# Patient Record
Sex: FEMALE | Race: WHITE | NOT HISPANIC OR LATINO | Employment: OTHER | URBAN - METROPOLITAN AREA
[De-identification: names, ages, dates, MRNs, and addresses within clinical notes are randomized per-mention and may not be internally consistent; named-entity substitution may affect disease eponyms.]

---

## 2023-12-28 ENCOUNTER — HOSPITAL ENCOUNTER (OUTPATIENT)
Dept: GENERAL RADIOLOGY | Facility: HOSPITAL | Age: 73
Discharge: HOME | End: 2023-12-28
Attending: NURSE PRACTITIONER | Admitting: NURSE PRACTITIONER
Payer: MEDICARE

## 2023-12-28 ENCOUNTER — OFFICE VISIT (OUTPATIENT)
Dept: FAMILY MEDICINE | Facility: CLINIC | Age: 73
End: 2023-12-28
Payer: MEDICARE

## 2023-12-28 VITALS
OXYGEN SATURATION: 98 % | SYSTOLIC BLOOD PRESSURE: 233 MMHG | DIASTOLIC BLOOD PRESSURE: 112 MMHG | HEART RATE: 99 BPM | RESPIRATION RATE: 17 BRPM | WEIGHT: 122 LBS | TEMPERATURE: 98.4 F

## 2023-12-28 DIAGNOSIS — R09.89 ABNORMAL LUNG SOUNDS: ICD-10-CM

## 2023-12-28 DIAGNOSIS — R05.2 SUBACUTE COUGH: Primary | ICD-10-CM

## 2023-12-28 DIAGNOSIS — J20.9 ACUTE BRONCHITIS, UNSPECIFIED ORGANISM: ICD-10-CM

## 2023-12-28 DIAGNOSIS — R05.2 SUBACUTE COUGH: ICD-10-CM

## 2023-12-28 PROCEDURE — 99203 OFFICE O/P NEW LOW 30 MIN: CPT | Performed by: NURSE PRACTITIONER

## 2023-12-28 PROCEDURE — 71046 X-RAY EXAM CHEST 2 VIEWS: CPT

## 2023-12-28 RX ORDER — PREDNISONE 20 MG/1
20 TABLET ORAL DAILY
Qty: 5 TABLET | Refills: 0 | Status: SHIPPED | OUTPATIENT
Start: 2023-12-28 | End: 2024-01-07

## 2023-12-28 RX ORDER — ALBUTEROL SULFATE 90 UG/1
1-2 AEROSOL, METERED RESPIRATORY (INHALATION) EVERY 4 HOURS PRN
Qty: 18 G | Refills: 0 | Status: ON HOLD | OUTPATIENT
Start: 2023-12-28 | End: 2024-01-10

## 2023-12-28 RX ORDER — AZITHROMYCIN 250 MG/1
TABLET, FILM COATED ORAL
Qty: 6 TABLET | Refills: 0 | Status: SHIPPED | OUTPATIENT
Start: 2023-12-28 | End: 2024-01-02

## 2023-12-28 NOTE — PROGRESS NOTES
SUBJECTIVE:   Inez Rodriugez is a 73 year old female presenting with a chief complaint of   Chief Complaint   Patient presents with    Respiratory Problems     X 2 months, sinus giving her an issue, difficulty breathing, wheezing, trouble coughing, no OTC medication is helping. No chest pains, wheezing, crackling. Has not been sleeping well.        She is a new patient of [facility].    She is here today for illness x 2 months, she recently moved her about 4 months ago from [place] and she notes that she always has high blood pressure when she comes in because she is scared of the doctor office and she has been to the hospital and had her heart checked out many times for the hypertensions, she has had ECHO and stress done and there is nothing wrong with her heart. She has been on blood pressure medication in the past but she is not on any blood pressure medication now, when she was on BP medication she was drinking alcohol and now she has made a lot of changes in her life and she is taking care of her self better. She is a non smoker, denies history of heart attack. Denies diabetes, stroke history. She did test her self for covid x 2 and she was negative x 2 times. She has been very careful over the last three years and has not left her house for anything for the last 3 years. Denies significant medical history. She does monitor her BP at home and it is always normal at home.     She states for about two months she has had cough, sinus pressure pain, she is having problems with laying down and wheezing, she does have phlegm but she does not get much up. She has tried OTC medication and herbs and steam and nothing is helping her get rid of the sinus congestion. She did try sudafed one time ast night. She has pressure when she touches her head and she states that her head feels like it weighs 100 pounds. She has no appetite. She denies shortness of breath.     Review of Systems    No past medical history on file.  No  family history on file.  Current Outpatient Medications   Medication Sig Dispense Refill    albuterol (PROAIR HFA/PROVENTIL HFA/VENTOLIN HFA) 108 (90 Base) MCG/ACT inhaler Inhale 1-2 puffs into the lungs every 4 hours as needed for shortness of breath, wheezing or cough 18 g 0    azithromycin (ZITHROMAX) 250 MG tablet Take 2 tablets (500 mg) by mouth daily for 1 day, THEN 1 tablet (250 mg) daily for 4 days. 6 tablet 0    predniSONE (DELTASONE) 20 MG tablet Take 1 tablet (20 mg) by mouth daily 5 tablet 0     Social History     Tobacco Use    Smoking status: Not on file    Smokeless tobacco: Not on file   Substance Use Topics    Alcohol use: Not on file       OBJECTIVE  BP (!) 233/112 (BP Location: Right arm, Patient Position: Sitting, Cuff Size: Adult Small)   Pulse 99   Temp 98.4  F (36.9  C) (Oral)   Resp 17   Wt 55.3 kg (122 lb)   SpO2 98%     Physical Exam  Constitutional:       Appearance: Normal appearance.   HENT:      Head: Normocephalic.      Right Ear: Tympanic membrane and ear canal normal.      Left Ear: Ear canal normal.      Nose: Nose normal.      Mouth/Throat:      Mouth: Mucous membranes are moist.      Pharynx: Posterior oropharyngeal erythema present.   Cardiovascular:      Rate and Rhythm: Normal rate and regular rhythm.   Pulmonary:      Breath sounds: Stridor present. Wheezing and rales present.   Abdominal:      General: Abdomen is flat.      Palpations: Abdomen is soft.   Musculoskeletal:      Cervical back: Normal range of motion.   Skin:     General: Skin is dry.   Neurological:      General: No focal deficit present.      Mental Status: She is alert and oriented to person, place, and time.   Psychiatric:         Mood and Affect: Mood normal.         Behavior: Behavior normal.         Labs:  No results found for this or any previous visit (from the past 24 hour(s)).    X-Ray was done, my findings are: clear lung xray     ASSESSMENT:      ICD-10-CM    1. Subacute cough  R05.2 XR Chest 2  Views     albuterol (PROAIR HFA/PROVENTIL HFA/VENTOLIN HFA) 108 (90 Base) MCG/ACT inhaler      2. Abnormal lung sounds  R09.89 XR Chest 2 Views     predniSONE (DELTASONE) 20 MG tablet      3. Acute bronchitis, unspecified organism  J20.9 predniSONE (DELTASONE) 20 MG tablet     azithromycin (ZITHROMAX) 250 MG tablet        PLAN:  - discussed in depth patients blood pressure and the need for follow up care and to establish care, she states she has been working with medicare to fill out all the paper work and so she can get a primary. She states she is healthy and takes care of herself, BP second time on manual cuff still elevated, will have her continue to monitor at home.   - will treat due to duration of illness  - discussed medications and side effects  - take prednisone with food      Followup:    If not improving or if condition worsens, follow up with your Primary Care Provider    There are no Patient Instructions on file for this visit.

## 2024-01-07 ENCOUNTER — HOSPITAL ENCOUNTER (INPATIENT)
Facility: HOSPITAL | Age: 74
LOS: 2 days | Discharge: HOME | DRG: 166 | End: 2024-01-10
Attending: EMERGENCY MEDICINE | Admitting: STUDENT IN AN ORGANIZED HEALTH CARE EDUCATION/TRAINING PROGRAM
Payer: MEDICARE

## 2024-01-07 ENCOUNTER — NURSE TRIAGE (OUTPATIENT)
Dept: NURSING | Facility: CLINIC | Age: 74
End: 2024-01-07
Payer: MEDICARE

## 2024-01-07 ENCOUNTER — APPOINTMENT (OUTPATIENT)
Dept: CT IMAGING | Facility: HOSPITAL | Age: 74
DRG: 166 | End: 2024-01-07
Attending: EMERGENCY MEDICINE
Payer: MEDICARE

## 2024-01-07 DIAGNOSIS — E04.1 THYROID NODULE: ICD-10-CM

## 2024-01-07 DIAGNOSIS — R05.2 SUBACUTE COUGH: ICD-10-CM

## 2024-01-07 DIAGNOSIS — R05.3 CHRONIC COUGH: ICD-10-CM

## 2024-01-07 DIAGNOSIS — Z03.89 OBSERVATION FOR SUSPECTED MALIGNANT NEOPLASM: ICD-10-CM

## 2024-01-07 DIAGNOSIS — J98.8 WHEEZING-ASSOCIATED RESPIRATORY INFECTION (WARI): ICD-10-CM

## 2024-01-07 DIAGNOSIS — I10 BENIGN ESSENTIAL HYPERTENSION: ICD-10-CM

## 2024-01-07 DIAGNOSIS — R91.8 PULMONARY NODULES: ICD-10-CM

## 2024-01-07 DIAGNOSIS — C34.31 MALIGNANT NEOPLASM OF LOWER LOBE OF RIGHT LUNG (H): ICD-10-CM

## 2024-01-07 DIAGNOSIS — J18.9 POSTOBSTRUCTIVE PNEUMONIA: Primary | ICD-10-CM

## 2024-01-07 LAB
ANION GAP SERPL CALCULATED.3IONS-SCNC: 14 MMOL/L (ref 7–15)
BASOPHILS # BLD AUTO: 0 10E3/UL (ref 0–0.2)
BASOPHILS NFR BLD AUTO: 0 %
BUN SERPL-MCNC: 12.6 MG/DL (ref 8–23)
CALCIUM SERPL-MCNC: 9.4 MG/DL (ref 8.8–10.2)
CHLORIDE SERPL-SCNC: 96 MMOL/L (ref 98–107)
CREAT SERPL-MCNC: 0.8 MG/DL (ref 0.51–0.95)
DEPRECATED HCO3 PLAS-SCNC: 25 MMOL/L (ref 22–29)
EGFRCR SERPLBLD CKD-EPI 2021: 77 ML/MIN/1.73M2
EOSINOPHIL # BLD AUTO: 0 10E3/UL (ref 0–0.7)
EOSINOPHIL NFR BLD AUTO: 0 %
ERYTHROCYTE [DISTWIDTH] IN BLOOD BY AUTOMATED COUNT: 14.3 % (ref 10–15)
FLUAV RNA SPEC QL NAA+PROBE: NEGATIVE
FLUBV RNA RESP QL NAA+PROBE: NEGATIVE
GLUCOSE SERPL-MCNC: 98 MG/DL (ref 70–99)
HCT VFR BLD AUTO: 42.9 % (ref 35–47)
HGB BLD-MCNC: 13.9 G/DL (ref 11.7–15.7)
IMM GRANULOCYTES # BLD: 0.1 10E3/UL
IMM GRANULOCYTES NFR BLD: 1 %
LYMPHOCYTES # BLD AUTO: 1.5 10E3/UL (ref 0.8–5.3)
LYMPHOCYTES NFR BLD AUTO: 10 %
MCH RBC QN AUTO: 29 PG (ref 26.5–33)
MCHC RBC AUTO-ENTMCNC: 32.4 G/DL (ref 31.5–36.5)
MCV RBC AUTO: 90 FL (ref 78–100)
MONOCYTES # BLD AUTO: 1.4 10E3/UL (ref 0–1.3)
MONOCYTES NFR BLD AUTO: 9 %
NEUTROPHILS # BLD AUTO: 11.9 10E3/UL (ref 1.6–8.3)
NEUTROPHILS NFR BLD AUTO: 80 %
NRBC # BLD AUTO: 0 10E3/UL
NRBC BLD AUTO-RTO: 0 /100
NT-PROBNP SERPL-MCNC: 95 PG/ML (ref 0–900)
PLATELET # BLD AUTO: 265 10E3/UL (ref 150–450)
POTASSIUM SERPL-SCNC: 4.8 MMOL/L (ref 3.4–5.3)
RBC # BLD AUTO: 4.79 10E6/UL (ref 3.8–5.2)
RSV RNA SPEC NAA+PROBE: NEGATIVE
SARS-COV-2 RNA RESP QL NAA+PROBE: NEGATIVE
SODIUM SERPL-SCNC: 135 MMOL/L (ref 135–145)
T4 FREE SERPL-MCNC: 1.22 NG/DL (ref 0.9–1.7)
TSH SERPL DL<=0.005 MIU/L-ACNC: 0.02 UIU/ML (ref 0.3–4.2)
WBC # BLD AUTO: 14.9 10E3/UL (ref 4–11)

## 2024-01-07 PROCEDURE — 250N000011 HC RX IP 250 OP 636: Performed by: EMERGENCY MEDICINE

## 2024-01-07 PROCEDURE — 96361 HYDRATE IV INFUSION ADD-ON: CPT

## 2024-01-07 PROCEDURE — 80048 BASIC METABOLIC PNL TOTAL CA: CPT | Performed by: EMERGENCY MEDICINE

## 2024-01-07 PROCEDURE — 258N000003 HC RX IP 258 OP 636: Performed by: EMERGENCY MEDICINE

## 2024-01-07 PROCEDURE — 93005 ELECTROCARDIOGRAM TRACING: CPT | Performed by: EMERGENCY MEDICINE

## 2024-01-07 PROCEDURE — 85025 COMPLETE CBC W/AUTO DIFF WBC: CPT | Performed by: EMERGENCY MEDICINE

## 2024-01-07 PROCEDURE — 99222 1ST HOSP IP/OBS MODERATE 55: CPT | Mod: AI | Performed by: STUDENT IN AN ORGANIZED HEALTH CARE EDUCATION/TRAINING PROGRAM

## 2024-01-07 PROCEDURE — G0378 HOSPITAL OBSERVATION PER HR: HCPCS

## 2024-01-07 PROCEDURE — 94640 AIRWAY INHALATION TREATMENT: CPT

## 2024-01-07 PROCEDURE — 250N000013 HC RX MED GY IP 250 OP 250 PS 637

## 2024-01-07 PROCEDURE — 36415 COLL VENOUS BLD VENIPUNCTURE: CPT | Performed by: EMERGENCY MEDICINE

## 2024-01-07 PROCEDURE — 84439 ASSAY OF FREE THYROXINE: CPT

## 2024-01-07 PROCEDURE — 999N000157 HC STATISTIC RCP TIME EA 10 MIN

## 2024-01-07 PROCEDURE — 87637 SARSCOV2&INF A&B&RSV AMP PRB: CPT | Performed by: EMERGENCY MEDICINE

## 2024-01-07 PROCEDURE — 96374 THER/PROPH/DIAG INJ IV PUSH: CPT | Mod: 59

## 2024-01-07 PROCEDURE — 99285 EMERGENCY DEPT VISIT HI MDM: CPT | Mod: 25

## 2024-01-07 PROCEDURE — G1010 CDSM STANSON: HCPCS

## 2024-01-07 PROCEDURE — 99207 PR APP CREDIT; MD BILLING SHARED VISIT: CPT

## 2024-01-07 PROCEDURE — 250N000009 HC RX 250: Performed by: EMERGENCY MEDICINE

## 2024-01-07 PROCEDURE — 83880 ASSAY OF NATRIURETIC PEPTIDE: CPT | Performed by: EMERGENCY MEDICINE

## 2024-01-07 PROCEDURE — 250N000013 HC RX MED GY IP 250 OP 250 PS 637: Performed by: EMERGENCY MEDICINE

## 2024-01-07 PROCEDURE — 94664 DEMO&/EVAL PT USE INHALER: CPT

## 2024-01-07 PROCEDURE — 84443 ASSAY THYROID STIM HORMONE: CPT

## 2024-01-07 RX ORDER — MULTIPLE VITAMINS W/ MINERALS TAB 9MG-400MCG
1 TAB ORAL DAILY
COMMUNITY

## 2024-01-07 RX ORDER — GUAIFENESIN 200 MG/10ML
200 LIQUID ORAL EVERY 4 HOURS PRN
Status: DISCONTINUED | OUTPATIENT
Start: 2024-01-07 | End: 2024-01-08

## 2024-01-07 RX ORDER — IPRATROPIUM BROMIDE AND ALBUTEROL SULFATE 2.5; .5 MG/3ML; MG/3ML
3 SOLUTION RESPIRATORY (INHALATION) ONCE
Status: COMPLETED | OUTPATIENT
Start: 2024-01-07 | End: 2024-01-07

## 2024-01-07 RX ORDER — IPRATROPIUM BROMIDE AND ALBUTEROL SULFATE 2.5; .5 MG/3ML; MG/3ML
3 SOLUTION RESPIRATORY (INHALATION)
Status: DISCONTINUED | OUTPATIENT
Start: 2024-01-07 | End: 2024-01-07

## 2024-01-07 RX ORDER — PREDNISONE 20 MG/1
40 TABLET ORAL DAILY
Status: DISCONTINUED | OUTPATIENT
Start: 2024-01-08 | End: 2024-01-08

## 2024-01-07 RX ORDER — HYDRALAZINE HYDROCHLORIDE 20 MG/ML
10 INJECTION INTRAMUSCULAR; INTRAVENOUS EVERY 6 HOURS PRN
Status: DISCONTINUED | OUTPATIENT
Start: 2024-01-07 | End: 2024-01-08

## 2024-01-07 RX ORDER — METHYLPREDNISOLONE SODIUM SUCCINATE 125 MG/2ML
125 INJECTION, POWDER, LYOPHILIZED, FOR SOLUTION INTRAMUSCULAR; INTRAVENOUS ONCE
Status: COMPLETED | OUTPATIENT
Start: 2024-01-07 | End: 2024-01-07

## 2024-01-07 RX ORDER — PREDNISONE 20 MG/1
40 TABLET ORAL DAILY
Status: DISCONTINUED | OUTPATIENT
Start: 2024-01-07 | End: 2024-01-07

## 2024-01-07 RX ORDER — ACETAMINOPHEN 650 MG/1
650 SUPPOSITORY RECTAL EVERY 4 HOURS PRN
Status: DISCONTINUED | OUTPATIENT
Start: 2024-01-07 | End: 2024-01-10 | Stop reason: HOSPADM

## 2024-01-07 RX ORDER — IPRATROPIUM BROMIDE AND ALBUTEROL SULFATE 2.5; .5 MG/3ML; MG/3ML
3 SOLUTION RESPIRATORY (INHALATION) EVERY 4 HOURS PRN
Status: DISCONTINUED | OUTPATIENT
Start: 2024-01-07 | End: 2024-01-10 | Stop reason: HOSPADM

## 2024-01-07 RX ORDER — MULTIPLE VITAMINS W/ MINERALS TAB 9MG-400MCG
1 TAB ORAL DAILY
Status: DISCONTINUED | OUTPATIENT
Start: 2024-01-07 | End: 2024-01-10 | Stop reason: HOSPADM

## 2024-01-07 RX ORDER — PREDNISONE 20 MG/1
40 TABLET ORAL DAILY
Qty: 14 TABLET | Refills: 0 | Status: SHIPPED | OUTPATIENT
Start: 2024-01-08 | End: 2024-01-09

## 2024-01-07 RX ORDER — LIDOCAINE 40 MG/G
CREAM TOPICAL
Status: DISCONTINUED | OUTPATIENT
Start: 2024-01-07 | End: 2024-01-10 | Stop reason: HOSPADM

## 2024-01-07 RX ORDER — ALBUTEROL SULFATE 0.83 MG/ML
3 SOLUTION RESPIRATORY (INHALATION)
Status: DISCONTINUED | OUTPATIENT
Start: 2024-01-07 | End: 2024-01-10 | Stop reason: HOSPADM

## 2024-01-07 RX ORDER — IOPAMIDOL 755 MG/ML
75 INJECTION, SOLUTION INTRAVASCULAR ONCE
Status: COMPLETED | OUTPATIENT
Start: 2024-01-07 | End: 2024-01-07

## 2024-01-07 RX ORDER — ALBUTEROL SULFATE 90 UG/1
2 AEROSOL, METERED RESPIRATORY (INHALATION) EVERY 6 HOURS PRN
Qty: 18 G | Refills: 0 | Status: SHIPPED | OUTPATIENT
Start: 2024-01-07 | End: 2024-01-10

## 2024-01-07 RX ORDER — ACETAMINOPHEN 325 MG/1
650 TABLET ORAL EVERY 4 HOURS PRN
Status: DISCONTINUED | OUTPATIENT
Start: 2024-01-07 | End: 2024-01-10 | Stop reason: HOSPADM

## 2024-01-07 RX ORDER — BENZONATATE 100 MG/1
100 CAPSULE ORAL 3 TIMES DAILY PRN
Status: DISCONTINUED | OUTPATIENT
Start: 2024-01-07 | End: 2024-01-09

## 2024-01-07 RX ADMIN — Medication 125 MCG: at 18:40

## 2024-01-07 RX ADMIN — GUAIFENESIN 200 MG: 100 SOLUTION ORAL at 18:40

## 2024-01-07 RX ADMIN — METHYLPREDNISOLONE SODIUM SUCCINATE 125 MG: 125 INJECTION, POWDER, FOR SOLUTION INTRAMUSCULAR; INTRAVENOUS at 12:26

## 2024-01-07 RX ADMIN — SODIUM CHLORIDE 500 ML: 9 INJECTION, SOLUTION INTRAVENOUS at 12:23

## 2024-01-07 RX ADMIN — BENZONATATE 100 MG: 100 CAPSULE ORAL at 12:26

## 2024-01-07 RX ADMIN — MULTIPLE VITAMINS W/ MINERALS TAB 1 TABLET: TAB at 18:40

## 2024-01-07 RX ADMIN — IOPAMIDOL 75 ML: 755 INJECTION, SOLUTION INTRAVENOUS at 13:08

## 2024-01-07 RX ADMIN — BENZONATATE 100 MG: 100 CAPSULE ORAL at 18:40

## 2024-01-07 RX ADMIN — IPRATROPIUM BROMIDE AND ALBUTEROL SULFATE 3 ML: .5; 3 SOLUTION RESPIRATORY (INHALATION) at 12:28

## 2024-01-07 ASSESSMENT — ACTIVITIES OF DAILY LIVING (ADL)
DEPENDENT_IADLS:: TRANSPORTATION;SHOPPING
ADLS_ACUITY_SCORE: 35
ADLS_ACUITY_SCORE: 18

## 2024-01-07 ASSESSMENT — ENCOUNTER SYMPTOMS
COUGH: 1
ABDOMINAL PAIN: 0

## 2024-01-07 NOTE — H&P
[facility]    History and Physical - Hospitalist Service       Date of Admission:  1/7/2024    Assessment & Plan    Inez Rodriguez is a 73 year old female admitted on 1/7/2024 for cough, shortness of breath, abnormal chest CT suspicious for malignancy.     Cough   Shortness of Breath  Chest discomfort  Patient reports cough, SOB x 2-3 months, worsening   - EKG sinus tach, no ischemic changes per my read  - COVID, Flu and RSV negative  - Recently given azithro and prednisone at previous office visit 12/28  - CT Chest: Mediastinal and right hilar adenopathy most suggestive of metastatic adenopathy from a primary lung malignancy. A nodular opacity in the right upper lobe could represent a primary lung malignancy versus an infectious/inflammatory focus . (Negative for PE)  - Pulreji diaz in ED, plan for bronchoscopy tomorrow. NPO at midnight   - Pulm consult placed  - Solu-Medrol/Duoneb given in ED  - PRN albuterol neb and DuoNeb. PRN robitussin and tessalon    - Prednisone 40 mg daily starting tomorrow   - Not currently requiring supp O2, continuous pulseox and O2 PRN     Leukocytosis   - Suspect secondary to prednisone use PTA   - CBC trend     Thyroid Nodule   - CT Chest: Left thyroid nodule which could be further evaluated with nonemergent thyroid ultrasound   - TSH with reflex pending    Elevated blood pressures without the diagnosis of hypertension   - SBPs 190 at admit   - Denies hx of HTN, no home meds   - Hydralazine PRN           Diet:  Regular Adult  DVT Prophylaxis: Pneumatic Compression Devices and Ambulate every shift  Ansari Catheter: Not present  Lines: None     Cardiac Monitoring: None  Code Status:  Full     Clinically Significant Risk Factors Present on Admission                                  Disposition Plan      Expected Discharge Date: 01/08/2024                The patient's care was discussed with the Attending Physician, Dr. Armin Florentino who independently met with  "and assessed the patient and is agreement with the assessment and plan.     Rosanne Goins PA-C  Hospitalist Service  [facility]  Securely message with [employer] (more info)  Text page via [employer] Paging/Directory     ______________________________________________________________________    Chief Complaint   Cough  Shortness of breath   Difficulty breathing     History is obtained from the patient, patient's son     History of Present Illness   Inez Rodriguez is a 73 year old female who presented to the ED with complaints of worsening cough and shortness of breath that has been present for 2-3 months. Patient states she was seen at a clinic 12/28/23, had CXR which was clear and dx'd with acute bronchitis. Given azithromycin and prednisone which patient states she has taken without improvement in symptoms, and states she feels worsening symptoms since. Was given albuterol inhaler which she has also used without improvement in sxs. Reports \"chest discomfort\" which she states is secondary to coughing. Denies leg swelling, leg pain. No history of COPD, asthma. Reports \"occasional\" cigarette in remote past, but has not smoked since 1987. Does report 2nd hand smoke exposure, lived with smoker.     Patient recently moved to [place] with her son.     Past Medical History    No past medical history on file.    Past Surgical History   No past surgical history on file.    Prior to Admission Medications   Prior to Admission Medications   Prescriptions Last Dose Informant Patient Reported? Taking?   albuterol (PROAIR HFA/PROVENTIL HFA/VENTOLIN HFA) 108 (90 Base) MCG/ACT inhaler   No No   Sig: Inhale 1-2 puffs into the lungs every 4 hours as needed for shortness of breath, wheezing or cough   predniSONE (DELTASONE) 20 MG tablet   No No   Sig: Take 1 tablet (20 mg) by mouth daily      Facility-Administered Medications: None           Physical Exam   Vital Signs: Temp: 98.4  F (36.9  C) Temp src: Tympanic BP: (!) 162/79 " Pulse: 108   Resp: 16 SpO2: 95 % O2 Device: None (Room air)    Weight: 122 lbs 0 oz    Constitutional: awake, alert, no apparent distress, and appears stated age  Respiratory: No increased work of breathing, no accessory muscle use. Inspiratory wheezes + rhonci bilaterally, faint expiratory wheezes.  Cardiovascular: Regular rate and rhythm, normal S1 and S2, no S3 or S4, and no murmur noted  Skin: Normal skin color, texture, turgor. No rashes and no jaundice  Musculoskeletal: no lower extremity pitting edema present  Neuropsychiatric: Appropriate mood, affect and eye contact. Cooperative.      Medical Decision Making             Data     I have personally reviewed the following data over the past 24 hrs:    14.9 (H)  \   13.9   / 265     135 96 (L) 12.6 /  98   4.8 25 0.80 \     Trop: N/A BNP: 95       Imaging results reviewed over the past 24 hrs:   Recent Results (from the past 24 hour(s))   CT Chest Pulmonary Embolism w Contrast    Narrative    EXAM: CT CHEST PULMONARY EMBOLISM W CONTRAST  LOCATION: [facility]  DATE: 1/7/2024    INDICATION: Cough. Shortness of breath.  COMPARISON: Chest x-ray 12/28/2023.  TECHNIQUE: CT chest pulmonary angiogram during arterial phase injection of IV contrast. Multiplanar reformats and MIP reconstructions were performed. Dose reduction techniques were used.   CONTRAST: 75 mL Isovue-370.    FINDINGS:  ANGIOGRAM CHEST: Pulmonary arteries are normal caliber and negative for pulmonary emboli. Thoracic aorta is negative for dissection within the limitations of cardiac motion artifact. Moderate burden of atherosclerotic disease of the thoracic aorta.     LUNGS AND PLEURA: Right upper lobe nodular opacity measuring 2.1 x 1.9 x 1.0 cm (series 6, image 109). Ill-defined cluster of nodular opacities in the right upper lobe (series 6, image 75), favored infectious or inflammatory. Mild centrilobular   emphysema. No pleural effusion.    MEDIASTINUM/AXILLAE:  Heterogeneous left thyroid lobe with a 1.5 cm hypoattenuating left thyroid nodule. There is mediastinal and right hilar adenopathy including right upper paratracheal, subcarinal, and right hilar nodes. Index right upper paratracheal   lymph node measures 2.2 cm in short axis and the subcarinal nichole conglomerate measures 3.0 cm in short axis. Right hilar nichole conglomerate demonstrates mass effect on the right upper lobe bronchus and bronchus intermedius with associated narrowing   without occlusion. Normal heart size with trace pericardial effusion.    CORONARY ARTERY CALCIFICATION: Moderate.    UPPER ABDOMEN: Lobulated contour of both kidneys. Atherosclerotic disease of the partially imaged abdominal aorta.    MUSCULOSKELETAL: No aggressive appearing osseous lesion.      Impression    IMPRESSION:  1.  Mediastinal and right hilar adenopathy most suggestive of metastatic adenopathy from a primary lung malignancy. A nodular opacity in the right upper lobe could represent a primary lung malignancy versus an infectious/inflammatory focus. Recommend   referral to pulmonology for tissue sampling.  2.  Nichole conglomerate demonstrate mass effect on the right upper lobe bronchus and bronchus intermedius with associated narrowing without occlusion.  3.  Left thyroid nodule which could be further evaluated with nonemergent thyroid ultrasound.  4.  No evidence of acute pulmonary thromboembolic disease.

## 2024-01-07 NOTE — TELEPHONE ENCOUNTER
Patient's son, ann Cooper.     Reports that pt. recently completed course of antibiotics and prednisone for URI. Concerned that symptoms are getting worse.  No consent on file; Obtained verbal consent from patient and patient took over call for triage.    Patient with persistent cough and some wheezing, that is worse when laying down. No fever. C/o having no energy. Also reports sore throat, and an achy pain on the right side of her chest under the collar bone. Patient states that the pain comes and goes, and thinks that it is related to laying on her right side as she reports sore back muscles on the side. Pain not present now.    Disposition: Go to ED Now. Patient verbalized understanding and agrees with plan of care.    Sherie Taylor RN on 1/7/2024 at 11:00 AM     Reason for Disposition   Breathing difficulty   [1] MODERATE difficulty breathing (e.g., speaks in phrases, SOB even at rest, pulse 100-120) AND [2] NEW-onset or WORSE than normal    Additional Information   Negative: SEVERE difficulty breathing (e.g., struggling for each breath, speaks in single words)   Negative: [1] Breathing stopped AND [2] hasn't returned   Negative: Bluish (or gray) lips or face now   Negative: Choking on something   Negative: Difficult to awaken or acting confused (e.g., disoriented, slurred speech)   Negative: Passed out (i.e., lost consciousness, collapsed and was not responding)   Negative: Wheezing started suddenly after medicine, an allergic food or bee sting   Negative: Stridor (harsh sound while breathing in)   Negative: Slow, shallow and weak breathing   Negative: Sounds like a life-threatening emergency to the triager    Protocols used: Breathing Difficulty-A-AH, Respiratory Multiple Symptoms - Guideline [id number]

## 2024-01-07 NOTE — ED TRIAGE NOTES
Pt was diagnosed with pneumonia 10 days ago-put on prednisone, alb inhaler and antibiotics. Was feeling better after this at first but then the cough started up again. Has been fatigued and not eating much. Son is with pt.

## 2024-01-07 NOTE — ED PROVIDER NOTES
EMERGENCY DEPARTMENT ENCOUNTER      NAME: Inez Rodriguez  AGE: 73 year old female  YOB: 1950  MRN: [id number]  EVALUATION DATE & TIME: 1/7/2024 11:33 AM    PCP: No Ref-Primary, Physician    ED PROVIDER: Christine Christy M.D.      CHIEF COMPLAINT     Chief Complaint   Patient presents with    Cough         FINAL IMPRESSION:     1. Chronic cough    2. Wheezing-associated respiratory infection (WARI)    3. Pulmonary nodules    4. Thyroid nodule    5. Observation for suspected malignant neoplasm          MEDICAL DECISION MAKING:       Pertinent Labs & Imaging studies reviewed. (See chart for details)    73 year old female presents to the Emergency Department for evaluation of cough    History  Supplemental history from son  External Record(s) review as documented below    Exam  Nontoxic cooperative and pleasant when she starts talking she does have a harsh cough.  Not staccato.  Bilateral expiratory wheezes.    Differential Diagnosis include but not limited to pneumonia, COVID, reactive airway disease, congestive heart failure, aspiration among others      Vital Signs: Hypertensive  EKG: Sinus tachycardia normal anterior progression normal axis  Imaging: I independently interpreted CT chest.Formal read by radiologist.  Home meds: Reviewed  ED meds: Solu-Medrol albuterol Atrovent normal saline Tessalon Perles  Fluids: Normal saline  Labs:  K 4.8  Cr 0.80  Wbc 14.9  Hgb 13.9  platelets 265    Clinical Impression and Decision Making    73-year-old female presents with her son.  She has been having a cough for at least 2 months.  Was seen at the clinic was given prednisone azithromycin and albuterol has not improved.  Cough is nonproductive.    On examination she is nontoxic has a harsh cough but is able to speak in full sentences.  Bilateral expiratory wheezes.    Given the expiratory wheezes will do albuterol Atrovent will do Solu-Medrol will scan chest.    EKG reveals sinus tachycardia with no acute or  normalities.    CT reveals no pulmonary embolism but unfortunately multiple nodules concerning for lung primary with mets and a thyroid nodule.  Spoke again with pulmonology states she could be scheduled for outpatient bronchoscopy if patient does not feel well she could be admitted.  Patient notified she says she feels terrible.  Spoke with hospitalist.  Patient admitted stable condition with guarded diagnosis.    In addition to the work out documented, I considered the following work up antibiotics.  clinically no evidence of bacterial infection.           Medical Decision Making  Obtained supplemental history:Supplemental history obtained?: Documented in chart  Reviewed external records: External records reviewed?: Documented in chart  Care impacted by chronic illness:N/A  Care significantly affected by social determinants of health:N/A  Did you consider but not order tests?: Work up considered but not performed and documented in chart, if applicable  Did you interpret images independently?: Independent interpretation of ECG and images noted in documentation, when applicable.  Consultation discussion with other provider:Did you involve another provider (consultant, , pharmacy, etc.)?: I discussed the care with another health care provider, see documentation for details.  Discharge. I prescribed additional prescription strength medication(s) as charted. See documentation for any additional details.      Review of External Records  Hospital/Clinic:  Date: 12/28/23  2 months of cough, sinus drainage, and wheezing  /112.   Prescribed prednisone, azithromycin, and albuterol.      External Consultation  Pulmonologist - Dr. Mayfield      ED COURSE     11:53 AM I met with the patient, obtained history, performed an initial exam, and discussed options and plan for diagnostics and treatment here in the ED.  1:15 PM Checked in on and updated patient. She is feeling much better. We discussed the plan for discharge  and the patient is agreeable. Reviewed supportive cares, symptomatic treatment, outpatient follow up, and reasons to return to the Emergency Department. Patient to be discharged by ED RN.   1:24 PM Paged pulmonology.   1:28 PM Spoke to pulmonology, Dr. Mayfield.  2:53 PM spoke with hospitalist.  Assess patient MedSurg observation.  2:53 PM reevaluated and updated.    At the conclusion of the encounter I discussed the results of all of the tests and the disposition. The questions were answered. The patient and son acknowledged understanding and was agreeable with the care plan.         MEDICATIONS GIVEN IN THE EMERGENCY:     Medications   benzonatate (TESSALON) capsule 100 mg (100 mg Oral $Given 1/7/24 1226)   methylPREDNISolone sodium succinate (solu-MEDROL) injection 125 mg (125 mg Intravenous $Given 1/7/24 1226)   ipratropium - albuterol 0.5 mg/2.5 mg/3 mL (DUONEB) neb solution 3 mL (3 mLs Nebulization $Given 1/7/24 1228)   sodium chloride 0.9% BOLUS 500 mL (0 mLs Intravenous Stopped 1/7/24 1330)   iopamidol (ISOVUE-370) solution 75 mL (75 mLs Intravenous $Given 1/7/24 1308)       NEW PRESCRIPTIONS STARTED AT TODAY'S ER VISIT     New Prescriptions    ALBUTEROL (PROAIR HFA/PROVENTIL HFA/VENTOLIN HFA) 108 (90 BASE) MCG/ACT INHALER    Inhale 2 puffs into the lungs every 6 hours as needed for shortness of breath, wheezing or cough    PREDNISONE (DELTASONE) 20 MG TABLET    Take 2 tablets (40 mg) by mouth daily for 7 days Take two tablets (= 40mg) each day for 5 (five) days          =================================================================    HPI     Patient information was obtained from: patient and patient's son    Use of : N/A        Inez Rodriguez is a 73 year old female who presents by walk in for evaluation of cough.    Since November 2023 (2 months ago), the patient has been having a cough. She was seen for evaluation and prescribed steroids, antibiotics, and albuterol. Son says patient's cough  "has worsened since taking the antibiotics. The patient is vaccinated against COVID-19. Son says the patient has \"white coat syndrome\", and the patient's blood pressure is frequently elevated in medical settings. Patient denies a diagnosis of hypertension. Reports her blood pressure at home is 115-120/70-80. Denies smoking. In August 2023, patient and her son moved to [place] from [place]. No abdominal pain or any other complaints at this time.      REVIEW OF SYSTEMS   Review of Systems   Respiratory:  Positive for cough.    Gastrointestinal:  Negative for abdominal pain.       PAST MEDICAL HISTORY:   No past medical history on file.    PAST SURGICAL HISTORY:   No past surgical history on file.      CURRENT MEDICATIONS:   albuterol (PROAIR HFA/PROVENTIL HFA/VENTOLIN HFA) 108 (90 Base) MCG/ACT inhaler  [START ON 1/8/2024] predniSONE (DELTASONE) 20 MG tablet  albuterol (PROAIR HFA/PROVENTIL HFA/VENTOLIN HFA) 108 (90 Base) MCG/ACT inhaler         ALLERGIES:   No Known Allergies    FAMILY HISTORY:   No family history on file.    SOCIAL HISTORY:     Social History     Socioeconomic History    Marital status:        VITALS:   BP (!) 162/79   Pulse 108   Temp 98.4  F (36.9  C) (Tympanic)   Resp 16   Ht 1.651 m (5' 5\")   Wt 55.3 kg (122 lb)   SpO2 95%   BMI 20.30 kg/m      PHYSICAL EXAM     Physical Exam  Vitals and nursing note reviewed. Exam conducted with a chaperone present.   Constitutional:       General: She is not in acute distress.     Appearance: Normal appearance. She is normal weight. She is not ill-appearing, toxic-appearing or diaphoretic.   Pulmonary:      Breath sounds: Wheezing and rhonchi present.   Neurological:      Mental Status: She is alert.         Physical Exam   Constitutional: Well appearing. In no distress.    Head: Atraumatic.     Nose: Nose normal.     Mouth/Throat: Oropharynx is clear and moist.     Eyes: EOM are normal. Pupils are equal, round, and reactive to light.     Ears: " Bilateral pearly white tympanic membranes.    Neck: Normal range of motion. Neck supple.     Cardiovascular: Normal rate, regular rhythm and normal heart sounds.      Pulmonary/Chest: Normal effort  and breath sounds normal.     Abdominal: soft nontender.    Musculoskeletal: Normal range of motion.     Neurological: Moves upper and lower extremities equally.    Lymphatics: no edema    : NA    Skin: Skin is warm and dry.     Psychiatric: Normal mood and affect. Behavior is normal.       LAB:     All pertinent labs reviewed and interpreted.  Labs Ordered and Resulted from Time of ED Arrival to Time of ED Departure   BASIC METABOLIC PANEL - Abnormal       Result Value    Sodium 135      Potassium 4.8      Chloride 96 (*)     Carbon Dioxide (CO2) 25      Anion Gap 14      Urea Nitrogen 12.6      Creatinine 0.80      GFR Estimate 77      Calcium 9.4      Glucose 98     CBC WITH PLATELETS AND DIFFERENTIAL - Abnormal    WBC Count 14.9 (*)     RBC Count 4.79      Hemoglobin 13.9      Hematocrit 42.9      MCV 90      MCH 29.0      MCHC 32.4      RDW 14.3      Platelet Count 265      % Neutrophils 80      % Lymphocytes 10      % Monocytes 9      % Eosinophils 0      % Basophils 0      % Immature Granulocytes 1      NRBCs per 100 WBC 0      Absolute Neutrophils 11.9 (*)     Absolute Lymphocytes 1.5      Absolute Monocytes 1.4 (*)     Absolute Eosinophils 0.0      Absolute Basophils 0.0      Absolute Immature Granulocytes 0.1      Absolute NRBCs 0.0     NT PROBNP INPATIENT - Normal    N terminal Pro BNP Inpatient 95     INFLUENZA A/B, RSV, & SARS-COV2 PCR - Normal    Influenza A PCR Negative      Influenza B PCR Negative      RSV PCR Negative      SARS CoV2 PCR Negative          RADIOLOGY:     Reviewed all pertinent imaging. Please see official radiology report.  CT Chest Pulmonary Embolism w Contrast   Final Result   IMPRESSION:   1.  Mediastinal and right hilar adenopathy most suggestive of metastatic adenopathy from a  primary lung malignancy. A nodular opacity in the right upper lobe could represent a primary lung malignancy versus an infectious/inflammatory focus. Recommend    referral to pulmonology for tissue sampling.   2.  [employer] demonstrate mass effect on the right upper lobe bronchus and bronchus intermedius with associated narrowing without occlusion.   3.  Left thyroid nodule which could be further evaluated with nonemergent thyroid ultrasound.   4.  No evidence of acute pulmonary thromboembolic disease.           EKG:     EKG #1  Sinus tachycardia normal anterior progression normal axis    Time:104847    Ventricular rate 107 bmp  Axis normal  HI interval 162 ms  QRS duration 88 ms  QT//467 ms    Compared to previous EKG on no previous available for comparison    I have independently reviewed and interpreted the EKG(s) documented above.      PROCEDURES:     Procedures      I, Karen Martin, am serving as a scribe to document services personally performed by Dr. Christy based on my observation and the provider's statements to me. I, Christine Christy MD attest that Karen Martin is acting in a scribe capacity, has observed my performance of the services and has documented them in accordance with my direction.    Christine Christy M.D.  Emergency Medicine  [facility] EMERGENCY DEPARTMENT  [facility] [facility] [address] [phone]  Dept: [phone]       Christine Christy MD  01/07/24 5118

## 2024-01-07 NOTE — PHARMACY-ADMISSION MEDICATION HISTORY
Pharmacist Admission Medication History    Admission medication history is complete. The information provided in this note is only as accurate as the sources available at the time of the update.    Information Source(s): Patient and Family member via in-person    Pertinent Information: patient reports taking numerous vitamins/supplements including potassium, magnesium, calcium, and zinc (writer was unable to get dosage details on all of them). Family member was able to look up a few products to determine dosage and instructions, added to list as able.  Patient also reports consuming higher amounts of garlic, kale, ginger, and apple cider vinegar in a drink she made daily up until ~2-3 weeks ago.     Changes made to PTA medication list:  Added: OTCs/supplements - Nyquil, multivitamin, vitamin D, beta glucan  Deleted: prednisone (therapy completed)  Changed: None    Allergies reviewed with patient and updates made in EHR: yes    Medication History Completed By: Nicollette McMann, RP 1/7/2024 3:32 PM    Prior to Admission medications    Medication Sig Last Dose Taking? Auth Provider Long Term End Date   albuterol (PROAIR HFA/PROVENTIL HFA/VENTOLIN HFA) 108 (90 Base) MCG/ACT inhaler Inhale 1-2 puffs into the lungs every 4 hours as needed for shortness of breath, wheezing or cough  at PRN Yes Shelley Pederson, NP Yes    BETA GLUCAN PO Take 1,000 mg by mouth daily 1/6/2024 Yes Unknown, Entered By History     DM-Doxylamine-Acetaminophen (NYQUIL COLD & FLU PO) Take 2 capsules by mouth every 6 hours as needed (cold and flu symptoms) 1/6/2024 Yes Unknown, Entered By History     multivitamin w/minerals (THERA-VIT-M) tablet Take 1 tablet by mouth daily 1/6/2024 Yes Unknown, Entered By History     Vitamin D3 (CHOLECALCIFEROL) 125 MCG (5000 UT) tablet Take 125 mcg by mouth daily 1/6/2024 Yes Unknown, Entered By History

## 2024-01-08 ENCOUNTER — ANESTHESIA (OUTPATIENT)
Dept: SURGERY | Facility: HOSPITAL | Age: 74
DRG: 166 | End: 2024-01-08
Payer: MEDICARE

## 2024-01-08 ENCOUNTER — TRANSFERRED RECORDS (OUTPATIENT)
Dept: HEALTH INFORMATION MANAGEMENT | Facility: CLINIC | Age: 74
End: 2024-01-08

## 2024-01-08 ENCOUNTER — ANESTHESIA EVENT (OUTPATIENT)
Dept: SURGERY | Facility: HOSPITAL | Age: 74
DRG: 166 | End: 2024-01-08
Payer: MEDICARE

## 2024-01-08 LAB
% LINING CELLS, BODY FLUID: 3 %
ANION GAP SERPL CALCULATED.3IONS-SCNC: 13 MMOL/L (ref 7–15)
APPEARANCE FLD: ABNORMAL
BUN SERPL-MCNC: 15.1 MG/DL (ref 8–23)
CALCIUM SERPL-MCNC: 9.9 MG/DL (ref 8.8–10.2)
CELL COUNT BODY FLUID SOURCE: ABNORMAL
CHLORIDE SERPL-SCNC: 102 MMOL/L (ref 98–107)
COLOR FLD: ABNORMAL
CREAT SERPL-MCNC: 0.61 MG/DL (ref 0.51–0.95)
DEPRECATED HCO3 PLAS-SCNC: 26 MMOL/L (ref 22–29)
EGFRCR SERPLBLD CKD-EPI 2021: >90 ML/MIN/1.73M2
EOSINOPHIL NFR FLD MANUAL: 1 %
ERYTHROCYTE [DISTWIDTH] IN BLOOD BY AUTOMATED COUNT: 14.1 % (ref 10–15)
GLUCOSE SERPL-MCNC: 146 MG/DL (ref 70–99)
HCT VFR BLD AUTO: 42.7 % (ref 35–47)
HGB BLD-MCNC: 13.9 G/DL (ref 11.7–15.7)
HOLD SPECIMEN: NORMAL
INR PPP: 1.1 (ref 0.85–1.15)
KOH PREPARATION: NORMAL
KOH PREPARATION: NORMAL
LYMPHOCYTES NFR FLD MANUAL: 4 %
MCH RBC QN AUTO: 28.9 PG (ref 26.5–33)
MCHC RBC AUTO-ENTMCNC: 32.6 G/DL (ref 31.5–36.5)
MCV RBC AUTO: 89 FL (ref 78–100)
MONOS+MACROS NFR FLD MANUAL: 9 %
NEUTS BAND NFR FLD MANUAL: 83 %
PLATELET # BLD AUTO: 305 10E3/UL (ref 150–450)
POTASSIUM SERPL-SCNC: 4.5 MMOL/L (ref 3.4–5.3)
PROCALCITONIN SERPL IA-MCNC: 0.41 NG/ML
RBC # BLD AUTO: 4.81 10E6/UL (ref 3.8–5.2)
SODIUM SERPL-SCNC: 141 MMOL/L (ref 135–145)
WBC # BLD AUTO: 12.1 10E3/UL (ref 4–11)
WBC # FLD AUTO: 90 /UL

## 2024-01-08 PROCEDURE — 999N000157 HC STATISTIC RCP TIME EA 10 MIN

## 2024-01-08 PROCEDURE — 31624 DX BRONCHOSCOPE/LAVAGE: CPT | Performed by: INTERNAL MEDICINE

## 2024-01-08 PROCEDURE — 87210 SMEAR WET MOUNT SALINE/INK: CPT | Performed by: INTERNAL MEDICINE

## 2024-01-08 PROCEDURE — 99222 1ST HOSP IP/OBS MODERATE 55: CPT | Mod: 25 | Performed by: INTERNAL MEDICINE

## 2024-01-08 PROCEDURE — 85027 COMPLETE CBC AUTOMATED: CPT

## 2024-01-08 PROCEDURE — 250N000011 HC RX IP 250 OP 636: Performed by: INTERNAL MEDICINE

## 2024-01-08 PROCEDURE — 250N000011 HC RX IP 250 OP 636

## 2024-01-08 PROCEDURE — 87206 SMEAR FLUORESCENT/ACID STAI: CPT | Performed by: INTERNAL MEDICINE

## 2024-01-08 PROCEDURE — 370N000017 HC ANESTHESIA TECHNICAL FEE, PER MIN: Performed by: INTERNAL MEDICINE

## 2024-01-08 PROCEDURE — 0BBC8ZX EXCISION OF RIGHT UPPER LUNG LOBE, VIA NATURAL OR ARTIFICIAL OPENING ENDOSCOPIC, DIAGNOSTIC: ICD-10-PCS | Performed by: INTERNAL MEDICINE

## 2024-01-08 PROCEDURE — 710N000009 HC RECOVERY PHASE 1, LEVEL 1, PER MIN: Performed by: INTERNAL MEDICINE

## 2024-01-08 PROCEDURE — 80048 BASIC METABOLIC PNL TOTAL CA: CPT

## 2024-01-08 PROCEDURE — 31653 BRONCH EBUS SAMPLNG 3/> NODE: CPT | Performed by: INTERNAL MEDICINE

## 2024-01-08 PROCEDURE — 0BB38ZX EXCISION OF RIGHT MAIN BRONCHUS, VIA NATURAL OR ARTIFICIAL OPENING ENDOSCOPIC, DIAGNOSTIC: ICD-10-PCS | Performed by: INTERNAL MEDICINE

## 2024-01-08 PROCEDURE — 250N000012 HC RX MED GY IP 250 OP 636 PS 637

## 2024-01-08 PROCEDURE — 272N000001 HC OR GENERAL SUPPLY STERILE: Performed by: INTERNAL MEDICINE

## 2024-01-08 PROCEDURE — 999N000248 HC STATISTIC IV INSERT WITH US BY RN

## 2024-01-08 PROCEDURE — 0B948ZX DRAINAGE OF RIGHT UPPER LOBE BRONCHUS, VIA NATURAL OR ARTIFICIAL OPENING ENDOSCOPIC, DIAGNOSTIC: ICD-10-PCS | Performed by: INTERNAL MEDICINE

## 2024-01-08 PROCEDURE — 87102 FUNGUS ISOLATION CULTURE: CPT | Performed by: INTERNAL MEDICINE

## 2024-01-08 PROCEDURE — 120N000001 HC R&B MED SURG/OB

## 2024-01-08 PROCEDURE — 36415 COLL VENOUS BLD VENIPUNCTURE: CPT

## 2024-01-08 PROCEDURE — 87205 SMEAR GRAM STAIN: CPT | Performed by: INTERNAL MEDICINE

## 2024-01-08 PROCEDURE — 88342 IMHCHEM/IMCYTCHM 1ST ANTB: CPT | Mod: TC | Performed by: INTERNAL MEDICINE

## 2024-01-08 PROCEDURE — 88173 CYTOPATH EVAL FNA REPORT: CPT | Mod: TC | Performed by: INTERNAL MEDICINE

## 2024-01-08 PROCEDURE — 250N000013 HC RX MED GY IP 250 OP 250 PS 637: Performed by: INTERNAL MEDICINE

## 2024-01-08 PROCEDURE — G0378 HOSPITAL OBSERVATION PER HR: HCPCS

## 2024-01-08 PROCEDURE — 89050 BODY FLUID CELL COUNT: CPT | Performed by: INTERNAL MEDICINE

## 2024-01-08 PROCEDURE — 88305 TISSUE EXAM BY PATHOLOGIST: CPT | Mod: TC | Performed by: INTERNAL MEDICINE

## 2024-01-08 PROCEDURE — 84145 PROCALCITONIN (PCT): CPT | Performed by: INTERNAL MEDICINE

## 2024-01-08 PROCEDURE — 99233 SBSQ HOSP IP/OBS HIGH 50: CPT | Performed by: INTERNAL MEDICINE

## 2024-01-08 PROCEDURE — 250N000013 HC RX MED GY IP 250 OP 250 PS 637

## 2024-01-08 PROCEDURE — 07D78ZX EXTRACTION OF THORAX LYMPHATIC, VIA NATURAL OR ARTIFICIAL OPENING ENDOSCOPIC, DIAGNOSTIC: ICD-10-PCS | Performed by: INTERNAL MEDICINE

## 2024-01-08 PROCEDURE — 0B9C8ZX DRAINAGE OF RIGHT UPPER LUNG LOBE, VIA NATURAL OR ARTIFICIAL OPENING ENDOSCOPIC, DIAGNOSTIC: ICD-10-PCS | Performed by: INTERNAL MEDICINE

## 2024-01-08 PROCEDURE — 360N000077 HC SURGERY LEVEL 4, PER MIN: Performed by: INTERNAL MEDICINE

## 2024-01-08 PROCEDURE — 999N000141 HC STATISTIC PRE-PROCEDURE NURSING ASSESSMENT: Performed by: INTERNAL MEDICINE

## 2024-01-08 PROCEDURE — 85610 PROTHROMBIN TIME: CPT | Performed by: STUDENT IN AN ORGANIZED HEALTH CARE EDUCATION/TRAINING PROGRAM

## 2024-01-08 PROCEDURE — 250N000009 HC RX 250

## 2024-01-08 PROCEDURE — 31625 BRONCHOSCOPY W/BIOPSY(S): CPT | Performed by: INTERNAL MEDICINE

## 2024-01-08 PROCEDURE — 250N000013 HC RX MED GY IP 250 OP 250 PS 637: Performed by: EMERGENCY MEDICINE

## 2024-01-08 PROCEDURE — 258N000003 HC RX IP 258 OP 636

## 2024-01-08 RX ORDER — NALOXONE HYDROCHLORIDE 1 MG/ML
0.4 INJECTION INTRAMUSCULAR; INTRAVENOUS; SUBCUTANEOUS
Status: DISCONTINUED | OUTPATIENT
Start: 2024-01-08 | End: 2024-01-10 | Stop reason: HOSPADM

## 2024-01-08 RX ORDER — PROPOFOL 10 MG/ML
INJECTION, EMULSION INTRAVENOUS PRN
Status: DISCONTINUED | OUTPATIENT
Start: 2024-01-08 | End: 2024-01-08

## 2024-01-08 RX ORDER — FENTANYL CITRATE 50 UG/ML
INJECTION, SOLUTION INTRAMUSCULAR; INTRAVENOUS PRN
Status: DISCONTINUED | OUTPATIENT
Start: 2024-01-08 | End: 2024-01-08

## 2024-01-08 RX ORDER — FENTANYL CITRATE 50 UG/ML
25 INJECTION, SOLUTION INTRAMUSCULAR; INTRAVENOUS EVERY 5 MIN PRN
Status: DISCONTINUED | OUTPATIENT
Start: 2024-01-08 | End: 2024-01-08

## 2024-01-08 RX ORDER — ONDANSETRON 4 MG/1
4 TABLET, ORALLY DISINTEGRATING ORAL EVERY 6 HOURS PRN
Status: DISCONTINUED | OUTPATIENT
Start: 2024-01-08 | End: 2024-01-10 | Stop reason: HOSPADM

## 2024-01-08 RX ORDER — ONDANSETRON 2 MG/ML
INJECTION INTRAMUSCULAR; INTRAVENOUS PRN
Status: DISCONTINUED | OUTPATIENT
Start: 2024-01-08 | End: 2024-01-08

## 2024-01-08 RX ORDER — LIDOCAINE 40 MG/G
CREAM TOPICAL
Status: DISCONTINUED | OUTPATIENT
Start: 2024-01-08 | End: 2024-01-08 | Stop reason: HOSPADM

## 2024-01-08 RX ORDER — HYDROMORPHONE HYDROCHLORIDE 1 MG/ML
0.2 INJECTION, SOLUTION INTRAMUSCULAR; INTRAVENOUS; SUBCUTANEOUS EVERY 5 MIN PRN
Status: DISCONTINUED | OUTPATIENT
Start: 2024-01-08 | End: 2024-01-08 | Stop reason: HOSPADM

## 2024-01-08 RX ORDER — DIPHENHYDRAMINE HCL 25 MG
25 CAPSULE ORAL EVERY 6 HOURS PRN
Status: DISCONTINUED | OUTPATIENT
Start: 2024-01-08 | End: 2024-01-10 | Stop reason: HOSPADM

## 2024-01-08 RX ORDER — NALOXONE HYDROCHLORIDE 1 MG/ML
0.2 INJECTION INTRAMUSCULAR; INTRAVENOUS; SUBCUTANEOUS
Status: DISCONTINUED | OUTPATIENT
Start: 2024-01-08 | End: 2024-01-10 | Stop reason: HOSPADM

## 2024-01-08 RX ORDER — ONDANSETRON 2 MG/ML
4 INJECTION INTRAMUSCULAR; INTRAVENOUS EVERY 6 HOURS PRN
Status: DISCONTINUED | OUTPATIENT
Start: 2024-01-08 | End: 2024-01-10 | Stop reason: HOSPADM

## 2024-01-08 RX ORDER — LANOLIN ALCOHOL/MO/W.PET/CERES
3 CREAM (GRAM) TOPICAL
Status: DISCONTINUED | OUTPATIENT
Start: 2024-01-08 | End: 2024-01-10 | Stop reason: HOSPADM

## 2024-01-08 RX ORDER — ONDANSETRON 2 MG/ML
4 INJECTION INTRAMUSCULAR; INTRAVENOUS EVERY 30 MIN PRN
Status: DISCONTINUED | OUTPATIENT
Start: 2024-01-08 | End: 2024-01-08 | Stop reason: HOSPADM

## 2024-01-08 RX ORDER — FLUMAZENIL 0.1 MG/ML
0.2 INJECTION, SOLUTION INTRAVENOUS
Status: ACTIVE | OUTPATIENT
Start: 2024-01-08 | End: 2024-01-09

## 2024-01-08 RX ORDER — SODIUM CHLORIDE, SODIUM LACTATE, POTASSIUM CHLORIDE, CALCIUM CHLORIDE 600; 310; 30; 20 MG/100ML; MG/100ML; MG/100ML; MG/100ML
INJECTION, SOLUTION INTRAVENOUS CONTINUOUS
Status: DISCONTINUED | OUTPATIENT
Start: 2024-01-08 | End: 2024-01-08

## 2024-01-08 RX ORDER — HYDRALAZINE HYDROCHLORIDE 20 MG/ML
10 INJECTION INTRAMUSCULAR; INTRAVENOUS EVERY 4 HOURS PRN
Status: DISCONTINUED | OUTPATIENT
Start: 2024-01-08 | End: 2024-01-10 | Stop reason: HOSPADM

## 2024-01-08 RX ORDER — FENTANYL CITRATE 50 UG/ML
50 INJECTION, SOLUTION INTRAMUSCULAR; INTRAVENOUS EVERY 5 MIN PRN
Status: DISCONTINUED | OUTPATIENT
Start: 2024-01-08 | End: 2024-01-08

## 2024-01-08 RX ORDER — PIPERACILLIN SODIUM, TAZOBACTAM SODIUM 3; .375 G/15ML; G/15ML
3.38 INJECTION, POWDER, LYOPHILIZED, FOR SOLUTION INTRAVENOUS EVERY 8 HOURS
Status: DISCONTINUED | OUTPATIENT
Start: 2024-01-08 | End: 2024-01-10 | Stop reason: HOSPADM

## 2024-01-08 RX ORDER — ONDANSETRON 4 MG/1
4 TABLET, ORALLY DISINTEGRATING ORAL EVERY 30 MIN PRN
Status: DISCONTINUED | OUTPATIENT
Start: 2024-01-08 | End: 2024-01-08 | Stop reason: HOSPADM

## 2024-01-08 RX ORDER — SODIUM CHLORIDE, SODIUM LACTATE, POTASSIUM CHLORIDE, CALCIUM CHLORIDE 600; 310; 30; 20 MG/100ML; MG/100ML; MG/100ML; MG/100ML
INJECTION, SOLUTION INTRAVENOUS CONTINUOUS
Status: DISCONTINUED | OUTPATIENT
Start: 2024-01-08 | End: 2024-01-08 | Stop reason: HOSPADM

## 2024-01-08 RX ORDER — SODIUM CHLORIDE, SODIUM LACTATE, POTASSIUM CHLORIDE, CALCIUM CHLORIDE 600; 310; 30; 20 MG/100ML; MG/100ML; MG/100ML; MG/100ML
INJECTION, SOLUTION INTRAVENOUS CONTINUOUS PRN
Status: DISCONTINUED | OUTPATIENT
Start: 2024-01-08 | End: 2024-01-08

## 2024-01-08 RX ORDER — OXYCODONE HYDROCHLORIDE 5 MG/1
10 TABLET ORAL
Status: DISCONTINUED | OUTPATIENT
Start: 2024-01-08 | End: 2024-01-08 | Stop reason: HOSPADM

## 2024-01-08 RX ORDER — PIPERACILLIN SODIUM, TAZOBACTAM SODIUM 3; .375 G/15ML; G/15ML
3.38 INJECTION, POWDER, LYOPHILIZED, FOR SOLUTION INTRAVENOUS ONCE
Status: COMPLETED | OUTPATIENT
Start: 2024-01-08 | End: 2024-01-08

## 2024-01-08 RX ORDER — HYDROMORPHONE HYDROCHLORIDE 1 MG/ML
0.4 INJECTION, SOLUTION INTRAMUSCULAR; INTRAVENOUS; SUBCUTANEOUS EVERY 5 MIN PRN
Status: DISCONTINUED | OUTPATIENT
Start: 2024-01-08 | End: 2024-01-08 | Stop reason: HOSPADM

## 2024-01-08 RX ORDER — OXYCODONE HYDROCHLORIDE 5 MG/1
5 TABLET ORAL
Status: DISCONTINUED | OUTPATIENT
Start: 2024-01-08 | End: 2024-01-08 | Stop reason: HOSPADM

## 2024-01-08 RX ORDER — PROPOFOL 10 MG/ML
INJECTION, EMULSION INTRAVENOUS CONTINUOUS PRN
Status: DISCONTINUED | OUTPATIENT
Start: 2024-01-08 | End: 2024-01-08

## 2024-01-08 RX ORDER — CODEINE PHOSPHATE AND GUAIFENESIN 10; 100 MG/5ML; MG/5ML
5 SOLUTION ORAL EVERY 4 HOURS PRN
Status: DISCONTINUED | OUTPATIENT
Start: 2024-01-08 | End: 2024-01-10 | Stop reason: HOSPADM

## 2024-01-08 RX ORDER — DEXAMETHASONE SODIUM PHOSPHATE 4 MG/ML
INJECTION, SOLUTION INTRA-ARTICULAR; INTRALESIONAL; INTRAMUSCULAR; INTRAVENOUS; SOFT TISSUE PRN
Status: DISCONTINUED | OUTPATIENT
Start: 2024-01-08 | End: 2024-01-08

## 2024-01-08 RX ORDER — LIDOCAINE HYDROCHLORIDE 10 MG/ML
INJECTION, SOLUTION INFILTRATION; PERINEURAL PRN
Status: DISCONTINUED | OUTPATIENT
Start: 2024-01-08 | End: 2024-01-08

## 2024-01-08 RX ADMIN — HYDRALAZINE HYDROCHLORIDE 10 MG: 20 INJECTION INTRAMUSCULAR; INTRAVENOUS at 14:46

## 2024-01-08 RX ADMIN — FENTANYL CITRATE 50 MCG: 50 INJECTION INTRAMUSCULAR; INTRAVENOUS at 11:20

## 2024-01-08 RX ADMIN — DEXAMETHASONE SODIUM PHOSPHATE 4 MG: 4 INJECTION, SOLUTION INTRA-ARTICULAR; INTRALESIONAL; INTRAMUSCULAR; INTRAVENOUS; SOFT TISSUE at 11:37

## 2024-01-08 RX ADMIN — ACETAMINOPHEN 650 MG: 325 TABLET ORAL at 19:03

## 2024-01-08 RX ADMIN — GUAIFENESIN 200 MG: 100 SOLUTION ORAL at 17:34

## 2024-01-08 RX ADMIN — SODIUM CHLORIDE, POTASSIUM CHLORIDE, SODIUM LACTATE AND CALCIUM CHLORIDE: 600; 310; 30; 20 INJECTION, SOLUTION INTRAVENOUS at 11:13

## 2024-01-08 RX ADMIN — BENZONATATE 100 MG: 100 CAPSULE ORAL at 15:06

## 2024-01-08 RX ADMIN — MULTIPLE VITAMINS W/ MINERALS TAB 1 TABLET: TAB at 08:46

## 2024-01-08 RX ADMIN — FENTANYL CITRATE 50 MCG: 50 INJECTION INTRAMUSCULAR; INTRAVENOUS at 11:26

## 2024-01-08 RX ADMIN — PROPOFOL 150 MG: 10 INJECTION, EMULSION INTRAVENOUS at 11:20

## 2024-01-08 RX ADMIN — Medication 1 LOZENGE: at 20:47

## 2024-01-08 RX ADMIN — ONDANSETRON 4 MG: 2 INJECTION INTRAMUSCULAR; INTRAVENOUS at 11:54

## 2024-01-08 RX ADMIN — Medication 125 MCG: at 08:46

## 2024-01-08 RX ADMIN — PIPERACILLIN AND TAZOBACTAM 3.38 G: 3; .375 INJECTION, POWDER, FOR SOLUTION INTRAVENOUS at 20:34

## 2024-01-08 RX ADMIN — GUAIFENESIN 200 MG: 100 SOLUTION ORAL at 14:14

## 2024-01-08 RX ADMIN — MIDAZOLAM 2 MG: 1 INJECTION INTRAMUSCULAR; INTRAVENOUS at 11:10

## 2024-01-08 RX ADMIN — GUAIFENESIN AND CODEINE PHOSPHATE 5 ML: 100; 10 SOLUTION ORAL at 19:24

## 2024-01-08 RX ADMIN — PROPOFOL 150 MCG/KG/MIN: 10 INJECTION, EMULSION INTRAVENOUS at 11:20

## 2024-01-08 RX ADMIN — PROPOFOL 50 MG: 10 INJECTION, EMULSION INTRAVENOUS at 11:54

## 2024-01-08 RX ADMIN — Medication 1 LOZENGE: at 19:04

## 2024-01-08 RX ADMIN — LIDOCAINE HYDROCHLORIDE 5 ML: 10 INJECTION, SOLUTION INFILTRATION; PERINEURAL at 11:20

## 2024-01-08 RX ADMIN — HYDRALAZINE HYDROCHLORIDE 10 MG: 20 INJECTION INTRAMUSCULAR; INTRAVENOUS at 21:33

## 2024-01-08 RX ADMIN — PREDNISONE 40 MG: 20 TABLET ORAL at 08:46

## 2024-01-08 RX ADMIN — Medication 80 MG: at 11:20

## 2024-01-08 RX ADMIN — PIPERACILLIN AND TAZOBACTAM 3.38 G: 3; .375 INJECTION, POWDER, FOR SOLUTION INTRAVENOUS at 14:25

## 2024-01-08 RX ADMIN — PROPOFOL 50 MG: 10 INJECTION, EMULSION INTRAVENOUS at 11:28

## 2024-01-08 ASSESSMENT — ACTIVITIES OF DAILY LIVING (ADL)
ADLS_ACUITY_SCORE: 21
ADLS_ACUITY_SCORE: 20
ADLS_ACUITY_SCORE: 18
ADLS_ACUITY_SCORE: 20
ADLS_ACUITY_SCORE: 21
ADLS_ACUITY_SCORE: 20
ADLS_ACUITY_SCORE: 18
ADLS_ACUITY_SCORE: 20
ADLS_ACUITY_SCORE: 21

## 2024-01-08 NOTE — PROGRESS NOTES
Attempted to start 18 gauge IV x2 on right forearm. Unsuccessful. Pt had rolling veins. Swat nurse called to place IV. SCD's placed. Warm blankets wrapped around bilateral forearms .

## 2024-01-08 NOTE — PROGRESS NOTES
Assisted Dr Mustafa with Ebus bronch in the OR. Anesthesia controlled the airway.  Pathology present for slides. BAL done on Rt lung.  Bites done RML.  Samples sent to lab.  Deshawn Arce, RT

## 2024-01-08 NOTE — ANESTHESIA PREPROCEDURE EVALUATION
Anesthesia Pre-Procedure Evaluation    Patient: Inez Rodriguez   MRN: [id number] : 1950        Procedure : Procedure(s):  BRONCHOSCOPY, WITH ENDOBRONCHIAL ULTRASOUND with possible endobronchial biopsies, brush, and lavage          No past medical history on file.   No past surgical history on file.   No Known Allergies   Social History     Tobacco Use    Smoking status: Not on file    Smokeless tobacco: Not on file   Substance Use Topics    Alcohol use: Not on file      Wt Readings from Last 1 Encounters:   24 55.3 kg (122 lb)        Anesthesia Evaluation            ROS/MED HX  ENT/Pulmonary:       Neurologic:       Cardiovascular:     (+)  hypertension- -   -  - -                                      METS/Exercise Tolerance:     Hematologic:       Musculoskeletal:       GI/Hepatic:     (+) GERD,                   Renal/Genitourinary:       Endo:     (+)          thyroid problem,            Psychiatric/Substance Use:       Infectious Disease:       Malignancy:       Other:            Physical Exam    Airway        Mallampati: II   TM distance: > 3 FB   Neck ROM: full     Respiratory Devices and Support         Dental       (+) Minor Abnormalities - some fillings, tiny chips      Cardiovascular   cardiovascular exam normal          Pulmonary   pulmonary exam normal            OUTSIDE LABS:  CBC:   Lab Results   Component Value Date    WBC 12.1 (H) 2024    WBC 14.9 (H) 2024    HGB 13.9 2024    HGB 13.9 2024    HCT 42.7 2024    HCT 42.9 2024     2024     2024     BMP:   Lab Results   Component Value Date     2024     2024    POTASSIUM 4.5 2024    POTASSIUM 4.8 2024    CHLORIDE 102 2024    CHLORIDE 96 (L) 2024    CO2 26 2024    CO2 25 2024    BUN 15.1 2024    BUN 12.6 2024    CR 0.61 2024    CR 0.80 2024     (H) 2024    GLC 98 2024     COAGS:  "  Lab Results   Component Value Date    INR 1.10 01/08/2024     POC: No results found for: \"BGM\", \"HCG\", \"HCGS\"  HEPATIC: No results found for: \"ALBUMIN\", \"PROTTOTAL\", \"ALT\", \"AST\", \"GGT\", \"ALKPHOS\", \"BILITOTAL\", \"BILIDIRECT\", \"JANETTE\"  OTHER:   Lab Results   Component Value Date    CHELITA 9.9 01/08/2024    TSH 0.02 (L) 01/07/2024    T4 1.22 01/07/2024       Anesthesia Plan    ASA Status:  3       Anesthesia Type: General.     - Airway: ETT   Induction: RSI.   Maintenance: TIVA.   Techniques and Equipment:     - Airway: Video-Laryngoscope       Consents    Anesthesia Plan(s) and associated risks, benefits, and realistic alternatives discussed. Questions answered and patient/representative(s) expressed understanding.     - Discussed: Risks, Benefits and Alternatives for BOTH SEDATION and the PROCEDURE were discussed     - Discussed with:  Patient      - Extended Intubation/Ventilatory Support Discussed: No.      - Patient is DNR/DNI Status: No     Use of blood products discussed: No .     Postoperative Care    Pain management: IV analgesics, Oral pain medications.   PONV prophylaxis: Ondansetron (or other 5HT-3), Dexamethasone or Solumedrol     Comments:               Jimenez Martinez,     I have reviewed the pertinent notes and labs in the chart from the past 30 days and (re)examined the patient.  Any updates or changes from those notes are reflected in this note.     # Hyponatremia: Lowest Na = 135 mmol/L in last 30 days, will monitor as appropriate              "

## 2024-01-08 NOTE — PLAN OF CARE
"PRIMARY DIAGNOSIS: \"GENERIC\" NURSING  OUTPATIENT/OBSERVATION GOALS TO BE MET BEFORE DISCHARGE:  ADLs back to baseline: No    Activity and level of assistance: Up with standby assistance.    Pain status: Pain free.    Return to near baseline physical activity: No     Discharge Planner Nurse   Safe discharge environment identified: Yes  Barriers to discharge: No       Entered by: Sarah Sahu RN 01/08/2024 9:53 AM     Please review provider order for any additional goals.   Nurse to notify provider when observation goals have been met and patient is ready for discharge.Goal Outcome Evaluation:  Patient will be going for bronchoscopy soon. PICC team had to come place IV due to her being a difficult IV start. Pulmonologist came and spoke with the patient and her son and explained procedure. Continues to have high blood pressure. States she takes OTC potassium at home for this. Asked her to talk with her PMD about this and if she needs blood pressure medications. I will also let hospitalist know this. Denies pain. Gets up with SBA to bathroom and gets short of breath.     Sarah Sahu RN                        "

## 2024-01-08 NOTE — PLAN OF CARE
"PRIMARY DIAGNOSIS: \"GENERIC\" NURSING  OUTPATIENT/OBSERVATION GOALS TO BE MET BEFORE DISCHARGE:  ADLs back to baseline: No    Activity and level of assistance: Up with standby assistance.    Pain status: Pain free.    Return to near baseline physical activity: No     Discharge Planner Nurse   Safe discharge environment identified: No  Barriers to discharge: Yes       Entered by: Ev Talbot RN 01/08/2024 3:37 AM     Pain: Denies  Neuro: A&Ox4   Cardiac: WDL  Respiratory: SaO2 >92% on RA. Has frequent, non-productive cough. On continuous pulse oximetry  Diet/Appetite:  NPO at midnight  /GI: WDL  LDA's: PIV saline locked  Skin: WDL  Activity: Up w/ SBA, can walk to the curtain in room before becoming fatigued.  Procedures: Bronchoscopy expected in AM  Pertinent Labs: Respiratory panel negative     BP (!) 156/85 (BP Location: Right arm, Patient Position: Supine, Cuff Size: Adult Small)   Pulse 82   Temp 97.8  F (36.6  C) (Oral)   Resp 16   Ht 1.651 m (5' 5\")   Wt 55.3 kg (122 lb)   SpO2 94%   BMI 20.30 kg/m      "

## 2024-01-08 NOTE — ANESTHESIA POSTPROCEDURE EVALUATION
Patient: Inez Rodriguez    Procedure: Procedure(s):  BRONCHOSCOPY, WITH ENDOBRONCHIAL ULTRASOUND WITH FINE NEEDLE APIRATION OF LYMPH NODE       Anesthesia Type:  General    Note:  Disposition: Outpatient   Postop Pain Control: Uneventful            Sign Out: Well controlled pain   PONV: No   Neuro/Psych: Uneventful            Sign Out: Acceptable/Baseline neuro status   Airway/Respiratory: Uneventful            Sign Out: Acceptable/Baseline resp. status   CV/Hemodynamics: Uneventful            Sign Out: Acceptable CV status; No obvious hypovolemia; No obvious fluid overload   Other NRE: NONE   DID A NON-ROUTINE EVENT OCCUR? No    Event details/Postop Comments:  Patient recovering comfortably.        Last vitals:  Vitals Value Taken Time   /77 01/08/24 1330   Temp 36.8  C (98.3  F) 01/08/24 1315   Pulse 102 01/08/24 1333   Resp 42 01/08/24 1332   SpO2 89 % 01/08/24 1334   Vitals shown include unfiled device data.    Electronically Signed By: Jimenez Martinez DO  January 8, 2024  1:52 PM

## 2024-01-08 NOTE — UTILIZATION REVIEW
Admission Status; Secondary Review Determination   Under the authority of the Utilization Management Committee, the utilization review process indicated a secondary review on Inez Rodriguez. The review outcome is based on review of the medical records, discussions with staff, and applying clinical experience noted on the date of the review.   (x) Inpatient Status Appropriate - This patient's medical care is consistent with medical management for inpatient care and reasonable inpatient medical practice.     RATIONALE FOR DETERMINATION   Inez Rodriguez is a 73 yr old female, former smoker who presented with shortness of breath and cough.  CT with no PE however RUL opacity noted with right hilar and mediastinal and RU paratracheal lymphadenopathy.  Pulmonary has seen and planned bronchoscopy which was performed today with significant findings for post obstructive pneumonia.  Has been started on IV Zosyn every 8 hours.  WBC is elevated, unclear if due to steroids.      At the time of admission with the information available to the attending physician more than 2 nights Hospital complex care was anticipated, based on patient risk of adverse outcome if treated as outpatient and complex care required. Inpatient admission is appropriate based on the Medicare guidelines.   The information on this document is developed by the utilization review team in order for the business office to ensure compliance. This only denotes the appropriateness of proper admission status and does not reflect the quality of care rendered.   The definitions of Inpatient Status and Observation Status used in making the determination above are those provided in the CMS Coverage Manual, Chapter 1 and Chapter 6, section 70.4.   Sincerely,   Althea Andrade MD  Utilization Review  Physician Advisor  [facility]

## 2024-01-08 NOTE — ANESTHESIA PROCEDURE NOTES
Airway       Patient location during procedure: OR       Procedure Start/Stop Times: 1/8/2024 11:20 AM  Staff -        CRNA: Mo Gregory APRN CRNA       Performed By: CRNAIndications and Patient Condition       Indications for airway management: karely-procedural         Mask difficulty assessment: 0 - not attempted    Final Airway Details       Final airway type: endotracheal airway       Successful airway: ETT - single  Endotracheal Airway Details        ETT size (mm): 8.0       Cuffed: yes       Successful intubation technique: video laryngoscopy       VL Blade Size: Glidescope 3       Grade View of Cords: 1       Adjucts: stylet    Post intubation assessment        Placement verified by: capnometry and equal breath sounds        Number of attempts at approach: 1       Ease of procedure: easy    Medication(s) Administered   Medication Administration Time: 1/8/2024 11:20 AM

## 2024-01-08 NOTE — PROCEDURES
FIBEROPTIC BRONCHOSCOPY PROCEDURE NOTE (NON-OR)  Date of Procedure: 1/8/2024  Performing Physician: Angelia Mustafa MD  Pre-Procedure Diagnosis:   Mediastinal and hilar adenopathy  Post-Procedure Diagnosis:  Same as Pre-Procedure Diagnosis  Procedure:  Diagnostic Flexible Fiberoptic Bronchoscopy   Indications:  Inez Rodriguez is a 73 year old female with cough and shortness of breath x few months.  Preop evaluation: Per anesthesia  Anesthesia: General anesthesia  Specimen:   Bronchoscopy  EBUS TBNA from 4R x 7 passes, subcarinal x 6 passes, and 11 R x 5 passes  BAL/wash from right bronchus at the junction of the takeoff of the right upper lobe and the bronchus intermedius  Endobronchial biopsies x 3 passes from the right bronchus, at the takeoff of the right upper lobe and bronchus intermedius    Estimated Blood Loss:  Minimal ml  Complications:  none    Findings:    Trachea    Jena  Abnormal, splayed     Right Bronchial Tree  Abnormal, .  Extrinsic compression noted at the takeoff of the right upper lobe and the bronchus intermedius.  There is also extrinsic compression of the right lower lobe.  The mucosa appeared somewhat abnormal at the takeoff of the right upper lobe and bronchus intermedius.   I was able to pass the scope to the right middle lobe and right lower lobe, however I was unable to pass the scope through the right upper lobe.  Left Bronchial Tree  Normal    Photodocumentation was obtained.    Procedure Details:   The patient was seen and the risks, benefits, complications, treatment options, and expected outcomes were discussed with PATIENT   . The risks and potential complications of their problem and proposed treatment include but are not limited to infection, bleeding, pain, adverse drug reaction, pulmonary aspiration, the need for additional procedures, failure to diagnose a condition, creating a complication requiring transfusion or operation, and complication secondary to the anesthetic.  The  patient/alternate (see above) concurred with the proposed plan, giving informed consent.  The patient was identified as Inez Rodriguez with Date of Birth 1950 and the procedure verified as Diagnostic Flexible Fiberoptic Bronchoscopy .  A Time Out was held and the above information confirmed.    After application of topical nasopharyngeal anesthesia, the patient was placed in the supine position and the bronchoscope was passed through the mouth.  The vocal cords were visualized . The cord findings are noted above.  The scope was then passed into the trachea.  Careful inspection of the tracheal lumen was accomplished. The scope was sequentially passed into the left main and then left upper and lower bronchi and segmental bronchi.   Findings and specimen details recorded above.  The scope was then withdrawn and advanced into the right main bronchus and then into the RUL, RML, and RLL bronchi and segmental bronchi.   Findings and specimen details recorded above.     Additional Procedures:   EBUS TBNA 4R x 7 passes, subcarinal x 6 passes, and 11R x 5 passes.  Marked enlarged subcarinal LN on EBUS about 4x6 cm, 4R LN about 3 x 3 cm, 11 R about 3x4 cm.  Endobronchial biopsy from take off of RUL and bronchus intermedius - 3 biopsies  BAL/bronchial wash from take off RUL and bronchus intermedius    The patient tolerated the procedure well.      Angelia Mustafa MD, 1/8/2024, 12:47 PM    Referring Physician: * No referring provider recorded for this case *  Attending Physician: Smitha Wyatt MD  Primary Care Physician: No Ref-Primary, Physician

## 2024-01-08 NOTE — CONSULTS
"Care Management Initial Consult    General Information  Assessment completed with: PatientInez  Type of CM/SW Visit: Initial Assessment    Primary Care Provider verified and updated as needed: Yes   Readmission within the last 30 days: no previous admission in last 30 days      Reason for Consult: discharge planning  Advance Care Planning: Advance Care Planning Reviewed: no concerns identified          Communication Assessment  Patient's communication style: spoken language (English or Bilingual)             Cognitive  Cognitive/Neuro/Behavioral:                        Living Environment:   People in home: child(bernice), adult  Inez and son Kenneth  Current living Arrangements: house (\"2 level house. There are 2 separate living environments. I live up a full flight of steps on the 2nd level. My son lives downstairs.\")      Able to return to prior arrangements: yes       Family/Social Support:  Care provided by: self  Provides care for: no one  Marital Status:   Children          Description of Support System: Supportive, Involved    Support Assessment: Adequate family and caregiver support, Adequate social supports, Patient communicates needs well met    Current Resources:   Patient receiving home care services: No     Community Resources: None  Equipment currently used at home: none  Supplies currently used at home: Other (\"glasses\")    Employment/Financial:  Employment Status: retired     Employment/ Comments: \"no  history\"  Financial Concerns:     Referral to Financial Worker: No       Does the patient's insurance plan have a 3 day qualifying hospital stay waiver?  No      Functional Status:  Prior to admission patient needed assistance:   Dependent ADLs:: Independent, Ambulation-no assistive device  Dependent IADLs:: Transportation, Shopping (\"I haven't driven since COVID. My son helps with transportation\".)  Assesssment of Functional Status: At functional baseline    Mental Health Status:   " "       Chemical Dependency Status:                Values/Beliefs:  Spiritual, Cultural Beliefs, Buddhism Practices, Values that affect care:                 Additional Information:  Inez lives in a house with her adult son Kenneth. It is a \"2 level house. There are 2 separate living environments. I live up a full flight of steps on the 2nd level. My son lives downstairs. We just recently moved here to [place] from [place] of state\".    She is independent with ADLs and son helps with some IADLs. \"I haven't driven since COVID. My son helps with transportation\".    Unknown discharge needs at this time, but should be able to return home.     Son to transport at discharge.    CM to follow for medical progression of care, discharge recommendations, and final discharge plan.    Natalee Landa RN    "

## 2024-01-08 NOTE — ANESTHESIA CARE TRANSFER NOTE
Patient: Inez Rodriguez    Procedure: Procedure(s):  BRONCHOSCOPY, WITH ENDOBRONCHIAL ULTRASOUND WITH FINE NEEDLE APIRATION OF LYMPH NODE       Diagnosis: Pulmonary nodules [R91.8]  Diagnosis Additional Information: No value filed.    Anesthesia Type:   General     Note:    Oropharynx: oropharynx clear of all foreign objects  Level of Consciousness: drowsy  Oxygen Supplementation: face mask    Independent Airway: airway patency satisfactory and stable  Dentition: dentition unchanged  Vital Signs Stable: post-procedure vital signs reviewed and stable  Report to RN Given: handoff report given  Patient transferred to: PACU    Handoff Report: Identifed the Patient, Identified the Reponsible Provider, Reviewed the pertinent medical history, Discussed the surgical course, Reviewed Intra-OP anesthesia mangement and issues during anesthesia, Set expectations for post-procedure period and Allowed opportunity for questions and acknowledgement of understanding      Vitals:  Vitals Value Taken Time   /85 01/08/24 1245   Temp     Pulse 85 01/08/24 1246   Resp 19 01/08/24 1246   SpO2 89 % 01/08/24 1246   Vitals shown include unfiled device data.    Electronically Signed By: TANESHA Gutierrez CRNA  January 8, 2024  12:48 PM

## 2024-01-08 NOTE — PROGRESS NOTES
[facility]    PROGRESS NOTE - Hospitalist Service    Assessment and Plan    Principal Problem:    Observation for suspected malignant neoplasm  Active Problems:    Thyroid nodule    Chronic cough    Pulmonary nodules    Wheezing-associated respiratory infection (WARI)    Inez Rodriguez is a 73 year old female with h/o  progressive shortness of breath. Was treated with a course of antibiotics recently, but her cough and breathlessness worsened despite treatment. On admission her CT PE study showed evidence of a RUL opacity that seems most consistent with a primary lung malignancy. There is associated hilar adenopathy and a marialuisa mass effect on the RUL bronchus with narrowing of the lumen.     RUL nodular opacity  Right-sided hilar adenopathy with mass effect leading to RUL bronchus narrowing  - Received steroids in the ED, stopped prednisone for now   - personally reviewed CT Mediastinal and right hilar adenopathy most suggestive of metastatic adenopathy from a primary lung malignancy. A nodular opacity in the right upper lobe could represent a primary lung malignancy versus an infectious/inflammatory focus.   - Discussed with Pulmonology and oncology consulted   - S/p Bronchoscopy  Extrinsic compression noted at the takeoff of the right upper lobe and the bronchus intermedius.  There is also extrinsic compression of the right lower lobe. The mucosa appeared somewhat abnormal at the takeoff of the right upper lobe and bronchus intermedius.  Marked enlarged subcarinal LN on EBUS about 4x6 cm, 4R LN about 3 x 3 cm, 11 R about 3x4 cm. Endobronchial biopsy from take off of RUL and bronchus intermedius - 3 biopsies BAL/bronchial wash from take off RUL and bronchus intermedius  - Oncology consulted  - ok to eat and ADAT   - pulm placed on zosyn continue for now for post-obstructive pneumonitis    - trend labs      Leukocytosis   - may be due to Prednisone  - pulm placed on IV zosyn for now  - trend  "labs   - prednisone stopped      Thyroid Nodule   - CT Chest: Left thyroid nodule which could be further evaluated with nonemergent thyroid ultrasound   - TSH low but free T4 normal  - recheck at outpatient PCP follow up      Elevated blood pressures without the diagnosis of hypertension   - prn IV hydralazine 10mg Q4hrs for sbp >160     Clinically Significant Risk Factors      # Overweight: Estimated body mass index is 28.31 kg/m  as calculated from the following:  Height as of this encounter: 1.626 m (5' 4\").  Weight as of this encounter: 74.8 kg (164 lb 14.4 oz)., PRESENT ON ADMISSION    COVID-19 PCR Results          1/7/2024    12:23   COVID-19 PCR Results   SARS CoV2 PCR Negative      COVID-19 Antibody Results, Testing for Immunity           No data to display               Code Status: Full Code  VTE prophylaxis:  Pneumatic Compression Devices  DIET: Orders Placed This Encounter      Advance Diet as Tolerated: Full Liquid Diet    Drains/Lines: none  Weight bearing status: WBAT     Expected Discharge Date: 01/10/2024      Destination: home with family        Subjective:  Seen in PACU     PHYSICAL EXAM  Temp:  [97  F (36.1  C)-98.3  F (36.8  C)] 98.3  F (36.8  C)  Pulse:  [] 97  Resp:  [12-24] 20  BP: (144-187)/() 181/97  SpO2:  [87 %-96 %] 96 %  Wt Readings from Last 1 Encounters:   01/07/24 55.3 kg (122 lb)       Intake/Output Summary (Last 24 hours) at 1/8/2024 0753  Last data filed at 1/8/2024 0600  Gross per 24 hour   Intake 650 ml   Output --   Net 650 ml      Body mass index is 20.3 kg/m .    Physical Exam  Constitutional:       Comments: Groggy but sitting up coughing   HENT:      Head: Normocephalic and atraumatic.   Cardiovascular:      Rate and Rhythm: Regular rhythm. Tachycardia present.      Pulses: Normal pulses.   Pulmonary:      Comments: Tachypnea and persistent coughing  Course BS   Abdominal:      General: Bowel sounds are normal.      Palpations: Abdomen is soft. " "  Musculoskeletal:         General: Normal range of motion.      Right lower leg: No edema.      Left lower leg: No edema.   Skin:     General: Skin is warm and dry.      Capillary Refill: Capillary refill takes less than 2 seconds.   Neurological:      Mental Status: She is oriented to person, place, and time. Mental status is at baseline.       PERTINENT LABS/IMAGING:  Results for orders placed or performed during the hospital encounter of 01/07/24   CT Chest Pulmonary Embolism w Contrast    Impression    IMPRESSION:  1.  Mediastinal and right hilar adenopathy most suggestive of metastatic adenopathy from a primary lung malignancy. A nodular opacity in the right upper lobe could represent a primary lung malignancy versus an infectious/inflammatory focus. Recommend   referral to pulmonology for tissue sampling.  2.  Nichole conglomerate demonstrate mass effect on the right upper lobe bronchus and bronchus intermedius with associated narrowing without occlusion.  3.  Left thyroid nodule which could be further evaluated with nonemergent thyroid ultrasound.  4.  No evidence of acute pulmonary thromboembolic disease.       Imaging results reviewed over the past 24 hrs:   No results found for this or any previous visit (from the past 24 hour(s)).    Recent Labs   Lab 01/08/24  0714 01/07/24  1223   WBC 12.1* 14.9*   HGB 13.9 13.9   MCV 89 90    265   INR 1.10  --     135   POTASSIUM 4.5 4.8   CHLORIDE 102 96*   CO2 26 25   BUN 15.1 12.6   CR 0.61 0.80   ANIONGAP 13 14   CHELITA 9.9 9.4   * 98     No results for input(s): \"CHOL\", \"HDL\", \"LDL\", \"TRIG\", \"CHOLHDLRATIO\" in the last 60433 hours.  Recent Labs   Lab Test 01/07/24  1223      POTASSIUM 4.8   CHLORIDE 96*   CO2 25   GLC 98   BUN 12.6   CR 0.80   GFRESTIMATED 77   CHELITA 9.4     No results for input(s): \"A1C\" in the last 46077 hours.  Recent Labs   Lab Test 01/07/24  1223   HGB 13.9     No results for input(s): \"TROPONINI\" in the last 30262 " "hours.  Recent Labs   Lab Test 01/07/24  1223   NTBNPI 95     Recent Labs   Lab Test 01/07/24  1223   TSH 0.02*     No results for input(s): \"INR\" in the last 44077 hours.    25 MINUTES SPENT BY ME on the date of service doing chart review, history, exam, documentation, discussion with nursing staff and specialist, & further activities per the note.  Smitha Wyatt MD  [facility] Medicine Service  [phone]   "

## 2024-01-08 NOTE — CONSULTS
Pulmonary Service Consult Note  Date of Service: 01/08/2024    Reason for Consultation: abnormal ct chest    Assessment:     Inez Rodriguez is a 73 year old female, with distant history of tobacco use, who came in with progressive shortness of breath and cough.  CT scan of the chest was done and revealed no evidence of pulmonary embolus however there was a right upper lobe nodule opacity along with mediastinal and right hilar as well as right upper paratracheal lymphadenopathy.  Pulmonary was consulted for further recommendations.    Plan:   Follow up procalcitonin and sputum for Gram stain and culture  Bronch with EBUS this morning   Discussed with OR to find timing    Addendum: pt taken to OR for bronchoscopy.  On EBUS, patient was noted to have a markedly enlarged subcarinal node, right hilar, and 4R lymph node.   In addition, there was abnormal mucosa likely due to extrinsic compression at the takeoff of the right upper lobe and bronchus intermedius.  Endobronchial biopsies x 4 passes were done. In addition, the takeoff of the right lower lobe appeared to be narrowed.  I was unable to pass the scope to the right upper lobe as well as the right lower lobe, but they were patent though.  Patient will be started on Zosyn for postobstructive pneumonia.     TTS greater than 55 min, more than 50% on counseling and coordination of care    I appreciate the opportunity to participate in the care of Inez Rodriguez.  Please feel free to contact me at any time.    Angelia Mustafa MD  Pulmonary and Critical Care Medicine    History:     HPI: Patient is a pleasant 73-year-old female, former smoker, with no significant past medical history who came in with complaints of shortness of breath and cough for few months.  Patient noted that she moved from [place] back in September 2023.  She endorses exposure to smoke during the summer.  She then developed shortness of breath that progressively got worse.  She also had associated cough  "and endorsed blood flecks in her sputum at times, but denied any misty hemoptysis.  She denied any significant weight loss or night sweats.  She endorsed fatigue.  Patient notes that she sought medical attention and received a course of antibiotics and prednisone without much improvement.  Patient has distant history of tobacco use and quit approximately 40 years ago.  Prior to that, she used to smoke occasionally.  She denied any significant exposures.    PMHx/PSHx:  Past Medical History:   Diagnosis Date    Hypertension     SOB (shortness of breath)      Past Surgical History:   Procedure Laterality Date    TONSILLECTOMY      at 8 years of age     Social History     Socioeconomic History    Marital status:      Spouse name: Not on file    Number of children: Not on file    Years of education: Not on file    Highest education level: Not on file   Occupational History    Not on file   Tobacco Use    Smoking status: Not on file    Smokeless tobacco: Not on file   Substance and Sexual Activity    Alcohol use: Not on file    Drug use: Not on file    Sexual activity: Not on file   Other Topics Concern    Not on file   Social History Narrative    Not on file     Social Determinants of Health     Financial Resource Strain: Not on file   Food Insecurity: Not on file   Transportation Needs: Not on file   Physical Activity: Not on file   Stress: Not on file   Social Connections: Not on file   Interpersonal Safety: Not on file   Housing Stability: Not on file       Review of Systems - 10 point review of system negative except for what is mentioned in the HPI.    Exam/Data:   BP (!) 180/87   Pulse 91   Temp 98.2  F (36.8  C) (Temporal)   Resp 16   Ht 1.651 m (5' 5\")   Wt 55.3 kg (122 lb)   SpO2 95%   BMI 20.30 kg/m      GEN: comfortable, NAD  HEENT: NCAT, EMOI, mmm  CVS: S1S2, RRR  Lung: good air entry bilaterally, exp wheezing on right  Abd: Soft, NT, ND,  + BS.   Ext: no c/c/e  Neuro: nonfocal  Skin: no visible " rash  Musculoskeletal: FROM all extremities  Psych: appropriate    DATA    Labs: Reviewed in EMR and outside records where pertinent.         IMAGING: Personally reviewed images. Formal radiology interpretation noted below.    CT Chest Pulmonary Embolism w Contrast    Result Date: 1/7/2024  EXAM: CT CHEST PULMONARY EMBOLISM W CONTRAST LOCATION: [facility] DATE: 1/7/2024 INDICATION: Cough. Shortness of breath. COMPARISON: Chest x-ray 12/28/2023. TECHNIQUE: CT chest pulmonary angiogram during arterial phase injection of IV contrast. Multiplanar reformats and MIP reconstructions were performed. Dose reduction techniques were used. CONTRAST: 75 mL [id number]. FINDINGS: ANGIOGRAM CHEST: Pulmonary arteries are normal caliber and negative for pulmonary emboli. Thoracic aorta is negative for dissection within the limitations of cardiac motion artifact. Moderate burden of atherosclerotic disease of the thoracic aorta. LUNGS AND PLEURA: Right upper lobe nodular opacity measuring 2.1 x 1.9 x 1.0 cm (series 6, image 109). Ill-defined cluster of nodular opacities in the right upper lobe (series 6, image 75), favored infectious or inflammatory. Mild centrilobular emphysema. No pleural effusion. MEDIASTINUM/AXILLAE: Heterogeneous left thyroid lobe with a 1.5 cm hypoattenuating left thyroid nodule. There is mediastinal and right hilar adenopathy including right upper paratracheal, subcarinal, and right hilar nodes. Index right upper paratracheal lymph node measures 2.2 cm in short axis and the subcarinal marialuisa conglomerate measures 3.0 cm in short axis. Right hilar marialuisa conglomerate demonstrates mass effect on the right upper lobe bronchus and bronchus intermedius with associated narrowing without occlusion. Normal heart size with trace pericardial effusion. CORONARY ARTERY CALCIFICATION: Moderate. UPPER ABDOMEN: Lobulated contour of both kidneys. Atherosclerotic disease of the partially imaged abdominal  aorta. MUSCULOSKELETAL: No aggressive appearing osseous lesion.     IMPRESSION: 1.  Mediastinal and right hilar adenopathy most suggestive of metastatic adenopathy from a primary lung malignancy. A nodular opacity in the right upper lobe could represent a primary lung malignancy versus an infectious/inflammatory focus. Recommend referral to pulmonology for tissue sampling. 2.  Nichole conglomerate demonstrate mass effect on the right upper lobe bronchus and bronchus intermedius with associated narrowing without occlusion. 3.  Left thyroid nodule which could be further evaluated with nonemergent thyroid ultrasound. 4.  No evidence of acute pulmonary thromboembolic disease.    XR Chest 2 Views    Result Date: 12/28/2023  EXAM: XR CHEST 2 VIEWS LOCATION: [facility] DATE: 12/28/2023 INDICATION:  Subacute cough, Abnormal lung sounds COMPARISON: None.     IMPRESSION: Heart size is normal. No CHF, lobar consolidation, or effusion. Mediastinum and bony structures are unremarkable.          No family history on file.  No Known Allergies    Medications:     Current Outpatient Medications   Medication Sig Dispense Refill    albuterol (PROAIR HFA/PROVENTIL HFA/VENTOLIN HFA) 108 (90 Base) MCG/ACT inhaler Inhale 2 puffs into the lungs every 6 hours as needed for shortness of breath, wheezing or cough 18 g 0    predniSONE (DELTASONE) 20 MG tablet Take 2 tablets (40 mg) by mouth daily for 7 days Take two tablets (= 40mg) each day for 5 (five) days 14 tablet 0       Much or all of the text in this note was generated through the use of the Dragon Dictate voice-to-text software. Errors in spelling or words which seem out of context are unintentional. Sound alike errors, in particular, may have escaped editing.

## 2024-01-09 ENCOUNTER — APPOINTMENT (OUTPATIENT)
Dept: PHYSICAL THERAPY | Facility: HOSPITAL | Age: 74
DRG: 166 | End: 2024-01-09
Attending: INTERNAL MEDICINE
Payer: MEDICARE

## 2024-01-09 LAB
ANION GAP SERPL CALCULATED.3IONS-SCNC: 10 MMOL/L (ref 7–15)
BUN SERPL-MCNC: 14 MG/DL (ref 8–23)
CALCIUM SERPL-MCNC: 9.8 MG/DL (ref 8.8–10.2)
CHLORIDE SERPL-SCNC: 107 MMOL/L (ref 98–107)
CREAT SERPL-MCNC: 0.77 MG/DL (ref 0.51–0.95)
DEPRECATED HCO3 PLAS-SCNC: 27 MMOL/L (ref 22–29)
EGFRCR SERPLBLD CKD-EPI 2021: 81 ML/MIN/1.73M2
ERYTHROCYTE [DISTWIDTH] IN BLOOD BY AUTOMATED COUNT: 14 % (ref 10–15)
GLUCOSE SERPL-MCNC: 120 MG/DL (ref 70–99)
HCT VFR BLD AUTO: 39 % (ref 35–47)
HGB BLD-MCNC: 12.6 G/DL (ref 11.7–15.7)
MCH RBC QN AUTO: 28.8 PG (ref 26.5–33)
MCHC RBC AUTO-ENTMCNC: 32.3 G/DL (ref 31.5–36.5)
MCV RBC AUTO: 89 FL (ref 78–100)
PLATELET # BLD AUTO: 317 10E3/UL (ref 150–450)
POTASSIUM SERPL-SCNC: 4.4 MMOL/L (ref 3.4–5.3)
RBC # BLD AUTO: 4.37 10E6/UL (ref 3.8–5.2)
SODIUM SERPL-SCNC: 144 MMOL/L (ref 135–145)
WBC # BLD AUTO: 16 10E3/UL (ref 4–11)

## 2024-01-09 PROCEDURE — 250N000009 HC RX 250

## 2024-01-09 PROCEDURE — 99232 SBSQ HOSP IP/OBS MODERATE 35: CPT | Performed by: INTERNAL MEDICINE

## 2024-01-09 PROCEDURE — 80048 BASIC METABOLIC PNL TOTAL CA: CPT | Performed by: INTERNAL MEDICINE

## 2024-01-09 PROCEDURE — 250N000013 HC RX MED GY IP 250 OP 250 PS 637: Performed by: INTERNAL MEDICINE

## 2024-01-09 PROCEDURE — 250N000011 HC RX IP 250 OP 636: Performed by: INTERNAL MEDICINE

## 2024-01-09 PROCEDURE — 85027 COMPLETE CBC AUTOMATED: CPT | Performed by: INTERNAL MEDICINE

## 2024-01-09 PROCEDURE — 99207 PR NO BILLABLE SERVICE THIS VISIT: CPT | Performed by: INTERNAL MEDICINE

## 2024-01-09 PROCEDURE — 250N000013 HC RX MED GY IP 250 OP 250 PS 637

## 2024-01-09 PROCEDURE — 999N000104 HC STATISTIC NO CHARGE

## 2024-01-09 PROCEDURE — 120N000001 HC R&B MED SURG/OB

## 2024-01-09 PROCEDURE — 36415 COLL VENOUS BLD VENIPUNCTURE: CPT | Performed by: INTERNAL MEDICINE

## 2024-01-09 RX ORDER — BENZONATATE 100 MG/1
100 CAPSULE ORAL 3 TIMES DAILY PRN
Status: DISCONTINUED | OUTPATIENT
Start: 2024-01-09 | End: 2024-01-10 | Stop reason: HOSPADM

## 2024-01-09 RX ORDER — AMLODIPINE BESYLATE 5 MG/1
5 TABLET ORAL DAILY
Qty: 30 TABLET | Refills: 0 | Status: SHIPPED | OUTPATIENT
Start: 2024-01-09 | End: 2024-02-02

## 2024-01-09 RX ORDER — BENZONATATE 100 MG/1
100 CAPSULE ORAL 3 TIMES DAILY PRN
Qty: 15 CAPSULE | Refills: 0 | Status: SHIPPED | OUTPATIENT
Start: 2024-01-09 | End: 2024-02-14

## 2024-01-09 RX ORDER — CODEINE PHOSPHATE AND GUAIFENESIN 10; 100 MG/5ML; MG/5ML
5 SOLUTION ORAL EVERY 4 HOURS PRN
Qty: 180 ML | Refills: 0 | Status: SHIPPED | OUTPATIENT
Start: 2024-01-09 | End: 2024-02-14

## 2024-01-09 RX ORDER — AMLODIPINE BESYLATE 5 MG/1
5 TABLET ORAL DAILY
Status: DISCONTINUED | OUTPATIENT
Start: 2024-01-09 | End: 2024-01-10 | Stop reason: HOSPADM

## 2024-01-09 RX ADMIN — ACETAMINOPHEN 650 MG: 325 TABLET ORAL at 17:46

## 2024-01-09 RX ADMIN — Medication 125 MCG: at 10:17

## 2024-01-09 RX ADMIN — ACETAMINOPHEN 650 MG: 325 TABLET ORAL at 10:23

## 2024-01-09 RX ADMIN — PIPERACILLIN AND TAZOBACTAM 3.38 G: 3; .375 INJECTION, POWDER, FOR SOLUTION INTRAVENOUS at 04:56

## 2024-01-09 RX ADMIN — PIPERACILLIN AND TAZOBACTAM 3.38 G: 3; .375 INJECTION, POWDER, FOR SOLUTION INTRAVENOUS at 21:26

## 2024-01-09 RX ADMIN — ALBUTEROL SULFATE 2.5 MG: 2.5 SOLUTION RESPIRATORY (INHALATION) at 19:25

## 2024-01-09 RX ADMIN — AMLODIPINE BESYLATE 5 MG: 5 TABLET ORAL at 19:25

## 2024-01-09 RX ADMIN — GUAIFENESIN AND CODEINE PHOSPHATE 5 ML: 100; 10 SOLUTION ORAL at 17:54

## 2024-01-09 RX ADMIN — BENZONATATE 100 MG: 100 CAPSULE ORAL at 10:00

## 2024-01-09 RX ADMIN — MULTIPLE VITAMINS W/ MINERALS TAB 1 TABLET: TAB at 10:00

## 2024-01-09 RX ADMIN — PIPERACILLIN AND TAZOBACTAM 3.38 G: 3; .375 INJECTION, POWDER, FOR SOLUTION INTRAVENOUS at 13:17

## 2024-01-09 RX ADMIN — GUAIFENESIN AND CODEINE PHOSPHATE 5 ML: 100; 10 SOLUTION ORAL at 13:24

## 2024-01-09 ASSESSMENT — ACTIVITIES OF DAILY LIVING (ADL)
ADLS_ACUITY_SCORE: 21
ADLS_ACUITY_SCORE: 21
ADLS_ACUITY_SCORE: 20
ADLS_ACUITY_SCORE: 21
ADLS_ACUITY_SCORE: 21
ADLS_ACUITY_SCORE: 20
ADLS_ACUITY_SCORE: 21

## 2024-01-09 NOTE — PROGRESS NOTES
Occupational Therapy: OT order received however pt is at baseline with mobility and ADL's and has no OT needs at this time.  Pt should continue to get OOB and ambulate with supervision of nursing while inpatient.  Will discontinue OT orders at this time.    Nell Sharma OTR/L, CLT  1/9/2024

## 2024-01-09 NOTE — PLAN OF CARE
Problem: Hypertension Acute  Goal: Blood Pressure Within Desired Range  1/9/2024 0646 by Lupe Rios RN  Outcome: Progressing  1/9/2024 0302 by Lupe Rios RN  Outcome: Progressing   Goal Outcome Evaluation:       Her BP continues to be within normal range, 142/62 at 05.  Problem: Infection  Goal: Absence of Infection Signs and Symptoms  1/9/2024 0646 by Lupe Rios RN  Outcome: Progressing  She has been afebrile.98.6 & 98.7.  She is getting zosyn q 8 hours.

## 2024-01-09 NOTE — PLAN OF CARE
"Goal Outcome Evaluation:    Pt c/o persistent non stop cough, sore throat and HA from coughing so much 8/10. Robitussin w/ codeine syrup, chloraseptic lozenge, and Tylenol given. Writer also gave the pt chamomile tea twice. Pt reports \"the main thing that helped her was the tea.\" Writer observed the Pts cough has decreased. Hypertensive PRN Hydralazine given stabilizing BP. SBA to the BR. Pt has been coughing up thin blood tinged sputum MD aware. Other VSS on RA.       Problem: Adult Inpatient Plan of Care  Goal: Plan of Care Review  Description: The Plan of Care Review/Shift note should be completed every shift.  The Outcome Evaluation is a brief statement about your assessment that the patient is improving, declining, or no change.  This information will be displayed automatically on your shift  note.  Outcome: Progressing  Flowsheets (Taken 1/8/2024 2316)  Plan of Care Reviewed With:   patient   child  Overall Patient Progress: improving     Problem: Adult Inpatient Plan of Care  Goal: Optimal Comfort and Wellbeing  Outcome: Progressing     Problem: Adult Inpatient Plan of Care  Goal: Optimal Comfort and Wellbeing  Intervention: Monitor Pain and Promote Comfort  Recent Flowsheet Documentation  Taken 1/8/2024 1903 by Althea Dorsey, RN  Pain Management Interventions: (camomile tea given)   medication (see MAR)   rest   other (see comments)  Taken 1/8/2024 1830 by Althea Dorsey, RN  Pain Management Interventions: (pt constantly coughing MD and CC MD notified.)   medication (see MAR)   emotional support   MD notified (comment)   other (see comments)     Problem: Adult Inpatient Plan of Care  Goal: Readiness for Transition of Care  Outcome: Progressing     Problem: Risk for Delirium  Goal: Improved Sleep  Outcome: Progressing           Plan of Care Reviewed With: patient, child    Overall Patient Progress: improvingOverall Patient Progress: improving           "

## 2024-01-09 NOTE — PLAN OF CARE
Problem: Hypertension Acute  Goal: Blood Pressure Within Desired Range  Outcome: Progressing   Goal Outcome Evaluation:    Problem: Hypertension Acute  Goal: Blood Pressure Within Desired Range  Outcome: Progressing   Her Bps have been 151/75 at 2300 & 129/66 at 02.  Problem: Risk for Delirium  Goal: Improved Sleep  1/9/2024 0302 by Lupe Rios RN  Outcome: Progressing  She states she has finally been able to sleep; states its been 10 days since she slept well.

## 2024-01-09 NOTE — PROGRESS NOTES
Pt is not appropriate for skilled PT services at this time due to pt up independently in room, pt has no concerns about home mobility at this time.  If mobility status changes please reorder PT, pt and pt's son in agreement with plan.  Will D/C current PT orders.  Please reorder if status changes. Thank you.    1/9/2024 by Ananya Watson,PT

## 2024-01-09 NOTE — DISCHARGE SUMMARY
[facility]  Hospitalist Discharge Summary      Date of Admission:  1/7/2024  Date of Discharge:  1/9/2024  Discharging Provider: Lisa Jarquin MD  Discharge Service: Hospitalist Service    Discharge Diagnoses   Postobstructive pneumonia  Tobacco use disorder  Mediastinal and hilar adenopathy     Clinically Significant Risk Factors          Follow-ups Needed After Discharge   Follow-up Appointments     Follow-up and recommended labs and tests       Follow up with primary care provider, Physician No Ref-Primary, within 7   days for hospital follow- up.  Follow-up with pulmonologist on 1/19/2024   as arranged.  Follow-up results of EBUS biopsy.  Follow-up at the oncology   clinic as arranged.            Unresulted Labs Ordered in the Past 30 Days of this Admission       Date and Time Order Name Status Description    1/8/2024  2:02 PM Surgical pathology exam - Biopsy Site 1 In process     1/8/2024 12:55 PM Acid-Fast Bacilli Culture and Stain with AFB Stain In process     1/8/2024 12:46 PM Nocardia culture - BAL Site 1 In process     1/8/2024 12:46 PM Fungus Culture, non-blood - BAL Site 1 In process     1/8/2024 12:46 PM Acid-Fast Bacilli Culture and Stain with AFB Stain In process     1/8/2024 12:46 PM Respiratory Aerobic Bacterial Culture with Gram Stain Preliminary     1/8/2024 12:46 PM Cytology non gyn - BAL Sites In process     1/8/2024 12:27 PM Cytology, non-gynecologic In process         These results will be followed up by Pulmonologist, Dr. Arzola    Discharge Disposition   Discharged to home  Condition at discharge: Stable    Hospital Course   Inez Rodriguez is a 73-year-old woman, former smoker with history of hypertension admitted on 1/7/2023 with cough associated with shortness of breath and chest pain secondary to postobstructive pneumonia.     Pulmonary CT angiogram revealed large mediastinal and hilar adenopathy and no evidence of pulmonary embolism. Endobronchial ultrasound  (EBUS) revealed significant enlargement of the patient's subcarinal, right hilar, and 4R lymph nodes, along with mucosal abnormalities likely due to extrinsic compression at the junctions of the right upper lobe bronchus and bronchus intermedius, and a noted constriction at the junction of the right lower lobe bronchus.  Results of lymph node biopsy are pending.  Results of AFB, sputum culture, and BAL for nocardia and cytology are also pending.    She received IV Zosyn briefly during hospital stay and was subsequently transitioned to oral Augmentin for 7 days as recommended by pulmonologist, Dr. Cammy Candelario.  She will follow-up at the pulmonology clinic on 1/19/2023. Pulmonologist service will discuss biopsy results at that time.  Symptoms have improved and patient is breathing comfortably on room air.  She is clinically stable for discharge at this time.    Consultations This Hospital Stay   PULMONARY IP CONSULT  CARE MANAGEMENT / SOCIAL WORK IP CONSULT  VASCULAR ACCESS ADULT IP CONSULT  HEMATOLOGY & ONCOLOGY IP CONSULT  PHYSICAL THERAPY ADULT IP CONSULT  OCCUPATIONAL THERAPY ADULT IP CONSULT    Code Status   Full Code    Time Spent on this Encounter   I, Lisa Jarquin MD, personally saw the patient today and spent greater than 30 minutes discharging this patient.       Lisa Jarquin MD  [place] [id number]  Phone: [phone]  Fax: [phone]  ______________________________________________________________________    Physical Exam   Vital Signs: Temp: 98.5  F (36.9  C) Temp src: Oral BP: (!) 151/76 Pulse: 81   Resp: 20 SpO2: 95 % O2 Device: None (Room air)    Weight: 122 lbs 0 oz    Physical Exam  General appearance: Awake, Alert, Cooperative, not in any obvious distress and appears stated age   HEENT: Normocephalic, atraumatic, conjunctiva clear without icterus and ears without discharge  Lungs: Diminished air exchange  bilaterally.  Cardiovascular: Regular Rate and Rythm, normal apical impulse, normal S1 and S2, no lower extremity edema bilaterally  Abdomen: Soft, non-tender and Non-distended, active bowel sounds  Skin: Skin color, texture normal and bruising or bleeding. No rashes or lesions over face, neck, arms and legs, turgor normal.  Musculoskeletal: No bony deformities or joint tenderness. Normal ROM upon flexion & extension.   Neurologic: Alert & Oriented X 3, Facial symmetry preserved and upper & lower extremities moving well with symmetry  Psychiatric: Calm, normal eye contact and normal affect         Primary Care Physician   Physician No Ref-Primary    Discharge Orders      Adult Pulmonary Medicine  Referral      Adult Oncology/Hematology  Referral      Reason for your hospital stay    Tobacco use disorder  Postobstructive pneumonia  Suspected metastatic adenopathy postobstructive pneumonia     Follow-up and recommended labs and tests     Follow up with primary care provider, Physician No Ref-Primary, within 7 days for hospital follow- up.  Follow-up with pulmonologist on 1/19/2024 as arranged.  Follow-up results of EBUS biopsy.  Follow-up at the oncology clinic as arranged.     Activity    Your activity upon discharge: activity as tolerated     Diet    Follow this diet upon discharge: Orders Placed This Encounter      Advance Diet as Tolerated: Full Liquid Diet       Significant Results and Procedures   Results for orders placed or performed during the hospital encounter of 01/07/24   CT Chest Pulmonary Embolism w Contrast    Narrative    EXAM: CT CHEST PULMONARY EMBOLISM W CONTRAST  LOCATION: [facility]  DATE: 1/7/2024    INDICATION: Cough. Shortness of breath.  COMPARISON: Chest x-ray 12/28/2023.  TECHNIQUE: CT chest pulmonary angiogram during arterial phase injection of IV contrast. Multiplanar reformats and MIP reconstructions were performed. Dose reduction techniques  were used.   CONTRAST: 75 mL Isovue-[id number].    FINDINGS:  ANGIOGRAM CHEST: Pulmonary arteries are normal caliber and negative for pulmonary emboli. Thoracic aorta is negative for dissection within the limitations of cardiac motion artifact. Moderate burden of atherosclerotic disease of the thoracic aorta.     LUNGS AND PLEURA: Right upper lobe nodular opacity measuring 2.1 x 1.9 x 1.0 cm (series 6, image 109). Ill-defined cluster of nodular opacities in the right upper lobe (series 6, image 75), favored infectious or inflammatory. Mild centrilobular   emphysema. No pleural effusion.    MEDIASTINUM/AXILLAE: Heterogeneous left thyroid lobe with a 1.5 cm hypoattenuating left thyroid nodule. There is mediastinal and right hilar adenopathy including right upper paratracheal, subcarinal, and right hilar nodes. Index right upper paratracheal   lymph node measures 2.2 cm in short axis and the subcarinal nichole conglomerate measures 3.0 cm in short axis. Right hilar nichole conglomerate demonstrates mass effect on the right upper lobe bronchus and bronchus intermedius with associated narrowing   without occlusion. Normal heart size with trace pericardial effusion.    CORONARY ARTERY CALCIFICATION: Moderate.    UPPER ABDOMEN: Lobulated contour of both kidneys. Atherosclerotic disease of the partially imaged abdominal aorta.    MUSCULOSKELETAL: No aggressive appearing osseous lesion.      Impression    IMPRESSION:  1.  Mediastinal and right hilar adenopathy most suggestive of metastatic adenopathy from a primary lung malignancy. A nodular opacity in the right upper lobe could represent a primary lung malignancy versus an infectious/inflammatory focus. Recommend   referral to pulmonology for tissue sampling.  2.  Nichole conglomerate demonstrate mass effect on the right upper lobe bronchus and bronchus intermedius with associated narrowing without occlusion.  3.  Left thyroid nodule which could be further evaluated with nonemergent  thyroid ultrasound.  4.  No evidence of acute pulmonary thromboembolic disease.       Discharge Medications   Current Discharge Medication List        START taking these medications    Details   !! albuterol (PROAIR HFA/PROVENTIL HFA/VENTOLIN HFA) 108 (90 Base) MCG/ACT inhaler Inhale 2 puffs into the lungs every 6 hours as needed for shortness of breath, wheezing or cough  Qty: 18 g, Refills: 0    Comments: Pharmacy may dispense brand covered by insurance (Proair, or proventil or ventolin or generic albuterol inhaler)      amoxicillin-clavulanate (AUGMENTIN) 875-125 MG tablet Take 1 tablet by mouth 2 times daily  Qty: 14 tablet, Refills: 0    Associated Diagnoses: Chronic cough; Wheezing-associated respiratory infection (WARI); Pulmonary nodules; Postobstructive pneumonia      benzonatate (TESSALON) 100 MG capsule Take 1 capsule (100 mg) by mouth 3 times daily as needed for cough  Qty: 15 capsule, Refills: 0    Associated Diagnoses: Chronic cough; Wheezing-associated respiratory infection (WARI); Pulmonary nodules; Postobstructive pneumonia      guaiFENesin-codeine (ROBITUSSIN AC) 100-10 MG/5ML solution Take 5 mLs by mouth every 4 hours as needed for cough  Qty: 180 mL, Refills: 0    Associated Diagnoses: Chronic cough; Wheezing-associated respiratory infection (WARI); Pulmonary nodules; Postobstructive pneumonia       !! - Potential duplicate medications found. Please discuss with provider.        CONTINUE these medications which have NOT CHANGED    Details   !! albuterol (PROAIR HFA/PROVENTIL HFA/VENTOLIN HFA) 108 (90 Base) MCG/ACT inhaler Inhale 1-2 puffs into the lungs every 4 hours as needed for shortness of breath, wheezing or cough  Qty: 18 g, Refills: 0    Comments: Pharmacy may dispense brand covered by insurance (Proair, or proventil or ventolin or generic albuterol inhaler)  Associated Diagnoses: Subacute cough      BETA GLUCAN PO Take 1,000 mg by mouth daily      DM-Doxylamine-Acetaminophen (NYQUIL COLD  & FLU PO) Take 2 capsules by mouth every 6 hours as needed (cold and flu symptoms)      multivitamin w/minerals (THERA-VIT-M) tablet Take 1 tablet by mouth daily      Vitamin D3 (CHOLECALCIFEROL) 125 MCG (5000 UT) tablet Take 125 mcg by mouth daily       !! - Potential duplicate medications found. Please discuss with provider.        STOP taking these medications       predniSONE (DELTASONE) 20 MG tablet Comments:   Reason for Stopping:             Allergies   No Known Allergies

## 2024-01-10 VITALS
RESPIRATION RATE: 18 BRPM | BODY MASS INDEX: 20.33 KG/M2 | DIASTOLIC BLOOD PRESSURE: 79 MMHG | HEART RATE: 88 BPM | TEMPERATURE: 97.8 F | SYSTOLIC BLOOD PRESSURE: 142 MMHG | WEIGHT: 122 LBS | HEIGHT: 65 IN | OXYGEN SATURATION: 95 %

## 2024-01-10 LAB
BACTERIA BRONCH: NO GROWTH
GRAM STAIN RESULT: NORMAL
GRAM STAIN RESULT: NORMAL

## 2024-01-10 PROCEDURE — 250N000013 HC RX MED GY IP 250 OP 250 PS 637: Performed by: INTERNAL MEDICINE

## 2024-01-10 PROCEDURE — 250N000011 HC RX IP 250 OP 636: Performed by: INTERNAL MEDICINE

## 2024-01-10 PROCEDURE — 250N000013 HC RX MED GY IP 250 OP 250 PS 637

## 2024-01-10 PROCEDURE — 99239 HOSP IP/OBS DSCHRG MGMT >30: CPT | Performed by: INTERNAL MEDICINE

## 2024-01-10 PROCEDURE — 250N000009 HC RX 250

## 2024-01-10 RX ORDER — ALBUTEROL SULFATE 90 UG/1
1-2 AEROSOL, METERED RESPIRATORY (INHALATION) EVERY 4 HOURS PRN
Qty: 18 G | Refills: 0 | Status: SHIPPED | OUTPATIENT
Start: 2024-01-10 | End: 2024-01-10

## 2024-01-10 RX ORDER — ALBUTEROL SULFATE 0.83 MG/ML
2.5 SOLUTION RESPIRATORY (INHALATION) EVERY 4 HOURS PRN
Qty: 90 ML | Refills: 0 | Status: SHIPPED | OUTPATIENT
Start: 2024-01-10 | End: 2024-02-14

## 2024-01-10 RX ADMIN — MULTIPLE VITAMINS W/ MINERALS TAB 1 TABLET: TAB at 08:13

## 2024-01-10 RX ADMIN — AMLODIPINE BESYLATE 5 MG: 5 TABLET ORAL at 08:13

## 2024-01-10 RX ADMIN — IPRATROPIUM BROMIDE AND ALBUTEROL SULFATE 3 ML: .5; 3 SOLUTION RESPIRATORY (INHALATION) at 05:55

## 2024-01-10 RX ADMIN — GUAIFENESIN AND CODEINE PHOSPHATE 5 ML: 100; 10 SOLUTION ORAL at 10:07

## 2024-01-10 RX ADMIN — HYDRALAZINE HYDROCHLORIDE 10 MG: 20 INJECTION INTRAMUSCULAR; INTRAVENOUS at 08:36

## 2024-01-10 RX ADMIN — BENZONATATE 100 MG: 100 CAPSULE ORAL at 00:56

## 2024-01-10 RX ADMIN — GUAIFENESIN AND CODEINE PHOSPHATE 5 ML: 100; 10 SOLUTION ORAL at 06:07

## 2024-01-10 RX ADMIN — GUAIFENESIN AND CODEINE PHOSPHATE 5 ML: 100; 10 SOLUTION ORAL at 00:56

## 2024-01-10 RX ADMIN — PIPERACILLIN AND TAZOBACTAM 3.38 G: 3; .375 INJECTION, POWDER, FOR SOLUTION INTRAVENOUS at 05:57

## 2024-01-10 RX ADMIN — Medication 125 MCG: at 08:13

## 2024-01-10 RX ADMIN — ACETAMINOPHEN 650 MG: 325 TABLET ORAL at 06:07

## 2024-01-10 ASSESSMENT — ACTIVITIES OF DAILY LIVING (ADL)
ADLS_ACUITY_SCORE: 21

## 2024-01-10 NOTE — PLAN OF CARE
"  Problem: Adult Inpatient Plan of Care  Goal: Plan of Care Review  Description: The Plan of Care Review/Shift note should be completed every shift.  The Outcome Evaluation is a brief statement about your assessment that the patient is improving, declining, or no change.  This information will be displayed automatically on your shift  note.  Outcome: Progressing  Goal: Patient-Specific Goal (Individualized)  Description: You can add care plan individualizations to a care plan. Examples of Individualization might be:  \"Parent requests to be called daily at 9am for status\", \"I have a hard time hearing out of my right ear\", or \"Do not touch me to wake me up as it startles  me\".  Outcome: Progressing  Goal: Absence of Hospital-Acquired Illness or Injury  Outcome: Progressing  Intervention: Identify and Manage Fall Risk  Recent Flowsheet Documentation  Taken 1/10/2024 0800 by Kristy Richards RN  Safety Promotion/Fall Prevention:   activity supervised   nonskid shoes/slippers when out of bed   room organization consistent   toileting scheduled   supervised activity  Intervention: Prevent Skin Injury  Recent Flowsheet Documentation  Taken 1/10/2024 0800 by Kristy Richards RN  Body Position: position changed independently  Goal: Optimal Comfort and Wellbeing  Outcome: Progressing  Goal: Readiness for Transition of Care  Outcome: Progressing     Problem: Risk for Delirium  Goal: Optimal Coping  Outcome: Progressing  Intervention: Optimize Psychosocial Adjustment to Delirium  Recent Flowsheet Documentation  Taken 1/10/2024 0800 by Kristy Richards RN  Supportive Measures:   active listening utilized   self-care encouraged   problem-solving facilitated  Goal: Improved Behavioral Control  Outcome: Progressing  Intervention: Minimize Safety Risk  Recent Flowsheet Documentation  Taken 1/10/2024 0800 by Kristy Richards RN  Communication Enhancement Strategies: call light answered in person  Goal: Improved Attention and Thought " Clarity  Outcome: Progressing  Intervention: Maximize Cognitive Function  Recent Flowsheet Documentation  Taken 1/10/2024 0800 by Kristy Richards RN  Reorientation Measures: clock in view  Goal: Improved Sleep  Outcome: Progressing     Problem: Infection  Goal: Absence of Infection Signs and Symptoms  Outcome: Progressing     Problem: Hypertension Acute  Goal: Blood Pressure Within Desired Range  Outcome: Progressing  Intervention: Normalize Blood Pressure  Recent Flowsheet Documentation  Taken 1/10/2024 0800 by Kristy Richards RN  Medication Review/Management: medications reviewed   Goal Outcome Evaluation:               Pt is alert and oriented x3. Pt is med complaint. Pt denies pain. Pt's v/s is stable. Pt received prn bp med for high bp.pt received all of her discharge paperwork and belongings. Pt will leave the unit with her son at around 1330.

## 2024-01-10 NOTE — DISCHARGE SUMMARY
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  Follow-up with pulmonologist on 1/19/2024   as arranged.  Follow-up results of EBUS biopsy.  Follow-up at the oncology   clinic as arranged.            Unresulted Labs Ordered in the Past 30 Days of this Admission       Date and Time Order Name Status Description    1/8/2024  2:02 PM Surgical pathology exam - Biopsy Site 1 In process     1/8/2024 12:46 PM Nocardia culture - BAL Site 1 Preliminary     1/8/2024 12:46 PM Fungus Culture, non-blood - BAL Site 1 Preliminary     1/8/2024 12:46 PM Acid-Fast Bacilli Culture and Stain with AFB Stain In process     1/8/2024 12:46 PM Respiratory Aerobic Bacterial Culture with Gram Stain Preliminary     1/8/2024 12:46 PM Cytology non gyn - BAL Sites In process     1/8/2024 12:27 PM Cytology, non-gynecologic In process         These results will be followed up by Pulmonologist, Dr. Arzola    Discharge Disposition   Discharged to home  Condition at discharge: Stable    Hospital Course   Inez Rodriguez is a 73-year-old woman, former smoker with history of hypertension admitted on 1/7/2023 with cough associated with shortness of breath and chest pain secondary to postobstructive pneumonia.     Pulmonary CT angiogram revealed large  mediastinal and hilar adenopathy and no evidence of pulmonary embolism. Endobronchial ultrasound (EBUS) revealed significant enlargement of the patient's subcarinal, right hilar, and 4R lymph nodes, along with mucosal abnormalities likely due to extrinsic compression at the junctions of the right upper lobe bronchus and bronchus intermedius, and a noted constriction at the junction of the right lower lobe bronchus.  Results of lymph node biopsy are pending.  Results of AFB, sputum culture, and BAL for nocardia and cytology are also pending.    She received IV Zosyn briefly during hospital stay and was subsequently transitioned to oral Augmentin for 7 days as recommended by pulmonologist, Dr. Cammy Candelario.  She will follow-up at the pulmonology clinic on 1/19/2023. Symptoms have improved and patient is breathing comfortably on room air.  She is clinically stable for discharge at this time.      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 Patient has an upcoming appointment with oncologist, Dr. Miller on 1/17/2024 at 1pm and pulmonologist Dr. Arzola on 1/19/2024.  Patient and sonKenneth are aware of pathology results.  They said they already saw the results and they are planning to follow-up with Dr. Miller on 1/17/2021 at 1 PM.    Consultations This Hospital Stay   PULMONARY IP CONSULT  CARE MANAGEMENT / SOCIAL WORK IP CONSULT  VASCULAR ACCESS ADULT IP CONSULT  HEMATOLOGY & ONCOLOGY IP CONSULT  PHYSICAL THERAPY ADULT IP CONSULT  OCCUPATIONAL THERAPY ADULT IP CONSULT    Code Status   Full Code    Time Spent on this Encounter   I, Lisa Jarquin MD, personally saw the patient today and spent greater than 30 minutes discharging this patient.       Lisa Jarquin MD  [address] [address]  Phone: [phone]  Fax:  [phone]  ______________________________________________________________________    Physical Exam   Vital Signs: Temp: 97.8  F (36.6  C) Temp src: Oral BP: (!) 175/76 Pulse: 88   Resp: 18 SpO2: 95 % O2 Device: None (Room air)    Weight: 122 lbs 0 oz    Physical Exam  General appearance: Awake, Alert, Cooperative, not in any obvious distress and appears stated age   HEENT: Normocephalic, atraumatic, conjunctiva clear without icterus and ears without discharge  Lungs: Diminished air exchange bilaterally.  Cardiovascular: Regular Rate and Rythm, normal apical impulse, normal S1 and S2, no lower extremity edema bilaterally  Abdomen: Soft, non-tender and Non-distended, active bowel sounds  Skin: Skin color, texture normal and bruising or bleeding. No rashes or lesions over face, neck, arms and legs, turgor normal.  Musculoskeletal: No bony deformities or joint tenderness. Normal ROM upon flexion & extension.   Neurologic: Alert & Oriented X 3, Facial symmetry preserved and upper & lower extremities moving well with symmetry  Psychiatric: Calm, normal eye contact and normal affect         Primary Care Physician   Physician No Ref-Primary    Discharge Orders      Adult Pulmonary Medicine  Referral      Adult Oncology/Hematology  Referral      Reason for your hospital stay    Tobacco use disorder  Postobstructive pneumonia  Suspected metastatic adenopathy postobstructive pneumonia     Follow-up and recommended labs and tests     Follow up with primary care provider, Physician No Ref-Primary, within 7 days for hospital follow- up.  Follow-up with pulmonologist on 1/19/2024 as arranged.  Follow-up results of EBUS biopsy.  Follow-up at the oncology clinic as arranged.     Activity    Your activity upon discharge: activity as tolerated     Pulse Oximeter Equipment     Diet    Follow this diet upon discharge: Orders Placed This Encounter      Advance Diet as Tolerated: Full Liquid Diet       Significant  Results and Procedures   Results for orders placed or performed during the hospital encounter of 01/07/24   CT Chest Pulmonary Embolism w Contrast    Narrative    EXAM: CT CHEST PULMONARY EMBOLISM W CONTRAST  LOCATION: [facility]  DATE: 1/7/2024    INDICATION: Cough. Shortness of breath.  COMPARISON: Chest x-ray 12/28/2023.  TECHNIQUE: CT chest pulmonary angiogram during arterial phase injection of IV contrast. Multiplanar reformats and MIP reconstructions were performed. Dose reduction techniques were used.   CONTRAST: 75 mL Isovue-[id number].    FINDINGS:  ANGIOGRAM CHEST: Pulmonary arteries are normal caliber and negative for pulmonary emboli. Thoracic aorta is negative for dissection within the limitations of cardiac motion artifact. Moderate burden of atherosclerotic disease of the thoracic aorta.     LUNGS AND PLEURA: Right upper lobe nodular opacity measuring 2.1 x 1.9 x 1.0 cm (series 6, image 109). Ill-defined cluster of nodular opacities in the right upper lobe (series 6, image 75), favored infectious or inflammatory. Mild centrilobular   emphysema. No pleural effusion.    MEDIASTINUM/AXILLAE: Heterogeneous left thyroid lobe with a 1.5 cm hypoattenuating left thyroid nodule. There is mediastinal and right hilar adenopathy including right upper paratracheal, subcarinal, and right hilar nodes. Index right upper paratracheal   lymph node measures 2.2 cm in short axis and the subcarinal marialuisa conglomerate measures 3.0 cm in short axis. Right hilar marialuisa conglomerate demonstrates mass effect on the right upper lobe bronchus and bronchus intermedius with associated narrowing   without occlusion. Normal heart size with trace pericardial effusion.    CORONARY ARTERY CALCIFICATION: Moderate.    UPPER ABDOMEN: Lobulated contour of both kidneys. Atherosclerotic disease of the partially imaged abdominal aorta.    MUSCULOSKELETAL: No aggressive appearing osseous lesion.      Impression     IMPRESSION:  1.  Mediastinal and right hilar adenopathy most suggestive of metastatic adenopathy from a primary lung malignancy. A nodular opacity in the right upper lobe could represent a primary lung malignancy versus an infectious/inflammatory focus. Recommend   referral to pulmonology for tissue sampling.  2.  Nichole conglomerate demonstrate mass effect on the right upper lobe bronchus and bronchus intermedius with associated narrowing without occlusion.  3.  Left thyroid nodule which could be further evaluated with nonemergent thyroid ultrasound.  4.  No evidence of acute pulmonary thromboembolic disease.       Discharge Medications   Current Discharge Medication List        START taking these medications    Details   !! albuterol (PROAIR HFA/PROVENTIL HFA/VENTOLIN HFA) 108 (90 Base) MCG/ACT inhaler Inhale 2 puffs into the lungs every 6 hours as needed for shortness of breath, wheezing or cough  Qty: 18 g, Refills: 0    Comments: Pharmacy may dispense brand covered by insurance (Proair, or proventil or ventolin or generic albuterol inhaler)      amLODIPine (NORVASC) 5 MG tablet Take 1 tablet (5 mg) by mouth daily  Qty: 30 tablet, Refills: 0    Associated Diagnoses: Benign essential hypertension      amoxicillin-clavulanate (AUGMENTIN) 875-125 MG tablet Take 1 tablet by mouth 2 times daily  Qty: 14 tablet, Refills: 0    Associated Diagnoses: Chronic cough; Wheezing-associated respiratory infection (WARI); Pulmonary nodules; Postobstructive pneumonia      benzonatate (TESSALON) 100 MG capsule Take 1 capsule (100 mg) by mouth 3 times daily as needed for cough  Qty: 15 capsule, Refills: 0    Associated Diagnoses: Chronic cough; Wheezing-associated respiratory infection (WARI); Pulmonary nodules; Postobstructive pneumonia      guaiFENesin-codeine (ROBITUSSIN AC) 100-10 MG/5ML solution Take 5 mLs by mouth every 4 hours as needed for cough  Qty: 180 mL, Refills: 0    Associated Diagnoses: Chronic cough;  Wheezing-associated respiratory infection (WARI); Pulmonary nodules; Postobstructive pneumonia       !! - Potential duplicate medications found. Please discuss with provider.        CONTINUE these medications which have NOT CHANGED    Details   !! albuterol (PROAIR HFA/PROVENTIL HFA/VENTOLIN HFA) 108 (90 Base) MCG/ACT inhaler Inhale 1-2 puffs into the lungs every 4 hours as needed for shortness of breath, wheezing or cough  Qty: 18 g, Refills: 0    Comments: Pharmacy may dispense brand covered by insurance (Proair, or proventil or ventolin or generic albuterol inhaler)  Associated Diagnoses: Subacute cough      BETA GLUCAN PO Take 1,000 mg by mouth daily      DM-Doxylamine-Acetaminophen (NYQUIL COLD & FLU PO) Take 2 capsules by mouth every 6 hours as needed (cold and flu symptoms)      multivitamin w/minerals (THERA-VIT-M) tablet Take 1 tablet by mouth daily      Vitamin D3 (CHOLECALCIFEROL) 125 MCG (5000 UT) tablet Take 125 mcg by mouth daily       !! - Potential duplicate medications found. Please discuss with provider.        STOP taking these medications       predniSONE (DELTASONE) 20 MG tablet Comments:   Reason for Stopping:             Allergies   No Known Allergies

## 2024-01-10 NOTE — PROGRESS NOTES
Care Management Discharge Note    Discharge Date: 01/10/2024       Discharge Disposition:  Home with son    Discharge Transportation: family or friend will provide    Private pay costs discussed: Not applicable    Does the patient's insurance plan have a 3 day qualifying hospital stay waiver?  No      Additional Information:  SW informed that pt requires a hospital follow-up visit and son has questions about insurance. SW spoke with pts son. He confirmed pt needs a PCP appointment and they are requesting the [facility]. He denies having any other questions about insurance. Pt has Medicare listed on her facesheet.    SW able to schedule pt at the [facility] on 1/11/2024 at 1:25 PM. Appointment listed on pts AVS. Pts RN updated. No further SW needs identified.    ROXANA Vences

## 2024-01-10 NOTE — CONSULTS
Oncology note  1/9/2024    Chart reviewed.  Patient is admitted with a postobstructive pneumonia.  EBUS biopsy pending for suspected lung cancer.  We will complete a formal consult once the pathology is finalized.    Please call me with any questions.    Jaren Jorge MD

## 2024-01-10 NOTE — PLAN OF CARE
Problem: Infection  Goal: Absence of Infection Signs and Symptoms  Outcome: Progressing   Goal Outcome Evaluation:       Pt alert and oriented x4. Lung sounds wheezing on inspiration and expiration. Prn albuterol neb given. Reports generalized weakness and  abdominal pain from coughing. Oxygen on room air.  Afebrile. On zosyn.

## 2024-01-10 NOTE — PROGRESS NOTES
Pulmonary Update:    Patient discharging today.  Needs 7 days of Augmentin for obstructive pneumonia  Pathology still pending.  Needs oncology appointment to discuss next steps. I have messaged the coordinator to start getting appt.  Has appt with Dr. Arzola 1/18 this may not be needed based on when oncology can get her in.    Andree Chawla MD

## 2024-01-11 ENCOUNTER — PATIENT OUTREACH (OUTPATIENT)
Dept: CARE COORDINATION | Facility: CLINIC | Age: 74
End: 2024-01-11

## 2024-01-11 ENCOUNTER — PATIENT OUTREACH (OUTPATIENT)
Dept: ONCOLOGY | Facility: CLINIC | Age: 74
End: 2024-01-11

## 2024-01-11 ENCOUNTER — OFFICE VISIT (OUTPATIENT)
Dept: INTERNAL MEDICINE | Facility: CLINIC | Age: 74
End: 2024-01-11
Payer: MEDICARE

## 2024-01-11 VITALS
BODY MASS INDEX: 20.33 KG/M2 | OXYGEN SATURATION: 94 % | HEART RATE: 107 BPM | SYSTOLIC BLOOD PRESSURE: 156 MMHG | WEIGHT: 122 LBS | DIASTOLIC BLOOD PRESSURE: 82 MMHG | RESPIRATION RATE: 18 BRPM | HEIGHT: 65 IN

## 2024-01-11 DIAGNOSIS — C34.31 MALIGNANT NEOPLASM OF LOWER LOBE OF RIGHT LUNG (H): ICD-10-CM

## 2024-01-11 DIAGNOSIS — J18.9 POSTOBSTRUCTIVE PNEUMONIA: Primary | ICD-10-CM

## 2024-01-11 DIAGNOSIS — R05.3 CHRONIC COUGH: ICD-10-CM

## 2024-01-11 PROCEDURE — 99213 OFFICE O/P EST LOW 20 MIN: CPT | Performed by: NURSE PRACTITIONER

## 2024-01-11 ASSESSMENT — PATIENT HEALTH QUESTIONNAIRE - PHQ9
SUM OF ALL RESPONSES TO PHQ QUESTIONS 1-9: 25
SUM OF ALL RESPONSES TO PHQ QUESTIONS 1-9: 25
10. IF YOU CHECKED OFF ANY PROBLEMS, HOW DIFFICULT HAVE THESE PROBLEMS MADE IT FOR YOU TO DO YOUR WORK, TAKE CARE OF THINGS AT HOME, OR GET ALONG WITH OTHER PEOPLE: VERY DIFFICULT

## 2024-01-11 NOTE — PROGRESS NOTES
Assessment & Plan   Problem List Items Addressed This Visit       Chronic cough    Malignant neoplasm of lower lobe of right lung (H)    Postobstructive pneumonia - Primary               MED REC REQUIRED  Post Medication Reconciliation Status:     Depression Screening Follow Up        1/11/2024    12:47 PM   PHQ   PHQ-9 Total Score 25   Q9: Thoughts of better off dead/self-harm past 2 weeks Nearly every day   F/U: Thoughts of suicide or self-harm No   F/U: Safety concerns No                 Follow Up    Follow Up Actions Taken  Crisis resource information provided in the After Visit Summary  Patient to follow up with PCP.  Clinic staff to schedule appointment if able.    Discussed the following ways the patient can remain in a safe environment:  be around others      Althea Jones NP  [facility] GABRIELA Wiley is a 73 year old, presenting for the following health issues:  Hospital F/U        1/11/2024     1:16 PM   Additional Questions   Roomed by J Luis VILLASENOR CMA   Accompanied by Bart       [facility]       Hospital Follow-up Visit:    Hospital/Nursing Home/IP Rehab Facility: [facility]  Date of Admission: 01/07/2024  Date of Discharge: 01/10/2024  Reason(s) for Admission: Observation for suspected malignant neoplasm     Was your hospitalization related to COVID-19? No   Problems taking medications regularly:  None  Medication changes since discharge: Amlodipine, augmentin, benzonatate, robitussin AC, albuterol   Problems adhering to non-medication therapy:  None    Summary of hospitalization:  [facility] discharge summary reviewed  Diagnostic Tests/Treatments reviewed.  Follow up needed: Pulmonology, oncology and establish care with a primary care provider  Other Healthcare Providers Involved in Patient s Care:         None  Update since discharge: stable.         Plan of care communicated with patient and family             Review of Systems  "  Constitutional, HEENT, cardiovascular, pulmonary, GI, , musculoskeletal, neuro, skin, endocrine and psych systems are negative, except as otherwise noted.      Objective    BP (!) 156/82 (BP Location: Right arm, Patient Position: Sitting, Cuff Size: Adult Small)   Pulse 107   Resp 18   Ht 1.651 m (5' 5\")   Wt 55.3 kg (122 lb)   SpO2 94%   BMI 20.30 kg/m    Body mass index is 20.3 kg/m .  Physical Exam   GENERAL: alert and no distress  NECK: no adenopathy  RESP: Respirations are regular nonlabored, coarsely clear to auscultation anterior posterior bilaterally  CV: regular rate and rhythm, normal S1 S2,  MS: no gross musculoskeletal defects noted, no edema  PSYCH: mentation appears normal, affect normal/bright    Admission on 01/07/2024, Discharged on 01/10/2024   Component Date Value Ref Range Status    Sodium 01/07/2024 135  135 - 145 mmol/L Final    Reference intervals for this test were updated on 09/26/2023 to more accurately reflect our healthy population. There may be differences in the flagging of prior results with similar values performed with this method. Interpretation of those prior results can be made in the context of the updated reference intervals.     Potassium 01/07/2024 4.8  3.4 - 5.3 mmol/L Final    Chloride 01/07/2024 96 (L)  98 - 107 mmol/L Final    Carbon Dioxide (CO2) 01/07/2024 25  22 - 29 mmol/L Final    Anion Gap 01/07/2024 14  7 - 15 mmol/L Final    Urea Nitrogen 01/07/2024 12.6  8.0 - 23.0 mg/dL Final    Creatinine 01/07/2024 0.80  0.51 - 0.95 mg/dL Final    GFR Estimate 01/07/2024 77  >60 mL/min/1.73m2 Final    Calcium 01/07/2024 9.4  8.8 - 10.2 mg/dL Final    Glucose 01/07/2024 98  70 - 99 mg/dL Final    N terminal Pro BNP Inpatient 01/07/2024 95  0 - 900 pg/mL Final    Reference range shown and results flagged as abnormal are suggested inpatient cut points for confirming diagnosis if CHF in an acute setting. Establishing a baseline value for each individual patient is useful " for follow-up. An inpatient or emergency department NT-proPBNP <300 pg/mL effectively rules out acute CHF, with 99% negative predictive value.    The outpatient non-acute reference range for ruling out CHF is:  0-125 pg/mL (age 18 to less than 75)  0-450 pg/mL (age 75 yrs and older)     Influenza A PCR 01/07/2024 Negative  Negative Final    Influenza B PCR 01/07/2024 Negative  Negative Final    RSV PCR 01/07/2024 Negative  Negative Final    SARS CoV2 PCR 01/07/2024 Negative  Negative Final    NEGATIVE: SARS-CoV-2 (COVID-19) RNA not detected, presumed negative.    WBC Count 01/07/2024 14.9 (H)  4.0 - 11.0 10e3/uL Final    RBC Count 01/07/2024 4.79  3.80 - 5.20 10e6/uL Final    Hemoglobin 01/07/2024 13.9  11.7 - 15.7 g/dL Final    Hematocrit 01/07/2024 42.9  35.0 - 47.0 % Final    MCV 01/07/2024 90  78 - 100 fL Final    MCH 01/07/2024 29.0  26.5 - 33.0 pg Final    MCHC 01/07/2024 32.4  31.5 - 36.5 g/dL Final    RDW 01/07/2024 14.3  10.0 - 15.0 % Final    Platelet Count 01/07/2024 265  150 - 450 10e3/uL Final    % Neutrophils 01/07/2024 80  % Final    % Lymphocytes 01/07/2024 10  % Final    % Monocytes 01/07/2024 9  % Final    % Eosinophils 01/07/2024 0  % Final    % Basophils 01/07/2024 0  % Final    % Immature Granulocytes 01/07/2024 1  % Final    NRBCs per 100 WBC 01/07/2024 0  <1 /100 Final    Absolute Neutrophils 01/07/2024 11.9 (H)  1.6 - 8.3 10e3/uL Final    Absolute Lymphocytes 01/07/2024 1.5  0.8 - 5.3 10e3/uL Final    Absolute Monocytes 01/07/2024 1.4 (H)  0.0 - 1.3 10e3/uL Final    Absolute Eosinophils 01/07/2024 0.0  0.0 - 0.7 10e3/uL Final    Absolute Basophils 01/07/2024 0.0  0.0 - 0.2 10e3/uL Final    Absolute Immature Granulocytes 01/07/2024 0.1  <=0.4 10e3/uL Final    Absolute NRBCs 01/07/2024 0.0  10e3/uL Final    TSH 01/07/2024 0.02 (L)  0.30 - 4.20 uIU/mL Final    Free T4 01/07/2024 1.22  0.90 - 1.70 ng/dL Final    WBC Count 01/08/2024 12.1 (H)  4.0 - 11.0 10e3/uL Final    RBC Count 01/08/2024 4.81   3.80 - 5.20 10e6/uL Final    Hemoglobin 01/08/2024 13.9  11.7 - 15.7 g/dL Final    Hematocrit 01/08/2024 42.7  35.0 - 47.0 % Final    MCV 01/08/2024 89  78 - 100 fL Final    MCH 01/08/2024 28.9  26.5 - 33.0 pg Final    MCHC 01/08/2024 32.6  31.5 - 36.5 g/dL Final    RDW 01/08/2024 14.1  10.0 - 15.0 % Final    Platelet Count 01/08/2024 305  150 - 450 10e3/uL Final    Sodium 01/08/2024 141  135 - 145 mmol/L Final    Reference intervals for this test were updated on 09/26/2023 to more accurately reflect our healthy population. There may be differences in the flagging of prior results with similar values performed with this method. Interpretation of those prior results can be made in the context of the updated reference intervals.     Potassium 01/08/2024 4.5  3.4 - 5.3 mmol/L Final    Chloride 01/08/2024 102  98 - 107 mmol/L Final    Carbon Dioxide (CO2) 01/08/2024 26  22 - 29 mmol/L Final    Anion Gap 01/08/2024 13  7 - 15 mmol/L Final    Urea Nitrogen 01/08/2024 15.1  8.0 - 23.0 mg/dL Final    Creatinine 01/08/2024 0.61  0.51 - 0.95 mg/dL Final    GFR Estimate 01/08/2024 >90  >60 mL/min/1.73m2 Final    Calcium 01/08/2024 9.9  8.8 - 10.2 mg/dL Final    Glucose 01/08/2024 146 (H)  70 - 99 mg/dL Final    INR 01/08/2024 1.10  0.85 - 1.15 Final    Hold Specimen 01/08/2024 JIC   Final    Procalcitonin 01/08/2024 0.41  <0.50 ng/mL Final    Comment: Interpretation and Recommendations     <0.5 ng/mL:   Systemic bacterial infection unlikely. Local bacterial infection is possible.     0.5-1.99 ng/mL:   Systemic bacterial infection possible, but various other conditions are known to induce PCT as well.     >=2.00 ng/mL:   Systemic bacterial infection likely, unless other causes are known.     Decision to start antibiotics should not be based on procalcitonin level alone. See Procalcitonin Guidance document for more details.  https://[employer]/files/[email or web address]/[email or web address]-[email or web address]-guidance-[email or web address]    Factors that may affect PCT levels (not all-inclusive):     - Increased PCT level           Severe trauma/burns           Invasive surgery           Cooling therapy after cardiac arrest/surgery           Treatment with agents which stimulate cytokines           Acute kidney injury           Chronic kidney disease and end stage renal disease           Acute graft vs host disease           Non-specific shock                            causing decreased organ perfusion and/or infarction       - Normal or unchanged PCT level           Early in infections (if low and infection is suspected, repeating in 6-12 hours is recommended)           Chronic infections (endocarditis, osteomyelitis, prosthetic device/graft infections)           Localized infections (cellulitis, wound infections, intra-abdominal abscess)     Note: PCT has not been extensively studied in pregnancy/breastfeeding, pediatrics, severe immunosuppression, and cystic fibrosis.    Culture 01/08/2024 No Growth   Final    Gram Stain Result 01/08/2024 <25 PMNs/low power field   Final    Gram Stain Result 01/08/2024 No organisms seen   Final    Culture 01/08/2024 No growth after 2 days   Preliminary    KATHLEEN Preparation 01/08/2024 No fungal elements seen   Final    KATHLEEN Preparation 01/08/2024 Reference Range: No fungal elements seen.   Final    Culture 01/08/2024 No growth after 2 days   Preliminary    Color 01/08/2024 Red (A)  Colorless, Yellow Final    Clarity 01/08/2024 Cloudy (A)  Clear Final    Cell Count Fluid Source 01/08/2024 Lung, Right   Final    Total Nucleated Cells 01/08/2024 90  /uL Final    Acid Fast Stain 01/08/2024 No acid fast bacilli seen   Preliminary    Less than 5 mL of specimen received. A minimum of 5 mL of sputum or fluid is recommended for recovery of acid fast bacilli (AFB). Volumes less than 5 mL are suboptimal and may compromise recovery  of AFB from culture.    Acid Fast Stain 01/08/2024 No acid fast bacilli seen   Preliminary    Comment: Less than 5 mL of specimen received. A minimum of 5 mL of sputum or fluid is recommended for recovery of acid fast bacilli (AFB). Volumes less than 5 mL are suboptimal and may compromise recovery of AFB from culture.                             This is an appended report. These results have been appended to a previously preliminary verified report.    % Neutrophils 01/08/2024 83  % Final    % Lymphocytes 01/08/2024 4  % Final    % Monocyte/Macrophages 01/08/2024 9  % Final    % Eosinophils 01/08/2024 1  % Final    % Lining Cells 01/08/2024 3  % Final    Sodium 01/09/2024 144  135 - 145 mmol/L Final    Reference intervals for this test were updated on 09/26/2023 to more accurately reflect our healthy population. There may be differences in the flagging of prior results with similar values performed with this method. Interpretation of those prior results can be made in the context of the updated reference intervals.     Potassium 01/09/2024 4.4  3.4 - 5.3 mmol/L Final    Chloride 01/09/2024 107  98 - 107 mmol/L Final    Carbon Dioxide (CO2) 01/09/2024 27  22 - 29 mmol/L Final    Anion Gap 01/09/2024 10  7 - 15 mmol/L Final    Urea Nitrogen 01/09/2024 14.0  8.0 - 23.0 mg/dL Final    Creatinine 01/09/2024 0.77  0.51 - 0.95 mg/dL Final    GFR Estimate 01/09/2024 81  >60 mL/min/1.73m2 Final    Calcium 01/09/2024 9.8  8.8 - 10.2 mg/dL Final    Glucose 01/09/2024 120 (H)  70 - 99 mg/dL Final    WBC Count 01/09/2024 16.0 (H)  4.0 - 11.0 10e3/uL Final    RBC Count 01/09/2024 4.37  3.80 - 5.20 10e6/uL Final    Hemoglobin 01/09/2024 12.6  11.7 - 15.7 g/dL Final    Hematocrit 01/09/2024 39.0  35.0 - 47.0 % Final    MCV 01/09/2024 89  78 - 100 fL Final    MCH 01/09/2024 28.8  26.5 - 33.0 pg Final    MCHC 01/09/2024 32.3  31.5 - 36.5 g/dL Final    RDW 01/09/2024 14.0  10.0 - 15.0 % Final    Platelet Count 01/09/2024 317  150 -  450 10e3/uL Final         Current Outpatient Medications:     albuterol (PROVENTIL) (2.5 MG/3ML) 0.083% neb solution, Take 1 vial (2.5 mg) by nebulization every 4 hours as needed for shortness of breath, wheezing or cough (Patient not taking: Reported on 1/19/2024), Disp: 90 mL, Rfl: 0    amLODIPine (NORVASC) 5 MG tablet, Take 1 tablet (5 mg) by mouth daily, Disp: 30 tablet, Rfl: 0    amoxicillin-clavulanate (AUGMENTIN) 875-125 MG tablet, Take 1 tablet by mouth 2 times daily (Patient not taking: Reported on 1/17/2024), Disp: 14 tablet, Rfl: 0    benzonatate (TESSALON) 100 MG capsule, Take 1 capsule (100 mg) by mouth 3 times daily as needed for cough (Patient not taking: Reported on 1/17/2024), Disp: 15 capsule, Rfl: 0    BETA GLUCAN PO, Take 1,000 mg by mouth daily, Disp: , Rfl:     CITICOLINE PO, Take 250 mg by mouth daily (Patient not taking: Reported on 1/19/2024), Disp: , Rfl:     DM-Doxylamine-Acetaminophen (NYQUIL COLD & FLU PO), Take 2 capsules by mouth every 6 hours as needed (cold and flu symptoms) (Patient not taking: Reported on 1/19/2024), Disp: , Rfl:     Folic Acid (FOLATE PO), Take 1,700 mcg by mouth daily (Patient not taking: Reported on 1/19/2024), Disp: , Rfl:     guaiFENesin-codeine (ROBITUSSIN AC) 100-10 MG/5ML solution, Take 5 mLs by mouth every 4 hours as needed for cough (Patient not taking: Reported on 1/19/2024), Disp: 180 mL, Rfl: 0    Inositol Niacinate (NIACIN FLUSH FREE PO), Take 1 capsule by mouth daily (Patient not taking: Reported on 1/19/2024), Disp: , Rfl:     Mag Aspart-Potassium Aspart (POTASSIUM & MAGNESIUM ASPARTAT PO), Take 2 capsules by mouth daily (Patient not taking: Reported on 1/19/2024), Disp: , Rfl:     METHYLCOBALAMIN PO, Place 1 mg under the tongue daily (Patient not taking: Reported on 1/19/2024), Disp: , Rfl:     multivitamin w/minerals (THERA-VIT-M) tablet, Take 1 tablet by mouth daily, Disp: , Rfl:     Vitamin D3 (CHOLECALCIFEROL) 125 MCG (5000 UT) tablet, Take 125  mcg by mouth daily, Disp: , Rfl:

## 2024-01-11 NOTE — PROGRESS NOTES
New Patient Oncology Nurse Navigator Note     Referring provider: [facility]    Referring Clinic/Organization: [facility]     Referred to: Medical Oncology    Requested provider (if applicable): First available - did not specify     Referral Received: 01/09/24       Evaluation for : CT concerning for lung malignancy     Clinical History (per Nurse review of records provided):      * 1/7/2024 - 1/10/2024 Pt presented to ED at [facility] for cough associated with shortness of breath and chest pain secondary to postobstructive pneumonia. Work up is below:      * 1/7/2024 CT chest ([facility])  IMPRESSION:  1.  Mediastinal and right hilar adenopathy most suggestive of metastatic adenopathy from a primary lung malignancy. A nodular opacity in the right upper lobe could represent a primary lung malignancy versus an infectious/inflammatory focus. Recommend   referral to pulmonology for tissue sampling.  2.  [employer] demonstrate mass effect on the right upper lobe bronchus and bronchus intermedius with associated narrowing without occlusion.  3.  Left thyroid nodule which could be further evaluated with nonemergent thyroid ultrasound.  4.  No evidence of acute pulmonary thromboembolic disease.      * 1/8/2024 Bronchoscopy, pathology is pending________ at the time of this note.      Clinical Assessment / Barriers to Care (Per Nurse):    Inpt Pulm team, Dr Chawla, is referring pt to Med Onc for presumed lung cancer. Biopsy/ path are still pending from EBUS 1/8. Pt lives in [facility]; Dr Miller had a cancellation for 1/17/24 Wed at 1pm, in person. Pt accepts this appt.       [facility]     Records Needed:     N/A, internal work up and path    Additional testing needed prior to consult:     TBD          Bandar Jaime, RN, BSN, OCN  Oncology New Patient Nurse Navigator   [facility]  [phone]

## 2024-01-11 NOTE — PROGRESS NOTES
[facility]    Background: Transitional Care Management program identified per system criteria and reviewed by [facility] team for possible outreach.    Assessment: Upon chart review, CCR Team member will not proceed with patient outreach related to this episode of Transitional Care Management program due to reason below:    Patient has a follow up appointment with an appropriate provider today for hospital discharge    Future Appointments   Date Time Provider Department Center   1/11/2024  1:40 PM Althea Jones NP [facility]   1/19/2024 10:30 AM Андрей Arzola MD [facility]         Plan: Transitional Care Management episode addressed appropriately per reason noted above.      Kathy Mendoza RN  [facility] Resource Center, [facility]    *Connected Care Resource Team does NOT follow patient ongoing. Referrals are identified based on internal discharge reports and the outreach is to ensure patient has an understanding of their discharge instructions.

## 2024-01-12 LAB
PATH REPORT.COMMENTS IMP SPEC: ABNORMAL
PATH REPORT.COMMENTS IMP SPEC: YES
PATH REPORT.FINAL DX SPEC: ABNORMAL
PATH REPORT.GROSS SPEC: ABNORMAL
PATH REPORT.MICROSCOPIC SPEC OTHER STN: ABNORMAL
PATH REPORT.RELEVANT HX SPEC: ABNORMAL

## 2024-01-12 PROCEDURE — 88305 TISSUE EXAM BY PATHOLOGIST: CPT | Mod: 26 | Performed by: PATHOLOGY

## 2024-01-12 PROCEDURE — 88360 TUMOR IMMUNOHISTOCHEM/MANUAL: CPT | Mod: 26 | Performed by: PATHOLOGY

## 2024-01-12 PROCEDURE — 88342 IMHCHEM/IMCYTCHM 1ST ANTB: CPT | Mod: 26 | Performed by: PATHOLOGY

## 2024-01-12 PROCEDURE — 88173 CYTOPATH EVAL FNA REPORT: CPT | Mod: 26 | Performed by: PATHOLOGY

## 2024-01-12 PROCEDURE — 88341 IMHCHEM/IMCYTCHM EA ADD ANTB: CPT | Mod: 26 | Performed by: PATHOLOGY

## 2024-01-12 PROCEDURE — 88108 CYTOPATH CONCENTRATE TECH: CPT | Mod: 26 | Performed by: PATHOLOGY

## 2024-01-12 PROCEDURE — 88172 CYTP DX EVAL FNA 1ST EA SITE: CPT | Mod: 26 | Performed by: PATHOLOGY

## 2024-01-12 PROCEDURE — 88177 CYTP FNA EVAL EA ADDL: CPT | Mod: 26 | Performed by: PATHOLOGY

## 2024-01-15 LAB
PATH REPORT.ADDENDUM SPEC: ABNORMAL
PATH REPORT.COMMENTS IMP SPEC: ABNORMAL
PATH REPORT.COMMENTS IMP SPEC: ABNORMAL
PATH REPORT.COMMENTS IMP SPEC: YES
PATH REPORT.FINAL DX SPEC: ABNORMAL
PATH REPORT.GROSS SPEC: ABNORMAL
PATH REPORT.MICROSCOPIC SPEC OTHER STN: ABNORMAL
PATH REPORT.RELEVANT HX SPEC: ABNORMAL

## 2024-01-17 ENCOUNTER — ONCOLOGY VISIT (OUTPATIENT)
Dept: ONCOLOGY | Facility: HOSPITAL | Age: 74
End: 2024-01-17
Attending: INTERNAL MEDICINE
Payer: MEDICARE

## 2024-01-17 ENCOUNTER — PATIENT OUTREACH (OUTPATIENT)
Dept: CARE COORDINATION | Facility: CLINIC | Age: 74
End: 2024-01-17
Payer: MEDICARE

## 2024-01-17 ENCOUNTER — PATIENT OUTREACH (OUTPATIENT)
Dept: ONCOLOGY | Facility: HOSPITAL | Age: 74
End: 2024-01-17
Payer: MEDICARE

## 2024-01-17 VITALS
BODY MASS INDEX: 19.99 KG/M2 | OXYGEN SATURATION: 93 % | WEIGHT: 120 LBS | HEART RATE: 101 BPM | DIASTOLIC BLOOD PRESSURE: 63 MMHG | RESPIRATION RATE: 16 BRPM | TEMPERATURE: 97.6 F | HEIGHT: 65 IN | SYSTOLIC BLOOD PRESSURE: 134 MMHG

## 2024-01-17 DIAGNOSIS — J18.9 POSTOBSTRUCTIVE PNEUMONIA: ICD-10-CM

## 2024-01-17 DIAGNOSIS — C34.31 MALIGNANT NEOPLASM OF LOWER LOBE OF RIGHT LUNG (H): Primary | ICD-10-CM

## 2024-01-17 PROCEDURE — 99205 OFFICE O/P NEW HI 60 MIN: CPT | Performed by: INTERNAL MEDICINE

## 2024-01-17 PROCEDURE — G0463 HOSPITAL OUTPT CLINIC VISIT: HCPCS | Performed by: INTERNAL MEDICINE

## 2024-01-17 PROCEDURE — G2211 COMPLEX E/M VISIT ADD ON: HCPCS | Performed by: INTERNAL MEDICINE

## 2024-01-17 ASSESSMENT — PAIN SCALES - GENERAL: PAINLEVEL: NO PAIN (0)

## 2024-01-17 NOTE — PROGRESS NOTES
"Oncology Rooming Note    January 17, 2024 1:11 PM   Inez Rodriguez is a 73 year old female who presents for:    Chief Complaint   Patient presents with    Oncology Clinic Visit     New Oncology Non-small cell lung cancer, Postobstructive pneumonia.      Initial Vitals: /63 (BP Location: Left arm, Patient Position: Sitting, Cuff Size: Adult Regular)   Pulse 101   Temp 97.6  F (36.4  C) (Oral)   Resp 16   Ht 1.651 m (5' 5\")   Wt 54.4 kg (120 lb)   SpO2 93%   BMI 19.97 kg/m   Estimated body mass index is 19.97 kg/m  as calculated from the following:    Height as of this encounter: 1.651 m (5' 5\").    Weight as of this encounter: 54.4 kg (120 lb). Body surface area is 1.58 meters squared.  No Pain (0) Comment: Data Unavailable   No LMP recorded. Patient is premenopausal.  Allergies reviewed: Yes  Medications reviewed: Yes    Medications: Medication refills not needed today.  Pharmacy name entered into [facility]:    [id number] - [place] [id number] AT [facility] [address]    Frailty Screening:   Is the patient here for a new oncology consult visit in cancer care? 2. No      Clinical concerns: New Oncology Non-small cell lung cancer, Postobstructive pneumonia.      Malgorzata Fields [facility]            "

## 2024-01-17 NOTE — PROGRESS NOTES
[facility]: Cancer Care                                                                                          Patient was in today with her son Kenneth today to meet with Dr Miller about her presumed lung cancer.  Dr Miller talked with her about getting additional imaging and a radiation therapy consultation before making any decisions about what she needs done.  He also added on pathology additional testing on her EBUS tissue.      I went in to meet her after her visit with Dr Miller.  She also met our  Sharon.  I introduced myself and explained my role.  I assisted her out to the  to get her imaging set  up.    Signature:  María Casillas RN

## 2024-01-17 NOTE — PROGRESS NOTES
[facility] Hematology and Oncology Consult Note    Patient: Inez Rodriguez  MRN: [id number]  Date of Service: Jan 17, 2024           Reason for consultation      Problem List Items Addressed This Visit          Respiratory    Malignant neoplasm of lower lobe of right lung (H) - Primary    Relevant Orders    PET Oncology (Eyes to Thighs)    MR Brain w Contrast    Radiation Therapy Referral    Pathology Additional Testing: [facility] Lung; [facility]    Postobstructive pneumonia         Assessment / number of problems addressed      1.  A very pleasant 73-year-old woman who unfortunately seems to have at least stage IIIB non-small cell lung cancer.  The primary seems to be coming from the right upper lobe.  2.  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  Pathology is positive for non-small cell lung cancer.  3.  Prior history of smoking.  Currently patient does not smoke.  4.  Recently moved from [place].  Limited social support.    Plan and medical decision making      Highly complicated medical decision-making process.  The patient presenting with advanced non-small cell lung cancer which appears to be at least stage IIIb.  Obvious threat to life and health.  Reviewed Labs and interpreted the results independently, Reviewed imaging results and Reviewed Notes from other providers. Ordered tests and medications as indicated.    1.  Recommend staging study with the PET scan.  2.  MRI of the brain to make sure that she does not have any brain metastases.  3.  Send pathology for molecular testing.  4.  Follow-up with me in couple of weeks time after the tests are done to plan her treatment.  5.  Of also recommended consider radiation consultation since I think she might be a candidate for concurrent chemoradiation if she is not found to have any other metastatic disease.  6.  Discussed with the patient that the treatment will be decided after we have the full staging  workup back and hopefully also more molecular testing back.  7.  Since referred her for  consultation regarding her insurance questions.  8. The longitudinal plan of care for lung cancer was addressed during this visit. Due to the added complexity in care, I will continue to support Inez in the subsequent management of this condition(s) and with the ongoing continuity of care of this condition(s).    Clinical/pathological stage      Cancer Staging   No matching staging information was found for the patient.      History of present illness      Ms. Inez Rodriguez is a 73 year old woman who came to the emergency room in the beginning of January 2024 with increasing shortness of breath.  Was also having some coughing which was not responding to treatment given by the urgent care.  In the emergency room she had a CT scan done to rule out any pulmonary embolism but was positive for multiple lymph nodes in the mediastinum as well as subcarinal area.  She was therefore admitted.  She was seen by pulmonology.  Underwent bronchoscopy and biopsy.  The bronchoscopy came back positive for adenocarcinoma involving subcarinal and mediastinal lymph nodes.    The patient does have a's history of smoking exposure but tells me that she has not smoked for several years.  No significant family history of cancer.  She recently moved from [place].  Comes accompanied by her son.    She is having some degree of shortness of breath on exertion.    Detailed review of systems      A 14 point review of systems was obtained.  Positive findings noted in the history.  Rest of the review of system is otherwise negative.      Past medical/surgical/social/family history        Past Medical History:   Diagnosis Date    Hypertension     SOB (shortness of breath)      Past Surgical History:   Procedure Laterality Date    BRONCHOSCOPY RIGID OR FLEXIBLE W/TRANSENDOSCOPIC ENDOBRONCHIAL ULTRASOUND GUIDED N/A 1/8/2024    Procedure: BRONCHOSCOPY,  WITH ENDOBRONCHIAL ULTRASOUND WITH FINE NEEDLE ASPIRATION OF LYMPH NODE;  Surgeon: Angelia Mustafa MD;  [place] [facility] [place]    TONSILLECTOMY      at 8 years of age     No family history on file.  Social History     Socioeconomic History    Marital status:      Spouse name: None    Number of children: None    Years of education: None    Highest education level: None   Tobacco Use    Smoking status: Never    Smokeless tobacco: Never   Substance and Sexual Activity    Alcohol use: Never     Social Determinants of Health     Financial Resource Strain: Low Risk  (1/11/2024)    Financial Resource Strain     Within the past 12 months, have you or your family members you live with been unable to get utilities (heat, electricity) when it was really needed?: No   Food Insecurity: Low Risk  (1/11/2024)    Food Insecurity     Within the past 12 months, did you worry that your food would run out before you got money to buy more?: No     Within the past 12 months, did the food you bought just not last and you didn t have money to get more?: No   Transportation Needs: Low Risk  (1/11/2024)    Transportation Needs     Within the past 12 months, has lack of transportation kept you from medical appointments, getting your medicines, non-medical meetings or appointments, work, or from getting things that you need?: No   Interpersonal Safety: Low Risk  (1/11/2024)    Interpersonal Safety     Do you feel physically and emotionally safe where you currently live?: Yes     Within the past 12 months, have you been hit, slapped, kicked or otherwise physically hurt by someone?: No     Within the past 12 months, have you been humiliated or emotionally abused in other ways by your partner or ex-partner?: No   Housing Stability: Low Risk  (1/11/2024)    Housing Stability     Do you have housing? : Yes     Are you worried about losing your housing?: No           Allergies      No Known Allergies      Physical exam        /63  "(BP Location: Left arm, Patient Position: Sitting, Cuff Size: Adult Regular)   Pulse 101   Temp 97.6  F (36.4  C) (Oral)   Resp 16   Ht 1.651 m (5' 5\")   Wt 54.4 kg (120 lb)   SpO2 93%   BMI 19.97 kg/m        GENERAL: Alert and oriented to time place and person. Seated comfortably. In no distress.    HEAD: Atraumatic and normocephalic.    EYES: OCTAVIA, EOMI. No pallor. No icterus.    Oral cavity: no mucosal lesion or tonsillar enlargement.    NECK: supple. JVP normal.No thyroid enlargement.    LYMPH NODES: No palpable, cervical, axillary or inguinal lymphadenopathy.    CHEST: clear to auscultation bilaterally. Symmetrical breath movements bilaterally.  Although she does have some rhonchi and some wheezing.    CVS: S1 and S2 are Regular rate and rhythm. No murmur or gallop or rub heard. No peripheral edema.    ABDOMEN: Soft. Not tender. Not distended. No palpable hepatomegaly or splenomegaly. No other mass palpable. Bowel sounds heard.    EXTREMITIES: Warm.  Minimal arthritic changes.    NEUROLOGICAL: Alert awake oriented.  Otherwise intact.  Cranial nerves appears to be preserved.    SKIN: no rash, or bruising or purpura.      Laboratory data      No results found for this or any previous visit (from the past 168 hour(s)).    Imaging results        CT Chest Pulmonary Embolism w Contrast    Result Date: 1/7/2024  EXAM: CT CHEST PULMONARY EMBOLISM W CONTRAST LOCATION: [facility] DATE: 1/7/2024 INDICATION: Cough. Shortness of breath. COMPARISON: Chest x-ray 12/28/2023. TECHNIQUE: CT chest pulmonary angiogram during arterial phase injection of IV contrast. Multiplanar reformats and MIP reconstructions were performed. Dose reduction techniques were used. CONTRAST: 75 mL Isovue-370. FINDINGS: ANGIOGRAM CHEST: Pulmonary arteries are normal caliber and negative for pulmonary emboli. Thoracic aorta is negative for dissection within the limitations of cardiac motion artifact. Moderate burden of " atherosclerotic disease of the thoracic aorta. LUNGS AND PLEURA: Right upper lobe nodular opacity measuring 2.1 x 1.9 x 1.0 cm (series 6, image 109). Ill-defined cluster of nodular opacities in the right upper lobe (series 6, image 75), favored infectious or inflammatory. Mild centrilobular emphysema. No pleural effusion. MEDIASTINUM/AXILLAE: Heterogeneous left thyroid lobe with a 1.5 cm hypoattenuating left thyroid nodule. There is mediastinal and right hilar adenopathy including right upper paratracheal, subcarinal, and right hilar nodes. Index right upper paratracheal lymph node measures 2.2 cm in short axis and the subcarinal nichole conglomerate measures 3.0 cm in short axis. Right hilar nichole conglomerate demonstrates mass effect on the right upper lobe bronchus and bronchus intermedius with associated narrowing without occlusion. Normal heart size with trace pericardial effusion. CORONARY ARTERY CALCIFICATION: Moderate. UPPER ABDOMEN: Lobulated contour of both kidneys. Atherosclerotic disease of the partially imaged abdominal aorta. MUSCULOSKELETAL: No aggressive appearing osseous lesion.     IMPRESSION: 1.  Mediastinal and right hilar adenopathy most suggestive of metastatic adenopathy from a primary lung malignancy. A nodular opacity in the right upper lobe could represent a primary lung malignancy versus an infectious/inflammatory focus. Recommend referral to pulmonology for tissue sampling. 2.  Nichole conglomerate demonstrate mass effect on the right upper lobe bronchus and bronchus intermedius with associated narrowing without occlusion. 3.  Left thyroid nodule which could be further evaluated with nonemergent thyroid ultrasound. 4.  No evidence of acute pulmonary thromboembolic disease.    XR Chest 2 Views    Result Date: 12/28/2023  EXAM: XR CHEST 2 VIEWS LOCATION: [facility] DATE: 12/28/2023 INDICATION:  Subacute cough, Abnormal lung sounds COMPARISON: None.     IMPRESSION: Heart  size is normal. No CHF, lobar consolidation, or effusion. Mediastinum and bony structures are unremarkable.         This note has been dictated using voice recognition software. Any grammatical or context distortions are unintentional and inherent to the software      Signed by: Chino Miller MD

## 2024-01-17 NOTE — LETTER
"    1/17/2024         RE: Inez Rodriguez  [address] [place]  [facility] [address]        Dear Colleague,    Thank you for referring your patient, Inez Rodriguez, to the [facility] CANCER [facility]. Please see a copy of my visit note below.    Oncology Rooming Note    January 17, 2024 1:11 PM   Inez Rodriguez is a 73 year old female who presents for:    Chief Complaint   Patient presents with     Oncology Clinic Visit     New Oncology Non-small cell lung cancer, Postobstructive pneumonia.      Initial Vitals: /63 (BP Location: Left arm, Patient Position: Sitting, Cuff Size: Adult Regular)   Pulse 101   Temp 97.6  F (36.4  C) (Oral)   Resp 16   Ht 1.651 m (5' 5\")   Wt 54.4 kg (120 lb)   SpO2 93%   BMI 19.97 kg/m   Estimated body mass index is 19.97 kg/m  as calculated from the following:    Height as of this encounter: 1.651 m (5' 5\").    Weight as of this encounter: 54.4 kg (120 lb). Body surface area is 1.58 meters squared.  No Pain (0) Comment: Data Unavailable   No LMP recorded. Patient is premenopausal.  Allergies reviewed: Yes  Medications reviewed: Yes    Medications: Medication refills not needed today.  Pharmacy name entered into [facility]:    [id number] - [place] [id number] AT [facility] [address]    Frailty Screening:   Is the patient here for a new oncology consult visit in cancer care? 2. No      Clinical concerns: New Oncology Non-small cell lung cancer, Postobstructive pneumonia.      Malgorzata Fields, [facility] Hematology and Oncology Consult Note    Patient: Inez Rodriguez  MRN: [id number]  Date of Service: Jan 17, 2024           Reason for consultation      Problem List Items Addressed This Visit          Respiratory    Malignant neoplasm of lower lobe of right lung (H) - Primary    Relevant Orders    PET Oncology (Eyes to Thighs)    MR Brain w " Contrast    Radiation Therapy Referral    Pathology Additional Testing: [facility] Lung; [facility]    Postobstructive pneumonia         Assessment / number of problems addressed      1.  A very pleasant 73-year-old woman who unfortunately seems to have at least stage IIIB non-small cell lung cancer.  The primary seems to be coming from the right upper lobe.  2.  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  Pathology is positive for non-small cell lung cancer.  3.  Prior history of smoking.  Currently patient does not smoke.  4.  Recently moved from [place].  Limited social support.    Plan and medical decision making      Highly complicated medical decision-making process.  The patient presenting with advanced non-small cell lung cancer which appears to be at least stage IIIb.  Obvious threat to life and health.  Reviewed Labs and interpreted the results independently, Reviewed imaging results and Reviewed Notes from other providers. Ordered tests and medications as indicated.    1.  Recommend staging study with the PET scan.  2.  MRI of the brain to make sure that she does not have any brain metastases.  3.  Send pathology for molecular testing.  4.  Follow-up with me in couple of weeks time after the tests are done to plan her treatment.  5.  Of also recommended consider radiation consultation since I think she might be a candidate for concurrent chemoradiation if she is not found to have any other metastatic disease.  6.  Discussed with the patient that the treatment will be decided after we have the full staging workup back and hopefully also more molecular testing back.  7.  Since referred her for  consultation regarding her insurance questions.  8. The longitudinal plan of care for lung cancer was addressed during this visit. Due to the added complexity in care, I will continue to support Inez in the subsequent management of this condition(s) and with  the ongoing continuity of care of this condition(s).    Clinical/pathological stage      Cancer Staging   No matching staging information was found for the patient.      History of present illness      Ms. Inez Rodriguez is a 73 year old woman who came to the emergency room in the beginning of January 2024 with increasing shortness of breath.  Was also having some coughing which was not responding to treatment given by the urgent care.  In the emergency room she had a CT scan done to rule out any pulmonary embolism but was positive for multiple lymph nodes in the mediastinum as well as subcarinal area.  She was therefore admitted.  She was seen by pulmonology.  Underwent bronchoscopy and biopsy.  The bronchoscopy came back positive for adenocarcinoma involving subcarinal and mediastinal lymph nodes.    The patient does have a's history of smoking exposure but tells me that she has not smoked for several years.  No significant family history of cancer.  She recently moved from [place].  Comes accompanied by her son.    She is having some degree of shortness of breath on exertion.    Detailed review of systems      A 14 point review of systems was obtained.  Positive findings noted in the history.  Rest of the review of system is otherwise negative.      Past medical/surgical/social/family history        Past Medical History:   Diagnosis Date     Hypertension      SOB (shortness of breath)      Past Surgical History:   Procedure Laterality Date     BRONCHOSCOPY RIGID OR FLEXIBLE W/TRANSENDOSCOPIC ENDOBRONCHIAL ULTRASOUND GUIDED N/A 1/8/2024    Procedure: BRONCHOSCOPY, WITH ENDOBRONCHIAL ULTRASOUND WITH FINE NEEDLE ASPIRATION OF LYMPH NODE;  Surgeon: Angelia Mustafa MD;  Location: [facility] [place] [facility]     TONSILLECTOMY      at 8 years of age     No family history on file.  Social History     Socioeconomic History     Marital status:      Spouse name: None     Number of children: None     Years of education: None      "Highest education level: None   Tobacco Use     Smoking status: Never     Smokeless tobacco: Never   Substance and Sexual Activity     Alcohol use: Never     Social Determinants of Health     Financial Resource Strain: Low Risk  (1/11/2024)    Financial Resource Strain      Within the past 12 months, have you or your family members you live with been unable to get utilities (heat, electricity) when it was really needed?: No   Food Insecurity: Low Risk  (1/11/2024)    Food Insecurity      Within the past 12 months, did you worry that your food would run out before you got money to buy more?: No      Within the past 12 months, did the food you bought just not last and you didn t have money to get more?: No   Transportation Needs: Low Risk  (1/11/2024)    Transportation Needs      Within the past 12 months, has lack of transportation kept you from medical appointments, getting your medicines, non-medical meetings or appointments, work, or from getting things that you need?: No   Interpersonal Safety: Low Risk  (1/11/2024)    Interpersonal Safety      Do you feel physically and emotionally safe where you currently live?: Yes      Within the past 12 months, have you been hit, slapped, kicked or otherwise physically hurt by someone?: No      Within the past 12 months, have you been humiliated or emotionally abused in other ways by your partner or ex-partner?: No   Housing Stability: Low Risk  (1/11/2024)    Housing Stability      Do you have housing? : Yes      Are you worried about losing your housing?: No           Allergies      No Known Allergies      Physical exam        /63 (BP Location: Left arm, Patient Position: Sitting, Cuff Size: Adult Regular)   Pulse 101   Temp 97.6  F (36.4  C) (Oral)   Resp 16   Ht 1.651 m (5' 5\")   Wt 54.4 kg (120 lb)   SpO2 93%   BMI 19.97 kg/m        GENERAL: Alert and oriented to time place and person. Seated comfortably. In no distress.    HEAD: Atraumatic and " normocephalic.    EYES: OCTAVIA, EOMI. No pallor. No icterus.    Oral cavity: no mucosal lesion or tonsillar enlargement.    NECK: supple. JVP normal.No thyroid enlargement.    LYMPH NODES: No palpable, cervical, axillary or inguinal lymphadenopathy.    CHEST: clear to auscultation bilaterally. Symmetrical breath movements bilaterally.  Although she does have some rhonchi and some wheezing.    CVS: S1 and S2 are Regular rate and rhythm. No murmur or gallop or rub heard. No peripheral edema.    ABDOMEN: Soft. Not tender. Not distended. No palpable hepatomegaly or splenomegaly. No other mass palpable. Bowel sounds heard.    EXTREMITIES: Warm.  Minimal arthritic changes.    NEUROLOGICAL: Alert awake oriented.  Otherwise intact.  Cranial nerves appears to be preserved.    SKIN: no rash, or bruising or purpura.      Laboratory data      No results found for this or any previous visit (from the past 168 hour(s)).    Imaging results        CT Chest Pulmonary Embolism w Contrast    Result Date: 1/7/2024  EXAM: CT CHEST PULMONARY EMBOLISM W CONTRAST LOCATION: [facility] DATE: 1/7/2024 INDICATION: Cough. Shortness of breath. COMPARISON: Chest x-ray 12/28/2023. TECHNIQUE: CT chest pulmonary angiogram during arterial phase injection of IV contrast. Multiplanar reformats and MIP reconstructions were performed. Dose reduction techniques were used. CONTRAST: 75 mL Isovue-370. FINDINGS: ANGIOGRAM CHEST: Pulmonary arteries are normal caliber and negative for pulmonary emboli. Thoracic aorta is negative for dissection within the limitations of cardiac motion artifact. Moderate burden of atherosclerotic disease of the thoracic aorta. LUNGS AND PLEURA: Right upper lobe nodular opacity measuring 2.1 x 1.9 x 1.0 cm (series 6, image 109). Ill-defined cluster of nodular opacities in the right upper lobe (series 6, image 75), favored infectious or inflammatory. Mild centrilobular emphysema. No pleural effusion.  MEDIASTINUM/AXILLAE: Heterogeneous left thyroid lobe with a 1.5 cm hypoattenuating left thyroid nodule. There is mediastinal and right hilar adenopathy including right upper paratracheal, subcarinal, and right hilar nodes. Index right upper paratracheal lymph node measures 2.2 cm in short axis and the subcarinal nichole conglomerate measures 3.0 cm in short axis. Right hilar nichole conglomerate demonstrates mass effect on the right upper lobe bronchus and bronchus intermedius with associated narrowing without occlusion. Normal heart size with trace pericardial effusion. CORONARY ARTERY CALCIFICATION: Moderate. UPPER ABDOMEN: Lobulated contour of both kidneys. Atherosclerotic disease of the partially imaged abdominal aorta. MUSCULOSKELETAL: No aggressive appearing osseous lesion.     IMPRESSION: 1.  Mediastinal and right hilar adenopathy most suggestive of metastatic adenopathy from a primary lung malignancy. A nodular opacity in the right upper lobe could represent a primary lung malignancy versus an infectious/inflammatory focus. Recommend referral to pulmonology for tissue sampling. 2.  Nichole conglomerate demonstrate mass effect on the right upper lobe bronchus and bronchus intermedius with associated narrowing without occlusion. 3.  Left thyroid nodule which could be further evaluated with nonemergent thyroid ultrasound. 4.  No evidence of acute pulmonary thromboembolic disease.    XR Chest 2 Views    Result Date: 12/28/2023  EXAM: XR CHEST 2 VIEWS LOCATION: [facility] DATE: 12/28/2023 INDICATION:  Subacute cough, Abnormal lung sounds COMPARISON: None.     IMPRESSION: Heart size is normal. No CHF, lobar consolidation, or effusion. Mediastinum and bony structures are unremarkable.         This note has been dictated using voice recognition software. Any grammatical or context distortions are unintentional and inherent to the software      Signed by: Chino Miller MD      Again, thank you  for allowing me to participate in the care of your patient.        Sincerely,        Chino Miller MD

## 2024-01-17 NOTE — TELEPHONE ENCOUNTER
Social Work - Intervention  [facility]  Data/Intervention:    Patient Name: Inez Rodriguez Goes By: Inez    /Age: 1950 (73 year old)     Visit Type: In person  Referral Source: Dr. Miller [place]  Reason for Referral: New dx    Collaborated With:    -María Casillas, RN  -Inez's son Kenneth    Psychosocial Information/Concerns:  SW met with Inez, her son Kenneth and RN María. Introduced self and SW role. Assessed any needs/concerns. Rafa recently moved to [place] from [place]. Most of their lives were spent in [facility]. Inez's   from cancer many years ago and it caused them financial hardship. Inez has medicare and also applied for medical assistance. Encouraged them to call billing re questions about any charges. Also reassured them that there are programs available to help with the cost of care. Inez is anxious to get her treatment going. Both shared that they haven't been sleeping well due to her presumed lung cancer. Acknowledged their worry/anxiety around the waiting and unknowns. Provided active listening and emotional support.     Intervention/Education/Resources Provided:  Educated re role of SW  Briefly talked about financial resources  Gave SW contact information     Assessment/Plan:  Provided patient/family with contact information and availability.    DIANNE Kirkland, [facility]  Adult Oncology Clinics  [place] (M,W), [place] (T) & [place] ()  *I am off Friday  Office: [phone]

## 2024-01-19 ENCOUNTER — OFFICE VISIT (OUTPATIENT)
Dept: PULMONOLOGY | Facility: CLINIC | Age: 74
End: 2024-01-19
Payer: MEDICARE

## 2024-01-19 VITALS
SYSTOLIC BLOOD PRESSURE: 126 MMHG | WEIGHT: 120 LBS | DIASTOLIC BLOOD PRESSURE: 70 MMHG | BODY MASS INDEX: 19.97 KG/M2 | OXYGEN SATURATION: 97 % | HEART RATE: 99 BPM

## 2024-01-19 DIAGNOSIS — J98.8 WHEEZING-ASSOCIATED RESPIRATORY INFECTION (WARI): ICD-10-CM

## 2024-01-19 DIAGNOSIS — R05.3 CHRONIC COUGH: ICD-10-CM

## 2024-01-19 PROCEDURE — 99214 OFFICE O/P EST MOD 30 MIN: CPT | Mod: 25 | Performed by: INTERNAL MEDICINE

## 2024-01-19 PROCEDURE — G0008 ADMIN INFLUENZA VIRUS VAC: HCPCS | Mod: GZ | Performed by: INTERNAL MEDICINE

## 2024-01-19 PROCEDURE — 90662 IIV NO PRSV INCREASED AG IM: CPT | Mod: GZ | Performed by: INTERNAL MEDICINE

## 2024-01-19 NOTE — LETTER
1/19/2024         RE: Inez Rodriguez  [address] [facility] [address]        Dear Colleague,    Thank you for referring your patient, Inez Rodriguez, to the [facility]. Please see a copy of my visit note below.    Pulmonary Clinic Follow-up Visit    Impression: 73F with a history of HTN, recently diagnosed with NSCLC and hospitalization for post-obstructive pneumonia, presents for hospital discharge follow up. Her breathing and cough have improved significantly. She has established care with oncology and is going to have some additional scans done later this month. She has no concerning respiratory symptoms today. Discussed importance of vaccination especially if she is going to start chemotherapy.    Recommendations:  - offered to bring her back for PFTs to see if there is any underlying lung disease; she says she feels good and declines. She'll call me if she wants to schedule this.  - encouraged to exercise and remain active  - follow up with oncology as planned.  - we'll give her the flu vaccine today. I also recommended she get pneumococcal, covid-19 and RSV vaccination. She will discuss with her PMD.    Follow up with me as needed.    Андрей Arzola MD (Avi)  [facility] ([facility])  Clinic [phone]  Fax [phone]     CCx: hospital discharge follow up; recently diagnosed lung cancer    HPI: Interim history: Inez was last seen by Dr. Mustafa on 1/8 when she was hospitalized at [facility] for new lung mass and post-obstructive pneumonia. Unfortunately her bronchoscopy/biopsy results showed new diagnosis of NSCLC.   She has already seen oncology and is going to have a PET/CT and MRI of the brain to complete the staging, later this month.    Today, Inez reports she's doing fairly well. Still having some shortness of breath. No cough or hemoptysis. Has not had any vaccines. From [place].     ROS:  A 12-system review was obtained and  was negative with the exception of the symptoms endorsed in the history of present illness.    PMH:  Past Medical History:   Diagnosis Date     Hypertension      SOB (shortness of breath)         PSH:  Past Surgical History:   Procedure Laterality Date     BRONCHOSCOPY RIGID OR FLEXIBLE W/TRANSENDOSCOPIC ENDOBRONCHIAL ULTRASOUND GUIDED N/A 1/8/2024    Procedure: BRONCHOSCOPY, WITH ENDOBRONCHIAL ULTRASOUND WITH FINE NEEDLE ASPIRATION OF LYMPH NODE;  Surgeon: Angelia Mustafa MD;  Location: [facility] [place] [place] OR     TONSILLECTOMY      at 8 years of age       Allergies:  No Known Allergies    Family HX:  No family history on file.    Social Hx:  Social History     Socioeconomic History     Marital status:      Spouse name: Not on file     Number of children: Not on file     Years of education: Not on file     Highest education level: Not on file   Occupational History     Not on file   Tobacco Use     Smoking status: Never     Smokeless tobacco: Never   Vaping Use     Vaping Use: Not on file   Substance and Sexual Activity     Alcohol use: Never     Drug use: Not on file     Sexual activity: Not on file   Other Topics Concern     Not on file   Social History Narrative     Not on file     Social Determinants of Health     Financial Resource Strain: Low Risk  (1/11/2024)    Financial Resource Strain      Within the past 12 months, have you or your family members you live with been unable to get utilities (heat, electricity) when it was really needed?: No   Food Insecurity: Low Risk  (1/11/2024)    Food Insecurity      Within the past 12 months, did you worry that your food would run out before you got money to buy more?: No      Within the past 12 months, did the food you bought just not last and you didn t have money to get more?: No   Transportation Needs: Low Risk  (1/11/2024)    Transportation Needs      Within the past 12 months, has lack of transportation kept you from medical appointments, getting your  medicines, non-medical meetings or appointments, work, or from getting things that you need?: No   Physical Activity: Not on file   Stress: Not on file   Social Connections: Not on file   Interpersonal Safety: Low Risk  (1/11/2024)    Interpersonal Safety      Do you feel physically and emotionally safe where you currently live?: Yes      Within the past 12 months, have you been hit, slapped, kicked or otherwise physically hurt by someone?: No      Within the past 12 months, have you been humiliated or emotionally abused in other ways by your partner or ex-partner?: No   Housing Stability: Low Risk  (1/11/2024)    Housing Stability      Do you have housing? : Yes      Are you worried about losing your housing?: No       Current Meds:  Current Outpatient Medications   Medication Sig Dispense Refill     amLODIPine (NORVASC) 5 MG tablet Take 1 tablet (5 mg) by mouth daily 30 tablet 0     BETA GLUCAN PO Take 1,000 mg by mouth daily       multivitamin w/minerals (THERA-VIT-M) tablet Take 1 tablet by mouth daily       Vitamin D3 (CHOLECALCIFEROL) 125 MCG (5000 UT) tablet Take 125 mcg by mouth daily       albuterol (PROVENTIL) (2.5 MG/3ML) 0.083% neb solution Take 1 vial (2.5 mg) by nebulization every 4 hours as needed for shortness of breath, wheezing or cough (Patient not taking: Reported on 1/19/2024) 90 mL 0     amoxicillin-clavulanate (AUGMENTIN) 875-125 MG tablet Take 1 tablet by mouth 2 times daily (Patient not taking: Reported on 1/17/2024) 14 tablet 0     benzonatate (TESSALON) 100 MG capsule Take 1 capsule (100 mg) by mouth 3 times daily as needed for cough (Patient not taking: Reported on 1/17/2024) 15 capsule 0     CITICOLINE PO Take 250 mg by mouth daily (Patient not taking: Reported on 1/19/2024)       DM-Doxylamine-Acetaminophen (NYQUIL COLD & FLU PO) Take 2 capsules by mouth every 6 hours as needed (cold and flu symptoms) (Patient not taking: Reported on 1/19/2024)       Folic Acid (FOLATE PO) Take 1,700  mcg by mouth daily (Patient not taking: Reported on 1/19/2024)       guaiFENesin-codeine (ROBITUSSIN AC) 100-10 MG/5ML solution Take 5 mLs by mouth every 4 hours as needed for cough (Patient not taking: Reported on 1/19/2024) 180 mL 0     Inositol Niacinate (NIACIN FLUSH FREE PO) Take 1 capsule by mouth daily (Patient not taking: Reported on 1/19/2024)       Mag Aspart-Potassium Aspart (POTASSIUM & MAGNESIUM ASPARTAT PO) Take 2 capsules by mouth daily (Patient not taking: Reported on 1/19/2024)       METHYLCOBALAMIN PO Place 1 mg under the tongue daily (Patient not taking: Reported on 1/19/2024)         Physical Exam:  /70   Pulse 99   Wt 54.4 kg (120 lb)   SpO2 97%   BMI 19.97 kg/m    Gen: awake, alert, oriented, no distress  HEENT: nasal turbinates are unremarkable, no oropharyngeal lesions, no cervical or supraclavicular lymphadenopathy  CV: RRR, no M/G/R  Resp: CTAB, no focal crackles or wheezes  Skin: no apparent rashes  Ext: no cyanosis, clubbing or edema  Neuro: alert, nonfocal    Labs:  Reviewed    RUL biopsy  Final Diagnosis   LUNG, RIGHT UPPER LOBE, EXTRINSIC COMPRESSION AT TAKE OFF OF RUL AND BRONCHUS INTERMEDIUS, TRANSBRONCHIAL BIOPSY:  -NON-SMALL CELL CARCINOMA, FAVOR ADENOCARCINOMA COMPATIBLE WITH LUNG PRIMARY       Imaging studies:  Personally reviewed    CTA chest  Narrative & Impression   EXAM: CT CHEST PULMONARY EMBOLISM W CONTRAST  LOCATION: [facility]  DATE: 1/7/2024     INDICATION: Cough. Shortness of breath.  COMPARISON: Chest x-ray 12/28/2023.  TECHNIQUE: CT chest pulmonary angiogram during arterial phase injection of IV contrast. Multiplanar reformats and MIP reconstructions were performed. Dose reduction techniques were used.   CONTRAST: 75 mL Isovue-370.     FINDINGS:  ANGIOGRAM CHEST: Pulmonary arteries are normal caliber and negative for pulmonary emboli. Thoracic aorta is negative for dissection within the limitations of cardiac motion artifact.  Moderate burden of atherosclerotic disease of the thoracic aorta.      LUNGS AND PLEURA: Right upper lobe nodular opacity measuring 2.1 x 1.9 x 1.0 cm (series 6, image 109). Ill-defined cluster of nodular opacities in the right upper lobe (series 6, image 75), favored infectious or inflammatory. Mild centrilobular   emphysema. No pleural effusion.     MEDIASTINUM/AXILLAE: Heterogeneous left thyroid lobe with a 1.5 cm hypoattenuating left thyroid nodule. There is mediastinal and right hilar adenopathy including right upper paratracheal, subcarinal, and right hilar nodes. Index right upper paratracheal   lymph node measures 2.2 cm in short axis and the subcarinal nichole conglomerate measures 3.0 cm in short axis. Right hilar nichole conglomerate demonstrates mass effect on the right upper lobe bronchus and bronchus intermedius with associated narrowing   without occlusion. Normal heart size with trace pericardial effusion.     CORONARY ARTERY CALCIFICATION: Moderate.     UPPER ABDOMEN: Lobulated contour of both kidneys. Atherosclerotic disease of the partially imaged abdominal aorta.     MUSCULOSKELETAL: No aggressive appearing osseous lesion.                                                                      IMPRESSION:  1.  Mediastinal and right hilar adenopathy most suggestive of metastatic adenopathy from a primary lung malignancy. A nodular opacity in the right upper lobe could represent a primary lung malignancy versus an infectious/inflammatory focus. Recommend   referral to pulmonology for tissue sampling.  2.  Nichole conglomerate demonstrate mass effect on the right upper lobe bronchus and bronchus intermedius with associated narrowing without occlusion.  3.  Left thyroid nodule which could be further evaluated with nonemergent thyroid ultrasound.  4.  No evidence of acute pulmonary thromboembolic disease.         Pulmonary Function Testing  None available    Again, thank you for allowing me to participate in  the care of your patient.        Sincerely,        Андрей Arzola MD

## 2024-01-19 NOTE — PROGRESS NOTES
Pulmonary Clinic Follow-up Visit    Impression: 73F with a history of HTN, recently diagnosed with NSCLC and hospitalization for post-obstructive pneumonia, presents for hospital discharge follow up. Her breathing and cough have improved significantly. She has established care with oncology and is going to have some additional scans done later this month. She has no concerning respiratory symptoms today. Discussed importance of vaccination especially if she is going to start chemotherapy.    Recommendations:  - offered to bring her back for PFTs to see if there is any underlying lung disease; she says she feels good and declines. She'll call me if she wants to schedule this.  - encouraged to exercise and remain active  - follow up with oncology as planned.  - we'll give her the flu vaccine today. I also recommended she get pneumococcal, covid-19 and RSV vaccination. She will discuss with her PMD.    Follow up with me as needed.    Андрей Arzola MD (Avi)  [facility] Pulmonary [facility] Critical [facility] ([facility])  Clinic [phone]  Fax [phone]     CCx: hospital discharge follow up; recently diagnosed lung cancer    HPI: Interim history: Inez was last seen by Dr. Mustafa on 1/8 when she was hospitalized at [facility] for new lung mass and post-obstructive pneumonia. Unfortunately her bronchoscopy/biopsy results showed new diagnosis of NSCLC.   She has already seen oncology and is going to have a PET/CT and MRI of the brain to complete the staging, later this month.    Today, Inez reports she's doing fairly well. Still having some shortness of breath. No cough or hemoptysis. Has not had any vaccines. From [place].     ROS:  A 12-system review was obtained and was negative with the exception of the symptoms endorsed in the history of present illness.    PMH:  Past Medical History:   Diagnosis Date    Hypertension     SOB (shortness of breath)         PSH:  Past Surgical History:   Procedure Laterality Date     BRONCHOSCOPY RIGID OR FLEXIBLE W/TRANSENDOSCOPIC ENDOBRONCHIAL ULTRASOUND GUIDED N/A 1/8/2024    Procedure: BRONCHOSCOPY, WITH ENDOBRONCHIAL ULTRASOUND WITH FINE NEEDLE ASPIRATION OF LYMPH NODE;  Surgeon: Angelia Mustafa MD;  [place] [facility] [place]    TONSILLECTOMY      at 8 years of age       Allergies:  No Known Allergies    Family HX:  No family history on file.    Social Hx:  Social History     Socioeconomic History    Marital status:      Spouse name: Not on file    Number of children: Not on file    Years of education: Not on file    Highest education level: Not on file   Occupational History    Not on file   Tobacco Use    Smoking status: Never    Smokeless tobacco: Never   Vaping Use    Vaping Use: Not on file   Substance and Sexual Activity    Alcohol use: Never    Drug use: Not on file    Sexual activity: Not on file   Other Topics Concern    Not on file   Social History Narrative    Not on file     Social Determinants of Health     Financial Resource Strain: Low Risk  (1/11/2024)    Financial Resource Strain     Within the past 12 months, have you or your family members you live with been unable to get utilities (heat, electricity) when it was really needed?: No   Food Insecurity: Low Risk  (1/11/2024)    Food Insecurity     Within the past 12 months, did you worry that your food would run out before you got money to buy more?: No     Within the past 12 months, did the food you bought just not last and you didn t have money to get more?: No   Transportation Needs: Low Risk  (1/11/2024)    Transportation Needs     Within the past 12 months, has lack of transportation kept you from medical appointments, getting your medicines, non-medical meetings or appointments, work, or from getting things that you need?: No   Physical Activity: Not on file   Stress: Not on file   Social Connections: Not on file   Interpersonal Safety: Low Risk  (1/11/2024)    Interpersonal Safety     Do you feel physically  and emotionally safe where you currently live?: Yes     Within the past 12 months, have you been hit, slapped, kicked or otherwise physically hurt by someone?: No     Within the past 12 months, have you been humiliated or emotionally abused in other ways by your partner or ex-partner?: No   Housing Stability: Low Risk  (1/11/2024)    Housing Stability     Do you have housing? : Yes     Are you worried about losing your housing?: No       Current Meds:  Current Outpatient Medications   Medication Sig Dispense Refill    amLODIPine (NORVASC) 5 MG tablet Take 1 tablet (5 mg) by mouth daily 30 tablet 0    BETA GLUCAN PO Take 1,000 mg by mouth daily      multivitamin w/minerals (THERA-VIT-M) tablet Take 1 tablet by mouth daily      Vitamin D3 (CHOLECALCIFEROL) 125 MCG (5000 UT) tablet Take 125 mcg by mouth daily      albuterol (PROVENTIL) (2.5 MG/3ML) 0.083% neb solution Take 1 vial (2.5 mg) by nebulization every 4 hours as needed for shortness of breath, wheezing or cough (Patient not taking: Reported on 1/19/2024) 90 mL 0    amoxicillin-clavulanate (AUGMENTIN) 875-125 MG tablet Take 1 tablet by mouth 2 times daily (Patient not taking: Reported on 1/17/2024) 14 tablet 0    benzonatate (TESSALON) 100 MG capsule Take 1 capsule (100 mg) by mouth 3 times daily as needed for cough (Patient not taking: Reported on 1/17/2024) 15 capsule 0    CITICOLINE PO Take 250 mg by mouth daily (Patient not taking: Reported on 1/19/2024)      DM-Doxylamine-Acetaminophen (NYQUIL COLD & FLU PO) Take 2 capsules by mouth every 6 hours as needed (cold and flu symptoms) (Patient not taking: Reported on 1/19/2024)      Folic Acid (FOLATE PO) Take 1,700 mcg by mouth daily (Patient not taking: Reported on 1/19/2024)      guaiFENesin-codeine (ROBITUSSIN AC) 100-10 MG/5ML solution Take 5 mLs by mouth every 4 hours as needed for cough (Patient not taking: Reported on 1/19/2024) 180 mL 0    Inositol Niacinate (NIACIN FLUSH FREE PO) Take 1 capsule by  mouth daily (Patient not taking: Reported on 1/19/2024)      Mag Aspart-Potassium Aspart (POTASSIUM & MAGNESIUM ASPARTAT PO) Take 2 capsules by mouth daily (Patient not taking: Reported on 1/19/2024)      METHYLCOBALAMIN PO Place 1 mg under the tongue daily (Patient not taking: Reported on 1/19/2024)         Physical Exam:  /70   Pulse 99   Wt 54.4 kg (120 lb)   SpO2 97%   BMI 19.97 kg/m    Gen: awake, alert, oriented, no distress  HEENT: nasal turbinates are unremarkable, no oropharyngeal lesions, no cervical or supraclavicular lymphadenopathy  CV: RRR, no M/G/R  Resp: CTAB, no focal crackles or wheezes  Skin: no apparent rashes  Ext: no cyanosis, clubbing or edema  Neuro: alert, nonfocal    Labs:  Reviewed    RUL biopsy  Final Diagnosis   LUNG, RIGHT UPPER LOBE, EXTRINSIC COMPRESSION AT TAKE OFF OF RUL AND BRONCHUS INTERMEDIUS, TRANSBRONCHIAL BIOPSY:  -NON-SMALL CELL CARCINOMA, FAVOR ADENOCARCINOMA COMPATIBLE WITH LUNG PRIMARY       Imaging studies:  Personally reviewed    CTA chest  Narrative & Impression   EXAM: CT CHEST PULMONARY EMBOLISM W CONTRAST  LOCATION: [facility]  DATE: 1/7/2024     INDICATION: Cough. Shortness of breath.  COMPARISON: Chest x-ray 12/28/2023.  TECHNIQUE: CT chest pulmonary angiogram during arterial phase injection of IV contrast. Multiplanar reformats and MIP reconstructions were performed. Dose reduction techniques were used.   CONTRAST: 75 mL Isovue-370.     FINDINGS:  ANGIOGRAM CHEST: Pulmonary arteries are normal caliber and negative for pulmonary emboli. Thoracic aorta is negative for dissection within the limitations of cardiac motion artifact. Moderate burden of atherosclerotic disease of the thoracic aorta.      LUNGS AND PLEURA: Right upper lobe nodular opacity measuring 2.1 x 1.9 x 1.0 cm (series 6, image 109). Ill-defined cluster of nodular opacities in the right upper lobe (series 6, image 75), favored infectious or inflammatory. Mild  centrilobular   emphysema. No pleural effusion.     MEDIASTINUM/AXILLAE: Heterogeneous left thyroid lobe with a 1.5 cm hypoattenuating left thyroid nodule. There is mediastinal and right hilar adenopathy including right upper paratracheal, subcarinal, and right hilar nodes. Index right upper paratracheal   lymph node measures 2.2 cm in short axis and the subcarinal nichole conglomerate measures 3.0 cm in short axis. Right hilar nichole conglomerate demonstrates mass effect on the right upper lobe bronchus and bronchus intermedius with associated narrowing   without occlusion. Normal heart size with trace pericardial effusion.     CORONARY ARTERY CALCIFICATION: Moderate.     UPPER ABDOMEN: Lobulated contour of both kidneys. Atherosclerotic disease of the partially imaged abdominal aorta.     MUSCULOSKELETAL: No aggressive appearing osseous lesion.                                                                      IMPRESSION:  1.  Mediastinal and right hilar adenopathy most suggestive of metastatic adenopathy from a primary lung malignancy. A nodular opacity in the right upper lobe could represent a primary lung malignancy versus an infectious/inflammatory focus. Recommend   referral to pulmonology for tissue sampling.  2.  Nichole conglomerate demonstrate mass effect on the right upper lobe bronchus and bronchus intermedius with associated narrowing without occlusion.  3.  Left thyroid nodule which could be further evaluated with nonemergent thyroid ultrasound.  4.  No evidence of acute pulmonary thromboembolic disease.         Pulmonary Function Testing  None available

## 2024-01-26 ENCOUNTER — HOSPITAL ENCOUNTER (OUTPATIENT)
Dept: MRI IMAGING | Facility: HOSPITAL | Age: 74
Discharge: HOME | End: 2024-01-26
Attending: INTERNAL MEDICINE | Admitting: INTERNAL MEDICINE
Payer: MEDICARE

## 2024-01-26 DIAGNOSIS — C34.31 MALIGNANT NEOPLASM OF LOWER LOBE OF RIGHT LUNG (H): ICD-10-CM

## 2024-01-26 PROCEDURE — 70553 MRI BRAIN STEM W/O & W/DYE: CPT | Mod: MG

## 2024-01-26 PROCEDURE — A9585 GADOBUTROL INJECTION: HCPCS | Performed by: INTERNAL MEDICINE

## 2024-01-26 PROCEDURE — 255N000002 HC RX 255 OP 636: Performed by: INTERNAL MEDICINE

## 2024-01-26 RX ORDER — GADOBUTROL 604.72 MG/ML
5 INJECTION INTRAVENOUS ONCE
Status: COMPLETED | OUTPATIENT
Start: 2024-01-26 | End: 2024-01-26

## 2024-01-26 RX ADMIN — GADOBUTROL 5 ML: 604.72 INJECTION INTRAVENOUS at 13:08

## 2024-01-29 ENCOUNTER — HOSPITAL ENCOUNTER (OUTPATIENT)
Dept: PET IMAGING | Facility: HOSPITAL | Age: 74
Discharge: HOME | End: 2024-01-29
Attending: INTERNAL MEDICINE | Admitting: INTERNAL MEDICINE
Payer: MEDICARE

## 2024-01-29 DIAGNOSIS — C34.31 MALIGNANT NEOPLASM OF LOWER LOBE OF RIGHT LUNG (H): ICD-10-CM

## 2024-01-29 LAB — GLUCOSE BLDC GLUCOMTR-MCNC: 101 MG/DL (ref 70–99)

## 2024-01-29 PROCEDURE — 82962 GLUCOSE BLOOD TEST: CPT

## 2024-01-29 PROCEDURE — A9552 F18 FDG: HCPCS | Performed by: INTERNAL MEDICINE

## 2024-01-29 PROCEDURE — 78815 PET IMAGE W/CT SKULL-THIGH: CPT | Mod: MG,PI

## 2024-01-29 PROCEDURE — 343N000001 HC RX 343: Performed by: INTERNAL MEDICINE

## 2024-01-29 RX ADMIN — FLUDEOXYGLUCOSE F-18 13.07 MILLICURIE: 500 INJECTION, SOLUTION INTRAVENOUS at 12:39

## 2024-01-30 NOTE — TELEPHONE ENCOUNTER
Action 2024 2:26 PM ABT   Action Taken Called and spoke to pt's son Kenneth, states no chest images from [place], [place]. Only seen Dr. Miller, no rad onc and no pulm or sleep center visits.     MEDICAL RECORDS REQUEST   Radiation Oncology  [address] [place] [address]  Fax: [phone]          FUTURE VISIT INFORMATION                                                   Inez Rodriguez, : 1950 scheduled for future visit at [facility] Radiation Oncology    RECORDS REQUESTED FOR VISIT                                                     LUNG     OFFICE NOTE from medical oncologist Epic 24: Dr. Chino Miller   OFFICE NOTE from pulmonologist Epic 24: Dr. Андрей Arzola   BRONCHOSCOPY/EUS/CT guided [facility] 24: BRONCHOSCOPY, WITH ENDOBRONCHIAL ULTRASOUND WITH FINE NEEDLE ASPIRATION OF LYMPH NODE    MEDICATION LIST [facility]    LABS     PATHOLOGY REPORTS Reports in [facility] Surg Path:  24: [id number]    Non gyn:  24: [id number]  24: [id number]   ANYTHING RELATED TO DIAGNOSIS Epic Most recent 24   IMAGING (NEED IMAGES & REPORT)     CT SCANS PACS 24: CT Chest   MRI PACS 24: MR Brain   XRAYS PACS 23: XR CHest   PET PACS 24: PET Onc Eyes to Thighs

## 2024-01-31 ENCOUNTER — ONCOLOGY VISIT (OUTPATIENT)
Dept: ONCOLOGY | Facility: HOSPITAL | Age: 74
End: 2024-01-31
Attending: INTERNAL MEDICINE
Payer: MEDICARE

## 2024-01-31 ENCOUNTER — PATIENT OUTREACH (OUTPATIENT)
Dept: ONCOLOGY | Facility: HOSPITAL | Age: 74
End: 2024-01-31

## 2024-01-31 VITALS
HEIGHT: 65 IN | TEMPERATURE: 97.8 F | SYSTOLIC BLOOD PRESSURE: 139 MMHG | HEART RATE: 97 BPM | DIASTOLIC BLOOD PRESSURE: 66 MMHG | OXYGEN SATURATION: 95 % | WEIGHT: 120.8 LBS | BODY MASS INDEX: 20.12 KG/M2 | RESPIRATION RATE: 18 BRPM

## 2024-01-31 DIAGNOSIS — C34.31 MALIGNANT NEOPLASM OF LOWER LOBE OF RIGHT LUNG (H): Primary | ICD-10-CM

## 2024-01-31 DIAGNOSIS — Z95.828 PORT-A-CATH IN PLACE: ICD-10-CM

## 2024-01-31 DIAGNOSIS — C79.51 LUNG CANCER METASTATIC TO BONE (H): ICD-10-CM

## 2024-01-31 DIAGNOSIS — C34.90 LUNG CANCER METASTATIC TO BONE (H): ICD-10-CM

## 2024-01-31 LAB
PATH REPORT.ADDENDUM SPEC: ABNORMAL
PATH REPORT.COMMENTS IMP SPEC: ABNORMAL
PATH REPORT.COMMENTS IMP SPEC: YES
PATH REPORT.FINAL DX SPEC: ABNORMAL
PATH REPORT.GROSS SPEC: ABNORMAL
PATH REPORT.MICROSCOPIC SPEC OTHER STN: ABNORMAL
PATH REPORT.RELEVANT HX SPEC: ABNORMAL
PHOTO IMAGE: ABNORMAL

## 2024-01-31 PROCEDURE — 99215 OFFICE O/P EST HI 40 MIN: CPT | Performed by: INTERNAL MEDICINE

## 2024-01-31 PROCEDURE — G2211 COMPLEX E/M VISIT ADD ON: HCPCS | Performed by: INTERNAL MEDICINE

## 2024-01-31 PROCEDURE — G0463 HOSPITAL OUTPT CLINIC VISIT: HCPCS | Performed by: INTERNAL MEDICINE

## 2024-01-31 RX ORDER — EPINEPHRINE 1 MG/ML
0.3 INJECTION, SOLUTION INTRAMUSCULAR; SUBCUTANEOUS EVERY 5 MIN PRN
Status: CANCELLED | OUTPATIENT
Start: 2024-02-12

## 2024-01-31 RX ORDER — CYANOCOBALAMIN 1000 UG/ML
1000 INJECTION, SOLUTION INTRAMUSCULAR; SUBCUTANEOUS ONCE
Status: CANCELLED
Start: 2024-01-31 | End: 2024-01-31

## 2024-01-31 RX ORDER — PROCHLORPERAZINE MALEATE 10 MG
10 TABLET ORAL EVERY 6 HOURS PRN
Qty: 30 TABLET | Refills: 2 | Status: SHIPPED | OUTPATIENT
Start: 2024-01-31 | End: 2024-07-30

## 2024-01-31 RX ORDER — CYCLOBENZAPRINE HCL 5 MG
5 TABLET ORAL 2 TIMES DAILY PRN
Qty: 30 TABLET | Refills: 0 | Status: SHIPPED | OUTPATIENT
Start: 2024-01-31 | End: 2024-05-08

## 2024-01-31 RX ORDER — MEPERIDINE HYDROCHLORIDE 25 MG/ML
25 INJECTION INTRAMUSCULAR; INTRAVENOUS; SUBCUTANEOUS EVERY 30 MIN PRN
Status: CANCELLED | OUTPATIENT
Start: 2024-02-12

## 2024-01-31 RX ORDER — FOLIC ACID 1 MG/1
1000 TABLET ORAL DAILY
Qty: 30 TABLET | Refills: 4 | Status: SHIPPED | OUTPATIENT
Start: 2024-01-31 | End: 2024-03-24

## 2024-01-31 RX ORDER — METHYLPREDNISOLONE SODIUM SUCCINATE 125 MG/2ML
125 INJECTION, POWDER, LYOPHILIZED, FOR SOLUTION INTRAMUSCULAR; INTRAVENOUS
Status: CANCELLED
Start: 2024-02-12

## 2024-01-31 RX ORDER — LIDOCAINE/PRILOCAINE 2.5 %-2.5%
CREAM (GRAM) TOPICAL
Qty: 30 G | Refills: 2 | Status: SHIPPED | OUTPATIENT
Start: 2024-01-31

## 2024-01-31 RX ORDER — ALBUTEROL SULFATE 90 UG/1
1-2 AEROSOL, METERED RESPIRATORY (INHALATION)
Status: CANCELLED
Start: 2024-02-12

## 2024-01-31 RX ORDER — PALONOSETRON 0.05 MG/ML
0.25 INJECTION, SOLUTION INTRAVENOUS ONCE
Status: CANCELLED
Start: 2024-02-12

## 2024-01-31 RX ORDER — GLUCOSAMINE SULFATE DIPOT CHLR 1000 MG
2 TABLET ORAL DAILY
COMMUNITY

## 2024-01-31 RX ORDER — HEPARIN SODIUM,PORCINE 10 UNIT/ML
5-20 VIAL (ML) INTRAVENOUS DAILY PRN
Status: CANCELLED | OUTPATIENT
Start: 2024-02-12

## 2024-01-31 RX ORDER — DIPHENHYDRAMINE HYDROCHLORIDE 50 MG/ML
50 INJECTION INTRAMUSCULAR; INTRAVENOUS
Status: CANCELLED
Start: 2024-02-12

## 2024-01-31 RX ORDER — ALBUTEROL SULFATE 0.83 MG/ML
2.5 SOLUTION RESPIRATORY (INHALATION)
Status: CANCELLED | OUTPATIENT
Start: 2024-02-12

## 2024-01-31 RX ORDER — HEPARIN SODIUM (PORCINE) LOCK FLUSH IV SOLN 100 UNIT/ML 100 UNIT/ML
5 SOLUTION INTRAVENOUS
Status: CANCELLED | OUTPATIENT
Start: 2024-02-12

## 2024-01-31 RX ORDER — ONDANSETRON 8 MG/1
8 TABLET, FILM COATED ORAL EVERY 8 HOURS PRN
Qty: 30 TABLET | Refills: 2 | Status: SHIPPED | OUTPATIENT
Start: 2024-01-31 | End: 2024-07-30

## 2024-01-31 RX ORDER — DEXAMETHASONE 4 MG/1
4 TABLET ORAL 2 TIMES DAILY WITH MEALS
Qty: 6 TABLET | Refills: 3 | Status: SHIPPED | OUTPATIENT
Start: 2024-02-06 | End: 2024-05-02

## 2024-01-31 RX ORDER — LORAZEPAM 2 MG/ML
0.5 INJECTION INTRAMUSCULAR EVERY 4 HOURS PRN
Status: CANCELLED | OUTPATIENT
Start: 2024-02-12

## 2024-01-31 ASSESSMENT — PAIN SCALES - GENERAL: PAINLEVEL: MILD PAIN (2)

## 2024-01-31 NOTE — PROGRESS NOTES
"Oncology Rooming Note    January 31, 2024 3:05 PM   Inez Rodriguez is a 73 year old female who presents for:    Chief Complaint   Patient presents with    Oncology Clinic Visit     Return visit 2 weeks. MRI and PET 01/26/24. Malignant neoplasm of lower lobe of right lung.     Initial Vitals: /66 (BP Location: Left arm, Patient Position: Sitting, Cuff Size: Adult Regular)   Pulse 97   Temp 97.8  F (36.6  C) (Oral)   Resp 18   Ht 1.651 m (5' 5\")   Wt 54.8 kg (120 lb 12.8 oz)   SpO2 95%   BMI 20.10 kg/m   Estimated body mass index is 20.1 kg/m  as calculated from the following:    Height as of this encounter: 1.651 m (5' 5\").    Weight as of this encounter: 54.8 kg (120 lb 12.8 oz). Body surface area is 1.59 meters squared.  Mild Pain (2) Comment: Data Unavailable   No LMP recorded. Patient is premenopausal.  Allergies reviewed: Yes  Medications reviewed: Yes    Medications: Medication refills not needed today.  Pharmacy name entered into "[employer]:    [id number] [place] [id number] [facility] [address] [facility] [address]    Frailty Screening:   Is the patient here for a new oncology consult visit in cancer care? 2. No      Clinical concerns: Return visit 2 weeks. MRI and PET 01/26/24. Malignant neoplasm of lower lobe of right lung.      Malgorzata Fields CMA            "

## 2024-01-31 NOTE — LETTER
"    1/31/2024         RE: Inez Rodriguez  [address]  [facility] [address]        Dear Colleague,    Thank you for referring your patient, Inez Rodriguez, to the [facility]. Please see a copy of my visit note below.    Oncology Rooming Note    January 31, 2024 3:05 PM   Inez Rodriguez is a 73 year old female who presents for:    Chief Complaint   Patient presents with     Oncology Clinic Visit     Return visit 2 weeks. MRI and PET 01/26/24. Malignant neoplasm of lower lobe of right lung.     Initial Vitals: /66 (BP Location: Left arm, Patient Position: Sitting, Cuff Size: Adult Regular)   Pulse 97   Temp 97.8  F (36.6  C) (Oral)   Resp 18   Ht 1.651 m (5' 5\")   Wt 54.8 kg (120 lb 12.8 oz)   SpO2 95%   BMI 20.10 kg/m   Estimated body mass index is 20.1 kg/m  as calculated from the following:    Height as of this encounter: 1.651 m (5' 5\").    Weight as of this encounter: 54.8 kg (120 lb 12.8 oz). Body surface area is 1.59 meters squared.  Mild Pain (2) Comment: Data Unavailable   No LMP recorded. Patient is premenopausal.  Allergies reviewed: Yes  Medications reviewed: Yes    Medications: Medication refills not needed today.  Pharmacy name entered into [facility]:    [id number] [place] [id number] AT [facility] [address]    Frailty Screening:   Is the patient here for a new oncology consult visit in cancer care? 2. No      Clinical concerns: Return visit 2 weeks. MRI and PET 01/26/24. Malignant neoplasm of lower lobe of right lung.      Malgorzata Fields, [facility] Hematology and Oncology progress note    Patient: Inez Rodriguez  MRN: [id number]  Date of Service: Jan 31, 2024           Reason for consultation      Problem List Items Addressed This Visit          Respiratory    Malignant neoplasm of lower lobe of right lung (H) - Primary    " Relevant Medications    cyclobenzaprine (FLEXERIL) 5 MG tablet    ondansetron (ZOFRAN) 8 MG tablet    prochlorperazine (COMPAZINE) 10 MG tablet    folic acid (FOLVITE) 1 MG tablet    dexAMETHasone (DECADRON) 4 MG tablet (Start on 2/6/2024)    lidocaine-prilocaine (EMLA) 2.5-2.5 % external cream    Other Relevant Orders    Infusion Appointment Request    IR Referral    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  A very pleasant 73 year old  woman with advanced stage IV non-small cell lung cancer.  The primary seems to be coming from the right upper lobe.  She has mediastinal lymph nodes as well as couple of osseous metastases 1 in the T4 vertebral body and 1 in the sacrum.  Molecular testing does show that this is K-tray G12C mutated tumor.  No other targeted mutation present except for PTEN which is not significant.  2.  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  Pathology is positive for non-small cell lung cancer.  3.  Prior history of smoking.  Currently patient does not smoke.  4.  Recently moved from [place].  Limited social support.  5.  Upper back pain which the patient tells me secondary to stress and anxiety.    Plan and medical decision making      Patient presenting with more advanced disease than previously thought.  She has stage IV disease which is not curable.  Having significant symptom from stress of the  disease.  Reviewed notes from each unique source.  Reviewed each unique test.  Ordered tests if indicated.  Independently interpreted the results of lab tests and radiological exams.  Personally reviewed the images.  High risk intervention in the form of chemotherapy decision made.  Close monitoring needed.    Had a lengthy discussion with the patient and her son.  Recommend that she should consider palliative chemotherapy with pembrolizumab, pemetrexed and dexamethasone.  Patient was given printed information about the treatment plan and the medication.  We did talk about that chemotherapy is palliative in nature and not a cure.  The median survival if the patient to respond to treatment is almost up to 2 years.  Since she does have G12C mutation in the KRAS she would be a candidate for sotorasib or other similar therapy after initial chemoimmunotherapy.  Port-A-Cath implantation indicated because of her poor peripheral venous access.  Premedication with vitamin B12 and folic acid.  Follow-up in couple of weeks to start treatment.  At this time she probably would not benefit from radiation therapy consultation.  Continue with good diet and exercise.  7.  The longitudinal plan of care for lung cancer was addressed during this visit. Due to the added complexity in care, I will continue to support Inez in the subsequent management of this condition(s) and with the ongoing continuity of care of this condition(s).    Clinical/pathological stage       Cancer Staging   No matching staging information was found for the patient.      History of present illness      Ms. Inez Rodriguez is a 73 year old  woman who came to the emergency room in the beginning of January 2024 with increasing shortness of breath.  Was also having some coughing which was not responding to treatment given by the urgent care.  In the emergency room she had a CT scan done to rule out any pulmonary embolism but was positive for multiple lymph nodes in  "the mediastinum as well as subcarinal area.  She was therefore admitted.  She was seen by pulmonology.  Underwent bronchoscopy and biopsy.  The bronchoscopy came back positive for adenocarcinoma involving subcarinal and mediastinal lymph nodes.    The patient does have a's history of smoking exposure but tells me that she has not smoked for several years.  No significant family history of cancer.  She recently moved from [place].  Comes accompanied by her son.    She is having some degree of shortness of breath on exertion.  After she was seen here last week she has been having some increasing back pain in the middle of her back in the upper back.  This is a typical stress back pain that she gets anytime she gets stressed.  She has been using hot water bottle on it on her back to relieve the pain.    Review of system      Details noted in the history of present illness.  A detailed review of systems is otherwise negative.      Physical exam        /66 (BP Location: Left arm, Patient Position: Sitting, Cuff Size: Adult Regular)   Pulse 97   Temp 97.8  F (36.6  C) (Oral)   Resp 18   Ht 1.651 m (5' 5\")   Wt 54.8 kg (120 lb 12.8 oz)   SpO2 95%   BMI 20.10 kg/m      GENERAL: no acute distress. Cooperative in conversation.   HEENT: pupils are equal, round and reactive. Oral mucosa is moist and intact.  RESP:Chest symmetric. Regular respiratory rate. No stridor.  ABD: Nondistended, soft.  EXTREMITIES: No lower extremity edema.   NEURO: non focal. Alert and oriented x3.   PSYCH: within normal limits. No depression or anxiety.  SKIN: warm dry intact       Lab results Reviewed      Recent Results (from the past 168 hour(s))   Glucose by meter   Result Value Ref Range    GLUCOSE BY METER POCT 101 (H) 70 - 99 mg/dL       Imaging results Reviewed        PET Oncology (Eyes to Thighs)    Result Date: 1/30/2024  EXAM: PET ONCOLOGY (EYES TO THIGHS) LOCATION: [facility] DATE: 1/29/2024 " INDICATION: Malignant neoplasm of upper lobe, right bronchus or lung. Initial treatment strategy. COMPARISON: CT 01/07/2024 CONTRAST: None TECHNIQUE: Serum glucose level 101 mg/dL. One hour post intravenous administration of 13.07 mCi F-18 FDG, PET imaging was performed from the skull vertex to mid thighs, utilizing attenuation correction with concurrent axial CT and PET/CT image fusion. Dose reduction techniques were used. PET/CT FINDINGS: Mildly FDG avid nodular avid opacity in the right upper lobe anteriorly abutting the pleura measuring 2.5 x 1.8 x 1.4 cm (SUV max 3.6) with intensely FDG avid right hilar station 10R (SUV max 17.9, right lower paratracheal station 4R (SUV max 16.1), left lower paratracheal station 4L (SUV max 9.6), subcarinal station 7 (SUV max 14.5) , right upper paratracheal station 2R (SUV max 5.8), and right anterior mediastinal (SUV max 3.8) lymphadenopathy, and osseous lesions involving the T4 vertebral body (SUV max 8.3) and right sacrum (SUV max 3.6) suspicious for a right upper lobe primary lung neoplasm with extensive mediastinal/hilar lymph node metastases and osseous metastases to the T4 vertebral body and right sacrum. CT FINDINGS: Mild senescent intracranial changes. Non-FDG avid left thyroid nodule suggesting benignity. Mild centrilobular emphysema. 5 mm nodular opacity in the right upper lobe laterally (series 2, image 162), not FDG avid but below PET resolution. Advanced coronary arterial calcification. Trace pericardial fluid. Distal colonic diverticulosis without acute diverticulitis. Non-FDG avid sclerosis in the right iliac bone. Multilevel degenerative changes in the spine. The lower extremities are unremarkable.     IMPRESSION: Findings suspicious for a right upper lobe primary lung neoplasm with extensive mediastinal/hilar lymph node metastases and osseous metastases to the T4 vertebral body and right sacrum.    MR Brain w/o & w Contrast    Result Date: 1/26/2024  EXAM: MR  BRAIN W/O and W CONTRAST LOCATION: [facility] DATE: 1/26/2024 INDICATION:  Malignant neoplasm of lower lobe of right lung (H) COMPARISON: None. CONTRAST: 5 mL Gadavist TECHNIQUE: Routine multiplanar multisequence head MRI without and with intravenous contrast. FINDINGS: INTRACRANIAL CONTENTS: No acute or subacute infarct. No mass, acute hemorrhage, or extra-axial fluid collections. Scattered nonspecific T2/FLAIR hyperintensities within the cerebral white matter most consistent with mild chronic microvascular ischemic change. Mild generalized cerebral atrophy. No hydrocephalus. Normal position of the cerebellar tonsils. No pathologic contrast enhancement. SELLA: Empty sella morphology with flattening of the pituitary gland along the sellar floor. OSSEOUS STRUCTURES/SOFT TISSUES: Normal marrow signal. The major intracranial vascular flow voids are maintained. ORBITS: No abnormality accounting for technique. SINUSES/MASTOIDS: No paranasal sinus mucosal disease. Scattered fluid/membrane thickening in the right mastoid air cells. No apparent mass in the posterior nasopharynx or skull base.     IMPRESSION: 1.  No acute intracranial process or intracranial metastases. 2.  Generalized brain atrophy and presumed microvascular ischemic changes as detailed above.    CT Chest Pulmonary Embolism w Contrast    Result Date: 1/7/2024  EXAM: CT CHEST PULMONARY EMBOLISM W CONTRAST LOCATION: [facility] DATE: 1/7/2024 INDICATION: Cough. Shortness of breath. COMPARISON: Chest x-ray 12/28/2023. TECHNIQUE: CT chest pulmonary angiogram during arterial phase injection of IV contrast. Multiplanar reformats and MIP reconstructions were performed. Dose reduction techniques were used. CONTRAST: 75 mL [id number]. FINDINGS: ANGIOGRAM CHEST: Pulmonary arteries are normal caliber and negative for pulmonary emboli. Thoracic aorta is negative for dissection within the limitations of cardiac motion  artifact. Moderate burden of atherosclerotic disease of the thoracic aorta. LUNGS AND PLEURA: Right upper lobe nodular opacity measuring 2.1 x 1.9 x 1.0 cm (series 6, image 109). Ill-defined cluster of nodular opacities in the right upper lobe (series 6, image 75), favored infectious or inflammatory. Mild centrilobular emphysema. No pleural effusion. MEDIASTINUM/AXILLAE: Heterogeneous left thyroid lobe with a 1.5 cm hypoattenuating left thyroid nodule. There is mediastinal and right hilar adenopathy including right upper paratracheal, subcarinal, and right hilar nodes. Index right upper paratracheal lymph node measures 2.2 cm in short axis and the subcarinal nichole conglomerate measures 3.0 cm in short axis. Right hilar nichole conglomerate demonstrates mass effect on the right upper lobe bronchus and bronchus intermedius with associated narrowing without occlusion. Normal heart size with trace pericardial effusion. CORONARY ARTERY CALCIFICATION: Moderate. UPPER ABDOMEN: Lobulated contour of both kidneys. Atherosclerotic disease of the partially imaged abdominal aorta. MUSCULOSKELETAL: No aggressive appearing osseous lesion.     IMPRESSION: 1.  Mediastinal and right hilar adenopathy most suggestive of metastatic adenopathy from a primary lung malignancy. A nodular opacity in the right upper lobe could represent a primary lung malignancy versus an infectious/inflammatory focus. Recommend referral to pulmonology for tissue sampling. 2.  Nichole conglomerate demonstrate mass effect on the right upper lobe bronchus and bronchus intermedius with associated narrowing without occlusion. 3.  Left thyroid nodule which could be further evaluated with nonemergent thyroid ultrasound. 4.  No evidence of acute pulmonary thromboembolic disease.       Signed by: Chino Miller MD      This note has been dictated using voice recognition software. Any grammatical or context distortions are unintentional and inherent to the  software        Again, thank you for allowing me to participate in the care of your patient.        Sincerely,        Chino Miller MD

## 2024-01-31 NOTE — PROGRESS NOTES
[facility] Hematology and Oncology progress note    Patient: Inez Rodriguez  MRN: [id number]  Date of Service: Jan 31, 2024           Reason for consultation      Problem List Items Addressed This Visit          Respiratory    Malignant neoplasm of lower lobe of right lung (H) - Primary    Relevant Medications    cyclobenzaprine (FLEXERIL) 5 MG tablet    ondansetron (ZOFRAN) 8 MG tablet    prochlorperazine (COMPAZINE) 10 MG tablet    folic acid (FOLVITE) 1 MG tablet    dexAMETHasone (DECADRON) 4 MG tablet (Start on 2/6/2024)    lidocaine-prilocaine (EMLA) 2.5-2.5 % external cream    Other Relevant Orders    Infusion Appointment Request    IR Referral    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  A very pleasant 73 year old  woman with advanced stage IV non-small cell lung cancer.  The primary seems to be coming from the right upper lobe.  She has mediastinal lymph nodes as well as couple of osseous metastases 1 in the T4 vertebral body and 1 in the sacrum.  Molecular testing does show that this is K-tray G12C mutated tumor.  No other targeted mutation present except for PTEN which is not significant.  2.  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  Pathology is positive for non-small cell lung cancer.  3.  Prior history of smoking.  Currently patient does not smoke.  4.  Recently moved from [place].  Limited social  support.  5.  Upper back pain which the patient tells me secondary to stress and anxiety.    Plan and medical decision making      Patient presenting with more advanced disease than previously thought.  She has stage IV disease which is not curable.  Having significant symptom from stress of the disease.  Reviewed notes from each unique source.  Reviewed each unique test.  Ordered tests if indicated.  Independently interpreted the results of lab tests and radiological exams.  Personally reviewed the images.  High risk intervention in the form of chemotherapy decision made.  Close monitoring needed.    Had a lengthy discussion with the patient and her son.  Recommend that she should consider palliative chemotherapy with pembrolizumab, pemetrexed and dexamethasone.  Patient was given printed information about the treatment plan and the medication.  We did talk about that chemotherapy is palliative in nature and not a cure.  The median survival if the patient to respond to treatment is almost up to 2 years.  Since she does have G12C mutation in the KRAS she would be a candidate for sotorasib or other similar therapy after initial chemoimmunotherapy.  Port-A-Cath implantation indicated because of her poor peripheral venous access.  Premedication with vitamin B12 and folic acid.  Follow-up in couple of weeks to start treatment.  At this time she probably would not benefit from radiation therapy consultation.  Continue with good diet and exercise.  7.  The longitudinal plan of care for lung cancer was addressed during this visit. Due to the added complexity in care, I will continue to support Inez in the subsequent management of this condition(s) and with the ongoing continuity of care of this condition(s).    Clinical/pathological stage       Cancer Staging   No matching staging information was found for the patient.      History of present illness      Ms. Inez Rodriguez is a 73 year old  woman who came to the emergency  "room in the beginning of January 2024 with increasing shortness of breath.  Was also having some coughing which was not responding to treatment given by the urgent care.  In the emergency room she had a CT scan done to rule out any pulmonary embolism but was positive for multiple lymph nodes in the mediastinum as well as subcarinal area.  She was therefore admitted.  She was seen by pulmonology.  Underwent bronchoscopy and biopsy.  The bronchoscopy came back positive for adenocarcinoma involving subcarinal and mediastinal lymph nodes.    The patient does have a's history of smoking exposure but tells me that she has not smoked for several years.  No significant family history of cancer.  She recently moved from [place].  Comes accompanied by her son.    She is having some degree of shortness of breath on exertion.  After she was seen here last week she has been having some increasing back pain in the middle of her back in the upper back.  This is a typical stress back pain that she gets anytime she gets stressed.  She has been using hot water bottle on it on her back to relieve the pain.    Review of system      Details noted in the history of present illness.  A detailed review of systems is otherwise negative.      Physical exam        /66 (BP Location: Left arm, Patient Position: Sitting, Cuff Size: Adult Regular)   Pulse 97   Temp 97.8  F (36.6  C) (Oral)   Resp 18   Ht 1.651 m (5' 5\")   Wt 54.8 kg (120 lb 12.8 oz)   SpO2 95%   BMI 20.10 kg/m      GENERAL: no acute distress. Cooperative in conversation.   HEENT: pupils are equal, round and reactive. Oral mucosa is moist and intact.  RESP:Chest symmetric. Regular respiratory rate. No stridor.  ABD: Nondistended, soft.  EXTREMITIES: No lower extremity edema.   NEURO: non focal. Alert and oriented x3.   PSYCH: within normal limits. No depression or anxiety.  SKIN: warm dry intact       Lab results Reviewed      Recent Results (from the past 168 " hour(s))   Glucose by meter   Result Value Ref Range    GLUCOSE BY METER POCT 101 (H) 70 - 99 mg/dL       Imaging results Reviewed        PET Oncology (Eyes to Thighs)    Result Date: 1/30/2024  EXAM: PET ONCOLOGY (EYES TO THIGHS) LOCATION: [facility] DATE: 1/29/2024 INDICATION: Malignant neoplasm of upper lobe, right bronchus or lung. Initial treatment strategy. COMPARISON: CT 01/07/2024 CONTRAST: None TECHNIQUE: Serum glucose level 101 mg/dL. One hour post intravenous administration of 13.07 mCi F-18 FDG, PET imaging was performed from the skull vertex to mid thighs, utilizing attenuation correction with concurrent axial CT and PET/CT image fusion. Dose reduction techniques were used. PET/CT FINDINGS: Mildly FDG avid nodular avid opacity in the right upper lobe anteriorly abutting the pleura measuring 2.5 x 1.8 x 1.4 cm (SUV max 3.6) with intensely FDG avid right hilar station 10R (SUV max 17.9, right lower paratracheal station 4R (SUV max 16.1), left lower paratracheal station 4L (SUV max 9.6), subcarinal station 7 (SUV max 14.5) , right upper paratracheal station 2R (SUV max 5.8), and right anterior mediastinal (SUV max 3.8) lymphadenopathy, and osseous lesions involving the T4 vertebral body (SUV max 8.3) and right sacrum (SUV max 3.6) suspicious for a right upper lobe primary lung neoplasm with extensive mediastinal/hilar lymph node metastases and osseous metastases to the T4 vertebral body and right sacrum. CT FINDINGS: Mild senescent intracranial changes. Non-FDG avid left thyroid nodule suggesting benignity. Mild centrilobular emphysema. 5 mm nodular opacity in the right upper lobe laterally (series 2, image 162), not FDG avid but below PET resolution. Advanced coronary arterial calcification. Trace pericardial fluid. Distal colonic diverticulosis without acute diverticulitis. Non-FDG avid sclerosis in the right iliac bone. Multilevel degenerative changes in the spine. The lower  extremities are unremarkable.     IMPRESSION: Findings suspicious for a right upper lobe primary lung neoplasm with extensive mediastinal/hilar lymph node metastases and osseous metastases to the T4 vertebral body and right sacrum.    MR Brain w/o & w Contrast    Result Date: 1/26/2024  EXAM: MR BRAIN W/O and W CONTRAST LOCATION: [facility] DATE: 1/26/2024 INDICATION:  Malignant neoplasm of lower lobe of right lung (H) COMPARISON: None. CONTRAST: 5 mL Gadavist TECHNIQUE: Routine multiplanar multisequence head MRI without and with intravenous contrast. FINDINGS: INTRACRANIAL CONTENTS: No acute or subacute infarct. No mass, acute hemorrhage, or extra-axial fluid collections. Scattered nonspecific T2/FLAIR hyperintensities within the cerebral white matter most consistent with mild chronic microvascular ischemic change. Mild generalized cerebral atrophy. No hydrocephalus. Normal position of the cerebellar tonsils. No pathologic contrast enhancement. SELLA: Empty sella morphology with flattening of the pituitary gland along the sellar floor. OSSEOUS STRUCTURES/SOFT TISSUES: Normal marrow signal. The major intracranial vascular flow voids are maintained. ORBITS: No abnormality accounting for technique. SINUSES/MASTOIDS: No paranasal sinus mucosal disease. Scattered fluid/membrane thickening in the right mastoid air cells. No apparent mass in the posterior nasopharynx or skull base.     IMPRESSION: 1.  No acute intracranial process or intracranial metastases. 2.  Generalized brain atrophy and presumed microvascular ischemic changes as detailed above.    CT Chest Pulmonary Embolism w Contrast    Result Date: 1/7/2024  EXAM: CT CHEST PULMONARY EMBOLISM W CONTRAST LOCATION: [facility] DATE: 1/7/2024 INDICATION: Cough. Shortness of breath. COMPARISON: Chest x-ray 12/28/2023. TECHNIQUE: CT chest pulmonary angiogram during arterial phase injection of IV contrast. Multiplanar  reformats and MIP reconstructions were performed. Dose reduction techniques were used. CONTRAST: 75 mL Isovue-370. FINDINGS: ANGIOGRAM CHEST: Pulmonary arteries are normal caliber and negative for pulmonary emboli. Thoracic aorta is negative for dissection within the limitations of cardiac motion artifact. Moderate burden of atherosclerotic disease of the thoracic aorta. LUNGS AND PLEURA: Right upper lobe nodular opacity measuring 2.1 x 1.9 x 1.0 cm (series 6, image 109). Ill-defined cluster of nodular opacities in the right upper lobe (series 6, image 75), favored infectious or inflammatory. Mild centrilobular emphysema. No pleural effusion. MEDIASTINUM/AXILLAE: Heterogeneous left thyroid lobe with a 1.5 cm hypoattenuating left thyroid nodule. There is mediastinal and right hilar adenopathy including right upper paratracheal, subcarinal, and right hilar nodes. Index right upper paratracheal lymph node measures 2.2 cm in short axis and the subcarinal nichole conglomerate measures 3.0 cm in short axis. Right hilar nichole conglomerate demonstrates mass effect on the right upper lobe bronchus and bronchus intermedius with associated narrowing without occlusion. Normal heart size with trace pericardial effusion. CORONARY ARTERY CALCIFICATION: Moderate. UPPER ABDOMEN: Lobulated contour of both kidneys. Atherosclerotic disease of the partially imaged abdominal aorta. MUSCULOSKELETAL: No aggressive appearing osseous lesion.     IMPRESSION: 1.  Mediastinal and right hilar adenopathy most suggestive of metastatic adenopathy from a primary lung malignancy. A nodular opacity in the right upper lobe could represent a primary lung malignancy versus an infectious/inflammatory focus. Recommend referral to pulmonology for tissue sampling. 2.  Nichole conglomerate demonstrate mass effect on the right upper lobe bronchus and bronchus intermedius with associated narrowing without occlusion. 3.  Left thyroid nodule which could be further  evaluated with nonemergent thyroid ultrasound. 4.  No evidence of acute pulmonary thromboembolic disease.       Signed by: Chino Miller MD      This note has been dictated using voice recognition software. Any grammatical or context distortions are unintentional and inherent to the software

## 2024-02-01 ENCOUNTER — PRE VISIT (OUTPATIENT)
Dept: RADIATION ONCOLOGY | Facility: HOSPITAL | Age: 74
End: 2024-02-01
Payer: MEDICARE

## 2024-02-01 DIAGNOSIS — I10 BENIGN ESSENTIAL HYPERTENSION: ICD-10-CM

## 2024-02-01 NOTE — PROGRESS NOTES
[facility]: Cancer Care Initial Note                                    Discussion with Patient:                                                      Patient along with her son Kenneth, were in to follow-up with Dr Miller regarding results of PET scan and brain MRI.     Antiemetics: Zofran, Compazine, dexamethasone, Aloxi, Emend    Steroid use/side effect: Dexamethasone    Medication understanding/side effect: Pemetrexed, carboplatin, pembrolizumab, Zofran, Compazine, dexamethasone, Aloxi, Emend    Port concerns: Patient will be getting a port scheduled.  She also needs a vitamin B12 injection 1 week prior to starting treatment.    Education concerns: Patient did ask that I talk with her son Kenneth when I called.  He will likely put her on speaker phone but sometimes she gets overwhelmed and cannot comprehend any more information.  She wants us to make sure he has all the information.     Goals          General    Maintain ability to perform ADLs without difficulty             Assessment:                                                      Initial  Current living arrangement:: I live in a private home with family  Type of residence:: Private home - stairs  Informal Support system:: Children  Equipment Currently Used at Home: none  Bed or wheelchair confined:: No  Mobility Status: Independent  Transportation means:: Regular car  Medication adherence problem (GOAL):: No  Knowledgeable about how to use meds:: Yes  Medication side effects suspected:: Yes  Referrals Placed: None  Type Advanced Care Plans/Directives: Other (patient does not have and it is too overwhelming right now to discuss)  Advanced Care Plan/Directive Status: Declined Further Information    Plan of Care Education Review:   Assessment completed with:: Patient;Children (john Cooper)    Plan of Care Education   Yearly learning assessment completed?: Yes (see Education tab)  Diagnosis:: lung cancer metastatic to bone; stage IV  Does patient  understand diagnosis?: Yes  Tx plan/regimen:: PEMEtrexed / CARBOplatin / Pembrolizumab - once every 3 weeks  Does patient understand treatment plan/regimen?: Yes  Preparing for treatment:: Reviewed treatment preparation information with patient (vascular access, day of chemo, visitor policy, what to bring, etc.)  Vascular access education provided for:: Port  Side effect education:: Diarrhea/Constipation;DVT/PE;Endocrine effects;Fatigue;Hair loss;Immune-mediated effects;Infection;Infertility;Lab value monitoring (anemia, neutropenia, thrombocytopenia);Mouth sores;Mylosuppression;Nausea/Vomiting;Neuropathy;Sexual health;Skin changes;Urinary  Supportive services education provided for:: Social work;Nutrition;Palliative care  Safety/self care at home reviewed with patient:: Yes  Coping - concerns/fears reviewed with patient:: Yes  Plan of Care:: LISA follow-up appointment;Lab appointment;Imaging;MD follow-up appointment;Treatment schedule  When to call provider:: Bleeding;Increased shortness of breath;New/worsening pain;Shaking chills;Temperature >100.4F;Uncontrolled diarrhea/constipation;Uncontrolled nausea/vomiting  Reasons for deferring treatment reviewed with patient:: Yes  Additional education provided for: : Steroid therapy;Neutropenic precautions;Bleeding precautions  Procedure education provided for: : Port/PICC placement    Evaluation of Learning  Patient Education Provided: Yes  Readiness:: Acceptance  Method:: Booklet/Handout;Literature;Explanation  Response:: Verbalizes understanding           Intervention/Education provided during outreach:                                                       Patient and son given chemo education on the treatment plan she will be receiving.  They were also given a chemotherapy folder.  She is aware that Zofran, Compazine, folic acid and Decadron have been sent over to her pharmacy.  She needs to start the folic acid 1 week prior.  Dexamethasone was explained that she needs  to get twice daily, starting the day before chemo, the day of chemo and the day following chemo.  She will do this with each cycle.  The other 2 meds are to be used as needed for nausea.    I will follow-up next week with a chart check to make sure things have been scheduled appropriately.    Patient to follow up as scheduled at next appt  Patient to call/[employer] message with updates  Confirmed patient has clinic and triage numbers    Signature:  María Casillas RN

## 2024-02-01 NOTE — TELEPHONE ENCOUNTER
Medication Question or Refill        What medication are you calling about (include dose and sig)?: amLODIPine (NORVASC) 5 MG tablet     Preferred Pharmacy:     [address]  [address] [place] [address]  [facility] [id number]  Phone: [phone] Fax: [phone]      Controlled Substance Agreement on file:   CSA -- Patient Level:    CSA: None found at the patient level.       Who prescribed the medication?: Lisa Jarquin MD     Do you need a refill? Yes    When did you use the medication last? daily    Patient offered an appointment? Yes: soonest available 2/19/24    Do you have any questions or concerns?  Yes: Patient will run out of medication before follow up appointment, please send refill to last until appointment (pt kenny Jones on 1/11/24)      Could we send this information to you in [facility] [place] [facility] or would you prefer to receive a phone call?:   Patient would prefer a phone call   Okay to leave a detailed message?: Yes at Cell number on file:    Telephone Information:   Mobile [phone]

## 2024-02-02 ENCOUNTER — MYC MEDICAL ADVICE (OUTPATIENT)
Dept: INTERVENTIONAL RADIOLOGY/VASCULAR | Facility: CLINIC | Age: 74
End: 2024-02-02

## 2024-02-02 RX ORDER — AMLODIPINE BESYLATE 5 MG/1
5 TABLET ORAL DAILY
Qty: 30 TABLET | Refills: 0 | Status: SHIPPED | OUTPATIENT
Start: 2024-02-02 | End: 2024-03-03

## 2024-02-05 LAB
BACTERIA BRONCH: NO GROWTH
BACTERIA BRONCH: NO GROWTH

## 2024-02-06 ENCOUNTER — INFUSION THERAPY VISIT (OUTPATIENT)
Dept: INFUSION THERAPY | Facility: HOSPITAL | Age: 74
End: 2024-02-06
Attending: INTERNAL MEDICINE
Payer: MEDICARE

## 2024-02-06 ENCOUNTER — PATIENT OUTREACH (OUTPATIENT)
Dept: ONCOLOGY | Facility: HOSPITAL | Age: 74
End: 2024-02-06
Payer: MEDICARE

## 2024-02-06 DIAGNOSIS — C34.31 MALIGNANT NEOPLASM OF LOWER LOBE OF RIGHT LUNG (H): ICD-10-CM

## 2024-02-06 DIAGNOSIS — C79.51 LUNG CANCER METASTATIC TO BONE (H): Primary | ICD-10-CM

## 2024-02-06 DIAGNOSIS — C34.90 LUNG CANCER METASTATIC TO BONE (H): Primary | ICD-10-CM

## 2024-02-06 PROCEDURE — 250N000011 HC RX IP 250 OP 636: Performed by: INTERNAL MEDICINE

## 2024-02-06 PROCEDURE — 96372 THER/PROPH/DIAG INJ SC/IM: CPT | Performed by: INTERNAL MEDICINE

## 2024-02-06 RX ORDER — CYANOCOBALAMIN 1000 UG/ML
1000 INJECTION, SOLUTION INTRAMUSCULAR; SUBCUTANEOUS ONCE
Status: COMPLETED | OUTPATIENT
Start: 2024-02-06 | End: 2024-02-06

## 2024-02-06 RX ADMIN — CYANOCOBALAMIN 1000 MCG: 1000 INJECTION, SOLUTION INTRAMUSCULAR; SUBCUTANEOUS at 14:55

## 2024-02-06 NOTE — PROGRESS NOTES
[facility]: Cancer Care                                                                                          Chart check today to make sure patient is scheduled for her vitamin B12 injection 1 week prior to starting her treatment for her right lung cancer.  Patient is scheduled today to receive her vitamin B12 injection.  She will then start her carboplatin, Alimta and Keytruda on 2/14/2024.  I will give the patient a call 2 days after her treatment to see how she is doing.    Signature:  María Casillas RN

## 2024-02-06 NOTE — PROGRESS NOTES
Pt here ambulatory for vitamin b 12 inj. Reviewed inj with pt. Injection administered in left deltoid/bandaid to site. Follow up reviewed/ pt tolerated inj without any problems. PT dc'd steady gait

## 2024-02-08 LAB
BKR LAB AP ADD'L TEST STATUS: NORMAL
BKR LAB LINKED CASE OR SPECIMEN ID: NORMAL
BKR LAB LINKED CASE OR TEST ORDER DATE: NORMAL
BKR PATH ADDL TEST FINAL COMMENTS: NORMAL

## 2024-02-09 LAB — SPECIMEN STATUS: NORMAL

## 2024-02-09 NOTE — H&P
Interventional Radiology Pre-Procedure Note  [facility] - 02/09/2024     Procedure Requested: Port placement  Requested by: Chino Miller MD     History and Physical Reviewed: H&P documented within 30 days (by Chino Miller MD on 1/31/24). I have personally reviewed the patient's medical history and have updated the medical record as necessary.    Brief HPI: Inez Rodriguez is a 73 year old female with a pertinent past medical history of hypertension and recently diagnosed non-small cell lung cancer who plans to proceed with palliative chemotherapy. Request for port placement.    IMAGING  1/29/2024 PET ONCOLOGY (EYES TO THIGHS)  PET/CT FINDINGS: Mildly FDG avid nodular avid opacity in the right upper lobe anteriorly abutting the pleura measuring 2.5 x 1.8 x 1.4 cm (SUV max 3.6) with intensely FDG avid right hilar station 10R (SUV max 17.9, right lower paratracheal station 4R   (SUV max 16.1), left lower paratracheal station 4L (SUV max 9.6), subcarinal station 7 (SUV max 14.5) , right upper paratracheal station 2R (SUV max 5.8), and right anterior mediastinal (SUV max 3.8) lymphadenopathy, and osseous lesions involving the T4   vertebral body (SUV max 8.3) and right sacrum (SUV max 3.6) suspicious for a right upper lobe primary lung neoplasm with extensive mediastinal/hilar lymph node metastases and osseous metastases to the T4 vertebral body and right sacrum.     CT FINDINGS: Mild senescent intracranial changes. Non-FDG avid left thyroid nodule suggesting benignity. Mild centrilobular emphysema. 5 mm nodular opacity in the right upper lobe laterally (series 2, image 162), not FDG avid but below PET resolution.   Advanced coronary arterial calcification. Trace pericardial fluid. Distal colonic diverticulosis without acute diverticulitis. Non-FDG avid sclerosis in the right iliac bone. Multilevel degenerative changes in the spine. The lower extremities are    unremarkable.                               IMPRESSION:  Findings suspicious for a right upper lobe primary lung neoplasm with extensive mediastinal/hilar lymph node metastases and osseous metastases to the T4 vertebral body and right sacrum.    NPO since Midnight.  ANTICOAGULANT: None  ANTIBIOTIC: Ancef intra-procedure; order signed and held.    ALLERGIES  No Known Allergies    LABORATORY VALUES  INR   Date Value Ref Range Status   01/08/2024 1.10 0.85 - 1.15 Final      Hemoglobin   Date Value Ref Range Status   01/09/2024 12.6 11.7 - 15.7 g/dL Final     Platelet Count   Date Value Ref Range Status   01/09/2024 317 150 - 450 10e3/uL Final     Creatinine   Date Value Ref Range Status   01/09/2024 0.77 0.51 - 0.95 mg/dL Final     Potassium   Date Value Ref Range Status   01/09/2024 4.4 3.4 - 5.3 mmol/L Final     EXAM  There were no vitals taken for this visit.  General: No apparent acute distress.  Respiratory: Normal depth and regular rhythm. Clear and equal throughout without adventitious sounds.  Cardiovascular: S1 & S2; regular rate and rhythm.  Integumentary: No excoriation, ecchymosis, erythema, lesions or open sores to bilateral upper chest wall and neck.  Neurological: Alert and oriented. Thoughts coherent; speech clear and logical.  Psychiatric: Appropriate mood and affect.    Pre-Sedation Assessment  Mallampati Airway Classification:  II - Faucial pillars and soft palate may be seen, but uvula is masked by the base of the tongue  Previous reaction to anesthesia/sedation:  No  Sedation plan based on assessment: Moderate (conscious) sedation  ASA Classification: Class 2 - MILD SYSTEMIC DISEASE, NO ACUTE PROBLEMS, NO FUNCTIONAL LIMITATIONS.   Code Status: Full Code intra-procedure, per discussion with patient.    ASSESSMENT  73 year old female with non-small cell lung cancer who presents today for port placement in anticipation of palliative chemotherapy.    PET scan demonstrated right upper lobe primary  lung neoplasm with extensive mediastinal/hilar lymph node metastases.    PLAN  # Advanced stage IV right non-small cell lung cancer   - Image-guided port placement with sedation. Laterality to be determined by procedural provider. Anticipate left chest wall placement.   - Procedural education reviewed with patient in detail including but not limited to risks, benefits and alternatives. Patient verbalized understanding.      Total time spent on the date of the encounter was 35 minutes.    TANESHA Johnson, CNP  Interventional Radiology  Pager Number: [phone]

## 2024-02-10 ENCOUNTER — NURSE TRIAGE (OUTPATIENT)
Dept: NURSING | Facility: CLINIC | Age: 74
End: 2024-02-10
Payer: MEDICARE

## 2024-02-10 RX ORDER — ALBUTEROL SULFATE 90 UG/1
2 AEROSOL, METERED RESPIRATORY (INHALATION) EVERY 6 HOURS PRN
Qty: 18 G | Status: CANCELLED | OUTPATIENT
Start: 2024-02-10

## 2024-02-10 RX ORDER — ALBUTEROL SULFATE 90 UG/1
2 AEROSOL, METERED RESPIRATORY (INHALATION) EVERY 6 HOURS PRN
COMMUNITY
End: 2024-02-11

## 2024-02-10 NOTE — TELEPHONE ENCOUNTER
Using inhaler albuterol Nurse Triage SBAR    Is this a 2nd Level Triage? NO    Situation: Asthma and wheezing       Assessment: Patient states she is out of her albuterol inhler, she did go tot pharmacy and they gave her nebulizer solution but she states she does not have a nebulizer. She called to see if she can get a refill of her albuterol inhaler, writer triaged symptoms and she is having wheezing when she lays down, no shortness of breath and no breathing difficulties. Used the last of her inhaler last night. She does not want to go to  and would like to talk to to provider as she does not want to wait until Monday for a refill    Protocol Recommended Disposition:   See PCP Within 3 Days, See More Appropriate Guideline Care advise given and scheduled  VV  Eloisa Callahan RN on 2/10/2024 at 12:37 PM        Reason for Disposition   [1] Wheezing (high pitched whistling sound) AND [2] previous asthma attacks or use of asthma medicines   [1] Mild wheezing comes and goes AND [2] present > 3 days    Additional Information   Negative: SEVERE difficulty breathing (e.g., struggling for each breath, speaks in single words)   Negative: [1] Breathing stopped AND [2] hasn't returned   Negative: Choking on something   Negative: Bluish (or gray) lips or face now   Negative: Difficult to awaken or acting confused (e.g., disoriented, slurred speech)   Negative: Passed out (i.e., lost consciousness, collapsed and was not responding)   Negative: Wheezing started suddenly after medicine, an allergic food or bee sting   Negative: Stridor (harsh sound while breathing in)   Negative: Slow, shallow and weak breathing   Negative: Sounds like a life-threatening emergency to the triager   Negative: Chest pain   Negative: SEVERE asthma attack (e.g., struggling for each breath, speaks in single words, loud wheezes, pulse >120)  (RED  Zone)   Negative: Bluish (or gray) lips or face now   Negative: Difficult to awaken or acting confused  (e.g., disoriented, slurred speech)   Negative: Passed out (i.e., lost consciousness, collapsed and was not responding)   Negative: Wheezing started suddenly after medicine, an allergic food, or bee sting   Negative: Sounds like a life-threatening emergency to the triager   Negative: [1] MODERATE asthma attack (e.g., SOB at rest, speaks in phrases, audible wheezes) AND [2] doesn't have neb or inhaler available   Negative: Peak flow rate less than 50% of baseline level  (RED  Zone)   Negative: Oxygen level (e.g., pulse oximetry) 90 percent or lower   Negative: [1] Hospitalized before with asthma AND [2] feels the same now   Negative: Chest pain  (Exception: Mild chest tightness that feels the same as prior asthma attacks.)   Negative: [1] MODERATE asthma attack (e.g., SOB at rest, speaks in phrases, audible wheezes) AND [2] not resolved after 2 or 3 inhaler or nebulizer treatments given 20 minutes apart   Negative: [1] Peak flow rate 50-79% of baseline level (YELLOW zone) AND [2] still present after 2 or 3 inhaler or nebulizer treatments given 20 minutes apart   Negative: Quick-relief asthma medicine (e.g., albuterol /salbutamol, levalbuterol by inhaler or nebulizer) is needed more frequently than every 4 hours to keep you comfortable   Negative: Fever > 103 F (39.4 C)   Negative: Patient sounds very sick or weak to the triager   Negative: [1] Continuous (nonstop) coughing AND [2] keeps from working or sleeping AND [3] not improved after 2 or 3 inhaler or nebulizer treatments given 20 minutes apart   Negative: [1] Fever > 101 F (38.3 C) AND [2] age > 60 years   Negative: [1] Wheezing or coughing AND [2] hasn't used neb or inhaler (up to 3 treatments given 20 minutes apart) AND [3] it's available   Negative: [1] COVID-19 diagnosed or suspected (e.g., COVID-19 present in community, known COVID-19 exposure, positive test) AND [2] COVID symptoms (e.g., cough, fever)   Negative: [1] Influenza diagnosed or suspected (e.g.,  flu present in the community, known flu exposure) AND [2] FLU symptoms (e.g., cough with fever)   Negative: Oxygen level (e.g., pulse oximetry) 91 to 94 percent   Negative: [1] MILD asthma attack (e.g., no SOB at rest, mild SOB with walking, speaks normally in sentences, mild wheezing) AND [2] lasting > 24 hours on prescribed treatment   Negative: Fever present > 3 days (72 hours)   Negative: Coughing up kev-colored or yellow-green sputum   Negative: [1] Nasal discharge AND [2] present > 10 days   Negative: High risk adult asthmatic (i.e., prior intubation for asthma, hospitalized this past year for asthma, frequent steroid treatment)    Protocols used: Breathing Difficulty-A-AH, Asthma Attack-A-AH

## 2024-02-11 ENCOUNTER — MYC REFILL (OUTPATIENT)
Dept: INTERNAL MEDICINE | Facility: CLINIC | Age: 74
End: 2024-02-11
Payer: MEDICARE

## 2024-02-11 DIAGNOSIS — J98.8 WHEEZING-ASSOCIATED RESPIRATORY INFECTION (WARI): Primary | ICD-10-CM

## 2024-02-12 ENCOUNTER — HOSPITAL ENCOUNTER (OUTPATIENT)
Dept: INTERVENTIONAL RADIOLOGY/VASCULAR | Facility: HOSPITAL | Age: 74
Discharge: HOME | End: 2024-02-12
Attending: INTERNAL MEDICINE | Admitting: RADIOLOGY
Payer: MEDICARE

## 2024-02-12 VITALS
DIASTOLIC BLOOD PRESSURE: 81 MMHG | HEART RATE: 102 BPM | SYSTOLIC BLOOD PRESSURE: 154 MMHG | TEMPERATURE: 97.4 F | OXYGEN SATURATION: 92 % | RESPIRATION RATE: 12 BRPM

## 2024-02-12 DIAGNOSIS — C79.51 LUNG CANCER METASTATIC TO BONE (H): ICD-10-CM

## 2024-02-12 DIAGNOSIS — C34.90 LUNG CANCER METASTATIC TO BONE (H): ICD-10-CM

## 2024-02-12 DIAGNOSIS — C34.31 MALIGNANT NEOPLASM OF LOWER LOBE OF RIGHT LUNG (H): ICD-10-CM

## 2024-02-12 PROCEDURE — C1788 PORT, INDWELLING, IMP: HCPCS

## 2024-02-12 PROCEDURE — 250N000009 HC RX 250: Performed by: PHYSICIAN ASSISTANT

## 2024-02-12 PROCEDURE — 272N000500 HC NEEDLE CR2

## 2024-02-12 PROCEDURE — 250N000011 HC RX IP 250 OP 636: Performed by: PHYSICIAN ASSISTANT

## 2024-02-12 PROCEDURE — C1769 GUIDE WIRE: HCPCS

## 2024-02-12 PROCEDURE — 250N000011 HC RX IP 250 OP 636: Performed by: NURSE PRACTITIONER

## 2024-02-12 PROCEDURE — 999N000248 HC STATISTIC IV INSERT WITH US BY RN

## 2024-02-12 PROCEDURE — 99152 MOD SED SAME PHYS/QHP 5/>YRS: CPT

## 2024-02-12 RX ORDER — CEFAZOLIN SODIUM/WATER 2 G/20 ML
2 SYRINGE (ML) INTRAVENOUS
Status: COMPLETED | OUTPATIENT
Start: 2024-02-12 | End: 2024-02-12

## 2024-02-12 RX ORDER — ALBUTEROL SULFATE 90 UG/1
2 AEROSOL, METERED RESPIRATORY (INHALATION) EVERY 6 HOURS PRN
Qty: 18 G | Refills: 0 | Status: SHIPPED | OUTPATIENT
Start: 2024-02-12

## 2024-02-12 RX ORDER — NALOXONE HYDROCHLORIDE 0.4 MG/ML
0.4 INJECTION, SOLUTION INTRAMUSCULAR; INTRAVENOUS; SUBCUTANEOUS
Status: DISCONTINUED | OUTPATIENT
Start: 2024-02-12 | End: 2024-02-13 | Stop reason: HOSPADM

## 2024-02-12 RX ORDER — ONDANSETRON 2 MG/ML
4 INJECTION INTRAMUSCULAR; INTRAVENOUS
Status: COMPLETED | OUTPATIENT
Start: 2024-02-12 | End: 2024-02-12

## 2024-02-12 RX ORDER — HEPARIN SODIUM (PORCINE) LOCK FLUSH IV SOLN 100 UNIT/ML 100 UNIT/ML
500 SOLUTION INTRAVENOUS
Status: COMPLETED | OUTPATIENT
Start: 2024-02-12 | End: 2024-02-12

## 2024-02-12 RX ORDER — NALOXONE HYDROCHLORIDE 0.4 MG/ML
0.2 INJECTION, SOLUTION INTRAMUSCULAR; INTRAVENOUS; SUBCUTANEOUS
Status: DISCONTINUED | OUTPATIENT
Start: 2024-02-12 | End: 2024-02-13 | Stop reason: HOSPADM

## 2024-02-12 RX ORDER — SODIUM CHLORIDE 9 MG/ML
INJECTION, SOLUTION INTRAVENOUS CONTINUOUS
Status: DISCONTINUED | OUTPATIENT
Start: 2024-02-12 | End: 2024-02-13 | Stop reason: HOSPADM

## 2024-02-12 RX ORDER — LIDOCAINE 40 MG/G
CREAM TOPICAL
Status: DISCONTINUED | OUTPATIENT
Start: 2024-02-12 | End: 2024-02-13 | Stop reason: HOSPADM

## 2024-02-12 RX ORDER — FLUMAZENIL 0.1 MG/ML
0.2 INJECTION, SOLUTION INTRAVENOUS
Status: DISCONTINUED | OUTPATIENT
Start: 2024-02-12 | End: 2024-02-13 | Stop reason: HOSPADM

## 2024-02-12 RX ORDER — LIDOCAINE HYDROCHLORIDE AND EPINEPHRINE 10; 10 MG/ML; UG/ML
10-20 INJECTION, SOLUTION INFILTRATION; PERINEURAL
Status: COMPLETED | OUTPATIENT
Start: 2024-02-12 | End: 2024-02-12

## 2024-02-12 RX ORDER — FENTANYL CITRATE 50 UG/ML
25-50 INJECTION, SOLUTION INTRAMUSCULAR; INTRAVENOUS EVERY 5 MIN PRN
Status: DISCONTINUED | OUTPATIENT
Start: 2024-02-12 | End: 2024-02-13 | Stop reason: HOSPADM

## 2024-02-12 RX ADMIN — LIDOCAINE HYDROCHLORIDE 1 ML: 10 INJECTION, SOLUTION INFILTRATION; PERINEURAL at 11:07

## 2024-02-12 RX ADMIN — FENTANYL CITRATE 50 MCG: 50 INJECTION, SOLUTION INTRAMUSCULAR; INTRAVENOUS at 11:11

## 2024-02-12 RX ADMIN — FENTANYL CITRATE 25 MCG: 50 INJECTION, SOLUTION INTRAMUSCULAR; INTRAVENOUS at 11:18

## 2024-02-12 RX ADMIN — LIDOCAINE HYDROCHLORIDE,EPINEPHRINE BITARTRATE 15 ML: 10; .01 INJECTION, SOLUTION INFILTRATION; PERINEURAL at 11:07

## 2024-02-12 RX ADMIN — ONDANSETRON 4 MG: 2 INJECTION INTRAMUSCULAR; INTRAVENOUS at 11:03

## 2024-02-12 RX ADMIN — MIDAZOLAM 0.5 MG: 1 INJECTION INTRAMUSCULAR; INTRAVENOUS at 11:15

## 2024-02-12 RX ADMIN — FENTANYL CITRATE 50 MCG: 50 INJECTION, SOLUTION INTRAMUSCULAR; INTRAVENOUS at 11:01

## 2024-02-12 RX ADMIN — Medication 500 UNITS: at 11:17

## 2024-02-12 RX ADMIN — Medication 2 G: at 11:01

## 2024-02-12 RX ADMIN — MIDAZOLAM 1 MG: 1 INJECTION INTRAMUSCULAR; INTRAVENOUS at 11:06

## 2024-02-12 RX ADMIN — MIDAZOLAM 1 MG: 1 INJECTION INTRAMUSCULAR; INTRAVENOUS at 10:58

## 2024-02-12 NOTE — DISCHARGE INSTRUCTIONS
Port Placement Procedure Discharge Instructions:  You had a port placed. A port is a small medical device that is placed under the skin and is connected to a vein with a catheter (thin, flexible tube). Ports can be used to administer IV medications (including chemotherapy), fluids or blood products or for blood lab draws. Please follow the below instructions after your procedure:    Care Instructions:  - If you received sedation for your procedure, do not drive or operate heavy machinery for the rest of the day.  - You may shower beginning tomorrow (post procedure day #1). Do not scrub site until well healed; pat dry gently with a towel.  - You likely have skin adhesive over your port site. Skin adhesive works like a bandage to keep the site covered and protected. Do not use antibiotic ointment or creams/lotions over adhesive as it can break it down. The skin adhesive will peel off on its own (typically in 5-14 days).  - Avoid submerging the port site under water (ex: tub baths, Jacuzzis, lakes, hot tubs and pools) for 10 days or until your site is well healed.  - You may have some discomfort, minimal swelling, redness and/or bruising at your port site/procedure site. You may take over the counter pain medication for discomfort (follow the package directions) or apply an ice pack wrapped in a towel over the site (rotating 20 minutes with ice pack on and 20 minutes with ice pack off) for comfort as needed. It can take several days for these to resolve.  - Avoid heavy lifting (greater than 10 pounds) and strenuous activities for 2 days following your procedure.   - If you experience significant bleeding at site, apply pressure with hands above the clavicle bone, sit upright and seek immediate medical assistance.  - Ports need to be flushed approximately every 4-6 weeks, if not being used more frequently. Follow up with the provider who ordered your port placement for further instructions for this.    Seek medical  evaluation or contact [facility]ATES RN Line at [phone] if you experience the following:  - Uncontrolled bleeding from port site  - Fever (greater than 101 F (38.3C))  - Purulent (yellow/green/foul smelling) drainage from port insertion site  - Increasing pain at port site  - Increasing redness at port site

## 2024-02-12 NOTE — PROCEDURES
RADIOLOGY POST PROCEDURE NOTE WITH SEDATION  Patient name: Inez Rodriguez  MRN: [id number]  : 1950    Pre-procedure diagnosis: RUL NSCLC  Post-procedure diagnosis: Same    Procedure Date/Time: 2024  11:40 AM    Procedure: Port-a-cath placement  Estimated blood loss: Minimal  Sedation: Moderate sedation was employed. The patient was monitored by a nurse at all times during the procedure under my direct supervision.    Specimen(s) collected with description: None    I determined this patient to be an appropriate candidate for the planned sedation and procedure and reassessed the patient IMMEDIATELY PRIOR to sedation and procedure.     The patient tolerated the procedure well with no immediate complications.    Significant findings: Successful placement of port-a-cath. Ready for immediate use.    See imaging dictation for procedural details.    Provider name: Gorge Mireles M.D.  Assistant(s):None

## 2024-02-12 NOTE — PRE-PROCEDURE
GENERAL PRE-PROCEDURE:   Procedure:  Port placement  Date/Time:  2/12/2024 10:40 AM    Written consent obtained?: Yes    Risks and benefits: Risks, benefits and alternatives were discussed    Consent given by:  Patient  Patient states understanding of procedure being performed: Yes    Patient's understanding of procedure matches consent: Yes    Procedure consent matches procedure scheduled: Yes    Expected level of sedation:  Moderate  Appropriately NPO:  Yes  ASA Class:  2  Mallampati  :  Grade 2- soft palate, base of uvula, tonsillar pillars, and portion of posterior pharyngeal wall visible  Lungs:  Lungs clear with good breath sounds bilaterally  Heart:  Normal heart sounds and rate  History & Physical reviewed:  History and physical reviewed and no updates needed  Statement of review:  I have reviewed the lab findings, diagnostic data, medications, and the plan for sedation

## 2024-02-12 NOTE — IR NOTE
Patient Name: Inez Rodriguez  Medical Record Number: [id number]  Today's Date: 2/12/2024    Procedure: Port  Proceduralist: Chi    Procedure Start: 1104  Procedure end: 1120  Sedation medications administered: 2.5 mg midazolam and 125 mcg fentanyl   Sedation time: 16 minutes    Patient verbalized understanding of discharge teaching. Wheelchair at discharge.

## 2024-02-14 ENCOUNTER — INFUSION THERAPY VISIT (OUTPATIENT)
Dept: INFUSION THERAPY | Facility: HOSPITAL | Age: 74
End: 2024-02-14
Attending: INTERNAL MEDICINE
Payer: MEDICARE

## 2024-02-14 ENCOUNTER — ONCOLOGY VISIT (OUTPATIENT)
Dept: ONCOLOGY | Facility: HOSPITAL | Age: 74
End: 2024-02-14
Attending: INTERNAL MEDICINE
Payer: MEDICARE

## 2024-02-14 VITALS
WEIGHT: 120.6 LBS | OXYGEN SATURATION: 95 % | TEMPERATURE: 97.8 F | HEART RATE: 126 BPM | DIASTOLIC BLOOD PRESSURE: 83 MMHG | BODY MASS INDEX: 20.09 KG/M2 | SYSTOLIC BLOOD PRESSURE: 163 MMHG | HEIGHT: 65 IN | RESPIRATION RATE: 16 BRPM

## 2024-02-14 DIAGNOSIS — C34.31 MALIGNANT NEOPLASM OF LOWER LOBE OF RIGHT LUNG (H): ICD-10-CM

## 2024-02-14 DIAGNOSIS — C34.90 LUNG CANCER METASTATIC TO BONE (H): Primary | ICD-10-CM

## 2024-02-14 DIAGNOSIS — C79.51 LUNG CANCER METASTATIC TO BONE (H): Primary | ICD-10-CM

## 2024-02-14 LAB
ALBUMIN SERPL BCG-MCNC: 4.2 G/DL (ref 3.5–5.2)
ALP SERPL-CCNC: 96 U/L (ref 40–150)
ALT SERPL W P-5'-P-CCNC: 16 U/L (ref 0–50)
ANION GAP SERPL CALCULATED.3IONS-SCNC: 13 MMOL/L (ref 7–15)
AST SERPL W P-5'-P-CCNC: 21 U/L (ref 0–45)
BASOPHILS # BLD AUTO: 0 10E3/UL (ref 0–0.2)
BASOPHILS NFR BLD AUTO: 0 %
BILIRUB SERPL-MCNC: 0.2 MG/DL
BUN SERPL-MCNC: 13.1 MG/DL (ref 8–23)
CALCIUM SERPL-MCNC: 9.7 MG/DL (ref 8.8–10.2)
CHLORIDE SERPL-SCNC: 100 MMOL/L (ref 98–107)
CREAT SERPL-MCNC: 0.73 MG/DL (ref 0.51–0.95)
DEPRECATED HCO3 PLAS-SCNC: 26 MMOL/L (ref 22–29)
EGFRCR SERPLBLD CKD-EPI 2021: 86 ML/MIN/1.73M2
EOSINOPHIL # BLD AUTO: 0 10E3/UL (ref 0–0.7)
EOSINOPHIL NFR BLD AUTO: 0 %
ERYTHROCYTE [DISTWIDTH] IN BLOOD BY AUTOMATED COUNT: 14 % (ref 10–15)
GLUCOSE SERPL-MCNC: 202 MG/DL (ref 70–99)
HCT VFR BLD AUTO: 43.8 % (ref 35–47)
HGB BLD-MCNC: 14.2 G/DL (ref 11.7–15.7)
IMM GRANULOCYTES # BLD: 0 10E3/UL
IMM GRANULOCYTES NFR BLD: 0 %
LYMPHOCYTES # BLD AUTO: 1 10E3/UL (ref 0.8–5.3)
LYMPHOCYTES NFR BLD AUTO: 12 %
MCH RBC QN AUTO: 28.9 PG (ref 26.5–33)
MCHC RBC AUTO-ENTMCNC: 32.4 G/DL (ref 31.5–36.5)
MCV RBC AUTO: 89 FL (ref 78–100)
MONOCYTES # BLD AUTO: 0.2 10E3/UL (ref 0–1.3)
MONOCYTES NFR BLD AUTO: 2 %
NEUTROPHILS # BLD AUTO: 6.8 10E3/UL (ref 1.6–8.3)
NEUTROPHILS NFR BLD AUTO: 86 %
NRBC # BLD AUTO: 0 10E3/UL
NRBC BLD AUTO-RTO: 0 /100
PLATELET # BLD AUTO: 301 10E3/UL (ref 150–450)
POTASSIUM SERPL-SCNC: 3.8 MMOL/L (ref 3.4–5.3)
PROT SERPL-MCNC: 7.4 G/DL (ref 6.4–8.3)
RBC # BLD AUTO: 4.91 10E6/UL (ref 3.8–5.2)
SODIUM SERPL-SCNC: 139 MMOL/L (ref 135–145)
T4 FREE SERPL-MCNC: 1.09 NG/DL (ref 0.9–1.7)
TSH SERPL DL<=0.005 MIU/L-ACNC: 0.09 UIU/ML (ref 0.3–4.2)
WBC # BLD AUTO: 8.1 10E3/UL (ref 4–11)

## 2024-02-14 PROCEDURE — 84439 ASSAY OF FREE THYROXINE: CPT | Performed by: INTERNAL MEDICINE

## 2024-02-14 PROCEDURE — G2211 COMPLEX E/M VISIT ADD ON: HCPCS | Performed by: NURSE PRACTITIONER

## 2024-02-14 PROCEDURE — 258N000003 HC RX IP 258 OP 636: Performed by: INTERNAL MEDICINE

## 2024-02-14 PROCEDURE — 85025 COMPLETE CBC W/AUTO DIFF WBC: CPT | Performed by: INTERNAL MEDICINE

## 2024-02-14 PROCEDURE — 80053 COMPREHEN METABOLIC PANEL: CPT | Performed by: INTERNAL MEDICINE

## 2024-02-14 PROCEDURE — 96411 CHEMO IV PUSH ADDL DRUG: CPT

## 2024-02-14 PROCEDURE — 96417 CHEMO IV INFUS EACH ADDL SEQ: CPT

## 2024-02-14 PROCEDURE — 36591 DRAW BLOOD OFF VENOUS DEVICE: CPT | Performed by: INTERNAL MEDICINE

## 2024-02-14 PROCEDURE — 96367 TX/PROPH/DG ADDL SEQ IV INF: CPT

## 2024-02-14 PROCEDURE — 96413 CHEMO IV INFUSION 1 HR: CPT

## 2024-02-14 PROCEDURE — 84443 ASSAY THYROID STIM HORMONE: CPT | Performed by: INTERNAL MEDICINE

## 2024-02-14 PROCEDURE — G0463 HOSPITAL OUTPT CLINIC VISIT: HCPCS | Performed by: NURSE PRACTITIONER

## 2024-02-14 PROCEDURE — 96375 TX/PRO/DX INJ NEW DRUG ADDON: CPT

## 2024-02-14 PROCEDURE — 250N000011 HC RX IP 250 OP 636: Mod: JZ | Performed by: INTERNAL MEDICINE

## 2024-02-14 PROCEDURE — 99215 OFFICE O/P EST HI 40 MIN: CPT | Performed by: NURSE PRACTITIONER

## 2024-02-14 RX ORDER — HEPARIN SODIUM (PORCINE) LOCK FLUSH IV SOLN 100 UNIT/ML 100 UNIT/ML
5 SOLUTION INTRAVENOUS
Status: DISCONTINUED | OUTPATIENT
Start: 2024-02-14 | End: 2024-02-14 | Stop reason: HOSPADM

## 2024-02-14 RX ORDER — MEPERIDINE HYDROCHLORIDE 25 MG/ML
25 INJECTION INTRAMUSCULAR; INTRAVENOUS; SUBCUTANEOUS EVERY 30 MIN PRN
Status: DISCONTINUED | OUTPATIENT
Start: 2024-02-14 | End: 2024-02-14 | Stop reason: HOSPADM

## 2024-02-14 RX ORDER — PALONOSETRON 0.05 MG/ML
0.25 INJECTION, SOLUTION INTRAVENOUS ONCE
Status: COMPLETED | OUTPATIENT
Start: 2024-02-14 | End: 2024-02-14

## 2024-02-14 RX ORDER — METHYLPREDNISOLONE SODIUM SUCCINATE 125 MG/2ML
125 INJECTION, POWDER, LYOPHILIZED, FOR SOLUTION INTRAMUSCULAR; INTRAVENOUS
Status: DISCONTINUED | OUTPATIENT
Start: 2024-02-14 | End: 2024-02-14 | Stop reason: HOSPADM

## 2024-02-14 RX ORDER — DIPHENHYDRAMINE HYDROCHLORIDE 50 MG/ML
50 INJECTION INTRAMUSCULAR; INTRAVENOUS
Status: DISCONTINUED | OUTPATIENT
Start: 2024-02-14 | End: 2024-02-14 | Stop reason: HOSPADM

## 2024-02-14 RX ORDER — ALBUTEROL SULFATE 0.83 MG/ML
2.5 SOLUTION RESPIRATORY (INHALATION)
Status: DISCONTINUED | OUTPATIENT
Start: 2024-02-14 | End: 2024-02-14 | Stop reason: HOSPADM

## 2024-02-14 RX ORDER — ALBUTEROL SULFATE 90 UG/1
1-2 AEROSOL, METERED RESPIRATORY (INHALATION)
Status: DISCONTINUED | OUTPATIENT
Start: 2024-02-14 | End: 2024-02-14 | Stop reason: HOSPADM

## 2024-02-14 RX ORDER — EPINEPHRINE 1 MG/ML
0.3 INJECTION, SOLUTION INTRAMUSCULAR; SUBCUTANEOUS EVERY 5 MIN PRN
Status: DISCONTINUED | OUTPATIENT
Start: 2024-02-14 | End: 2024-02-14 | Stop reason: HOSPADM

## 2024-02-14 RX ADMIN — SODIUM CHLORIDE 250 ML: 9 INJECTION, SOLUTION INTRAVENOUS at 10:49

## 2024-02-14 RX ADMIN — CARBOPLATIN 420 MG: 10 INJECTION, SOLUTION INTRAVENOUS at 12:19

## 2024-02-14 RX ADMIN — PALONOSETRON 0.25 MG: 0.05 INJECTION, SOLUTION INTRAVENOUS at 10:49

## 2024-02-14 RX ADMIN — DEXAMETHASONE SODIUM PHOSPHATE: 10 INJECTION, SOLUTION INTRAMUSCULAR; INTRAVENOUS at 10:52

## 2024-02-14 RX ADMIN — PEMETREXED DISODIUM 795 MG: 500 INJECTION, POWDER, LYOPHILIZED, FOR SOLUTION INTRAVENOUS at 11:58

## 2024-02-14 RX ADMIN — SODIUM CHLORIDE 200 MG: 9 INJECTION, SOLUTION INTRAVENOUS at 11:23

## 2024-02-14 RX ADMIN — Medication 5 ML: at 12:54

## 2024-02-14 ASSESSMENT — PAIN SCALES - GENERAL: PAINLEVEL: MODERATE PAIN (5)

## 2024-02-14 NOTE — PROGRESS NOTES
"Oncology Rooming Note    February 14, 2024 9:31 AM   Inez Rodriguez is a 73 year old female who presents for:    Chief Complaint   Patient presents with    Oncology Clinic Visit     Return visit 2 weeks with lab and infusion. [facility]. Lung cancer metastatic to bone.     Initial Vitals: BP (!) 163/83 (BP Location: Left arm, Patient Position: Sitting, Cuff Size: Adult Regular)   Pulse (!) 126   Temp 97.8  F (36.6  C) (Oral)   Resp 16   Ht 1.651 m (5' 5\")   Wt 54.7 kg (120 lb 9.6 oz)   SpO2 95%   BMI 20.07 kg/m   Estimated body mass index is 20.07 kg/m  as calculated from the following:    Height as of this encounter: 1.651 m (5' 5\").    Weight as of this encounter: 54.7 kg (120 lb 9.6 oz). Body surface area is 1.58 meters squared.  Moderate Pain (5) Comment: Data Unavailable   No LMP recorded. Patient is premenopausal.  Allergies reviewed: Yes  Medications reviewed: Yes    Medications: Medication refills not needed today.  Pharmacy name entered into [facility]:    [id number] [place] [id number] [facility] [address] [facility] [address]    Frailty Screening:   Is the patient here for a new oncology consult visit in cancer care? 2. No      Clinical concerns: Return visit 2 weeks with lab and infusion. [facility]. Lung cancer metastatic to bone. Has questions about supplements with starting chemo.      Malgorzata Fields CMA              "

## 2024-02-14 NOTE — PROGRESS NOTES
[facility] Hematology and Oncology Progress Note    Patient: Inez Rodriguez  MRN: [id number]  Date of Service: 02/14/2024        Reason for Visit    Chief Complaint   Patient presents with    Oncology Clinic Visit     Return visit 2 weeks with lab and infusion. New Start. Lung cancer metastatic to bone.       Assessment and Plan     Cancer Staging   No matching staging information was found for the patient.    1.  Lung cancer, non-small cell likely adenocarcinoma, stage IV with right upper lobe nodule in her lung, lymphadenopathy, bone mets with T4 and right sacrum, possible malignant pleural effusion: Patient is here today to start her palliative treatment with pembrolizumab, pemetrexed and carboplatin.  We talked about the overall plan which would be to do 2 cycles and then repeat CT scan and then another 2 cycles if there is improvement or stability.  We will likely do a full 4 cycles of triplet therapy and if things are going well we may then go on to maintenance Keytruda and Alimta.  Patient will get a CT scan after 2 cycles and 4 cycles.  Patient gave verbal consent to start treatment today. Pt and son had an opportunity to ask their questions. They felt educated. They understands the risks and benefits of treatment.  they understand how to use the post medications for nausea.  She is taking folic acid every day.  She also understands she needs to take dexamethasone the day before chemo, the day of chemo and the day after chemo.  They know the side effects include, but are not limited to: alopecia, diarrhea/constipation, nausea, vomiting, fatigue/weakness, myelosuppression, rash, peripheral neuropathy, taste alterations, poor appetite. They understand this is with palliative intent. Risk of immunotherapy include: hepatitis, thyroiditis, colitis, pulmonary toxicity, rash and dry skin, myalgias.   Pt will return in 3 weeks for cycle 2.  We will do a CT scan after that cycle.  Will have her nurse  coordinator call her in a couple of days to see how she is doing.    2.  Slightly abnormal TSH with a normal T4: We will monitor this closely with immunotherapy.  This can definitely get abnormal with the immunotherapy.  Check every 3 to 6 weeks.    3. Anxiety: Patient is quite anxious to start treatment.  She also did not sleep well likely because of the dexamethasone.  Encouraged her to not take that too late in the evening.  I did tell patient that the anxiety will likely get better with time and experience and education as we go.  We will continue to offer services as needed.    The longitudinal plan of care for the condition(s) below were addressed during this visit. Due to the added complexity in care, I will continue to support Inez in the subsequent management of this condition(s) and with the ongoing continuity of care of this condition(s).    Problem List Items Addressed This Visit as of 2/14/2024      Malignant neoplasm of lower lobe of right lung (H)    Lung cancer metastatic to bone (H) - Primary           ECOG Performance    0 - Independent    Distress Screening (within last 30 days)    No data recorded     Pain  Pain Score: Moderate Pain (5)  Pain Loc: Other - see comment (port)    Problem List    Patient Active Problem List   Diagnosis    Observation for suspected malignant neoplasm    Thyroid nodule    Chronic cough    Pulmonary nodules    Wheezing-associated respiratory infection (WARI)    Malignant neoplasm of lower lobe of right lung (H)    Postobstructive pneumonia    Lung cancer metastatic to bone (H)        ______________________________________________________________________________    History of Present Illness    Oncologist: Dr. Miller    Diagnosis: Lung cancer, January 2024 came to ER with SOB.   -1/7/24: Mediastinal and right hilar adenopathy most suggestive of metastatic adenopathy from a primary lung malignancy. A nodular opacity in the right upper lobe could represent a primary lung  "malignancy versus an infectious/inflammatory focus. Recommend   referral to pulmonology for tissue sampling. [employer]ole conglomerate demonstrate mass effect on the right upper lobe bronchus and bronchus intermedius with associated narrowing without occlusion. Brain MRI negative.   -1/8/24: EBUS done and 4R, 11R and subcarinal nodes all positive for malignancy. NSCLC, favor adenocarcinoma. Right upper lobe nodule was also positive. Pleural fluid suspicious.   -1/29/24: PET: Findings suspicious for a right upper lobe primary lung neoplasm with extensive mediastinal/hilar lymph node metastases and osseous metastases to the T4 vertebral body and right sacrum.   Neogenomics: KRAS G12C mutation. Tp53 mutation. PTEN deletion +, MSI stable, PDL1 0%, TMB intermediate, Her2/leisa negative. Eveything else was negative.     Treatment:   -2/14/24: here today to start palliative treatment with Carboplatin, Alimta, Keytruda    Interim History:   To start treatment.  She states that she did not sleep at all last night because she is quite nervous and anxious to start.  She is worried about side effects.  She she states that her port is still pretty tender and it hurt when they accessed it this morning.  She has some questions about limitations with that.  She denies any new bone or back pain.  She states that her appetite and eating has gotten a little bit better lately but her son is still concerned that she is lost weight and is not eating and drinking well enough.  Patient denies any neuro changes.  Denies any infectious complaint         Review of Systems    Pertinent items are noted in HPI.    Past History    Past Medical History:   Diagnosis Date    Hypertension     SOB (shortness of breath)        PHYSICAL EXAM  BP (!) 163/83 (BP Location: Left arm, Patient Position: Sitting, Cuff Size: Adult Regular)   Pulse (!) 126   Temp 97.8  F (36.6  C) (Oral)   Resp 16   Ht 1.651 m (5' 5\")   Wt 54.7 kg (120 lb 9.6 oz)   SpO2 95%   BMI " 20.07 kg/m      GENERAL: no acute distress. Cooperative in conversation. Here with son.   RESP: Regular respiratory rate. No expiratory wheezes   NEURO: non focal. Alert and oriented x3.   PSYCH: within normal limits. No depression or anxiety.  SKIN: exposed skin is dry intact. New port is reddened, tender. No signs of infection.     Lab Results    Recent Results (from the past 168 hour(s))   Comprehensive metabolic panel   Result Value Ref Range    Sodium 139 135 - 145 mmol/L    Potassium 3.8 3.4 - 5.3 mmol/L    Carbon Dioxide (CO2) 26 22 - 29 mmol/L    Anion Gap 13 7 - 15 mmol/L    Urea Nitrogen 13.1 8.0 - 23.0 mg/dL    Creatinine 0.73 0.51 - 0.95 mg/dL    GFR Estimate 86 >60 mL/min/1.73m2    Calcium 9.7 8.8 - 10.2 mg/dL    Chloride 100 98 - 107 mmol/L    Glucose 202 (H) 70 - 99 mg/dL    Alkaline Phosphatase 96 40 - 150 U/L    AST 21 0 - 45 U/L    ALT 16 0 - 50 U/L    Protein Total 7.4 6.4 - 8.3 g/dL    Albumin 4.2 3.5 - 5.2 g/dL    Bilirubin Total 0.2 <=1.2 mg/dL   TSH with free T4 reflex   Result Value Ref Range    TSH 0.09 (L) 0.30 - 4.20 uIU/mL   CBC with platelets and differential   Result Value Ref Range    WBC Count 8.1 4.0 - 11.0 10e3/uL    RBC Count 4.91 3.80 - 5.20 10e6/uL    Hemoglobin 14.2 11.7 - 15.7 g/dL    Hematocrit 43.8 35.0 - 47.0 %    MCV 89 78 - 100 fL    MCH 28.9 26.5 - 33.0 pg    MCHC 32.4 31.5 - 36.5 g/dL    RDW 14.0 10.0 - 15.0 %    Platelet Count 301 150 - 450 10e3/uL    % Neutrophils 86 %    % Lymphocytes 12 %    % Monocytes 2 %    % Eosinophils 0 %    % Basophils 0 %    % Immature Granulocytes 0 %    NRBCs per 100 WBC 0 <1 /100    Absolute Neutrophils 6.8 1.6 - 8.3 10e3/uL    Absolute Lymphocytes 1.0 0.8 - 5.3 10e3/uL    Absolute Monocytes 0.2 0.0 - 1.3 10e3/uL    Absolute Eosinophils 0.0 0.0 - 0.7 10e3/uL    Absolute Basophils 0.0 0.0 - 0.2 10e3/uL    Absolute Immature Granulocytes 0.0 <=0.4 10e3/uL    Absolute NRBCs 0.0 10e3/uL   T4 free   Result Value Ref Range    Free T4 1.09 0.90 -  1.70 ng/dL       Imaging    IR Chest Port Placement > 5 Yrs of Age    Result Date: 2/12/2024  [place] [facility] LOCATION: [facility] DATE: 2/12/2024 PROCEDURES: 1. SUBCUTANEOUS IMPLANTED VENOUS ACCESS PORT. 2. ULTRASOUND GUIDANCE FOR VASCULAR ACCESS. 3. FLUOROSCOPIC GUIDANCE FOR CATHETER PLACEMENT. 4. MODERATE SEDATION INTERVENTIONAL RADIOLOGIST: Gorge Mireles MD INDICATION: Patient is a 73-year-old female the history of right upper lobe non-small cell lung cancer. The patient presents to Interventional Radiology for placement of a Port-A-Cath for long-term central venous access to allow for infusion and blood draws. CONSENT: The risks, benefits and alternatives of Port placement were discussed with the patient  in detail. All questions were answered. Informed consent was given to proceed with the procedure. MODERATE SEDATION: Versed 2.5 mg IV; Fentanyl 125 mcg IV. During the time out, immediately prior to the administration of medications, the patient was reassessed for adequacy to receive conscious sedation.  Under physician supervision, Versed and fentanyl were administered for moderate sedation. Pulse oximetry, heart rate and blood pressure were continuously monitored by an independent trained observer. The physician spent 16 minutes of face-to-face sedation time with the patient. CONTRAST: None. ANTIBIOTICS: 2 g of IV Ancef. ADDITIONAL MEDICATIONS: None. FLUOROSCOPIC TIME: 1.1 minutes. RADIATION DOSE: Air Kerma: 5 mGy. COMPLICATIONS: No immediate complications. STERILE BARRIER TECHNIQUE: Maximum sterile barrier technique was used. Cutaneous antisepsis was performed at the operative site with application of 2% chlorhexidine and large sterile drape. Prior to the procedure, the  and assistant performed hand hygiene and wore hat, mask, sterile gown, and sterile gloves during the entire procedure. PROCEDURE: After discussing the procedure and risks, informed consent was obtained.  Permanent ultrasound images were obtained of the left subclavian vein, documenting patency and compressibility. The left neck and upper chest were prepped and draped. After local anesthesia, the subclavian vein was punctured under direct ultrasound guidance, and a small dilator was placed. A short transverse skin incision was made below the clavicle, and a pocket was created for the port. A skin tunnel was created from the pocket to the vein entry site, and the catheter was pulled through the tunnel. The vein was dilated and the catheter inserted through a peel-away sheath, positioning the tip fluoroscopically at  the SVC/atriocaval junction. The catheter was cut to an appropriate length. The catheter was then attached to the port itself and the port was placed within the subcutaneous pocket. The port was flushed with heparin. The incision was closed with layered  absorbable suture and covered with Dermabond. The patient tolerated the procedure, and there were no immediate complications. FINDINGS: Ultrasound shows an anechoic and compressible subclavian vein. A permanent ultrasound image of this was saved. After placement, fluoroscopic spot images show that the tip of the catheter is at the SVC/atriocaval junction.     IMPRESSION:  1.  Uneventful venous access port placement. This port is power injector compatible.     PET Oncology (Eyes to Thighs)    Result Date: 1/30/2024  EXAM: PET ONCOLOGY (EYES TO THIGHS) LOCATION: [facility] DATE: 1/29/2024 INDICATION: Malignant neoplasm of upper lobe, right bronchus or lung. Initial treatment strategy. COMPARISON: CT 01/07/2024 CONTRAST: None TECHNIQUE: Serum glucose level 101 mg/dL. One hour post intravenous administration of 13.07 mCi F-18 FDG, PET imaging was performed from the skull vertex to mid thighs, utilizing attenuation correction with concurrent axial CT and PET/CT image fusion. Dose reduction techniques were used. PET/CT FINDINGS: Mildly  FDG avid nodular avid opacity in the right upper lobe anteriorly abutting the pleura measuring 2.5 x 1.8 x 1.4 cm (SUV max 3.6) with intensely FDG avid right hilar station 10R (SUV max 17.9, right lower paratracheal station 4R (SUV max 16.1), left lower paratracheal station 4L (SUV max 9.6), subcarinal station 7 (SUV max 14.5) , right upper paratracheal station 2R (SUV max 5.8), and right anterior mediastinal (SUV max 3.8) lymphadenopathy, and osseous lesions involving the T4 vertebral body (SUV max 8.3) and right sacrum (SUV max 3.6) suspicious for a right upper lobe primary lung neoplasm with extensive mediastinal/hilar lymph node metastases and osseous metastases to the T4 vertebral body and right sacrum. CT FINDINGS: Mild senescent intracranial changes. Non-FDG avid left thyroid nodule suggesting benignity. Mild centrilobular emphysema. 5 mm nodular opacity in the right upper lobe laterally (series 2, image 162), not FDG avid but below PET resolution. Advanced coronary arterial calcification. Trace pericardial fluid. Distal colonic diverticulosis without acute diverticulitis. Non-FDG avid sclerosis in the right iliac bone. Multilevel degenerative changes in the spine. The lower extremities are unremarkable.     IMPRESSION: Findings suspicious for a right upper lobe primary lung neoplasm with extensive mediastinal/hilar lymph node metastases and osseous metastases to the T4 vertebral body and right sacrum.    MR Brain w/o & w Contrast    Result Date: 1/26/2024  EXAM: MR BRAIN W/O and W CONTRAST LOCATION: [facility] DATE: 1/26/2024 INDICATION:  Malignant neoplasm of lower lobe of right lung (H) COMPARISON: None. CONTRAST: 5 mL Gadavist TECHNIQUE: Routine multiplanar multisequence head MRI without and with intravenous contrast. FINDINGS: INTRACRANIAL CONTENTS: No acute or subacute infarct. No mass, acute hemorrhage, or extra-axial fluid collections. Scattered nonspecific T2/FLAIR  hyperintensities within the cerebral white matter most consistent with mild chronic microvascular ischemic change. Mild generalized cerebral atrophy. No hydrocephalus. Normal position of the cerebellar tonsils. No pathologic contrast enhancement. SELLA: Empty sella morphology with flattening of the pituitary gland along the sellar floor. OSSEOUS STRUCTURES/SOFT TISSUES: Normal marrow signal. The major intracranial vascular flow voids are maintained. ORBITS: No abnormality accounting for technique. SINUSES/MASTOIDS: No paranasal sinus mucosal disease. Scattered fluid/membrane thickening in the right mastoid air cells. No apparent mass in the posterior nasopharynx or skull base.     IMPRESSION: 1.  No acute intracranial process or intracranial metastases. 2.  Generalized brain atrophy and presumed microvascular ischemic changes as detailed above.     Total time spent with patient in face to face time, chart review and documentation was 45 minutes.      Signed by: TANESHA Okeefe CNP

## 2024-02-14 NOTE — PROGRESS NOTES
Pt here for first treatment. Brought emla cream with her. Reviewed directions and placed an ice pack on site prior to access. All medications reviewed prior to administration. Positive blood return throughout infusion. Reviewed home medications and phone number to call with questions or concerns. Port flushed and deaccessed upon completion. Pt adam ambulatory to lobby to meet her son and verbalizes understanding of plan.

## 2024-02-14 NOTE — LETTER
2/14/2024         RE: Inez Rodriguez  [address]  [facility] [address]        Dear Colleague,    Thank you for referring your patient, Inez Rodriguez, to the [facility] [place]. Please see a copy of my visit note below.    [facility] Hematology and Oncology Progress Note    Patient: Inez Rodriguez  MRN: [id number]  Date of Service: 02/14/2024        Reason for Visit    Chief Complaint   Patient presents with     Oncology Clinic Visit     Return visit 2 weeks with lab and infusion. New Start. Lung cancer metastatic to bone.       Assessment and Plan     Cancer Staging   No matching staging information was found for the patient.    1.  Lung cancer, non-small cell likely adenocarcinoma, stage IV with right upper lobe nodule in her lung, lymphadenopathy, bone mets with T4 and right sacrum, possible malignant pleural effusion: Patient is here today to start her palliative treatment with pembrolizumab, pemetrexed and carboplatin.  We talked about the overall plan which would be to do 2 cycles and then repeat CT scan and then another 2 cycles if there is improvement or stability.  We will likely do a full 4 cycles of triplet therapy and if things are going well we may then go on to maintenance Keytruda and Alimta.  Patient will get a CT scan after 2 cycles and 4 cycles.  Patient gave verbal consent to start treatment today. Pt and son had an opportunity to ask their questions. They felt educated. They understands the risks and benefits of treatment.  they understand how to use the post medications for nausea.  She is taking folic acid every day.  She also understands she needs to take dexamethasone the day before chemo, the day of chemo and the day after chemo.  They know the side effects include, but are not limited to: alopecia, diarrhea/constipation, nausea, vomiting, fatigue/weakness, myelosuppression, rash, peripheral neuropathy, taste alterations, poor appetite. They understand  this is with palliative intent. Risk of immunotherapy include: hepatitis, thyroiditis, colitis, pulmonary toxicity, rash and dry skin, myalgias.   Pt will return in 3 weeks for cycle 2.  We will do a CT scan after that cycle.  Will have her nurse coordinator call her in a couple of days to see how she is doing.    2.  Slightly abnormal TSH with a normal T4: We will monitor this closely with immunotherapy.  This can definitely get abnormal with the immunotherapy.  Check every 3 to 6 weeks.    3. Anxiety: Patient is quite anxious to start treatment.  She also did not sleep well likely because of the dexamethasone.  Encouraged her to not take that too late in the evening.  I did tell patient that the anxiety will likely get better with time and experience and education as we go.  We will continue to offer services as needed.    The longitudinal plan of care for the condition(s) below were addressed during this visit. Due to the added complexity in care, I will continue to support Inez in the subsequent management of this condition(s) and with the ongoing continuity of care of this condition(s).    Problem List Items Addressed This Visit as of 2/14/2024      Malignant neoplasm of lower lobe of right lung (H)    Lung cancer metastatic to bone (H) - Primary           ECOG Performance    0 - Independent    Distress Screening (within last 30 days)    No data recorded     Pain  Pain Score: Moderate Pain (5)  Pain Loc: Other - see comment (port)    Problem List    Patient Active Problem List   Diagnosis     Observation for suspected malignant neoplasm     Thyroid nodule     Chronic cough     Pulmonary nodules     Wheezing-associated respiratory infection (WARI)     Malignant neoplasm of lower lobe of right lung (H)     Postobstructive pneumonia     Lung cancer metastatic to bone (H)        ______________________________________________________________________________    History of Present Illness    Oncologist:   Angela    Diagnosis: Lung cancer, January 2024 came to ER with SOB.   -1/7/24: Mediastinal and right hilar adenopathy most suggestive of metastatic adenopathy from a primary lung malignancy. A nodular opacity in the right upper lobe could represent a primary lung malignancy versus an infectious/inflammatory focus. Recommend   referral to pulmonology for tissue sampling. Nichole conglomerate demonstrate mass effect on the right upper lobe bronchus and bronchus intermedius with associated narrowing without occlusion. Brain MRI negative.   -1/8/24: EBUS done and 4R, 11R and subcarinal nodes all positive for malignancy. NSCLC, favor adenocarcinoma. Right upper lobe nodule was also positive. Pleural fluid suspicious.   -1/29/24: PET: Findings suspicious for a right upper lobe primary lung neoplasm with extensive mediastinal/hilar lymph node metastases and osseous metastases to the T4 vertebral body and right sacrum.   Neogenomics: KRAS G12C mutation. Tp53 mutation. PTEN deletion +, MSI stable, PDL1 0%, TMB intermediate, Her2/leisa negative. Eveything else was negative.     Treatment:   -2/14/24: here today to start palliative treatment with Carboplatin, Alimta, Keytruda    Interim History:   To start treatment.  She states that she did not sleep at all last night because she is quite nervous and anxious to start.  She is worried about side effects.  She she states that her port is still pretty tender and it hurt when they accessed it this morning.  She has some questions about limitations with that.  She denies any new bone or back pain.  She states that her appetite and eating has gotten a little bit better lately but her son is still concerned that she is lost weight and is not eating and drinking well enough.  Patient denies any neuro changes.  Denies any infectious complaint         Review of Systems    Pertinent items are noted in HPI.    Past History    Past Medical History:   Diagnosis Date     Hypertension      SOB  "(shortness of breath)        PHYSICAL EXAM  BP (!) 163/83 (BP Location: Left arm, Patient Position: Sitting, Cuff Size: Adult Regular)   Pulse (!) 126   Temp 97.8  F (36.6  C) (Oral)   Resp 16   Ht 1.651 m (5' 5\")   Wt 54.7 kg (120 lb 9.6 oz)   SpO2 95%   BMI 20.07 kg/m      GENERAL: no acute distress. Cooperative in conversation. Here with son.   RESP: Regular respiratory rate. No expiratory wheezes   NEURO: non focal. Alert and oriented x3.   PSYCH: within normal limits. No depression or anxiety.  SKIN: exposed skin is dry intact. New port is reddened, tender. No signs of infection.     Lab Results    Recent Results (from the past 168 hour(s))   Comprehensive metabolic panel   Result Value Ref Range    Sodium 139 135 - 145 mmol/L    Potassium 3.8 3.4 - 5.3 mmol/L    Carbon Dioxide (CO2) 26 22 - 29 mmol/L    Anion Gap 13 7 - 15 mmol/L    Urea Nitrogen 13.1 8.0 - 23.0 mg/dL    Creatinine 0.73 0.51 - 0.95 mg/dL    GFR Estimate 86 >60 mL/min/1.73m2    Calcium 9.7 8.8 - 10.2 mg/dL    Chloride 100 98 - 107 mmol/L    Glucose 202 (H) 70 - 99 mg/dL    Alkaline Phosphatase 96 40 - 150 U/L    AST 21 0 - 45 U/L    ALT 16 0 - 50 U/L    Protein Total 7.4 6.4 - 8.3 g/dL    Albumin 4.2 3.5 - 5.2 g/dL    Bilirubin Total 0.2 <=1.2 mg/dL   TSH with free T4 reflex   Result Value Ref Range    TSH 0.09 (L) 0.30 - 4.20 uIU/mL   CBC with platelets and differential   Result Value Ref Range    WBC Count 8.1 4.0 - 11.0 10e3/uL    RBC Count 4.91 3.80 - 5.20 10e6/uL    Hemoglobin 14.2 11.7 - 15.7 g/dL    Hematocrit 43.8 35.0 - 47.0 %    MCV 89 78 - 100 fL    MCH 28.9 26.5 - 33.0 pg    MCHC 32.4 31.5 - 36.5 g/dL    RDW 14.0 10.0 - 15.0 %    Platelet Count 301 150 - 450 10e3/uL    % Neutrophils 86 %    % Lymphocytes 12 %    % Monocytes 2 %    % Eosinophils 0 %    % Basophils 0 %    % Immature Granulocytes 0 %    NRBCs per 100 WBC 0 <1 /100    Absolute Neutrophils 6.8 1.6 - 8.3 10e3/uL    Absolute Lymphocytes 1.0 0.8 - 5.3 10e3/uL    " Absolute Monocytes 0.2 0.0 - 1.3 10e3/uL    Absolute Eosinophils 0.0 0.0 - 0.7 10e3/uL    Absolute Basophils 0.0 0.0 - 0.2 10e3/uL    Absolute Immature Granulocytes 0.0 <=0.4 10e3/uL    Absolute NRBCs 0.0 10e3/uL   T4 free   Result Value Ref Range    Free T4 1.09 0.90 - 1.70 ng/dL       Imaging    IR Chest Port Placement > 5 Yrs of Age    Result Date: 2/12/2024  [place] RADIOLOGY LOCATION: [facility] DATE: 2/12/2024 PROCEDURES: 1. SUBCUTANEOUS IMPLANTED VENOUS ACCESS PORT. 2. ULTRASOUND GUIDANCE FOR VASCULAR ACCESS. 3. FLUOROSCOPIC GUIDANCE FOR CATHETER PLACEMENT. 4. MODERATE SEDATION INTERVENTIONAL RADIOLOGIST: Gorge Mireles MD INDICATION: Patient is a 73-year-old female the history of right upper lobe non-small cell lung cancer. The patient presents to Interventional Radiology for placement of a Port-A-Cath for long-term central venous access to allow for infusion and blood draws. CONSENT: The risks, benefits and alternatives of Port placement were discussed with the patient  in detail. All questions were answered. Informed consent was given to proceed with the procedure. MODERATE SEDATION: Versed 2.5 mg IV; Fentanyl 125 mcg IV. During the time out, immediately prior to the administration of medications, the patient was reassessed for adequacy to receive conscious sedation.  Under physician supervision, Versed and fentanyl were administered for moderate sedation. Pulse oximetry, heart rate and blood pressure were continuously monitored by an independent trained observer. The physician spent 16 minutes of face-to-face sedation time with the patient. CONTRAST: None. ANTIBIOTICS: 2 g of IV Ancef. ADDITIONAL MEDICATIONS: None. FLUOROSCOPIC TIME: 1.1 minutes. RADIATION DOSE: Air Kerma: 5 mGy. COMPLICATIONS: No immediate complications. STERILE BARRIER TECHNIQUE: Maximum sterile barrier technique was used. Cutaneous antisepsis was performed at the operative site with application of 2%  chlorhexidine and large sterile drape. Prior to the procedure, the  and assistant performed hand hygiene and wore hat, mask, sterile gown, and sterile gloves during the entire procedure. PROCEDURE: After discussing the procedure and risks, informed consent was obtained. Permanent ultrasound images were obtained of the left subclavian vein, documenting patency and compressibility. The left neck and upper chest were prepped and draped. After local anesthesia, the subclavian vein was punctured under direct ultrasound guidance, and a small dilator was placed. A short transverse skin incision was made below the clavicle, and a pocket was created for the port. A skin tunnel was created from the pocket to the vein entry site, and the catheter was pulled through the tunnel. The vein was dilated and the catheter inserted through a peel-away sheath, positioning the tip fluoroscopically at  the SVC/atriocaval junction. The catheter was cut to an appropriate length. The catheter was then attached to the port itself and the port was placed within the subcutaneous pocket. The port was flushed with heparin. The incision was closed with layered  absorbable suture and covered with Dermabond. The patient tolerated the procedure, and there were no immediate complications. FINDINGS: Ultrasound shows an anechoic and compressible subclavian vein. A permanent ultrasound image of this was saved. After placement, fluoroscopic spot images show that the tip of the catheter is at the SVC/atriocaval junction.     IMPRESSION:  1.  Uneventful venous access port placement. This port is power injector compatible.     PET Oncology (Eyes to Thighs)    Result Date: 1/30/2024  EXAM: PET ONCOLOGY (EYES TO THIGHS) LOCATION: [facility] DATE: 1/29/2024 INDICATION: Malignant neoplasm of upper lobe, right bronchus or lung. Initial treatment strategy. COMPARISON: CT 01/07/2024 CONTRAST: None TECHNIQUE: Serum glucose level 101  mg/dL. One hour post intravenous administration of 13.07 mCi F-18 FDG, PET imaging was performed from the skull vertex to mid thighs, utilizing attenuation correction with concurrent axial CT and PET/CT image fusion. Dose reduction techniques were used. PET/CT FINDINGS: Mildly FDG avid nodular avid opacity in the right upper lobe anteriorly abutting the pleura measuring 2.5 x 1.8 x 1.4 cm (SUV max 3.6) with intensely FDG avid right hilar station 10R (SUV max 17.9, right lower paratracheal station 4R (SUV max 16.1), left lower paratracheal station 4L (SUV max 9.6), subcarinal station 7 (SUV max 14.5) , right upper paratracheal station 2R (SUV max 5.8), and right anterior mediastinal (SUV max 3.8) lymphadenopathy, and osseous lesions involving the T4 vertebral body (SUV max 8.3) and right sacrum (SUV max 3.6) suspicious for a right upper lobe primary lung neoplasm with extensive mediastinal/hilar lymph node metastases and osseous metastases to the T4 vertebral body and right sacrum. CT FINDINGS: Mild senescent intracranial changes. Non-FDG avid left thyroid nodule suggesting benignity. Mild centrilobular emphysema. 5 mm nodular opacity in the right upper lobe laterally (series 2, image 162), not FDG avid but below PET resolution. Advanced coronary arterial calcification. Trace pericardial fluid. Distal colonic diverticulosis without acute diverticulitis. Non-FDG avid sclerosis in the right iliac bone. Multilevel degenerative changes in the spine. The lower extremities are unremarkable.     IMPRESSION: Findings suspicious for a right upper lobe primary lung neoplasm with extensive mediastinal/hilar lymph node metastases and osseous metastases to the T4 vertebral body and right sacrum.    MR Brain w/o & w Contrast    Result Date: 1/26/2024  EXAM: MR BRAIN W/O and W CONTRAST LOCATION: [facility] DATE: 1/26/2024 INDICATION:  Malignant neoplasm of lower lobe of right lung (H) COMPARISON: None.  "CONTRAST: 5 mL Gadavist TECHNIQUE: Routine multiplanar multisequence head MRI without and with intravenous contrast. FINDINGS: INTRACRANIAL CONTENTS: No acute or subacute infarct. No mass, acute hemorrhage, or extra-axial fluid collections. Scattered nonspecific T2/FLAIR hyperintensities within the cerebral white matter most consistent with mild chronic microvascular ischemic change. Mild generalized cerebral atrophy. No hydrocephalus. Normal position of the cerebellar tonsils. No pathologic contrast enhancement. SELLA: Empty sella morphology with flattening of the pituitary gland along the sellar floor. OSSEOUS STRUCTURES/SOFT TISSUES: Normal marrow signal. The major intracranial vascular flow voids are maintained. ORBITS: No abnormality accounting for technique. SINUSES/MASTOIDS: No paranasal sinus mucosal disease. Scattered fluid/membrane thickening in the right mastoid air cells. No apparent mass in the posterior nasopharynx or skull base.     IMPRESSION: 1.  No acute intracranial process or intracranial metastases. 2.  Generalized brain atrophy and presumed microvascular ischemic changes as detailed above.     Total time spent with patient in face to face time, chart review and documentation was 45 minutes.      Signed by: TANESHA Okeefe CNP    Oncology Rooming Note    February 14, 2024 9:31 AM   Inez Rodriguez is a 73 year old female who presents for:    Chief Complaint   Patient presents with     Oncology Clinic Visit     Return visit 2 weeks with lab and infusion. New Start. Lung cancer metastatic to bone.     Initial Vitals: BP (!) 163/83 (BP Location: Left arm, Patient Position: Sitting, Cuff Size: Adult Regular)   Pulse (!) 126   Temp 97.8  F (36.6  C) (Oral)   Resp 16   Ht 1.651 m (5' 5\")   Wt 54.7 kg (120 lb 9.6 oz)   SpO2 95%   BMI 20.07 kg/m   Estimated body mass index is 20.07 kg/m  as calculated from the following:    Height as of this encounter: 1.651 m (5' 5\").    Weight as of this " encounter: 54.7 kg (120 lb 9.6 oz). Body surface area is 1.58 meters squared.  Moderate Pain (5) Comment: Data Unavailable   No LMP recorded. Patient is premenopausal.  Allergies reviewed: Yes  Medications reviewed: Yes    Medications: Medication refills not needed today.  Pharmacy name entered into [facility]:    [id number] [address] [facility] [address] [facility] [address]    Frailty Screening:   Is the patient here for a new oncology consult visit in cancer care? 2. No      Clinical concerns: Return visit 2 weeks with lab and infusion. New Start. Lung cancer metastatic to bone. Has questions about supplements with starting chemo.      Malgorzata Fields CMA                Again, thank you for allowing me to participate in the care of your patient.        Sincerely,        TANESHA Okeefe CNP

## 2024-02-16 ENCOUNTER — PATIENT OUTREACH (OUTPATIENT)
Dept: ONCOLOGY | Facility: HOSPITAL | Age: 74
End: 2024-02-16
Payer: MEDICARE

## 2024-02-16 DIAGNOSIS — C34.31 MALIGNANT NEOPLASM OF LOWER LOBE OF RIGHT LUNG (H): ICD-10-CM

## 2024-02-16 DIAGNOSIS — C79.51 LUNG CANCER METASTATIC TO BONE (H): Primary | ICD-10-CM

## 2024-02-16 DIAGNOSIS — C34.90 LUNG CANCER METASTATIC TO BONE (H): Primary | ICD-10-CM

## 2024-02-16 DIAGNOSIS — J18.9 POSTOBSTRUCTIVE PNEUMONIA: ICD-10-CM

## 2024-02-16 NOTE — PROGRESS NOTES
[facility]: Cancer Care Follow-Up Note                                    Discussion/assessment with Patient:                                                      Call placed to patient today after she received her first chemotherapy earlier this week.  She states that she is overall doing okay.  Her wheezing and cough has gotten a little worse again.  She states that when she had pneumonia is when this was at its worst.  She states that they prescribed her nebulizer liquid but never prescribed her the actual nebulizer.  I went ahead and put in a order for that and told her to call [facility] medical at [phone].  They will be able to see the orders from our system.  She does have an inhaler at home as well.  She also has cough medicine with codeine.  I told her it was okay to use both of these as directed on the labels.  She is also having some acid reflux.  She usually takes Quyen-Brumley for this.  I told her that was just fine to take when needed.     Goals          General    Maintain ability to perform ADLs without difficulty             Dates of Treatment:                                                      Infusion given in last 28 days       Administered MAR Action Medication Dose Rate Visit    02/14/2024 11:23 New Bag pembrolizumab (KEYTRUDA) 200 mg in sodium chloride 0.9 % 63 mL infusion 200 mg 126 mL/hr Infusion Therapy Visit on 02/14/2024 in [facility]    02/14/2024 11:58 New Bag PEMEtrexed (ALIMTA) 795 mg in sodium chloride 0.9 % 141.8 mL infusion 795 mg 850.8 mL/hr Infusion Therapy Visit on 02/14/2024 in [facility]    02/14/2024 12:19 New Bag CARBOplatin 420 mg in sodium chloride 0.9 % 317 mL infusion 420 mg 634 mL/hr Infusion Therapy Visit on 02/14/2024 in [facility]          Intervention/Education provided during outreach:                                                       Patient did not  need anything further at this time.  They are aware of what number to call if anything comes up.  Will continue to follow-up with her that she goes through treatment.    Patient to follow up as scheduled at next appt  Patient to call/[employer] message with updates  Confirmed patient has clinic and triage numbers    Signature:  María Casillas RN

## 2024-02-29 ENCOUNTER — NURSE TRIAGE (OUTPATIENT)
Dept: NURSING | Facility: CLINIC | Age: 74
End: 2024-02-29
Payer: MEDICARE

## 2024-02-29 ENCOUNTER — HOSPITAL ENCOUNTER (EMERGENCY)
Facility: HOSPITAL | Age: 74
Discharge: HOME | End: 2024-02-29
Attending: EMERGENCY MEDICINE | Admitting: EMERGENCY MEDICINE
Payer: MEDICARE

## 2024-02-29 VITALS
HEART RATE: 91 BPM | SYSTOLIC BLOOD PRESSURE: 141 MMHG | DIASTOLIC BLOOD PRESSURE: 67 MMHG | TEMPERATURE: 97.8 F | RESPIRATION RATE: 22 BRPM | HEIGHT: 65 IN | BODY MASS INDEX: 19.99 KG/M2 | WEIGHT: 120 LBS | OXYGEN SATURATION: 94 %

## 2024-02-29 DIAGNOSIS — M62.830 SPASM OF THORACIC BACK MUSCLE: ICD-10-CM

## 2024-02-29 LAB
ALBUMIN SERPL BCG-MCNC: 3.6 G/DL (ref 3.5–5.2)
ALP SERPL-CCNC: 79 U/L (ref 40–150)
ALT SERPL W P-5'-P-CCNC: 27 U/L (ref 0–50)
ANION GAP SERPL CALCULATED.3IONS-SCNC: 10 MMOL/L (ref 7–15)
AST SERPL W P-5'-P-CCNC: 29 U/L (ref 0–45)
BASOPHILS # BLD AUTO: 0 10E3/UL (ref 0–0.2)
BASOPHILS NFR BLD AUTO: 0 %
BILIRUB SERPL-MCNC: <0.2 MG/DL
BUN SERPL-MCNC: 13.1 MG/DL (ref 8–23)
CALCIUM SERPL-MCNC: 8.9 MG/DL (ref 8.8–10.2)
CHLORIDE SERPL-SCNC: 99 MMOL/L (ref 98–107)
CREAT SERPL-MCNC: 0.82 MG/DL (ref 0.51–0.95)
DEPRECATED HCO3 PLAS-SCNC: 29 MMOL/L (ref 22–29)
EGFRCR SERPLBLD CKD-EPI 2021: 75 ML/MIN/1.73M2
EOSINOPHIL # BLD AUTO: 0.1 10E3/UL (ref 0–0.7)
EOSINOPHIL NFR BLD AUTO: 1 %
ERYTHROCYTE [DISTWIDTH] IN BLOOD BY AUTOMATED COUNT: 14.2 % (ref 10–15)
GLUCOSE SERPL-MCNC: 127 MG/DL (ref 70–99)
HCT VFR BLD AUTO: 35.6 % (ref 35–47)
HGB BLD-MCNC: 11.8 G/DL (ref 11.7–15.7)
IMM GRANULOCYTES # BLD: 0 10E3/UL
IMM GRANULOCYTES NFR BLD: 0 %
LYMPHOCYTES # BLD AUTO: 2 10E3/UL (ref 0.8–5.3)
LYMPHOCYTES NFR BLD AUTO: 39 %
MCH RBC QN AUTO: 29.9 PG (ref 26.5–33)
MCHC RBC AUTO-ENTMCNC: 33.1 G/DL (ref 31.5–36.5)
MCV RBC AUTO: 90 FL (ref 78–100)
MONOCYTES # BLD AUTO: 0.6 10E3/UL (ref 0–1.3)
MONOCYTES NFR BLD AUTO: 11 %
NEUTROPHILS # BLD AUTO: 2.5 10E3/UL (ref 1.6–8.3)
NEUTROPHILS NFR BLD AUTO: 49 %
NRBC # BLD AUTO: 0 10E3/UL
NRBC BLD AUTO-RTO: 0 /100
PLATELET # BLD AUTO: 297 10E3/UL (ref 150–450)
POTASSIUM SERPL-SCNC: 3.9 MMOL/L (ref 3.4–5.3)
PROT SERPL-MCNC: 6.2 G/DL (ref 6.4–8.3)
RBC # BLD AUTO: 3.94 10E6/UL (ref 3.8–5.2)
SODIUM SERPL-SCNC: 138 MMOL/L (ref 135–145)
WBC # BLD AUTO: 5.3 10E3/UL (ref 4–11)

## 2024-02-29 PROCEDURE — 36415 COLL VENOUS BLD VENIPUNCTURE: CPT | Performed by: EMERGENCY MEDICINE

## 2024-02-29 PROCEDURE — 99284 EMERGENCY DEPT VISIT MOD MDM: CPT | Mod: 25

## 2024-02-29 PROCEDURE — 85025 COMPLETE CBC W/AUTO DIFF WBC: CPT | Performed by: EMERGENCY MEDICINE

## 2024-02-29 PROCEDURE — 80053 COMPREHEN METABOLIC PANEL: CPT | Performed by: EMERGENCY MEDICINE

## 2024-02-29 PROCEDURE — 250N000011 HC RX IP 250 OP 636: Performed by: EMERGENCY MEDICINE

## 2024-02-29 PROCEDURE — 250N000013 HC RX MED GY IP 250 OP 250 PS 637: Performed by: EMERGENCY MEDICINE

## 2024-02-29 PROCEDURE — 96374 THER/PROPH/DIAG INJ IV PUSH: CPT

## 2024-02-29 RX ORDER — DIAZEPAM 2 MG
2 TABLET ORAL EVERY 8 HOURS PRN
Qty: 10 TABLET | Refills: 0 | Status: SHIPPED | OUTPATIENT
Start: 2024-02-29 | End: 2024-07-30

## 2024-02-29 RX ORDER — HYDROMORPHONE HYDROCHLORIDE 1 MG/ML
0.5 INJECTION, SOLUTION INTRAMUSCULAR; INTRAVENOUS; SUBCUTANEOUS
Status: COMPLETED | OUTPATIENT
Start: 2024-02-29 | End: 2024-02-29

## 2024-02-29 RX ORDER — DIAZEPAM 2 MG
2 TABLET ORAL ONCE
Status: COMPLETED | OUTPATIENT
Start: 2024-02-29 | End: 2024-02-29

## 2024-02-29 RX ORDER — ONDANSETRON 2 MG/ML
4 INJECTION INTRAMUSCULAR; INTRAVENOUS EVERY 30 MIN PRN
Status: DISCONTINUED | OUTPATIENT
Start: 2024-02-29 | End: 2024-02-29 | Stop reason: HOSPADM

## 2024-02-29 RX ADMIN — DIAZEPAM 2 MG: 2 TABLET ORAL at 02:23

## 2024-02-29 RX ADMIN — HYDROMORPHONE HYDROCHLORIDE 0.5 MG: 1 INJECTION, SOLUTION INTRAMUSCULAR; INTRAVENOUS; SUBCUTANEOUS at 01:07

## 2024-02-29 ASSESSMENT — ACTIVITIES OF DAILY LIVING (ADL)
ADLS_ACUITY_SCORE: 36

## 2024-02-29 ASSESSMENT — ENCOUNTER SYMPTOMS: BACK PAIN: 1

## 2024-02-29 ASSESSMENT — COLUMBIA-SUICIDE SEVERITY RATING SCALE - C-SSRS
1. IN THE PAST MONTH, HAVE YOU WISHED YOU WERE DEAD OR WISHED YOU COULD GO TO SLEEP AND NOT WAKE UP?: NO
2. HAVE YOU ACTUALLY HAD ANY THOUGHTS OF KILLING YOURSELF IN THE PAST MONTH?: NO
6. HAVE YOU EVER DONE ANYTHING, STARTED TO DO ANYTHING, OR PREPARED TO DO ANYTHING TO END YOUR LIFE?: NO

## 2024-02-29 NOTE — ED TRIAGE NOTES
The patient reports a history of spasms in her right lower back. This instance of spasms started 48 hours ago. The pain has never been this bad. She had a recent diagnosis of lung and spine cancer.

## 2024-02-29 NOTE — ED PROVIDER NOTES
NAME: Inez Rodriguez  AGE: 73 year old female  YOB: 1950  MRN: [id number]  EVALUATION DATE & TIME: 2024 12:36 AM    PCP: Lynn Bowles    ED PROVIDER: Deshawn Rodriguez M.D.      Chief Complaint   Patient presents with    Back Pain         FINAL IMPRESSION:  1. Spasm of thoracic back muscle        MEDICAL DECISION MAKIN:49 AM Patient was clinically assessed and consented to treatment. After assessment, medical decision making and workup were discussed with the patient. The patient was agreeable to plan for testing, workup, and treatment.  Pertinent Labs & Imaging studies reviewed. (See chart for details)       Medical Decision Making  Obtained supplemental history:Supplemental history obtained?: Documented in chart  Reviewed external records: External records reviewed?: Documented in chart and Outpatient Record: [facility] on 2024  Care impacted by chronic illness:Cancer/Chemotherapy and Hypertension  Care significantly affected by social determinants of health:Access to Medical Care  Did you consider but not order tests?: Work up considered but not performed and documented in chart, if applicable  Did you interpret images independently?: Independent interpretation of ECG and images noted in documentation, when applicable.  Consultation discussion with other provider:Did you involve another provider (consultant, , pharmacy, etc.)?: No  Discharge. I prescribed additional prescription strength medication(s) as charted. See documentation for any additional details.    Inez Rodriguez is a 73 year old female who presents with back pain and spasm.   Differential diagnosis includes but not limited to thoracic back spasms, thoracic back pain, kidney stones, compression fracture.  Patient with no falls but does have history of cancer and just had chemotherapy.  Patient reports a history of thoracic back spasms which she usually able to control  with massage and heat packs.  Patient has been trying this at home with help from family.  Patient however has not had no relief and over-the-counter pain medication does not help.  I discussed with patient possible kidney stones which she reports no blood in the urine and no history of them.  She also just recently had CT scan and PET scan that did not show any stones in the kidneys.  Upon discussion with patient she would just like treatment but reporting significant spasms would like an injection.  IV was established and patient did have labs sent.  They did not show any leukocytosis or significant kidney abnormality.  Patient was given a single dose of Dilaudid which block the pain.  She did feel some tightness there still but the pain and spasms were significant improved.  Patient was given an oral dose of Valium subsequent which relax the muscles further and after only 30 minutes on the Valium patient would like to discharge.  Patient would like the muscle like to go home on and will stop taking the Flexeril which she has been given in the past.  I did prescribe her Valium 2.5 mg to be taken at home and following discussion with patient and her family she will be discharged to follow-up with her primary doctor as needed.    0 minutes of critical care time    MEDICATIONS GIVEN IN THE EMERGENCY:  Medications   HYDROmorphone (PF) (DILAUDID) injection 0.5 mg (0.5 mg Intravenous $Given 2/29/24 0107)   diazepam (VALIUM) tablet 2 mg (2 mg Oral $Given 2/29/24 0223)       NEW PRESCRIPTIONS STARTED AT TODAY'S ER VISIT:  Discharge Medication List as of 2/29/2024  3:06 AM        START taking these medications    Details   diazepam (VALIUM) 2 MG tablet Take 1 tablet (2 mg) by mouth every 8 hours as needed for muscle spasms or pain, Disp-10 tablet, R-0, Local Print                =================================================================    HPI    Patient information was obtained from: The patient    Use of  : N/A         Inez Rodriguez is a 73 year old female with a past medical history of HTN, Malignant neoplasm of lower lobe of right lung, lung cancer metastatic to bone, who presents with back pain.    Per chart review, the patient was seen at [facility] on 01/31/2024. She has advanced state IV non-small cell lung cancer that primarily seems to be coming form the right upper lobe. She has mediastinal lymph nodes as well as couple of osseous metastases 1 in the T4 vertebral body and 1 in the sacrum. Molecular testing does show that this is K-tray G12C mutated tumor.     The patient reports she has been experiencing right-sided back spasms since attending her first chemotherapy session on 02/14 (15 days ago). Her back spasms has progressively worsened since. She has been taking Flexeril with no significant relief. She has had back spasms before, but not as severe as tonight. The patient has lung cancer metastatic to bone.    Otherwise, the patient denied any other medical complaints or concerns at this time.      REVIEW OF SYSTEMS   Review of Systems   Musculoskeletal:  Positive for back pain (right sided back spasms).   All other systems reviewed and are negative.       PAST MEDICAL HISTORY:  Past Medical History:   Diagnosis Date    Hypertension     SOB (shortness of breath)        PAST SURGICAL HISTORY:  Past Surgical History:   Procedure Laterality Date    BRONCHOSCOPY RIGID OR FLEXIBLE W/TRANSENDOSCOPIC ENDOBRONCHIAL ULTRASOUND GUIDED N/A 1/8/2024    Procedure: BRONCHOSCOPY, WITH ENDOBRONCHIAL ULTRASOUND WITH FINE NEEDLE ASPIRATION OF LYMPH NODE;  Surgeon: Angelia Mustafa MD;  Location: [facility] OR     CHEST PORT PLACEMENT > 5 YRS OF AGE  2/12/2024    TONSILLECTOMY      at 8 years of age       CURRENT MEDICATIONS:    No current facility-administered medications for this encounter.    Current Outpatient Medications:     diazepam (VALIUM) 2 MG tablet, Take 1 tablet (2  mg) by mouth every 8 hours as needed for muscle spasms or pain, Disp: 10 tablet, Rfl: 0    albuterol (PROAIR HFA/PROVENTIL HFA/VENTOLIN HFA) 108 (90 Base) MCG/ACT inhaler, Inhale 2 puffs into the lungs every 6 hours as needed for shortness of breath, wheezing or cough, Disp: 18 g, Rfl: 0    amLODIPine (NORVASC) 5 MG tablet, Take 1 tablet (5 mg) by mouth daily, Disp: 30 tablet, Rfl: 0    BETA GLUCAN PO, Take 1,000 mg by mouth daily (Patient not taking: Reported on 2/14/2024), Disp: , Rfl:     CITICOLINE PO, Take 250 mg by mouth daily (Patient not taking: Reported on 1/31/2024), Disp: , Rfl:     cyclobenzaprine (FLEXERIL) 5 MG tablet, Take 1 tablet (5 mg) by mouth 2 times daily as needed for muscle spasms (Patient not taking: Reported on 2/14/2024), Disp: 30 tablet, Rfl: 0    dexAMETHasone (DECADRON) 4 MG tablet, Take 1 tablet (4 mg) by mouth 2 times daily (with meals) Start one day prior to Pemetrexed (Alimta), continue on the day of treatment, and the day following Pemetrexed., Disp: 6 tablet, Rfl: 3    folic acid (FOLVITE) 1 MG tablet, Take 1 tablet (1,000 mcg) by mouth daily Begin 7 days before Pemetrexed (Alimta) starts and continue for 21 days after Pemetrexed is discontinued., Disp: 30 tablet, Rfl: 4    Inositol Niacinate (NIACIN FLUSH FREE PO), Take 1 capsule by mouth daily, Disp: , Rfl:     lidocaine-prilocaine (EMLA) 2.5-2.5 % external cream, Place a nickel sized dollop over port 30-45 minutes prior to port access, Disp: 30 g, Rfl: 2    Mag Aspart-Potassium Aspart (POTASSIUM & MAGNESIUM ASPARTAT PO), Take 2 capsules by mouth daily (Patient not taking: Reported on 2/14/2024), Disp: , Rfl:     METHYLCOBALAMIN PO, Place 1 mg under the tongue daily (Patient not taking: Reported on 1/19/2024), Disp: , Rfl:     Methylsulfonylmethane (MSM) 1000 MG TABS, Take 2 tablets by mouth daily (Patient not taking: Reported on 2/14/2024), Disp: , Rfl:     multivitamin w/minerals (THERA-VIT-M) tablet, Take 1 tablet by mouth  daily, Disp: , Rfl:     ondansetron (ZOFRAN) 8 MG tablet, Take 1 tablet (8 mg) by mouth every 8 hours as needed (Nausea/Vomiting), Disp: 30 tablet, Rfl: 2    prochlorperazine (COMPAZINE) 10 MG tablet, Take 1 tablet (10 mg) by mouth every 6 hours as needed (Nausea/Vomiting), Disp: 30 tablet, Rfl: 2    Vitamin D3 (CHOLECALCIFEROL) 125 MCG (5000 UT) tablet, Take 125 mcg by mouth daily (Patient not taking: Reported on 2/14/2024), Disp: , Rfl:     ALLERGIES:  No Known Allergies    FAMILY HISTORY:  No family history on file.    SOCIAL HISTORY:   Social History     Socioeconomic History    Marital status:    Tobacco Use    Smoking status: Never    Smokeless tobacco: Never   Substance and Sexual Activity    Alcohol use: Never     Social Determinants of Health     Financial Resource Strain: Low Risk  (1/11/2024)    Financial Resource Strain     Within the past 12 months, have you or your family members you live with been unable to get utilities (heat, electricity) when it was really needed?: No   Food Insecurity: Low Risk  (1/11/2024)    Food Insecurity     Within the past 12 months, did you worry that your food would run out before you got money to buy more?: No     Within the past 12 months, did the food you bought just not last and you didn t have money to get more?: No   Transportation Needs: Low Risk  (1/11/2024)    Transportation Needs     Within the past 12 months, has lack of transportation kept you from medical appointments, getting your medicines, non-medical meetings or appointments, work, or from getting things that you need?: No   Interpersonal Safety: Low Risk  (1/11/2024)    Interpersonal Safety     Do you feel physically and emotionally safe where you currently live?: Yes     Within the past 12 months, have you been hit, slapped, kicked or otherwise physically hurt by someone?: No     Within the past 12 months, have you been humiliated or emotionally abused in other ways by your partner or ex-partner?:  "No   Housing Stability: Low Risk  (1/11/2024)    Housing Stability     Do you have housing? : Yes     Are you worried about losing your housing?: No       PHYSICAL EXAM:    Vitals: BP (!) 141/67   Pulse 91   Temp 97.8  F (36.6  C) (Oral)   Resp 22   Ht 1.651 m (5' 5\")   Wt 54.4 kg (120 lb)   SpO2 94%   BMI 19.97 kg/m     Physical Exam  Vitals and nursing note reviewed.   Constitutional:       General: She is not in acute distress.     Appearance: Normal appearance. She is normal weight.   HENT:      Head: Normocephalic.   Cardiovascular:      Rate and Rhythm: Normal rate and regular rhythm.      Heart sounds: Normal heart sounds.   Pulmonary:      Effort: Pulmonary effort is normal. No respiratory distress.      Breath sounds: Normal breath sounds.   Abdominal:      Palpations: Abdomen is soft.      Tenderness: There is no abdominal tenderness. There is no right CVA tenderness, left CVA tenderness, guarding or rebound.   Musculoskeletal:         General: Tenderness present.      Cervical back: Normal range of motion.      Thoracic back: Swelling, spasms and tenderness present. No bony tenderness.        Back:    Skin:     General: Skin is warm and dry.      Findings: No erythema.   Neurological:      General: No focal deficit present.      Mental Status: She is alert.   Psychiatric:         Behavior: Behavior normal.           LAB:  All pertinent labs reviewed and interpreted.  Labs Ordered and Resulted from Time of ED Arrival to Time of ED Departure   COMPREHENSIVE METABOLIC PANEL - Abnormal       Result Value    Sodium 138      Potassium 3.9      Carbon Dioxide (CO2) 29      Anion Gap 10      Urea Nitrogen 13.1      Creatinine 0.82      GFR Estimate 75      Calcium 8.9      Chloride 99      Glucose 127 (*)     Alkaline Phosphatase 79      AST 29      ALT 27      Protein Total 6.2 (*)     Albumin 3.6      Bilirubin Total <0.2     CBC WITH PLATELETS AND DIFFERENTIAL    WBC Count 5.3      RBC Count 3.94      " Hemoglobin 11.8      Hematocrit 35.6      MCV 90      MCH 29.9      MCHC 33.1      RDW 14.2      Platelet Count 297      % Neutrophils 49      % Lymphocytes 39      % Monocytes 11      % Eosinophils 1      % Basophils 0      % Immature Granulocytes 0      NRBCs per 100 WBC 0      Absolute Neutrophils 2.5      Absolute Lymphocytes 2.0      Absolute Monocytes 0.6      Absolute Eosinophils 0.1      Absolute Basophils 0.0      Absolute Immature Granulocytes 0.0      Absolute NRBCs 0.0         RADIOLOGY:  No orders to display       EKG:   None    PROCEDURES:   Procedures       I, Ace Ochoa, am serving as a scribe to document services personally performed by Dr. Deshawn Rodriguez  based on my observation and the provider's statements to me. I, Deshawn Rodriguez MD attest that Ace Ochoa is acting in a scribe capacity, has observed my performance of the services and has documented them in accordance with my direction.      Deshawn Rodriguez M.D.  Emergency Medicine  [facility] Emergency Department       Deshawn Rodriguez MD  02/29/24 0815

## 2024-02-29 NOTE — TELEPHONE ENCOUNTER
Patient's son Kenneth calling with patient giving verbal consent to communicate. Reports the patient has had spasms over the past few nights with pain typically improving with patient reporting pain is worse than child birth at 10/10 with patient screaming out in pain. Tried medication with no relief. Denies passing out and shock. Advised patient be evaluated in the ER with Kenneth agreeable.     María Ashley RN 02/29/24 12:16 AM    Health Triage Nurse Advisor    Reason for Disposition   [1] SEVERE back pain (e.g., excruciating) AND [2] sudden onset AND [3] age > 60 years    Additional Information   Negative: Passed out (i.e., lost consciousness, collapsed and was not responding)   Negative: Shock suspected (e.g., cold/pale/clammy skin, too weak to stand, low BP, rapid pulse)   Negative: Sounds like a life-threatening emergency to the triager   Negative: Major injury to the back (e.g., MVA, fall > 10 feet or 3 meters, penetrating injury, etc.)   Negative: Followed a tailbone injury   Negative: [1] Pain in the upper back over the ribs (rib cage) AND [2] radiates (travels, goes) into chest   Negative: [1] Pain in the upper back over the ribs (rib cage) AND [2] worsened by coughing (or clearly increases with breathing)   Negative: Back pain during pregnancy   Negative: Pain mainly in flank (i.e., in the side, over the lower ribs or just below the ribs)    Protocols used: Back Pain-A-

## 2024-03-01 ENCOUNTER — OFFICE VISIT (OUTPATIENT)
Dept: FAMILY MEDICINE | Facility: CLINIC | Age: 74
End: 2024-03-01
Payer: MEDICARE

## 2024-03-01 VITALS
RESPIRATION RATE: 16 BRPM | OXYGEN SATURATION: 97 % | HEIGHT: 65 IN | WEIGHT: 123.9 LBS | SYSTOLIC BLOOD PRESSURE: 138 MMHG | TEMPERATURE: 97.5 F | DIASTOLIC BLOOD PRESSURE: 80 MMHG | BODY MASS INDEX: 20.64 KG/M2 | HEART RATE: 95 BPM

## 2024-03-01 DIAGNOSIS — M54.89 OTHER ACUTE BACK PAIN: ICD-10-CM

## 2024-03-01 DIAGNOSIS — Z09 HOSPITAL DISCHARGE FOLLOW-UP: Primary | ICD-10-CM

## 2024-03-01 DIAGNOSIS — Z23 NEED FOR VACCINATION: ICD-10-CM

## 2024-03-01 PROCEDURE — 99213 OFFICE O/P EST LOW 20 MIN: CPT | Performed by: FAMILY MEDICINE

## 2024-03-01 RX ORDER — RESPIRATORY SYNCYTIAL VIRUS VACCINE 120MCG/0.5
0.5 KIT INTRAMUSCULAR ONCE
Qty: 1 EACH | Refills: 0 | Status: CANCELLED | OUTPATIENT
Start: 2024-03-01 | End: 2024-03-01

## 2024-03-01 NOTE — PROGRESS NOTES
"  Assessment & Plan   Problem List Items Addressed This Visit    None  Visit Diagnoses       ER discharge follow-up    -  Primary    acute right sided back pain        Musculoskeletal pain    Relevant Orders    Physical Therapy  Referral    Need for vaccination        Care gap vaccines were reviewed.   was deferred                  MED REC REQUIRED  Post Medication Reconciliation Status: discharge medications reconciled and changed, per note/orders        Subjective   Inez is a 73 year old, presenting for the following health issues:  Hospital F/U (Back pain at [facility] on 2/29/24)        3/1/2024    11:59 AM   Additional Questions   Roomed by Benito RANGEL   Accompanied by Son     HPI     Patient was seen for thoracic back pain which is usually amendable to heat and massaging, but not at this time and was not getting a relief with taking the over-the-counter medication.  Her workup was unremarkable she was treated with medication and discharged home on brief course of Valium.    ED/UC Followup:    Facility:  [facility]  Date of visit: 2/29/24  Reason for visit: Back pain  Current Status: Better          Objective    /80   Pulse 95   Temp 97.5  F (36.4  C) (Oral)   Resp 16   Ht 1.651 m (5' 5\")   Wt 56.2 kg (123 lb 14.4 oz)   SpO2 97%   BMI 20.62 kg/m    Body mass index is 20.62 kg/m .    Physical Exam   Back: palpable tenderness to the right side ( upper and lower )             Signed Electronically by: Jose Antonio Hinton MD    "

## 2024-03-03 ENCOUNTER — MYC REFILL (OUTPATIENT)
Dept: INTERNAL MEDICINE | Facility: CLINIC | Age: 74
End: 2024-03-03
Payer: MEDICARE

## 2024-03-03 DIAGNOSIS — I10 BENIGN ESSENTIAL HYPERTENSION: ICD-10-CM

## 2024-03-04 ENCOUNTER — PATIENT OUTREACH (OUTPATIENT)
Dept: ONCOLOGY | Facility: HOSPITAL | Age: 74
End: 2024-03-04

## 2024-03-04 ENCOUNTER — ONCOLOGY VISIT (OUTPATIENT)
Dept: ONCOLOGY | Facility: HOSPITAL | Age: 74
End: 2024-03-04
Payer: MEDICARE

## 2024-03-04 ENCOUNTER — INFUSION THERAPY VISIT (OUTPATIENT)
Dept: INFUSION THERAPY | Facility: HOSPITAL | Age: 74
End: 2024-03-04
Attending: INTERNAL MEDICINE
Payer: MEDICARE

## 2024-03-04 VITALS
HEIGHT: 65 IN | OXYGEN SATURATION: 98 % | TEMPERATURE: 97.3 F | RESPIRATION RATE: 24 BRPM | WEIGHT: 123.9 LBS | DIASTOLIC BLOOD PRESSURE: 81 MMHG | SYSTOLIC BLOOD PRESSURE: 173 MMHG | BODY MASS INDEX: 20.64 KG/M2 | HEART RATE: 114 BPM

## 2024-03-04 DIAGNOSIS — C34.90 LUNG CANCER METASTATIC TO BONE (H): Primary | ICD-10-CM

## 2024-03-04 DIAGNOSIS — C79.51 LUNG CANCER METASTATIC TO BONE (H): Primary | ICD-10-CM

## 2024-03-04 DIAGNOSIS — C34.31 MALIGNANT NEOPLASM OF LOWER LOBE OF RIGHT LUNG (H): ICD-10-CM

## 2024-03-04 LAB
ACID FAST STAIN (ARUP): NORMAL
ALBUMIN SERPL BCG-MCNC: 4.2 G/DL (ref 3.5–5.2)
ALP SERPL-CCNC: 104 U/L (ref 40–150)
ALT SERPL W P-5'-P-CCNC: 54 U/L (ref 0–50)
ANION GAP SERPL CALCULATED.3IONS-SCNC: 14 MMOL/L (ref 7–15)
AST SERPL W P-5'-P-CCNC: 44 U/L (ref 0–45)
BASOPHILS # BLD AUTO: 0 10E3/UL (ref 0–0.2)
BASOPHILS NFR BLD AUTO: 0 %
BILIRUB SERPL-MCNC: <0.2 MG/DL
BUN SERPL-MCNC: 16.5 MG/DL (ref 8–23)
CALCIUM SERPL-MCNC: 9.6 MG/DL (ref 8.8–10.2)
CHLORIDE SERPL-SCNC: 101 MMOL/L (ref 98–107)
CREAT SERPL-MCNC: 0.73 MG/DL (ref 0.51–0.95)
DEPRECATED HCO3 PLAS-SCNC: 26 MMOL/L (ref 22–29)
EGFRCR SERPLBLD CKD-EPI 2021: 86 ML/MIN/1.73M2
EOSINOPHIL # BLD AUTO: 0 10E3/UL (ref 0–0.7)
EOSINOPHIL NFR BLD AUTO: 0 %
ERYTHROCYTE [DISTWIDTH] IN BLOOD BY AUTOMATED COUNT: 14.7 % (ref 10–15)
GLUCOSE SERPL-MCNC: 172 MG/DL (ref 70–99)
HCT VFR BLD AUTO: 40.8 % (ref 35–47)
HGB BLD-MCNC: 13 G/DL (ref 11.7–15.7)
IMM GRANULOCYTES # BLD: 0.1 10E3/UL
IMM GRANULOCYTES NFR BLD: 1 %
LYMPHOCYTES # BLD AUTO: 1.3 10E3/UL (ref 0.8–5.3)
LYMPHOCYTES NFR BLD AUTO: 20 %
MCH RBC QN AUTO: 29.2 PG (ref 26.5–33)
MCHC RBC AUTO-ENTMCNC: 31.9 G/DL (ref 31.5–36.5)
MCV RBC AUTO: 92 FL (ref 78–100)
MONOCYTES # BLD AUTO: 0.5 10E3/UL (ref 0–1.3)
MONOCYTES NFR BLD AUTO: 8 %
NEUTROPHILS # BLD AUTO: 4.7 10E3/UL (ref 1.6–8.3)
NEUTROPHILS NFR BLD AUTO: 71 %
NRBC # BLD AUTO: 0 10E3/UL
NRBC BLD AUTO-RTO: 0 /100
PLATELET # BLD AUTO: 504 10E3/UL (ref 150–450)
POTASSIUM SERPL-SCNC: 3.8 MMOL/L (ref 3.4–5.3)
PROT SERPL-MCNC: 7.4 G/DL (ref 6.4–8.3)
RBC # BLD AUTO: 4.45 10E6/UL (ref 3.8–5.2)
SODIUM SERPL-SCNC: 141 MMOL/L (ref 135–145)
T4 FREE SERPL-MCNC: 0.95 NG/DL (ref 0.9–1.7)
TSH SERPL DL<=0.005 MIU/L-ACNC: 0.12 UIU/ML (ref 0.3–4.2)
WBC # BLD AUTO: 6.7 10E3/UL (ref 4–11)

## 2024-03-04 PROCEDURE — 96367 TX/PROPH/DG ADDL SEQ IV INF: CPT

## 2024-03-04 PROCEDURE — 96413 CHEMO IV INFUSION 1 HR: CPT

## 2024-03-04 PROCEDURE — 36591 DRAW BLOOD OFF VENOUS DEVICE: CPT | Performed by: NURSE PRACTITIONER

## 2024-03-04 PROCEDURE — 84443 ASSAY THYROID STIM HORMONE: CPT | Performed by: NURSE PRACTITIONER

## 2024-03-04 PROCEDURE — 258N000003 HC RX IP 258 OP 636: Performed by: NURSE PRACTITIONER

## 2024-03-04 PROCEDURE — 99214 OFFICE O/P EST MOD 30 MIN: CPT | Performed by: NURSE PRACTITIONER

## 2024-03-04 PROCEDURE — G2211 COMPLEX E/M VISIT ADD ON: HCPCS | Performed by: NURSE PRACTITIONER

## 2024-03-04 PROCEDURE — 80053 COMPREHEN METABOLIC PANEL: CPT | Performed by: NURSE PRACTITIONER

## 2024-03-04 PROCEDURE — 96417 CHEMO IV INFUS EACH ADDL SEQ: CPT

## 2024-03-04 PROCEDURE — 250N000011 HC RX IP 250 OP 636: Mod: JZ | Performed by: NURSE PRACTITIONER

## 2024-03-04 PROCEDURE — 84439 ASSAY OF FREE THYROXINE: CPT | Performed by: NURSE PRACTITIONER

## 2024-03-04 PROCEDURE — G0463 HOSPITAL OUTPT CLINIC VISIT: HCPCS | Mod: 25 | Performed by: NURSE PRACTITIONER

## 2024-03-04 PROCEDURE — 85025 COMPLETE CBC W/AUTO DIFF WBC: CPT | Performed by: NURSE PRACTITIONER

## 2024-03-04 PROCEDURE — 96375 TX/PRO/DX INJ NEW DRUG ADDON: CPT

## 2024-03-04 RX ORDER — LORAZEPAM 2 MG/ML
0.5 INJECTION INTRAMUSCULAR EVERY 4 HOURS PRN
Status: CANCELLED | OUTPATIENT
Start: 2024-03-04

## 2024-03-04 RX ORDER — EPINEPHRINE 1 MG/ML
0.3 INJECTION, SOLUTION INTRAMUSCULAR; SUBCUTANEOUS EVERY 5 MIN PRN
Status: CANCELLED | OUTPATIENT
Start: 2024-03-04

## 2024-03-04 RX ORDER — ALBUTEROL SULFATE 90 UG/1
1-2 AEROSOL, METERED RESPIRATORY (INHALATION)
Status: DISCONTINUED | OUTPATIENT
Start: 2024-03-04 | End: 2024-03-04 | Stop reason: HOSPADM

## 2024-03-04 RX ORDER — MEPERIDINE HYDROCHLORIDE 25 MG/ML
25 INJECTION INTRAMUSCULAR; INTRAVENOUS; SUBCUTANEOUS EVERY 30 MIN PRN
Status: DISCONTINUED | OUTPATIENT
Start: 2024-03-04 | End: 2024-03-04 | Stop reason: HOSPADM

## 2024-03-04 RX ORDER — MEPERIDINE HYDROCHLORIDE 25 MG/ML
25 INJECTION INTRAMUSCULAR; INTRAVENOUS; SUBCUTANEOUS EVERY 30 MIN PRN
Status: CANCELLED | OUTPATIENT
Start: 2024-03-04

## 2024-03-04 RX ORDER — AMLODIPINE BESYLATE 5 MG/1
5 TABLET ORAL DAILY
Qty: 90 TABLET | Refills: 2 | Status: SHIPPED | OUTPATIENT
Start: 2024-03-04

## 2024-03-04 RX ORDER — DIPHENHYDRAMINE HYDROCHLORIDE 50 MG/ML
50 INJECTION INTRAMUSCULAR; INTRAVENOUS
Status: DISCONTINUED | OUTPATIENT
Start: 2024-03-04 | End: 2024-03-04 | Stop reason: HOSPADM

## 2024-03-04 RX ORDER — HEPARIN SODIUM,PORCINE 10 UNIT/ML
5-20 VIAL (ML) INTRAVENOUS DAILY PRN
Status: CANCELLED | OUTPATIENT
Start: 2024-03-04

## 2024-03-04 RX ORDER — ALBUTEROL SULFATE 0.83 MG/ML
2.5 SOLUTION RESPIRATORY (INHALATION)
Status: CANCELLED | OUTPATIENT
Start: 2024-03-04

## 2024-03-04 RX ORDER — ALBUTEROL SULFATE 0.83 MG/ML
2.5 SOLUTION RESPIRATORY (INHALATION)
Status: DISCONTINUED | OUTPATIENT
Start: 2024-03-04 | End: 2024-03-04 | Stop reason: HOSPADM

## 2024-03-04 RX ORDER — HEPARIN SODIUM (PORCINE) LOCK FLUSH IV SOLN 100 UNIT/ML 100 UNIT/ML
5 SOLUTION INTRAVENOUS
Status: DISCONTINUED | OUTPATIENT
Start: 2024-03-04 | End: 2024-03-04 | Stop reason: HOSPADM

## 2024-03-04 RX ORDER — PALONOSETRON 0.05 MG/ML
0.25 INJECTION, SOLUTION INTRAVENOUS ONCE
Status: COMPLETED | OUTPATIENT
Start: 2024-03-04 | End: 2024-03-04

## 2024-03-04 RX ORDER — DIPHENHYDRAMINE HYDROCHLORIDE 50 MG/ML
50 INJECTION INTRAMUSCULAR; INTRAVENOUS
Status: CANCELLED
Start: 2024-03-04

## 2024-03-04 RX ORDER — HEPARIN SODIUM (PORCINE) LOCK FLUSH IV SOLN 100 UNIT/ML 100 UNIT/ML
5 SOLUTION INTRAVENOUS
Status: CANCELLED | OUTPATIENT
Start: 2024-03-04

## 2024-03-04 RX ORDER — EPINEPHRINE 1 MG/ML
0.3 INJECTION, SOLUTION INTRAMUSCULAR; SUBCUTANEOUS EVERY 5 MIN PRN
Status: DISCONTINUED | OUTPATIENT
Start: 2024-03-04 | End: 2024-03-04 | Stop reason: HOSPADM

## 2024-03-04 RX ORDER — PALONOSETRON 0.05 MG/ML
0.25 INJECTION, SOLUTION INTRAVENOUS ONCE
Status: CANCELLED
Start: 2024-03-04

## 2024-03-04 RX ORDER — METHYLPREDNISOLONE SODIUM SUCCINATE 125 MG/2ML
125 INJECTION, POWDER, LYOPHILIZED, FOR SOLUTION INTRAMUSCULAR; INTRAVENOUS
Status: CANCELLED
Start: 2024-03-04

## 2024-03-04 RX ORDER — METHYLPREDNISOLONE SODIUM SUCCINATE 125 MG/2ML
125 INJECTION, POWDER, LYOPHILIZED, FOR SOLUTION INTRAMUSCULAR; INTRAVENOUS
Status: DISCONTINUED | OUTPATIENT
Start: 2024-03-04 | End: 2024-03-04 | Stop reason: HOSPADM

## 2024-03-04 RX ORDER — ALBUTEROL SULFATE 90 UG/1
1-2 AEROSOL, METERED RESPIRATORY (INHALATION)
Status: CANCELLED
Start: 2024-03-04

## 2024-03-04 RX ADMIN — DEXAMETHASONE SODIUM PHOSPHATE: 10 INJECTION, SOLUTION INTRAMUSCULAR; INTRAVENOUS at 10:19

## 2024-03-04 RX ADMIN — Medication 5 ML: at 12:17

## 2024-03-04 RX ADMIN — SODIUM CHLORIDE 250 ML: 9 INJECTION, SOLUTION INTRAVENOUS at 10:18

## 2024-03-04 RX ADMIN — PEMETREXED DISODIUM 795 MG: 500 INJECTION, POWDER, LYOPHILIZED, FOR SOLUTION INTRAVENOUS at 11:20

## 2024-03-04 RX ADMIN — PALONOSETRON 0.25 MG: 0.05 INJECTION, SOLUTION INTRAVENOUS at 10:19

## 2024-03-04 RX ADMIN — CARBOPLATIN 430 MG: 10 INJECTION, SOLUTION INTRAVENOUS at 11:45

## 2024-03-04 RX ADMIN — SODIUM CHLORIDE 200 MG: 9 INJECTION, SOLUTION INTRAVENOUS at 10:50

## 2024-03-04 ASSESSMENT — PAIN SCALES - GENERAL: PAINLEVEL: NO PAIN (0)

## 2024-03-04 NOTE — PROGRESS NOTES
Infusion Nursing Note:  Inez Rodriguez presents today for cycle 2 day 1 keytruda/pemetrexed/carboplatin.    Patient seen by provider today: Yes: Elizabet Williamson NP   present during visit today: Not Applicable.    Note: Inez arrived ambulatory and in stable condition. Port accessed using sterile technique, good blood return noted, and labs collected. She was premedicated and treatment administered per orders. Port de-accessed upon completion and site covered with gauze and secured with tape. Her son was called when she was done to pick her up. Will return on 3/25 for next appointment.      Intravenous Access:  Labs drawn without difficulty.  Implanted Port.    Treatment Conditions:  Lab Results   Component Value Date    HGB 13.0 03/04/2024    WBC 6.7 03/04/2024    ANEUTAUTO 4.7 03/04/2024     (H) 03/04/2024        Lab Results   Component Value Date     03/04/2024    POTASSIUM 3.8 03/04/2024    CR 0.73 03/04/2024    CHELITA 9.6 03/04/2024    BILITOTAL <0.2 03/04/2024    ALBUMIN 4.2 03/04/2024    ALT 54 (H) 03/04/2024    AST 44 03/04/2024       Results reviewed, labs MET treatment parameters, ok to proceed with treatment.      Post Infusion Assessment:  Patient tolerated infusion without incident.  Blood return noted pre and post infusion.  Site patent and intact, free from redness, edema or discomfort.  No evidence of extravasations.  Access discontinued per protocol.       Discharge Plan:   Patient and/or family verbalized understanding of discharge instructions and all questions answered.  AVS to patient via [employer].  Patient will return 3/25 for next appointment.   Patient discharged in stable condition accompanied by: self.  Departure Mode: Ambulatory.      Lynn Malave RN

## 2024-03-04 NOTE — PROGRESS NOTES
[facility] Hematology and Oncology Progress Note    Patient: Inez Rodriguez  MRN: [id number]  Date of Service: Mar 4, 2024          Reason for Visit    Chief Complaint   Patient presents with    Oncology Clinic Visit     Return visit with labs/infusion related to Malignant neoplasm of lower lobe of right lung; Lung cancer metastatic to bone         Assessment and Plan     Cancer Staging   No matching staging information was found for the patient.    1.  Lung cancer, non-small cell likely adenocarcinoma, stage IV with right upper lobe nodule in her lung, lymphadenopathy, bone mets with T4 and right sacrum, possible malignant pleural effusion: Patient is on palliative treatment with pembrolizumab, pemetrexed and carboplatin.  Patient will get cycle 2 today.  She will get a CT scan after this cycle.  We will likely do a full 4 cycles of triplet therapy and if things are going well we may then go on to maintenance Keytruda and Alimta.     2.  Slightly abnormal TSH with a normal T4: We will monitor this closely with immunotherapy.  TSH is still low.  Check every 3 to 6 weeks.    3.  Fatigue, dysgeusia, anorexia, constipation: These are all expected side effects of the chemotherapy.  Overall patient has a good handle on these and is taking medications appropriately.  I did tell her that some of the stuff is Chemotrim and will get worse.  She will continue to monitor closely.    ECOG Performance    0 - Independent    Distress Screening (within last 30 days)    No data recorded     Pain  Pain Score: No Pain (0)    Problem List    Patient Active Problem List   Diagnosis    Observation for suspected malignant neoplasm    Thyroid nodule    Chronic cough    Pulmonary nodules    Wheezing-associated respiratory infection (WARI)    Malignant neoplasm of lower lobe of right lung (H)    Postobstructive pneumonia    Lung cancer metastatic to bone (H)         ______________________________________________________________________________    History of Present Illness    Oncologist: Dr. Miller    Diagnosis: Lung cancer, January 2024 came to ER with SOB.   -1/7/24: Mediastinal and right hilar adenopathy most suggestive of metastatic adenopathy from a primary lung malignancy. A nodular opacity in the right upper lobe could represent a primary lung malignancy versus an infectious/inflammatory focus. Recommend   referral to pulmonology for tissue sampling. [employer] demonstrate mass effect on the right upper lobe bronchus and bronchus intermedius with associated narrowing without occlusion. Brain MRI negative.   -1/8/24: EBUS done and 4R, 11R and subcarinal nodes all positive for malignancy. NSCLC, favor adenocarcinoma. Right upper lobe nodule was also positive. Pleural fluid suspicious.   -1/29/24: PET: Findings suspicious for a right upper lobe primary lung neoplasm with extensive mediastinal/hilar lymph node metastases and osseous metastases to the T4 vertebral body and right sacrum.   [employer]omics: KRAS G12C mutation. Tp53 mutation. PTEN deletion +, MSI stable, PDL1 0%, TMB intermediate, Her2/leisa negative. Eveything else was negative.     Treatment:   -2/14/24: palliative treatment with Carboplatin, Alimta, Keytruda, today is cycle 2    Interim History:   Patient is here today to continue on treatment.  Overall she feels like the first cycle went pretty well.  She had a lot of fatigue.  She said food is not tasting good and she does not have quite the same appetite.  She did not really feel like she lost much weight though.  She says she did have constipation for couple days which was pretty miserable but she did not take a laxative and that seemed to help.  She denies any new bone or back pain.  Feels like her breathing is gotten a lot better since starting treatment.  She is not wheezing anymore.  The discomfort in her upper chest is gone.           Review of  "Systems    Pertinent items are noted in HPI.    Past History    Past Medical History:   Diagnosis Date    Hypertension     SOB (shortness of breath)        PHYSICAL EXAM  BP (!) 173/81 (BP Location: Left arm, Patient Position: Sitting, Cuff Size: Adult Regular)   Pulse 114   Temp 97.3  F (36.3  C) (Tympanic)   Resp 24   Ht 1.651 m (5' 5\")   Wt 56.2 kg (123 lb 14.4 oz)   SpO2 98%   BMI 20.62 kg/m      GENERAL: no acute distress. Cooperative in conversation. Here alone today  RESP: Regular respiratory rate. No expiratory wheezes   NEURO: non focal. Alert and oriented x3.   PSYCH: within normal limits. No depression or anxiety.  SKIN: exposed skin is dry intact    Lab Results    Recent Results (from the past 168 hour(s))   Comprehensive metabolic panel   Result Value Ref Range    Sodium 138 135 - 145 mmol/L    Potassium 3.9 3.4 - 5.3 mmol/L    Carbon Dioxide (CO2) 29 22 - 29 mmol/L    Anion Gap 10 7 - 15 mmol/L    Urea Nitrogen 13.1 8.0 - 23.0 mg/dL    Creatinine 0.82 0.51 - 0.95 mg/dL    GFR Estimate 75 >60 mL/min/1.73m2    Calcium 8.9 8.8 - 10.2 mg/dL    Chloride 99 98 - 107 mmol/L    Glucose 127 (H) 70 - 99 mg/dL    Alkaline Phosphatase 79 40 - 150 U/L    AST 29 0 - 45 U/L    ALT 27 0 - 50 U/L    Protein Total 6.2 (L) 6.4 - 8.3 g/dL    Albumin 3.6 3.5 - 5.2 g/dL    Bilirubin Total <0.2 <=1.2 mg/dL   CBC with platelets and differential   Result Value Ref Range    WBC Count 5.3 4.0 - 11.0 10e3/uL    RBC Count 3.94 3.80 - 5.20 10e6/uL    Hemoglobin 11.8 11.7 - 15.7 g/dL    Hematocrit 35.6 35.0 - 47.0 %    MCV 90 78 - 100 fL    MCH 29.9 26.5 - 33.0 pg    MCHC 33.1 31.5 - 36.5 g/dL    RDW 14.2 10.0 - 15.0 %    Platelet Count 297 150 - 450 10e3/uL    % Neutrophils 49 %    % Lymphocytes 39 %    % Monocytes 11 %    % Eosinophils 1 %    % Basophils 0 %    % Immature Granulocytes 0 %    NRBCs per 100 WBC 0 <1 /100    Absolute Neutrophils 2.5 1.6 - 8.3 10e3/uL    Absolute Lymphocytes 2.0 0.8 - 5.3 10e3/uL    Absolute " Monocytes 0.6 0.0 - 1.3 10e3/uL    Absolute Eosinophils 0.1 0.0 - 0.7 10e3/uL    Absolute Basophils 0.0 0.0 - 0.2 10e3/uL    Absolute Immature Granulocytes 0.0 <=0.4 10e3/uL    Absolute NRBCs 0.0 10e3/uL   Comprehensive metabolic panel   Result Value Ref Range    Sodium 141 135 - 145 mmol/L    Potassium 3.8 3.4 - 5.3 mmol/L    Carbon Dioxide (CO2) 26 22 - 29 mmol/L    Anion Gap 14 7 - 15 mmol/L    Urea Nitrogen 16.5 8.0 - 23.0 mg/dL    Creatinine 0.73 0.51 - 0.95 mg/dL    GFR Estimate 86 >60 mL/min/1.73m2    Calcium 9.6 8.8 - 10.2 mg/dL    Chloride 101 98 - 107 mmol/L    Glucose 172 (H) 70 - 99 mg/dL    Alkaline Phosphatase 104 40 - 150 U/L    AST 44 0 - 45 U/L    ALT 54 (H) 0 - 50 U/L    Protein Total 7.4 6.4 - 8.3 g/dL    Albumin 4.2 3.5 - 5.2 g/dL    Bilirubin Total <0.2 <=1.2 mg/dL   TSH with free T4 reflex   Result Value Ref Range    TSH 0.12 (L) 0.30 - 4.20 uIU/mL   CBC with platelets and differential   Result Value Ref Range    WBC Count 6.7 4.0 - 11.0 10e3/uL    RBC Count 4.45 3.80 - 5.20 10e6/uL    Hemoglobin 13.0 11.7 - 15.7 g/dL    Hematocrit 40.8 35.0 - 47.0 %    MCV 92 78 - 100 fL    MCH 29.2 26.5 - 33.0 pg    MCHC 31.9 31.5 - 36.5 g/dL    RDW 14.7 10.0 - 15.0 %    Platelet Count 504 (H) 150 - 450 10e3/uL    % Neutrophils 71 %    % Lymphocytes 20 %    % Monocytes 8 %    % Eosinophils 0 %    % Basophils 0 %    % Immature Granulocytes 1 %    NRBCs per 100 WBC 0 <1 /100    Absolute Neutrophils 4.7 1.6 - 8.3 10e3/uL    Absolute Lymphocytes 1.3 0.8 - 5.3 10e3/uL    Absolute Monocytes 0.5 0.0 - 1.3 10e3/uL    Absolute Eosinophils 0.0 0.0 - 0.7 10e3/uL    Absolute Basophils 0.0 0.0 - 0.2 10e3/uL    Absolute Immature Granulocytes 0.1 <=0.4 10e3/uL    Absolute NRBCs 0.0 10e3/uL   T4 free   Result Value Ref Range    Free T4 0.95 0.90 - 1.70 ng/dL       Imaging    IR Chest Port Placement > 5 Yrs of Age    Result Date: 2/12/2024  [place] [facility] LOCATION: [facility] DATE: 2/12/2024  PROCEDURES: 1. SUBCUTANEOUS IMPLANTED VENOUS ACCESS PORT. 2. ULTRASOUND GUIDANCE FOR VASCULAR ACCESS. 3. FLUOROSCOPIC GUIDANCE FOR CATHETER PLACEMENT. 4. MODERATE SEDATION INTERVENTIONAL RADIOLOGIST: Gorge Mireles MD INDICATION: Patient is a 73-year-old female the history of right upper lobe non-small cell lung cancer. The patient presents to Interventional Radiology for placement of a Port-A-Cath for long-term central venous access to allow for infusion and blood draws. CONSENT: The risks, benefits and alternatives of Port placement were discussed with the patient  in detail. All questions were answered. Informed consent was given to proceed with the procedure. MODERATE SEDATION: Versed 2.5 mg IV; Fentanyl 125 mcg IV. During the time out, immediately prior to the administration of medications, the patient was reassessed for adequacy to receive conscious sedation.  Under physician supervision, Versed and fentanyl were administered for moderate sedation. Pulse oximetry, heart rate and blood pressure were continuously monitored by an independent trained observer. The physician spent 16 minutes of face-to-face sedation time with the patient. CONTRAST: None. ANTIBIOTICS: 2 g of IV Ancef. ADDITIONAL MEDICATIONS: None. FLUOROSCOPIC TIME: 1.1 minutes. RADIATION DOSE: Air Kerma: 5 mGy. COMPLICATIONS: No immediate complications. STERILE BARRIER TECHNIQUE: Maximum sterile barrier technique was used. Cutaneous antisepsis was performed at the operative site with application of 2% chlorhexidine and large sterile drape. Prior to the procedure, the  and assistant performed hand hygiene and wore hat, mask, sterile gown, and sterile gloves during the entire procedure. PROCEDURE: After discussing the procedure and risks, informed consent was obtained. Permanent ultrasound images were obtained of the left subclavian vein, documenting patency and compressibility. The left neck and upper chest were prepped and draped. After  local anesthesia, the subclavian vein was punctured under direct ultrasound guidance, and a small dilator was placed. A short transverse skin incision was made below the clavicle, and a pocket was created for the port. A skin tunnel was created from the pocket to the vein entry site, and the catheter was pulled through the tunnel. The vein was dilated and the catheter inserted through a peel-away sheath, positioning the tip fluoroscopically at  the SVC/atriocaval junction. The catheter was cut to an appropriate length. The catheter was then attached to the port itself and the port was placed within the subcutaneous pocket. The port was flushed with heparin. The incision was closed with layered  absorbable suture and covered with Dermabond. The patient tolerated the procedure, and there were no immediate complications. FINDINGS: Ultrasound shows an anechoic and compressible subclavian vein. A permanent ultrasound image of this was saved. After placement, fluoroscopic spot images show that the tip of the catheter is at the SVC/atriocaval junction.     IMPRESSION:  1.  Uneventful venous access port placement. This port is power injector compatible.       The longitudinal plan of care for the diagnosis(es)/condition(s) as documented were addressed during this visit. Due to the added complexity in care, I will continue to support Inez in the subsequent management and with ongoing continuity of care.      Signed by: TANESHA Okeefe CNP

## 2024-03-04 NOTE — PROGRESS NOTES
"Oncology Rooming Note    March 4, 2024 8:52 AM   Inez Rodriguez is a 73 year old female who presents for:    Chief Complaint   Patient presents with    Oncology Clinic Visit     Return visit with labs/infusion related to Malignant neoplasm of lower lobe of right lung; Lung cancer metastatic to bone       Initial Vitals: BP (!) 173/81 (BP Location: Left arm, Patient Position: Sitting, Cuff Size: Adult Regular)   Pulse 114   Temp 97.3  F (36.3  C) (Tympanic)   Resp 24   Ht 1.651 m (5' 5\")   Wt 56.2 kg (123 lb 14.4 oz)   SpO2 98%   BMI 20.62 kg/m   Estimated body mass index is 20.62 kg/m  as calculated from the following:    Height as of this encounter: 1.651 m (5' 5\").    Weight as of this encounter: 56.2 kg (123 lb 14.4 oz). Body surface area is 1.61 meters squared.  No Pain (0) Comment: Data Unavailable   No LMP recorded. Patient is premenopausal.  Allergies reviewed: Yes  Medications reviewed: Yes    Medications: Medication refills not needed today.  Pharmacy name entered into [employer]:    [id number] [place] [address] AT [facility] [address] [facility] [address]    Frailty Screening:   Is the patient here for a new oncology consult visit in cancer care? 2. No      Clinical concerns: Return visit with labs/infusion related to Malignant neoplasm of lower lobe of right lung; Lung cancer metastatic to bone      FERCHO JORGENSEN CMA              "

## 2024-03-04 NOTE — PROGRESS NOTES
"[facility]: Cancer Care Follow-Up Note                                    Discussion with Patient:                                                      Patient comes in today in follow-up in regards to her diagnosis of lung cancer under the direction of Dr Miller.  Patient was seen by Elizabet Williamson CNP prior to getting her next cycle of chemotherapy.     Goals          General    Maintain ability to perform ADLs without difficulty             Dates of Treatment:                                                      Infusion given in last 28 days       Administered MAR Action Medication Dose Rate Visit    02/14/2024 11:23 New Bag pembrolizumab (KEYTRUDA) 200 mg in sodium chloride 0.9 % 63 mL infusion 200 mg 126 mL/hr Infusion Therapy Visit on 02/14/2024 in [facility]    02/14/2024 11:58 New Bag PEMEtrexed (ALIMTA) 795 mg in sodium chloride 0.9 % 141.8 mL infusion 795 mg 850.8 mL/hr Infusion Therapy Visit on 02/14/2024 in [facility]    02/14/2024 12:19 New Bag CARBOplatin 420 mg in sodium chloride 0.9 % 317 mL infusion 420 mg 634 mL/hr Infusion Therapy Visit on 02/14/2024 in [facility]    03/04/2024 10:50 New Bag pembrolizumab (KEYTRUDA) 200 mg in sodium chloride 0.9 % 63 mL infusion 200 mg 126 mL/hr Infusion Therapy Visit on 03/04/2024 in [facility]            Assessment:                                                      I did go in and chat with patient prior to Elizabet seeing her in the clinic.  Patient tells me that she is feeling really good.  She feels like her voice is now back to normal where she felt like she had 2 different voices before.  She also tells me that her wheezing just \"stopped\" the other day.  She stated got really bad and then all of a sudden stopped.  She is feeling so much better.    Intervention/Education provided during outreach:                              "                          Patient is scheduled for CT scan on 3/21 and follow-up with Elizabet Williamson CNP on 3/25.    Patient to follow up as scheduled at next appt  Patient to call/[employer] message with updates  Confirmed patient has clinic and triage numbers    Signature:  María Casillas RN

## 2024-03-04 NOTE — LETTER
3/4/2024         RE: Inez Rodriguez  [address]  [facility] [address]        Dear Colleague,    Thank you for referring your patient, Inez Rodriguez, to the [facility] [place]. Please see a copy of my visit note below.    [facility] Hematology and Oncology Progress Note    Patient: Inez Rodriguez  MRN: [id number]  Date of Service: Mar 4, 2024          Reason for Visit    Chief Complaint   Patient presents with     Oncology Clinic Visit     Return visit with labs/infusion related to Malignant neoplasm of lower lobe of right lung; Lung cancer metastatic to bone         Assessment and Plan     Cancer Staging   No matching staging information was found for the patient.    1.  Lung cancer, non-small cell likely adenocarcinoma, stage IV with right upper lobe nodule in her lung, lymphadenopathy, bone mets with T4 and right sacrum, possible malignant pleural effusion: Patient is on palliative treatment with pembrolizumab, pemetrexed and carboplatin.  Patient will get cycle 2 today.  She will get a CT scan after this cycle.  We will likely do a full 4 cycles of triplet therapy and if things are going well we may then go on to maintenance Keytruda and Alimta.     2.  Slightly abnormal TSH with a normal T4: We will monitor this closely with immunotherapy.  TSH is still low.  Check every 3 to 6 weeks.    3.  Fatigue, dysgeusia, anorexia, constipation: These are all expected side effects of the chemotherapy.  Overall patient has a good handle on these and is taking medications appropriately.  I did tell her that some of the stuff is Chemotrim and will get worse.  She will continue to monitor closely.    ECOG Performance    0 - Independent    Distress Screening (within last 30 days)    No data recorded     Pain  Pain Score: No Pain (0)    Problem List    Patient Active Problem List   Diagnosis     Observation for suspected malignant neoplasm     Thyroid nodule     Chronic cough     Pulmonary  nodules     Wheezing-associated respiratory infection (WARI)     Malignant neoplasm of lower lobe of right lung (H)     Postobstructive pneumonia     Lung cancer metastatic to bone (H)        ______________________________________________________________________________    History of Present Illness    Oncologist: Dr. Miller    Diagnosis: Lung cancer, January 2024 came to ER with SOB.   -1/7/24: Mediastinal and right hilar adenopathy most suggestive of metastatic adenopathy from a primary lung malignancy. A nodular opacity in the right upper lobe could represent a primary lung malignancy versus an infectious/inflammatory focus. Recommend   referral to pulmonology for tissue sampling. [employer] demonstrate mass effect on the right upper lobe bronchus and bronchus intermedius with associated narrowing without occlusion. Brain MRI negative.   -1/8/24: EBUS done and 4R, 11R and subcarinal nodes all positive for malignancy. NSCLC, favor adenocarcinoma. Right upper lobe nodule was also positive. Pleural fluid suspicious.   -1/29/24: PET: Findings suspicious for a right upper lobe primary lung neoplasm with extensive mediastinal/hilar lymph node metastases and osseous metastases to the T4 vertebral body and right sacrum.   [employer]: KRAS G12C mutation. Tp53 mutation. PTEN deletion +, MSI stable, PDL1 0%, TMB intermediate, Her2/leisa negative. Eveything else was negative.     Treatment:   -2/14/24: palliative treatment with Carboplatin, Alimta, Keytruda, today is cycle 2    Interim History:   Patient is here today to continue on treatment.  Overall she feels like the first cycle went pretty well.  She had a lot of fatigue.  She said food is not tasting good and she does not have quite the same appetite.  She did not really feel like she lost much weight though.  She says she did have constipation for couple days which was pretty miserable but she did not take a laxative and that seemed to help.  She denies any new  "bone or back pain.  Feels like her breathing is gotten a lot better since starting treatment.  She is not wheezing anymore.  The discomfort in her upper chest is gone.           Review of Systems    Pertinent items are noted in HPI.    Past History    Past Medical History:   Diagnosis Date     Hypertension      SOB (shortness of breath)        PHYSICAL EXAM  BP (!) 173/81 (BP Location: Left arm, Patient Position: Sitting, Cuff Size: Adult Regular)   Pulse 114   Temp 97.3  F (36.3  C) (Tympanic)   Resp 24   Ht 1.651 m (5' 5\")   Wt 56.2 kg (123 lb 14.4 oz)   SpO2 98%   BMI 20.62 kg/m      GENERAL: no acute distress. Cooperative in conversation. Here alone today  RESP: Regular respiratory rate. No expiratory wheezes   NEURO: non focal. Alert and oriented x3.   PSYCH: within normal limits. No depression or anxiety.  SKIN: exposed skin is dry intact    Lab Results    Recent Results (from the past 168 hour(s))   Comprehensive metabolic panel   Result Value Ref Range    Sodium 138 135 - 145 mmol/L    Potassium 3.9 3.4 - 5.3 mmol/L    Carbon Dioxide (CO2) 29 22 - 29 mmol/L    Anion Gap 10 7 - 15 mmol/L    Urea Nitrogen 13.1 8.0 - 23.0 mg/dL    Creatinine 0.82 0.51 - 0.95 mg/dL    GFR Estimate 75 >60 mL/min/1.73m2    Calcium 8.9 8.8 - 10.2 mg/dL    Chloride 99 98 - 107 mmol/L    Glucose 127 (H) 70 - 99 mg/dL    Alkaline Phosphatase 79 40 - 150 U/L    AST 29 0 - 45 U/L    ALT 27 0 - 50 U/L    Protein Total 6.2 (L) 6.4 - 8.3 g/dL    Albumin 3.6 3.5 - 5.2 g/dL    Bilirubin Total <0.2 <=1.2 mg/dL   CBC with platelets and differential   Result Value Ref Range    WBC Count 5.3 4.0 - 11.0 10e3/uL    RBC Count 3.94 3.80 - 5.20 10e6/uL    Hemoglobin 11.8 11.7 - 15.7 g/dL    Hematocrit 35.6 35.0 - 47.0 %    MCV 90 78 - 100 fL    MCH 29.9 26.5 - 33.0 pg    MCHC 33.1 31.5 - 36.5 g/dL    RDW 14.2 10.0 - 15.0 %    Platelet Count 297 150 - 450 10e3/uL    % Neutrophils 49 %    % Lymphocytes 39 %    % Monocytes 11 %    % Eosinophils 1 " %    % Basophils 0 %    % Immature Granulocytes 0 %    NRBCs per 100 WBC 0 <1 /100    Absolute Neutrophils 2.5 1.6 - 8.3 10e3/uL    Absolute Lymphocytes 2.0 0.8 - 5.3 10e3/uL    Absolute Monocytes 0.6 0.0 - 1.3 10e3/uL    Absolute Eosinophils 0.1 0.0 - 0.7 10e3/uL    Absolute Basophils 0.0 0.0 - 0.2 10e3/uL    Absolute Immature Granulocytes 0.0 <=0.4 10e3/uL    Absolute NRBCs 0.0 10e3/uL   Comprehensive metabolic panel   Result Value Ref Range    Sodium 141 135 - 145 mmol/L    Potassium 3.8 3.4 - 5.3 mmol/L    Carbon Dioxide (CO2) 26 22 - 29 mmol/L    Anion Gap 14 7 - 15 mmol/L    Urea Nitrogen 16.5 8.0 - 23.0 mg/dL    Creatinine 0.73 0.51 - 0.95 mg/dL    GFR Estimate 86 >60 mL/min/1.73m2    Calcium 9.6 8.8 - 10.2 mg/dL    Chloride 101 98 - 107 mmol/L    Glucose 172 (H) 70 - 99 mg/dL    Alkaline Phosphatase 104 40 - 150 U/L    AST 44 0 - 45 U/L    ALT 54 (H) 0 - 50 U/L    Protein Total 7.4 6.4 - 8.3 g/dL    Albumin 4.2 3.5 - 5.2 g/dL    Bilirubin Total <0.2 <=1.2 mg/dL   TSH with free T4 reflex   Result Value Ref Range    TSH 0.12 (L) 0.30 - 4.20 uIU/mL   CBC with platelets and differential   Result Value Ref Range    WBC Count 6.7 4.0 - 11.0 10e3/uL    RBC Count 4.45 3.80 - 5.20 10e6/uL    Hemoglobin 13.0 11.7 - 15.7 g/dL    Hematocrit 40.8 35.0 - 47.0 %    MCV 92 78 - 100 fL    MCH 29.2 26.5 - 33.0 pg    MCHC 31.9 31.5 - 36.5 g/dL    RDW 14.7 10.0 - 15.0 %    Platelet Count 504 (H) 150 - 450 10e3/uL    % Neutrophils 71 %    % Lymphocytes 20 %    % Monocytes 8 %    % Eosinophils 0 %    % Basophils 0 %    % Immature Granulocytes 1 %    NRBCs per 100 WBC 0 <1 /100    Absolute Neutrophils 4.7 1.6 - 8.3 10e3/uL    Absolute Lymphocytes 1.3 0.8 - 5.3 10e3/uL    Absolute Monocytes 0.5 0.0 - 1.3 10e3/uL    Absolute Eosinophils 0.0 0.0 - 0.7 10e3/uL    Absolute Basophils 0.0 0.0 - 0.2 10e3/uL    Absolute Immature Granulocytes 0.1 <=0.4 10e3/uL    Absolute NRBCs 0.0 10e3/uL   T4 free   Result Value Ref Range    Free T4 0.95  0.90 - 1.70 ng/dL       Imaging    IR Chest Port Placement > 5 Yrs of Age    Result Date: 2/12/2024  [facility] LOCATION: [facility] DATE: 2/12/2024 PROCEDURES: 1. SUBCUTANEOUS IMPLANTED VENOUS ACCESS PORT. 2. ULTRASOUND GUIDANCE FOR VASCULAR ACCESS. 3. FLUOROSCOPIC GUIDANCE FOR CATHETER PLACEMENT. 4. MODERATE SEDATION INTERVENTIONAL RADIOLOGIST: Gorge Mireles MD INDICATION: Patient is a 73-year-old female the history of right upper lobe non-small cell lung cancer. The patient presents to Interventional Radiology for placement of a Port-A-Cath for long-term central venous access to allow for infusion and blood draws. CONSENT: The risks, benefits and alternatives of Port placement were discussed with the patient  in detail. All questions were answered. Informed consent was given to proceed with the procedure. MODERATE SEDATION: Versed 2.5 mg IV; Fentanyl 125 mcg IV. During the time out, immediately prior to the administration of medications, the patient was reassessed for adequacy to receive conscious sedation.  Under physician supervision, Versed and fentanyl were administered for moderate sedation. Pulse oximetry, heart rate and blood pressure were continuously monitored by an independent trained observer. The physician spent 16 minutes of face-to-face sedation time with the patient. CONTRAST: None. ANTIBIOTICS: 2 g of IV Ancef. ADDITIONAL MEDICATIONS: None. FLUOROSCOPIC TIME: 1.1 minutes. RADIATION DOSE: Air Kerma: 5 mGy. COMPLICATIONS: No immediate complications. STERILE BARRIER TECHNIQUE: Maximum sterile barrier technique was used. Cutaneous antisepsis was performed at the operative site with application of 2% chlorhexidine and large sterile drape. Prior to the procedure, the  and assistant performed hand hygiene and wore hat, mask, sterile gown, and sterile gloves during the entire procedure. PROCEDURE: After discussing the procedure and risks, informed consent was  obtained. Permanent ultrasound images were obtained of the left subclavian vein, documenting patency and compressibility. The left neck and upper chest were prepped and draped. After local anesthesia, the subclavian vein was punctured under direct ultrasound guidance, and a small dilator was placed. A short transverse skin incision was made below the clavicle, and a pocket was created for the port. A skin tunnel was created from the pocket to the vein entry site, and the catheter was pulled through the tunnel. The vein was dilated and the catheter inserted through a peel-away sheath, positioning the tip fluoroscopically at  the SVC/atriocaval junction. The catheter was cut to an appropriate length. The catheter was then attached to the port itself and the port was placed within the subcutaneous pocket. The port was flushed with heparin. The incision was closed with layered  absorbable suture and covered with Dermabond. The patient tolerated the procedure, and there were no immediate complications. FINDINGS: Ultrasound shows an anechoic and compressible subclavian vein. A permanent ultrasound image of this was saved. After placement, fluoroscopic spot images show that the tip of the catheter is at the SVC/atriocaval junction.     IMPRESSION:  1.  Uneventful venous access port placement. This port is power injector compatible.       The longitudinal plan of care for the diagnosis(es)/condition(s) as documented were addressed during this visit. Due to the added complexity in care, I will continue to support Inez in the subsequent management and with ongoing continuity of care.      Signed by: TANESHA Okeefe CNP    Oncology Rooming Note    March 4, 2024 8:52 AM   Inez Rodriguez is a 73 year old female who presents for:    Chief Complaint   Patient presents with     Oncology Clinic Visit     Return visit with labs/infusion related to Malignant neoplasm of lower lobe of right lung; Lung cancer metastatic to  "bone       Initial Vitals: BP (!) 173/81 (BP Location: Left arm, Patient Position: Sitting, Cuff Size: Adult Regular)   Pulse 114   Temp 97.3  F (36.3  C) (Tympanic)   Resp 24   Ht 1.651 m (5' 5\")   Wt 56.2 kg (123 lb 14.4 oz)   SpO2 98%   BMI 20.62 kg/m   Estimated body mass index is 20.62 kg/m  as calculated from the following:    Height as of this encounter: 1.651 m (5' 5\").    Weight as of this encounter: 56.2 kg (123 lb 14.4 oz). Body surface area is 1.61 meters squared.  No Pain (0) Comment: Data Unavailable   No LMP recorded. Patient is premenopausal.  Allergies reviewed: Yes  Medications reviewed: Yes    Medications: Medication refills not needed today.  Pharmacy name entered into [facility]:    [facility] #[id number] [place] [id number]  AT [facility] [address]    Frailty Screening:   Is the patient here for a new oncology consult visit in cancer care? 2. No      Clinical concerns: Return visit with labs/infusion related to Malignant neoplasm of lower lobe of right lung; Lung cancer metastatic to bone      FERCHO JORGENSEN CMA                Again, thank you for allowing me to participate in the care of your patient.        Sincerely,        TANESHA Okeefe CNP  "

## 2024-03-10 ENCOUNTER — HEALTH MAINTENANCE LETTER (OUTPATIENT)
Age: 74
End: 2024-03-10

## 2024-03-19 ENCOUNTER — THERAPY VISIT (OUTPATIENT)
Dept: PHYSICAL THERAPY | Facility: REHABILITATION | Age: 74
End: 2024-03-19
Attending: FAMILY MEDICINE
Payer: MEDICARE

## 2024-03-19 DIAGNOSIS — M54.89 OTHER ACUTE BACK PAIN: Primary | ICD-10-CM

## 2024-03-19 PROCEDURE — 97140 MANUAL THERAPY 1/> REGIONS: CPT | Mod: GP | Performed by: PHYSICAL THERAPIST

## 2024-03-19 PROCEDURE — 97161 PT EVAL LOW COMPLEX 20 MIN: CPT | Mod: GP | Performed by: PHYSICAL THERAPIST

## 2024-03-19 PROCEDURE — 97110 THERAPEUTIC EXERCISES: CPT | Mod: GP | Performed by: PHYSICAL THERAPIST

## 2024-03-19 NOTE — PROGRESS NOTES
"PHYSICAL THERAPY EVALUATION  Type of Visit: Evaluation    See electronic medical record for Abuse and Falls Screening details.    Subjective       Presenting condition or subjective complaint: back pain and muscle spasms  Date of onset: 03/11/24    Relevant medical history:     Dates & types of surgery: 1/24 lung biopsy, port installed    Prior diagnostic imaging/testing results:       Prior therapy history for the same diagnosis, illness or injury: No      Patient reports she quit going to gym since pandemic. Has tried to workout at home. Used to be very active, playing sports[place] \"Stress will cause as much physical damage as lifting\" Been practicing yoga for 50 years. Doesn't leave the home due to the pandemic and having chemotherapy. First time getting injection for muscle spasm was when she was 11. Back spasms are getting much more frequent and much more severe. The last spasm before that was about 1 year.      Has right sided pain after physical activity. Most recently was hanging curtains and has muscle soreness in upper trapezius. A couple days ago was feeling numb in left buttock pain. \" if I exert myself in any way, there will pain somewhere in my back\". Is unsure what is the cause of muscle spasms. Thinks stress is contributing to this.     Would like to start getting some aerobic exercise: dancing to music. Feels she can do this, but doesn't know how she will feel the next day    Meditation and candy for stress management.       Living Environment  Social support: With family members   Type of home: House   Stairs to enter the home: Yes 1 Is there a railing: Yes   Ramp: No   Stairs inside the home: Yes 1 Is there a railing: Yes   Help at home: Home management tasks (cooking, cleaning); Home and Yard maintenance tasks; Assist for driving and community activities  Equipment owned:       Employment: Not Applicable    Hobbies/Interests: writing    Patient goals for " therapy: eliminate constant pain, sleep thru the night, excersize       Objective        PALPATION: tenderness to right upper trapezius, levator scapulae  RANGE OF MOTION: LE ROM WNL  UE ROM WNL  Cervical and lumbar and thoracic ROM WFL  STRENGTH: LE Strength WFL  UE Strength WFL        Assessment & Plan   CLINICAL IMPRESSIONS  Medical Diagnosis: M54.9 (ICD-10-CM) - Other acute back pain    Treatment Diagnosis: myofascial upper back pain   Impression/Assessment: Patient is a 73 year old female with chief complaints of acute on chronic pain in various regions of back.  The following significant findings have been identified: Pain and Decreased activity tolerance. These impairments interfere with their ability to perform recreational activities and household chores as compared to previous level of function. Patient will benefit from skilled physical therapy to address impairments and functional limitations in order to achieve their goals.       Clinical Decision Making (Complexity):  Clinical Presentation: Stable/Uncomplicated  Clinical Presentation Rationale: based on medical and personal factors listed in PT evaluation  Clinical Decision Making (Complexity): Low complexity    PLAN OF CARE  Treatment Interventions:  Interventions: Manual Therapy, Neuromuscular Re-education, Therapeutic Activity, Therapeutic Exercise, Self-Care/Home Management    Long Term Goals     PT Goal 1  Goal Identifier: HEP  Goal Description: Patient will demonstrate >50% compliance with home exercise program to promote independence and progress towards  Target Date: 06/11/24      Frequency of Treatment: every other week  Duration of Treatment: 12 weeks    Recommended Referrals to Other Professionals:   Education Assessment:   Learner/Method: Patient    Risks and benefits of evaluation/treatment have been explained.   Patient/Family/caregiver agrees with Plan of Care.     Evaluation Time:     PT Eval, Low Complexity Minutes (34375): 25        Signing Clinician: Gordy Belle, DOIGO      [facility]                                                                                   OUTPATIENT PHYSICAL THERAPY      PLAN OF TREATMENT FOR OUTPATIENT REHABILITATION   Patient's Last Name, First Name, Inez Duarte YOB: 1950   Provider's Name   [facility]   Medical Record No.  [id number]     Onset Date: 03/11/24  Start of Care Date: 03/19/24     Medical Diagnosis:  M54.9 (ICD-10-CM) - Other acute back pain      PT Treatment Diagnosis:  myofascial upper back pain Plan of Treatment  Frequency/Duration: every other week/ 12 weeks    Certification date from 03/19/24 to 06/11/24         See note for plan of treatment details and functional goals     Gordy Belle, DIOGO                         I CERTIFY THE NEED FOR THESE SERVICES FURNISHED UNDER        THIS PLAN OF TREATMENT AND WHILE UNDER MY CARE     (Physician attestation of this document indicates review and certification of the therapy plan).              Referring Provider:  Jose Antonio Hinton    Initial Assessment  See Epic Evaluation- Start of Care Date: 03/19/24

## 2024-03-21 ENCOUNTER — HOSPITAL ENCOUNTER (OUTPATIENT)
Dept: CT IMAGING | Facility: HOSPITAL | Age: 74
Discharge: HOME | End: 2024-03-21
Attending: NURSE PRACTITIONER | Admitting: NURSE PRACTITIONER
Payer: MEDICARE

## 2024-03-21 DIAGNOSIS — C79.51 LUNG CANCER METASTATIC TO BONE (H): ICD-10-CM

## 2024-03-21 DIAGNOSIS — C34.90 LUNG CANCER METASTATIC TO BONE (H): ICD-10-CM

## 2024-03-21 DIAGNOSIS — C34.31 MALIGNANT NEOPLASM OF LOWER LOBE OF RIGHT LUNG (H): ICD-10-CM

## 2024-03-21 PROCEDURE — 250N000011 HC RX IP 250 OP 636: Performed by: RADIOLOGY

## 2024-03-21 PROCEDURE — 250N000011 HC RX IP 250 OP 636: Performed by: NURSE PRACTITIONER

## 2024-03-21 PROCEDURE — 71260 CT THORAX DX C+: CPT | Mod: MG

## 2024-03-21 RX ORDER — IOPAMIDOL 755 MG/ML
61 INJECTION, SOLUTION INTRAVASCULAR ONCE
Status: COMPLETED | OUTPATIENT
Start: 2024-03-21 | End: 2024-03-21

## 2024-03-21 RX ORDER — HEPARIN SODIUM (PORCINE) LOCK FLUSH IV SOLN 100 UNIT/ML 100 UNIT/ML
500 SOLUTION INTRAVENOUS ONCE
Status: COMPLETED | OUTPATIENT
Start: 2024-03-21 | End: 2024-03-21

## 2024-03-21 RX ADMIN — IOPAMIDOL 61 ML: 755 INJECTION, SOLUTION INTRAVENOUS at 12:22

## 2024-03-21 RX ADMIN — Medication 500 UNITS: at 12:38

## 2024-03-24 ENCOUNTER — MYC REFILL (OUTPATIENT)
Dept: ONCOLOGY | Facility: HOSPITAL | Age: 74
End: 2024-03-24
Payer: MEDICARE

## 2024-03-24 DIAGNOSIS — C79.51 LUNG CANCER METASTATIC TO BONE (H): ICD-10-CM

## 2024-03-24 DIAGNOSIS — C34.90 LUNG CANCER METASTATIC TO BONE (H): ICD-10-CM

## 2024-03-24 DIAGNOSIS — C34.31 MALIGNANT NEOPLASM OF LOWER LOBE OF RIGHT LUNG (H): ICD-10-CM

## 2024-03-25 ENCOUNTER — LAB (OUTPATIENT)
Dept: INFUSION THERAPY | Facility: HOSPITAL | Age: 74
End: 2024-03-25
Attending: INTERNAL MEDICINE
Payer: MEDICARE

## 2024-03-25 ENCOUNTER — ONCOLOGY VISIT (OUTPATIENT)
Dept: ONCOLOGY | Facility: HOSPITAL | Age: 74
End: 2024-03-25
Attending: INTERNAL MEDICINE
Payer: MEDICARE

## 2024-03-25 VITALS
BODY MASS INDEX: 20.16 KG/M2 | HEART RATE: 116 BPM | TEMPERATURE: 97.7 F | WEIGHT: 121 LBS | OXYGEN SATURATION: 99 % | SYSTOLIC BLOOD PRESSURE: 142 MMHG | HEIGHT: 65 IN | RESPIRATION RATE: 20 BRPM | DIASTOLIC BLOOD PRESSURE: 81 MMHG

## 2024-03-25 DIAGNOSIS — C79.51 LUNG CANCER METASTATIC TO BONE (H): Primary | ICD-10-CM

## 2024-03-25 DIAGNOSIS — C34.31 MALIGNANT NEOPLASM OF LOWER LOBE OF RIGHT LUNG (H): ICD-10-CM

## 2024-03-25 DIAGNOSIS — C34.90 LUNG CANCER METASTATIC TO BONE (H): Primary | ICD-10-CM

## 2024-03-25 LAB
ALBUMIN SERPL BCG-MCNC: 4.3 G/DL (ref 3.5–5.2)
ALP SERPL-CCNC: 81 U/L (ref 40–150)
ALT SERPL W P-5'-P-CCNC: 30 U/L (ref 0–50)
ANION GAP SERPL CALCULATED.3IONS-SCNC: 15 MMOL/L (ref 7–15)
AST SERPL W P-5'-P-CCNC: 25 U/L (ref 0–45)
BASOPHILS # BLD AUTO: 0 10E3/UL (ref 0–0.2)
BASOPHILS NFR BLD AUTO: 0 %
BILIRUB SERPL-MCNC: 0.2 MG/DL
BUN SERPL-MCNC: 17.8 MG/DL (ref 8–23)
CALCIUM SERPL-MCNC: 9.4 MG/DL (ref 8.8–10.2)
CHLORIDE SERPL-SCNC: 102 MMOL/L (ref 98–107)
CREAT SERPL-MCNC: 0.74 MG/DL (ref 0.51–0.95)
DEPRECATED HCO3 PLAS-SCNC: 24 MMOL/L (ref 22–29)
EGFRCR SERPLBLD CKD-EPI 2021: 85 ML/MIN/1.73M2
EOSINOPHIL # BLD AUTO: 0 10E3/UL (ref 0–0.7)
EOSINOPHIL NFR BLD AUTO: 0 %
ERYTHROCYTE [DISTWIDTH] IN BLOOD BY AUTOMATED COUNT: 15.8 % (ref 10–15)
GLUCOSE SERPL-MCNC: 171 MG/DL (ref 70–99)
HCT VFR BLD AUTO: 34.5 % (ref 35–47)
HGB BLD-MCNC: 11.4 G/DL (ref 11.7–15.7)
IMM GRANULOCYTES # BLD: 0 10E3/UL
IMM GRANULOCYTES NFR BLD: 1 %
LYMPHOCYTES # BLD AUTO: 1 10E3/UL (ref 0.8–5.3)
LYMPHOCYTES NFR BLD AUTO: 19 %
MCH RBC QN AUTO: 30.4 PG (ref 26.5–33)
MCHC RBC AUTO-ENTMCNC: 33 G/DL (ref 31.5–36.5)
MCV RBC AUTO: 92 FL (ref 78–100)
MONOCYTES # BLD AUTO: 0.4 10E3/UL (ref 0–1.3)
MONOCYTES NFR BLD AUTO: 7 %
NEUTROPHILS # BLD AUTO: 4.1 10E3/UL (ref 1.6–8.3)
NEUTROPHILS NFR BLD AUTO: 73 %
NRBC # BLD AUTO: 0 10E3/UL
NRBC BLD AUTO-RTO: 0 /100
PLATELET # BLD AUTO: 396 10E3/UL (ref 150–450)
POTASSIUM SERPL-SCNC: 3.8 MMOL/L (ref 3.4–5.3)
PROT SERPL-MCNC: 6.8 G/DL (ref 6.4–8.3)
RBC # BLD AUTO: 3.75 10E6/UL (ref 3.8–5.2)
SODIUM SERPL-SCNC: 141 MMOL/L (ref 135–145)
T4 FREE SERPL-MCNC: 0.92 NG/DL (ref 0.9–1.7)
TSH SERPL DL<=0.005 MIU/L-ACNC: 0.24 UIU/ML (ref 0.3–4.2)
WBC # BLD AUTO: 5.5 10E3/UL (ref 4–11)

## 2024-03-25 PROCEDURE — 84439 ASSAY OF FREE THYROXINE: CPT | Performed by: NURSE PRACTITIONER

## 2024-03-25 PROCEDURE — 96413 CHEMO IV INFUSION 1 HR: CPT

## 2024-03-25 PROCEDURE — 36591 DRAW BLOOD OFF VENOUS DEVICE: CPT | Performed by: NURSE PRACTITIONER

## 2024-03-25 PROCEDURE — G0463 HOSPITAL OUTPT CLINIC VISIT: HCPCS | Performed by: NURSE PRACTITIONER

## 2024-03-25 PROCEDURE — 96367 TX/PROPH/DG ADDL SEQ IV INF: CPT

## 2024-03-25 PROCEDURE — 96411 CHEMO IV PUSH ADDL DRUG: CPT

## 2024-03-25 PROCEDURE — 258N000003 HC RX IP 258 OP 636: Performed by: NURSE PRACTITIONER

## 2024-03-25 PROCEDURE — 250N000011 HC RX IP 250 OP 636: Mod: JZ | Performed by: NURSE PRACTITIONER

## 2024-03-25 PROCEDURE — 80053 COMPREHEN METABOLIC PANEL: CPT | Performed by: NURSE PRACTITIONER

## 2024-03-25 PROCEDURE — 84443 ASSAY THYROID STIM HORMONE: CPT | Performed by: NURSE PRACTITIONER

## 2024-03-25 PROCEDURE — 85025 COMPLETE CBC W/AUTO DIFF WBC: CPT | Performed by: NURSE PRACTITIONER

## 2024-03-25 PROCEDURE — G2211 COMPLEX E/M VISIT ADD ON: HCPCS | Performed by: NURSE PRACTITIONER

## 2024-03-25 PROCEDURE — 96375 TX/PRO/DX INJ NEW DRUG ADDON: CPT

## 2024-03-25 PROCEDURE — 96417 CHEMO IV INFUS EACH ADDL SEQ: CPT

## 2024-03-25 PROCEDURE — 99214 OFFICE O/P EST MOD 30 MIN: CPT | Performed by: NURSE PRACTITIONER

## 2024-03-25 RX ORDER — ALBUTEROL SULFATE 90 UG/1
1-2 AEROSOL, METERED RESPIRATORY (INHALATION)
Status: CANCELLED
Start: 2024-03-25

## 2024-03-25 RX ORDER — PALONOSETRON 0.05 MG/ML
0.25 INJECTION, SOLUTION INTRAVENOUS ONCE
Status: COMPLETED | OUTPATIENT
Start: 2024-03-25 | End: 2024-03-25

## 2024-03-25 RX ORDER — HEPARIN SODIUM (PORCINE) LOCK FLUSH IV SOLN 100 UNIT/ML 100 UNIT/ML
5 SOLUTION INTRAVENOUS
Status: DISCONTINUED | OUTPATIENT
Start: 2024-03-25 | End: 2024-03-25 | Stop reason: HOSPADM

## 2024-03-25 RX ORDER — ALBUTEROL SULFATE 0.83 MG/ML
2.5 SOLUTION RESPIRATORY (INHALATION)
Status: DISCONTINUED | OUTPATIENT
Start: 2024-03-25 | End: 2024-03-25 | Stop reason: HOSPADM

## 2024-03-25 RX ORDER — MEPERIDINE HYDROCHLORIDE 25 MG/ML
25 INJECTION INTRAMUSCULAR; INTRAVENOUS; SUBCUTANEOUS EVERY 30 MIN PRN
Status: CANCELLED | OUTPATIENT
Start: 2024-03-25

## 2024-03-25 RX ORDER — MEPERIDINE HYDROCHLORIDE 25 MG/ML
25 INJECTION INTRAMUSCULAR; INTRAVENOUS; SUBCUTANEOUS EVERY 30 MIN PRN
Status: DISCONTINUED | OUTPATIENT
Start: 2024-03-25 | End: 2024-03-25 | Stop reason: HOSPADM

## 2024-03-25 RX ORDER — HEPARIN SODIUM (PORCINE) LOCK FLUSH IV SOLN 100 UNIT/ML 100 UNIT/ML
5 SOLUTION INTRAVENOUS
Status: CANCELLED | OUTPATIENT
Start: 2024-03-25

## 2024-03-25 RX ORDER — EPINEPHRINE 1 MG/ML
0.3 INJECTION, SOLUTION INTRAMUSCULAR; SUBCUTANEOUS EVERY 5 MIN PRN
Status: DISCONTINUED | OUTPATIENT
Start: 2024-03-25 | End: 2024-03-25 | Stop reason: HOSPADM

## 2024-03-25 RX ORDER — HEPARIN SODIUM,PORCINE 10 UNIT/ML
5-20 VIAL (ML) INTRAVENOUS DAILY PRN
Status: CANCELLED | OUTPATIENT
Start: 2024-03-25

## 2024-03-25 RX ORDER — METHYLPREDNISOLONE SODIUM SUCCINATE 125 MG/2ML
125 INJECTION, POWDER, LYOPHILIZED, FOR SOLUTION INTRAMUSCULAR; INTRAVENOUS
Status: DISCONTINUED | OUTPATIENT
Start: 2024-03-25 | End: 2024-03-25 | Stop reason: HOSPADM

## 2024-03-25 RX ORDER — ALBUTEROL SULFATE 0.83 MG/ML
2.5 SOLUTION RESPIRATORY (INHALATION)
Status: CANCELLED | OUTPATIENT
Start: 2024-03-25

## 2024-03-25 RX ORDER — LORAZEPAM 2 MG/ML
0.5 INJECTION INTRAMUSCULAR EVERY 4 HOURS PRN
Status: CANCELLED | OUTPATIENT
Start: 2024-03-25

## 2024-03-25 RX ORDER — EPINEPHRINE 1 MG/ML
0.3 INJECTION, SOLUTION INTRAMUSCULAR; SUBCUTANEOUS EVERY 5 MIN PRN
Status: CANCELLED | OUTPATIENT
Start: 2024-03-25

## 2024-03-25 RX ORDER — DIPHENHYDRAMINE HYDROCHLORIDE 50 MG/ML
50 INJECTION INTRAMUSCULAR; INTRAVENOUS
Status: CANCELLED
Start: 2024-03-25

## 2024-03-25 RX ORDER — PALONOSETRON 0.05 MG/ML
0.25 INJECTION, SOLUTION INTRAVENOUS ONCE
Status: CANCELLED
Start: 2024-03-25

## 2024-03-25 RX ORDER — METHYLPREDNISOLONE SODIUM SUCCINATE 125 MG/2ML
125 INJECTION, POWDER, LYOPHILIZED, FOR SOLUTION INTRAMUSCULAR; INTRAVENOUS
Status: CANCELLED
Start: 2024-03-25

## 2024-03-25 RX ORDER — FOLIC ACID 1 MG/1
1000 TABLET ORAL DAILY
Qty: 30 TABLET | Refills: 4 | Status: SHIPPED | OUTPATIENT
Start: 2024-03-25 | End: 2024-08-26

## 2024-03-25 RX ORDER — ALBUTEROL SULFATE 90 UG/1
1-2 AEROSOL, METERED RESPIRATORY (INHALATION)
Status: DISCONTINUED | OUTPATIENT
Start: 2024-03-25 | End: 2024-03-25 | Stop reason: HOSPADM

## 2024-03-25 RX ORDER — DIPHENHYDRAMINE HYDROCHLORIDE 50 MG/ML
50 INJECTION INTRAMUSCULAR; INTRAVENOUS
Status: DISCONTINUED | OUTPATIENT
Start: 2024-03-25 | End: 2024-03-25 | Stop reason: HOSPADM

## 2024-03-25 RX ADMIN — DEXAMETHASONE SODIUM PHOSPHATE: 10 INJECTION, SOLUTION INTRAMUSCULAR; INTRAVENOUS at 11:06

## 2024-03-25 RX ADMIN — CARBOPLATIN 420 MG: 10 INJECTION, SOLUTION INTRAVENOUS at 12:21

## 2024-03-25 RX ADMIN — PEMETREXED DISODIUM 795 MG: 500 INJECTION, POWDER, LYOPHILIZED, FOR SOLUTION INTRAVENOUS at 12:05

## 2024-03-25 RX ADMIN — PALONOSETRON 0.25 MG: 0.05 INJECTION, SOLUTION INTRAVENOUS at 10:53

## 2024-03-25 RX ADMIN — SODIUM CHLORIDE 250 ML: 9 INJECTION, SOLUTION INTRAVENOUS at 10:50

## 2024-03-25 RX ADMIN — SODIUM CHLORIDE 200 MG: 9 INJECTION, SOLUTION INTRAVENOUS at 11:30

## 2024-03-25 RX ADMIN — Medication 5 ML: at 12:53

## 2024-03-25 ASSESSMENT — PAIN SCALES - GENERAL: PAINLEVEL: NO PAIN (0)

## 2024-03-25 NOTE — PROGRESS NOTES
"Oncology Rooming Note    March 25, 2024 10:22 AM   Inez Rodriguez is a 73 year old female who presents for:    Chief Complaint   Patient presents with    Oncology Clinic Visit     Return visit with labs/infusion related to Lung cancer metastatic to bone; Malignant neoplasm of lower lobe of right lung       Initial Vitals: BP (!) 142/81 (BP Location: Left arm, Patient Position: Sitting, Cuff Size: Adult Regular)   Pulse 116   Temp 97.7  F (36.5  C) (Tympanic)   Resp 20   Ht 1.651 m (5' 5\")   Wt 54.9 kg (121 lb)   LMP  (LMP Unknown)   SpO2 99%   BMI 20.14 kg/m   Estimated body mass index is 20.14 kg/m  as calculated from the following:    Height as of this encounter: 1.651 m (5' 5\").    Weight as of this encounter: 54.9 kg (121 lb). Body surface area is 1.59 meters squared.  No Pain (0) Comment: Data Unavailable   No LMP recorded (lmp unknown). Patient is premenopausal.  Allergies reviewed: Yes  Medications reviewed: Yes    Medications: Medication refills not needed today.  Pharmacy name entered into [facility]:    [id number] [place] [phone] AT [facility] [address] [facility] [address]    Frailty Screening:   Is the patient here for a new oncology consult visit in cancer care? 2. No      Clinical concerns: Return visit with labs/infusion related to Lung cancer metastatic to bone; Malignant neoplasm of lower lobe of right lung      FERCHO JORGENSEN CMA              "

## 2024-03-25 NOTE — PROGRESS NOTES
Infusion Nursing Note:  Inez Rodriguez presents today for Keytruda, Alimta, Carboplatin.    Patient seen by provider today: Yes: JULY Williamson CNP   present during visit today: Not Applicable.    Note: N/A.      Intravenous Access:  Labs drawn without difficulty.  Implanted Port.    Treatment Conditions:  Results reviewed, labs MET treatment parameters, ok to proceed with treatment.      Post Infusion Assessment:  Patient tolerated infusion without incident.  Blood return noted pre and post infusion.  No evidence of extravasations.  Access discontinued per protocol.       Discharge Plan:   Patient discharged in stable condition accompanied by: self.  Departure Mode: Ambulatory.      Ashley Springer RN

## 2024-03-25 NOTE — PROGRESS NOTES
[facility] Hematology and Oncology Progress Note    Patient: Inez Rodriguez  MRN: [id number]  Date of Service: Mar 25, 2024          Reason for Visit    Chief Complaint   Patient presents with    Oncology Clinic Visit     Return visit with labs/infusion related to Lung cancer metastatic to bone; Malignant neoplasm of lower lobe of right lung         Assessment and Plan     Cancer Staging   No matching staging information was found for the patient.    1.  Lung cancer, non-small cell likely adenocarcinoma, stage IV with right upper lobe nodule in her lung, lymphadenopathy, bone mets with T4 and right sacrum, possible malignant pleural effusion: Patient is on palliative treatment with pembrolizumab, pemetrexed and carboplatin. Pt has had 2 cycles. Her CT shows significant improvement.  We will likely do a full 4 cycles of triplet therapy and if things are going well we may then go on to maintenance Keytruda and Alimta. She will return in 3 weeks for cycle 4. We will do scan after the 4th cycle.     2.  Slightly abnormal TSH with a normal T4: We will monitor this closely with immunotherapy.  TSH is still low but better.  Check every 3 to 6 weeks.    3.  Fatigue, dysgeusia, anorexia, constipation: These are all expected side effects of the chemotherapy.  Patient feels like these are better after the second cycle and she handled the much better.  She is happy with how she is doing.    4. Anemia: chemo induced and usually cumulative. Overall asymptomatic except fatigue. Continue to monitor.       ECOG Performance    0 - Independent    Distress Screening (within last 30 days)    No data recorded     Pain  Pain Score: No Pain (0)    Problem List    Patient Active Problem List   Diagnosis    Observation for suspected malignant neoplasm    Thyroid nodule    Chronic cough    Pulmonary nodules    Wheezing-associated respiratory infection (WARI)    Malignant neoplasm of lower lobe of right lung (H)    Postobstructive  pneumonia    Lung cancer metastatic to bone (H)        ______________________________________________________________________________    History of Present Illness    Oncologist: Dr. Miller    Diagnosis: Lung cancer, January 2024 came to ER with SOB.   -1/7/24: Mediastinal and right hilar adenopathy most suggestive of metastatic adenopathy from a primary lung malignancy. A nodular opacity in the right upper lobe could represent a primary lung malignancy versus an infectious/inflammatory focus. Recommend   referral to pulmonology for tissue sampling. Nichole conglomerate demonstrate mass effect on the right upper lobe bronchus and bronchus intermedius with associated narrowing without occlusion. Brain MRI negative.   -1/8/24: EBUS done and 4R, 11R and subcarinal nodes all positive for malignancy. NSCLC, favor adenocarcinoma. Right upper lobe nodule was also positive. Pleural fluid suspicious.   -1/29/24: PET: Findings suspicious for a right upper lobe primary lung neoplasm with extensive mediastinal/hilar lymph node metastases and osseous metastases to the T4 vertebral body and right sacrum.   Neogenomics: KRAS G12C mutation. Tp53 mutation. PTEN deletion +, MSI stable, PDL1 0%, TMB intermediate, Her2/leisa negative. Eveything else was negative.     Treatment:   -2/14/24: palliative treatment with Carboplatin, Alimta, Keytruda, today is cycle 3    Interim History:   Patient is here today to continue on treatment and to review CT. overall patient states that she was able to see her CT scan results on [employer] and she is very happy with how she is doing.  She said the second cycle went much better than the first cycle because she had a better idea of how to handle everything.  She is doing a lot of things and actually would just in her diet and what she is eating.  She denies any new bone or back pain.  Denies any infectious complaints.           Review of Systems    Pertinent items are noted in HPI.    Past History    Past  "Medical History:   Diagnosis Date    Hypertension     SOB (shortness of breath)        PHYSICAL EXAM  BP (!) 142/81 (BP Location: Left arm, Patient Position: Sitting, Cuff Size: Adult Regular)   Pulse 116   Temp 97.7  F (36.5  C) (Tympanic)   Resp 20   Ht 1.651 m (5' 5\")   Wt 54.9 kg (121 lb)   LMP  (LMP Unknown)   SpO2 99%   BMI 20.14 kg/m      GENERAL: no acute distress. Cooperative in conversation. Here alone today  RESP: Regular respiratory rate. No expiratory wheezes   NEURO: non focal. Alert and oriented x3.   PSYCH: within normal limits. No depression or anxiety.  SKIN: exposed skin is dry intact    Lab Results    Recent Results (from the past 168 hour(s))   Comprehensive metabolic panel   Result Value Ref Range    Sodium 141 135 - 145 mmol/L    Potassium 3.8 3.4 - 5.3 mmol/L    Carbon Dioxide (CO2) 24 22 - 29 mmol/L    Anion Gap 15 7 - 15 mmol/L    Urea Nitrogen 17.8 8.0 - 23.0 mg/dL    Creatinine 0.74 0.51 - 0.95 mg/dL    GFR Estimate 85 >60 mL/min/1.73m2    Calcium 9.4 8.8 - 10.2 mg/dL    Chloride 102 98 - 107 mmol/L    Glucose 171 (H) 70 - 99 mg/dL    Alkaline Phosphatase 81 40 - 150 U/L    AST 25 0 - 45 U/L    ALT 30 0 - 50 U/L    Protein Total 6.8 6.4 - 8.3 g/dL    Albumin 4.3 3.5 - 5.2 g/dL    Bilirubin Total 0.2 <=1.2 mg/dL   TSH with free T4 reflex   Result Value Ref Range    TSH 0.24 (L) 0.30 - 4.20 uIU/mL   CBC with platelets and differential   Result Value Ref Range    WBC Count 5.5 4.0 - 11.0 10e3/uL    RBC Count 3.75 (L) 3.80 - 5.20 10e6/uL    Hemoglobin 11.4 (L) 11.7 - 15.7 g/dL    Hematocrit 34.5 (L) 35.0 - 47.0 %    MCV 92 78 - 100 fL    MCH 30.4 26.5 - 33.0 pg    MCHC 33.0 31.5 - 36.5 g/dL    RDW 15.8 (H) 10.0 - 15.0 %    Platelet Count 396 150 - 450 10e3/uL    % Neutrophils 73 %    % Lymphocytes 19 %    % Monocytes 7 %    % Eosinophils 0 %    % Basophils 0 %    % Immature Granulocytes 1 %    NRBCs per 100 WBC 0 <1 /100    Absolute Neutrophils 4.1 1.6 - 8.3 10e3/uL    Absolute " Lymphocytes 1.0 0.8 - 5.3 10e3/uL    Absolute Monocytes 0.4 0.0 - 1.3 10e3/uL    Absolute Eosinophils 0.0 0.0 - 0.7 10e3/uL    Absolute Basophils 0.0 0.0 - 0.2 10e3/uL    Absolute Immature Granulocytes 0.0 <=0.4 10e3/uL    Absolute NRBCs 0.0 10e3/uL         Imaging    CT Chest/Abdomen/Pelvis w Contrast    Result Date: 3/21/2024  EXAM: CT CHEST/ABDOMEN/PELVIS W CONTRAST LOCATION: [facility] DATE: 3/21/2024 INDICATION:  Lung cancer metastatic to bone (H), Lung cancer metastatic to bone (H), Malignant neoplasm of lower lobe of right lung (H) COMPARISON: CTA chest 01/07/2024, PET/CT 01/29/2024 TECHNIQUE: CT scan of the chest, abdomen, and pelvis was performed following injection of IV contrast. Multiplanar reformats were obtained. Dose reduction techniques were used. CONTRAST: 61ml Isovue 370 FINDINGS: LUNGS AND PLEURA: Decrease in size of the right upper lobe mass, now measuring 18 x 7 mm (4/110), previously 25 x 17 mm on PET/CT 01/29/2024 as remeasured. The cluster of inflammatory nodules in the right upper lobe (4/74) is unchanged. No new or enlarging lung nodules. Unchanged background emphysema. No pleural effusion. MEDIASTINUM/AXILLAE: Normal heart size. No pericardial effusion. Thoracic aorta is nonaneurysmal with severe atheromatous disease. Decreasing mediastinal and hilar lymphadenopathy. For instance: - 9 mm right lower paratracheal lymph node (3/57) previously measured 12 mm. - 13 mm right hilar lymph node (3/64), previously approximately 17 mm. - 17 mm partially necrotic subcarinal lymph node (3/67), previously 25 mm. Left chest port catheter tip terminates in the lower SVC. CORONARY ARTERY CALCIFICATION: Moderate, primarily involving the left anterior descending. HEPATOBILIARY: Normal liver contours. Focal fat deposition adjacent to the falciform. No worrisome liver lesions. Normal gallbladder. No biliary ductal dilation. PANCREAS: Normal. SPLEEN: Normal. ADRENAL GLANDS: Normal.  KIDNEYS/BLADDER: Kidneys enhance symmetrically with bilateral cortically based cysts, which require no follow-up. No urolithiasis or collecting system dilation. Decompressed bladder. BOWEL: Normal caliber small and large bowel. Extensive colonic diverticulosis. The appendix is normal. No ascites. LYMPH NODES: No lymphadenopathy. VASCULATURE: The abdominal aorta is nonaneurysmal with severe circumferential atherosclerotic calcifications. PELVIC ORGANS: Uterus is present. No adnexal masses. MUSCULOSKELETAL: Increased sclerosis within the T4 vertebral body metastasis with new mild superior endplate deformity. No new destructive osseous lesions. Unchanged benign-appearing sclerosis within the right iliac bone.     IMPRESSION: 1.  Decrease in size of the right upper lobe mass and mediastinal/hilar lymphadenopathy compared to PET CT 01/29/2024. 2.  Increased sclerosis within the T4 vertebral body metastasis, likely posttreatment in nature. There is now a mild superior endplate compression deformity of this vertebral body; correlate with focal pain. 3.  No new sites of disease in the chest, abdomen or pelvis.     Personally reviewed CT and compared to January.     The longitudinal plan of care for the diagnosis(es)/condition(s) as documented were addressed during this visit. Due to the added complexity in care, I will continue to support Inez in the subsequent management and with ongoing continuity of care.      Signed by: TANESHA Okeefe CNP

## 2024-03-25 NOTE — LETTER
3/25/2024         RE: Inez Rodriguez  [address]  [place] [address]        Dear Colleague,    Thank you for referring your patient, Inez Rodriguez, to the [facility] [place]. Please see a copy of my visit note below.    [facility] Hematology and Oncology Progress Note    Patient: Inez Rodriguez  MRN: [id number]  Date of Service: Mar 25, 2024          Reason for Visit    Chief Complaint   Patient presents with     Oncology Clinic Visit     Return visit with labs/infusion related to Lung cancer metastatic to bone; Malignant neoplasm of lower lobe of right lung         Assessment and Plan     Cancer Staging   No matching staging information was found for the patient.    1.  Lung cancer, non-small cell likely adenocarcinoma, stage IV with right upper lobe nodule in her lung, lymphadenopathy, bone mets with T4 and right sacrum, possible malignant pleural effusion: Patient is on palliative treatment with pembrolizumab, pemetrexed and carboplatin. Pt has had 2 cycles. Her CT shows significant improvement.  We will likely do a full 4 cycles of triplet therapy and if things are going well we may then go on to maintenance Keytruda and Alimta. She will return in 3 weeks for cycle 4. We will do scan after the 4th cycle.     2.  Slightly abnormal TSH with a normal T4: We will monitor this closely with immunotherapy.  TSH is still low but better.  Check every 3 to 6 weeks.    3.  Fatigue, dysgeusia, anorexia, constipation: These are all expected side effects of the chemotherapy.  Patient feels like these are better after the second cycle and she handled the much better.  She is happy with how she is doing.    4. Anemia: chemo induced and usually cumulative. Overall asymptomatic except fatigue. Continue to monitor.       ECOG Performance    0 - Independent    Distress Screening (within last 30 days)    No data recorded     Pain  Pain Score: No Pain (0)    Problem List    Patient Active  Problem List   Diagnosis     Observation for suspected malignant neoplasm     Thyroid nodule     Chronic cough     Pulmonary nodules     Wheezing-associated respiratory infection (WARI)     Malignant neoplasm of lower lobe of right lung (H)     Postobstructive pneumonia     Lung cancer metastatic to bone (H)        ______________________________________________________________________________    History of Present Illness    Oncologist: Dr. Miller    Diagnosis: Lung cancer, January 2024 came to ER with SOB.   -1/7/24: Mediastinal and right hilar adenopathy most suggestive of metastatic adenopathy from a primary lung malignancy. A nodular opacity in the right upper lobe could represent a primary lung malignancy versus an infectious/inflammatory focus. Recommend   referral to pulmonology for tissue sampling. Nichole conglomerate demonstrate mass effect on the right upper lobe bronchus and bronchus intermedius with associated narrowing without occlusion. Brain MRI negative.   -1/8/24: EBUS done and 4R, 11R and subcarinal nodes all positive for malignancy. NSCLC, favor adenocarcinoma. Right upper lobe nodule was also positive. Pleural fluid suspicious.   -1/29/24: PET: Findings suspicious for a right upper lobe primary lung neoplasm with extensive mediastinal/hilar lymph node metastases and osseous metastases to the T4 vertebral body and right sacrum.   Neogenomics: KRAS G12C mutation. Tp53 mutation. PTEN deletion +, MSI stable, PDL1 0%, TMB intermediate, Her2/leisa negative. Eveything else was negative.     Treatment:   -2/14/24: palliative treatment with Carboplatin, Alimta, Keytruda, today is cycle 3    Interim History:   Patient is here today to continue on treatment and to review CT. overall patient states that she was able to see her CT scan results on [employer] and she is very happy with how she is doing.  She said the second cycle went much better than the first cycle because she had a better idea of how to handle  "everything.  She is doing a lot of things and actually would just in her diet and what she is eating.  She denies any new bone or back pain.  Denies any infectious complaints.           Review of Systems    Pertinent items are noted in HPI.    Past History    Past Medical History:   Diagnosis Date     Hypertension      SOB (shortness of breath)        PHYSICAL EXAM  BP (!) 142/81 (BP Location: Left arm, Patient Position: Sitting, Cuff Size: Adult Regular)   Pulse 116   Temp 97.7  F (36.5  C) (Tympanic)   Resp 20   Ht 1.651 m (5' 5\")   Wt 54.9 kg (121 lb)   LMP  (LMP Unknown)   SpO2 99%   BMI 20.14 kg/m      GENERAL: no acute distress. Cooperative in conversation. Here alone today  RESP: Regular respiratory rate. No expiratory wheezes   NEURO: non focal. Alert and oriented x3.   PSYCH: within normal limits. No depression or anxiety.  SKIN: exposed skin is dry intact    Lab Results    Recent Results (from the past 168 hour(s))   Comprehensive metabolic panel   Result Value Ref Range    Sodium 141 135 - 145 mmol/L    Potassium 3.8 3.4 - 5.3 mmol/L    Carbon Dioxide (CO2) 24 22 - 29 mmol/L    Anion Gap 15 7 - 15 mmol/L    Urea Nitrogen 17.8 8.0 - 23.0 mg/dL    Creatinine 0.74 0.51 - 0.95 mg/dL    GFR Estimate 85 >60 mL/min/1.73m2    Calcium 9.4 8.8 - 10.2 mg/dL    Chloride 102 98 - 107 mmol/L    Glucose 171 (H) 70 - 99 mg/dL    Alkaline Phosphatase 81 40 - 150 U/L    AST 25 0 - 45 U/L    ALT 30 0 - 50 U/L    Protein Total 6.8 6.4 - 8.3 g/dL    Albumin 4.3 3.5 - 5.2 g/dL    Bilirubin Total 0.2 <=1.2 mg/dL   TSH with free T4 reflex   Result Value Ref Range    TSH 0.24 (L) 0.30 - 4.20 uIU/mL   CBC with platelets and differential   Result Value Ref Range    WBC Count 5.5 4.0 - 11.0 10e3/uL    RBC Count 3.75 (L) 3.80 - 5.20 10e6/uL    Hemoglobin 11.4 (L) 11.7 - 15.7 g/dL    Hematocrit 34.5 (L) 35.0 - 47.0 %    MCV 92 78 - 100 fL    MCH 30.4 26.5 - 33.0 pg    MCHC 33.0 31.5 - 36.5 g/dL    RDW 15.8 (H) 10.0 - 15.0 %    " Platelet Count 396 150 - 450 10e3/uL    % Neutrophils 73 %    % Lymphocytes 19 %    % Monocytes 7 %    % Eosinophils 0 %    % Basophils 0 %    % Immature Granulocytes 1 %    NRBCs per 100 WBC 0 <1 /100    Absolute Neutrophils 4.1 1.6 - 8.3 10e3/uL    Absolute Lymphocytes 1.0 0.8 - 5.3 10e3/uL    Absolute Monocytes 0.4 0.0 - 1.3 10e3/uL    Absolute Eosinophils 0.0 0.0 - 0.7 10e3/uL    Absolute Basophils 0.0 0.0 - 0.2 10e3/uL    Absolute Immature Granulocytes 0.0 <=0.4 10e3/uL    Absolute NRBCs 0.0 10e3/uL         Imaging    CT Chest/Abdomen/Pelvis w Contrast    Result Date: 3/21/2024  EXAM: CT CHEST/ABDOMEN/PELVIS W CONTRAST LOCATION: [facility] DATE: 3/21/2024 INDICATION:  Lung cancer metastatic to bone (H), Lung cancer metastatic to bone (H), Malignant neoplasm of lower lobe of right lung (H) COMPARISON: CTA chest 01/07/2024, PET/CT 01/29/2024 TECHNIQUE: CT scan of the chest, abdomen, and pelvis was performed following injection of IV contrast. Multiplanar reformats were obtained. Dose reduction techniques were used. CONTRAST: 61ml Isovue 370 FINDINGS: LUNGS AND PLEURA: Decrease in size of the right upper lobe mass, now measuring 18 x 7 mm (4/110), previously 25 x 17 mm on PET/CT 01/29/2024 as remeasured. The cluster of inflammatory nodules in the right upper lobe (4/74) is unchanged. No new or enlarging lung nodules. Unchanged background emphysema. No pleural effusion. MEDIASTINUM/AXILLAE: Normal heart size. No pericardial effusion. Thoracic aorta is nonaneurysmal with severe atheromatous disease. Decreasing mediastinal and hilar lymphadenopathy. For instance: - 9 mm right lower paratracheal lymph node (3/57) previously measured 12 mm. - 13 mm right hilar lymph node (3/64), previously approximately 17 mm. - 17 mm partially necrotic subcarinal lymph node (3/67), previously 25 mm. Left chest port catheter tip terminates in the lower SVC. CORONARY ARTERY CALCIFICATION: Moderate, primarily  involving the left anterior descending. HEPATOBILIARY: Normal liver contours. Focal fat deposition adjacent to the falciform. No worrisome liver lesions. Normal gallbladder. No biliary ductal dilation. PANCREAS: Normal. SPLEEN: Normal. ADRENAL GLANDS: Normal. KIDNEYS/BLADDER: Kidneys enhance symmetrically with bilateral cortically based cysts, which require no follow-up. No urolithiasis or collecting system dilation. Decompressed bladder. BOWEL: Normal caliber small and large bowel. Extensive colonic diverticulosis. The appendix is normal. No ascites. LYMPH NODES: No lymphadenopathy. VASCULATURE: The abdominal aorta is nonaneurysmal with severe circumferential atherosclerotic calcifications. PELVIC ORGANS: Uterus is present. No adnexal masses. MUSCULOSKELETAL: Increased sclerosis within the T4 vertebral body metastasis with new mild superior endplate deformity. No new destructive osseous lesions. Unchanged benign-appearing sclerosis within the right iliac bone.     IMPRESSION: 1.  Decrease in size of the right upper lobe mass and mediastinal/hilar lymphadenopathy compared to PET CT 01/29/2024. 2.  Increased sclerosis within the T4 vertebral body metastasis, likely posttreatment in nature. There is now a mild superior endplate compression deformity of this vertebral body; correlate with focal pain. 3.  No new sites of disease in the chest, abdomen or pelvis.     Personally reviewed CT and compared to January.     The longitudinal plan of care for the diagnosis(es)/condition(s) as documented were addressed during this visit. Due to the added complexity in care, I will continue to support Inez in the subsequent management and with ongoing continuity of care.      Signed by: TANESHA Okeefe CNP    Oncology Rooming Note    March 25, 2024 10:22 AM   Inez Rodriguez is a 73 year old female who presents for:    Chief Complaint   Patient presents with     Oncology Clinic Visit     Return visit with labs/infusion  "related to Lung cancer metastatic to bone; Malignant neoplasm of lower lobe of right lung       Initial Vitals: BP (!) 142/81 (BP Location: Left arm, Patient Position: Sitting, Cuff Size: Adult Regular)   Pulse 116   Temp 97.7  F (36.5  C) (Tympanic)   Resp 20   Ht 1.651 m (5' 5\")   Wt 54.9 kg (121 lb)   LMP  (LMP Unknown)   SpO2 99%   BMI 20.14 kg/m   Estimated body mass index is 20.14 kg/m  as calculated from the following:    Height as of this encounter: 1.651 m (5' 5\").    Weight as of this encounter: 54.9 kg (121 lb). Body surface area is 1.59 meters squared.  No Pain (0) Comment: Data Unavailable   No LMP recorded (lmp unknown). Patient is premenopausal.  Allergies reviewed: Yes  Medications reviewed: Yes    Medications: Medication refills not needed today.  Pharmacy name entered into [facility]:    [id number] [address] [facility] [address]    Frailty Screening:   Is the patient here for a new oncology consult visit in cancer care? 2. No      Clinical concerns: Return visit with labs/infusion related to Lung cancer metastatic to bone; Malignant neoplasm of lower lobe of right lung      FERCHO JROGENSEN CMA                Again, thank you for allowing me to participate in the care of your patient.        Sincerely,        TANESHA Okeefe CNP  "

## 2024-04-01 ENCOUNTER — MYC REFILL (OUTPATIENT)
Dept: INTERNAL MEDICINE | Facility: CLINIC | Age: 74
End: 2024-04-01
Payer: MEDICARE

## 2024-04-01 DIAGNOSIS — I10 BENIGN ESSENTIAL HYPERTENSION: ICD-10-CM

## 2024-04-01 RX ORDER — AMLODIPINE BESYLATE 5 MG/1
5 TABLET ORAL DAILY
Qty: 90 TABLET | Refills: 2 | OUTPATIENT
Start: 2024-04-01

## 2024-04-09 ENCOUNTER — PATIENT OUTREACH (OUTPATIENT)
Dept: GERIATRIC MEDICINE | Facility: CLINIC | Age: 74
End: 2024-04-09
Payer: MEDICARE

## 2024-04-09 NOTE — PROGRESS NOTES
[facility] Care Coordination Contact    Member became effective with  Aggie on 04/01/24 with [employer] MSC+.  Previous Health Plan: [place]   Previous Care System: [facility]  Previous care coordinators name and number: None. No MMIS   Waiver Type: N/A  Last MMIS Entry: Date 00, and Type na  MMIS visit date (and type) if different from above: na  Services Listed in MMIS: none  UTF received: No UTF to request  Mailed welcome letter and Ailin When to Contact Your Care Coordinator  Address/Phone discrepancy: na  MnChoices: yes    Waylon James  Care Management Specialist  [facility]  [phone]

## 2024-04-09 NOTE — LETTER
April 9, 2024    ROCHELLE AVILA  [address]  [place] [address]    Dear  Rochelle,    Welcome to [facility] s MSC+ health program. My name is LOPEZ Razo. I am your MSC+ care coordinator. You are eligible for Care Coordination through [facility] MSC+ plan.    As your care coordinator, we ll:  Meet to go over your care coordination benefits  Talk about your physical and mental health care needs   Review your preventative care needs  Create a plan that meets your needs with the services you choose    What happens next?  I ll call you soon to introduce myself and tell you more about my role. We ll then plan time to go over your health and safety needs. Our goal is to keep you as healthy and independent as possible.    Soon, you will receive a new MSC+ member identification (ID) card from [facility]. When you receive it, please use this card along with your [place] [facility] card and Prescription Drug Coverage Program card. When you receive, it please use this card where you get your health services. If you have Medicare, you will need to show your Medicare card when you get health services.    The MSC+ care coordination program is voluntary and offered to you at no cost. If you wish to stop being in the care coordination program or have questions, call me at [phone]. If you reach my voicemail, leave a message and your phone number. TTY users, call the [place] [employer] at 118 or 1-586.483.7853 (oymzvp-mt-yjblzn relay service).    Sincerely,      LOPEZ Razo  [phone]  [email or web address]      [id number] accepted   [id number]       [id number] (07/2022)

## 2024-04-16 ENCOUNTER — VIRTUAL VISIT (OUTPATIENT)
Dept: ONCOLOGY | Facility: HOSPITAL | Age: 74
End: 2024-04-16
Attending: NURSE PRACTITIONER
Payer: MEDICARE

## 2024-04-16 ENCOUNTER — LAB (OUTPATIENT)
Dept: INFUSION THERAPY | Facility: HOSPITAL | Age: 74
End: 2024-04-16
Attending: NURSE PRACTITIONER
Payer: MEDICARE

## 2024-04-16 VITALS
WEIGHT: 125 LBS | HEIGHT: 65 IN | HEART RATE: 114 BPM | RESPIRATION RATE: 18 BRPM | SYSTOLIC BLOOD PRESSURE: 193 MMHG | TEMPERATURE: 97.9 F | OXYGEN SATURATION: 98 % | DIASTOLIC BLOOD PRESSURE: 93 MMHG | BODY MASS INDEX: 20.83 KG/M2

## 2024-04-16 DIAGNOSIS — C34.90 LUNG CANCER METASTATIC TO BONE (H): Primary | ICD-10-CM

## 2024-04-16 DIAGNOSIS — F32.A DEPRESSION, UNSPECIFIED DEPRESSION TYPE: ICD-10-CM

## 2024-04-16 DIAGNOSIS — C79.51 LUNG CANCER METASTATIC TO BONE (H): Primary | ICD-10-CM

## 2024-04-16 DIAGNOSIS — C34.31 MALIGNANT NEOPLASM OF LOWER LOBE OF RIGHT LUNG (H): Primary | ICD-10-CM

## 2024-04-16 DIAGNOSIS — C34.31 MALIGNANT NEOPLASM OF LOWER LOBE OF RIGHT LUNG (H): ICD-10-CM

## 2024-04-16 LAB
ALBUMIN SERPL BCG-MCNC: 4.3 G/DL (ref 3.5–5.2)
ALP SERPL-CCNC: 81 U/L (ref 40–150)
ALT SERPL W P-5'-P-CCNC: 52 U/L (ref 0–50)
ANION GAP SERPL CALCULATED.3IONS-SCNC: 13 MMOL/L (ref 7–15)
AST SERPL W P-5'-P-CCNC: 32 U/L (ref 0–45)
BASOPHILS # BLD AUTO: 0 10E3/UL (ref 0–0.2)
BASOPHILS NFR BLD AUTO: 0 %
BILIRUB SERPL-MCNC: 0.2 MG/DL
BUN SERPL-MCNC: 17.3 MG/DL (ref 8–23)
CALCIUM SERPL-MCNC: 10.4 MG/DL (ref 8.8–10.2)
CHLORIDE SERPL-SCNC: 103 MMOL/L (ref 98–107)
CREAT SERPL-MCNC: 0.78 MG/DL (ref 0.51–0.95)
DEPRECATED HCO3 PLAS-SCNC: 27 MMOL/L (ref 22–29)
EGFRCR SERPLBLD CKD-EPI 2021: 80 ML/MIN/1.73M2
EOSINOPHIL # BLD AUTO: 0 10E3/UL (ref 0–0.7)
EOSINOPHIL NFR BLD AUTO: 0 %
ERYTHROCYTE [DISTWIDTH] IN BLOOD BY AUTOMATED COUNT: 19.2 % (ref 10–15)
GLUCOSE SERPL-MCNC: 129 MG/DL (ref 70–99)
HCT VFR BLD AUTO: 34.6 % (ref 35–47)
HGB BLD-MCNC: 11.2 G/DL (ref 11.7–15.7)
IMM GRANULOCYTES # BLD: 0 10E3/UL
IMM GRANULOCYTES NFR BLD: 1 %
LYMPHOCYTES # BLD AUTO: 1 10E3/UL (ref 0.8–5.3)
LYMPHOCYTES NFR BLD AUTO: 20 %
MCH RBC QN AUTO: 30.9 PG (ref 26.5–33)
MCHC RBC AUTO-ENTMCNC: 32.4 G/DL (ref 31.5–36.5)
MCV RBC AUTO: 96 FL (ref 78–100)
MONOCYTES # BLD AUTO: 0.7 10E3/UL (ref 0–1.3)
MONOCYTES NFR BLD AUTO: 13 %
NEUTROPHILS # BLD AUTO: 3.4 10E3/UL (ref 1.6–8.3)
NEUTROPHILS NFR BLD AUTO: 66 %
NRBC # BLD AUTO: 0 10E3/UL
NRBC BLD AUTO-RTO: 0 /100
PLATELET # BLD AUTO: 428 10E3/UL (ref 150–450)
POTASSIUM SERPL-SCNC: 4.2 MMOL/L (ref 3.4–5.3)
PROT SERPL-MCNC: 6.9 G/DL (ref 6.4–8.3)
RBC # BLD AUTO: 3.62 10E6/UL (ref 3.8–5.2)
SODIUM SERPL-SCNC: 143 MMOL/L (ref 135–145)
T4 FREE SERPL-MCNC: 1.02 NG/DL (ref 0.9–1.7)
TSH SERPL DL<=0.005 MIU/L-ACNC: 0.18 UIU/ML (ref 0.3–4.2)
WBC # BLD AUTO: 5.2 10E3/UL (ref 4–11)

## 2024-04-16 PROCEDURE — 96417 CHEMO IV INFUS EACH ADDL SEQ: CPT

## 2024-04-16 PROCEDURE — G2211 COMPLEX E/M VISIT ADD ON: HCPCS | Mod: 95 | Performed by: NURSE PRACTITIONER

## 2024-04-16 PROCEDURE — 96367 TX/PROPH/DG ADDL SEQ IV INF: CPT

## 2024-04-16 PROCEDURE — 96375 TX/PRO/DX INJ NEW DRUG ADDON: CPT

## 2024-04-16 PROCEDURE — 36591 DRAW BLOOD OFF VENOUS DEVICE: CPT | Performed by: NURSE PRACTITIONER

## 2024-04-16 PROCEDURE — 85025 COMPLETE CBC W/AUTO DIFF WBC: CPT | Performed by: NURSE PRACTITIONER

## 2024-04-16 PROCEDURE — 84439 ASSAY OF FREE THYROXINE: CPT | Performed by: NURSE PRACTITIONER

## 2024-04-16 PROCEDURE — 84443 ASSAY THYROID STIM HORMONE: CPT | Performed by: NURSE PRACTITIONER

## 2024-04-16 PROCEDURE — 258N000003 HC RX IP 258 OP 636: Performed by: NURSE PRACTITIONER

## 2024-04-16 PROCEDURE — 250N000011 HC RX IP 250 OP 636: Performed by: NURSE PRACTITIONER

## 2024-04-16 PROCEDURE — 80053 COMPREHEN METABOLIC PANEL: CPT | Performed by: NURSE PRACTITIONER

## 2024-04-16 PROCEDURE — 96413 CHEMO IV INFUSION 1 HR: CPT

## 2024-04-16 PROCEDURE — 99214 OFFICE O/P EST MOD 30 MIN: CPT | Mod: 95 | Performed by: NURSE PRACTITIONER

## 2024-04-16 PROCEDURE — 96372 THER/PROPH/DIAG INJ SC/IM: CPT | Mod: XS | Performed by: NURSE PRACTITIONER

## 2024-04-16 RX ORDER — DIPHENHYDRAMINE HYDROCHLORIDE 50 MG/ML
50 INJECTION INTRAMUSCULAR; INTRAVENOUS
Status: CANCELLED
Start: 2024-04-16

## 2024-04-16 RX ORDER — CYANOCOBALAMIN 1000 UG/ML
1000 INJECTION, SOLUTION INTRAMUSCULAR; SUBCUTANEOUS ONCE
Status: CANCELLED
Start: 2024-04-16 | End: 2024-04-16

## 2024-04-16 RX ORDER — LORAZEPAM 2 MG/ML
0.5 INJECTION INTRAMUSCULAR EVERY 4 HOURS PRN
Status: CANCELLED | OUTPATIENT
Start: 2024-04-16

## 2024-04-16 RX ORDER — HEPARIN SODIUM (PORCINE) LOCK FLUSH IV SOLN 100 UNIT/ML 100 UNIT/ML
5 SOLUTION INTRAVENOUS
Status: CANCELLED | OUTPATIENT
Start: 2024-04-16

## 2024-04-16 RX ORDER — ALBUTEROL SULFATE 0.83 MG/ML
2.5 SOLUTION RESPIRATORY (INHALATION)
Status: CANCELLED | OUTPATIENT
Start: 2024-04-16

## 2024-04-16 RX ORDER — DIPHENHYDRAMINE HYDROCHLORIDE 50 MG/ML
50 INJECTION INTRAMUSCULAR; INTRAVENOUS
Status: DISCONTINUED | OUTPATIENT
Start: 2024-04-16 | End: 2024-04-16 | Stop reason: HOSPADM

## 2024-04-16 RX ORDER — PALONOSETRON 0.05 MG/ML
0.25 INJECTION, SOLUTION INTRAVENOUS ONCE
Status: COMPLETED | OUTPATIENT
Start: 2024-04-16 | End: 2024-04-16

## 2024-04-16 RX ORDER — METHYLPREDNISOLONE SODIUM SUCCINATE 125 MG/2ML
125 INJECTION, POWDER, LYOPHILIZED, FOR SOLUTION INTRAMUSCULAR; INTRAVENOUS
Status: DISCONTINUED | OUTPATIENT
Start: 2024-04-16 | End: 2024-04-16 | Stop reason: HOSPADM

## 2024-04-16 RX ORDER — MEPERIDINE HYDROCHLORIDE 50 MG/ML
25 INJECTION INTRAMUSCULAR; INTRAVENOUS; SUBCUTANEOUS EVERY 30 MIN PRN
Status: CANCELLED | OUTPATIENT
Start: 2024-04-16

## 2024-04-16 RX ORDER — EPINEPHRINE 1 MG/ML
0.3 INJECTION, SOLUTION INTRAMUSCULAR; SUBCUTANEOUS EVERY 5 MIN PRN
Status: DISCONTINUED | OUTPATIENT
Start: 2024-04-16 | End: 2024-04-16 | Stop reason: HOSPADM

## 2024-04-16 RX ORDER — HEPARIN SODIUM,PORCINE 10 UNIT/ML
5-20 VIAL (ML) INTRAVENOUS DAILY PRN
Status: CANCELLED | OUTPATIENT
Start: 2024-04-16

## 2024-04-16 RX ORDER — HEPARIN SODIUM (PORCINE) LOCK FLUSH IV SOLN 100 UNIT/ML 100 UNIT/ML
5 SOLUTION INTRAVENOUS
Status: DISCONTINUED | OUTPATIENT
Start: 2024-04-16 | End: 2024-04-16 | Stop reason: HOSPADM

## 2024-04-16 RX ORDER — METHYLPREDNISOLONE SODIUM SUCCINATE 125 MG/2ML
125 INJECTION, POWDER, LYOPHILIZED, FOR SOLUTION INTRAMUSCULAR; INTRAVENOUS
Status: CANCELLED
Start: 2024-04-16

## 2024-04-16 RX ORDER — CYANOCOBALAMIN 1000 UG/ML
1000 INJECTION, SOLUTION INTRAMUSCULAR; SUBCUTANEOUS ONCE
Status: COMPLETED | OUTPATIENT
Start: 2024-04-16 | End: 2024-04-16

## 2024-04-16 RX ORDER — PALONOSETRON 0.05 MG/ML
0.25 INJECTION, SOLUTION INTRAVENOUS ONCE
Status: CANCELLED
Start: 2024-04-16

## 2024-04-16 RX ORDER — EPINEPHRINE 1 MG/ML
0.3 INJECTION, SOLUTION INTRAMUSCULAR; SUBCUTANEOUS EVERY 5 MIN PRN
Status: CANCELLED | OUTPATIENT
Start: 2024-04-16

## 2024-04-16 RX ORDER — ALBUTEROL SULFATE 90 UG/1
1-2 AEROSOL, METERED RESPIRATORY (INHALATION)
Status: CANCELLED
Start: 2024-04-16

## 2024-04-16 RX ADMIN — PEMETREXED DISODIUM 800 MG: 500 INJECTION, POWDER, LYOPHILIZED, FOR SOLUTION INTRAVENOUS at 15:29

## 2024-04-16 RX ADMIN — Medication 5 ML: at 16:28

## 2024-04-16 RX ADMIN — SODIUM CHLORIDE 250 ML: 9 INJECTION, SOLUTION INTRAVENOUS at 14:12

## 2024-04-16 RX ADMIN — CARBOPLATIN 415 MG: 450 INJECTION, SOLUTION INTRAVENOUS at 15:52

## 2024-04-16 RX ADMIN — DEXAMETHASONE SODIUM PHOSPHATE: 10 INJECTION, SOLUTION INTRAMUSCULAR; INTRAVENOUS at 14:25

## 2024-04-16 RX ADMIN — CYANOCOBALAMIN 1000 MCG: 1000 INJECTION, SOLUTION INTRAMUSCULAR; SUBCUTANEOUS at 14:37

## 2024-04-16 RX ADMIN — PALONOSETRON 0.25 MG: 0.05 INJECTION, SOLUTION INTRAVENOUS at 14:12

## 2024-04-16 RX ADMIN — SODIUM CHLORIDE 200 MG: 9 INJECTION, SOLUTION INTRAVENOUS at 14:50

## 2024-04-16 ASSESSMENT — PAIN SCALES - GENERAL: PAINLEVEL: NO PAIN (0)

## 2024-04-16 NOTE — PROGRESS NOTES
Infusion Nursing Note:  Inez Rodriguez presents today for cycle 4 day 1 keytruda/pemetrexed/carboplatin.    Patient seen by provider today: Yes: Elizabet Williamson NP   present during visit today: Not Applicable.    Note: Inez arrived ambulatory and in stable condition. Port accessed and labs collected previous to this appointment prior to provider visit. Patient prefers a private room as she continues to practice COVID precautions while she is receiving cancer care as well as additional precautions due to her dental conditions that have her vulnerable to infection.  Patient prefers a N95 mask for maximum protection, however, the rigid portions of the mask apply pressure and rub against her sore oral areas therefore she can only wear her mask for short durations and asks that staff wear masks while caring for her.  Writer and staff all wore masks during her stay. Patient understands a private room is not always guaranteed. She was premedicated and treatment administered per orders. Port de-accessed upon completion and site covered with gauze and secured with tape. Her son was called when she was done to pick her up. Will return on 5/6/24 for next appointment.      Intravenous Access:  Labs drawn without difficulty.  Implanted Port.    Treatment Conditions:  Lab Results   Component Value Date    HGB 11.2 (L) 04/16/2024    WBC 5.2 04/16/2024    ANEUTAUTO 3.4 04/16/2024     04/16/2024        Lab Results   Component Value Date     04/16/2024    POTASSIUM 4.2 04/16/2024    CR 0.78 04/16/2024    CHELITA 10.4 (H) 04/16/2024    BILITOTAL 0.2 04/16/2024    ALBUMIN 4.3 04/16/2024    ALT 52 (H) 04/16/2024    AST 32 04/16/2024       Results reviewed, labs MET treatment parameters, ok to proceed with treatment.      Post Infusion Assessment:  Patient tolerated infusion without incident.  Blood return noted pre and post infusion.  Site patent and intact, free from redness, edema or discomfort.  No evidence of  extravasations.  Access discontinued per protocol.       Discharge Plan:   Patient and/or family verbalized understanding of discharge instructions and all questions answered.  AVS to patient via [employer].  Patient will return 5/6/24 for next appointment.   Patient discharged in stable condition accompanied by: self.  Departure Mode: Ambulatory.      Sheri Ely RN

## 2024-04-16 NOTE — PROGRESS NOTES
[facility] Progress Note    Patient: Inez Rodriguez  MRN: [id number]  Date of Service: Apr 16, 2024          Reason for Visit    Chief Complaint   Patient presents with    Oncology Clinic Visit     Virtual return with labs/infusion related to Lung cancer metastatic to bone; Malignant neoplasm of lower lobe of right lung          Assessment and Plan     Cancer Staging   No matching staging information was found for the patient.    1.  Lung cancer, non-small cell likely adenocarcinoma, stage IV with right upper lobe nodule in her lung, lymphadenopathy, bone mets with T4 and right sacrum, possible malignant pleural effusion: Patient is on palliative treatment with pembrolizumab, pemetrexed and carboplatin. Pt has had 3 cycles. Her CT shows significant improvement after 2 cycles.  We will likely do a full 4 cycles of triplet therapy and if things are going well we may then go on to maintenance Keytruda and Alimta. She will get cycle 4 today. Will return in 3 weeks with CT and to see Dr. Miller to decide on next steps. Starting to have harder time with chemo.     2.  Slightly abnormal TSH with a normal T4: We will monitor this closely with immunotherapy.  TSH is still low.  Check every 3 to 6 weeks.    3.  Depression and worsening fatigue: I will refer to psychotherapy. Having issues with neighbor. Encourage her to have son help as needed.     4. Anemia: chemo induced and usually cumulative. Overall asymptomatic except fatigue. Continue to monitor.     5. Elevated liver function tests: likely from chemotherapy. These are mild. We will continue to monitor and make adjustments as necessary.       ECOG Performance    0 - Independent    Distress Screening (within last 30 days)    No data recorded     Pain  Pain Score: No Pain (0)    Problem List    Patient Active Problem List   Diagnosis    Observation for suspected malignant neoplasm    Thyroid nodule    Chronic cough    Pulmonary nodules     Wheezing-associated respiratory infection (WARI)    Malignant neoplasm of lower lobe of right lung (H)    Postobstructive pneumonia    Lung cancer metastatic to bone (H)        ______________________________________________________________________________    History of Present Illness    Oncologist: Dr. Miller    Diagnosis: Lung cancer, January 2024 came to ER with SOB.   -1/7/24: Mediastinal and right hilar adenopathy most suggestive of metastatic adenopathy from a primary lung malignancy. A nodular opacity in the right upper lobe could represent a primary lung malignancy versus an infectious/inflammatory focus. Recommend   referral to pulmonology for tissue sampling. Nichole conglomerate demonstrate mass effect on the right upper lobe bronchus and bronchus intermedius with associated narrowing without occlusion. Brain MRI negative.   -1/8/24: EBUS done and 4R, 11R and subcarinal nodes all positive for malignancy. NSCLC, favor adenocarcinoma. Right upper lobe nodule was also positive. Pleural fluid suspicious.   -1/29/24: PET: Findings suspicious for a right upper lobe primary lung neoplasm with extensive mediastinal/hilar lymph node metastases and osseous metastases to the T4 vertebral body and right sacrum.   [employer]: KRAS G12C mutation. Tp53 mutation. PTEN deletion +, MSI stable, PDL1 0%, TMB intermediate, Her2/leisa negative. Eveything else was negative.     Treatment:   -2/14/24: palliative treatment with Carboplatin, Alimta, Keytruda, today is cycle 3    Interim History:   Patient is here today to continue on treatment.  States that the third cycle was definitely worse than the first 2 cycles.  She says she feels like she stayed in bed for most a week.  She says she also is having depression which is not something she is ever had in the past.  She says she has a stressful situation going on with her neighbor and that is been making life really hard for her.  She denies any significant anxiety.  She says she  "just does not enjoy anything right now.  She is spending a lot of time in bed which is unlike her.  She denies any significant eating issues.  Denies any significant weight changes.  Denies any infectious complaints    Review of Systems    Pertinent items are noted in HPI.    Past History    Past Medical History:   Diagnosis Date    Hypertension     SOB (shortness of breath)        PHYSICAL EXAM  BP (!) 193/93 (BP Location: Left arm, Patient Position: Sitting, Cuff Size: Adult Regular)   Pulse 114   Temp 97.9  F (36.6  C) (Tympanic)   Resp 18   Ht 1.651 m (5' 5\")   Wt 56.7 kg (125 lb)   LMP  (LMP Unknown)   SpO2 98%   BMI 20.80 kg/m      GENERAL: no acute distress. Cooperative in conversation. Alone on video  RESP: Regular respiratory rate. No expiratory wheezes   NEURO: non focal. Alert and oriented x3.   PSYCH: within normal limits. No depression or anxiety.  SKIN: exposed skin is dry intact    Lab Results    Recent Results (from the past 168 hour(s))   Comprehensive metabolic panel   Result Value Ref Range    Sodium 143 135 - 145 mmol/L    Potassium 4.2 3.4 - 5.3 mmol/L    Carbon Dioxide (CO2) 27 22 - 29 mmol/L    Anion Gap 13 7 - 15 mmol/L    Urea Nitrogen 17.3 8.0 - 23.0 mg/dL    Creatinine 0.78 0.51 - 0.95 mg/dL    GFR Estimate 80 >60 mL/min/1.73m2    Calcium 10.4 (H) 8.8 - 10.2 mg/dL    Chloride 103 98 - 107 mmol/L    Glucose 129 (H) 70 - 99 mg/dL    Alkaline Phosphatase 81 40 - 150 U/L    AST 32 0 - 45 U/L    ALT 52 (H) 0 - 50 U/L    Protein Total 6.9 6.4 - 8.3 g/dL    Albumin 4.3 3.5 - 5.2 g/dL    Bilirubin Total 0.2 <=1.2 mg/dL   TSH with free T4 reflex   Result Value Ref Range    TSH 0.18 (L) 0.30 - 4.20 uIU/mL   CBC with platelets and differential   Result Value Ref Range    WBC Count 5.2 4.0 - 11.0 10e3/uL    RBC Count 3.62 (L) 3.80 - 5.20 10e6/uL    Hemoglobin 11.2 (L) 11.7 - 15.7 g/dL    Hematocrit 34.6 (L) 35.0 - 47.0 %    MCV 96 78 - 100 fL    MCH 30.9 26.5 - 33.0 pg    MCHC 32.4 31.5 - 36.5 " g/dL    RDW 19.2 (H) 10.0 - 15.0 %    Platelet Count 428 150 - 450 10e3/uL    % Neutrophils 66 %    % Lymphocytes 20 %    % Monocytes 13 %    % Eosinophils 0 %    % Basophils 0 %    % Immature Granulocytes 1 %    NRBCs per 100 WBC 0 <1 /100    Absolute Neutrophils 3.4 1.6 - 8.3 10e3/uL    Absolute Lymphocytes 1.0 0.8 - 5.3 10e3/uL    Absolute Monocytes 0.7 0.0 - 1.3 10e3/uL    Absolute Eosinophils 0.0 0.0 - 0.7 10e3/uL    Absolute Basophils 0.0 0.0 - 0.2 10e3/uL    Absolute Immature Granulocytes 0.0 <=0.4 10e3/uL    Absolute NRBCs 0.0 10e3/uL   T4 free   Result Value Ref Range    Free T4 1.02 0.90 - 1.70 ng/dL           Imaging    CT Chest/Abdomen/Pelvis w Contrast    Result Date: 3/21/2024  EXAM: CT CHEST/ABDOMEN/PELVIS W CONTRAST LOCATION: [facility] DATE: 3/21/2024 INDICATION:  Lung cancer metastatic to bone (H), Lung cancer metastatic to bone (H), Malignant neoplasm of lower lobe of right lung (H) COMPARISON: CTA chest 01/07/2024, PET/CT 01/29/2024 TECHNIQUE: CT scan of the chest, abdomen, and pelvis was performed following injection of IV contrast. Multiplanar reformats were obtained. Dose reduction techniques were used. CONTRAST: 61ml Isovue 370 FINDINGS: LUNGS AND PLEURA: Decrease in size of the right upper lobe mass, now measuring 18 x 7 mm (4/110), previously 25 x 17 mm on PET/CT 01/29/2024 as remeasured. The cluster of inflammatory nodules in the right upper lobe (4/74) is unchanged. No new or enlarging lung nodules. Unchanged background emphysema. No pleural effusion. MEDIASTINUM/AXILLAE: Normal heart size. No pericardial effusion. Thoracic aorta is nonaneurysmal with severe atheromatous disease. Decreasing mediastinal and hilar lymphadenopathy. For instance: - 9 mm right lower paratracheal lymph node (3/57) previously measured 12 mm. - 13 mm right hilar lymph node (3/64), previously approximately 17 mm. - 17 mm partially necrotic subcarinal lymph node (3/67), previously 25 mm.  Left chest port catheter tip terminates in the lower SVC. CORONARY ARTERY CALCIFICATION: Moderate, primarily involving the left anterior descending. HEPATOBILIARY: Normal liver contours. Focal fat deposition adjacent to the falciform. No worrisome liver lesions. Normal gallbladder. No biliary ductal dilation. PANCREAS: Normal. SPLEEN: Normal. ADRENAL GLANDS: Normal. KIDNEYS/BLADDER: Kidneys enhance symmetrically with bilateral cortically based cysts, which require no follow-up. No urolithiasis or collecting system dilation. Decompressed bladder. BOWEL: Normal caliber small and large bowel. Extensive colonic diverticulosis. The appendix is normal. No ascites. LYMPH NODES: No lymphadenopathy. VASCULATURE: The abdominal aorta is nonaneurysmal with severe circumferential atherosclerotic calcifications. PELVIC ORGANS: Uterus is present. No adnexal masses. MUSCULOSKELETAL: Increased sclerosis within the T4 vertebral body metastasis with new mild superior endplate deformity. No new destructive osseous lesions. Unchanged benign-appearing sclerosis within the right iliac bone.     IMPRESSION: 1.  Decrease in size of the right upper lobe mass and mediastinal/hilar lymphadenopathy compared to PET CT 01/29/2024. 2.  Increased sclerosis within the T4 vertebral body metastasis, likely posttreatment in nature. There is now a mild superior endplate compression deformity of this vertebral body; correlate with focal pain. 3.  No new sites of disease in the chest, abdomen or pelvis.       Virtual Visit Details    Type of service:  Video Visit   Video Start Time: 1:38pm  Video End Time: 1:48pm    Originating Location (pt. Location): Other cancer clinic in [place].     Distant Location (provider location):  On-site  Platform used for Video Visit: Bryant      The longitudinal plan of care for the diagnosis(es)/condition(s) as documented were addressed during this visit. Due to the added complexity in care, I will continue to support  Inez in the subsequent management and with ongoing continuity of care.      Signed by: TANESHA Okeefe CNP

## 2024-04-16 NOTE — PROGRESS NOTES
"Oncology Rooming Note    April 16, 2024 1:23 PM   Inez Rodriguez is a 73 year old female who presents for:    Chief Complaint   Patient presents with    Oncology Clinic Visit     Virtual return with labs/infusion related to Lung cancer metastatic to bone; Malignant neoplasm of lower lobe of right lung        Initial Vitals: BP (!) 193/93 (BP Location: Left arm, Patient Position: Sitting, Cuff Size: Adult Regular)   Pulse 114   Temp 97.9  F (36.6  C) (Tympanic)   Resp 18   Ht 1.651 m (5' 5\")   Wt 56.7 kg (125 lb)   LMP  (LMP Unknown)   SpO2 98%   BMI 20.80 kg/m   Estimated body mass index is 20.8 kg/m  as calculated from the following:    Height as of this encounter: 1.651 m (5' 5\").    Weight as of this encounter: 56.7 kg (125 lb). Body surface area is 1.61 meters squared.  No Pain (0) Comment: Data Unavailable   No LMP recorded (lmp unknown). Patient is premenopausal.  Allergies reviewed: Yes  Medications reviewed: Yes    Medications: Medication refills not needed today.  Pharmacy name entered into [employer]:    [id number] [address] AT [facility] [address] [facility] [address]    Frailty Screening:   Is the patient here for a new oncology consult visit in cancer care? 2. No      Clinical concerns: Virtual return with labs/infusion related to Lung cancer metastatic to bone; Malignant neoplasm of lower lobe of right lung       FERCHO JORGENSEN CMA              "

## 2024-04-17 ENCOUNTER — TELEPHONE (OUTPATIENT)
Dept: ONCOLOGY | Facility: HOSPITAL | Age: 74
End: 2024-04-17
Payer: MEDICARE

## 2024-04-17 ENCOUNTER — PATIENT OUTREACH (OUTPATIENT)
Dept: ONCOLOGY | Facility: HOSPITAL | Age: 74
End: 2024-04-17
Payer: MEDICARE

## 2024-04-17 NOTE — TELEPHONE ENCOUNTER
I received a phone call today from Rafa.  They are calling hoping to get some clarification regarding the scheduling of the CT scan.  Per Kenneth, his understanding was that his mom would have 3 cycles of chemotherapy followed by a CT scan and an appointment with Dr. Miller.  He states his mom had a CT scan before cycle #3. She had cycle #4 on 4/16/24. Today they got a phone call to schedule another CT scan. He is wondering if the CT scan that is scheduled on 5/2 is necessary? I let him know I would send this question to Dr. Miller to review and advise. They can be reached at [phone].     Sarah Garcia RN on 4/17/2024 at 1:38 PM

## 2024-04-17 NOTE — TELEPHONE ENCOUNTER
I called Inez and Kenneth back to let them know that the plan was to do 2 cycles followed by a CT scan. Then, 2 more cycles followed by a CT scan. So, she has completed 4 cycles and the CT scan scheduled on 5/2 is necessary. She will see Dr. Miller in the clinic on 5/6 to review the results. Both Inez and Kenneth verbalized understanding and they agree with this plan.    Sarah Garcia RN on 4/17/2024 at 2:11 PM

## 2024-04-18 ENCOUNTER — TELEPHONE (OUTPATIENT)
Dept: ONCOLOGY | Facility: HOSPITAL | Age: 74
End: 2024-04-18
Payer: MEDICARE

## 2024-04-18 NOTE — TELEPHONE ENCOUNTER
I received a phone call today from Inez.  She states she forgot to take her dexamethasone yesterday evening.  This would have been her last dexamethasone post treatment.  She is wondering if she can omit this or if she should take it this morning.  She feels fine today with no side effects or concerns.  I let her know it is okay just to omit this dexamethasone.  She verbalized understanding and agrees with this plan.  She has no other questions at this time.    Sarah Garcia RN on 4/18/2024 at 11:40 AM

## 2024-04-19 ENCOUNTER — PATIENT OUTREACH (OUTPATIENT)
Dept: GERIATRIC MEDICINE | Facility: CLINIC | Age: 74
End: 2024-04-19
Payer: MEDICARE

## 2024-04-22 ENCOUNTER — PATIENT OUTREACH (OUTPATIENT)
Dept: GERIATRIC MEDICINE | Facility: CLINIC | Age: 74
End: 2024-04-22
Payer: MEDICARE

## 2024-04-25 NOTE — PROGRESS NOTES
[facility] Initial Assessment     Home visit for Initial Health Risk Assessment with Inez Rodriguez completed on August 29, 2024    Current Care Plan  Member currently receiving the following home care services:     Member currently receiving the following community resources:    None    Medication Review  Medication reconciliation completed in Epic: No  Medication set-up & administration: Independent and sets up on own daily.  Self-administers medications.  Medication Risk Assessment Medication (1 or more, place referral to MTM): N/A: No risk factors identified  MTM Referral Placed: No: No risk factors idenified    Mental/Behavioral Health   Depression Screening:                    No current MH services-will place referral for  NA    Falls Assessment:            ADL/IADL Dependencies:           Health Plan sponsored benefits: [employer]: Shared information regarding preventative health screening and health plan supplemental benefits/incentives. Reviewed medication disposal form.    PCA Assessment completed at visit: No     Elderly Waiver Eligibility: Inez is requesting EW services    Care Plan & Recommendations: Inez will be opened to EW in order to receive homemaking services.     See [place] for detailed assessment information.    Follow-Up Plan: Member informed of future contact, plan to f/u with member with a 6 month telephone assessment.  Contact information shared with member and family, encouraged member to call with any questions or concerns at any time.    [place] care continuum providers: Please see Snapshot and Care Management Flowsheets for Specific details of care plan.    This CC note routed to PCP, Lynn Bowles.      Myriam QUEZADAWellstar [facility]  [phone]

## 2024-04-26 ENCOUNTER — VIRTUAL VISIT (OUTPATIENT)
Dept: ONCOLOGY | Facility: HOSPITAL | Age: 74
End: 2024-04-26
Attending: NURSE PRACTITIONER
Payer: MEDICARE

## 2024-04-26 VITALS — WEIGHT: 121 LBS | BODY MASS INDEX: 20.16 KG/M2 | HEIGHT: 65 IN

## 2024-04-26 DIAGNOSIS — F32.A DEPRESSION, UNSPECIFIED DEPRESSION TYPE: ICD-10-CM

## 2024-04-26 DIAGNOSIS — F43.21 ADJUSTMENT DISORDER WITH DEPRESSED MOOD: Primary | ICD-10-CM

## 2024-04-26 DIAGNOSIS — C34.31 MALIGNANT NEOPLASM OF LOWER LOBE OF RIGHT LUNG (H): ICD-10-CM

## 2024-04-26 PROCEDURE — 90834 PSYTX W PT 45 MINUTES: CPT | Mod: 95 | Performed by: PSYCHOLOGIST

## 2024-04-26 ASSESSMENT — PAIN SCALES - GENERAL: PAINLEVEL: NO PAIN (0)

## 2024-04-26 NOTE — PROGRESS NOTES
[facility] Oncology- Psychotherapy                                    Progress Note    Patient Name: Inez Rodriguez  Date: 2024         Service Type: Individual      Session Start Time: 1400  Session End Time: 1443     Session Length: 43 mins    Session #: 1    Attendees: Client attended alone    Service Modality:  Video Visit:      Provider verified identity through the following two step process.  Patient provided:  Patient     Telemedicine Visit: The patient's condition can be safely assessed and treated via synchronous audio and visual telemedicine encounter.      Reason for Telemedicine Visit: Patient has requested telehealth visit    Originating Site (Patient Location): Patient's home    Distant Site (Provider Location): [facility]    Consent:  The patient/guardian has verbally consented to: the potential risks and benefits of telemedicine (video visit) versus in person care; bill my insurance or make self-payment for services provided; and responsibility for payment of non-covered services.     Patient would like the video invitation sent by:  My Chart    Mode of Communication:  Video Conference via Amwell    Distant Location (Provider):  On-site    As the provider I attest to compliance with applicable laws and regulations related to telemedicine.    DATA  Interactive Complexity: No  Crisis: No        Progress Since Last Session (Related to Symptoms / Goals / Homework):   Symptoms:  Pt reports she developed depression after her last chemotherapy treatment.   Pt reports she was irritable and has some continued frustrations.      Pt reports she was depressed for 10 days and now her mood is better. Pt reports she is watching a lot of TV.      Pt reports she has anxiety over her condition.      Pt reports some challenges with cognition.      Pt reports her main complaint is a lack of knowing what is going on.      Pt reports she is sleeping well. Pt reports she is  Problem: Labor (Cervical Ripen, Induct, Augment) (Adult,Obstetrics,Pediatric)  Goal: Signs and Symptoms of Listed Potential Problems Will be Absent, Minimized or Managed (Labor)  Signs and symptoms of listed potential problems will be absent, minimized or managed by discharge/transition of care (reference Labor (Cervical Ripen, Induct, Augment) (Adult,Obstetrics,Pediatric) CPG).   Outcome: Completed Date Met: 03/14/18  VSS Afebrile. Shelley q2-3 mins after membrane sweep by ORTEGA Morton CNM. FHR moderate variability with accelerations. Recurrent variable decelerations. Prolonged deceleration at 1912 x8 minutes. (see flowsheets for details). DEDRA Sanchez CNM called to bedside to assess. Pt repositioned, fluid bolus given, and O2 given. Decision to move to OR at 1944. Baby girl born at 1951. NICU at delivery. Pt out of OR at 2049 and into main PACU at 2053. PACU assessments WDL. Will visit infant in NICU prior to transfer to postpartum.       "eating WNL.          Homework:  none        Episode of Care Goals: Satisfactory progress - ACTION (Actively working towards change); Intervened by reinforcing change plan / affirming steps taken       Current / Ongoing Stressors and Concerns:   Pt reports she has been battling cancer 2-3 months.      Pt reports some unhappiness with her care.      Pt reports her son is \"amazing.\" He is doing well in helping pt.      Pt has her next treatment in two weeks.      Pt reports she has projects that are keeping her busy      Pt agreed staying in today is helpful.      Pt reports a neighbor is irritating her with allowing her dog to use her yard as a bathroom.         Social HX   Pt reports no family hx of cancer. She reports a hx of mental health and substance abuse.      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      Pt reports her son works for a company that makes board games.        Treatment Objective(s) Addressed in This Session:   identify medical stressors which contribute to feelings of depression       Intervention:   CBT: ,  Emotion Focused Therapy: ,  Solution Focused: ,    Assessments completed prior to visit:  none       ASSESSMENT: Current Emotional / Mental Status (status of significant symptoms):   Risk status (Self / Other harm or suicidal ideation)   Patient denies current fears or concerns for personal safety.   Patient denies current or recent suicidal ideation or behaviors.   Patient denies current or recent homicidal ideation or behaviors.   Patient denies current or recent self injurious behavior or ideation.   Patient denies other safety concerns.   Patient reports there has been no change in risk factors since their last session.     Patient reports there has been no change in protective factors since their last session.     Recommended that patient call 911 or go to the local ED " should there be a change in any of these risk factors.     Appearance:   Appropriate    Eye Contact:   Good    Psychomotor Behavior: Normal    Attitude:   Cooperative    Orientation:   All   Speech    Rate / Production: Normal     Volume:  Normal    Mood:    Normal   Affect:    Appropriate    Thought Content:  Clear    Thought Form:  Coherent  Logical    Insight:    Fair      Medication Review:   No current psychiatric medications prescribed     Medication Compliance:   NA     Changes in Health Issues:   Yes: cancer, Associated Psychological Distress     Chemical Use Review:   Substance Use: Chemical use reviewed, no active concerns identified      Tobacco Use: No current tobacco use.      Diagnosis:  1. Malignant neoplasm of lower lobe of right lung (H)    2. Depression, unspecified depression type    F43.21 Adjustment D/O with depressive sx.     Collateral Reports Completed:   Not Applicable    PLAN: (Patient Tasks / Therapist Tasks / Other)  Return as needed. Utilize coping skills discussed.         Hadley Rose LP                                                         ______________________________________________________________________    Individual Treatment Plan    Patient's Name: Inez Rodrgiuez  YOB: 1950    Date of Creation: 4/26/2024    Date Treatment Plan Last Reviewed/Revised:     DSM5 Diagnoses: F43.21 adjustment d/o with depressive sx.   Psychosocial / Contextual Factors: cancer dx  PROMIS (reviewed every 90 days):     Referral / Collaboration:  Referral to another professional/service is not indicated at this time..    Anticipated number of session for this episode of care: 9-12 sessions  Anticipation frequency of session: Biweekly  Anticipated Duration of each session: 53 or more minutes  Treatment plan will be reviewed in 90 days or when goals have been changed.       MeasurableTreatment Goal(s) related to diagnosis / functional impairment(s)  Goal 1: Patient will reduce of  depressive sx.       Objective #A (Patient Action)    Patient will identify medical stressors which contribute to feelings of depression.  Status: New - Date: 4/26/24      Intervention(s)  Therapist will teach distraction skills.   .    Objective #B  Patient will identify medical stressors which contribute to feelings of depression.  Status: New - Date: 4/26/24      Intervention(s)  Therapist will teach emotional recognition/identification.   .    Objective #C  Patient will identify medical stressors which contribute to feelings of depression.  Status: New - Date: 4/26/24      Intervention(s)  Therapist will teach emotional regulation skills.   .        Hadley Rose LP  April 26, 2024

## 2024-04-26 NOTE — NURSING NOTE
Is the patient currently in the state of [place]? YES    Visit mode:VIDEO    If the visit is dropped, the patient can be reconnected by: VIDEO VISIT: Send to e-mail at: [email or web address]    Will anyone else be joining the visit? NO  (If patient encounters technical issues they should call [phone]    How would you like to obtain your AVS? MyChart    Are changes needed to the allergy or medication list? No        Reason for visit: Video Visit (New apt )    Radha LUTHERF

## 2024-04-26 NOTE — PROGRESS NOTES
Virtual Visit Details    Type of service:  Video Visit   Video Start Time:  1400  Video End Time: 1443    Originating Location (pt. Location): Home    Distant Location (provider location):  On-site  Platform used for Video Visit: [employer]

## 2024-05-01 DIAGNOSIS — C34.31 MALIGNANT NEOPLASM OF LOWER LOBE OF RIGHT LUNG (H): ICD-10-CM

## 2024-05-01 DIAGNOSIS — C34.90 LUNG CANCER METASTATIC TO BONE (H): ICD-10-CM

## 2024-05-01 DIAGNOSIS — C79.51 LUNG CANCER METASTATIC TO BONE (H): ICD-10-CM

## 2024-05-02 ENCOUNTER — TELEPHONE (OUTPATIENT)
Dept: ONCOLOGY | Facility: HOSPITAL | Age: 74
End: 2024-05-02
Payer: COMMERCIAL

## 2024-05-02 ENCOUNTER — HOSPITAL ENCOUNTER (OUTPATIENT)
Dept: CT IMAGING | Facility: HOSPITAL | Age: 74
Discharge: HOME | End: 2024-05-02
Attending: NURSE PRACTITIONER | Admitting: NURSE PRACTITIONER
Payer: COMMERCIAL

## 2024-05-02 DIAGNOSIS — C34.31 MALIGNANT NEOPLASM OF LOWER LOBE OF RIGHT LUNG (H): ICD-10-CM

## 2024-05-02 PROCEDURE — 250N000011 HC RX IP 250 OP 636: Performed by: NURSE PRACTITIONER

## 2024-05-02 PROCEDURE — 71260 CT THORAX DX C+: CPT

## 2024-05-02 PROCEDURE — 250N000011 HC RX IP 250 OP 636: Performed by: RADIOLOGY

## 2024-05-02 RX ORDER — IOPAMIDOL 755 MG/ML
75 INJECTION, SOLUTION INTRAVASCULAR ONCE
Status: COMPLETED | OUTPATIENT
Start: 2024-05-02 | End: 2024-05-02

## 2024-05-02 RX ORDER — HEPARIN SODIUM (PORCINE) LOCK FLUSH IV SOLN 100 UNIT/ML 100 UNIT/ML
500 SOLUTION INTRAVENOUS ONCE
Status: COMPLETED | OUTPATIENT
Start: 2024-05-02 | End: 2024-05-02

## 2024-05-02 RX ORDER — DEXAMETHASONE 4 MG/1
4 TABLET ORAL 2 TIMES DAILY WITH MEALS
Qty: 6 TABLET | Refills: 3 | Status: SHIPPED | OUTPATIENT
Start: 2024-05-02 | End: 2024-07-25

## 2024-05-02 RX ADMIN — IOPAMIDOL 75 ML: 755 INJECTION, SOLUTION INTRAVENOUS at 13:14

## 2024-05-02 RX ADMIN — Medication 500 UNITS: at 13:13

## 2024-05-02 NOTE — TELEPHONE ENCOUNTER
Inez's son Kenneth was calling today to check to see if the prescription for dexamethasone has been filled.  I let him know that this prescription was sent to the pharmacy this morning.  He is also requesting another appointment be scheduled for Inez to meet with Jason Rose.  I let him know that I will send a message over to our schedulers to call Inez to get this appointment scheduled.  He has no other questions or concerns at this time.    Sarah Garcia RN on 5/2/2024 at 10:48 AM

## 2024-05-03 ENCOUNTER — MYC REFILL (OUTPATIENT)
Dept: INTERNAL MEDICINE | Facility: CLINIC | Age: 74
End: 2024-05-03
Payer: COMMERCIAL

## 2024-05-03 DIAGNOSIS — I10 BENIGN ESSENTIAL HYPERTENSION: ICD-10-CM

## 2024-05-03 RX ORDER — AMLODIPINE BESYLATE 5 MG/1
5 TABLET ORAL DAILY
Qty: 90 TABLET | Refills: 2 | OUTPATIENT
Start: 2024-05-03

## 2024-05-06 ENCOUNTER — INFUSION THERAPY VISIT (OUTPATIENT)
Dept: INFUSION THERAPY | Facility: HOSPITAL | Age: 74
End: 2024-05-06
Attending: INTERNAL MEDICINE
Payer: COMMERCIAL

## 2024-05-06 ENCOUNTER — ONCOLOGY VISIT (OUTPATIENT)
Dept: ONCOLOGY | Facility: HOSPITAL | Age: 74
End: 2024-05-06
Attending: INTERNAL MEDICINE
Payer: COMMERCIAL

## 2024-05-06 VITALS
RESPIRATION RATE: 20 BRPM | SYSTOLIC BLOOD PRESSURE: 150 MMHG | HEIGHT: 65 IN | OXYGEN SATURATION: 99 % | BODY MASS INDEX: 21.13 KG/M2 | TEMPERATURE: 98.4 F | HEART RATE: 119 BPM | DIASTOLIC BLOOD PRESSURE: 78 MMHG | WEIGHT: 126.8 LBS

## 2024-05-06 DIAGNOSIS — C79.51 LUNG CANCER METASTATIC TO BONE (H): ICD-10-CM

## 2024-05-06 DIAGNOSIS — C34.90 LUNG CANCER METASTATIC TO BONE (H): ICD-10-CM

## 2024-05-06 DIAGNOSIS — C34.90 LUNG CANCER METASTATIC TO BONE (H): Primary | ICD-10-CM

## 2024-05-06 DIAGNOSIS — C79.51 LUNG CANCER METASTATIC TO BONE (H): Primary | ICD-10-CM

## 2024-05-06 DIAGNOSIS — C34.31 MALIGNANT NEOPLASM OF LOWER LOBE OF RIGHT LUNG (H): Primary | ICD-10-CM

## 2024-05-06 DIAGNOSIS — C34.31 MALIGNANT NEOPLASM OF LOWER LOBE OF RIGHT LUNG (H): ICD-10-CM

## 2024-05-06 LAB
ALBUMIN SERPL BCG-MCNC: 4.5 G/DL (ref 3.5–5.2)
ALP SERPL-CCNC: 75 U/L (ref 40–150)
ALT SERPL W P-5'-P-CCNC: 35 U/L (ref 0–50)
ANION GAP SERPL CALCULATED.3IONS-SCNC: 18 MMOL/L (ref 7–15)
AST SERPL W P-5'-P-CCNC: 24 U/L (ref 0–45)
BASOPHILS # BLD AUTO: 0 10E3/UL (ref 0–0.2)
BASOPHILS NFR BLD AUTO: 0 %
BILIRUB SERPL-MCNC: 0.2 MG/DL
BUN SERPL-MCNC: 15.7 MG/DL (ref 8–23)
CALCIUM SERPL-MCNC: 9.8 MG/DL (ref 8.8–10.2)
CHLORIDE SERPL-SCNC: 102 MMOL/L (ref 98–107)
CREAT SERPL-MCNC: 0.93 MG/DL (ref 0.51–0.95)
DEPRECATED HCO3 PLAS-SCNC: 22 MMOL/L (ref 22–29)
EGFRCR SERPLBLD CKD-EPI 2021: 65 ML/MIN/1.73M2
EOSINOPHIL # BLD AUTO: 0 10E3/UL (ref 0–0.7)
EOSINOPHIL NFR BLD AUTO: 0 %
ERYTHROCYTE [DISTWIDTH] IN BLOOD BY AUTOMATED COUNT: 21.2 % (ref 10–15)
GLUCOSE SERPL-MCNC: 155 MG/DL (ref 70–99)
HCT VFR BLD AUTO: 32.1 % (ref 35–47)
HGB BLD-MCNC: 10.6 G/DL (ref 11.7–15.7)
IMM GRANULOCYTES # BLD: 0 10E3/UL
IMM GRANULOCYTES NFR BLD: 0 %
LYMPHOCYTES # BLD AUTO: 1 10E3/UL (ref 0.8–5.3)
LYMPHOCYTES NFR BLD AUTO: 17 %
MCH RBC QN AUTO: 32.9 PG (ref 26.5–33)
MCHC RBC AUTO-ENTMCNC: 33 G/DL (ref 31.5–36.5)
MCV RBC AUTO: 100 FL (ref 78–100)
MONOCYTES # BLD AUTO: 0.7 10E3/UL (ref 0–1.3)
MONOCYTES NFR BLD AUTO: 12 %
NEUTROPHILS # BLD AUTO: 4.1 10E3/UL (ref 1.6–8.3)
NEUTROPHILS NFR BLD AUTO: 71 %
NRBC # BLD AUTO: 0 10E3/UL
NRBC BLD AUTO-RTO: 0 /100
PLATELET # BLD AUTO: 385 10E3/UL (ref 150–450)
POTASSIUM SERPL-SCNC: 4.2 MMOL/L (ref 3.4–5.3)
PROT SERPL-MCNC: 6.9 G/DL (ref 6.4–8.3)
RBC # BLD AUTO: 3.22 10E6/UL (ref 3.8–5.2)
SODIUM SERPL-SCNC: 142 MMOL/L (ref 135–145)
T4 FREE SERPL-MCNC: 0.94 NG/DL (ref 0.9–1.7)
TSH SERPL DL<=0.005 MIU/L-ACNC: 0.2 UIU/ML (ref 0.3–4.2)
WBC # BLD AUTO: 5.9 10E3/UL (ref 4–11)

## 2024-05-06 PROCEDURE — G2211 COMPLEX E/M VISIT ADD ON: HCPCS | Performed by: INTERNAL MEDICINE

## 2024-05-06 PROCEDURE — 96413 CHEMO IV INFUSION 1 HR: CPT

## 2024-05-06 PROCEDURE — 84443 ASSAY THYROID STIM HORMONE: CPT | Performed by: NURSE PRACTITIONER

## 2024-05-06 PROCEDURE — 85041 AUTOMATED RBC COUNT: CPT | Performed by: NURSE PRACTITIONER

## 2024-05-06 PROCEDURE — 84439 ASSAY OF FREE THYROXINE: CPT | Performed by: NURSE PRACTITIONER

## 2024-05-06 PROCEDURE — 96375 TX/PRO/DX INJ NEW DRUG ADDON: CPT

## 2024-05-06 PROCEDURE — 99215 OFFICE O/P EST HI 40 MIN: CPT | Performed by: INTERNAL MEDICINE

## 2024-05-06 PROCEDURE — 82040 ASSAY OF SERUM ALBUMIN: CPT | Performed by: NURSE PRACTITIONER

## 2024-05-06 PROCEDURE — 84155 ASSAY OF PROTEIN SERUM: CPT | Performed by: NURSE PRACTITIONER

## 2024-05-06 PROCEDURE — 250N000011 HC RX IP 250 OP 636: Performed by: INTERNAL MEDICINE

## 2024-05-06 PROCEDURE — 96411 CHEMO IV PUSH ADDL DRUG: CPT

## 2024-05-06 PROCEDURE — 36591 DRAW BLOOD OFF VENOUS DEVICE: CPT | Performed by: NURSE PRACTITIONER

## 2024-05-06 PROCEDURE — G0463 HOSPITAL OUTPT CLINIC VISIT: HCPCS | Mod: 25 | Performed by: INTERNAL MEDICINE

## 2024-05-06 PROCEDURE — 258N000003 HC RX IP 258 OP 636: Performed by: INTERNAL MEDICINE

## 2024-05-06 RX ORDER — MEPERIDINE HYDROCHLORIDE 25 MG/ML
25 INJECTION INTRAMUSCULAR; INTRAVENOUS; SUBCUTANEOUS EVERY 30 MIN PRN
Status: CANCELLED | OUTPATIENT
Start: 2024-05-06

## 2024-05-06 RX ORDER — ALBUTEROL SULFATE 90 UG/1
1-2 AEROSOL, METERED RESPIRATORY (INHALATION)
Status: CANCELLED
Start: 2024-05-28

## 2024-05-06 RX ORDER — HEPARIN SODIUM (PORCINE) LOCK FLUSH IV SOLN 100 UNIT/ML 100 UNIT/ML
5 SOLUTION INTRAVENOUS
Status: CANCELLED | OUTPATIENT
Start: 2024-05-06

## 2024-05-06 RX ORDER — METHYLPREDNISOLONE SODIUM SUCCINATE 125 MG/2ML
125 INJECTION, POWDER, LYOPHILIZED, FOR SOLUTION INTRAMUSCULAR; INTRAVENOUS
Status: CANCELLED
Start: 2024-05-06

## 2024-05-06 RX ORDER — METHYLPREDNISOLONE SODIUM SUCCINATE 125 MG/2ML
125 INJECTION, POWDER, LYOPHILIZED, FOR SOLUTION INTRAMUSCULAR; INTRAVENOUS
Status: CANCELLED
Start: 2024-05-28

## 2024-05-06 RX ORDER — EPINEPHRINE 1 MG/ML
0.3 INJECTION, SOLUTION INTRAMUSCULAR; SUBCUTANEOUS EVERY 5 MIN PRN
Status: CANCELLED | OUTPATIENT
Start: 2024-05-06

## 2024-05-06 RX ORDER — HEPARIN SODIUM,PORCINE 10 UNIT/ML
5-20 VIAL (ML) INTRAVENOUS DAILY PRN
Status: CANCELLED | OUTPATIENT
Start: 2024-05-06

## 2024-05-06 RX ORDER — PALONOSETRON 0.05 MG/ML
0.25 INJECTION, SOLUTION INTRAVENOUS ONCE
Status: CANCELLED
Start: 2024-06-17

## 2024-05-06 RX ORDER — EPINEPHRINE 1 MG/ML
0.3 INJECTION, SOLUTION INTRAMUSCULAR; SUBCUTANEOUS EVERY 5 MIN PRN
Status: DISCONTINUED | OUTPATIENT
Start: 2024-05-06 | End: 2024-05-06 | Stop reason: HOSPADM

## 2024-05-06 RX ORDER — HEPARIN SODIUM,PORCINE 10 UNIT/ML
5-20 VIAL (ML) INTRAVENOUS DAILY PRN
Status: CANCELLED | OUTPATIENT
Start: 2024-07-09

## 2024-05-06 RX ORDER — HEPARIN SODIUM (PORCINE) LOCK FLUSH IV SOLN 100 UNIT/ML 100 UNIT/ML
5 SOLUTION INTRAVENOUS
Status: CANCELLED | OUTPATIENT
Start: 2024-07-09

## 2024-05-06 RX ORDER — EPINEPHRINE 1 MG/ML
0.3 INJECTION, SOLUTION INTRAMUSCULAR; SUBCUTANEOUS EVERY 5 MIN PRN
Status: CANCELLED | OUTPATIENT
Start: 2024-07-09

## 2024-05-06 RX ORDER — MEPERIDINE HYDROCHLORIDE 25 MG/ML
25 INJECTION INTRAMUSCULAR; INTRAVENOUS; SUBCUTANEOUS EVERY 30 MIN PRN
Status: CANCELLED | OUTPATIENT
Start: 2024-05-28

## 2024-05-06 RX ORDER — LORAZEPAM 2 MG/ML
0.5 INJECTION INTRAMUSCULAR EVERY 4 HOURS PRN
Status: CANCELLED | OUTPATIENT
Start: 2024-06-18

## 2024-05-06 RX ORDER — MEPERIDINE HYDROCHLORIDE 25 MG/ML
25 INJECTION INTRAMUSCULAR; INTRAVENOUS; SUBCUTANEOUS EVERY 30 MIN PRN
Status: CANCELLED | OUTPATIENT
Start: 2024-07-09

## 2024-05-06 RX ORDER — PALONOSETRON 0.05 MG/ML
0.25 INJECTION, SOLUTION INTRAVENOUS ONCE
Status: CANCELLED
Start: 2024-07-08

## 2024-05-06 RX ORDER — MEPERIDINE HYDROCHLORIDE 25 MG/ML
25 INJECTION INTRAMUSCULAR; INTRAVENOUS; SUBCUTANEOUS EVERY 30 MIN PRN
Status: DISCONTINUED | OUTPATIENT
Start: 2024-05-06 | End: 2024-05-06 | Stop reason: HOSPADM

## 2024-05-06 RX ORDER — HEPARIN SODIUM (PORCINE) LOCK FLUSH IV SOLN 100 UNIT/ML 100 UNIT/ML
5 SOLUTION INTRAVENOUS
Status: CANCELLED | OUTPATIENT
Start: 2024-06-18

## 2024-05-06 RX ORDER — HEPARIN SODIUM,PORCINE 10 UNIT/ML
5-20 VIAL (ML) INTRAVENOUS DAILY PRN
Status: CANCELLED | OUTPATIENT
Start: 2024-06-18

## 2024-05-06 RX ORDER — METHYLPREDNISOLONE SODIUM SUCCINATE 125 MG/2ML
125 INJECTION, POWDER, LYOPHILIZED, FOR SOLUTION INTRAMUSCULAR; INTRAVENOUS
Status: CANCELLED
Start: 2024-06-18

## 2024-05-06 RX ORDER — DIPHENHYDRAMINE HYDROCHLORIDE 50 MG/ML
50 INJECTION INTRAMUSCULAR; INTRAVENOUS
Status: CANCELLED
Start: 2024-05-06

## 2024-05-06 RX ORDER — DIPHENHYDRAMINE HYDROCHLORIDE 50 MG/ML
50 INJECTION INTRAMUSCULAR; INTRAVENOUS
Status: CANCELLED
Start: 2024-06-18

## 2024-05-06 RX ORDER — ALBUTEROL SULFATE 0.83 MG/ML
2.5 SOLUTION RESPIRATORY (INHALATION)
Status: CANCELLED | OUTPATIENT
Start: 2024-05-06

## 2024-05-06 RX ORDER — ALBUTEROL SULFATE 90 UG/1
1-2 AEROSOL, METERED RESPIRATORY (INHALATION)
Status: CANCELLED
Start: 2024-06-18

## 2024-05-06 RX ORDER — ALBUTEROL SULFATE 90 UG/1
1-2 AEROSOL, METERED RESPIRATORY (INHALATION)
Status: DISCONTINUED | OUTPATIENT
Start: 2024-05-06 | End: 2024-05-06 | Stop reason: HOSPADM

## 2024-05-06 RX ORDER — ALBUTEROL SULFATE 0.83 MG/ML
2.5 SOLUTION RESPIRATORY (INHALATION)
Status: CANCELLED | OUTPATIENT
Start: 2024-06-18

## 2024-05-06 RX ORDER — LORAZEPAM 2 MG/ML
0.5 INJECTION INTRAMUSCULAR EVERY 4 HOURS PRN
Status: CANCELLED | OUTPATIENT
Start: 2024-05-28

## 2024-05-06 RX ORDER — ALBUTEROL SULFATE 90 UG/1
1-2 AEROSOL, METERED RESPIRATORY (INHALATION)
Status: CANCELLED
Start: 2024-07-09

## 2024-05-06 RX ORDER — ALBUTEROL SULFATE 90 UG/1
1-2 AEROSOL, METERED RESPIRATORY (INHALATION)
Status: CANCELLED
Start: 2024-05-06

## 2024-05-06 RX ORDER — ALBUTEROL SULFATE 0.83 MG/ML
2.5 SOLUTION RESPIRATORY (INHALATION)
Status: DISCONTINUED | OUTPATIENT
Start: 2024-05-06 | End: 2024-05-06 | Stop reason: HOSPADM

## 2024-05-06 RX ORDER — ALBUTEROL SULFATE 0.83 MG/ML
2.5 SOLUTION RESPIRATORY (INHALATION)
Status: CANCELLED | OUTPATIENT
Start: 2024-05-28

## 2024-05-06 RX ORDER — HEPARIN SODIUM (PORCINE) LOCK FLUSH IV SOLN 100 UNIT/ML 100 UNIT/ML
5 SOLUTION INTRAVENOUS
Status: DISCONTINUED | OUTPATIENT
Start: 2024-05-06 | End: 2024-05-06 | Stop reason: HOSPADM

## 2024-05-06 RX ORDER — DIPHENHYDRAMINE HYDROCHLORIDE 50 MG/ML
50 INJECTION INTRAMUSCULAR; INTRAVENOUS
Status: DISCONTINUED | OUTPATIENT
Start: 2024-05-06 | End: 2024-05-06 | Stop reason: HOSPADM

## 2024-05-06 RX ORDER — DIPHENHYDRAMINE HYDROCHLORIDE 50 MG/ML
50 INJECTION INTRAMUSCULAR; INTRAVENOUS
Status: CANCELLED
Start: 2024-07-09

## 2024-05-06 RX ORDER — HEPARIN SODIUM,PORCINE 10 UNIT/ML
5-20 VIAL (ML) INTRAVENOUS DAILY PRN
Status: CANCELLED | OUTPATIENT
Start: 2024-05-28

## 2024-05-06 RX ORDER — LORAZEPAM 2 MG/ML
0.5 INJECTION INTRAMUSCULAR EVERY 4 HOURS PRN
Status: CANCELLED | OUTPATIENT
Start: 2024-05-06

## 2024-05-06 RX ORDER — PALONOSETRON 0.05 MG/ML
0.25 INJECTION, SOLUTION INTRAVENOUS ONCE
Status: CANCELLED
Start: 2024-05-06

## 2024-05-06 RX ORDER — METHYLPREDNISOLONE SODIUM SUCCINATE 125 MG/2ML
125 INJECTION, POWDER, LYOPHILIZED, FOR SOLUTION INTRAMUSCULAR; INTRAVENOUS
Status: CANCELLED
Start: 2024-07-09

## 2024-05-06 RX ORDER — METHYLPREDNISOLONE SODIUM SUCCINATE 125 MG/2ML
125 INJECTION, POWDER, LYOPHILIZED, FOR SOLUTION INTRAMUSCULAR; INTRAVENOUS
Status: DISCONTINUED | OUTPATIENT
Start: 2024-05-06 | End: 2024-05-06 | Stop reason: HOSPADM

## 2024-05-06 RX ORDER — DIPHENHYDRAMINE HYDROCHLORIDE 50 MG/ML
50 INJECTION INTRAMUSCULAR; INTRAVENOUS
Status: CANCELLED
Start: 2024-05-28

## 2024-05-06 RX ORDER — MEPERIDINE HYDROCHLORIDE 25 MG/ML
25 INJECTION INTRAMUSCULAR; INTRAVENOUS; SUBCUTANEOUS EVERY 30 MIN PRN
Status: CANCELLED | OUTPATIENT
Start: 2024-06-18

## 2024-05-06 RX ORDER — ALBUTEROL SULFATE 0.83 MG/ML
2.5 SOLUTION RESPIRATORY (INHALATION)
Status: CANCELLED | OUTPATIENT
Start: 2024-07-09

## 2024-05-06 RX ORDER — EPINEPHRINE 1 MG/ML
0.3 INJECTION, SOLUTION INTRAMUSCULAR; SUBCUTANEOUS EVERY 5 MIN PRN
Status: CANCELLED | OUTPATIENT
Start: 2024-05-28

## 2024-05-06 RX ORDER — HEPARIN SODIUM (PORCINE) LOCK FLUSH IV SOLN 100 UNIT/ML 100 UNIT/ML
5 SOLUTION INTRAVENOUS
Status: CANCELLED | OUTPATIENT
Start: 2024-05-28

## 2024-05-06 RX ORDER — EPINEPHRINE 1 MG/ML
0.3 INJECTION, SOLUTION INTRAMUSCULAR; SUBCUTANEOUS EVERY 5 MIN PRN
Status: CANCELLED | OUTPATIENT
Start: 2024-06-18

## 2024-05-06 RX ORDER — ONDANSETRON 2 MG/ML
8 INJECTION INTRAMUSCULAR; INTRAVENOUS ONCE
Status: COMPLETED | OUTPATIENT
Start: 2024-05-06 | End: 2024-05-06

## 2024-05-06 RX ORDER — PALONOSETRON 0.05 MG/ML
0.25 INJECTION, SOLUTION INTRAVENOUS ONCE
Status: CANCELLED
Start: 2024-05-27

## 2024-05-06 RX ORDER — ONDANSETRON 2 MG/ML
8 INJECTION INTRAMUSCULAR; INTRAVENOUS ONCE
Status: CANCELLED
Start: 2024-05-06 | End: 2024-05-06

## 2024-05-06 RX ORDER — LORAZEPAM 2 MG/ML
0.5 INJECTION INTRAMUSCULAR EVERY 4 HOURS PRN
Status: CANCELLED | OUTPATIENT
Start: 2024-07-09

## 2024-05-06 RX ADMIN — PEMETREXED DISODIUM 800 MG: 500 INJECTION, POWDER, LYOPHILIZED, FOR SOLUTION INTRAVENOUS at 12:17

## 2024-05-06 RX ADMIN — SODIUM CHLORIDE 250 ML: 9 INJECTION, SOLUTION INTRAVENOUS at 11:41

## 2024-05-06 RX ADMIN — Medication 5 ML: at 12:34

## 2024-05-06 RX ADMIN — ONDANSETRON 8 MG: 2 INJECTION INTRAMUSCULAR; INTRAVENOUS at 11:43

## 2024-05-06 RX ADMIN — SODIUM CHLORIDE 200 MG: 9 INJECTION, SOLUTION INTRAVENOUS at 11:46

## 2024-05-06 ASSESSMENT — PAIN SCALES - GENERAL: PAINLEVEL: NO PAIN (0)

## 2024-05-06 NOTE — LETTER
5/6/2024         RE: Inez Rodriguez  [address]  [facility] [address]        Dear Colleague,    Thank you for referring your patient, Inez Rodriguez, to the [facility] [place]. Please see a copy of my visit note below.    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  A very pleasant 73 year old  woman with advanced stage IV non-small cell lung cancer.  The primary seems to be coming from the right upper lobe.  She has mediastinal lymph nodes as well as couple of osseous metastases 1 in the T4 vertebral body and 1 in the sacrum.  Molecular testing does show that this is K-tray G12C mutated tumor.  No other targeted mutation present except for PTEN which is not significant.  She has been started on palliative chemotherapy with carboplatin Alimta and pembrolizumab.  She has had good response after 2 cycles and continuation of response after 4 cycles.  2.  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  Pathology is positive for non-small cell lung cancer.  Responding well to treatment.  3.  Prior history of smoking.  Currently patient does not smoke.  4.  Recently moved from [place].  Limited social support.  5.  Upper back pain which the patient tells me secondary to stress and anxiety.  6.  Chemotherapy related side effects.    Plan and medical decision making      Patient presenting  with moderate side effects of chemotherapy.  Reviewed notes from each unique source.  Reviewed each unique test.  Ordered tests if indicated.  Independently interpreted the results of lab tests and radiological exams.  Personally reviewed the images.      Decision made regarding continuation of chemotherapy with pembrolizumab and pemetrexed.  We will drop carboplatin for now.  Continue close monitoring for chemotherapy-related side effects.  Continue with B12 and folic acid supplementation.  Discussed with the patient that she does have K-tray G12C mutation.  She would be a candidate for sotorasib or other similar targeted therapy.  Continue good diet and exercise.  The longitudinal plan of care for the diagnosis(es)/condition(s) as documented were addressed during this visit. Due to the added complexity in care, I will continue to support Inez in the subsequent management and with ongoing continuity of care.     Clinical/pathological stage       Cancer Staging   No matching staging information was found for the patient.      History of present illness      Ms. Inez Rodriguez is a 73 year old  woman who came to the emergency room in the beginning of January 2024 with increasing shortness of breath.  Was also having some coughing which was not responding to treatment given by the urgent care.  In the emergency room she had a CT scan done to rule out any pulmonary embolism but was positive for multiple lymph nodes in the mediastinum as well as subcarinal area.  She was therefore admitted.  She was seen by pulmonology.  Underwent bronchoscopy and biopsy.  The bronchoscopy came back positive for adenocarcinoma involving subcarinal and mediastinal lymph nodes.    The patient does have a's history of smoking exposure but tells me that she has not smoked for several years.  No significant family history of cancer.  She recently moved from [place].    PET scan done on 29 January 2024 showed that she had extensive disease not  "only involving the right upper lobe but having extensive mediastinal hilar lymph node metastases and osseous metastases on the T4 vertebral body and right sacrum.    Has been started on chemoimmunotherapy with carboplatin etoposide and pemetrexed.  She has tolerated with moderate side effects.  Comes in today for follow-up.  She has finished 4 cycles.  CT scan after 2 cycles showed decrease in the size of the right upper lobe mass as well as mediastinal lymphadenopathy.  Increasing sclerosis was noted in the T4 vertebral body.    Now comes in with another CT scan.  Still having some lower back pain.  Moderate chemotherapeutic side effect    Review of system      Details noted in the history of present illness.  A detailed review of systems is otherwise negative.      Physical exam        BP (!) 150/78 (BP Location: Right arm, Patient Position: Sitting, Cuff Size: Adult Regular)   Pulse 119   Temp 98.4  F (36.9  C) (Oral)   Resp 20   Ht 1.651 m (5' 5\")   Wt 57.5 kg (126 lb 12.8 oz)   LMP  (LMP Unknown)   SpO2 99%   BMI 21.10 kg/m      GENERAL: No acute distress. Cooperative in conversation.   HEENT:  Pupils are equal, round and reactive. Oral mucosa is clean and intact. No ulcerations or mucositis noted. No bleeding noted.  RESP:Chest symmetric lungs are clear bilaterally per auscultation. Regular respiratory rate. No wheezes or rhonchi.  CV: Normal S1 S2 Regular, rate and rhythm.     ABD: Nondistended, soft, nontender. Positive bowel sounds. No organomegaly.   EXTREMITIES: No lower extremity edema.   NEURO: Non- focal. Alert and oriented x3.  Cranial nerves appear intact.  PSYCH: Within normal limits. No depression or anxiety.  SKIN: Warm dry intact.       Lab results Reviewed      Recent Results (from the past 168 hour(s))   Comprehensive metabolic panel   Result Value Ref Range    Sodium 142 135 - 145 mmol/L    Potassium 4.2 3.4 - 5.3 mmol/L    Carbon Dioxide (CO2) 22 22 - 29 mmol/L    Anion Gap 18 (H) 7 - " 15 mmol/L    Urea Nitrogen 15.7 8.0 - 23.0 mg/dL    Creatinine 0.93 0.51 - 0.95 mg/dL    GFR Estimate 65 >60 mL/min/1.73m2    Calcium 9.8 8.8 - 10.2 mg/dL    Chloride 102 98 - 107 mmol/L    Glucose 155 (H) 70 - 99 mg/dL    Alkaline Phosphatase 75 40 - 150 U/L    AST 24 0 - 45 U/L    ALT 35 0 - 50 U/L    Protein Total 6.9 6.4 - 8.3 g/dL    Albumin 4.5 3.5 - 5.2 g/dL    Bilirubin Total 0.2 <=1.2 mg/dL   TSH with free T4 reflex   Result Value Ref Range    TSH 0.20 (L) 0.30 - 4.20 uIU/mL   CBC with platelets and differential   Result Value Ref Range    WBC Count 5.9 4.0 - 11.0 10e3/uL    RBC Count 3.22 (L) 3.80 - 5.20 10e6/uL    Hemoglobin 10.6 (L) 11.7 - 15.7 g/dL    Hematocrit 32.1 (L) 35.0 - 47.0 %     78 - 100 fL    MCH 32.9 26.5 - 33.0 pg    MCHC 33.0 31.5 - 36.5 g/dL    RDW 21.2 (H) 10.0 - 15.0 %    Platelet Count 385 150 - 450 10e3/uL    % Neutrophils 71 %    % Lymphocytes 17 %    % Monocytes 12 %    % Eosinophils 0 %    % Basophils 0 %    % Immature Granulocytes 0 %    NRBCs per 100 WBC 0 <1 /100    Absolute Neutrophils 4.1 1.6 - 8.3 10e3/uL    Absolute Lymphocytes 1.0 0.8 - 5.3 10e3/uL    Absolute Monocytes 0.7 0.0 - 1.3 10e3/uL    Absolute Eosinophils 0.0 0.0 - 0.7 10e3/uL    Absolute Basophils 0.0 0.0 - 0.2 10e3/uL    Absolute Immature Granulocytes 0.0 <=0.4 10e3/uL    Absolute NRBCs 0.0 10e3/uL   T4 free   Result Value Ref Range    Free T4 0.94 0.90 - 1.70 ng/dL       Imaging results Reviewed        CT Chest/Abdomen/Pelvis w Contrast    Result Date: 5/2/2024  EXAM: CT CHEST/ABDOMEN/PELVIS W CONTRAST LOCATION: [facility] DATE: 5/2/2024 INDICATION:  Malignant neoplasm of lower lobe of right lung (H). COMPARISON: 3/21/2024. TECHNIQUE: CT scan of the chest, abdomen, and pelvis was performed following injection of IV contrast. Multiplanar reformats were obtained. Dose reduction techniques were used. CONTRAST: 75 mL Isovue-370. FINDINGS: LUNGS AND PLEURA: No significant change of  roughly 7 x 17 mm elongated nodular opacity along the anterior right upper lobe in region of post treatment change (series 4, image 101). Stable 13 mm nodular opacity peripheral right upper lobe. Stable 3 mm right apex nodule (image 40). No pleural effusion MEDIASTINUM/AXILLAE: Mildly improved mediastinal and hilar adenopathy since March 2024. Subcarinal node measures 15 x 20 mm versus 18 x 26 mm (series 3, image 62). Right suprahilar node measures 10 mm versus 13 mm (image 59). New tiny pericardial effusion. Left portacatheter tip at the cavoatrial junction. Stable 1.6 x 2.2 cm inferior left thyroid nodule. CORONARY ARTERY CALCIFICATION: Present. HEPATOBILIARY: Normal. PANCREAS: Normal. SPLEEN: Normal. ADRENAL GLANDS: Stable slight adrenal thickening. KIDNEYS/BLADDER: Incidental renal cysts again seen. BOWEL: Moderate rectosigmoid colonic stool. No obstruction. LYMPH NODES: No abdominal or pelvic adenopathy. VASCULATURE: Nonaneurysmal aorta. Severe abdominal aortic atherosclerosis. PELVIC ORGANS: Nothing acute. MUSCULOSKELETAL: Little change of T4 vertebral sclerosis. Stable sclerotic focus right hemipelvis.     IMPRESSION: 1.  Mildly improved mediastinal and perihilar adenopathy in the chest. 2.  Stable right upper lobe nodular thickening in area of previous nodule. No new or enlarging nodules. 3.  No other significant change. 4.  New tiny pericardial effusion.        Signed by: Chino Miller MD      This note has been dictated using voice recognition software. Any grammatical or context distortions are unintentional and inherent to the software          Oncology Rooming Note    May 6, 2024 10:22 AM   Inez Rodriguez is a 73 year old female who presents for:    Chief Complaint   Patient presents with     Oncology Clinic Visit     Return visit with lab and infusion. CT 05/02/24. Malignant neoplasm of lower lobe of right lung.     Initial Vitals: BP (!) 150/78 (BP Location: Right arm, Patient Position: Sitting, Cuff  "Size: Adult Regular)   Pulse 119   Temp 98.4  F (36.9  C) (Oral)   Resp 20   Ht 1.651 m (5' 5\")   Wt 57.5 kg (126 lb 12.8 oz)   LMP  (LMP Unknown)   SpO2 99%   BMI 21.10 kg/m   Estimated body mass index is 21.1 kg/m  as calculated from the following:    Height as of this encounter: 1.651 m (5' 5\").    Weight as of this encounter: 57.5 kg (126 lb 12.8 oz). Body surface area is 1.62 meters squared.  No Pain (0) Comment: Data Unavailable   No LMP recorded (lmp unknown). Patient is premenopausal.  Allergies reviewed: Yes  Medications reviewed: Yes    Medications: Medication refills not needed today.  Pharmacy name entered into [facility]:    [id number] - [place] [id number] AT [facility] [address]    Frailty Screening:   Is the patient here for a new oncology consult visit in cancer care? 2. No      Clinical concerns: none       Malgorzata Fields CMA                Again, thank you for allowing me to participate in the care of your patient.        Sincerely,        Chino Miller MD  "

## 2024-05-06 NOTE — PROGRESS NOTES
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  A very pleasant 73 year old  woman with advanced stage IV non-small cell lung cancer.  The primary seems to be coming from the right upper lobe.  She has mediastinal lymph nodes as well as couple of osseous metastases 1 in the T4 vertebral body and 1 in the sacrum.  Molecular testing does show that this is K-tray G12C mutated tumor.  No other targeted mutation present except for PTEN which is not significant.  She has been started on palliative chemotherapy with carboplatin Alimta and pembrolizumab.  She has had good response after 2 cycles and continuation of response after 4 cycles.  2.  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  Pathology is positive for non-small cell lung cancer.  Responding well to treatment.  3.  Prior history of smoking.  Currently patient does not smoke.  4.  Recently moved from [place].  Limited social support.  5.  Upper back pain which the patient tells me secondary to stress and anxiety.  6.  Chemotherapy related side effects.    Plan and medical decision making      Patient presenting with moderate side effects of chemotherapy.  Reviewed notes from each unique source.  Reviewed each unique test.  Ordered tests if indicated.  Independently interpreted the results of lab tests and radiological exams.  Personally reviewed the  images.      Decision made regarding continuation of chemotherapy with pembrolizumab and pemetrexed.  We will drop carboplatin for now.  Continue close monitoring for chemotherapy-related side effects.  Continue with B12 and folic acid supplementation.  Discussed with the patient that she does have K-tray G12C mutation.  She would be a candidate for sotorasib or other similar targeted therapy.  Continue good diet and exercise.  The longitudinal plan of care for the diagnosis(es)/condition(s) as documented were addressed during this visit. Due to the added complexity in care, I will continue to support Inez in the subsequent management and with ongoing continuity of care.     Clinical/pathological stage       Cancer Staging   No matching staging information was found for the patient.      History of present illness      Ms. Inez Rodriguez is a 73 year old  woman who came to the emergency room in the beginning of January 2024 with increasing shortness of breath.  Was also having some coughing which was not responding to treatment given by the urgent care.  In the emergency room she had a CT scan done to rule out any pulmonary embolism but was positive for multiple lymph nodes in the mediastinum as well as subcarinal area.  She was therefore admitted.  She was seen by pulmonology.  Underwent bronchoscopy and biopsy.  The bronchoscopy came back positive for adenocarcinoma involving subcarinal and mediastinal lymph nodes.    The patient does have a's history of smoking exposure but tells me that she has not smoked for several years.  No significant family history of cancer.  She recently moved from [place].    PET scan done on 29 January 2024 showed that she had extensive disease not only involving the right upper lobe but having extensive mediastinal hilar lymph node metastases and osseous metastases on the T4 vertebral body and right sacrum.    Has been started on chemoimmunotherapy with carboplatin etoposide and  "pemetrexed.  She has tolerated with moderate side effects.  Comes in today for follow-up.  She has finished 4 cycles.  CT scan after 2 cycles showed decrease in the size of the right upper lobe mass as well as mediastinal lymphadenopathy.  Increasing sclerosis was noted in the T4 vertebral body.    Now comes in with another CT scan.  Still having some lower back pain.  Moderate chemotherapeutic side effect    Review of system      Details noted in the history of present illness.  A detailed review of systems is otherwise negative.      Physical exam        BP (!) 150/78 (BP Location: Right arm, Patient Position: Sitting, Cuff Size: Adult Regular)   Pulse 119   Temp 98.4  F (36.9  C) (Oral)   Resp 20   Ht 1.651 m (5' 5\")   Wt 57.5 kg (126 lb 12.8 oz)   LMP  (LMP Unknown)   SpO2 99%   BMI 21.10 kg/m      GENERAL: No acute distress. Cooperative in conversation.   HEENT:  Pupils are equal, round and reactive. Oral mucosa is clean and intact. No ulcerations or mucositis noted. No bleeding noted.  RESP:Chest symmetric lungs are clear bilaterally per auscultation. Regular respiratory rate. No wheezes or rhonchi.  CV: Normal S1 S2 Regular, rate and rhythm.     ABD: Nondistended, soft, nontender. Positive bowel sounds. No organomegaly.   EXTREMITIES: No lower extremity edema.   NEURO: Non- focal. Alert and oriented x3.  Cranial nerves appear intact.  PSYCH: Within normal limits. No depression or anxiety.  SKIN: Warm dry intact.       Lab results Reviewed      Recent Results (from the past 168 hour(s))   Comprehensive metabolic panel   Result Value Ref Range    Sodium 142 135 - 145 mmol/L    Potassium 4.2 3.4 - 5.3 mmol/L    Carbon Dioxide (CO2) 22 22 - 29 mmol/L    Anion Gap 18 (H) 7 - 15 mmol/L    Urea Nitrogen 15.7 8.0 - 23.0 mg/dL    Creatinine 0.93 0.51 - 0.95 mg/dL    GFR Estimate 65 >60 mL/min/1.73m2    Calcium 9.8 8.8 - 10.2 mg/dL    Chloride 102 98 - 107 mmol/L    Glucose 155 (H) 70 - 99 mg/dL    Alkaline " Phosphatase 75 40 - 150 U/L    AST 24 0 - 45 U/L    ALT 35 0 - 50 U/L    Protein Total 6.9 6.4 - 8.3 g/dL    Albumin 4.5 3.5 - 5.2 g/dL    Bilirubin Total 0.2 <=1.2 mg/dL   TSH with free T4 reflex   Result Value Ref Range    TSH 0.20 (L) 0.30 - 4.20 uIU/mL   CBC with platelets and differential   Result Value Ref Range    WBC Count 5.9 4.0 - 11.0 10e3/uL    RBC Count 3.22 (L) 3.80 - 5.20 10e6/uL    Hemoglobin 10.6 (L) 11.7 - 15.7 g/dL    Hematocrit 32.1 (L) 35.0 - 47.0 %     78 - 100 fL    MCH 32.9 26.5 - 33.0 pg    MCHC 33.0 31.5 - 36.5 g/dL    RDW 21.2 (H) 10.0 - 15.0 %    Platelet Count 385 150 - 450 10e3/uL    % Neutrophils 71 %    % Lymphocytes 17 %    % Monocytes 12 %    % Eosinophils 0 %    % Basophils 0 %    % Immature Granulocytes 0 %    NRBCs per 100 WBC 0 <1 /100    Absolute Neutrophils 4.1 1.6 - 8.3 10e3/uL    Absolute Lymphocytes 1.0 0.8 - 5.3 10e3/uL    Absolute Monocytes 0.7 0.0 - 1.3 10e3/uL    Absolute Eosinophils 0.0 0.0 - 0.7 10e3/uL    Absolute Basophils 0.0 0.0 - 0.2 10e3/uL    Absolute Immature Granulocytes 0.0 <=0.4 10e3/uL    Absolute NRBCs 0.0 10e3/uL   T4 free   Result Value Ref Range    Free T4 0.94 0.90 - 1.70 ng/dL       Imaging results Reviewed        CT Chest/Abdomen/Pelvis w Contrast    Result Date: 5/2/2024  EXAM: CT CHEST/ABDOMEN/PELVIS W CONTRAST LOCATION: [facility] DATE: 5/2/2024 INDICATION:  Malignant neoplasm of lower lobe of right lung (H). COMPARISON: 3/21/2024. TECHNIQUE: CT scan of the chest, abdomen, and pelvis was performed following injection of IV contrast. Multiplanar reformats were obtained. Dose reduction techniques were used. CONTRAST: 75 mL Isovue-370. FINDINGS: LUNGS AND PLEURA: No significant change of roughly 7 x 17 mm elongated nodular opacity along the anterior right upper lobe in region of post treatment change (series 4, image 101). Stable 13 mm nodular opacity peripheral right upper lobe. Stable 3 mm right apex nodule (image  40). No pleural effusion MEDIASTINUM/AXILLAE: Mildly improved mediastinal and hilar adenopathy since March 2024. Subcarinal node measures 15 x 20 mm versus 18 x 26 mm (series 3, image 62). Right suprahilar node measures 10 mm versus 13 mm (image 59). New tiny pericardial effusion. Left portacatheter tip at the cavoatrial junction. Stable 1.6 x 2.2 cm inferior left thyroid nodule. CORONARY ARTERY CALCIFICATION: Present. HEPATOBILIARY: Normal. PANCREAS: Normal. SPLEEN: Normal. ADRENAL GLANDS: Stable slight adrenal thickening. KIDNEYS/BLADDER: Incidental renal cysts again seen. BOWEL: Moderate rectosigmoid colonic stool. No obstruction. LYMPH NODES: No abdominal or pelvic adenopathy. VASCULATURE: Nonaneurysmal aorta. Severe abdominal aortic atherosclerosis. PELVIC ORGANS: Nothing acute. MUSCULOSKELETAL: Little change of T4 vertebral sclerosis. Stable sclerotic focus right hemipelvis.     IMPRESSION: 1.  Mildly improved mediastinal and perihilar adenopathy in the chest. 2.  Stable right upper lobe nodular thickening in area of previous nodule. No new or enlarging nodules. 3.  No other significant change. 4.  New tiny pericardial effusion.        Signed by: Chino Miller MD      This note has been dictated using voice recognition software. Any grammatical or context distortions are unintentional and inherent to the software

## 2024-05-06 NOTE — PROGRESS NOTES
Infusion Nursing Note:  Inez Rodriguez presents today for cycle 5 day 1 treatment with Pembrolizumab and Pemetrexed.    Patient seen by provider today: Yes: Dr Miller   present during visit today: Not Applicable.    Note: Inez was educated on her plan of care and each medication given was reviewed prior to administration.  She received treatment as ordered.      Intravenous Access:  Implanted Port.    Treatment Conditions:  Lab Results   Component Value Date    HGB 10.6 (L) 05/06/2024    WBC 5.9 05/06/2024    ANEUTAUTO 4.1 05/06/2024     05/06/2024        Lab Results   Component Value Date     05/06/2024    POTASSIUM 4.2 05/06/2024    CR 0.93 05/06/2024    CHELITA 9.8 05/06/2024    BILITOTAL 0.2 05/06/2024    ALBUMIN 4.5 05/06/2024    ALT 35 05/06/2024    AST 24 05/06/2024       Results reviewed, labs MET treatment parameters, ok to proceed with treatment.      Post Infusion Assessment:  Patient tolerated infusion without incident.  Blood return noted pre and post infusion.  Site patent and intact, free from redness, edema or discomfort.  No evidence of extravasations.  Access discontinued per protocol.       Discharge Plan:   Patient discharged in stable condition accompanied by: self.  Departure Mode: Ambulatory.      Alysha Lara RN

## 2024-05-06 NOTE — PROGRESS NOTES
"Oncology Rooming Note    May 6, 2024 10:22 AM   Inez Rodriguez is a 73 year old female who presents for:    Chief Complaint   Patient presents with    Oncology Clinic Visit     Return visit with lab and infusion. CT 05/02/24. Malignant neoplasm of lower lobe of right lung.     Initial Vitals: BP (!) 150/78 (BP Location: Right arm, Patient Position: Sitting, Cuff Size: Adult Regular)   Pulse 119   Temp 98.4  F (36.9  C) (Oral)   Resp 20   Ht 1.651 m (5' 5\")   Wt 57.5 kg (126 lb 12.8 oz)   LMP  (LMP Unknown)   SpO2 99%   BMI 21.10 kg/m   Estimated body mass index is 21.1 kg/m  as calculated from the following:    Height as of this encounter: 1.651 m (5' 5\").    Weight as of this encounter: 57.5 kg (126 lb 12.8 oz). Body surface area is 1.62 meters squared.  No Pain (0) Comment: Data Unavailable   No LMP recorded (lmp unknown). Patient is premenopausal.  Allergies reviewed: Yes  Medications reviewed: Yes    Medications: Medication refills not needed today.  Pharmacy name entered into [facility]:    [id number] - [facility] [address] [facility] [address] [facility] [address]    Frailty Screening:   Is the patient here for a new oncology consult visit in cancer care? 2. No      Clinical concerns: none       Malgorzata Fields CMA              "

## 2024-05-07 ENCOUNTER — PATIENT OUTREACH (OUTPATIENT)
Dept: GERIATRIC MEDICINE | Facility: CLINIC | Age: 74
End: 2024-05-07
Payer: COMMERCIAL

## 2024-05-07 NOTE — PROGRESS NOTES
[facility] Care Coordination Contact    Member changed health plan products from [facility] to [facility] effective 05/01/24. CC to complete items on Product Change/ Health Plan Change check list including MMIS entry. CMS mailed Welcome Letter, supporting documents, [place] and other required tasks.    Waylon James  Care Management Specialist  [facility]  [phone]

## 2024-05-07 NOTE — LETTER
May 7, 2024    ROCHELLE AVILA  [address]  [place] [address]      Dear Rochelle    Welcome to [facility] health program. My name is LOPEZ Razo. I am your [facility] care coordinator. You're eligible for care coordination through your [facility] Product.    As your care coordinator, we ll:  Meet to go over your care coordination benefits  Talk about your physical and mental health care needs   Review your preventative care needs  Create a plan that meets your needs with the services you choose    What happens next?  I ll call you soon to introduce myself and tell you more about my role. We ll then plan time to go over your health and safety needs. Our goal is to keep you as healthy and independent as possible.    [facility] combines the benefits you may already have receive from Medical Assistance, Medicare and the Prescription Drug Coverage Program. Soon you'll receive a new member identification (ID) card from [facility]. Use this card whenever you get health services.    The [facility] care coordination program is voluntary and offered to you at no cost. If you wish to stop being in the care coordination program or have questions, call me at [phone]. If you reach my voicemail, leave a message and your phone number. TTY users, call the [place] Relay at 179 or [phone] (premyy-ss-uxfevk relay service).    Sincerely,    LOPEZ Razo  [phone]  [email or web address] ([facility]) is a health plan that contracts with both Medicare and the [place] Medical Assistance (Medicaid) program to provide benefits of both programs to enrollees. Enrollment in [facility] depends on contract renewal.    [id number] accepted   [id number]         [id number] (07/2022)

## 2024-05-08 ENCOUNTER — MYC REFILL (OUTPATIENT)
Dept: ONCOLOGY | Facility: HOSPITAL | Age: 74
End: 2024-05-08
Payer: COMMERCIAL

## 2024-05-08 DIAGNOSIS — C34.90 LUNG CANCER METASTATIC TO BONE (H): ICD-10-CM

## 2024-05-08 DIAGNOSIS — C79.51 LUNG CANCER METASTATIC TO BONE (H): ICD-10-CM

## 2024-05-08 DIAGNOSIS — C34.31 MALIGNANT NEOPLASM OF LOWER LOBE OF RIGHT LUNG (H): ICD-10-CM

## 2024-05-08 RX ORDER — CYCLOBENZAPRINE HCL 5 MG
5 TABLET ORAL 2 TIMES DAILY PRN
Qty: 30 TABLET | Refills: 0 | Status: SHIPPED | OUTPATIENT
Start: 2024-05-08 | End: 2024-07-30

## 2024-05-14 ENCOUNTER — VIRTUAL VISIT (OUTPATIENT)
Dept: ONCOLOGY | Facility: HOSPITAL | Age: 74
End: 2024-05-14
Attending: PSYCHOLOGIST
Payer: COMMERCIAL

## 2024-05-14 VITALS — WEIGHT: 126 LBS | BODY MASS INDEX: 20.99 KG/M2 | HEIGHT: 65 IN

## 2024-05-14 DIAGNOSIS — F43.21 ADJUSTMENT DISORDER WITH DEPRESSED MOOD: Primary | ICD-10-CM

## 2024-05-14 PROCEDURE — 90834 PSYTX W PT 45 MINUTES: CPT | Mod: 95 | Performed by: PSYCHOLOGIST

## 2024-05-14 ASSESSMENT — PAIN SCALES - GENERAL: PAINLEVEL: NO PAIN (0)

## 2024-05-14 NOTE — NURSING NOTE
Is the patient currently in the state of [place]? YES    Visit mode:VIDEO    If the visit is dropped, the patient can be reconnected by: VIDEO VISIT: Text to cell phone:   Telephone Information:   Mobile [phone]       Will anyone else be joining the visit? NO  (If patient encounters technical issues they should call [phone]    How would you like to obtain your AVS? MyChart    Are changes needed to the allergy or medication list? Pt stated no changes to allergies and Pt stated no med changes    Are refills needed on medications prescribed by this physician? NO    Reason for visit: BRITTNEY VU

## 2024-05-14 NOTE — PROGRESS NOTES
"          [facility] Oncology- Psychotherapy                                     Progress Note     Patient Name: Inez Rodriguez                        Date:   2024                                         Service Type: Individual                            Session Start Time:                 Session End Time:                  Session Length:      46   mins     Session #:      2     Attendees:     Client attended alone     Service Modality:  Video Visit:      Provider verified identity through the following two step process.  Patient provided:  Patient      Telemedicine Visit: The patient's condition can be safely assessed and treated via synchronous audio and visual telemedicine encounter.       Reason for Telemedicine Visit: Patient has requested telehealth visit     Originating Site (Patient Location): Patient's home     Distant Site (Provider Location): [facility] [place]     Consent:  The patient/guardian has verbally consented to: the potential risks and benefits of telemedicine (video visit) versus in person care; bill my insurance or make self-payment for services provided; and responsibility for payment of non-covered services.      Patient would like the video invitation sent by:  My Chart     Mode of Communication:  Video Conference via Amwell     Distant Location (Provider):  On-site     As the provider I attest to compliance with applicable laws and regulations related to telemedicine.     DATA  Interactive Complexity: No  Crisis: No                               Progress Since Last Session (Related to Symptoms / Goals / Homework):              Symptoms:  Pt reports she is feeling \"much better.\" She reports her personality changed with the chemotherapy and now she understands that. It has helped her feel less depressed and anxious.                  Pt reports her thinking is clearer.                 Pt reports some fatigue.                   Pt reports she " "sleeps well. She reports she is not eating great. Lack of appetite.                            Homework:  none                             Episode of Care Goals: Satisfactory progress - ACTION (Actively working towards change); Intervened by reinforcing change plan / affirming steps taken                  Current / Ongoing Stressors and Concerns:              Pt reports she is feeling better. She reports her personality changed with the chemotherapy and now she understands that.                 Pt reports she has been battling cancer 2-3 months.                  Pt reports her son is \"amazing.\" He is doing well in helping pt.                  Pt reports she has projects for her home and things are coming along well.                 Pt reports her last visit was good.                      Pt reports her mental health seems to suffer after a scan.                   Social HX              Pt reports no family hx of cancer. She reports a hx of mental health and substance abuse.                  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                  Pt reports her son works for a company that makes board games.                     Treatment Objective(s) Addressed in This Session:          identify medical stressors which contribute to feelings of depression                    Intervention:              CBT: ,  Emotion Focused Therapy: ,  Solution Focused: ,     Assessments completed prior to visit:  none                    ASSESSMENT: Current Emotional / Mental Status (status of significant symptoms):              Risk status (Self / Other harm or suicidal ideation)              Patient denies current fears or concerns for personal safety.              Patient denies current or recent suicidal ideation or behaviors.              Patient denies current or recent homicidal " ideation or behaviors.              Patient denies current or recent self injurious behavior or ideation.              Patient denies other safety concerns.              Patient reports there has been no change in risk factors since their last session.                Patient reports there has been no change in protective factors since their last session.                Recommended that patient call 911 or go to the local ED should there be a change in any of these risk factors.                 Appearance:                            Appropriate               Eye Contact:                           Good               Psychomotor Behavior:          Normal               Attitude:                                   Cooperative               Orientation:                             All              Speech                          Rate / Production:       Normal                           Volume:                       Normal               Mood:                                      Normal              Affect:                                      Appropriate               Thought Content:                    Clear               Thought Form:                        Coherent  Logical               Insight:                                     Fair                  Medication Review:              No current psychiatric medications prescribed                 Medication Compliance:              NA                 Changes in Health Issues:              Yes: cancer, Associated Psychological Distress                 Chemical Use Review:              Substance Use: Chemical use reviewed, no active concerns identified                  Tobacco Use: No current tobacco use.       Diagnosis:          F43.21 Adjustment D/O with depressive sx.      Collateral Reports Completed:              Not Applicable     PLAN: (Patient Tasks / Therapist Tasks / Other)  Return as needed. Utilize coping skills discussed.            Hadley Rose LP                                                            ______________________________________________________________________     Individual Treatment Plan     Patient's Name: Inez Rodriguez                     YOB: 1950     Date of Creation: 4/26/2024     Date Treatment Plan Last Reviewed/Revised:      DSM5 Diagnoses: F43.21 adjustment d/o with depressive sx.   Psychosocial / Contextual Factors: cancer dx  PROMIS (reviewed every 90 days):      Referral / Collaboration:  Referral to another professional/service is not indicated at this time..     Anticipated number of session for this episode of care: 9-12 sessions  Anticipation frequency of session: Biweekly  Anticipated Duration of each session: 53 or more minutes  Treatment plan will be reviewed in 90 days or when goals have been changed.         MeasurableTreatment Goal(s) related to diagnosis / functional impairment(s)  Goal 1: Patient will reduce of depressive sx.         Objective #A (Patient Action)                          Patient will identify medical stressors which contribute to feelings of depression.  Status: New - Date: 4/26/24       Intervention(s)  Therapist will teach distraction skills.   .     Objective #B  Patient will identify medical stressors which contribute to feelings of depression.  Status: New - Date: 4/26/24       Intervention(s)  Therapist will teach emotional recognition/identification.   .     Objective #C  Patient will identify medical stressors which contribute to feelings of depression.  Status: New - Date: 4/26/24       Intervention(s)  Therapist will teach emotional regulation skills.   .           Hadley Rose LP                   5/14/2024

## 2024-05-14 NOTE — PROGRESS NOTES
04/16/19 0900   C-SSRS (Frequent Screen)   2. Have you actually had any thoughts of killing yourself? No   Suicide Evaluation Negative screen= no ideation, behaviors or history   Nursing Suicide Assessment Note - Inpatient    Current assessment:    Current C-SSRS score: Negative screen= no ideation, behaviors or history      Protective Factors / Reason for Living: Responsibility to children, Sikh beliefs    Interventions:   · maintain plan of care and safety interventions    Other Interventions Implemented:  · n/a     Virtual Visit Details    Type of service:  Video Visit   Video Start Time:  1450  Video End Time: 15 36    Originating Location (pt. Location): Home    Distant Location (provider location):  On-site  Platform used for Video Visit: RubinWell

## 2024-05-20 ENCOUNTER — PATIENT OUTREACH (OUTPATIENT)
Dept: GERIATRIC MEDICINE | Facility: CLINIC | Age: 74
End: 2024-05-20
Payer: COMMERCIAL

## 2024-05-20 NOTE — LETTER
May 20, 2024      ROCHELLE AVILA  [address]  [facility] [address]      Dear Rochelle:    At [facility], we re dedicated to improving your health and wellness. Enclosed is the Care Plan developed with you on 04/22/24. Please review the Care Plan carefully.    As a reminder, during your visit we talked about:  Ways to manage your physical and mental health  Using health care to maintain and improve your health   Your preventive care needs     Remember to contact your care coordinator if you:  Are hospitalized, or plan to be hospitalized   Have a fall    Have a change in your physical or mental health  Need help finding support or services    If you have questions, or don t agree with your Care Plan, call me at [phone]. You can also call me if your needs change. TTY users, call the [place] [employer] at (546) or 1-222.298.4044 (howqan-qe-wjinrw relay service).    Sincerely,    LOPEZ Razo  [phone]  [email or web address] [place] [facility]\"") is a health plan that contracts with both Medicare and the [place] Medical Assistance (Medicaid) program to provide benefits of both programs to enrollees. Enrollment in [facility] depends on contract renewal.    [id number] accepted    [id number] (07/2022)

## 2024-05-20 NOTE — PROGRESS NOTES
[facility] Care Coordination Contact    Received after visit chart from care coordinator.  Completed following tasks: Mailed copy of care plan/support plan to member, Mailed [place] Choices signature sheet pages 3-4, and Mailed Safe Medication Disposal . Mail Assessment Summary letter.    EW pending for Homemaking.    Waylon James  Care Management Specialist  [facility]  [phone]

## 2024-05-28 ENCOUNTER — ONCOLOGY VISIT (OUTPATIENT)
Dept: ONCOLOGY | Facility: HOSPITAL | Age: 74
End: 2024-05-28
Attending: INTERNAL MEDICINE
Payer: COMMERCIAL

## 2024-05-28 ENCOUNTER — LAB (OUTPATIENT)
Dept: INFUSION THERAPY | Facility: HOSPITAL | Age: 74
End: 2024-05-28
Attending: INTERNAL MEDICINE
Payer: COMMERCIAL

## 2024-05-28 ENCOUNTER — PATIENT OUTREACH (OUTPATIENT)
Dept: ONCOLOGY | Facility: HOSPITAL | Age: 74
End: 2024-05-28

## 2024-05-28 VITALS
HEIGHT: 65 IN | OXYGEN SATURATION: 99 % | WEIGHT: 125.7 LBS | DIASTOLIC BLOOD PRESSURE: 77 MMHG | SYSTOLIC BLOOD PRESSURE: 174 MMHG | RESPIRATION RATE: 20 BRPM | TEMPERATURE: 98.7 F | BODY MASS INDEX: 20.94 KG/M2 | HEART RATE: 102 BPM

## 2024-05-28 DIAGNOSIS — C34.90 LUNG CANCER METASTATIC TO BONE (H): Primary | ICD-10-CM

## 2024-05-28 DIAGNOSIS — C79.51 LUNG CANCER METASTATIC TO BONE (H): Primary | ICD-10-CM

## 2024-05-28 DIAGNOSIS — C34.31 MALIGNANT NEOPLASM OF LOWER LOBE OF RIGHT LUNG (H): ICD-10-CM

## 2024-05-28 LAB
ALBUMIN SERPL BCG-MCNC: 4.2 G/DL (ref 3.5–5.2)
ALP SERPL-CCNC: 69 U/L (ref 40–150)
ALT SERPL W P-5'-P-CCNC: 23 U/L (ref 0–50)
ANION GAP SERPL CALCULATED.3IONS-SCNC: 16 MMOL/L (ref 7–15)
AST SERPL W P-5'-P-CCNC: 20 U/L (ref 0–45)
BASOPHILS # BLD AUTO: 0 10E3/UL (ref 0–0.2)
BASOPHILS NFR BLD AUTO: 0 %
BILIRUB SERPL-MCNC: 0.2 MG/DL
BUN SERPL-MCNC: 20.5 MG/DL (ref 8–23)
CALCIUM SERPL-MCNC: 9.8 MG/DL (ref 8.8–10.2)
CHLORIDE SERPL-SCNC: 102 MMOL/L (ref 98–107)
CREAT SERPL-MCNC: 0.78 MG/DL (ref 0.51–0.95)
DEPRECATED HCO3 PLAS-SCNC: 23 MMOL/L (ref 22–29)
EGFRCR SERPLBLD CKD-EPI 2021: 80 ML/MIN/1.73M2
EOSINOPHIL # BLD AUTO: 0 10E3/UL (ref 0–0.7)
EOSINOPHIL NFR BLD AUTO: 0 %
ERYTHROCYTE [DISTWIDTH] IN BLOOD BY AUTOMATED COUNT: 19 % (ref 10–15)
GLUCOSE SERPL-MCNC: 195 MG/DL (ref 70–99)
HCT VFR BLD AUTO: 33 % (ref 35–47)
HGB BLD-MCNC: 10.7 G/DL (ref 11.7–15.7)
IMM GRANULOCYTES # BLD: 0.1 10E3/UL
IMM GRANULOCYTES NFR BLD: 1 %
LYMPHOCYTES # BLD AUTO: 0.8 10E3/UL (ref 0.8–5.3)
LYMPHOCYTES NFR BLD AUTO: 9 %
MCH RBC QN AUTO: 32.8 PG (ref 26.5–33)
MCHC RBC AUTO-ENTMCNC: 32.4 G/DL (ref 31.5–36.5)
MCV RBC AUTO: 101 FL (ref 78–100)
MONOCYTES # BLD AUTO: 0.5 10E3/UL (ref 0–1.3)
MONOCYTES NFR BLD AUTO: 6 %
NEUTROPHILS # BLD AUTO: 7.2 10E3/UL (ref 1.6–8.3)
NEUTROPHILS NFR BLD AUTO: 84 %
NRBC # BLD AUTO: 0 10E3/UL
NRBC BLD AUTO-RTO: 0 /100
PLATELET # BLD AUTO: 488 10E3/UL (ref 150–450)
POTASSIUM SERPL-SCNC: 4.1 MMOL/L (ref 3.4–5.3)
PROT SERPL-MCNC: 6.9 G/DL (ref 6.4–8.3)
RBC # BLD AUTO: 3.26 10E6/UL (ref 3.8–5.2)
SODIUM SERPL-SCNC: 141 MMOL/L (ref 135–145)
T4 FREE SERPL-MCNC: 0.98 NG/DL (ref 0.9–1.7)
TSH SERPL DL<=0.005 MIU/L-ACNC: 0.18 UIU/ML (ref 0.3–4.2)
WBC # BLD AUTO: 8.5 10E3/UL (ref 4–11)

## 2024-05-28 PROCEDURE — 250N000011 HC RX IP 250 OP 636: Mod: JZ | Performed by: INTERNAL MEDICINE

## 2024-05-28 PROCEDURE — 96375 TX/PRO/DX INJ NEW DRUG ADDON: CPT

## 2024-05-28 PROCEDURE — G0463 HOSPITAL OUTPT CLINIC VISIT: HCPCS | Mod: 25 | Performed by: NURSE PRACTITIONER

## 2024-05-28 PROCEDURE — 99214 OFFICE O/P EST MOD 30 MIN: CPT | Performed by: NURSE PRACTITIONER

## 2024-05-28 PROCEDURE — 84439 ASSAY OF FREE THYROXINE: CPT | Performed by: INTERNAL MEDICINE

## 2024-05-28 PROCEDURE — 84443 ASSAY THYROID STIM HORMONE: CPT | Performed by: INTERNAL MEDICINE

## 2024-05-28 PROCEDURE — 85048 AUTOMATED LEUKOCYTE COUNT: CPT | Performed by: INTERNAL MEDICINE

## 2024-05-28 PROCEDURE — 250N000011 HC RX IP 250 OP 636

## 2024-05-28 PROCEDURE — 36591 DRAW BLOOD OFF VENOUS DEVICE: CPT | Performed by: INTERNAL MEDICINE

## 2024-05-28 PROCEDURE — G2211 COMPLEX E/M VISIT ADD ON: HCPCS | Performed by: NURSE PRACTITIONER

## 2024-05-28 PROCEDURE — 258N000003 HC RX IP 258 OP 636: Performed by: INTERNAL MEDICINE

## 2024-05-28 PROCEDURE — 96411 CHEMO IV PUSH ADDL DRUG: CPT

## 2024-05-28 PROCEDURE — 96413 CHEMO IV INFUSION 1 HR: CPT

## 2024-05-28 PROCEDURE — 82040 ASSAY OF SERUM ALBUMIN: CPT | Performed by: INTERNAL MEDICINE

## 2024-05-28 RX ORDER — ALBUTEROL SULFATE 90 UG/1
1-2 AEROSOL, METERED RESPIRATORY (INHALATION)
Status: DISCONTINUED | OUTPATIENT
Start: 2024-05-28 | End: 2024-05-28 | Stop reason: HOSPADM

## 2024-05-28 RX ORDER — HEPARIN SODIUM (PORCINE) LOCK FLUSH IV SOLN 100 UNIT/ML 100 UNIT/ML
5 SOLUTION INTRAVENOUS
Status: DISCONTINUED | OUTPATIENT
Start: 2024-05-28 | End: 2024-05-28 | Stop reason: HOSPADM

## 2024-05-28 RX ORDER — ONDANSETRON 2 MG/ML
8 INJECTION INTRAMUSCULAR; INTRAVENOUS ONCE
Status: COMPLETED | OUTPATIENT
Start: 2024-05-28 | End: 2024-05-28

## 2024-05-28 RX ORDER — DIPHENHYDRAMINE HYDROCHLORIDE 50 MG/ML
50 INJECTION INTRAMUSCULAR; INTRAVENOUS
Status: DISCONTINUED | OUTPATIENT
Start: 2024-05-28 | End: 2024-05-28 | Stop reason: HOSPADM

## 2024-05-28 RX ORDER — ALBUTEROL SULFATE 0.83 MG/ML
2.5 SOLUTION RESPIRATORY (INHALATION)
Status: DISCONTINUED | OUTPATIENT
Start: 2024-05-28 | End: 2024-05-28 | Stop reason: HOSPADM

## 2024-05-28 RX ORDER — MEPERIDINE HYDROCHLORIDE 25 MG/ML
25 INJECTION INTRAMUSCULAR; INTRAVENOUS; SUBCUTANEOUS EVERY 30 MIN PRN
Status: DISCONTINUED | OUTPATIENT
Start: 2024-05-28 | End: 2024-05-28 | Stop reason: HOSPADM

## 2024-05-28 RX ORDER — ONDANSETRON 2 MG/ML
8 INJECTION INTRAMUSCULAR; INTRAVENOUS ONCE
Status: CANCELLED
Start: 2024-06-18 | End: 2024-06-18

## 2024-05-28 RX ORDER — METHYLPREDNISOLONE SODIUM SUCCINATE 125 MG/2ML
125 INJECTION, POWDER, LYOPHILIZED, FOR SOLUTION INTRAMUSCULAR; INTRAVENOUS
Status: DISCONTINUED | OUTPATIENT
Start: 2024-05-28 | End: 2024-05-28 | Stop reason: HOSPADM

## 2024-05-28 RX ORDER — ONDANSETRON 2 MG/ML
8 INJECTION INTRAMUSCULAR; INTRAVENOUS ONCE
Status: CANCELLED
Start: 2024-05-28 | End: 2024-05-28

## 2024-05-28 RX ORDER — EPINEPHRINE 1 MG/ML
0.3 INJECTION, SOLUTION INTRAMUSCULAR; SUBCUTANEOUS EVERY 5 MIN PRN
Status: DISCONTINUED | OUTPATIENT
Start: 2024-05-28 | End: 2024-05-28 | Stop reason: HOSPADM

## 2024-05-28 RX ADMIN — SODIUM CHLORIDE 200 MG: 9 INJECTION, SOLUTION INTRAVENOUS at 14:31

## 2024-05-28 RX ADMIN — SODIUM CHLORIDE 250 ML: 9 INJECTION, SOLUTION INTRAVENOUS at 14:09

## 2024-05-28 RX ADMIN — Medication 5 ML: at 15:25

## 2024-05-28 RX ADMIN — PEMETREXED DISODIUM 800 MG: 500 INJECTION, POWDER, LYOPHILIZED, FOR SOLUTION INTRAVENOUS at 15:08

## 2024-05-28 RX ADMIN — ONDANSETRON 8 MG: 2 INJECTION INTRAMUSCULAR; INTRAVENOUS at 14:16

## 2024-05-28 ASSESSMENT — PAIN SCALES - GENERAL: PAINLEVEL: NO PAIN (0)

## 2024-05-28 NOTE — PROGRESS NOTES
Infusion Nursing Note:  Inez Rodriguez presents today for C#6 D#1 Keytruda/Alimta.    Patient seen by provider today: Yes: Elizabet Williamson CNP   present during visit today: Not Applicable.    Note: Administered premedication and treatment as ordered per provider. Tolerated well with no issues.      Intravenous Access:  Labs drawn without difficulty.  Implanted Port.    Treatment Conditions:  Lab Results   Component Value Date    HGB 10.7 (L) 05/28/2024    WBC 8.5 05/28/2024    ANEUTAUTO 7.2 05/28/2024     (H) 05/28/2024        Lab Results   Component Value Date     05/28/2024    POTASSIUM 4.1 05/28/2024    CR 0.78 05/28/2024    CHELITA 9.8 05/28/2024    BILITOTAL 0.2 05/28/2024    ALBUMIN 4.2 05/28/2024    ALT 23 05/28/2024    AST 20 05/28/2024       Results reviewed, labs MET treatment parameters, ok to proceed with treatment.      Post Infusion Assessment:  Patient tolerated infusion without incident.  Blood return noted pre and post infusion.  Site patent and intact, free from redness, edema or discomfort.  No evidence of extravasations.  Access discontinued per protocol.       Discharge Plan:   Patient and/or family verbalized understanding of discharge instructions and all questions answered.  Patient discharged in stable condition accompanied by: self.  Departure Mode: Ambulatory.      ENMANUEL ESTRELLA RN

## 2024-05-28 NOTE — PROGRESS NOTES
"Oncology Rooming Note    May 28, 2024 1:26 PM   Inez Rodriguez is a 73 year old female who presents for:    Chief Complaint   Patient presents with    Oncology Clinic Visit     3 week return visit with labs/infusion related to Malignant neoplasm of lower lobe of right lung.     Initial Vitals: BP (!) 174/77 (BP Location: Left arm, Patient Position: Sitting, Cuff Size: Adult Regular)   Pulse 102   Temp 98.7  F (37.1  C) (Tympanic)   Resp 20   Ht 1.651 m (5' 5\")   Wt 57 kg (125 lb 11.2 oz)   LMP  (LMP Unknown)   SpO2 99%   BMI 20.92 kg/m   Estimated body mass index is 20.92 kg/m  as calculated from the following:    Height as of this encounter: 1.651 m (5' 5\").    Weight as of this encounter: 57 kg (125 lb 11.2 oz). Body surface area is 1.62 meters squared.  No Pain (0) Comment: Data Unavailable   No LMP recorded (lmp unknown). Patient is postmenopausal.  Allergies reviewed: Yes  Medications reviewed: Yes    Medications: Medication refills not needed today.  Pharmacy name entered into [facility]:    [id number] [address] AT [facility] [address] [facility] [place] [id number] [facility]    Frailty Screening:   Is the patient here for a new oncology consult visit in cancer care? 2. No      Clinical concerns: none       Thelma Zafar CMA              "

## 2024-05-28 NOTE — PROGRESS NOTES
[facility] Hematology and Oncology Progress Note    Patient: Inez Rodriguez  MRN: [id number]  Date of Service: May 28, 2024      Oncologist: Dr. Miller    Reason for Visit    Chief Complaint   Patient presents with    Oncology Clinic Visit     3 week return visit with labs/infusion related to Malignant neoplasm of lower lobe of right lung.       Assessment and Plan     Cancer Staging   No matching staging information was found for the patient.    Metastatic non-small cell lung cancer, RUL with mediastinal lymph nodes and osseous mets of T4 and sacrum.   Molecular testing shows KRAS G12C mutated tumor.   On palliative chemotherapy finished 4 cycles of Carboplatin, Alimta, and pembrolizumab with good response. Decided to drop carboplatin 5/6/24. CT 5/2/24 showed mildly improved mediastinal and perihilar adenopathy with stable RUL thickening. New tiny pericardial effusion noted. Currently on Alimta and pembrolizumab. Tolerating well. Has persistent fatigue and brain fog. Has constipation. Back pain stable. Denies new symptoms. I reviewed labs today. Hgb is 10.7, , and ANC 7.2. TSH 0.18. T4 0.98. Will proceed with treatment today.     Chemotherapy induced anemia  Hbg 10.7 today. Continue to monitor.     Low back pain  Chronic, prior to diagnosis. Although does have T4 and sacrum mets. Pain is stable.    Constipation  Pt notes 2 weeks ago she was very constipated and strained so hard her eye turned red. The eye color has resolved now. Pt is managing constipation with fresh pineapple and is managing well.     History of smoking, quit      Plan  Continue every three weeks Alimta/pembrolizumab  Visit in 3 weeks.   CT after 4 cycles of Alimta/pembro, end of July 2024  Continue B12 and folate supplementation    ECOG Performance    0 - Independent    Distress Screening (within last 30 days)    No data recorded     Pain  Pain Score: No Pain (0)    Problem List    Patient Active Problem List   Diagnosis     Observation for suspected malignant neoplasm    Thyroid nodule    Chronic cough    Pulmonary nodules    Wheezing-associated respiratory infection (WARI)    Malignant neoplasm of lower lobe of right lung (H)    Postobstructive pneumonia    Lung cancer metastatic to bone (H)        ______________________________________________________________________________       Diagnosis:   Lung cancer, January 2024 came to ER with SOB.   -1/7/24: Mediastinal and right hilar adenopathy most suggestive of metastatic adenopathy from a primary lung malignancy. A nodular opacity in the right upper lobe could represent a primary lung malignancy versus an infectious/inflammatory focus. Recommend   referral to pulmonology for tissue sampling. [employer]ole conglomerate demonstrate mass effect on the right upper lobe bronchus and bronchus intermedius with associated narrowing without occlusion. Brain MRI negative.   -1/8/24: EBUS done and 4R, 11R and subcarinal nodes all positive for malignancy. NSCLC, favor adenocarcinoma. Right upper lobe nodule was also positive. Pleural fluid suspicious.   -1/29/24: PET: Findings suspicious for a right upper lobe primary lung neoplasm with extensive mediastinal/hilar lymph node metastases and osseous metastases to the T4 vertebral body and right sacrum.   Neogenomics: KRAS G12C mutation. Tp53 mutation. PTEN deletion +, MSI stable, PDL1 0%, TMB intermediate, Her2/leisa negative. Eveything else was negative.      Treatment:   -2/14/24: palliative treatment with Carboplatin, Alimta, Keytruda x 4 cycles  -5/6/24: Alimta and Keytruda    History of Present Illness    Patient here for continued treatment. She notes her main side effect is fatigue and brain fog. She has a hard time remembering things since starting chemotherapy and feels like her mental and physical energy are drained. She is trying to write and book and notes that she has a hard time coming up with her thoughts. She has not noticed much change since  "dropping carboplatin last cycle. She had some constipation two weeks ago and notes that she strained so hard, her eye turned red. It has now resolved and she eats fresh pineapple  and notes her constipation is much improved. Her back pain is stable. She denies nausea, vomiting, diarrhea, headaches, neuropathy, dyspnea, leg swelling or new symptoms.     Review of Systems    Pertinent items are noted in HPI.    Past History    Past Medical History:   Diagnosis Date    Hypertension     SOB (shortness of breath)        Physical Exam        5/28/2024     1:15 PM   Vital Signs   Systolic 174   Diastolic 77   Pulse 102   Temperature 98.7  F (37.1  C)   Respirations 20   Weight (LB) 125 lb 11.2 oz   Height 5' 5\"   BMI (Calculated) 20.92   Pain Score 0 (None)   O2 99 %       General: alert, appears stated age, and cooperative  HEENT: Head: Normal, normocephalic, atraumatic. Anicteric  Chest: Normal chest wall and respirations. Clear to auscultation.  Cardiac: regular rate and rhythm.   Extremities: no lower extremity edema  Skin: no rashes visible   CNS: grossly non focal     Lab Results    Recent Results (from the past 168 hour(s))   Comprehensive metabolic panel   Result Value Ref Range    Sodium 141 135 - 145 mmol/L    Potassium 4.1 3.4 - 5.3 mmol/L    Carbon Dioxide (CO2) 23 22 - 29 mmol/L    Anion Gap 16 (H) 7 - 15 mmol/L    Urea Nitrogen 20.5 8.0 - 23.0 mg/dL    Creatinine 0.78 0.51 - 0.95 mg/dL    GFR Estimate 80 >60 mL/min/1.73m2    Calcium 9.8 8.8 - 10.2 mg/dL    Chloride 102 98 - 107 mmol/L    Glucose 195 (H) 70 - 99 mg/dL    Alkaline Phosphatase 69 40 - 150 U/L    AST 20 0 - 45 U/L    ALT 23 0 - 50 U/L    Protein Total 6.9 6.4 - 8.3 g/dL    Albumin 4.2 3.5 - 5.2 g/dL    Bilirubin Total 0.2 <=1.2 mg/dL   TSH with free T4 reflex   Result Value Ref Range    TSH 0.18 (L) 0.30 - 4.20 uIU/mL   CBC with platelets and differential   Result Value Ref Range    WBC Count 8.5 4.0 - 11.0 10e3/uL    RBC Count 3.26 (L) 3.80 - " 5.20 10e6/uL    Hemoglobin 10.7 (L) 11.7 - 15.7 g/dL    Hematocrit 33.0 (L) 35.0 - 47.0 %     (H) 78 - 100 fL    MCH 32.8 26.5 - 33.0 pg    MCHC 32.4 31.5 - 36.5 g/dL    RDW 19.0 (H) 10.0 - 15.0 %    Platelet Count 488 (H) 150 - 450 10e3/uL    % Neutrophils 84 %    % Lymphocytes 9 %    % Monocytes 6 %    % Eosinophils 0 %    % Basophils 0 %    % Immature Granulocytes 1 %    NRBCs per 100 WBC 0 <1 /100    Absolute Neutrophils 7.2 1.6 - 8.3 10e3/uL    Absolute Lymphocytes 0.8 0.8 - 5.3 10e3/uL    Absolute Monocytes 0.5 0.0 - 1.3 10e3/uL    Absolute Eosinophils 0.0 0.0 - 0.7 10e3/uL    Absolute Basophils 0.0 0.0 - 0.2 10e3/uL    Absolute Immature Granulocytes 0.1 <=0.4 10e3/uL    Absolute NRBCs 0.0 10e3/uL   T4 free   Result Value Ref Range    Free T4 0.98 0.90 - 1.70 ng/dL       Imaging    CT Chest/Abdomen/Pelvis w Contrast    Result Date: 5/2/2024  EXAM: CT CHEST/ABDOMEN/PELVIS W CONTRAST LOCATION: [facility] DATE: 5/2/2024 INDICATION:  Malignant neoplasm of lower lobe of right lung (H). COMPARISON: 3/21/2024. TECHNIQUE: CT scan of the chest, abdomen, and pelvis was performed following injection of IV contrast. Multiplanar reformats were obtained. Dose reduction techniques were used. CONTRAST: 75 mL Isovue-370. FINDINGS: LUNGS AND PLEURA: No significant change of roughly 7 x 17 mm elongated nodular opacity along the anterior right upper lobe in region of post treatment change (series 4, image 101). Stable 13 mm nodular opacity peripheral right upper lobe. Stable 3 mm right apex nodule (image 40). No pleural effusion MEDIASTINUM/AXILLAE: Mildly improved mediastinal and hilar adenopathy since March 2024. Subcarinal node measures 15 x 20 mm versus 18 x 26 mm (series 3, image 62). Right suprahilar node measures 10 mm versus 13 mm (image 59). New tiny pericardial effusion. Left portacatheter tip at the cavoatrial junction. Stable 1.6 x 2.2 cm inferior left thyroid nodule. CORONARY ARTERY  CALCIFICATION: Present. HEPATOBILIARY: Normal. PANCREAS: Normal. SPLEEN: Normal. ADRENAL GLANDS: Stable slight adrenal thickening. KIDNEYS/BLADDER: Incidental renal cysts again seen. BOWEL: Moderate rectosigmoid colonic stool. No obstruction. LYMPH NODES: No abdominal or pelvic adenopathy. VASCULATURE: Nonaneurysmal aorta. Severe abdominal aortic atherosclerosis. PELVIC ORGANS: Nothing acute. MUSCULOSKELETAL: Little change of T4 vertebral sclerosis. Stable sclerotic focus right hemipelvis.     IMPRESSION: 1.  Mildly improved mediastinal and perihilar adenopathy in the chest. 2.  Stable right upper lobe nodular thickening in area of previous nodule. No new or enlarging nodules. 3.  No other significant change. 4.  New tiny pericardial effusion.      The longitudinal plan of care for the diagnosis(es)/condition(s) as documented were addressed during this visit. Due to the added complexity in care, I will continue to support Inez in the subsequent management and with ongoing continuity of care.     Signed by: Perla Sanchez PA-C    I, TANESHA Okeefe CNP, saw this patient and agree with the findings and plan of care as documented in the note.      Items personally reviewed/procedural attestation: vitals, labs, and I was present for key portions of the visit and agree with Assessment and plan

## 2024-05-28 NOTE — LETTER
5/28/2024         RE: Inez Rodriguez  [address]  [facility] [address]        Dear Colleague,    Thank you for referring your patient, Inez Rodriguez, to the [facility] [place]. Please see a copy of my visit note below.    [facility] Hematology and Oncology Progress Note    Patient: Inez Rodriguez  MRN: [id number]  Date of Service: May 28, 2024      Oncologist: Dr. Miller    Reason for Visit    Chief Complaint   Patient presents with     Oncology Clinic Visit     3 week return visit with labs/infusion related to Malignant neoplasm of lower lobe of right lung.       Assessment and Plan     Cancer Staging   No matching staging information was found for the patient.    Metastatic non-small cell lung cancer, RUL with mediastinal lymph nodes and osseous mets of T4 and sacrum.   Molecular testing shows KRAS G12C mutated tumor.   On palliative chemotherapy finished 4 cycles of Carboplatin, Alimta, and pembrolizumab with good response. Decided to drop carboplatin 5/6/24. CT 5/2/24 showed mildly improved mediastinal and perihilar adenopathy with stable RUL thickening. New tiny pericardial effusion noted. Currently on Alimta and pembrolizumab. Tolerating well. Has persistent fatigue and brain fog. Has constipation. Back pain stable. Denies new symptoms. I reviewed labs today. Hgb is 10.7, , and ANC 7.2. TSH 0.18. T4 0.98. Will proceed with treatment today.     Chemotherapy induced anemia  Hbg 10.7 today. Continue to monitor.     Low back pain  Chronic, prior to diagnosis. Although does have T4 and sacrum mets. Pain is stable.    Constipation  Pt notes 2 weeks ago she was very constipated and strained so hard her eye turned red. The eye color has resolved now. Pt is managing constipation with fresh pineapple and is managing well.     History of smoking, quit      Plan  Continue every three weeks Alimta/pembrolizumab  Visit in 3 weeks.   CT after 4 cycles of Alimta/pembro, end of July  2024  Continue B12 and folate supplementation    ECOG Performance    0 - Independent    Distress Screening (within last 30 days)    No data recorded     Pain  Pain Score: No Pain (0)    Problem List    Patient Active Problem List   Diagnosis     Observation for suspected malignant neoplasm     Thyroid nodule     Chronic cough     Pulmonary nodules     Wheezing-associated respiratory infection (WARI)     Malignant neoplasm of lower lobe of right lung (H)     Postobstructive pneumonia     Lung cancer metastatic to bone (H)        ______________________________________________________________________________       Diagnosis:   Lung cancer, January 2024 came to ER with SOB.   -1/7/24: Mediastinal and right hilar adenopathy most suggestive of metastatic adenopathy from a primary lung malignancy. A nodular opacity in the right upper lobe could represent a primary lung malignancy versus an infectious/inflammatory focus. Recommend   referral to pulmonology for tissue sampling. [employer] demonstrate mass effect on the right upper lobe bronchus and bronchus intermedius with associated narrowing without occlusion. Brain MRI negative.   -1/8/24: EBUS done and 4R, 11R and subcarinal nodes all positive for malignancy. NSCLC, favor adenocarcinoma. Right upper lobe nodule was also positive. Pleural fluid suspicious.   -1/29/24: PET: Findings suspicious for a right upper lobe primary lung neoplasm with extensive mediastinal/hilar lymph node metastases and osseous metastases to the T4 vertebral body and right sacrum.   Neogenomics: KRAS G12C mutation. Tp53 mutation. PTEN deletion +, MSI stable, PDL1 0%, TMB intermediate, Her2/leisa negative. Eveything else was negative.      Treatment:   -2/14/24: palliative treatment with Carboplatin, Alimta, Keytruda x 4 cycles  -5/6/24: Alimta and Keytruda    History of Present Illness    Patient here for continued treatment. She notes her main side effect is fatigue and brain fog. She has  "a hard time remembering things since starting chemotherapy and feels like her mental and physical energy are drained. She is trying to write and book and notes that she has a hard time coming up with her thoughts. She has not noticed much change since dropping carboplatin last cycle. She had some constipation two weeks ago and notes that she strained so hard, her eye turned red. It has now resolved and she eats fresh pineapple  and notes her constipation is much improved. Her back pain is stable. She denies nausea, vomiting, diarrhea, headaches, neuropathy, dyspnea, leg swelling or new symptoms.     Review of Systems    Pertinent items are noted in HPI.    Past History    Past Medical History:   Diagnosis Date     Hypertension      SOB (shortness of breath)        Physical Exam        5/28/2024     1:15 PM   Vital Signs   Systolic 174   Diastolic 77   Pulse 102   Temperature 98.7  F (37.1  C)   Respirations 20   Weight (LB) 125 lb 11.2 oz   Height 5' 5\"   BMI (Calculated) 20.92   Pain Score 0 (None)   O2 99 %       General: alert, appears stated age, and cooperative  HEENT: Head: Normal, normocephalic, atraumatic. Anicteric  Chest: Normal chest wall and respirations. Clear to auscultation.  Cardiac: regular rate and rhythm.   Extremities: no lower extremity edema  Skin: no rashes visible   CNS: grossly non focal     Lab Results    Recent Results (from the past 168 hour(s))   Comprehensive metabolic panel   Result Value Ref Range    Sodium 141 135 - 145 mmol/L    Potassium 4.1 3.4 - 5.3 mmol/L    Carbon Dioxide (CO2) 23 22 - 29 mmol/L    Anion Gap 16 (H) 7 - 15 mmol/L    Urea Nitrogen 20.5 8.0 - 23.0 mg/dL    Creatinine 0.78 0.51 - 0.95 mg/dL    GFR Estimate 80 >60 mL/min/1.73m2    Calcium 9.8 8.8 - 10.2 mg/dL    Chloride 102 98 - 107 mmol/L    Glucose 195 (H) 70 - 99 mg/dL    Alkaline Phosphatase 69 40 - 150 U/L    AST 20 0 - 45 U/L    ALT 23 0 - 50 U/L    Protein Total 6.9 6.4 - 8.3 g/dL    Albumin 4.2 3.5 - 5.2 " g/dL    Bilirubin Total 0.2 <=1.2 mg/dL   TSH with free T4 reflex   Result Value Ref Range    TSH 0.18 (L) 0.30 - 4.20 uIU/mL   CBC with platelets and differential   Result Value Ref Range    WBC Count 8.5 4.0 - 11.0 10e3/uL    RBC Count 3.26 (L) 3.80 - 5.20 10e6/uL    Hemoglobin 10.7 (L) 11.7 - 15.7 g/dL    Hematocrit 33.0 (L) 35.0 - 47.0 %     (H) 78 - 100 fL    MCH 32.8 26.5 - 33.0 pg    MCHC 32.4 31.5 - 36.5 g/dL    RDW 19.0 (H) 10.0 - 15.0 %    Platelet Count 488 (H) 150 - 450 10e3/uL    % Neutrophils 84 %    % Lymphocytes 9 %    % Monocytes 6 %    % Eosinophils 0 %    % Basophils 0 %    % Immature Granulocytes 1 %    NRBCs per 100 WBC 0 <1 /100    Absolute Neutrophils 7.2 1.6 - 8.3 10e3/uL    Absolute Lymphocytes 0.8 0.8 - 5.3 10e3/uL    Absolute Monocytes 0.5 0.0 - 1.3 10e3/uL    Absolute Eosinophils 0.0 0.0 - 0.7 10e3/uL    Absolute Basophils 0.0 0.0 - 0.2 10e3/uL    Absolute Immature Granulocytes 0.1 <=0.4 10e3/uL    Absolute NRBCs 0.0 10e3/uL   T4 free   Result Value Ref Range    Free T4 0.98 0.90 - 1.70 ng/dL       Imaging    CT Chest/Abdomen/Pelvis w Contrast    Result Date: 5/2/2024  EXAM: CT CHEST/ABDOMEN/PELVIS W CONTRAST LOCATION: [facility] DATE: 5/2/2024 INDICATION:  Malignant neoplasm of lower lobe of right lung (H). COMPARISON: 3/21/2024. TECHNIQUE: CT scan of the chest, abdomen, and pelvis was performed following injection of IV contrast. Multiplanar reformats were obtained. Dose reduction techniques were used. CONTRAST: 75 mL Isovue-370. FINDINGS: LUNGS AND PLEURA: No significant change of roughly 7 x 17 mm elongated nodular opacity along the anterior right upper lobe in region of post treatment change (series 4, image 101). Stable 13 mm nodular opacity peripheral right upper lobe. Stable 3 mm right apex nodule (image 40). No pleural effusion MEDIASTINUM/AXILLAE: Mildly improved mediastinal and hilar adenopathy since March 2024. Subcarinal node measures 15 x 20  mm versus 18 x 26 mm (series 3, image 62). Right suprahilar node measures 10 mm versus 13 mm (image 59). New tiny pericardial effusion. Left portacatheter tip at the cavoatrial junction. Stable 1.6 x 2.2 cm inferior left thyroid nodule. CORONARY ARTERY CALCIFICATION: Present. HEPATOBILIARY: Normal. PANCREAS: Normal. SPLEEN: Normal. ADRENAL GLANDS: Stable slight adrenal thickening. KIDNEYS/BLADDER: Incidental renal cysts again seen. BOWEL: Moderate rectosigmoid colonic stool. No obstruction. LYMPH NODES: No abdominal or pelvic adenopathy. VASCULATURE: Nonaneurysmal aorta. Severe abdominal aortic atherosclerosis. PELVIC ORGANS: Nothing acute. MUSCULOSKELETAL: Little change of T4 vertebral sclerosis. Stable sclerotic focus right hemipelvis.     IMPRESSION: 1.  Mildly improved mediastinal and perihilar adenopathy in the chest. 2.  Stable right upper lobe nodular thickening in area of previous nodule. No new or enlarging nodules. 3.  No other significant change. 4.  New tiny pericardial effusion.      The longitudinal plan of care for the diagnosis(es)/condition(s) as documented were addressed during this visit. Due to the added complexity in care, I will continue to support Inez in the subsequent management and with ongoing continuity of care.     Signed by: Perla Sanchez PA-C    I, TANESHA Okeefe CNP, saw this patient and agree with the findings and plan of care as documented in the note.      Items personally reviewed/procedural attestation: vitals, labs, and I was present for key portions of the visit and agree with Assessment and plan      Oncology Rooming Note    May 28, 2024 1:26 PM   Inez Rodriguez is a 73 year old female who presents for:    Chief Complaint   Patient presents with     Oncology Clinic Visit     3 week return visit with labs/infusion related to Malignant neoplasm of lower lobe of right lung.     Initial Vitals: BP (!) 174/77 (BP Location: Left arm, Patient Position: Sitting, Cuff  "Size: Adult Regular)   Pulse 102   Temp 98.7  F (37.1  C) (Tympanic)   Resp 20   Ht 1.651 m (5' 5\")   Wt 57 kg (125 lb 11.2 oz)   LMP  (LMP Unknown)   SpO2 99%   BMI 20.92 kg/m   Estimated body mass index is 20.92 kg/m  as calculated from the following:    Height as of this encounter: 1.651 m (5' 5\").    Weight as of this encounter: 57 kg (125 lb 11.2 oz). Body surface area is 1.62 meters squared.  No Pain (0) Comment: Data Unavailable   No LMP recorded (lmp unknown). Patient is postmenopausal.  Allergies reviewed: Yes  Medications reviewed: Yes    Medications: Medication refills not needed today.  Pharmacy name entered into [facility]:    [id number] - [place] [id number] AT [facility] [address]    Frailty Screening:   Is the patient here for a new oncology consult visit in cancer care? 2. No      Clinical concerns: none       Thelma Zafar CMA                Again, thank you for allowing me to participate in the care of your patient.        Sincerely,        TANESHA Okeefe CNP  "

## 2024-05-28 NOTE — PROGRESS NOTES
[facility]: Cancer Care Follow-Up Note                                    Discussion with Patient:                                                      Patient was in the clinic today in follow-up in regards to her diagnosis of lung cancer.     Goals          General    Maintain ability to perform ADLs without difficulty             Dates of Treatment:                                                      Infusion given in last 28 days       Administered MAR Action Medication Dose Rate Visit    05/06/2024 11:46 New Bag pembrolizumab (KEYTRUDA) 200 mg in sodium chloride 0.9 % 63 mL infusion 200 mg 126 mL/hr Infusion Therapy Visit on 05/06/2024 in [facility]    05/06/2024 12:17 New Bag PEMEtrexed (ALIMTA) 800 mg in sodium chloride 0.9 % 142 mL infusion 800 mg 852 mL/hr Infusion Therapy Visit on 05/06/2024 in [facility]    05/28/2024 14:31 New Bag pembrolizumab (KEYTRUDA) 200 mg in sodium chloride 0.9 % 63 mL infusion 200 mg 126 mL/hr Infusion Therapy Visit on 05/28/2024 in [facility]    05/28/2024 15:08 New Bag PEMEtrexed (ALIMTA) 800 mg in sodium chloride 0.9 % 142 mL infusion 800 mg 852 mL/hr Infusion Therapy Visit on 05/28/2024 in [facility]            Assessment:                                                      It was recommended that patient continue getting infusions every 3 weeks and follow-up with a provider every 3 weeks.  Patient was told that she will get a CT scan towards the end of July.    Intervention/Education provided during outreach:                                                       Patient appointments had already been set for 6/17 where she is scheduled for lab, Elizabet Williamson, CNP and infusion.  She will then have labs, see Dr Miller and get infusion on 7/8.  Patient will need a CT scan towards the end of July.  This will be due prior to her next treatment that  will be due on 7/29.    I will continue to monitor to make sure this gets scheduled.    Patient to follow up as scheduled at next appt  Patient to call/MyChart message with updates  Confirmed patient has clinic and triage numbers    Signature:  María Casillas RN

## 2024-05-30 ENCOUNTER — VIRTUAL VISIT (OUTPATIENT)
Dept: ONCOLOGY | Facility: HOSPITAL | Age: 74
End: 2024-05-30
Attending: PSYCHOLOGIST
Payer: COMMERCIAL

## 2024-05-30 VITALS — WEIGHT: 123 LBS | HEIGHT: 65 IN | BODY MASS INDEX: 20.49 KG/M2

## 2024-05-30 DIAGNOSIS — F43.21 ADJUSTMENT DISORDER WITH DEPRESSED MOOD: Primary | ICD-10-CM

## 2024-05-30 PROCEDURE — 90837 PSYTX W PT 60 MINUTES: CPT | Mod: 95 | Performed by: PSYCHOLOGIST

## 2024-05-30 ASSESSMENT — PAIN SCALES - GENERAL: PAINLEVEL: NO PAIN (0)

## 2024-05-30 NOTE — PROGRESS NOTES
Virtual Visit Details    Type of service:  Video Visit   Video Start Time:  1358  Video End Time: 1452    Originating Location (pt. Location): Home    Distant Location (provider location):  On-site  Platform used for Video Visit: [employer]

## 2024-05-30 NOTE — NURSING NOTE
Is the patient currently in the state of [place]? YES    Visit mode:VIDEO    If the visit is dropped, the patient can be reconnected by: VIDEO VISIT: Send to e-mail at: [email or web address]    Will anyone else be joining the visit? NO  (If patient encounters technical issues they should call [phone]    How would you like to obtain your AVS? MyChart    Are changes needed to the allergy or medication list? No    Are refills needed on medications prescribed by this physician? NO    Reason for visit: BRITTNEY VU

## 2024-05-30 NOTE — PROGRESS NOTES
[facility] Oncology- Psychotherapy                                     Progress Note     Patient Name: Inez Rodriguez                        Date:   2024                                         Service Type: Individual                            Session Start Time:  1358          Session End Time:  1451                Session Length:      53  mins     Attendees:     Client attended alone     Service Modality:  Video Visit:      Provider verified identity through the following two step process.  Patient provided:  Patient      Telemedicine Visit: The patient's condition can be safely assessed and treated via synchronous audio and visual telemedicine encounter.       Reason for Telemedicine Visit: Patient has requested telehealth visit     Originating Site (Patient Location): Patient's home     Distant Site (Provider Location): [facility] [place]     Consent:  The patient/guardian has verbally consented to: the potential risks and benefits of telemedicine (video visit) versus in person care; bill my insurance or make self-payment for services provided; and responsibility for payment of non-covered services.      Patient would like the video invitation sent by:  My Chart     Mode of Communication:  Video Conference via [employer]     Distant Location (Provider):  On-site     As the provider I attest to compliance with applicable laws and regulations related to telemedicine.     DATA  Interactive Complexity: No  Crisis: No                               Progress Since Last Session (Related to Symptoms / Goals / Homework):              Symptoms:  Pt reports obsessive thoughts and has difficulty with handling her neighbor. Pt reports she has been dysregulated in her recent hx with her neighbor.                 Pt reports anxiety, stress, and feels vulnerable.                 Pt reports frustration with some staff. Pt reports she feels on edge.                 Pt reports she is  "trying to use coping skills ot manage her mood.             Homework:  none                             Episode of Care Goals: Satisfactory progress - ACTION (Actively working towards change); Intervened by reinforcing change plan / affirming steps taken                  Current / Ongoing Stressors and Concerns:              Pt reports she was told she has five months to get her affairs in order and she is dealing with that concept. That was eight months ago she reports. She reports she has beaten that.                  Pt reports her neighbor is still bothering her.                  Pt reports she has two rounds of chemo left.                  Pt reports she is having landscaping done and thinks \"it will be awesome.\"                  Pt reports her son was at a convention recently and was the star of the show since he is one of five that rolled out a new role playing board game.                                 Social HX              Pt reports no family hx of cancer. She reports a hx of mental health and substance abuse.                  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                  Pt reports her son works for a company that makes board games.                  Pt reports she has been doing yoga X 50 years.,                     Treatment Objective(s) Addressed in This Session:          identify medical stressors which contribute to feelings of depression                    Intervention:              CBT: ,  Emotion Focused Therapy: ,  Solution Focused: ,     Assessments completed prior to visit:  none                    ASSESSMENT: Current Emotional / Mental Status (status of significant symptoms):              Risk status (Self / Other harm or suicidal ideation)              Patient denies current fears or concerns for personal safety.              Patient " denies current or recent suicidal ideation or behaviors.              Patient denies current or recent homicidal ideation or behaviors.              Patient denies current or recent self injurious behavior or ideation.              Patient denies other safety concerns.              Patient reports there has been no change in risk factors since their last session.                Patient reports there has been no change in protective factors since their last session.                Recommended that patient call 911 or go to the local ED should there be a change in any of these risk factors.                 Appearance:                            Appropriate               Eye Contact:                           Good               Psychomotor Behavior:          Normal               Attitude:                                   Cooperative               Orientation:                             All              Speech                          Rate / Production:       Normal                           Volume:                       Normal               Mood:                                      Euthymic              Affect:                                      Appropriate               Thought Content:                    Clear               Thought Form:                        Coherent  Logical               Insight:                                     Fair                  Medication Review:              No current psychiatric medications prescribed                 Medication Compliance:              NA                 Changes in Health Issues:              Yes: cancer, Associated Psychological Distress                 Chemical Use Review:              Substance Use: Chemical use reviewed, no active concerns identified                  Tobacco Use: No current tobacco use.       Diagnosis:              F43.21 Adjustment D/O with depressive sx.      Collateral Reports Completed:              Not Applicable     PLAN: (Patient  Tasks / Therapist Tasks / Other)  Return as needed. Utilize coping skills discussed.            Hadley Rose LP                                                           ______________________________________________________________________     Individual Treatment Plan     Patient's Name: Inez Rodriguez                     YOB: 1950     Date of Creation: 4/26/2024     Date Treatment Plan Last Reviewed/Revised:      DSM5 Diagnoses: F43.21 adjustment d/o with depressive sx.   Psychosocial / Contextual Factors: cancer dx  PROMIS (reviewed every 90 days):      Referral / Collaboration:  Referral to another professional/service is not indicated at this time..     Anticipated number of session for this episode of care: 9-12 sessions  Anticipation frequency of session: Biweekly  Anticipated Duration of each session: 53 or more minutes  Treatment plan will be reviewed in 90 days or when goals have been changed.         MeasurableTreatment Goal(s) related to diagnosis / functional impairment(s)  Goal 1: Patient will reduce of depressive sx.         Objective #A (Patient Action)                          Patient will identify medical stressors which contribute to feelings of depression.  Status: New - Date: 4/26/24       Intervention(s)  Therapist will teach distraction skills.   .     Objective #B  Patient will identify medical stressors which contribute to feelings of depression.  Status: New - Date: 4/26/24       Intervention(s)  Therapist will teach emotional recognition/identification.   .     Objective #C  Patient will identify medical stressors which contribute to feelings of depression.  Status: New - Date: 4/26/24       Intervention(s)  Therapist will teach emotional regulation skills.   .           Hadley Rose LP                   5/30/2024

## 2024-05-31 ENCOUNTER — PATIENT OUTREACH (OUTPATIENT)
Dept: GERIATRIC MEDICINE | Facility: CLINIC | Age: 74
End: 2024-05-31
Payer: COMMERCIAL

## 2024-05-31 NOTE — PROGRESS NOTES
[facility] Health Plan or Product Change    CC received notification that member's health plan or health plan product changed from are [employer] to [employer] effective 05/01/24.  CC contacted member and discussed change and face-to-face visit was offered. Member declined need for home visit. CC reviewed previous Health Risk Assessment/LTCC and POC with member and no changes noted.    Called member and introduced self as member s new CC. Confirmed with member that the welcome letter with writer's name and contact information has been received.  Reviewed LTCC/Health Risk Assessment (HRA) and POC with member. No changes noted.  Transitional HRA completed. Care Plan Summary updated and reflects current services.  Required referral authorization information communicated to CMS: No  Writer reviewed the following with member:  ER visits: No  Hospitalizations: No  TCU stays: No  Significant health status changes: NA  Falls/Injuries: No  ADL/IADL changes: No  Changes in services: No    Follow-Up Plan: Member informed of future contact, plan to f/u with member with at next regularly scheduled contact.  Contact information shared with member and family, encouraged member to call with any questions or concerns.     Myriam MARROQUIN  [facility]  [phone]

## 2024-06-04 ENCOUNTER — TELEPHONE (OUTPATIENT)
Dept: ONCOLOGY | Facility: HOSPITAL | Age: 74
End: 2024-06-04
Payer: COMMERCIAL

## 2024-06-04 DIAGNOSIS — B37.0 THRUSH: ICD-10-CM

## 2024-06-04 DIAGNOSIS — B37.31 YEAST INFECTION OF THE VAGINA: Primary | ICD-10-CM

## 2024-06-04 RX ORDER — FLUCONAZOLE 100 MG/1
TABLET ORAL
Qty: 8 TABLET | Refills: 0 | Status: SHIPPED | OUTPATIENT
Start: 2024-06-04

## 2024-06-04 NOTE — TELEPHONE ENCOUNTER
To discuss the [employer] message that she sent in regarding concerns about a yeast infection.  Per her report, she has noticed; a white fuzzy tongue, genital itching, vaginal discharge and white nailbeds.  She states the discharge started about 3 days ago and today she has noticed the other symptoms.  I reviewed this with Elizabet Williamson CNP today who recommends that she start on fluconazole.  She will take 200 mg the first day, then 100 mg daily for 6 additional days.  Inez is aware this prescription was sent over to the pharmacy today.  She agrees to take the medications, monitor symptoms, and call back if  symptoms worsen or do not improve.     Sarah Garcia RN on 6/4/2024 at 2:18 PM

## 2024-06-17 ENCOUNTER — LAB (OUTPATIENT)
Dept: INFUSION THERAPY | Facility: HOSPITAL | Age: 74
End: 2024-06-17
Payer: COMMERCIAL

## 2024-06-17 ENCOUNTER — ONCOLOGY VISIT (OUTPATIENT)
Dept: ONCOLOGY | Facility: HOSPITAL | Age: 74
End: 2024-06-17
Payer: COMMERCIAL

## 2024-06-17 ENCOUNTER — INFUSION THERAPY VISIT (OUTPATIENT)
Dept: INFUSION THERAPY | Facility: HOSPITAL | Age: 74
End: 2024-06-17
Payer: COMMERCIAL

## 2024-06-17 VITALS
RESPIRATION RATE: 20 BRPM | OXYGEN SATURATION: 100 % | TEMPERATURE: 98.3 F | SYSTOLIC BLOOD PRESSURE: 144 MMHG | BODY MASS INDEX: 20.09 KG/M2 | HEIGHT: 65 IN | HEART RATE: 105 BPM | WEIGHT: 120.6 LBS | DIASTOLIC BLOOD PRESSURE: 70 MMHG

## 2024-06-17 DIAGNOSIS — F43.21 ADJUSTMENT DISORDER WITH DEPRESSED MOOD: ICD-10-CM

## 2024-06-17 DIAGNOSIS — D64.81 ANTINEOPLASTIC CHEMOTHERAPY INDUCED ANEMIA: ICD-10-CM

## 2024-06-17 DIAGNOSIS — K11.7 DRUG-INDUCED XEROSTOMIA: ICD-10-CM

## 2024-06-17 DIAGNOSIS — C34.31 MALIGNANT NEOPLASM OF LOWER LOBE OF RIGHT LUNG (H): ICD-10-CM

## 2024-06-17 DIAGNOSIS — T45.1X5A ANTINEOPLASTIC CHEMOTHERAPY INDUCED ANEMIA: ICD-10-CM

## 2024-06-17 DIAGNOSIS — C34.90 LUNG CANCER METASTATIC TO BONE (H): Primary | ICD-10-CM

## 2024-06-17 DIAGNOSIS — C79.51 LUNG CANCER METASTATIC TO BONE (H): Primary | ICD-10-CM

## 2024-06-17 DIAGNOSIS — T50.905A DRUG-INDUCED XEROSTOMIA: ICD-10-CM

## 2024-06-17 DIAGNOSIS — K59.03 DRUG-INDUCED CONSTIPATION: ICD-10-CM

## 2024-06-17 LAB
ALBUMIN SERPL BCG-MCNC: 4.3 G/DL (ref 3.5–5.2)
ALP SERPL-CCNC: 75 U/L (ref 40–150)
ALT SERPL W P-5'-P-CCNC: 27 U/L (ref 0–50)
ANION GAP SERPL CALCULATED.3IONS-SCNC: 16 MMOL/L (ref 7–15)
AST SERPL W P-5'-P-CCNC: 27 U/L (ref 0–45)
BASOPHILS # BLD AUTO: 0 10E3/UL (ref 0–0.2)
BASOPHILS NFR BLD AUTO: 0 %
BILIRUB SERPL-MCNC: <0.2 MG/DL
BUN SERPL-MCNC: 14.8 MG/DL (ref 8–23)
CALCIUM SERPL-MCNC: 10 MG/DL (ref 8.8–10.2)
CHLORIDE SERPL-SCNC: 101 MMOL/L (ref 98–107)
CREAT SERPL-MCNC: 0.8 MG/DL (ref 0.51–0.95)
DEPRECATED HCO3 PLAS-SCNC: 24 MMOL/L (ref 22–29)
EGFRCR SERPLBLD CKD-EPI 2021: 77 ML/MIN/1.73M2
EOSINOPHIL # BLD AUTO: 0 10E3/UL (ref 0–0.7)
EOSINOPHIL NFR BLD AUTO: 0 %
ERYTHROCYTE [DISTWIDTH] IN BLOOD BY AUTOMATED COUNT: 15.5 % (ref 10–15)
GLUCOSE SERPL-MCNC: 172 MG/DL (ref 70–99)
HCT VFR BLD AUTO: 31 % (ref 35–47)
HGB BLD-MCNC: 10 G/DL (ref 11.7–15.7)
IMM GRANULOCYTES # BLD: 0.1 10E3/UL
IMM GRANULOCYTES NFR BLD: 1 %
LYMPHOCYTES # BLD AUTO: 0.9 10E3/UL (ref 0.8–5.3)
LYMPHOCYTES NFR BLD AUTO: 9 %
MCH RBC QN AUTO: 33.1 PG (ref 26.5–33)
MCHC RBC AUTO-ENTMCNC: 32.3 G/DL (ref 31.5–36.5)
MCV RBC AUTO: 103 FL (ref 78–100)
MONOCYTES # BLD AUTO: 0.7 10E3/UL (ref 0–1.3)
MONOCYTES NFR BLD AUTO: 8 %
NEUTROPHILS # BLD AUTO: 8.1 10E3/UL (ref 1.6–8.3)
NEUTROPHILS NFR BLD AUTO: 82 %
NRBC # BLD AUTO: 0 10E3/UL
NRBC BLD AUTO-RTO: 0 /100
PLATELET # BLD AUTO: 557 10E3/UL (ref 150–450)
POTASSIUM SERPL-SCNC: 4.2 MMOL/L (ref 3.4–5.3)
PROT SERPL-MCNC: 7.2 G/DL (ref 6.4–8.3)
RBC # BLD AUTO: 3.02 10E6/UL (ref 3.8–5.2)
SODIUM SERPL-SCNC: 141 MMOL/L (ref 135–145)
T4 FREE SERPL-MCNC: 1.06 NG/DL (ref 0.9–1.7)
TSH SERPL DL<=0.005 MIU/L-ACNC: 0.08 UIU/ML (ref 0.3–4.2)
WBC # BLD AUTO: 9.9 10E3/UL (ref 4–11)

## 2024-06-17 PROCEDURE — 85025 COMPLETE CBC W/AUTO DIFF WBC: CPT | Performed by: INTERNAL MEDICINE

## 2024-06-17 PROCEDURE — 250N000011 HC RX IP 250 OP 636: Performed by: NURSE PRACTITIONER

## 2024-06-17 PROCEDURE — G0463 HOSPITAL OUTPT CLINIC VISIT: HCPCS | Performed by: NURSE PRACTITIONER

## 2024-06-17 PROCEDURE — 258N000003 HC RX IP 258 OP 636: Mod: JZ | Performed by: INTERNAL MEDICINE

## 2024-06-17 PROCEDURE — 250N000011 HC RX IP 250 OP 636: Mod: JZ

## 2024-06-17 PROCEDURE — 84439 ASSAY OF FREE THYROXINE: CPT | Performed by: INTERNAL MEDICINE

## 2024-06-17 PROCEDURE — 250N000011 HC RX IP 250 OP 636: Mod: JZ | Performed by: INTERNAL MEDICINE

## 2024-06-17 PROCEDURE — 84450 TRANSFERASE (AST) (SGOT): CPT | Performed by: INTERNAL MEDICINE

## 2024-06-17 PROCEDURE — G2211 COMPLEX E/M VISIT ADD ON: HCPCS | Performed by: NURSE PRACTITIONER

## 2024-06-17 PROCEDURE — 96375 TX/PRO/DX INJ NEW DRUG ADDON: CPT

## 2024-06-17 PROCEDURE — 96413 CHEMO IV INFUSION 1 HR: CPT

## 2024-06-17 PROCEDURE — 82565 ASSAY OF CREATININE: CPT | Performed by: INTERNAL MEDICINE

## 2024-06-17 PROCEDURE — 96411 CHEMO IV PUSH ADDL DRUG: CPT

## 2024-06-17 PROCEDURE — 99214 OFFICE O/P EST MOD 30 MIN: CPT | Performed by: NURSE PRACTITIONER

## 2024-06-17 PROCEDURE — 96372 THER/PROPH/DIAG INJ SC/IM: CPT | Mod: XS | Performed by: NURSE PRACTITIONER

## 2024-06-17 PROCEDURE — 36591 DRAW BLOOD OFF VENOUS DEVICE: CPT | Performed by: INTERNAL MEDICINE

## 2024-06-17 PROCEDURE — 84443 ASSAY THYROID STIM HORMONE: CPT | Performed by: INTERNAL MEDICINE

## 2024-06-17 RX ORDER — EPINEPHRINE 1 MG/ML
0.3 INJECTION, SOLUTION INTRAMUSCULAR; SUBCUTANEOUS EVERY 5 MIN PRN
Status: DISCONTINUED | OUTPATIENT
Start: 2024-06-17 | End: 2024-06-17 | Stop reason: HOSPADM

## 2024-06-17 RX ORDER — CYANOCOBALAMIN 1000 UG/ML
1000 INJECTION, SOLUTION INTRAMUSCULAR; SUBCUTANEOUS ONCE
Status: CANCELLED | OUTPATIENT
Start: 2024-06-17 | End: 2024-06-17

## 2024-06-17 RX ORDER — MEPERIDINE HYDROCHLORIDE 25 MG/ML
25 INJECTION INTRAMUSCULAR; INTRAVENOUS; SUBCUTANEOUS EVERY 30 MIN PRN
Status: DISCONTINUED | OUTPATIENT
Start: 2024-06-17 | End: 2024-06-17 | Stop reason: HOSPADM

## 2024-06-17 RX ORDER — DIPHENHYDRAMINE HYDROCHLORIDE 50 MG/ML
50 INJECTION INTRAMUSCULAR; INTRAVENOUS
Status: DISCONTINUED | OUTPATIENT
Start: 2024-06-17 | End: 2024-06-17 | Stop reason: HOSPADM

## 2024-06-17 RX ORDER — HEPARIN SODIUM,PORCINE 10 UNIT/ML
5-20 VIAL (ML) INTRAVENOUS DAILY PRN
Status: DISCONTINUED | OUTPATIENT
Start: 2024-06-17 | End: 2024-06-17 | Stop reason: HOSPADM

## 2024-06-17 RX ORDER — FLUOXETINE 10 MG/1
20 CAPSULE ORAL DAILY
Qty: 30 CAPSULE | Refills: 1 | Status: CANCELLED | OUTPATIENT
Start: 2024-06-17

## 2024-06-17 RX ORDER — HEPARIN SODIUM (PORCINE) LOCK FLUSH IV SOLN 100 UNIT/ML 100 UNIT/ML
5 SOLUTION INTRAVENOUS
Status: DISCONTINUED | OUTPATIENT
Start: 2024-06-17 | End: 2024-06-17 | Stop reason: HOSPADM

## 2024-06-17 RX ORDER — METHYLPREDNISOLONE SODIUM SUCCINATE 125 MG/2ML
125 INJECTION, POWDER, LYOPHILIZED, FOR SOLUTION INTRAMUSCULAR; INTRAVENOUS
Status: DISCONTINUED | OUTPATIENT
Start: 2024-06-17 | End: 2024-06-17 | Stop reason: HOSPADM

## 2024-06-17 RX ORDER — ALBUTEROL SULFATE 0.83 MG/ML
2.5 SOLUTION RESPIRATORY (INHALATION)
Status: DISCONTINUED | OUTPATIENT
Start: 2024-06-17 | End: 2024-06-17 | Stop reason: HOSPADM

## 2024-06-17 RX ORDER — CYANOCOBALAMIN 1000 UG/ML
1000 INJECTION, SOLUTION INTRAMUSCULAR; SUBCUTANEOUS ONCE
Status: COMPLETED | OUTPATIENT
Start: 2024-06-17 | End: 2024-06-17

## 2024-06-17 RX ORDER — ONDANSETRON 2 MG/ML
8 INJECTION INTRAMUSCULAR; INTRAVENOUS ONCE
Qty: 4 ML | Refills: 0 | Status: COMPLETED | OUTPATIENT
Start: 2024-06-17 | End: 2024-06-17

## 2024-06-17 RX ORDER — ALBUTEROL SULFATE 90 UG/1
1-2 AEROSOL, METERED RESPIRATORY (INHALATION)
Status: DISCONTINUED | OUTPATIENT
Start: 2024-06-17 | End: 2024-06-17 | Stop reason: HOSPADM

## 2024-06-17 RX ADMIN — ONDANSETRON 8 MG: 2 INJECTION INTRAMUSCULAR; INTRAVENOUS at 15:08

## 2024-06-17 RX ADMIN — CYANOCOBALAMIN 1000 MCG: 1000 INJECTION, SOLUTION INTRAMUSCULAR; SUBCUTANEOUS at 15:47

## 2024-06-17 RX ADMIN — PEMETREXED DISODIUM 800 MG: 500 INJECTION, POWDER, LYOPHILIZED, FOR SOLUTION INTRAVENOUS at 15:52

## 2024-06-17 RX ADMIN — Medication 5 ML: at 16:06

## 2024-06-17 RX ADMIN — SODIUM CHLORIDE 250 ML: 9 INJECTION, SOLUTION INTRAVENOUS at 15:07

## 2024-06-17 RX ADMIN — SODIUM CHLORIDE 200 MG: 9 INJECTION, SOLUTION INTRAVENOUS at 15:15

## 2024-06-17 ASSESSMENT — PAIN SCALES - GENERAL: PAINLEVEL: MILD PAIN (3)

## 2024-06-17 NOTE — PROGRESS NOTES
"Oncology Rooming Note    June 17, 2024 1:48 PM   Inez Rodriguez is a 73 year old female who presents for:    Chief Complaint   Patient presents with    Oncology Clinic Visit     3 week return visit with labs/infusion related to Lung cancer metastatic to bone.     Initial Vitals: BP (!) 144/70 (BP Location: Left arm, Patient Position: Sitting, Cuff Size: Adult Regular)   Pulse 105   Temp 98.3  F (36.8  C) (Oral)   Resp 20   Ht 1.651 m (5' 5\")   Wt 54.7 kg (120 lb 9.6 oz)   LMP  (LMP Unknown)   SpO2 100%   BMI 20.07 kg/m   Estimated body mass index is 20.07 kg/m  as calculated from the following:    Height as of this encounter: 1.651 m (5' 5\").    Weight as of this encounter: 54.7 kg (120 lb 9.6 oz). Body surface area is 1.58 meters squared.  Mild Pain (3) Comment: Data Unavailable   No LMP recorded (lmp unknown). Patient is postmenopausal.  Allergies reviewed: Yes  Medications reviewed: Yes    Medications: Medication refills not needed today.  Pharmacy name entered into [facility]:    [id number] [address] AT [facility] [place] [id number] [facility]    Frailty Screening:   Is the patient here for a new oncology consult visit in cancer care? 2. No      Clinical concerns: none.       Thelma Zafar CMA              "

## 2024-06-17 NOTE — LETTER
6/17/2024      Inez Rodriguez  [address]  [facility] [address]      Dear Colleague,    Thank you for referring your patient, Inez Rodriguez, to the [facility] [place]. Please see a copy of my visit note below.    [facility] Hematology and Oncology Progress Note    Patient: Inez Rodriguez  MRN: [id number]  Date of Service: Jun 17, 2024      Oncologist: Dr. Miller    Reason for Visit    Chief Complaint   Patient presents with     Oncology Clinic Visit     3 week return visit with labs/infusion related to Lung cancer metastatic to bone.       Assessment and Plan     Cancer Staging   No matching staging information was found for the patient.    Metastatic non-small cell lung cancer, RUL with mediastinal lymph nodes and osseous mets of T4 and sacrum. Molecular testing shows KRAS G12C mutated tumor.   On palliative chemotherapy finished 4 cycles of Carboplatin, Alimta, and pembrolizumab with good response. Decided to drop carboplatin 5/6/24. CT 5/2/24 showed mildly improved mediastinal and perihilar adenopathy with stable RUL thickening. New tiny pericardial effusion noted. Currently on Alimta and pembrolizumab. Tolerating poorly. Has persistent fatigue and brain fog. Has mild constipation. Back pain stable. She is feeling depressed from her lack of energy and was hoping she could be done with treatment soon. Discussed with pt the goals of palliative treatment due to her stage IV disease and the risk of progression. Discussed that we try to continue treatment as long as it continues to work and the side effects are tolerable. Pt feels the side effects are intolerable and did not feel like she could continue treatment long term.  I reviewed labs today. Hgb is 10, , and ANC 8.1. TSH is low at 0.08 but T4 is WNL. Pt is agreeable to have treatment today and then return in 3 weeks with a CT prior for a visit with Dr. Miller to discuss treatment goals.     Depression  Has felt low  motivation due to fatigue and chemotherapy effects. Notes that she sleeps 18 hrs each day and christiano frequently due to her low quality of life. Start Fluoxetine 20 mg daily.     Chemotherapy induced anemia  Hbg 10 today. Continue to monitor.     Low back pain  Chronic, prior to diagnosis. Although does have T4 and sacrum mets. Pain is stable.    Constipation  Stable. Continues to have occasional constipation. Is not interested in medication. Encouraged good hydration and high fiber foods like pears, peaches, and pineapple.    Thrombocytosis   PLT increased today at 557. Previously 488 two weeks ago. Unclear the etiology. Will continue to monitor.     Vaginal Yeast Infection  Reached out to the clinic 2 weeks ago for white vaginal discharge and itchiness. Was sent a course of fluconazole. Symptoms have resolved    Xerostomia  Pt has had dry mouth for the past couple of months. Encouraged continued hydration and sucking on hard candies and chewing gum. Also discussed xylimelts for symptoms relief.     History of smoking, quit    Plan  Cycle 3 of maintenance Alimta/pembrolizumab today  Visit in 3 weeks with CT prior to decide on next steps for treatment   Continue B12 and folate supplementation  Start Fluoxetine 20 mg daily for depression   Encouraged hard candies and hydration for dry mouth     ECOG Performance    0 - Independent    Distress Screening (within last 30 days)    No data recorded     Pain  Pain Score: Mild Pain (3)  Pain Loc: Upper Back    Problem List    Patient Active Problem List   Diagnosis     Observation for suspected malignant neoplasm     Thyroid nodule     Chronic cough     Pulmonary nodules     Wheezing-associated respiratory infection (WARI)     Malignant neoplasm of lower lobe of right lung (H)     Postobstructive pneumonia     Lung cancer metastatic to bone (H)        ______________________________________________________________________________       Diagnosis:   Lung cancer, January 2024 came  to ER with SOB.   -1/7/24: Mediastinal and right hilar adenopathy most suggestive of metastatic adenopathy from a primary lung malignancy. A nodular opacity in the right upper lobe could represent a primary lung malignancy versus an infectious/inflammatory focus. Recommend   referral to pulmonology for tissue sampling. [employer] demonstrate mass effect on the right upper lobe bronchus and bronchus intermedius with associated narrowing without occlusion. Brain MRI negative.   -1/8/24: EBUS done and 4R, 11R and subcarinal nodes all positive for malignancy. NSCLC, favor adenocarcinoma. Right upper lobe nodule was also positive. Pleural fluid suspicious.   -1/29/24: PET: Findings suspicious for a right upper lobe primary lung neoplasm with extensive mediastinal/hilar lymph node metastases and osseous metastases to the T4 vertebral body and right sacrum.   Neogenomics: KRAS G12C mutation. Tp53 mutation. PTEN deletion +, MSI stable, PDL1 0%, TMB intermediate, Her2/leisa negative. Eveything else was negative.      Treatment:   -2/14/24: palliative treatment with Carboplatin, Alimta, Keytruda x 4 cycles  -5/6/24: Alimta and Keytruda    History of Present Illness    Patient here for continued treatment. She has been feeling continued fatigue and is tired of feeling this way. She often does not have the energy to make herself food or do dishes. Her son cooks for her. She sleeps 18 hours a day and christiano frequently. She feels depressed from these side effects and notes she used to be very active. She does not think she wants to continue this treatment forever as it is affecting her quality of life. The brain fog is stable. Her constipation is somewhat managed with fresh pineapple. She notes she could drink more water. She has not felt better since discontinuing the carboplatin. A few weeks ago she noticed vaginal discharge and itching and was prescribed fluconazole. Her symptoms have now improved. She also notes she has  "dry mouth that affects her desire to eat food. Her appetite is poor. Her back pain is stable. She denies nausea, vomiting, diarrhea, headaches, neuropathy, dyspnea, or leg swelling.     Review of Systems    Pertinent items are noted in HPI.    Past History    Past Medical History:   Diagnosis Date     Hypertension      SOB (shortness of breath)        Physical Exam        6/17/2024     1:31 PM   Vital Signs   Systolic 144   Diastolic 70   Pulse 105   Temperature 98.3  F (36.8  C)   Respirations 20   Weight (LB) 120 lb 9.6 oz   Height 5' 5\"   BMI (Calculated) 20.07   Pain Score 3 (Mild)   O2 100 %       General: alert, appears stated age, and cooperative  HEENT: Head: Normal, normocephalic, atraumatic. Anicteric. Xerostomia. No white plaque or lesions on tongue.   Chest: Normal chest wall and respirations. Clear to auscultation.  Cardiac: regular rate and rhythm.   Extremities: no lower extremity edema  Skin: no rashes visible   CNS: grossly non focal     Lab Results    Recent Results (from the past 168 hour(s))   Comprehensive metabolic panel   Result Value Ref Range    Sodium 141 135 - 145 mmol/L    Potassium 4.2 3.4 - 5.3 mmol/L    Carbon Dioxide (CO2) 24 22 - 29 mmol/L    Anion Gap 16 (H) 7 - 15 mmol/L    Urea Nitrogen 14.8 8.0 - 23.0 mg/dL    Creatinine 0.80 0.51 - 0.95 mg/dL    GFR Estimate 77 >60 mL/min/1.73m2    Calcium 10.0 8.8 - 10.2 mg/dL    Chloride 101 98 - 107 mmol/L    Glucose 172 (H) 70 - 99 mg/dL    Alkaline Phosphatase 75 40 - 150 U/L    AST 27 0 - 45 U/L    ALT 27 0 - 50 U/L    Protein Total 7.2 6.4 - 8.3 g/dL    Albumin 4.3 3.5 - 5.2 g/dL    Bilirubin Total <0.2 <=1.2 mg/dL   TSH with free T4 reflex   Result Value Ref Range    TSH 0.08 (L) 0.30 - 4.20 uIU/mL   CBC with platelets and differential   Result Value Ref Range    WBC Count 9.9 4.0 - 11.0 10e3/uL    RBC Count 3.02 (L) 3.80 - 5.20 10e6/uL    Hemoglobin 10.0 (L) 11.7 - 15.7 g/dL    Hematocrit 31.0 (L) 35.0 - 47.0 %     (H) 78 - 100 " "fL    MCH 33.1 (H) 26.5 - 33.0 pg    MCHC 32.3 31.5 - 36.5 g/dL    RDW 15.5 (H) 10.0 - 15.0 %    Platelet Count 557 (H) 150 - 450 10e3/uL    % Neutrophils 82 %    % Lymphocytes 9 %    % Monocytes 8 %    % Eosinophils 0 %    % Basophils 0 %    % Immature Granulocytes 1 %    NRBCs per 100 WBC 0 <1 /100    Absolute Neutrophils 8.1 1.6 - 8.3 10e3/uL    Absolute Lymphocytes 0.9 0.8 - 5.3 10e3/uL    Absolute Monocytes 0.7 0.0 - 1.3 10e3/uL    Absolute Eosinophils 0.0 0.0 - 0.7 10e3/uL    Absolute Basophils 0.0 0.0 - 0.2 10e3/uL    Absolute Immature Granulocytes 0.1 <=0.4 10e3/uL    Absolute NRBCs 0.0 10e3/uL   T4 free   Result Value Ref Range    Free T4 1.06 0.90 - 1.70 ng/dL         Imaging    No results found.    The longitudinal plan of care for the diagnosis(es)/condition(s) as documented were addressed during this visit. Due to the added complexity in care, I will continue to support Inez in the subsequent management and with ongoing continuity of care.     Signed by: Perla Sanchez PA-C    I, TANESHA Okeefe CNP, saw this patient and agree with the findings and plan of care as documented in the note.      Items personally reviewed/procedural attestation: vitals, labs, and I was present for key portions of the visit and agree with Assessment and plan      Oncology Rooming Note    June 17, 2024 1:48 PM   Inez Rodriguez is a 73 year old female who presents for:    Chief Complaint   Patient presents with     Oncology Clinic Visit     3 week return visit with labs/infusion related to Lung cancer metastatic to bone.     Initial Vitals: BP (!) 144/70 (BP Location: Left arm, Patient Position: Sitting, Cuff Size: Adult Regular)   Pulse 105   Temp 98.3  F (36.8  C) (Oral)   Resp 20   Ht 1.651 m (5' 5\")   Wt 54.7 kg (120 lb 9.6 oz)   LMP  (LMP Unknown)   SpO2 100%   BMI 20.07 kg/m   Estimated body mass index is 20.07 kg/m  as calculated from the following:    Height as of this encounter: 1.651 m (5' 5\").    " Weight as of this encounter: 54.7 kg (120 lb 9.6 oz). Body surface area is 1.58 meters squared.  Mild Pain (3) Comment: Data Unavailable   No LMP recorded (lmp unknown). Patient is postmenopausal.  Allergies reviewed: Yes  Medications reviewed: Yes    Medications: Medication refills not needed today.  Pharmacy name entered into [facility]:    [facility] #[id number] [place] [id number] AT [facility] [address]    Frailty Screening:   Is the patient here for a new oncology consult visit in cancer care? 2. No      Clinical concerns: none.       Thelma Zafar CMA                Again, thank you for allowing me to participate in the care of your patient.        Sincerely,        TANESHA Okeefe CNP

## 2024-06-17 NOTE — PROGRESS NOTES
Infusion Nursing Note:  Inez Rodriguez presents today for D1C7.    Patient seen by provider today: Yes: Scheduled provider visit.    present during visit today: Not Applicable.    Note: Here for treatment. States has been having a hard time with diagnosis. Was thinking this treatment would end soon, but was told today that this will be life long. States that she is very tired. Sleeps a lot and is tearful when up and awake. Spoke with Elizabet in regards to mood. Prescribed Prozac 20 mg daily. Inez willing to give it a try. Treatment administered as ordered. Once completed, the line was flushed and de accessed per protocol. Site covered with 2x2 gauze and secured in place with paper tape. Son here to  Inez. Will have a scan done in a few weeks and will be back in infusion on 7/8.     Intravenous Access:  Labs drawn without difficulty.  Implanted Port.    Treatment Conditions:  Lab Results   Component Value Date    HGB 10.0 (L) 06/17/2024    WBC 9.9 06/17/2024    ANEUTAUTO 8.1 06/17/2024     (H) 06/17/2024     Lab Results   Component Value Date     06/17/2024    POTASSIUM 4.2 06/17/2024    CR 0.80 06/17/2024    CHELITA 10.0 06/17/2024    BILITOTAL <0.2 06/17/2024    ALBUMIN 4.3 06/17/2024    ALT 27 06/17/2024    AST 27 06/17/2024     Results reviewed, labs MET treatment parameters, ok to proceed with treatment.    Post Infusion Assessment:  Patient tolerated infusion without incident.  Blood return noted pre and post infusion.  Site patent and intact, free from redness, edema or discomfort.  No evidence of extravasations.  Access discontinued per protocol.     Discharge Plan:   Discharge instructions reviewed with: Patient.  Patient and/or family verbalized understanding of discharge instructions and all questions answered.  AVS to patient via [employer]T.  Patient will return on 7/1 (scan) and 7/8 for next appointment.   Patient discharged in stable condition accompanied by: self.  Departure  Mode: Ambulatory.    Anita Bashir RN

## 2024-06-17 NOTE — PROGRESS NOTES
[facility] Hematology and Oncology Progress Note    Patient: Inez Rodriguez  MRN: [id number]  Date of Service: Jun 17, 2024      Oncologist: Dr. Miller    Reason for Visit    Chief Complaint   Patient presents with    Oncology Clinic Visit     3 week return visit with labs/infusion related to Lung cancer metastatic to bone.       Assessment and Plan     Cancer Staging   No matching staging information was found for the patient.    Metastatic non-small cell lung cancer, RUL with mediastinal lymph nodes and osseous mets of T4 and sacrum. Molecular testing shows KRAS G12C mutated tumor.   On palliative chemotherapy finished 4 cycles of Carboplatin, Alimta, and pembrolizumab with good response. Decided to drop carboplatin 5/6/24. CT 5/2/24 showed mildly improved mediastinal and perihilar adenopathy with stable RUL thickening. New tiny pericardial effusion noted. Currently on Alimta and pembrolizumab. Tolerating poorly. Has persistent fatigue and brain fog. Has mild constipation. Back pain stable. She is feeling depressed from her lack of energy and was hoping she could be done with treatment soon. Discussed with pt the goals of palliative treatment due to her stage IV disease and the risk of progression. Discussed that we try to continue treatment as long as it continues to work and the side effects are tolerable. Pt feels the side effects are intolerable and did not feel like she could continue treatment long term.  I reviewed labs today. Hgb is 10, , and ANC 8.1. TSH is low at 0.08 but T4 is WNL. Pt is agreeable to have treatment today and then return in 3 weeks with a CT prior for a visit with Dr. Miller to discuss treatment goals.     Depression  Has felt low motivation due to fatigue and chemotherapy effects. Notes that she sleeps 18 hrs each day and christiano frequently due to her low quality of life. Start Fluoxetine 20 mg daily.     Chemotherapy induced anemia  Hbg 10 today. Continue to monitor.     Low  back pain  Chronic, prior to diagnosis. Although does have T4 and sacrum mets. Pain is stable.    Constipation  Stable. Continues to have occasional constipation. Is not interested in medication. Encouraged good hydration and high fiber foods like pears, peaches, and pineapple.    Thrombocytosis   PLT increased today at 557. Previously 488 two weeks ago. Unclear the etiology. Will continue to monitor.     Vaginal Yeast Infection  Reached out to the clinic 2 weeks ago for white vaginal discharge and itchiness. Was sent a course of fluconazole. Symptoms have resolved    Xerostomia  Pt has had dry mouth for the past couple of months. Encouraged continued hydration and sucking on hard candies and chewing gum. Also discussed xylimelts for symptoms relief.     History of smoking, quit    Plan  Cycle 3 of maintenance Alimta/pembrolizumab today  Visit in 3 weeks with CT prior to decide on next steps for treatment   Continue B12 and folate supplementation  Start Fluoxetine 20 mg daily for depression   Encouraged hard candies and hydration for dry mouth     ECOG Performance    0 - Independent    Distress Screening (within last 30 days)    No data recorded     Pain  Pain Score: Mild Pain (3)  Pain Loc: Upper Back    Problem List    Patient Active Problem List   Diagnosis    Observation for suspected malignant neoplasm    Thyroid nodule    Chronic cough    Pulmonary nodules    Wheezing-associated respiratory infection (WARI)    Malignant neoplasm of lower lobe of right lung (H)    Postobstructive pneumonia    Lung cancer metastatic to bone (H)        ______________________________________________________________________________       Diagnosis:   Lung cancer, January 2024 came to ER with SOB.   -1/7/24: Mediastinal and right hilar adenopathy most suggestive of metastatic adenopathy from a primary lung malignancy. A nodular opacity in the right upper lobe could represent a primary lung malignancy versus an  infectious/inflammatory focus. Recommend   referral to pulmonology for tissue sampling. [employer] demonstrate mass effect on the right upper lobe bronchus and bronchus intermedius with associated narrowing without occlusion. Brain MRI negative.   -1/8/24: EBUS done and 4R, 11R and subcarinal nodes all positive for malignancy. NSCLC, favor adenocarcinoma. Right upper lobe nodule was also positive. Pleural fluid suspicious.   -1/29/24: PET: Findings suspicious for a right upper lobe primary lung neoplasm with extensive mediastinal/hilar lymph node metastases and osseous metastases to the T4 vertebral body and right sacrum.   Neogenomics: KRAS G12C mutation. Tp53 mutation. PTEN deletion +, MSI stable, PDL1 0%, TMB intermediate, Her2/leisa negative. Eveything else was negative.      Treatment:   -2/14/24: palliative treatment with Carboplatin, Alimta, Keytruda x 4 cycles  -5/6/24: Alimta and Keytruda    History of Present Illness    Patient here for continued treatment. She has been feeling continued fatigue and is tired of feeling this way. She often does not have the energy to make herself food or do dishes. Her son cooks for her. She sleeps 18 hours a day and christiano frequently. She feels depressed from these side effects and notes she used to be very active. She does not think she wants to continue this treatment forever as it is affecting her quality of life. The brain fog is stable. Her constipation is somewhat managed with fresh pineapple. She notes she could drink more water. She has not felt better since discontinuing the carboplatin. A few weeks ago she noticed vaginal discharge and itching and was prescribed fluconazole. Her symptoms have now improved. She also notes she has dry mouth that affects her desire to eat food. Her appetite is poor. Her back pain is stable. She denies nausea, vomiting, diarrhea, headaches, neuropathy, dyspnea, or leg swelling.     Review of Systems    Pertinent items are noted  "in HPI.    Past History    Past Medical History:   Diagnosis Date    Hypertension     SOB (shortness of breath)        Physical Exam        6/17/2024     1:31 PM   Vital Signs   Systolic 144   Diastolic 70   Pulse 105   Temperature 98.3  F (36.8  C)   Respirations 20   Weight (LB) 120 lb 9.6 oz   Height 5' 5\"   BMI (Calculated) 20.07   Pain Score 3 (Mild)   O2 100 %       General: alert, appears stated age, and cooperative  HEENT: Head: Normal, normocephalic, atraumatic. Anicteric. Xerostomia. No white plaque or lesions on tongue.   Chest: Normal chest wall and respirations. Clear to auscultation.  Cardiac: regular rate and rhythm.   Extremities: no lower extremity edema  Skin: no rashes visible   CNS: grossly non focal     Lab Results    Recent Results (from the past 168 hour(s))   Comprehensive metabolic panel   Result Value Ref Range    Sodium 141 135 - 145 mmol/L    Potassium 4.2 3.4 - 5.3 mmol/L    Carbon Dioxide (CO2) 24 22 - 29 mmol/L    Anion Gap 16 (H) 7 - 15 mmol/L    Urea Nitrogen 14.8 8.0 - 23.0 mg/dL    Creatinine 0.80 0.51 - 0.95 mg/dL    GFR Estimate 77 >60 mL/min/1.73m2    Calcium 10.0 8.8 - 10.2 mg/dL    Chloride 101 98 - 107 mmol/L    Glucose 172 (H) 70 - 99 mg/dL    Alkaline Phosphatase 75 40 - 150 U/L    AST 27 0 - 45 U/L    ALT 27 0 - 50 U/L    Protein Total 7.2 6.4 - 8.3 g/dL    Albumin 4.3 3.5 - 5.2 g/dL    Bilirubin Total <0.2 <=1.2 mg/dL   TSH with free T4 reflex   Result Value Ref Range    TSH 0.08 (L) 0.30 - 4.20 uIU/mL   CBC with platelets and differential   Result Value Ref Range    WBC Count 9.9 4.0 - 11.0 10e3/uL    RBC Count 3.02 (L) 3.80 - 5.20 10e6/uL    Hemoglobin 10.0 (L) 11.7 - 15.7 g/dL    Hematocrit 31.0 (L) 35.0 - 47.0 %     (H) 78 - 100 fL    MCH 33.1 (H) 26.5 - 33.0 pg    MCHC 32.3 31.5 - 36.5 g/dL    RDW 15.5 (H) 10.0 - 15.0 %    Platelet Count 557 (H) 150 - 450 10e3/uL    % Neutrophils 82 %    % Lymphocytes 9 %    % Monocytes 8 %    % Eosinophils 0 %    % Basophils " 0 %    % Immature Granulocytes 1 %    NRBCs per 100 WBC 0 <1 /100    Absolute Neutrophils 8.1 1.6 - 8.3 10e3/uL    Absolute Lymphocytes 0.9 0.8 - 5.3 10e3/uL    Absolute Monocytes 0.7 0.0 - 1.3 10e3/uL    Absolute Eosinophils 0.0 0.0 - 0.7 10e3/uL    Absolute Basophils 0.0 0.0 - 0.2 10e3/uL    Absolute Immature Granulocytes 0.1 <=0.4 10e3/uL    Absolute NRBCs 0.0 10e3/uL   T4 free   Result Value Ref Range    Free T4 1.06 0.90 - 1.70 ng/dL         Imaging    No results found.    The longitudinal plan of care for the diagnosis(es)/condition(s) as documented were addressed during this visit. Due to the added complexity in care, I will continue to support Inez in the subsequent management and with ongoing continuity of care.     Signed by: Perla Sanchez PA-C    I, TANEHSA Okeefe CNP, saw this patient and agree with the findings and plan of care as documented in the note.      Items personally reviewed/procedural attestation: vitals, labs, and I was present for key portions of the visit and agree with Assessment and plan

## 2024-06-18 ENCOUNTER — TELEPHONE (OUTPATIENT)
Dept: ONCOLOGY | Facility: HOSPITAL | Age: 74
End: 2024-06-18
Payer: COMMERCIAL

## 2024-06-18 NOTE — TELEPHONE ENCOUNTER
"I received a phone call today from Inez and Kenneth.  Inez is feeling frustrated as she was unaware that she will be getting chemotherapy every 21 days for the rest of her life.  She does not feel like she is tolerating the side effects of chemotherapy well as she sleeps 14 to 18 hours/day and is dealing with considerable brain fog and fatigue.  She met with Elizabet Williamson CNP yesterday and was given a prescription for Prozac.  She states she does not want to take a medication that is going to \"put a Band-Aid\" on both the side effects she is experiencing and the reality of the situation she is in.  She is starting to feel like she is a \"burden\" to her son.  I allowed her to vent her frustrations and spent time listening and validating her concerns.  She has a CT scan coming up on 7/1 and is meeting with Dr. Miller on 7/8.  I encouraged her to write down all of her questions and concerns and bring them with when she meets with Dr. Miller on 7/8.  She agrees to do this.  In the meantime, she agrees to call back if she has any issues with side effects or symptoms.    Sarah Garcia RN on 6/18/2024 at 9:50 AM    "

## 2024-06-19 ENCOUNTER — VIRTUAL VISIT (OUTPATIENT)
Dept: ONCOLOGY | Facility: HOSPITAL | Age: 74
End: 2024-06-19
Attending: INTERNAL MEDICINE
Payer: COMMERCIAL

## 2024-06-19 VITALS — HEIGHT: 65 IN | BODY MASS INDEX: 19.99 KG/M2 | WEIGHT: 120 LBS

## 2024-06-19 DIAGNOSIS — F43.21 ADJUSTMENT DISORDER WITH DEPRESSED MOOD: Primary | ICD-10-CM

## 2024-06-19 PROCEDURE — 90837 PSYTX W PT 60 MINUTES: CPT | Mod: 95 | Performed by: PSYCHOLOGIST

## 2024-06-19 ASSESSMENT — PAIN SCALES - GENERAL: PAINLEVEL: NO PAIN (0)

## 2024-06-19 NOTE — PROGRESS NOTES
Virtual Visit Details    Type of service:  Video Visit   Video Start Time:  1358  Video End Time: 1551    Originating Location (pt. Location): Home    Distant Location (provider location):  On-site  Platform used for Video Visit: Bryant

## 2024-06-19 NOTE — NURSING NOTE
Is the patient currently in the state of [place]? YES    Visit mode:VIDEO    If the visit is dropped, the patient can be reconnected by: VIDEO VISIT: Send to e-mail at: [email or web address]    Will anyone else be joining the visit? NO  (If patient encounters technical issues they should call [phone]    How would you like to obtain your AVS? MyChart    Are changes needed to the allergy or medication list? Pt stated no changes to allergies and Pt stated no med changes    Are refills needed on medications prescribed by this physician? NO    Reason for visit: BRITTNEY Reza LPN

## 2024-06-19 NOTE — PROGRESS NOTES
[facility] Oncology- Psychotherapy                                     Progress Note     Patient Name: Inez Rodriguez                        Date:   2024                                         Service Type: Individual                            Session Start Time:  1358          Session End Time:  1451                Session Length:      53  mins     Attendees:     Client attended alone     Service Modality:  Video Visit:      Provider verified identity through the following two step process.  Patient provided:  Patient      Telemedicine Visit: The patient's condition can be safely assessed and treated via synchronous audio and visual telemedicine encounter.       Reason for Telemedicine Visit: Patient has requested telehealth visit     Originating Site (Patient Location): Patient's home     Distant Site (Provider Location): [facility] [employer]     Consent:  The patient/guardian has verbally consented to: the potential risks and benefits of telemedicine (video visit) versus in person care; bill my insurance or make self-payment for services provided; and responsibility for payment of non-covered services.      Patient would like the video invitation sent by:  My Chart     Mode of Communication:  Video Conference via Amwell     Distant Location (Provider):  On-site     As the provider I attest to compliance with applicable laws and regulations related to telemedicine.     DATA  Interactive Complexity: No  Crisis: No                               Progress Since Last Session (Related to Symptoms / Goals / Homework):              Symptoms:  Pt reports ongoing distressing thoughts over her neighbor.                 Pt reports she has been dysregulated in her recent hx with her neighbor.                  Pt reports anxiety, stress, and feels vulnerable.                  Pt reports frustration with some staff. Pt reports she feels on edge.                  Pt reports she is  "trying to use coping skills ot manage her mood.             Homework:  none                             Episode of Care Goals: Satisfactory progress - ACTION (Actively working towards change); Intervened by reinforcing change plan / affirming steps taken                  Current / Ongoing Stressors and Concerns:              Pt reports she was told she has five months to get her affairs in order and she is dealing with that concept. That was eight months ago she reports. She reports she has beaten that.                   Pt reports her neighbor is still bothering her which has increased since putting up a trellis.                  Pt reports she is having things stuffed in her mailbox by her neighbor now. She also had a \"potluck\" in front of pts house.                    Pt reports her neighbor seems to have started a competition with her.                  Pt reports her neighbor had a potluck on her boulevard seeming to try to agitate her.                              Social HX              Pt reports no family hx of cancer. She reports a hx of mental health and substance abuse.                  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                  Pt reports her son works for a company that makes board games.                   Pt reports she has been doing yoga X 50 years.                   Treatment Objective(s) Addressed in This Session:          identify medical stressors which contribute to feelings of depression                    Intervention:              CBT:               Emotion Focused Therapy:              Solution Focused:                ASSESSMENT: Current Emotional / Mental Status (status of significant symptoms):              Risk status (Self / Other harm or suicidal ideation)              Patient denies current fears or concerns for personal safety.   "            Patient denies current or recent suicidal ideation or behaviors.              Patient denies current or recent homicidal ideation or behaviors.              Patient denies current or recent self injurious behavior or ideation.              Patient denies other safety concerns.              Patient reports there has been no change in risk factors since their last session.                Patient reports there has been no change in protective factors since their last session.                Recommended that patient call 911 or go to the local ED should there be a change in any of these risk factors.                 Appearance:                           Appropriate               Eye Contact:                           Good               Psychomotor Behavior:           Normal               Attitude:                                   Cooperative               Orientation:                              All              Speech                          Rate / Production:       Normal                           Volume:                       Normal               Mood:                                      Anxious              Affect:                                      Appropriate               Thought Content:                    Clear               Thought Form:                        Coherent  Logical               Insight:                                     Fair                  Medication Review:              No current psychiatric medications prescribed                 Medication Compliance:              Declines taking Prozac                 Changes in Health Issues:              Yes: cancer, Associated Psychological Distress                 Chemical Use Review:              Substance Use: Chemical use reviewed, no active concerns identified                  Tobacco Use: No current tobacco use.       Diagnosis:  F43.21 Adjustment D/O with depressive sx.      Collateral Reports Completed:              Not  Applicable     PLAN: (Patient Tasks / Therapist Tasks / Other)  Return as needed. Utilize coping skills discussed.       Hadley Rose LP                                                 ______________________________________________________________________     Individual Treatment Plan     Patient's Name: Inez Rodriguez                     YOB: 1950     Date of Creation: 4/26/2024     Date Treatment Plan Last Reviewed/Revised:      DSM5 Diagnoses: F43.21 adjustment d/o with depressive sx.   Psychosocial / Contextual Factors: cancer dx  PROMIS (reviewed every 90 days):      Referral / Collaboration:  Referral to another professional/service is not indicated at this time..     Anticipated number of session for this episode of care: 9-12 sessions  Anticipation frequency of session: Biweekly  Anticipated Duration of each session: 53 or more minutes  Treatment plan will be reviewed in 90 days or when goals have been changed.         MeasurableTreatment Goal(s) related to diagnosis / functional impairment(s)  Goal 1: Patient will reduce of depressive sx.         Objective #A (Patient Action)                          Patient will identify medical stressors which contribute to feelings of depression.  Status: New - Date: 4/26/24       Intervention(s)  Therapist will teach distraction skills.   .     Objective #B  Patient will identify medical stressors which contribute to feelings of depression.  Status: New - Date: 4/26/24       Intervention(s)  Therapist will teach emotional recognition/identification.   .     Objective #C  Patient will identify medical stressors which contribute to feelings of depression.  Status: New - Date: 4/26/24       Intervention(s)  Therapist will teach emotional regulation skills.   .           Hadley Rose LP                   6/19/24

## 2024-06-21 ENCOUNTER — PATIENT OUTREACH (OUTPATIENT)
Dept: CARE COORDINATION | Facility: CLINIC | Age: 74
End: 2024-06-21
Payer: COMMERCIAL

## 2024-06-21 NOTE — PROGRESS NOTES
Social Work - Distress Screen Intervention  [facility]    Identified Concern and Score from Distress Screenin. How concerned are you about your ability to eat? 0     2. How concerned are you about unintended weight loss or your current weight? 0     3. How concerned are you about feeling depressed or very sad?  7     4. How concerned are you about feeling anxious or very scared?  5     5. Do you struggle with the loss of meaning and radhika in your life?  Quite a bit     6. How concerned are you about work and home life issues that may be affected by your cancer?  (!) 8     7. How concerned are you about knowing what resources are available to help you?  0     8. Do you currently have what you would describe as Oriental orthodox or spiritual struggles? Not at all     9. If you want to be contacted by one of our professionals, I can send a message to them right now.  Oncology Psychologist       Date of Distress Screen:  24  Data: At time of last visit, patient scored positive on distress screening.  outreached to patient today to follow up on elevated distress and introduce psychosocial services and support.  Intervention/Education provided:  contacted patient by phone to discuss distress screening results. Provided  contact information and requested return call to discuss how cancer is affecting pt's home life.    Follow-up Required:  to remain available for support and await return call from patient    Corey Nicholson, Casual , for Sharon Sánchez,   [phone]

## 2024-06-21 NOTE — Clinical Note
Distress Screening. LM for pt to call SW back regarding concerns how cancer is affecting her home life.

## 2024-06-26 ENCOUNTER — PATIENT OUTREACH (OUTPATIENT)
Dept: CARE COORDINATION | Facility: CLINIC | Age: 74
End: 2024-06-26
Payer: COMMERCIAL

## 2024-06-26 NOTE — PROGRESS NOTES
Social Work - Follow-Up  [facility]    Data/Intervention:    Patient Name: Inez Rodriguez Goes By: Inez    /Age: 1950 (73 year old)    Collaborated With:    -pt  -María Casillas,RN  -Lyn Cardona CMA    Intervention/Education/Resources Provided:  SW returned Inez's call. SW introduced self and support available.She is most concerned about getting additional help at home. She and her son spoke with Liam ([phone]) from [facility] and he is available to provide assistance, but needs documentation from MD. Family isn't clear what that information is.Liam was going to reach out to the clinic himself. SW agreed to call Liam re what is needed and to help coordinate with clinic and family. No other needs at time of call.     Assessment/Plan:  Previously provided patient/family with writer's contact information and availability.    DIANNE Kirkland, [facility]  Adult Oncology Clinics  [place] (M,W), [place] (T) & [place] ()  *I am off Friday  Office: [phone]

## 2024-06-27 ENCOUNTER — PATIENT OUTREACH (OUTPATIENT)
Dept: CARE COORDINATION | Facility: CLINIC | Age: 74
End: 2024-06-27
Payer: COMMERCIAL

## 2024-06-27 NOTE — PROGRESS NOTES
Social Work - Follow-Up  [facility]    Data/Intervention:    Patient Name: Inez Rodriguez Goes By: Inez WINSTON/Age: 1950 (73 year old)    Reason for Follow-Up:  PCA services    Collaborated With:    -Liam, [facility] ([phone])  -María Casillas RN    Intervention/Education/Resources Provided:  Fredrick called Liam at [facility] to follow up re fax sent for pt's coordination of service. The fax was sent to PCP office not Oncology. Confirmed Onc fax and to put attention to María Casillas RN. Liam expressed appreciation for follow up and will send fax to María today. FREDRICK updated María.     Assessment/Plan:  Previously provided patient with writer's contact information and availability.    DIANNE Kirkland, [facility]  Adult Oncology Clinics  [place] (M,W), [place] (T) & [place] (Th)  *I am off Friday  Office: [phone]

## 2024-07-01 ENCOUNTER — HOSPITAL ENCOUNTER (OUTPATIENT)
Dept: CT IMAGING | Facility: HOSPITAL | Age: 74
Discharge: HOME | End: 2024-07-01
Payer: COMMERCIAL

## 2024-07-01 DIAGNOSIS — C34.90 LUNG CANCER METASTATIC TO BONE (H): ICD-10-CM

## 2024-07-01 DIAGNOSIS — C79.51 LUNG CANCER METASTATIC TO BONE (H): ICD-10-CM

## 2024-07-01 PROCEDURE — 250N000011 HC RX IP 250 OP 636

## 2024-07-01 PROCEDURE — 71260 CT THORAX DX C+: CPT

## 2024-07-01 RX ORDER — HEPARIN SODIUM,PORCINE 10 UNIT/ML
5-10 VIAL (ML) INTRAVENOUS EVERY 24 HOURS
Status: DISCONTINUED | OUTPATIENT
Start: 2024-07-01 | End: 2024-07-02 | Stop reason: HOSPADM

## 2024-07-01 RX ORDER — HEPARIN SODIUM,PORCINE 10 UNIT/ML
5-10 VIAL (ML) INTRAVENOUS
Status: DISCONTINUED | OUTPATIENT
Start: 2024-07-01 | End: 2024-07-02 | Stop reason: HOSPADM

## 2024-07-01 RX ORDER — IOPAMIDOL 755 MG/ML
60 INJECTION, SOLUTION INTRAVASCULAR ONCE
Status: COMPLETED | OUTPATIENT
Start: 2024-07-01 | End: 2024-07-01

## 2024-07-01 RX ORDER — HEPARIN SODIUM (PORCINE) LOCK FLUSH IV SOLN 100 UNIT/ML 100 UNIT/ML
5-10 SOLUTION INTRAVENOUS
Status: DISCONTINUED | OUTPATIENT
Start: 2024-07-01 | End: 2024-07-02 | Stop reason: HOSPADM

## 2024-07-01 RX ADMIN — IOPAMIDOL 60 ML: 755 INJECTION, SOLUTION INTRAVENOUS at 12:22

## 2024-07-08 ENCOUNTER — DOCUMENTATION ONLY (OUTPATIENT)
Dept: ONCOLOGY | Facility: HOSPITAL | Age: 74
End: 2024-07-08

## 2024-07-08 ENCOUNTER — INFUSION THERAPY VISIT (OUTPATIENT)
Dept: INFUSION THERAPY | Facility: HOSPITAL | Age: 74
End: 2024-07-08
Attending: INTERNAL MEDICINE
Payer: COMMERCIAL

## 2024-07-08 ENCOUNTER — ONCOLOGY VISIT (OUTPATIENT)
Dept: ONCOLOGY | Facility: HOSPITAL | Age: 74
End: 2024-07-08
Attending: INTERNAL MEDICINE
Payer: COMMERCIAL

## 2024-07-08 VITALS
HEART RATE: 106 BPM | DIASTOLIC BLOOD PRESSURE: 74 MMHG | RESPIRATION RATE: 16 BRPM | OXYGEN SATURATION: 98 % | BODY MASS INDEX: 20.44 KG/M2 | WEIGHT: 122.7 LBS | TEMPERATURE: 98.1 F | HEIGHT: 65 IN | SYSTOLIC BLOOD PRESSURE: 145 MMHG

## 2024-07-08 DIAGNOSIS — C34.90 LUNG CANCER METASTATIC TO BONE (H): Primary | ICD-10-CM

## 2024-07-08 DIAGNOSIS — T45.1X5A ANTINEOPLASTIC CHEMOTHERAPY INDUCED ANEMIA: ICD-10-CM

## 2024-07-08 DIAGNOSIS — C34.31 MALIGNANT NEOPLASM OF LOWER LOBE OF RIGHT LUNG (H): ICD-10-CM

## 2024-07-08 DIAGNOSIS — C79.51 LUNG CANCER METASTATIC TO BONE (H): Primary | ICD-10-CM

## 2024-07-08 DIAGNOSIS — D64.81 ANTINEOPLASTIC CHEMOTHERAPY INDUCED ANEMIA: ICD-10-CM

## 2024-07-08 LAB
ALBUMIN SERPL BCG-MCNC: 4.5 G/DL (ref 3.5–5.2)
ALP SERPL-CCNC: 66 U/L (ref 40–150)
ALT SERPL W P-5'-P-CCNC: 47 U/L (ref 0–50)
ANION GAP SERPL CALCULATED.3IONS-SCNC: 14 MMOL/L (ref 7–15)
AST SERPL W P-5'-P-CCNC: 24 U/L (ref 0–45)
BASOPHILS # BLD AUTO: 0 10E3/UL (ref 0–0.2)
BASOPHILS NFR BLD AUTO: 0 %
BILIRUB SERPL-MCNC: 0.2 MG/DL
BUN SERPL-MCNC: 19.3 MG/DL (ref 8–23)
CALCIUM SERPL-MCNC: 9.8 MG/DL (ref 8.8–10.2)
CHLORIDE SERPL-SCNC: 104 MMOL/L (ref 98–107)
CREAT SERPL-MCNC: 0.92 MG/DL (ref 0.51–0.95)
DEPRECATED HCO3 PLAS-SCNC: 25 MMOL/L (ref 22–29)
EGFRCR SERPLBLD CKD-EPI 2021: 65 ML/MIN/1.73M2
EOSINOPHIL # BLD AUTO: 0 10E3/UL (ref 0–0.7)
EOSINOPHIL NFR BLD AUTO: 0 %
ERYTHROCYTE [DISTWIDTH] IN BLOOD BY AUTOMATED COUNT: 16.7 % (ref 10–15)
GLUCOSE SERPL-MCNC: 159 MG/DL (ref 70–99)
HCT VFR BLD AUTO: 34.1 % (ref 35–47)
HGB BLD-MCNC: 10.7 G/DL (ref 11.7–15.7)
IMM GRANULOCYTES # BLD: 0.1 10E3/UL
IMM GRANULOCYTES NFR BLD: 1 %
LYMPHOCYTES # BLD AUTO: 0.8 10E3/UL (ref 0.8–5.3)
LYMPHOCYTES NFR BLD AUTO: 8 %
MCH RBC QN AUTO: 32.8 PG (ref 26.5–33)
MCHC RBC AUTO-ENTMCNC: 31.4 G/DL (ref 31.5–36.5)
MCV RBC AUTO: 105 FL (ref 78–100)
MONOCYTES # BLD AUTO: 0.6 10E3/UL (ref 0–1.3)
MONOCYTES NFR BLD AUTO: 6 %
NEUTROPHILS # BLD AUTO: 8.3 10E3/UL (ref 1.6–8.3)
NEUTROPHILS NFR BLD AUTO: 84 %
NRBC # BLD AUTO: 0 10E3/UL
NRBC BLD AUTO-RTO: 0 /100
PLATELET # BLD AUTO: 530 10E3/UL (ref 150–450)
POTASSIUM SERPL-SCNC: 4.3 MMOL/L (ref 3.4–5.3)
PROT SERPL-MCNC: 7 G/DL (ref 6.4–8.3)
RBC # BLD AUTO: 3.26 10E6/UL (ref 3.8–5.2)
SODIUM SERPL-SCNC: 143 MMOL/L (ref 135–145)
T4 FREE SERPL-MCNC: 0.89 NG/DL (ref 0.9–1.7)
TSH SERPL DL<=0.005 MIU/L-ACNC: 0.05 UIU/ML (ref 0.3–4.2)
WBC # BLD AUTO: 9.8 10E3/UL (ref 4–11)

## 2024-07-08 PROCEDURE — 82374 ASSAY BLOOD CARBON DIOXIDE: CPT | Performed by: INTERNAL MEDICINE

## 2024-07-08 PROCEDURE — 84443 ASSAY THYROID STIM HORMONE: CPT | Performed by: INTERNAL MEDICINE

## 2024-07-08 PROCEDURE — 96411 CHEMO IV PUSH ADDL DRUG: CPT

## 2024-07-08 PROCEDURE — 96413 CHEMO IV INFUSION 1 HR: CPT

## 2024-07-08 PROCEDURE — 36591 DRAW BLOOD OFF VENOUS DEVICE: CPT | Performed by: INTERNAL MEDICINE

## 2024-07-08 PROCEDURE — G0463 HOSPITAL OUTPT CLINIC VISIT: HCPCS | Performed by: INTERNAL MEDICINE

## 2024-07-08 PROCEDURE — 258N000003 HC RX IP 258 OP 636: Performed by: INTERNAL MEDICINE

## 2024-07-08 PROCEDURE — G2211 COMPLEX E/M VISIT ADD ON: HCPCS | Performed by: INTERNAL MEDICINE

## 2024-07-08 PROCEDURE — 250N000011 HC RX IP 250 OP 636: Performed by: INTERNAL MEDICINE

## 2024-07-08 PROCEDURE — 85049 AUTOMATED PLATELET COUNT: CPT | Performed by: INTERNAL MEDICINE

## 2024-07-08 PROCEDURE — 99214 OFFICE O/P EST MOD 30 MIN: CPT | Performed by: INTERNAL MEDICINE

## 2024-07-08 PROCEDURE — 84439 ASSAY OF FREE THYROXINE: CPT | Performed by: INTERNAL MEDICINE

## 2024-07-08 PROCEDURE — 96375 TX/PRO/DX INJ NEW DRUG ADDON: CPT

## 2024-07-08 PROCEDURE — 82040 ASSAY OF SERUM ALBUMIN: CPT | Performed by: INTERNAL MEDICINE

## 2024-07-08 RX ORDER — ONDANSETRON 2 MG/ML
8 INJECTION INTRAMUSCULAR; INTRAVENOUS ONCE
Status: COMPLETED | OUTPATIENT
Start: 2024-07-08 | End: 2024-07-08

## 2024-07-08 RX ORDER — CYANOCOBALAMIN 1000 UG/ML
1000 INJECTION, SOLUTION INTRAMUSCULAR; SUBCUTANEOUS ONCE
Status: CANCELLED | OUTPATIENT
Start: 2024-08-19 | End: 2024-08-20

## 2024-07-08 RX ORDER — ALBUTEROL SULFATE 90 UG/1
1-2 AEROSOL, METERED RESPIRATORY (INHALATION)
Status: DISCONTINUED | OUTPATIENT
Start: 2024-07-08 | End: 2024-07-08 | Stop reason: HOSPADM

## 2024-07-08 RX ORDER — METHYLPREDNISOLONE SODIUM SUCCINATE 125 MG/2ML
125 INJECTION, POWDER, LYOPHILIZED, FOR SOLUTION INTRAMUSCULAR; INTRAVENOUS
Status: CANCELLED
Start: 2024-08-19

## 2024-07-08 RX ORDER — DIPHENHYDRAMINE HYDROCHLORIDE 50 MG/ML
50 INJECTION INTRAMUSCULAR; INTRAVENOUS
Status: DISCONTINUED | OUTPATIENT
Start: 2024-07-08 | End: 2024-07-08 | Stop reason: HOSPADM

## 2024-07-08 RX ORDER — DIPHENHYDRAMINE HYDROCHLORIDE 50 MG/ML
50 INJECTION INTRAMUSCULAR; INTRAVENOUS
Status: CANCELLED
Start: 2024-07-30

## 2024-07-08 RX ORDER — ONDANSETRON 2 MG/ML
8 INJECTION INTRAMUSCULAR; INTRAVENOUS ONCE
Status: CANCELLED
Start: 2024-07-09 | End: 2024-07-09

## 2024-07-08 RX ORDER — HEPARIN SODIUM (PORCINE) LOCK FLUSH IV SOLN 100 UNIT/ML 100 UNIT/ML
5 SOLUTION INTRAVENOUS
Status: DISCONTINUED | OUTPATIENT
Start: 2024-07-08 | End: 2024-07-08 | Stop reason: HOSPADM

## 2024-07-08 RX ORDER — METHYLPREDNISOLONE SODIUM SUCCINATE 125 MG/2ML
125 INJECTION, POWDER, LYOPHILIZED, FOR SOLUTION INTRAMUSCULAR; INTRAVENOUS
Status: CANCELLED
Start: 2024-09-23

## 2024-07-08 RX ORDER — HEPARIN SODIUM (PORCINE) LOCK FLUSH IV SOLN 100 UNIT/ML 100 UNIT/ML
5 SOLUTION INTRAVENOUS
Status: CANCELLED | OUTPATIENT
Start: 2024-07-30

## 2024-07-08 RX ORDER — EPINEPHRINE 1 MG/ML
0.3 INJECTION, SOLUTION INTRAMUSCULAR; SUBCUTANEOUS EVERY 5 MIN PRN
Status: CANCELLED | OUTPATIENT
Start: 2024-09-23

## 2024-07-08 RX ORDER — DIPHENHYDRAMINE HYDROCHLORIDE 50 MG/ML
50 INJECTION INTRAMUSCULAR; INTRAVENOUS
Status: CANCELLED
Start: 2024-08-19

## 2024-07-08 RX ORDER — LORAZEPAM 2 MG/ML
0.5 INJECTION INTRAMUSCULAR EVERY 4 HOURS PRN
Status: CANCELLED | OUTPATIENT
Start: 2024-09-23

## 2024-07-08 RX ORDER — ALBUTEROL SULFATE 90 UG/1
1-2 AEROSOL, METERED RESPIRATORY (INHALATION)
Status: CANCELLED
Start: 2024-07-30

## 2024-07-08 RX ORDER — EPINEPHRINE 1 MG/ML
0.3 INJECTION, SOLUTION INTRAMUSCULAR; SUBCUTANEOUS EVERY 5 MIN PRN
Status: CANCELLED | OUTPATIENT
Start: 2024-07-30

## 2024-07-08 RX ORDER — ALBUTEROL SULFATE 90 UG/1
1-2 AEROSOL, METERED RESPIRATORY (INHALATION)
Status: CANCELLED
Start: 2024-08-19

## 2024-07-08 RX ORDER — EPINEPHRINE 1 MG/ML
0.3 INJECTION, SOLUTION INTRAMUSCULAR; SUBCUTANEOUS EVERY 5 MIN PRN
Status: CANCELLED | OUTPATIENT
Start: 2024-08-19

## 2024-07-08 RX ORDER — ALBUTEROL SULFATE 90 UG/1
1-2 AEROSOL, METERED RESPIRATORY (INHALATION)
Status: CANCELLED
Start: 2024-09-23

## 2024-07-08 RX ORDER — METHYLPREDNISOLONE SODIUM SUCCINATE 125 MG/2ML
125 INJECTION, POWDER, LYOPHILIZED, FOR SOLUTION INTRAMUSCULAR; INTRAVENOUS
Status: DISCONTINUED | OUTPATIENT
Start: 2024-07-08 | End: 2024-07-08 | Stop reason: HOSPADM

## 2024-07-08 RX ORDER — HEPARIN SODIUM (PORCINE) LOCK FLUSH IV SOLN 100 UNIT/ML 100 UNIT/ML
5 SOLUTION INTRAVENOUS
Status: CANCELLED | OUTPATIENT
Start: 2024-09-23

## 2024-07-08 RX ORDER — METHYLPREDNISOLONE SODIUM SUCCINATE 125 MG/2ML
125 INJECTION, POWDER, LYOPHILIZED, FOR SOLUTION INTRAMUSCULAR; INTRAVENOUS
Status: CANCELLED
Start: 2024-07-30

## 2024-07-08 RX ORDER — DIPHENHYDRAMINE HYDROCHLORIDE 50 MG/ML
50 INJECTION INTRAMUSCULAR; INTRAVENOUS
Status: CANCELLED
Start: 2024-09-23

## 2024-07-08 RX ORDER — EPINEPHRINE 1 MG/ML
0.3 INJECTION, SOLUTION INTRAMUSCULAR; SUBCUTANEOUS EVERY 5 MIN PRN
Status: DISCONTINUED | OUTPATIENT
Start: 2024-07-08 | End: 2024-07-08 | Stop reason: HOSPADM

## 2024-07-08 RX ORDER — HEPARIN SODIUM,PORCINE 10 UNIT/ML
5-20 VIAL (ML) INTRAVENOUS DAILY PRN
Status: CANCELLED | OUTPATIENT
Start: 2024-09-23

## 2024-07-08 RX ORDER — ALBUTEROL SULFATE 0.83 MG/ML
2.5 SOLUTION RESPIRATORY (INHALATION)
Status: CANCELLED | OUTPATIENT
Start: 2024-07-30

## 2024-07-08 RX ORDER — LORAZEPAM 2 MG/ML
0.5 INJECTION INTRAMUSCULAR EVERY 4 HOURS PRN
Status: CANCELLED | OUTPATIENT
Start: 2024-08-19

## 2024-07-08 RX ORDER — ONDANSETRON 2 MG/ML
8 INJECTION INTRAMUSCULAR; INTRAVENOUS ONCE
Status: CANCELLED
Start: 2024-09-23 | End: 2024-09-10

## 2024-07-08 RX ORDER — HEPARIN SODIUM,PORCINE 10 UNIT/ML
5-20 VIAL (ML) INTRAVENOUS DAILY PRN
Status: CANCELLED | OUTPATIENT
Start: 2024-07-30

## 2024-07-08 RX ORDER — ALBUTEROL SULFATE 0.83 MG/ML
2.5 SOLUTION RESPIRATORY (INHALATION)
Status: CANCELLED | OUTPATIENT
Start: 2024-08-19

## 2024-07-08 RX ORDER — ONDANSETRON 2 MG/ML
8 INJECTION INTRAMUSCULAR; INTRAVENOUS ONCE
Status: CANCELLED
Start: 2024-07-30 | End: 2024-07-30

## 2024-07-08 RX ORDER — HEPARIN SODIUM (PORCINE) LOCK FLUSH IV SOLN 100 UNIT/ML 100 UNIT/ML
5 SOLUTION INTRAVENOUS
Status: CANCELLED | OUTPATIENT
Start: 2024-08-19

## 2024-07-08 RX ORDER — HEPARIN SODIUM,PORCINE 10 UNIT/ML
5-20 VIAL (ML) INTRAVENOUS DAILY PRN
Status: CANCELLED | OUTPATIENT
Start: 2024-08-19

## 2024-07-08 RX ORDER — ALBUTEROL SULFATE 0.83 MG/ML
2.5 SOLUTION RESPIRATORY (INHALATION)
Status: DISCONTINUED | OUTPATIENT
Start: 2024-07-08 | End: 2024-07-08 | Stop reason: HOSPADM

## 2024-07-08 RX ORDER — LORAZEPAM 2 MG/ML
0.5 INJECTION INTRAMUSCULAR EVERY 4 HOURS PRN
Status: CANCELLED | OUTPATIENT
Start: 2024-07-30

## 2024-07-08 RX ORDER — ALBUTEROL SULFATE 0.83 MG/ML
2.5 SOLUTION RESPIRATORY (INHALATION)
Status: CANCELLED | OUTPATIENT
Start: 2024-09-23

## 2024-07-08 RX ORDER — ONDANSETRON 2 MG/ML
8 INJECTION INTRAMUSCULAR; INTRAVENOUS ONCE
Status: CANCELLED
Start: 2024-08-19 | End: 2024-08-20

## 2024-07-08 RX ORDER — MEPERIDINE HYDROCHLORIDE 25 MG/ML
25 INJECTION INTRAMUSCULAR; INTRAVENOUS; SUBCUTANEOUS EVERY 30 MIN PRN
Status: DISCONTINUED | OUTPATIENT
Start: 2024-07-08 | End: 2024-07-08 | Stop reason: HOSPADM

## 2024-07-08 RX ADMIN — ONDANSETRON 8 MG: 2 INJECTION INTRAMUSCULAR; INTRAVENOUS at 14:49

## 2024-07-08 RX ADMIN — Medication 5 ML: at 16:01

## 2024-07-08 RX ADMIN — PEMETREXED DISODIUM 800 MG: 500 INJECTION, POWDER, LYOPHILIZED, FOR SOLUTION INTRAVENOUS at 15:48

## 2024-07-08 RX ADMIN — SODIUM CHLORIDE 250 ML: 9 INJECTION, SOLUTION INTRAVENOUS at 14:49

## 2024-07-08 RX ADMIN — SODIUM CHLORIDE 200 MG: 9 INJECTION, SOLUTION INTRAVENOUS at 15:15

## 2024-07-08 ASSESSMENT — PAIN SCALES - GENERAL: PAINLEVEL: NO PAIN (0)

## 2024-07-08 NOTE — PROGRESS NOTES
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  A very pleasant 73 year old  woman with advanced stage IV non-small cell lung cancer.  The primary seems to be coming from the right upper lobe.  She has mediastinal lymph nodes as well as couple of osseous metastases 1 in the T4 vertebral body and 1 in the sacrum.  Molecular testing does show that this is K-tray G12C mutated tumor.  No other targeted mutation present except for PTEN which is not significant.  She has been started on palliative chemotherapy with carboplatin Alimta and pembrolizumab.  She has had good response after 2 cycles and continuation of response after 4 cycles.  Carboplatin discontinued after 4 cycles.  Current CT scan continues to shows further decrease in the size of the lymph nodes in the mediastinum.  In fact current CT scan shows very little adenopathy at all.  2.  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  Pathology is positive for non-small cell lung cancer.  Responding well to treatment.  Most of the bulky mediastinal apathy has resolved.  3.  Prior history of smoking.  Currently patient does not smoke.  4.  Recently moved from [place].  Limited social support.  5.  Upper back pain which the patient tells me secondary to stress and anxiety.  Back pain is now better.  6.  Chemotherapy related side  effects.  7.  Depressed TSH as well as low T4.  Might indicate hypopituitarism.    Plan and medical decision making      Presenting with moderate side effects of treatment.Reviewed notes from each unique source.  Reviewed each unique test.  Ordered tests.  Independently interpreted lab tests and radiological exams performed by other physicians.  Personally reviewed the images CT scan.    Treatment with pembrolizumab and pemetrexed.  Plan to continue this treatment until the next scan which should be a PET scan since there is very little mass evident on the current CT scan.  If the PET scan is completely negative then after some time we will discontinue pemetrexed.  Then if 2 years of negative PET scan's are obtained then we will discontinue pembrolizumab as well.  Also recommend signing up on the clinical trial since she does have K-tray G12C mutation so it makes her eligible for sotorasib.  At this point she will decide on the trial but no intervention would be done.  Intervention would only be done if there is any negative PET scan down the road.  Continue with folic acid and B12 supplementation.  Continue with good diet and exercise.  She can also take some over-the-counter allergy medications.  We will need to monitor her thyroid profile.  If both TSH and T4 continue to go lower this might indicate pituitary suppression.  Other hormones might also be affected.  The longitudinal plan of care for the diagnosis(es)/condition(s) as documented were addressed during this visit. Due to the added complexity in care, I will continue to support Inez in the subsequent management and with ongoing continuity of care.     Clinical/pathological stage      Stage IV    History of present illness      Ms. Inez Rodriguez is a 73 year old  woman who came to the emergency room in the beginning of January 2024 with increasing shortness of breath.  Was also having some coughing which was not responding to treatment given by the urgent  "care.  In the emergency room she had a CT scan done to rule out any pulmonary embolism but was positive for multiple lymph nodes in the mediastinum as well as subcarinal area.  She was therefore admitted.  She was seen by pulmonology.  Underwent bronchoscopy and biopsy.  The bronchoscopy came back positive for adenocarcinoma involving subcarinal and mediastinal lymph nodes.    The patient does have a's history of smoking exposure but tells me that she has not smoked for several years.  No significant family history of cancer.  She recently moved from [place].    PET scan done on 29 January 2024 showed that she had extensive disease not only involving the right upper lobe but having extensive mediastinal hilar lymph node metastases and osseous metastases on the T4 vertebral body and right sacrum.    Has been started on chemoimmunotherapy with carboplatin etoposide and pemetrexed.  She has tolerated with moderate side effects.  Comes in today for follow-up.  She has finished 4 cycles.  CT scan after 2 cycles showed decrease in the size of the right upper lobe mass as well as mediastinal lymphadenopathy.  Increasing sclerosis was noted in the T4 vertebral body.    After 4 cycles carboplatin was discontinued.  We continued on pemetrexed and pembrolizumab.  And has been tolerating that quite well.  She has symptoms lasting about a week after chemotherapy.  The last 2 weeks usually she is feeling better.  Comes in after another CT scan.      Review of system      Details noted in the history of present illness.  A detailed review of systems is otherwise negative.    ECOG performance status of 1.    Physical exam        BP (!) 145/74 (BP Location: Left arm, Patient Position: Sitting, Cuff Size: Adult Regular)   Pulse 106   Temp 98.1  F (36.7  C) (Oral)   Resp 16   Ht 1.651 m (5' 5\")   Wt 55.7 kg (122 lb 11.2 oz)   LMP  (LMP Unknown)   SpO2 98%   BMI 20.42 kg/m      GENERAL: No acute distress. Cooperative in " conversation.   HEENT:  Pupils are equal, round and reactive. Oral mucosa is clean and intact. No ulcerations or mucositis noted. No bleeding noted.  RESP:Chest symmetric lungs are clear bilaterally per auscultation. Regular respiratory rate. No wheezes or rhonchi.  CV: Normal S1 S2 Regular, rate and rhythm.     ABD: Nondistended, soft, nontender. Positive bowel sounds. No organomegaly.   EXTREMITIES: No lower extremity edema.   NEURO: Non- focal. Alert and oriented x3.  Cranial nerves appear intact.  PSYCH: Within normal limits. No depression or anxiety.  SKIN: Warm dry intact.      Lab results Reviewed      Recent Results (from the past 168 hour(s))   Comprehensive metabolic panel   Result Value Ref Range    Sodium 143 135 - 145 mmol/L    Potassium 4.3 3.4 - 5.3 mmol/L    Carbon Dioxide (CO2) 25 22 - 29 mmol/L    Anion Gap 14 7 - 15 mmol/L    Urea Nitrogen 19.3 8.0 - 23.0 mg/dL    Creatinine 0.92 0.51 - 0.95 mg/dL    GFR Estimate 65 >60 mL/min/1.73m2    Calcium 9.8 8.8 - 10.2 mg/dL    Chloride 104 98 - 107 mmol/L    Glucose 159 (H) 70 - 99 mg/dL    Alkaline Phosphatase 66 40 - 150 U/L    AST 24 0 - 45 U/L    ALT 47 0 - 50 U/L    Protein Total 7.0 6.4 - 8.3 g/dL    Albumin 4.5 3.5 - 5.2 g/dL    Bilirubin Total 0.2 <=1.2 mg/dL   TSH with free T4 reflex   Result Value Ref Range    TSH 0.05 (L) 0.30 - 4.20 uIU/mL   CBC with platelets and differential   Result Value Ref Range    WBC Count 9.8 4.0 - 11.0 10e3/uL    RBC Count 3.26 (L) 3.80 - 5.20 10e6/uL    Hemoglobin 10.7 (L) 11.7 - 15.7 g/dL    Hematocrit 34.1 (L) 35.0 - 47.0 %     (H) 78 - 100 fL    MCH 32.8 26.5 - 33.0 pg    MCHC 31.4 (L) 31.5 - 36.5 g/dL    RDW 16.7 (H) 10.0 - 15.0 %    Platelet Count 530 (H) 150 - 450 10e3/uL    % Neutrophils 84 %    % Lymphocytes 8 %    % Monocytes 6 %    % Eosinophils 0 %    % Basophils 0 %    % Immature Granulocytes 1 %    NRBCs per 100 WBC 0 <1 /100    Absolute Neutrophils 8.3 1.6 - 8.3 10e3/uL    Absolute Lymphocytes  0.8 0.8 - 5.3 10e3/uL    Absolute Monocytes 0.6 0.0 - 1.3 10e3/uL    Absolute Eosinophils 0.0 0.0 - 0.7 10e3/uL    Absolute Basophils 0.0 0.0 - 0.2 10e3/uL    Absolute Immature Granulocytes 0.1 <=0.4 10e3/uL    Absolute NRBCs 0.0 10e3/uL       Imaging results Reviewed        CT Chest/Abdomen/Pelvis w Contrast    Result Date: 7/2/2024  EXAM: CT CHEST/ABDOMEN/PELVIS W CONTRAST LOCATION: [facility] DATE: 7/1/2024 INDICATION:  Lung cancer metastatic to bone (H), 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 Multiplanar reformats were obtained. Dose reduction techniques were used. CONTRAST: isovue 370 60ml FINDINGS: LUNGS AND PLEURA: Mild emphysema. Irregular bandlike nodular opacity anterior right upper lobe measures 1.6 x 0.6 cm and has not changed. Adjacent curvilinear opacities/fibrosis or atelectasis. 11 mm irregular nodule right upper lobe on image 3 is also stable. 2 mm nodule right apex, unchanged. No new nodules. No acute infiltrates or effusions. Elevated right hemidiaphragm. MEDIASTINUM/AXILLAE: Mildly enlarged right hilar lymph node and enlarged subcarinal lymph node, both unchanged. No new adenopathy. Atherosclerotic disease thoracic aorta. Left anterior chest wall Port-A-Cath tip distal SVC. CORONARY ARTERY CALCIFICATION: Moderate. HEPATOBILIARY: Normal. PANCREAS: Normal. SPLEEN: Normal. ADRENAL GLANDS: Normal. KIDNEYS/BLADDER: Small cysts both kidneys. BOWEL: Diverticulosis descending and sigmoid colon, without evidence for diverticulitis or bowel obstruction. LYMPH NODES: Normal. VASCULATURE: Advanced atherosclerotic disease abdominal aorta and iliac arteries. PELVIC ORGANS: Normal. MUSCULOSKELETAL: Sclerotic lesion right iliac bone, unchanged. Sclerosis T4 vertebral body, unchanged.     IMPRESSION: 1.  Stable right upper lobe nodules. No  new nodules. No acute pulmonary disease. 2.  Stable slightly enlarged right hilar and mediastinal lymph nodes. 3.  No evidence for metastatic disease in the abdomen or pelvis.       Signed by: Chino Miller MD      This note has been dictated using voice recognition software. Any grammatical or context distortions are unintentional and inherent to the software

## 2024-07-08 NOTE — PROGRESS NOTES
"Oncology Rooming Note    July 8, 2024 1:56 PM   Inez Rodriguez is a 73 year old female who presents for:    Chief Complaint   Patient presents with    Oncology Clinic Visit     3 week return visit with labs/infusion related to Lung cancer metastatic to bone.     Initial Vitals: BP (!) 145/74 (BP Location: Left arm, Patient Position: Sitting, Cuff Size: Adult Regular)   Pulse 106   Temp 98.1  F (36.7  C) (Oral)   Resp 16   Ht 1.651 m (5' 5\")   Wt 55.7 kg (122 lb 11.2 oz)   LMP  (LMP Unknown)   SpO2 98%   BMI 20.42 kg/m   Estimated body mass index is 20.42 kg/m  as calculated from the following:    Height as of this encounter: 1.651 m (5' 5\").    Weight as of this encounter: 55.7 kg (122 lb 11.2 oz). Body surface area is 1.6 meters squared.  No Pain (0) Comment: Data Unavailable   No LMP recorded (lmp unknown). Patient is postmenopausal.  Allergies reviewed: Yes  Medications reviewed: Yes    Medications: Medication refills not needed today.  Pharmacy name entered into [facility]:    [facility] #[id number] [place] [address] [address] [facility] [address] [facility] [address]    Frailty Screening:   Is the patient here for a new oncology consult visit in cancer care? 2. No      Clinical concerns: Inez would like to discuss the plan moving forward. Would like to know what is next.   Dr. Miller was notified.      Thelma Zafar CMA            "

## 2024-07-08 NOTE — LETTER
"7/8/2024      Inez Rodriguez  [address]  [facility] [place] [facility] [address]      Dear Colleague,    Thank you for referring your patient, Inez Rodriguez, to the [facility]. Please see a copy of my visit note below.    Oncology Rooming Note    July 8, 2024 1:56 PM   Inez Rodriguez is a 73 year old female who presents for:    Chief Complaint   Patient presents with     Oncology Clinic Visit     3 week return visit with labs/infusion related to Lung cancer metastatic to bone.     Initial Vitals: BP (!) 145/74 (BP Location: Left arm, Patient Position: Sitting, Cuff Size: Adult Regular)   Pulse 106   Temp 98.1  F (36.7  C) (Oral)   Resp 16   Ht 1.651 m (5' 5\")   Wt 55.7 kg (122 lb 11.2 oz)   LMP  (LMP Unknown)   SpO2 98%   BMI 20.42 kg/m   Estimated body mass index is 20.42 kg/m  as calculated from the following:    Height as of this encounter: 1.651 m (5' 5\").    Weight as of this encounter: 55.7 kg (122 lb 11.2 oz). Body surface area is 1.6 meters squared.  No Pain (0) Comment: Data Unavailable   No LMP recorded (lmp unknown). Patient is postmenopausal.  Allergies reviewed: Yes  Medications reviewed: Yes    Medications: Medication refills not needed today.  Pharmacy name entered into [facility]:    [facility] #[id number] [place] [id number] AT [facility] [address]    Frailty Screening:   Is the patient here for a new oncology consult visit in cancer care? 2. No      Clinical concerns: Inez would like to discuss the plan moving forward. Would like to know what is next.   Dr. Miller was notified.      Thelma Zafar [facility] Hematology and Oncology progress note    Patient: Inez Rodriguez  MRN: [id number]  Date of Service: Jul 8, 2024           Reason for consultation      Problem List Items Addressed This Visit          Respiratory    Malignant neoplasm of lower lobe of right " lung (H)    Relevant Orders    Infusion Appointment Request - Adult    Infusion Appointment Request - Adult    Lung cancer metastatic to bone (H) - Primary    Relevant Orders    Infusion Appointment Request - Adult    Infusion Appointment Request - Adult       Hematologic    Antineoplastic chemotherapy induced anemia         Assessment / number of problems addressed      1.  A very pleasant 73 year old  woman with advanced stage IV non-small cell lung cancer.  The primary seems to be coming from the right upper lobe.  She has mediastinal lymph nodes as well as couple of osseous metastases 1 in the T4 vertebral body and 1 in the sacrum.  Molecular testing does show that this is K-tray G12C mutated tumor.  No other targeted mutation present except for PTEN which is not significant.  She has been started on palliative chemotherapy with carboplatin Alimta and pembrolizumab.  She has had good response after 2 cycles and continuation of response after 4 cycles.  Carboplatin discontinued after 4 cycles.  Current CT scan continues to shows further decrease in the size of the lymph nodes in the mediastinum.  In fact current CT scan shows very little adenopathy at all.  2.  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  Pathology is positive for non-small cell lung cancer.  Responding well to treatment.  Most of the bulky mediastinal apathy has resolved.  3.  Prior history of smoking.  Currently patient does not smoke.  4.  Recently moved from [place].  Limited social support.  5.  Upper back pain which the patient tells me secondary to stress and anxiety.  Back pain is now better.  6.  Chemotherapy related side effects.  7.  Depressed TSH as well as low T4.  Might indicate hypopituitarism.    Plan and medical decision making      Presenting with moderate side effects of treatment.Reviewed notes from each unique source.  Reviewed each unique test.  Ordered tests.  Independently  interpreted lab tests and radiological exams performed by other physicians.  Personally reviewed the images CT scan.    Treatment with pembrolizumab and pemetrexed.  Plan to continue this treatment until the next scan which should be a PET scan since there is very little mass evident on the current CT scan.  If the PET scan is completely negative then after some time we will discontinue pemetrexed.  Then if 2 years of negative PET scan's are obtained then we will discontinue pembrolizumab as well.  Also recommend signing up on the clinical trial since she does have K-tray G12C mutation so it makes her eligible for sotorasib.  At this point she will decide on the trial but no intervention would be done.  Intervention would only be done if there is any negative PET scan down the road.  Continue with folic acid and B12 supplementation.  Continue with good diet and exercise.  She can also take some over-the-counter allergy medications.  We will need to monitor her thyroid profile.  If both TSH and T4 continue to go lower this might indicate pituitary suppression.  Other hormones might also be affected.  The longitudinal plan of care for the diagnosis(es)/condition(s) as documented were addressed during this visit. Due to the added complexity in care, I will continue to support Inez in the subsequent management and with ongoing continuity of care.     Clinical/pathological stage      Stage IV    History of present illness      Ms. Inez Rodriguez is a 73 year old  woman who came to the emergency room in the beginning of January 2024 with increasing shortness of breath.  Was also having some coughing which was not responding to treatment given by the urgent care.  In the emergency room she had a CT scan done to rule out any pulmonary embolism but was positive for multiple lymph nodes in the mediastinum as well as subcarinal area.  She was therefore admitted.  She was seen by pulmonology.  Underwent bronchoscopy and biopsy.   "The bronchoscopy came back positive for adenocarcinoma involving subcarinal and mediastinal lymph nodes.    The patient does have a's history of smoking exposure but tells me that she has not smoked for several years.  No significant family history of cancer.  She recently moved from [place].    PET scan done on 29 January 2024 showed that she had extensive disease not only involving the right upper lobe but having extensive mediastinal hilar lymph node metastases and osseous metastases on the T4 vertebral body and right sacrum.    Has been started on chemoimmunotherapy with carboplatin etoposide and pemetrexed.  She has tolerated with moderate side effects.  Comes in today for follow-up.  She has finished 4 cycles.  CT scan after 2 cycles showed decrease in the size of the right upper lobe mass as well as mediastinal lymphadenopathy.  Increasing sclerosis was noted in the T4 vertebral body.    After 4 cycles carboplatin was discontinued.  We continued on pemetrexed and pembrolizumab.  And has been tolerating that quite well.  She has symptoms lasting about a week after chemotherapy.  The last 2 weeks usually she is feeling better.  Comes in after another CT scan.      Review of system      Details noted in the history of present illness.  A detailed review of systems is otherwise negative.      Physical exam        BP (!) 145/74 (BP Location: Left arm, Patient Position: Sitting, Cuff Size: Adult Regular)   Pulse 106   Temp 98.1  F (36.7  C) (Oral)   Resp 16   Ht 1.651 m (5' 5\")   Wt 55.7 kg (122 lb 11.2 oz)   LMP  (LMP Unknown)   SpO2 98%   BMI 20.42 kg/m      GENERAL: No acute distress. Cooperative in conversation.   HEENT:  Pupils are equal, round and reactive. Oral mucosa is clean and intact. No ulcerations or mucositis noted. No bleeding noted.  RESP:Chest symmetric lungs are clear bilaterally per auscultation. Regular respiratory rate. No wheezes or rhonchi.  CV: Normal S1 S2 Regular, rate and rhythm.  "    ABD: Nondistended, soft, nontender. Positive bowel sounds. No organomegaly.   EXTREMITIES: No lower extremity edema.   NEURO: Non- focal. Alert and oriented x3.  Cranial nerves appear intact.  PSYCH: Within normal limits. No depression or anxiety.  SKIN: Warm dry intact.      Lab results Reviewed      Recent Results (from the past 168 hour(s))   Comprehensive metabolic panel   Result Value Ref Range    Sodium 143 135 - 145 mmol/L    Potassium 4.3 3.4 - 5.3 mmol/L    Carbon Dioxide (CO2) 25 22 - 29 mmol/L    Anion Gap 14 7 - 15 mmol/L    Urea Nitrogen 19.3 8.0 - 23.0 mg/dL    Creatinine 0.92 0.51 - 0.95 mg/dL    GFR Estimate 65 >60 mL/min/1.73m2    Calcium 9.8 8.8 - 10.2 mg/dL    Chloride 104 98 - 107 mmol/L    Glucose 159 (H) 70 - 99 mg/dL    Alkaline Phosphatase 66 40 - 150 U/L    AST 24 0 - 45 U/L    ALT 47 0 - 50 U/L    Protein Total 7.0 6.4 - 8.3 g/dL    Albumin 4.5 3.5 - 5.2 g/dL    Bilirubin Total 0.2 <=1.2 mg/dL   TSH with free T4 reflex   Result Value Ref Range    TSH 0.05 (L) 0.30 - 4.20 uIU/mL   CBC with platelets and differential   Result Value Ref Range    WBC Count 9.8 4.0 - 11.0 10e3/uL    RBC Count 3.26 (L) 3.80 - 5.20 10e6/uL    Hemoglobin 10.7 (L) 11.7 - 15.7 g/dL    Hematocrit 34.1 (L) 35.0 - 47.0 %     (H) 78 - 100 fL    MCH 32.8 26.5 - 33.0 pg    MCHC 31.4 (L) 31.5 - 36.5 g/dL    RDW 16.7 (H) 10.0 - 15.0 %    Platelet Count 530 (H) 150 - 450 10e3/uL    % Neutrophils 84 %    % Lymphocytes 8 %    % Monocytes 6 %    % Eosinophils 0 %    % Basophils 0 %    % Immature Granulocytes 1 %    NRBCs per 100 WBC 0 <1 /100    Absolute Neutrophils 8.3 1.6 - 8.3 10e3/uL    Absolute Lymphocytes 0.8 0.8 - 5.3 10e3/uL    Absolute Monocytes 0.6 0.0 - 1.3 10e3/uL    Absolute Eosinophils 0.0 0.0 - 0.7 10e3/uL    Absolute Basophils 0.0 0.0 - 0.2 10e3/uL    Absolute Immature Granulocytes 0.1 <=0.4 10e3/uL    Absolute NRBCs 0.0 10e3/uL       Imaging results Reviewed        CT Chest/Abdomen/Pelvis w  Contrast    Result Date: 7/2/2024  EXAM: CT CHEST/ABDOMEN/PELVIS W CONTRAST LOCATION: [facility] DATE: 7/1/2024 INDICATION:  Lung cancer metastatic to bone (H), 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 Multiplanar reformats were obtained. Dose reduction techniques were used. CONTRAST: isovue 370 60ml FINDINGS: LUNGS AND PLEURA: Mild emphysema. Irregular bandlike nodular opacity anterior right upper lobe measures 1.6 x 0.6 cm and has not changed. Adjacent curvilinear opacities/fibrosis or atelectasis. 11 mm irregular nodule right upper lobe on image 3 is also stable. 2 mm nodule right apex, unchanged. No new nodules. No acute infiltrates or effusions. Elevated right hemidiaphragm. MEDIASTINUM/AXILLAE: Mildly enlarged right hilar lymph node and enlarged subcarinal lymph node, both unchanged. No new adenopathy. Atherosclerotic disease thoracic aorta. Left anterior chest wall Port-A-Cath tip distal SVC. CORONARY ARTERY CALCIFICATION: Moderate. HEPATOBILIARY: Normal. PANCREAS: Normal. SPLEEN: Normal. ADRENAL GLANDS: Normal. KIDNEYS/BLADDER: Small cysts both kidneys. BOWEL: Diverticulosis descending and sigmoid colon, without evidence for diverticulitis or bowel obstruction. LYMPH NODES: Normal. VASCULATURE: Advanced atherosclerotic disease abdominal aorta and iliac arteries. PELVIC ORGANS: Normal. MUSCULOSKELETAL: Sclerotic lesion right iliac bone, unchanged. Sclerosis T4 vertebral body, unchanged.     IMPRESSION: 1.  Stable right upper lobe nodules. No new nodules. No acute pulmonary disease. 2.  Stable slightly enlarged right hilar and mediastinal lymph nodes. 3.  No evidence for metastatic disease in the abdomen or pelvis.       Signed by: Cihno Miller MD      This note has been dictated using voice recognition software. Any grammatical or context  distortions are unintentional and inherent to the software       Again, thank you for allowing me to participate in the care of your patient.        Sincerely,        Chino Miller MD

## 2024-07-08 NOTE — PROGRESS NOTES
Infusion Nursing Note:  Inez Rodriguez presents today for D1C8.    Patient seen by provider today: Yes: Scheduled provider visit.    present during visit today: Not Applicable.    Note: Here for treatment. States has been feeling much better than the last visit. Did not end up starting Prozac. States is sleeping less, energy has improved and she has been in better spirits. Treatment administered as ordered. Once completed, the line was flushed and de accessed per protocol. Site covered with 2x2 gauze and secured in place with paper tape. Left infusion ambulatory and in stable condition.     Intravenous Access:  Labs drawn without difficulty.  Implanted Port.    Treatment Conditions:  Lab Results   Component Value Date    HGB 10.7 (L) 07/08/2024    WBC 9.8 07/08/2024    ANEUTAUTO 8.3 07/08/2024     (H) 07/08/2024     Lab Results   Component Value Date     07/08/2024    POTASSIUM 4.3 07/08/2024    CR 0.92 07/08/2024    CHELITA 9.8 07/08/2024    BILITOTAL 0.2 07/08/2024    ALBUMIN 4.5 07/08/2024    ALT 47 07/08/2024    AST 24 07/08/2024     Results reviewed, labs MET treatment parameters, ok to proceed with treatment.    Post Infusion Assessment:  Patient tolerated infusion without incident.  Blood return noted pre and post infusion.  Site patent and intact, free from redness, edema or discomfort.  No evidence of extravasations.  Access discontinued per protocol.     Discharge Plan:   Discharge instructions reviewed with: Patient.  Patient and/or family verbalized understanding of discharge instructions and all questions answered.  AVS to patient via [place].  Patient will return on 7/30 for next appointment.   Patient discharged in stable condition accompanied by: self.  Departure Mode: Ambulatory.    Anita Bashir RN

## 2024-07-11 ENCOUNTER — MEDICAL CORRESPONDENCE (OUTPATIENT)
Dept: HEALTH INFORMATION MANAGEMENT | Facility: CLINIC | Age: 74
End: 2024-07-11

## 2024-07-11 ENCOUNTER — PATIENT OUTREACH (OUTPATIENT)
Dept: ONCOLOGY | Facility: HOSPITAL | Age: 74
End: 2024-07-11
Payer: COMMERCIAL

## 2024-07-11 NOTE — PROGRESS NOTES
[facility]: Cancer Care                                                                                          Form from care roommate name [employer] has been filled out, signed and faxed back to them at [phone] (phone # [phone]).  This form supports the need for patient to have PCA services in her home.    Signature:  María Casillas RN

## 2024-07-16 NOTE — PROGRESS NOTES
[facility]: Cancer Care                                                                                          Additional information has been requested by care roommate.  I have faxed over the last office visit note which includes patient's diagnosis as well as medication list to [phone].    Signature:  María Casillas RN

## 2024-07-23 ENCOUNTER — DOCUMENTATION ONLY (OUTPATIENT)
Dept: ONCOLOGY | Facility: HOSPITAL | Age: 74
End: 2024-07-23
Payer: COMMERCIAL

## 2024-07-23 NOTE — PROGRESS NOTES
Eligibility was completed and reviewed with Dr. Miller .  Per pathology there is adequate tumor tissue for testing.  Registered to study 7/23/24 and tissue will be sent out for [facility] testing.    DELISA [place] [employer]

## 2024-07-23 NOTE — PROGRESS NOTES
Met with patient in clinic and spoke with her and son on multiple occasions regarding study, prior to today's visit.  Reviewed consent form in detail with patient.  All questions were answered.  Patient states if there is not enough tumor tissue for study testing she would most likely decline a study paid biopsy. Patient signed consent for for study: Lungmap: A Master Protocol to Evaluate Biomarker-Driven Therapies and Immunotherapies in Previously-Treated Non-Small Cell Lung Cancer (Lung-MAP Screening Study).  She is pre-screening (prior to progression on current line of therapy).  A signed copy of consent was offered to patient and she refused to take it.  She stated it is a waste of paper and she has a copy that was given to her previously to review at home.  Pt's copy of consent was placed in her folder and will provide in the future, if requested.     Tissue was requested from pathology by research coordinator.    Padmini Pruett RN Research

## 2024-07-24 ENCOUNTER — PATIENT OUTREACH (OUTPATIENT)
Dept: CARE COORDINATION | Facility: CLINIC | Age: 74
End: 2024-07-24
Payer: COMMERCIAL

## 2024-07-24 DIAGNOSIS — C79.51 LUNG CANCER METASTATIC TO BONE (H): ICD-10-CM

## 2024-07-24 DIAGNOSIS — C34.31 MALIGNANT NEOPLASM OF LOWER LOBE OF RIGHT LUNG (H): ICD-10-CM

## 2024-07-24 DIAGNOSIS — C34.90 LUNG CANCER METASTATIC TO BONE (H): ICD-10-CM

## 2024-07-24 NOTE — PROGRESS NOTES
Social Work - Follow-Up  [facility]    Data/Intervention:    Patient Name: Inez Rodriguez Goes By: Inez    /Age: 1950 (73 year old)    Reason for Follow-Up:  PCA paperwork question    Collaborated With:    -Kenneth, son  -Sonja Daniel, RN    Intervention/Education/Resources Provided:  SW returned Inez's call. She was still sleeping so spoke with son, Kenneth. He shared they were calling to follow up on PCA paperwork. Confirmed per chart review that additional documentation was sent on 24.Encouraged him to speak with company and if more paperwork is needed to fax to [facility]. No other needs at time of call.     Assessment/Plan:  Previously provided patient/family with writer's contact information and availability.    DIANNE Kirkland, [facility]  Adult Oncology Clinics  [place] (M,W), [place] (T) & [place] (Th)  *I am off Friday  Office: [phone]

## 2024-07-25 RX ORDER — DEXAMETHASONE 4 MG/1
4 TABLET ORAL 2 TIMES DAILY WITH MEALS
Qty: 6 TABLET | Refills: 3 | Status: SHIPPED | OUTPATIENT
Start: 2024-07-25 | End: 2024-07-30

## 2024-07-30 ENCOUNTER — LAB (OUTPATIENT)
Dept: INFUSION THERAPY | Facility: HOSPITAL | Age: 74
End: 2024-07-30
Attending: INTERNAL MEDICINE
Payer: COMMERCIAL

## 2024-07-30 ENCOUNTER — ONCOLOGY VISIT (OUTPATIENT)
Dept: ONCOLOGY | Facility: HOSPITAL | Age: 74
End: 2024-07-30
Attending: INTERNAL MEDICINE
Payer: COMMERCIAL

## 2024-07-30 VITALS
WEIGHT: 125.6 LBS | SYSTOLIC BLOOD PRESSURE: 172 MMHG | TEMPERATURE: 98.3 F | OXYGEN SATURATION: 97 % | BODY MASS INDEX: 20.9 KG/M2 | DIASTOLIC BLOOD PRESSURE: 79 MMHG | RESPIRATION RATE: 16 BRPM | HEART RATE: 95 BPM

## 2024-07-30 DIAGNOSIS — C79.51 LUNG CANCER METASTATIC TO BONE (H): Primary | ICD-10-CM

## 2024-07-30 DIAGNOSIS — C34.31 MALIGNANT NEOPLASM OF LOWER LOBE OF RIGHT LUNG (H): ICD-10-CM

## 2024-07-30 DIAGNOSIS — C34.90 LUNG CANCER METASTATIC TO BONE (H): Primary | ICD-10-CM

## 2024-07-30 DIAGNOSIS — C79.51 LUNG CANCER METASTATIC TO BONE (H): ICD-10-CM

## 2024-07-30 DIAGNOSIS — C34.90 LUNG CANCER METASTATIC TO BONE (H): ICD-10-CM

## 2024-07-30 LAB
ALBUMIN SERPL BCG-MCNC: 4.3 G/DL (ref 3.5–5.2)
ALP SERPL-CCNC: 62 U/L (ref 40–150)
ALT SERPL W P-5'-P-CCNC: 29 U/L (ref 0–50)
ANION GAP SERPL CALCULATED.3IONS-SCNC: 14 MMOL/L (ref 7–15)
AST SERPL W P-5'-P-CCNC: 21 U/L (ref 0–45)
BASOPHILS # BLD AUTO: 0 10E3/UL (ref 0–0.2)
BASOPHILS NFR BLD AUTO: 0 %
BILIRUB SERPL-MCNC: 0.2 MG/DL
BUN SERPL-MCNC: 19 MG/DL (ref 8–23)
CALCIUM SERPL-MCNC: 9.6 MG/DL (ref 8.8–10.4)
CHLORIDE SERPL-SCNC: 102 MMOL/L (ref 98–107)
CREAT SERPL-MCNC: 0.77 MG/DL (ref 0.51–0.95)
EGFRCR SERPLBLD CKD-EPI 2021: 81 ML/MIN/1.73M2
EOSINOPHIL # BLD AUTO: 0 10E3/UL (ref 0–0.7)
EOSINOPHIL NFR BLD AUTO: 0 %
ERYTHROCYTE [DISTWIDTH] IN BLOOD BY AUTOMATED COUNT: 16.1 % (ref 10–15)
GLUCOSE SERPL-MCNC: 165 MG/DL (ref 70–99)
HCO3 SERPL-SCNC: 27 MMOL/L (ref 22–29)
HCT VFR BLD AUTO: 34.9 % (ref 35–47)
HGB BLD-MCNC: 11.1 G/DL (ref 11.7–15.7)
IMM GRANULOCYTES # BLD: 0.1 10E3/UL
IMM GRANULOCYTES NFR BLD: 1 %
LYMPHOCYTES # BLD AUTO: 1.4 10E3/UL (ref 0.8–5.3)
LYMPHOCYTES NFR BLD AUTO: 13 %
MCH RBC QN AUTO: 33.3 PG (ref 26.5–33)
MCHC RBC AUTO-ENTMCNC: 31.8 G/DL (ref 31.5–36.5)
MCV RBC AUTO: 105 FL (ref 78–100)
MONOCYTES # BLD AUTO: 1 10E3/UL (ref 0–1.3)
MONOCYTES NFR BLD AUTO: 10 %
NEUTROPHILS # BLD AUTO: 8.2 10E3/UL (ref 1.6–8.3)
NEUTROPHILS NFR BLD AUTO: 76 %
NRBC # BLD AUTO: 0 10E3/UL
NRBC BLD AUTO-RTO: 0 /100
PLATELET # BLD AUTO: 375 10E3/UL (ref 150–450)
POTASSIUM SERPL-SCNC: 4 MMOL/L (ref 3.4–5.3)
PROT SERPL-MCNC: 6.6 G/DL (ref 6.4–8.3)
RBC # BLD AUTO: 3.33 10E6/UL (ref 3.8–5.2)
SODIUM SERPL-SCNC: 143 MMOL/L (ref 135–145)
T4 FREE SERPL-MCNC: 1.14 NG/DL (ref 0.9–1.7)
TSH SERPL DL<=0.005 MIU/L-ACNC: 0.01 UIU/ML (ref 0.3–4.2)
WBC # BLD AUTO: 10.8 10E3/UL (ref 4–11)

## 2024-07-30 PROCEDURE — 84439 ASSAY OF FREE THYROXINE: CPT | Performed by: INTERNAL MEDICINE

## 2024-07-30 PROCEDURE — G2211 COMPLEX E/M VISIT ADD ON: HCPCS | Performed by: NURSE PRACTITIONER

## 2024-07-30 PROCEDURE — 36591 DRAW BLOOD OFF VENOUS DEVICE: CPT | Performed by: INTERNAL MEDICINE

## 2024-07-30 PROCEDURE — 82040 ASSAY OF SERUM ALBUMIN: CPT | Performed by: INTERNAL MEDICINE

## 2024-07-30 PROCEDURE — 85049 AUTOMATED PLATELET COUNT: CPT | Performed by: INTERNAL MEDICINE

## 2024-07-30 PROCEDURE — 96413 CHEMO IV INFUSION 1 HR: CPT

## 2024-07-30 PROCEDURE — 84443 ASSAY THYROID STIM HORMONE: CPT | Performed by: INTERNAL MEDICINE

## 2024-07-30 PROCEDURE — 258N000003 HC RX IP 258 OP 636: Performed by: INTERNAL MEDICINE

## 2024-07-30 PROCEDURE — 84155 ASSAY OF PROTEIN SERUM: CPT | Performed by: INTERNAL MEDICINE

## 2024-07-30 PROCEDURE — 250N000011 HC RX IP 250 OP 636: Performed by: INTERNAL MEDICINE

## 2024-07-30 PROCEDURE — G0463 HOSPITAL OUTPT CLINIC VISIT: HCPCS | Performed by: NURSE PRACTITIONER

## 2024-07-30 PROCEDURE — 99214 OFFICE O/P EST MOD 30 MIN: CPT | Performed by: NURSE PRACTITIONER

## 2024-07-30 PROCEDURE — 96375 TX/PRO/DX INJ NEW DRUG ADDON: CPT

## 2024-07-30 PROCEDURE — 96411 CHEMO IV PUSH ADDL DRUG: CPT

## 2024-07-30 RX ORDER — ALBUTEROL SULFATE 0.83 MG/ML
2.5 SOLUTION RESPIRATORY (INHALATION)
Status: DISCONTINUED | OUTPATIENT
Start: 2024-07-30 | End: 2024-07-30 | Stop reason: HOSPADM

## 2024-07-30 RX ORDER — HEPARIN SODIUM (PORCINE) LOCK FLUSH IV SOLN 100 UNIT/ML 100 UNIT/ML
5 SOLUTION INTRAVENOUS
Status: DISCONTINUED | OUTPATIENT
Start: 2024-07-30 | End: 2024-07-30 | Stop reason: HOSPADM

## 2024-07-30 RX ORDER — METHYLPREDNISOLONE SODIUM SUCCINATE 125 MG/2ML
125 INJECTION, POWDER, LYOPHILIZED, FOR SOLUTION INTRAMUSCULAR; INTRAVENOUS
Status: DISCONTINUED | OUTPATIENT
Start: 2024-07-30 | End: 2024-07-30 | Stop reason: HOSPADM

## 2024-07-30 RX ORDER — DEXAMETHASONE 4 MG/1
4 TABLET ORAL 2 TIMES DAILY WITH MEALS
Qty: 6 TABLET | Refills: 3 | Status: SHIPPED | OUTPATIENT
Start: 2024-07-30

## 2024-07-30 RX ORDER — ALBUTEROL SULFATE 90 UG/1
1-2 AEROSOL, METERED RESPIRATORY (INHALATION)
Status: DISCONTINUED | OUTPATIENT
Start: 2024-07-30 | End: 2024-07-30 | Stop reason: HOSPADM

## 2024-07-30 RX ORDER — EPINEPHRINE 1 MG/ML
0.3 INJECTION, SOLUTION INTRAMUSCULAR; SUBCUTANEOUS EVERY 5 MIN PRN
Status: DISCONTINUED | OUTPATIENT
Start: 2024-07-30 | End: 2024-07-30 | Stop reason: HOSPADM

## 2024-07-30 RX ORDER — DIPHENHYDRAMINE HYDROCHLORIDE 50 MG/ML
50 INJECTION INTRAMUSCULAR; INTRAVENOUS
Status: DISCONTINUED | OUTPATIENT
Start: 2024-07-30 | End: 2024-07-30 | Stop reason: HOSPADM

## 2024-07-30 RX ORDER — ONDANSETRON 2 MG/ML
8 INJECTION INTRAMUSCULAR; INTRAVENOUS ONCE
Status: COMPLETED | OUTPATIENT
Start: 2024-07-30 | End: 2024-07-30

## 2024-07-30 RX ADMIN — Medication 5 ML: at 15:50

## 2024-07-30 RX ADMIN — ONDANSETRON 8 MG: 2 INJECTION INTRAMUSCULAR; INTRAVENOUS at 14:41

## 2024-07-30 RX ADMIN — SODIUM CHLORIDE 250 ML: 9 INJECTION, SOLUTION INTRAVENOUS at 14:41

## 2024-07-30 RX ADMIN — SODIUM CHLORIDE 200 MG: 9 INJECTION, SOLUTION INTRAVENOUS at 15:02

## 2024-07-30 RX ADMIN — PEMETREXED DISODIUM 800 MG: 500 INJECTION, POWDER, LYOPHILIZED, FOR SOLUTION INTRAVENOUS at 15:36

## 2024-07-30 ASSESSMENT — PAIN SCALES - GENERAL: PAINLEVEL: NO PAIN (0)

## 2024-07-30 NOTE — PROGRESS NOTES
"Oncology Rooming Note    July 30, 2024 1:46 PM   Inez Rodriguez is a 73 year old female who presents for:    Chief Complaint   Patient presents with    Oncology Clinic Visit     Initial Vitals: BP (!) 180/77 (BP Location: Left arm, Patient Position: Sitting, Cuff Size: Adult Regular)   Pulse 95   Temp 98.3  F (36.8  C) (Oral)   Resp 16   Wt 57 kg (125 lb 9.6 oz)   LMP  (LMP Unknown)   SpO2 97%   BMI 20.90 kg/m   Estimated body mass index is 20.9 kg/m  as calculated from the following:    Height as of 7/8/24: 1.651 m (5' 5\").    Weight as of this encounter: 57 kg (125 lb 9.6 oz). Body surface area is 1.62 meters squared.  No Pain (0) Comment: Data Unavailable   No LMP recorded (lmp unknown). Patient is postmenopausal.  Allergies reviewed: Yes  Medications reviewed: Yes    Medications: Medication refills not needed today.  Pharmacy name entered into [facility]:    [facility] #[id number] - [facility] [address] AT [facility] [address] [facility] [address] [facility]    Frailty Screening:   Is the patient here for a new oncology consult visit in cancer care? 2. No      Clinical concerns: Slight LE swelling.  Elizabet was notified.      Aster Dewey RN             "

## 2024-07-30 NOTE — PROGRESS NOTES
Infusion Nursing Note:  Inez Rodriguez presents today for C9D1.    Patient seen by provider today: Yes: Elizabet Williamson CNP   present during visit today: Not Applicable.    Note: Here for treatment after meeting with provider. States has been feeling much better.  Treatment administered as ordered. Once completed, the line was flushed and de accessed per protocol. Her son was called per request to pick her up. Left infusion ambulatory.     Intravenous Access:  Implanted Port.    Treatment Conditions:  Lab Results   Component Value Date    HGB 11.1 (L) 07/30/2024    WBC 10.8 07/30/2024    ANEUTAUTO 8.2 07/30/2024     07/30/2024        Lab Results   Component Value Date     07/30/2024    POTASSIUM 4.0 07/30/2024    CR 0.77 07/30/2024    CHELITA 9.6 07/30/2024    BILITOTAL 0.2 07/30/2024    ALBUMIN 4.3 07/30/2024    ALT 29 07/30/2024    AST 21 07/30/2024       Results reviewed, labs MET treatment parameters, ok to proceed with treatment.      Post Infusion Assessment:  Patient tolerated infusions without incident.  Blood return noted pre and post infusion.  No evidence of extravasations.  Access discontinued per protocol.       Discharge Plan:   Discharge instructions reviewed with: Patient.  Patient and/or family verbalized understanding of discharge instructions and all questions answered.  Patient discharged in stable condition accompanied by: self.  Departure Mode: Ambulatory.      Ashtyn Albright RN

## 2024-07-30 NOTE — LETTER
"7/30/2024      Inez Rodriguez  [address]  [facility] [address]      Dear Colleague,    Thank you for referring your patient, Inez Rodriguez, to the [facility]. Please see a copy of my visit note below.    Oncology Rooming Note    July 30, 2024 1:46 PM   Inez Rodriguez is a 73 year old female who presents for:    Chief Complaint   Patient presents with     Oncology Clinic Visit     Initial Vitals: BP (!) 180/77 (BP Location: Left arm, Patient Position: Sitting, Cuff Size: Adult Regular)   Pulse 95   Temp 98.3  F (36.8  C) (Oral)   Resp 16   Wt 57 kg (125 lb 9.6 oz)   LMP  (LMP Unknown)   SpO2 97%   BMI 20.90 kg/m   Estimated body mass index is 20.9 kg/m  as calculated from the following:    Height as of 7/8/24: 1.651 m (5' 5\").    Weight as of this encounter: 57 kg (125 lb 9.6 oz). Body surface area is 1.62 meters squared.  No Pain (0) Comment: Data Unavailable   No LMP recorded (lmp unknown). Patient is postmenopausal.  Allergies reviewed: Yes  Medications reviewed: Yes    Medications: Medication refills not needed today.  Pharmacy name entered into [employer]:    [id number] - [address] AT [facility] OF [facility] [address]    Frailty Screening:   Is the patient here for a new oncology consult visit in cancer care? 2. No      Clinical concerns: Slight LE swelling.  Elizabet was notified.      Aster Dewey RN               [facility] Hematology and Oncology Progress Note    Patient: Inez Rodriguez  MRN: [id number]  Date of Service: Jul 30, 2024      Oncologist: Dr. Miller    Reason for Visit    Chief Complaint   Patient presents with     Oncology Clinic Visit       Assessment and Plan     Cancer Staging   No matching staging information was found for the patient.    Metastatic non-small cell lung cancer, RUL with mediastinal lymph nodes and osseous mets of T4 and sacrum. " Molecular testing shows KRAS G12C mutated tumor.   On palliative chemotherapy finished 4 cycles of Carboplatin, Alimta, and pembrolizumab with good response. Decided to drop carboplatin 5/6/24. CT 5/2/24 showed mildly improved mediastinal and perihilar adenopathy with stable RUL thickening. New tiny pericardial effusion noted. Currently on Alimta and pembrolizumab.  Overall she feels better over the last month.  Her last CT scan was stable.  We will continue the Alimta and pembrolizumab.  Will get a dose today and then again in 3 weeks and then we will repeat a PET scan in September.  If there is a complete response we will then drop Alimta.    2. Chemotherapy induced anemia  Hbg 11.1 today.  Slightly improved.  Continue to monitor.     3.  Low TSH  Patient has been quite low on this for a while.  Her T4 generally runs normal.  We will continue to monitor with immunotherapy    ECOG Performance    0 - Independent    Distress Screening (within last 30 days)    No data recorded     Pain  Pain Score: No Pain (0)    Problem List    Patient Active Problem List   Diagnosis     Observation for suspected malignant neoplasm     Thyroid nodule     Chronic cough     Pulmonary nodules     Wheezing-associated respiratory infection (WARI)     Malignant neoplasm of lower lobe of right lung (H)     Postobstructive pneumonia     Lung cancer metastatic to bone (H)     Antineoplastic chemotherapy induced anemia        ______________________________________________________________________________       Diagnosis:   Lung cancer, January 2024 came to ER with SOB.   -1/7/24: Mediastinal and right hilar adenopathy most suggestive of metastatic adenopathy from a primary lung malignancy. A nodular opacity in the right upper lobe could represent a primary lung malignancy versus an infectious/inflammatory focus. Recommend   referral to pulmonology for tissue sampling. [employer] demonstrate mass effect on the right upper lobe bronchus  and bronchus intermedius with associated narrowing without occlusion. Brain MRI negative.   -1/8/24: EBUS done and 4R, 11R and subcarinal nodes all positive for malignancy. NSCLC, favor adenocarcinoma. Right upper lobe nodule was also positive. Pleural fluid suspicious.   -1/29/24: PET: Findings suspicious for a right upper lobe primary lung neoplasm with extensive mediastinal/hilar lymph node metastases and osseous metastases to the T4 vertebral body and right sacrum.   Neogenomics: KRAS G12C mutation. Tp53 mutation. PTEN deletion +, MSI stable, PDL1 0%, TMB intermediate, Her2/leisa negative. Eveything else was negative.      Treatment:   -2/14/24: palliative treatment with Carboplatin, Alimta, Keytruda x 4 cycles  -5/6/24: Alimta and Keytruda    History of Present Illness    Patient here for continued treatment.  Her all she states that over the last months she really has improved on her general health.  She says she feels a lot better.  She said her mood has improved significantly.  No more depression.  She says she feels more like herself.  She states that her appetite is better.  She is eating better.  She still has chronic fatigue with the chemotherapy but it is stable.  She denies any new bone or back pain.    Review of Systems    Pertinent items are noted in HPI.    Past History    Past Medical History:   Diagnosis Date     Hypertension      SOB (shortness of breath)        PHYSICAL EXAM  BP (!) 172/79 (BP Location: Left arm, Patient Position: Sitting, Cuff Size: Adult Regular)   Pulse 95   Temp 98.3  F (36.8  C) (Oral)   Resp 16   Wt 57 kg (125 lb 9.6 oz)   LMP  (LMP Unknown)   SpO2 97%   BMI 20.90 kg/m      GENERAL: no acute distress. Cooperative in conversation. Alone in clinic  RESP: Regular respiratory rate. No expiratory wheezes   NEURO: non focal. Alert and oriented x3.   PSYCH: within normal limits. No depression or anxiety.  SKIN: exposed skin is dry intact.       Lab Results    Recent Results  (from the past 168 hour(s))   Comprehensive metabolic panel   Result Value Ref Range    Sodium 143 135 - 145 mmol/L    Potassium 4.0 3.4 - 5.3 mmol/L    Carbon Dioxide (CO2) 27 22 - 29 mmol/L    Anion Gap 14 7 - 15 mmol/L    Urea Nitrogen 19.0 8.0 - 23.0 mg/dL    Creatinine 0.77 0.51 - 0.95 mg/dL    GFR Estimate 81 >60 mL/min/1.73m2    Calcium 9.6 8.8 - 10.4 mg/dL    Chloride 102 98 - 107 mmol/L    Glucose 165 (H) 70 - 99 mg/dL    Alkaline Phosphatase 62 40 - 150 U/L    AST 21 0 - 45 U/L    ALT 29 0 - 50 U/L    Protein Total 6.6 6.4 - 8.3 g/dL    Albumin 4.3 3.5 - 5.2 g/dL    Bilirubin Total 0.2 <=1.2 mg/dL   TSH with free T4 reflex   Result Value Ref Range    TSH 0.01 (L) 0.30 - 4.20 uIU/mL   CBC with platelets and differential   Result Value Ref Range    WBC Count 10.8 4.0 - 11.0 10e3/uL    RBC Count 3.33 (L) 3.80 - 5.20 10e6/uL    Hemoglobin 11.1 (L) 11.7 - 15.7 g/dL    Hematocrit 34.9 (L) 35.0 - 47.0 %     (H) 78 - 100 fL    MCH 33.3 (H) 26.5 - 33.0 pg    MCHC 31.8 31.5 - 36.5 g/dL    RDW 16.1 (H) 10.0 - 15.0 %    Platelet Count 375 150 - 450 10e3/uL    % Neutrophils 76 %    % Lymphocytes 13 %    % Monocytes 10 %    % Eosinophils 0 %    % Basophils 0 %    % Immature Granulocytes 1 %    NRBCs per 100 WBC 0 <1 /100    Absolute Neutrophils 8.2 1.6 - 8.3 10e3/uL    Absolute Lymphocytes 1.4 0.8 - 5.3 10e3/uL    Absolute Monocytes 1.0 0.0 - 1.3 10e3/uL    Absolute Eosinophils 0.0 0.0 - 0.7 10e3/uL    Absolute Basophils 0.0 0.0 - 0.2 10e3/uL    Absolute Immature Granulocytes 0.1 <=0.4 10e3/uL    Absolute NRBCs 0.0 10e3/uL         Imaging    CT Chest/Abdomen/Pelvis w Contrast    Result Date: 7/2/2024  EXAM: CT CHEST/ABDOMEN/PELVIS W CONTRAST LOCATION: [facility] DATE: 7/1/2024 INDICATION:  Lung cancer metastatic to bone (H), Lung cancer metastatic to bone (H) COMPARISON: CT chest, abdomen and pelvis 5 0-24 or TECHNIQUE: CT scan of the chest, abdomen, and pelvis was performed following  injection of IV contrast. Multiplanar reformats were obtained. Dose reduction techniques were used. CONTRAST: isovue 370 60ml FINDINGS: LUNGS AND PLEURA: Mild emphysema. Irregular bandlike nodular opacity anterior right upper lobe measures 1.6 x 0.6 cm and has not changed. Adjacent curvilinear opacities/fibrosis or atelectasis. 11 mm irregular nodule right upper lobe on image 3 is also stable. 2 mm nodule right apex, unchanged. No new nodules. No acute infiltrates or effusions. Elevated right hemidiaphragm. MEDIASTINUM/AXILLAE: Mildly enlarged right hilar lymph node and enlarged subcarinal lymph node, both unchanged. No new adenopathy. Atherosclerotic disease thoracic aorta. Left anterior chest wall Port-A-Cath tip distal SVC. CORONARY ARTERY CALCIFICATION: Moderate. HEPATOBILIARY: Normal. PANCREAS: Normal. SPLEEN: Normal. ADRENAL GLANDS: Normal. KIDNEYS/BLADDER: Small cysts both kidneys. BOWEL: Diverticulosis descending and sigmoid colon, without evidence for diverticulitis or bowel obstruction. LYMPH NODES: Normal. VASCULATURE: Advanced atherosclerotic disease abdominal aorta and iliac arteries. PELVIC ORGANS: Normal. MUSCULOSKELETAL: Sclerotic lesion right iliac bone, unchanged. Sclerosis T4 vertebral body, unchanged.     IMPRESSION: 1.  Stable right upper lobe nodules. No new nodules. No acute pulmonary disease. 2.  Stable slightly enlarged right hilar and mediastinal lymph nodes. 3.  No evidence for metastatic disease in the abdomen or pelvis.     The longitudinal plan of care for the diagnosis(es)/condition(s) as documented were addressed during this visit. Due to the added complexity in care, I will continue to support Inez in the subsequent management and with ongoing continuity of care.           Again, thank you for allowing me to participate in the care of your patient.        Sincerely,        TANESHA Okeefe CNP

## 2024-07-30 NOTE — PROGRESS NOTES
[facility] Hematology and Oncology Progress Note    Patient: Inez Rodriguez  MRN: [id number]  Date of Service: Jul 30, 2024      Oncologist: Dr. Miller    Reason for Visit    Chief Complaint   Patient presents with    Oncology Clinic Visit       Assessment and Plan     Cancer Staging   No matching staging information was found for the patient.    Metastatic non-small cell lung cancer, RUL with mediastinal lymph nodes and osseous mets of T4 and sacrum. Molecular testing shows KRAS G12C mutated tumor.   On palliative chemotherapy finished 4 cycles of Carboplatin, Alimta, and pembrolizumab with good response. Decided to drop carboplatin 5/6/24. CT 5/2/24 showed mildly improved mediastinal and perihilar adenopathy with stable RUL thickening. New tiny pericardial effusion noted. Currently on Alimta and pembrolizumab.  Overall she feels better over the last month.  Her last CT scan was stable.  We will continue the Alimta and pembrolizumab.  Will get a dose today and then again in 3 weeks and then we will repeat a PET scan in September.  If there is a complete response we will then drop Alimta.    2. Chemotherapy induced anemia  Hbg 11.1 today.  Slightly improved.  Continue to monitor.     3.  Low TSH  Patient has been quite low on this for a while.  Her T4 generally runs normal.  We will continue to monitor with immunotherapy    ECOG Performance    0 - Independent    Distress Screening (within last 30 days)    No data recorded     Pain  Pain Score: No Pain (0)    Problem List    Patient Active Problem List   Diagnosis    Observation for suspected malignant neoplasm    Thyroid nodule    Chronic cough    Pulmonary nodules    Wheezing-associated respiratory infection (WARI)    Malignant neoplasm of lower lobe of right lung (H)    Postobstructive pneumonia    Lung cancer metastatic to bone (H)    Antineoplastic chemotherapy induced anemia         ______________________________________________________________________________       Diagnosis:   Lung cancer, January 2024 came to ER with SOB.   -1/7/24: Mediastinal and right hilar adenopathy most suggestive of metastatic adenopathy from a primary lung malignancy. A nodular opacity in the right upper lobe could represent a primary lung malignancy versus an infectious/inflammatory focus. Recommend   referral to pulmonology for tissue sampling. [employer] demonstrate mass effect on the right upper lobe bronchus and bronchus intermedius with associated narrowing without occlusion. Brain MRI negative.   -1/8/24: EBUS done and 4R, 11R and subcarinal nodes all positive for malignancy. NSCLC, favor adenocarcinoma. Right upper lobe nodule was also positive. Pleural fluid suspicious.   -1/29/24: PET: Findings suspicious for a right upper lobe primary lung neoplasm with extensive mediastinal/hilar lymph node metastases and osseous metastases to the T4 vertebral body and right sacrum.   Neogenomics: KRAS G12C mutation. Tp53 mutation. PTEN deletion +, MSI stable, PDL1 0%, TMB intermediate, Her2/leisa negative. Eveything else was negative.      Treatment:   -2/14/24: palliative treatment with Carboplatin, Alimta, Keytruda x 4 cycles  -5/6/24: Alimta and Keytruda    History of Present Illness    Patient here for continued treatment.  Her all she states that over the last months she really has improved on her general health.  She says she feels a lot better.  She said her mood has improved significantly.  No more depression.  She says she feels more like herself.  She states that her appetite is better.  She is eating better.  She still has chronic fatigue with the chemotherapy but it is stable.  She denies any new bone or back pain.    Review of Systems    Pertinent items are noted in HPI.    Past History    Past Medical History:   Diagnosis Date    Hypertension     SOB (shortness of breath)        PHYSICAL EXAM  BP  (!) 172/79 (BP Location: Left arm, Patient Position: Sitting, Cuff Size: Adult Regular)   Pulse 95   Temp 98.3  F (36.8  C) (Oral)   Resp 16   Wt 57 kg (125 lb 9.6 oz)   LMP  (LMP Unknown)   SpO2 97%   BMI 20.90 kg/m      GENERAL: no acute distress. Cooperative in conversation. Alone in clinic  RESP: Regular respiratory rate. No expiratory wheezes   NEURO: non focal. Alert and oriented x3.   PSYCH: within normal limits. No depression or anxiety.  SKIN: exposed skin is dry intact.       Lab Results    Recent Results (from the past 168 hour(s))   Comprehensive metabolic panel   Result Value Ref Range    Sodium 143 135 - 145 mmol/L    Potassium 4.0 3.4 - 5.3 mmol/L    Carbon Dioxide (CO2) 27 22 - 29 mmol/L    Anion Gap 14 7 - 15 mmol/L    Urea Nitrogen 19.0 8.0 - 23.0 mg/dL    Creatinine 0.77 0.51 - 0.95 mg/dL    GFR Estimate 81 >60 mL/min/1.73m2    Calcium 9.6 8.8 - 10.4 mg/dL    Chloride 102 98 - 107 mmol/L    Glucose 165 (H) 70 - 99 mg/dL    Alkaline Phosphatase 62 40 - 150 U/L    AST 21 0 - 45 U/L    ALT 29 0 - 50 U/L    Protein Total 6.6 6.4 - 8.3 g/dL    Albumin 4.3 3.5 - 5.2 g/dL    Bilirubin Total 0.2 <=1.2 mg/dL   TSH with free T4 reflex   Result Value Ref Range    TSH 0.01 (L) 0.30 - 4.20 uIU/mL   CBC with platelets and differential   Result Value Ref Range    WBC Count 10.8 4.0 - 11.0 10e3/uL    RBC Count 3.33 (L) 3.80 - 5.20 10e6/uL    Hemoglobin 11.1 (L) 11.7 - 15.7 g/dL    Hematocrit 34.9 (L) 35.0 - 47.0 %     (H) 78 - 100 fL    MCH 33.3 (H) 26.5 - 33.0 pg    MCHC 31.8 31.5 - 36.5 g/dL    RDW 16.1 (H) 10.0 - 15.0 %    Platelet Count 375 150 - 450 10e3/uL    % Neutrophils 76 %    % Lymphocytes 13 %    % Monocytes 10 %    % Eosinophils 0 %    % Basophils 0 %    % Immature Granulocytes 1 %    NRBCs per 100 WBC 0 <1 /100    Absolute Neutrophils 8.2 1.6 - 8.3 10e3/uL    Absolute Lymphocytes 1.4 0.8 - 5.3 10e3/uL    Absolute Monocytes 1.0 0.0 - 1.3 10e3/uL    Absolute Eosinophils 0.0 0.0 - 0.7  10e3/uL    Absolute Basophils 0.0 0.0 - 0.2 10e3/uL    Absolute Immature Granulocytes 0.1 <=0.4 10e3/uL    Absolute NRBCs 0.0 10e3/uL         Imaging    CT Chest/Abdomen/Pelvis w Contrast    Result Date: 7/2/2024  EXAM: CT CHEST/ABDOMEN/PELVIS W CONTRAST LOCATION: [facility] DATE: 7/1/2024 INDICATION:  Lung cancer metastatic to bone (H), 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 Multiplanar reformats were obtained. Dose reduction techniques were used. CONTRAST: isovue 370 60ml FINDINGS: LUNGS AND PLEURA: Mild emphysema. Irregular bandlike nodular opacity anterior right upper lobe measures 1.6 x 0.6 cm and has not changed. Adjacent curvilinear opacities/fibrosis or atelectasis. 11 mm irregular nodule right upper lobe on image 3 is also stable. 2 mm nodule right apex, unchanged. No new nodules. No acute infiltrates or effusions. Elevated right hemidiaphragm. MEDIASTINUM/AXILLAE: Mildly enlarged right hilar lymph node and enlarged subcarinal lymph node, both unchanged. No new adenopathy. Atherosclerotic disease thoracic aorta. Left anterior chest wall Port-A-Cath tip distal SVC. CORONARY ARTERY CALCIFICATION: Moderate. HEPATOBILIARY: Normal. PANCREAS: Normal. SPLEEN: Normal. ADRENAL GLANDS: Normal. KIDNEYS/BLADDER: Small cysts both kidneys. BOWEL: Diverticulosis descending and sigmoid colon, without evidence for diverticulitis or bowel obstruction. LYMPH NODES: Normal. VASCULATURE: Advanced atherosclerotic disease abdominal aorta and iliac arteries. PELVIC ORGANS: Normal. MUSCULOSKELETAL: Sclerotic lesion right iliac bone, unchanged. Sclerosis T4 vertebral body, unchanged.     IMPRESSION: 1.  Stable right upper lobe nodules. No new nodules. No acute pulmonary disease. 2.  Stable slightly enlarged right hilar and mediastinal lymph nodes. 3.  No  evidence for metastatic disease in the abdomen or pelvis.     The longitudinal plan of care for the diagnosis(es)/condition(s) as documented were addressed during this visit. Due to the added complexity in care, I will continue to support Inez in the subsequent management and with ongoing continuity of care.

## 2024-08-15 ENCOUNTER — TELEPHONE (OUTPATIENT)
Dept: ONCOLOGY | Facility: HOSPITAL | Age: 74
End: 2024-08-15
Payer: COMMERCIAL

## 2024-08-15 NOTE — TELEPHONE ENCOUNTER
Dr. Miller would like to see Inez in his clinic next week. I called Inez and Kenneth back to let them know they would be getting a call to schedule. They verbalized understanding and have no questions at this time.     Sarah Garcia RN on 8/15/2024 at 4:38 PM

## 2024-08-15 NOTE — TELEPHONE ENCOUNTER
I received a phone call today from Inez and Kenneth.  They are calling to report that Inez has not been feeling well the past few days.  Inez states she felt fine right after her last treatment but about 10 days later she started to feel more fatigued, exhausted, depressed and nauseous.  She states she also has a new pain in her right armpit and new pain on the left side of her neck.  She states she does feel enlarged lymph nodes on the left side of her neck.  She also states she feels a bump in her right armpit.  She does not have a fever.  She has not been around sick people as she states she does not leave her home.  For the nausea, I recommended she try taking  Zofran.  She does have zofran at home but she has not been using this.  We also talked about eating small frequent meals and trying to push fluids today.  She agrees with this plan.  She states about 5 days ago they may have had bad cheese to eat for dinner.  She wonders if some of the nausea and gas pain she is feeling could be due to to this.    I let her know that I would talk with Dr. Miller regarding her right armpit and left side of the neck pain/lymph nodes that she is feeling.  Of note, she is scheduled to come in for treatment on Monday, 8/19 and will have a pet scan following this treatment.    Sarah Garcia RN on 8/15/2024 at 10:07 AM

## 2024-08-16 ENCOUNTER — TELEPHONE (OUTPATIENT)
Dept: ONCOLOGY | Facility: HOSPITAL | Age: 74
End: 2024-08-16
Payer: COMMERCIAL

## 2024-08-19 ENCOUNTER — ONCOLOGY VISIT (OUTPATIENT)
Dept: ONCOLOGY | Facility: HOSPITAL | Age: 74
End: 2024-08-19
Payer: COMMERCIAL

## 2024-08-19 ENCOUNTER — INFUSION THERAPY VISIT (OUTPATIENT)
Dept: INFUSION THERAPY | Facility: HOSPITAL | Age: 74
End: 2024-08-19
Payer: COMMERCIAL

## 2024-08-19 ENCOUNTER — LAB (OUTPATIENT)
Dept: INFUSION THERAPY | Facility: HOSPITAL | Age: 74
End: 2024-08-19
Payer: COMMERCIAL

## 2024-08-19 VITALS
TEMPERATURE: 97.9 F | DIASTOLIC BLOOD PRESSURE: 72 MMHG | OXYGEN SATURATION: 100 % | HEART RATE: 113 BPM | WEIGHT: 119 LBS | RESPIRATION RATE: 16 BRPM | BODY MASS INDEX: 19.83 KG/M2 | HEIGHT: 65 IN | SYSTOLIC BLOOD PRESSURE: 132 MMHG

## 2024-08-19 DIAGNOSIS — C34.90 LUNG CANCER METASTATIC TO BONE (H): ICD-10-CM

## 2024-08-19 DIAGNOSIS — C79.51 LUNG CANCER METASTATIC TO BONE (H): Primary | ICD-10-CM

## 2024-08-19 DIAGNOSIS — C34.90 LUNG CANCER METASTATIC TO BONE (H): Primary | ICD-10-CM

## 2024-08-19 DIAGNOSIS — C34.31 MALIGNANT NEOPLASM OF LOWER LOBE OF RIGHT LUNG (H): ICD-10-CM

## 2024-08-19 DIAGNOSIS — C79.51 LUNG CANCER METASTATIC TO BONE (H): ICD-10-CM

## 2024-08-19 DIAGNOSIS — C34.31 MALIGNANT NEOPLASM OF LOWER LOBE OF RIGHT LUNG (H): Primary | ICD-10-CM

## 2024-08-19 LAB
ALBUMIN SERPL BCG-MCNC: 4.1 G/DL (ref 3.5–5.2)
ALP SERPL-CCNC: 84 U/L (ref 40–150)
ALT SERPL W P-5'-P-CCNC: 36 U/L (ref 0–50)
ANION GAP SERPL CALCULATED.3IONS-SCNC: 17 MMOL/L (ref 7–15)
AST SERPL W P-5'-P-CCNC: 30 U/L (ref 0–45)
BASOPHILS # BLD AUTO: 0 10E3/UL (ref 0–0.2)
BASOPHILS NFR BLD AUTO: 0 %
BILIRUB SERPL-MCNC: 0.3 MG/DL
BUN SERPL-MCNC: 28.2 MG/DL (ref 8–23)
CALCIUM SERPL-MCNC: 10.1 MG/DL (ref 8.8–10.4)
CHLORIDE SERPL-SCNC: 98 MMOL/L (ref 98–107)
CREAT SERPL-MCNC: 1.35 MG/DL (ref 0.51–0.95)
EGFRCR SERPLBLD CKD-EPI 2021: 41 ML/MIN/1.73M2
EOSINOPHIL # BLD AUTO: 0 10E3/UL (ref 0–0.7)
EOSINOPHIL NFR BLD AUTO: 0 %
ERYTHROCYTE [DISTWIDTH] IN BLOOD BY AUTOMATED COUNT: 14.8 % (ref 10–15)
GLUCOSE SERPL-MCNC: 151 MG/DL (ref 70–99)
HCO3 SERPL-SCNC: 24 MMOL/L (ref 22–29)
HCT VFR BLD AUTO: 37.5 % (ref 35–47)
HGB BLD-MCNC: 12.3 G/DL (ref 11.7–15.7)
IMM GRANULOCYTES # BLD: 0.1 10E3/UL
IMM GRANULOCYTES NFR BLD: 1 %
LYMPHOCYTES # BLD AUTO: 0.6 10E3/UL (ref 0.8–5.3)
LYMPHOCYTES NFR BLD AUTO: 6 %
MCH RBC QN AUTO: 32.5 PG (ref 26.5–33)
MCHC RBC AUTO-ENTMCNC: 32.8 G/DL (ref 31.5–36.5)
MCV RBC AUTO: 99 FL (ref 78–100)
MONOCYTES # BLD AUTO: 0.7 10E3/UL (ref 0–1.3)
MONOCYTES NFR BLD AUTO: 7 %
NEUTROPHILS # BLD AUTO: 9 10E3/UL (ref 1.6–8.3)
NEUTROPHILS NFR BLD AUTO: 87 %
NRBC # BLD AUTO: 0 10E3/UL
NRBC BLD AUTO-RTO: 0 /100
PLATELET # BLD AUTO: 457 10E3/UL (ref 150–450)
POTASSIUM SERPL-SCNC: 4.9 MMOL/L (ref 3.4–5.3)
PROT SERPL-MCNC: 7.3 G/DL (ref 6.4–8.3)
RBC # BLD AUTO: 3.79 10E6/UL (ref 3.8–5.2)
SODIUM SERPL-SCNC: 139 MMOL/L (ref 135–145)
T4 FREE SERPL-MCNC: 1.08 NG/DL (ref 0.9–1.7)
TSH SERPL DL<=0.005 MIU/L-ACNC: 0.02 UIU/ML (ref 0.3–4.2)
WBC # BLD AUTO: 10.4 10E3/UL (ref 4–11)

## 2024-08-19 PROCEDURE — 80053 COMPREHEN METABOLIC PANEL: CPT | Performed by: INTERNAL MEDICINE

## 2024-08-19 PROCEDURE — 96413 CHEMO IV INFUSION 1 HR: CPT

## 2024-08-19 PROCEDURE — 99214 OFFICE O/P EST MOD 30 MIN: CPT | Performed by: INTERNAL MEDICINE

## 2024-08-19 PROCEDURE — 85025 COMPLETE CBC W/AUTO DIFF WBC: CPT | Performed by: INTERNAL MEDICINE

## 2024-08-19 PROCEDURE — 36591 DRAW BLOOD OFF VENOUS DEVICE: CPT | Performed by: INTERNAL MEDICINE

## 2024-08-19 PROCEDURE — 96411 CHEMO IV PUSH ADDL DRUG: CPT

## 2024-08-19 PROCEDURE — G2211 COMPLEX E/M VISIT ADD ON: HCPCS | Performed by: INTERNAL MEDICINE

## 2024-08-19 PROCEDURE — 96375 TX/PRO/DX INJ NEW DRUG ADDON: CPT

## 2024-08-19 PROCEDURE — 96372 THER/PROPH/DIAG INJ SC/IM: CPT | Performed by: INTERNAL MEDICINE

## 2024-08-19 PROCEDURE — 250N000011 HC RX IP 250 OP 636: Mod: JW | Performed by: INTERNAL MEDICINE

## 2024-08-19 PROCEDURE — 84443 ASSAY THYROID STIM HORMONE: CPT | Performed by: INTERNAL MEDICINE

## 2024-08-19 PROCEDURE — G0463 HOSPITAL OUTPT CLINIC VISIT: HCPCS | Performed by: INTERNAL MEDICINE

## 2024-08-19 PROCEDURE — 84439 ASSAY OF FREE THYROXINE: CPT | Performed by: INTERNAL MEDICINE

## 2024-08-19 PROCEDURE — 258N000003 HC RX IP 258 OP 636: Performed by: INTERNAL MEDICINE

## 2024-08-19 RX ORDER — HEPARIN SODIUM (PORCINE) LOCK FLUSH IV SOLN 100 UNIT/ML 100 UNIT/ML
5 SOLUTION INTRAVENOUS
Status: DISCONTINUED | OUTPATIENT
Start: 2024-08-19 | End: 2024-08-19 | Stop reason: HOSPADM

## 2024-08-19 RX ORDER — CYANOCOBALAMIN 1000 UG/ML
1000 INJECTION, SOLUTION INTRAMUSCULAR; SUBCUTANEOUS ONCE
Status: COMPLETED | OUTPATIENT
Start: 2024-08-19 | End: 2024-08-19

## 2024-08-19 RX ORDER — ONDANSETRON 2 MG/ML
8 INJECTION INTRAMUSCULAR; INTRAVENOUS ONCE
Status: COMPLETED | OUTPATIENT
Start: 2024-08-19 | End: 2024-08-19

## 2024-08-19 RX ADMIN — PEMETREXED DISODIUM 635 MG: 500 INJECTION, POWDER, LYOPHILIZED, FOR SOLUTION INTRAVENOUS at 16:07

## 2024-08-19 RX ADMIN — SODIUM CHLORIDE 200 MG: 9 INJECTION, SOLUTION INTRAVENOUS at 15:33

## 2024-08-19 RX ADMIN — SODIUM CHLORIDE 250 ML: 9 INJECTION, SOLUTION INTRAVENOUS at 16:06

## 2024-08-19 RX ADMIN — ONDANSETRON 8 MG: 2 INJECTION INTRAMUSCULAR; INTRAVENOUS at 16:06

## 2024-08-19 RX ADMIN — Medication 5 ML: at 16:26

## 2024-08-19 RX ADMIN — CYANOCOBALAMIN 1000 MCG: 1000 INJECTION, SOLUTION INTRAMUSCULAR; SUBCUTANEOUS at 16:13

## 2024-08-19 ASSESSMENT — PAIN SCALES - GENERAL: PAINLEVEL: WORST PAIN (10)

## 2024-08-19 NOTE — PROGRESS NOTES
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  A very pleasant 73 year old  woman with advanced stage IV non-small cell lung cancer.  The primary seems to be coming from the right upper lobe.  She has mediastinal lymph nodes as well as couple of osseous metastases. T1 in the T4 vertebral body and 1 in the sacrum.  Molecular testing does show that this is K-tray G12C mutated tumor.  No other targeted mutation present except for PTEN which is not significant.  She has been started on palliative chemotherapy with carboplatin Alimta and pembrolizumab.  She has had good response after 2 cycles and continuation of response after 4 cycles.  Carboplatin discontinued after 4 cycles.  CT 7/24 continues to shows further decrease in the size of the lymph nodes in the mediastinum. She has been feeling more fatigue, nausea, and constipation as the treatments continues.   2.  She started developing left neck pain and right axillary pain 2 weeks ago. Exam was negative for lymphadenopathy or palpable lesions.   3. 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  Pathology is positive for non-small cell lung cancer.  Responding well to treatment.  Most of the bulky mediastinal lymphadenopathy has resolved.  4.  Increasing Cr. Previously normal. Today Cr. 1.35. Pt has not been hydrating well.   5.  Prior history of smoking.  Currently patient does not smoke.  6.  Recently moved from [place].  Limited social support.  7.  Continues to have chemotherapy side effects of fatigue, nausea, constipation, and dry mouth. She is not eating much due to poor appetite  and has lost 6 lbs in one month.   7.  Depressed TSH as well as low T4.  Might indicate hypopituitarism. T4 normal today.   8. Has allergy like symptoms of dripping nose and itchy eyes. Tried Claritin for one day without relief.  9.  Has constipation.  She is not drinking enough water.  Has not had a bowel movement in 5 days.  Has tried to senna yesterday without relief.  10.  Has dry mouth since starting chemotherapy    Plan and medical decision making      Presenting with moderate side effects of treatment.had some degree of food poisoning after eating cheese that had gone bad apparently.Reviewed notes from each unique source.  Reviewed each unique test.    Ordered tests.    Independent historian account obtained from her son.  Decision made to proceed with treatment.      Treatment with pembrolizumab and pemetrexed. Dose reduction today of Alimta to 80% due to reduced kidney function. Reassess kidney function for next cycle. Plan to continue this treatment until the next scan which should be a PET scan scheduled for 9/9.  If the PET scan is completely negative then after some time we will discontinue pemetrexed.  Then if 2 years of negative PET scan's are obtained then we will discontinue pembrolizumab as well.  Also recommend signing up on the clinical trial since she does have K-tray G12C mutation so it makes her eligible for sotorasib.  At this point she will decide on the trial but no intervention would be done.  Intervention would only be done if there is any negative PET scan down the road.  Continue with folic acid and B12 supplementation.  Discussed increasing physical activity to help with fatigue.   Encouraged taking oral antihistamine for a longer period to assess if it helps  Continue to monitor TSH and T4  Encouraged eating small frequent meals   Discussed increasing hydration and adding daily MiraLAX to help with constipation  Encouraged having hard candies or gum to help with dry mouth  The  longitudinal plan of care for the diagnosis(es)/condition(s) as documented were addressed during this visit. Due to the added complexity in care, I will continue to support Inez in the subsequent management and with ongoing continuity of care.     Clinical/pathological stage      Stage IV    History of present illness      Ms. Inez Rodriguez is a 73 year old  woman who came to the emergency room in the beginning of January 2024 with increasing shortness of breath.  Was also having some coughing which was not responding to treatment given by the urgent care.  In the emergency room she had a CT scan done to rule out any pulmonary embolism but was positive for multiple lymph nodes in the mediastinum as well as subcarinal area.  She was therefore admitted.  She was seen by pulmonology.  Underwent bronchoscopy and biopsy.  The bronchoscopy came back positive for adenocarcinoma involving subcarinal and mediastinal lymph nodes.    The patient does have a's history of smoking exposure but tells me that she has not smoked for several years.  No significant family history of cancer.  She recently moved from [place].    PET scan done on 29 January 2024 showed that she had extensive disease not only involving the right upper lobe but having extensive mediastinal hilar lymph node metastases and osseous metastases on the T4 vertebral body and right sacrum.    Has been started on chemoimmunotherapy with carboplatin etoposide and pemetrexed.  She has tolerated with moderate side effects.  Comes in today for follow-up.  She has finished 4 cycles.  CT scan after 2 cycles showed decrease in the size of the right upper lobe mass as well as mediastinal lymphadenopathy.  Increasing sclerosis was noted in the T4 vertebral body.    After 4 cycles carboplatin was discontinued.  We continued on pemetrexed and pembrolizumab.  And has been tolerating that quite well.  She has symptoms lasting about a week after chemotherapy.  The last 2 weeks  "usually she is feeling better.  CT showed improvement in lymphadenopathy.     She is here with her son for cycle 10 with new left sided neck pain and right-sided axillary pain.  This has been new for the past 2 weeks and is bothersome.  She does not have any breathing changes.  She continues to have allergy-like symptoms with nasal dripping and itchy eyes.  She took Claritin for 1 day and did not notice improvement and has not taken it since.  She continues to have poor oral intake due to decreased appetite and fatigue.  She has lost 6 pounds in the last month.  She eats sometimes only yogurt in a day.  She sleeps 15 to 20 hours each day.  She has not been very active.  She also reports constipation and has not had a bowel movement in 5 days.  She also has had decreased intake.  She has not been drinking much water.  She has tried to senna yesterday without results.      Review of system      Details noted in the history of present illness.  A detailed review of systems is otherwise negative.    ECOG performance status of 1.    Physical exam        /72 (BP Location: Left arm, Patient Position: Sitting, Cuff Size: Adult Regular)   Pulse 113   Temp 97.9  F (36.6  C) (Oral)   Resp 16   Ht 1.651 m (5' 5\")   Wt 54 kg (119 lb)   LMP  (LMP Unknown)   SpO2 100%   BMI 19.80 kg/m      GENERAL: No acute distress. Cooperative in conversation.  Here with son  HEENT:  Pupils are equal, round and reactive. Oral mucosa is clean and intact. No ulcerations or mucositis noted.  Dry mouth.  Torus palatinus noted.  No bleeding noted.  No neck lesions noted.  RESP:Chest symmetric lungs are clear bilaterally per auscultation. Regular respiratory rate.  Single wheeze that resolved with continued breathing.  CV: Normal S1 S2 Regular, rate and rhythm.     AXILLA: right axilla examined without palpable abnormalities. Some tenderness. No rashes or lesions.   LYMPH: No cervical, supraclavicular, or axillary lymphadenopathy.   ABD: " Nondistended, soft, nontender.   EXTREMITIES: No lower extremity edema.   NEURO: Non- focal. Alert and oriented x3.  Cranial nerves appear intact.  PSYCH: Within normal limits. No depression or anxiety.  SKIN: Warm dry intact.      Lab results Reviewed      Recent Results (from the past 168 hour(s))   Comprehensive metabolic panel   Result Value Ref Range    Sodium 139 135 - 145 mmol/L    Potassium 4.9 3.4 - 5.3 mmol/L    Carbon Dioxide (CO2) 24 22 - 29 mmol/L    Anion Gap 17 (H) 7 - 15 mmol/L    Urea Nitrogen 28.2 (H) 8.0 - 23.0 mg/dL    Creatinine 1.35 (H) 0.51 - 0.95 mg/dL    GFR Estimate 41 (L) >60 mL/min/1.73m2    Calcium 10.1 8.8 - 10.4 mg/dL    Chloride 98 98 - 107 mmol/L    Glucose 151 (H) 70 - 99 mg/dL    Alkaline Phosphatase 84 40 - 150 U/L    AST 30 0 - 45 U/L    ALT 36 0 - 50 U/L    Protein Total 7.3 6.4 - 8.3 g/dL    Albumin 4.1 3.5 - 5.2 g/dL    Bilirubin Total 0.3 <=1.2 mg/dL   TSH with free T4 reflex   Result Value Ref Range    TSH 0.02 (L) 0.30 - 4.20 uIU/mL   CBC with platelets and differential   Result Value Ref Range    WBC Count 10.4 4.0 - 11.0 10e3/uL    RBC Count 3.79 (L) 3.80 - 5.20 10e6/uL    Hemoglobin 12.3 11.7 - 15.7 g/dL    Hematocrit 37.5 35.0 - 47.0 %    MCV 99 78 - 100 fL    MCH 32.5 26.5 - 33.0 pg    MCHC 32.8 31.5 - 36.5 g/dL    RDW 14.8 10.0 - 15.0 %    Platelet Count 457 (H) 150 - 450 10e3/uL    % Neutrophils 87 %    % Lymphocytes 6 %    % Monocytes 7 %    % Eosinophils 0 %    % Basophils 0 %    % Immature Granulocytes 1 %    NRBCs per 100 WBC 0 <1 /100    Absolute Neutrophils 9.0 (H) 1.6 - 8.3 10e3/uL    Absolute Lymphocytes 0.6 (L) 0.8 - 5.3 10e3/uL    Absolute Monocytes 0.7 0.0 - 1.3 10e3/uL    Absolute Eosinophils 0.0 0.0 - 0.7 10e3/uL    Absolute Basophils 0.0 0.0 - 0.2 10e3/uL    Absolute Immature Granulocytes 0.1 <=0.4 10e3/uL    Absolute NRBCs 0.0 10e3/uL   T4 free   Result Value Ref Range    Free T4 1.08 0.90 - 1.70 ng/dL       Imaging results Reviewed        No results  found.    Chino Miller MD      This note has been dictated using voice recognition software. Any grammatical or context distortions are unintentional and inherent to the software

## 2024-08-19 NOTE — PROGRESS NOTES
"Oncology Rooming Note    August 19, 2024 2:10 PM   Inez Rodriguez is a 73 year old female who presents for:    Chief Complaint   Patient presents with    Oncology Clinic Visit     Return visit 3 weeks with lab and infusion. Malignant neoplasm of lower lobe of right lung     Initial Vitals: /72 (BP Location: Left arm, Patient Position: Sitting, Cuff Size: Adult Regular)   Pulse 113   Temp 97.9  F (36.6  C) (Oral)   Resp 16   Ht 1.651 m (5' 5\")   Wt 54 kg (119 lb)   LMP  (LMP Unknown)   SpO2 100%   BMI 19.80 kg/m   Estimated body mass index is 19.8 kg/m  as calculated from the following:    Height as of this encounter: 1.651 m (5' 5\").    Weight as of this encounter: 54 kg (119 lb). Body surface area is 1.57 meters squared.  Worst Pain (10) Comment: Data Unavailable   No LMP recorded (lmp unknown). Patient is postmenopausal.  Allergies reviewed: Yes  Medications reviewed: Yes    Medications: Medication refills not needed today.  Pharmacy name entered into [employer]:    [id number] - [place] [id number] [facility] [address] [facility] [address]    Frailty Screening:   Is the patient here for a new oncology consult visit in cancer care? 2. No      Clinical concerns: left hip pain 5/10, right axilla 10/10 at it's worst-sharp-extra fold of skin, mid back pain 5/10 Malic acid helped. Has not had a bowel movement x 5 days.   Dr Miller was notified.      Malgorzata Fields, [facility]            "

## 2024-08-19 NOTE — LETTER
"8/19/2024      nIez Rodriguez  [address]  [place] [address]      Dear Colleague,    Thank you for referring your patient, Inez Rodriguez, to the [facility]. Please see a copy of my visit note below.    Oncology Rooming Note    August 19, 2024 2:10 PM   Inez Rodriguez is a 73 year old female who presents for:    Chief Complaint   Patient presents with     Oncology Clinic Visit     Return visit 3 weeks with lab and infusion. Malignant neoplasm of lower lobe of right lung     Initial Vitals: /72 (BP Location: Left arm, Patient Position: Sitting, Cuff Size: Adult Regular)   Pulse 113   Temp 97.9  F (36.6  C) (Oral)   Resp 16   Ht 1.651 m (5' 5\")   Wt 54 kg (119 lb)   LMP  (LMP Unknown)   SpO2 100%   BMI 19.80 kg/m   Estimated body mass index is 19.8 kg/m  as calculated from the following:    Height as of this encounter: 1.651 m (5' 5\").    Weight as of this encounter: 54 kg (119 lb). Body surface area is 1.57 meters squared.  Worst Pain (10) Comment: Data Unavailable   No LMP recorded (lmp unknown). Patient is postmenopausal.  Allergies reviewed: Yes  Medications reviewed: Yes    Medications: Medication refills not needed today.  Pharmacy name entered into [facility]:    [facility][id number] - [place], [place] - [address] AT [facility] [address]    Frailty Screening:   Is the patient here for a new oncology consult visit in cancer care? 2. No      Clinical concerns: left hip pain 5/10, right axilla 10/10 at it's worst-sharp-extra fold of skin, mid back pain 5/10 Malic acid helped. Has not had a bowel movement x 5 days.   Dr Miller was notified.      Malgorzata Fields, [facility] Hematology and Oncology progress note    Patient: Inez Rodriguez  MRN: [id number]  Date of Service: Aug 19, 2024           Reason for consultation      Problem List Items Addressed " This Visit          Respiratory    Malignant neoplasm of lower lobe of right lung (H) - Primary    Lung cancer metastatic to bone (H)           Assessment / number of problems addressed      1.  A very pleasant 73 year old  woman with advanced stage IV non-small cell lung cancer.  The primary seems to be coming from the right upper lobe.  She has mediastinal lymph nodes as well as couple of osseous metastases. T1 in the T4 vertebral body and 1 in the sacrum.  Molecular testing does show that this is K-tray G12C mutated tumor.  No other targeted mutation present except for PTEN which is not significant.  She has been started on palliative chemotherapy with carboplatin Alimta and pembrolizumab.  She has had good response after 2 cycles and continuation of response after 4 cycles.  Carboplatin discontinued after 4 cycles.  CT 7/24 continues to shows further decrease in the size of the lymph nodes in the mediastinum. She has been feeling more fatigue, nausea, and constipation as the treatments continues.   2.  She started developing left neck pain and right axillary pain 2 weeks ago. Exam was negative for lymphadenopathy or palpable lesions.   3. 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  Pathology is positive for non-small cell lung cancer.  Responding well to treatment.  Most of the bulky mediastinal lymphadenopathy has resolved.  4.  Increasing Cr. Previously normal. Today Cr. 1.35. Pt has not been hydrating well.   5.  Prior history of smoking.  Currently patient does not smoke.  6.  Recently moved from [place].  Limited social support.  7.  Continues to have chemotherapy side effects of fatigue, nausea, constipation, and dry mouth. She is not eating much due to poor appetite and has lost 6 lbs in one month.   7.  Depressed TSH as well as low T4.  Might indicate hypopituitarism. T4 normal today.   8. Has allergy like symptoms of dripping nose and itchy eyes. Tried Claritin  for one day without relief.  9.  Has constipation.  She is not drinking enough water.  Has not had a bowel movement in 5 days.  Has tried to senna yesterday without relief.  10.  Has dry mouth since starting chemotherapy    Plan and medical decision making      Presenting with moderate side effects of treatment.had some degree of food poisoning after eating cheese that had gone bad apparently.Reviewed notes from each unique source.  Reviewed each unique test.    Ordered tests.    Independent historian account obtained from her son.  Decision made to proceed with treatment.      Treatment with pembrolizumab and pemetrexed. Dose reduction today of Alimta to 80% due to reduced kidney function. Reassess kidney function for next cycle. Plan to continue this treatment until the next scan which should be a PET scan scheduled for 9/9.  If the PET scan is completely negative then after some time we will discontinue pemetrexed.  Then if 2 years of negative PET scan's are obtained then we will discontinue pembrolizumab as well.  Also recommend signing up on the clinical trial since she does have K-tray G12C mutation so it makes her eligible for sotorasib.  At this point she will decide on the trial but no intervention would be done.  Intervention would only be done if there is any negative PET scan down the road.  Continue with folic acid and B12 supplementation.  Discussed increasing physical activity to help with fatigue.   Encouraged taking oral antihistamine for a longer period to assess if it helps  Continue to monitor TSH and T4  Encouraged eating small frequent meals   Discussed increasing hydration and adding daily MiraLAX to help with constipation  Encouraged having hard candies or gum to help with dry mouth  The longitudinal plan of care for the diagnosis(es)/condition(s) as documented were addressed during this visit. Due to the added complexity in care, I will continue to support Inez in the subsequent management  and with ongoing continuity of care.     Clinical/pathological stage      Stage IV    History of present illness      Ms. Inez Rodriguez is a 73 year old  woman who came to the emergency room in the beginning of January 2024 with increasing shortness of breath.  Was also having some coughing which was not responding to treatment given by the urgent care.  In the emergency room she had a CT scan done to rule out any pulmonary embolism but was positive for multiple lymph nodes in the mediastinum as well as subcarinal area.  She was therefore admitted.  She was seen by pulmonology.  Underwent bronchoscopy and biopsy.  The bronchoscopy came back positive for adenocarcinoma involving subcarinal and mediastinal lymph nodes.    The patient does have a's history of smoking exposure but tells me that she has not smoked for several years.  No significant family history of cancer.  She recently moved from [place].    PET scan done on 29 January 2024 showed that she had extensive disease not only involving the right upper lobe but having extensive mediastinal hilar lymph node metastases and osseous metastases on the T4 vertebral body and right sacrum.    Has been started on chemoimmunotherapy with carboplatin etoposide and pemetrexed.  She has tolerated with moderate side effects.  Comes in today for follow-up.  She has finished 4 cycles.  CT scan after 2 cycles showed decrease in the size of the right upper lobe mass as well as mediastinal lymphadenopathy.  Increasing sclerosis was noted in the T4 vertebral body.    After 4 cycles carboplatin was discontinued.  We continued on pemetrexed and pembrolizumab.  And has been tolerating that quite well.  She has symptoms lasting about a week after chemotherapy.  The last 2 weeks usually she is feeling better.  CT showed improvement in lymphadenopathy.     She is here with her son for cycle 10 with new left sided neck pain and right-sided axillary pain.  This has been new for the past  "2 weeks and is bothersome.  She does not have any breathing changes.  She continues to have allergy-like symptoms with nasal dripping and itchy eyes.  She took Claritin for 1 day and did not notice improvement and has not taken it since.  She continues to have poor oral intake due to decreased appetite and fatigue.  She has lost 6 pounds in the last month.  She eats sometimes only yogurt in a day.  She sleeps 15 to 20 hours each day.  She has not been very active.  She also reports constipation and has not had a bowel movement in 5 days.  She also has had decreased intake.  She has not been drinking much water.  She has tried to senna yesterday without results.      Review of system      Details noted in the history of present illness.  A detailed review of systems is otherwise negative.    ECOG performance status of 1.    Physical exam        /72 (BP Location: Left arm, Patient Position: Sitting, Cuff Size: Adult Regular)   Pulse 113   Temp 97.9  F (36.6  C) (Oral)   Resp 16   Ht 1.651 m (5' 5\")   Wt 54 kg (119 lb)   LMP  (LMP Unknown)   SpO2 100%   BMI 19.80 kg/m      GENERAL: No acute distress. Cooperative in conversation.  Here with son  HEENT:  Pupils are equal, round and reactive. Oral mucosa is clean and intact. No ulcerations or mucositis noted.  Dry mouth.  Torus palatinus noted.  No bleeding noted.  No neck lesions noted.  RESP:Chest symmetric lungs are clear bilaterally per auscultation. Regular respiratory rate.  Single wheeze that resolved with continued breathing.  CV: Normal S1 S2 Regular, rate and rhythm.     AXILLA: right axilla examined without palpable abnormalities. Some tenderness. No rashes or lesions.   LYMPH: No cervical, supraclavicular, or axillary lymphadenopathy.   ABD: Nondistended, soft, nontender.   EXTREMITIES: No lower extremity edema.   NEURO: Non- focal. Alert and oriented x3.  Cranial nerves appear intact.  PSYCH: Within normal limits. No depression or " anxiety.  SKIN: Warm dry intact.      Lab results Reviewed      Recent Results (from the past 168 hour(s))   Comprehensive metabolic panel   Result Value Ref Range    Sodium 139 135 - 145 mmol/L    Potassium 4.9 3.4 - 5.3 mmol/L    Carbon Dioxide (CO2) 24 22 - 29 mmol/L    Anion Gap 17 (H) 7 - 15 mmol/L    Urea Nitrogen 28.2 (H) 8.0 - 23.0 mg/dL    Creatinine 1.35 (H) 0.51 - 0.95 mg/dL    GFR Estimate 41 (L) >60 mL/min/1.73m2    Calcium 10.1 8.8 - 10.4 mg/dL    Chloride 98 98 - 107 mmol/L    Glucose 151 (H) 70 - 99 mg/dL    Alkaline Phosphatase 84 40 - 150 U/L    AST 30 0 - 45 U/L    ALT 36 0 - 50 U/L    Protein Total 7.3 6.4 - 8.3 g/dL    Albumin 4.1 3.5 - 5.2 g/dL    Bilirubin Total 0.3 <=1.2 mg/dL   TSH with free T4 reflex   Result Value Ref Range    TSH 0.02 (L) 0.30 - 4.20 uIU/mL   CBC with platelets and differential   Result Value Ref Range    WBC Count 10.4 4.0 - 11.0 10e3/uL    RBC Count 3.79 (L) 3.80 - 5.20 10e6/uL    Hemoglobin 12.3 11.7 - 15.7 g/dL    Hematocrit 37.5 35.0 - 47.0 %    MCV 99 78 - 100 fL    MCH 32.5 26.5 - 33.0 pg    MCHC 32.8 31.5 - 36.5 g/dL    RDW 14.8 10.0 - 15.0 %    Platelet Count 457 (H) 150 - 450 10e3/uL    % Neutrophils 87 %    % Lymphocytes 6 %    % Monocytes 7 %    % Eosinophils 0 %    % Basophils 0 %    % Immature Granulocytes 1 %    NRBCs per 100 WBC 0 <1 /100    Absolute Neutrophils 9.0 (H) 1.6 - 8.3 10e3/uL    Absolute Lymphocytes 0.6 (L) 0.8 - 5.3 10e3/uL    Absolute Monocytes 0.7 0.0 - 1.3 10e3/uL    Absolute Eosinophils 0.0 0.0 - 0.7 10e3/uL    Absolute Basophils 0.0 0.0 - 0.2 10e3/uL    Absolute Immature Granulocytes 0.1 <=0.4 10e3/uL    Absolute NRBCs 0.0 10e3/uL   T4 free   Result Value Ref Range    Free T4 1.08 0.90 - 1.70 ng/dL       Imaging results Reviewed        No results found.    Chino Miller MD      This note has been dictated using voice recognition software. Any grammatical or context distortions are unintentional and inherent to the software       Again,  thank you for allowing me to participate in the care of your patient.        Sincerely,        Chino Miller MD

## 2024-08-19 NOTE — PROGRESS NOTES
Pt here for treatment after seeing Dr Miller. Labs reviewed with pharmacy and Dr Miller and dose adjustment made. No problem with treatment as directed. Pt received B12 injection R delt without incident. Port flushed and deaccessed upon completion and pt [email or web address] ambulatory to lobby to meet her son.

## 2024-08-20 ENCOUNTER — PATIENT OUTREACH (OUTPATIENT)
Dept: GERIATRIC MEDICINE | Facility: CLINIC | Age: 74
End: 2024-08-20
Payer: COMMERCIAL

## 2024-08-20 NOTE — PROGRESS NOTES
[facility] Care Coordination Contact    Completed following tasks: Submitted referrals/auths for [facility] with [facility] effective 8/1/24.  and Updated services in Database.     Waylon James  Care Management Specialist  [facility]  [phone]

## 2024-08-24 ENCOUNTER — APPOINTMENT (OUTPATIENT)
Dept: CT IMAGING | Facility: HOSPITAL | Age: 74
End: 2024-08-24
Attending: EMERGENCY MEDICINE
Payer: COMMERCIAL

## 2024-08-24 ENCOUNTER — NURSE TRIAGE (OUTPATIENT)
Dept: NURSING | Facility: CLINIC | Age: 74
End: 2024-08-24
Payer: COMMERCIAL

## 2024-08-24 ENCOUNTER — HOSPITAL ENCOUNTER (EMERGENCY)
Facility: HOSPITAL | Age: 74
Discharge: HOME | End: 2024-08-24
Attending: EMERGENCY MEDICINE | Admitting: EMERGENCY MEDICINE
Payer: COMMERCIAL

## 2024-08-24 VITALS
TEMPERATURE: 97.8 F | WEIGHT: 118 LBS | BODY MASS INDEX: 19.66 KG/M2 | HEIGHT: 65 IN | SYSTOLIC BLOOD PRESSURE: 116 MMHG | RESPIRATION RATE: 24 BRPM | OXYGEN SATURATION: 98 % | HEART RATE: 105 BPM | DIASTOLIC BLOOD PRESSURE: 59 MMHG

## 2024-08-24 DIAGNOSIS — K57.92 ACUTE DIVERTICULITIS: ICD-10-CM

## 2024-08-24 LAB
ALBUMIN SERPL BCG-MCNC: 3.8 G/DL (ref 3.5–5.2)
ALBUMIN UR-MCNC: 30 MG/DL
ALP SERPL-CCNC: 72 U/L (ref 40–150)
ALT SERPL W P-5'-P-CCNC: 48 U/L (ref 0–50)
ANION GAP SERPL CALCULATED.3IONS-SCNC: 15 MMOL/L (ref 7–15)
APPEARANCE UR: CLEAR
AST SERPL W P-5'-P-CCNC: 50 U/L (ref 0–45)
BASOPHILS # BLD AUTO: 0 10E3/UL (ref 0–0.2)
BASOPHILS NFR BLD AUTO: 0 %
BILIRUB DIRECT SERPL-MCNC: <0.2 MG/DL (ref 0–0.3)
BILIRUB SERPL-MCNC: 0.4 MG/DL
BILIRUB UR QL STRIP: NEGATIVE
BUN SERPL-MCNC: 17 MG/DL (ref 8–23)
CALCIUM SERPL-MCNC: 9.3 MG/DL (ref 8.8–10.4)
CHLORIDE SERPL-SCNC: 99 MMOL/L (ref 98–107)
COLOR UR AUTO: YELLOW
CREAT SERPL-MCNC: 0.98 MG/DL (ref 0.51–0.95)
EGFRCR SERPLBLD CKD-EPI 2021: 61 ML/MIN/1.73M2
EOSINOPHIL # BLD AUTO: 0.1 10E3/UL (ref 0–0.7)
EOSINOPHIL NFR BLD AUTO: 1 %
ERYTHROCYTE [DISTWIDTH] IN BLOOD BY AUTOMATED COUNT: 15 % (ref 10–15)
GLUCOSE SERPL-MCNC: 99 MG/DL (ref 70–99)
GLUCOSE UR STRIP-MCNC: NEGATIVE MG/DL
HCO3 SERPL-SCNC: 26 MMOL/L (ref 22–29)
HCT VFR BLD AUTO: 37.9 % (ref 35–47)
HGB BLD-MCNC: 12.4 G/DL (ref 11.7–15.7)
HGB UR QL STRIP: NEGATIVE
HYALINE CASTS: 14 /LPF
IMM GRANULOCYTES # BLD: 0 10E3/UL
IMM GRANULOCYTES NFR BLD: 0 %
KETONES UR STRIP-MCNC: ABNORMAL MG/DL
LACTATE SERPL-SCNC: 0.8 MMOL/L (ref 0.7–2)
LEUKOCYTE ESTERASE UR QL STRIP: ABNORMAL
LIPASE SERPL-CCNC: 99 U/L (ref 13–60)
LYMPHOCYTES # BLD AUTO: 1.9 10E3/UL (ref 0.8–5.3)
LYMPHOCYTES NFR BLD AUTO: 25 %
MAGNESIUM SERPL-MCNC: 2 MG/DL (ref 1.7–2.3)
MCH RBC QN AUTO: 32.2 PG (ref 26.5–33)
MCHC RBC AUTO-ENTMCNC: 32.7 G/DL (ref 31.5–36.5)
MCV RBC AUTO: 98 FL (ref 78–100)
MONOCYTES # BLD AUTO: 0.1 10E3/UL (ref 0–1.3)
MONOCYTES NFR BLD AUTO: 1 %
MUCOUS THREADS #/AREA URNS LPF: PRESENT /LPF
NEUTROPHILS # BLD AUTO: 5.6 10E3/UL (ref 1.6–8.3)
NEUTROPHILS NFR BLD AUTO: 73 %
NITRATE UR QL: NEGATIVE
NRBC # BLD AUTO: 0 10E3/UL
NRBC BLD AUTO-RTO: 0 /100
PH UR STRIP: 6 [PH] (ref 5–7)
PLATELET # BLD AUTO: 347 10E3/UL (ref 150–450)
POTASSIUM SERPL-SCNC: 3.7 MMOL/L (ref 3.4–5.3)
PROT SERPL-MCNC: 6.6 G/DL (ref 6.4–8.3)
RBC # BLD AUTO: 3.85 10E6/UL (ref 3.8–5.2)
RBC URINE: 2 /HPF
SODIUM SERPL-SCNC: 140 MMOL/L (ref 135–145)
SP GR UR STRIP: 1.01 (ref 1–1.03)
SQUAMOUS EPITHELIAL: <1 /HPF
TROPONIN T SERPL HS-MCNC: 12 NG/L
TSH SERPL DL<=0.005 MIU/L-ACNC: 0.79 UIU/ML (ref 0.3–4.2)
UROBILINOGEN UR STRIP-MCNC: <2 MG/DL
WBC # BLD AUTO: 7.6 10E3/UL (ref 4–11)
WBC URINE: 4 /HPF

## 2024-08-24 PROCEDURE — 85025 COMPLETE CBC W/AUTO DIFF WBC: CPT | Performed by: EMERGENCY MEDICINE

## 2024-08-24 PROCEDURE — 250N000011 HC RX IP 250 OP 636: Performed by: EMERGENCY MEDICINE

## 2024-08-24 PROCEDURE — 74177 CT ABD & PELVIS W/CONTRAST: CPT

## 2024-08-24 PROCEDURE — 83690 ASSAY OF LIPASE: CPT | Performed by: EMERGENCY MEDICINE

## 2024-08-24 PROCEDURE — 84443 ASSAY THYROID STIM HORMONE: CPT | Performed by: EMERGENCY MEDICINE

## 2024-08-24 PROCEDURE — 36415 COLL VENOUS BLD VENIPUNCTURE: CPT | Performed by: EMERGENCY MEDICINE

## 2024-08-24 PROCEDURE — 83605 ASSAY OF LACTIC ACID: CPT | Performed by: EMERGENCY MEDICINE

## 2024-08-24 PROCEDURE — 99285 EMERGENCY DEPT VISIT HI MDM: CPT | Mod: 25

## 2024-08-24 PROCEDURE — 81001 URINALYSIS AUTO W/SCOPE: CPT | Performed by: EMERGENCY MEDICINE

## 2024-08-24 PROCEDURE — 80053 COMPREHEN METABOLIC PANEL: CPT | Performed by: EMERGENCY MEDICINE

## 2024-08-24 PROCEDURE — 96360 HYDRATION IV INFUSION INIT: CPT | Mod: 59

## 2024-08-24 PROCEDURE — 93005 ELECTROCARDIOGRAM TRACING: CPT | Performed by: EMERGENCY MEDICINE

## 2024-08-24 PROCEDURE — 83735 ASSAY OF MAGNESIUM: CPT | Performed by: EMERGENCY MEDICINE

## 2024-08-24 PROCEDURE — 250N000013 HC RX MED GY IP 250 OP 250 PS 637: Performed by: EMERGENCY MEDICINE

## 2024-08-24 PROCEDURE — 70450 CT HEAD/BRAIN W/O DYE: CPT

## 2024-08-24 PROCEDURE — 258N000003 HC RX IP 258 OP 636: Performed by: EMERGENCY MEDICINE

## 2024-08-24 PROCEDURE — 84484 ASSAY OF TROPONIN QUANT: CPT | Performed by: EMERGENCY MEDICINE

## 2024-08-24 RX ORDER — HEPARIN SODIUM,PORCINE 10 UNIT/ML
5-10 VIAL (ML) INTRAVENOUS EVERY 24 HOURS
Status: DISCONTINUED | OUTPATIENT
Start: 2024-08-24 | End: 2024-08-24 | Stop reason: HOSPADM

## 2024-08-24 RX ORDER — HEPARIN SODIUM,PORCINE 10 UNIT/ML
5-10 VIAL (ML) INTRAVENOUS
Status: DISCONTINUED | OUTPATIENT
Start: 2024-08-24 | End: 2024-08-24 | Stop reason: HOSPADM

## 2024-08-24 RX ORDER — HEPARIN SODIUM (PORCINE) LOCK FLUSH IV SOLN 100 UNIT/ML 100 UNIT/ML
5-10 SOLUTION INTRAVENOUS
Status: DISCONTINUED | OUTPATIENT
Start: 2024-08-24 | End: 2024-08-24 | Stop reason: HOSPADM

## 2024-08-24 RX ORDER — IOPAMIDOL 755 MG/ML
58 INJECTION, SOLUTION INTRAVASCULAR ONCE
Status: COMPLETED | OUTPATIENT
Start: 2024-08-24 | End: 2024-08-24

## 2024-08-24 RX ADMIN — Medication 5 ML: at 17:18

## 2024-08-24 RX ADMIN — AMOXICILLIN AND CLAVULANATE POTASSIUM 1 TABLET: 875; 125 TABLET, FILM COATED ORAL at 16:54

## 2024-08-24 RX ADMIN — IOPAMIDOL 58 ML: 755 INJECTION, SOLUTION INTRAVENOUS at 15:45

## 2024-08-24 RX ADMIN — SODIUM CHLORIDE 500 ML: 9 INJECTION, SOLUTION INTRAVENOUS at 14:37

## 2024-08-24 ASSESSMENT — COLUMBIA-SUICIDE SEVERITY RATING SCALE - C-SSRS
1. IN THE PAST MONTH, HAVE YOU WISHED YOU WERE DEAD OR WISHED YOU COULD GO TO SLEEP AND NOT WAKE UP?: NO
6. HAVE YOU EVER DONE ANYTHING, STARTED TO DO ANYTHING, OR PREPARED TO DO ANYTHING TO END YOUR LIFE?: NO
2. HAVE YOU ACTUALLY HAD ANY THOUGHTS OF KILLING YOURSELF IN THE PAST MONTH?: NO

## 2024-08-24 ASSESSMENT — ACTIVITIES OF DAILY LIVING (ADL)
ADLS_ACUITY_SCORE: 36
ADLS_ACUITY_SCORE: 38

## 2024-08-24 NOTE — DISCHARGE INSTRUCTIONS
Please follow-up with your Primary Care Provider within the next 3 days for a recheck; call to arrange appointment.    Return to the ER for worsening symptoms, worsening abdominal pain, persistent nausea / vomiting, bloody diarrhea, sudden onset or severe headache, focal neurologic deficit (for example, facial droop or right arm weakness), fever or other concerns.     Complete the full course of antibiotics, even if you are feeling better.     A clear, liquid diet is recommended for the next couple of days. Take frequent small sips of electrolyte fluids, such as Pedialyte or Propel water.

## 2024-08-24 NOTE — ED PROVIDER NOTES
Emergency Department Encounter     Evaluation Date & Time:   2024  1:40 PM    CHIEF COMPLAINT:  Dizziness and Fatigue      Triage Note:Patient presents to ER in wheelchair with son.  Endorses feeling intermittent dizzy and fatigued over the last 10 days.  Also experiences nausea, diarrhea over last 3 days with visual hallucinations.  Along with lower abdominal pain that feels cramping pain.  Took Midol that was helpful.      Had chemo on Monday.  Being treated for lung cancer.       Impression and Plan       FINAL IMPRESSION:    ICD-10-CM    1. Acute diverticulitis  K57.92           ED COURSE & MEDICAL DECISION MAKIN:10 PM Introduced myself to the patient, obtained history of present illness, and performed initial physical exam at this time.    5:04 PM Discussed discharge with the patient and her son. They are agreeable with this plan.      73 year old female, history of lung cancer metastatic to bone (on chemotherapy), post-obstructive pneumonia and HTN, who presents for evaluation of multiple concerns.     She reports intermittent lightheadedness, extreme fatigue, visual hallucinations and poor appetite with decreased po intake for the past 10 days. No associated headache or vertigo.     She also reports 3 episodes of non-bloody diarrhea beginning today with associated abdominal cramping.     On exam, patient is awake, alert with non-focal neuro exam. She is non-toxic in appearance. Abdomen is soft with moderate tenderness to palpation across the lower abdomen with mild tenderness to palpation diffusely elsewhere; no peritoneal signs.    EKG performed and demonstrated sinus tachycardia with non-specific T wave inversions and no ST-T wave elevation consistent with ACS. Troponin WNL (12).     Labs otherwise remarkable for no leukocytosis, anemia, significant electrolyte derangements or renal impairment.  No laboratory evidence of hepatitis, biliary obstruction or pancreatitis (lipase mildly elevated to  99).  Lactate WNL (0.8).  Screening TSH normal.  UA without suggestion of infection.    Given hallucinations, head CT performed and negative for acute intracranial process.    CT abdomen / pelvis also performed and demonstrated acute diverticulitis of the distal descending and sigmoid colon.     I offered admission, but patient prefers to discharge to home.     Given immunosuppression on chemotherapy, will treat with antibiotics. Patient given a prescription for Augmentin; given the first dose in the ED. A clear liquid diet was advised for the next 48 hours.     She was advised to follow-up with her PCP for a close recheck. Return precautions provided. Patient stable throughout ED course.       At the conclusion of the encounter I discussed the results of all the tests and the disposition. The questions were answered. The patient and family acknowledged understanding and were agreeable with the care plan.      Medical Decision Making    History:  Obtained supplemental history:Supplemental history obtained?: Documented in chart and Family Member/Significant Other  Reviewed external records: External records reviewed?: No    External consultation:  Did you consider but not order tests?: Work up considered but not performed and documented in chart, if applicable  Did you interpret images independently?: Independent interpretation of ECG and images noted in documentation, when applicable.  Consultation discussion with other provider:Did you involve another provider (consultant, MH, pharmacy, etc.)?: No    Complicating factors:  Care impacted by chronic illness:Cancer/Chemotherapy and Hypertension  Care significantly affected by social determinants of health:N/A    Disposition considerations: Discharge. I prescribed additional prescription strength medication(s) as charted. I considered admission, but ultimately discharged patient given reassuring evaluation and shared decision making conversation.    MEDICATIONS GIVEN  IN THE EMERGENCY DEPARTMENT:  Medications   sodium chloride (PF) 0.9% PF flush 10-20 mL (has no administration in time range)   sodium chloride (PF) 0.9% PF flush 10-20 mL (has no administration in time range)   sodium chloride (PF) 0.9% PF flush 10-20 mL ( Intracatheter Not Given 8/24/24 1657)   heparin lock flush 10 unit/mL injection 5-10 mL (has no administration in time range)   heparin lock flush 10 unit/mL injection 5-10 mL (has no administration in time range)   heparin lock flush 100 unit/mL injection 5-10 mL (has no administration in time range)   sodium chloride 0.9% BOLUS 500 mL (0 mLs Intravenous Stopped 8/24/24 1552)   iopamidol (ISOVUE-370) solution 58 mL (58 mLs Intravenous $Given 8/24/24 1545)   amoxicillin-clavulanate (AUGMENTIN) 875-125 MG per tablet 1 tablet (1 tablet Oral $Given 8/24/24 1654)       NEW PRESCRIPTIONS STARTED AT TODAY'S ED VISIT:  New Prescriptions    AMOXICILLIN-CLAVULANATE (AUGMENTIN) 875-125 MG TABLET    Take 1 tablet by mouth 2 times daily for 10 days.       HPI     HPI     Inez Rodriguez is a 73 year old female, history of lung cancer metastatic to bone, current chemotherapy treatment, post-obstructive pneumonia and HTN, who presents to this ED for evaluation of multiple concerns.     For the past ten days, the patient has been experiencing intermittent lightheadedness that is worse with movement, chills, extreme fatigue, visual hallucinations and appetite loss. Her son reports that she has been sleeping most days, awake for maybe only 5 minutes during the entire day, without much oral intake.     The patient also endorses three episodes of non-bloody diarrhea beginning today with associated abdominal cramping. This cramping is comparable to pain she has had with menstruation in the past. Midol has been helping with the pain. Patient notes she took MiraLAX yesterday and the day prior, per Oncology direction.     She endorses sneezing and a runny nose, but denies cough and  fevers.     She otherwise denies headache, chest pain, shortness of breath, vertigo or other concerns.    REVIEW OF SYSTEMS:  All other systems reviewed and are negative.      Medical History     Past Medical History:   Diagnosis Date    Hypertension     SOB (shortness of breath)        Past Surgical History:   Procedure Laterality Date    BRONCHOSCOPY RIGID OR FLEXIBLE W/TRANSENDOSCOPIC ENDOBRONCHIAL ULTRASOUND GUIDED N/A 1/8/2024    Procedure: BRONCHOSCOPY, WITH ENDOBRONCHIAL ULTRASOUND WITH FINE NEEDLE ASPIRATION OF LYMPH NODE;  Surgeon: Angelia Mustafa MD;  Location: [facility] OR     CHEST PORT PLACEMENT > 5 YRS OF AGE  2/12/2024    TONSILLECTOMY      at 8 years of age       No family history on file.    Social History     Tobacco Use    Smoking status: Never    Smokeless tobacco: Never   Substance Use Topics    Alcohol use: Never       amoxicillin-clavulanate (AUGMENTIN) 875-125 MG tablet  albuterol (PROAIR HFA/PROVENTIL HFA/VENTOLIN HFA) 108 (90 Base) MCG/ACT inhaler  amLODIPine (NORVASC) 5 MG tablet  BETA GLUCAN PO  dexAMETHasone (DECADRON) 4 MG tablet  fluconazole (DIFLUCAN) 100 MG tablet  folic acid (FOLVITE) 1 MG tablet  Inositol Niacinate (NIACIN FLUSH FREE PO)  lidocaine-prilocaine (EMLA) 2.5-2.5 % external cream  Mag Aspart-Potassium Aspart (POTASSIUM & MAGNESIUM ASPARTAT PO)  MALIC ACID PO  Methylsulfonylmethane (MSM) 1000 MG TABS  multivitamin w/minerals (THERA-VIT-M) tablet  Vitamin D3 (CHOLECALCIFEROL) 125 MCG (5000 UT) tablet        Physical Exam     First Vitals:  Patient Vitals for the past 24 hrs:   BP Temp Temp src Pulse Resp SpO2 Height Weight   08/24/24 1645 119/63 -- -- 97 12 97 % -- --   08/24/24 1630 127/62 -- -- 95 16 96 % -- --   08/24/24 1615 133/61 -- -- 95 16 95 % -- --   08/24/24 1556 (!) 161/71 -- -- 100 22 96 % -- --   08/24/24 1500 128/60 -- -- 99 23 95 % -- --   08/24/24 1400 (!) 140/70 -- -- 106 -- 97 % -- --   08/24/24 1333 107/73 97.9  F (36.6  C) Oral 119 18 97 % 1.651  "m (5' 5\") 53.5 kg (118 lb)       PHYSICAL EXAM:   Physical Exam    GENERAL: Awake, alert.  In no acute distress.   HEENT: Normocephalic, atraumatic. Pupils equal, round and reactive. Conjunctiva normal. EOMI without nystagmus. Tongue is midline.   NECK: No stridor.  PULMONARY: Symmetrical breath sounds without distress.  Lungs clear to auscultation bilaterally without wheezes, rhonchi or rales.  CARDIO: Tachycardic rate with regular rhythm.  No significant murmur, rub or gallop.  Radial pulses strong and symmetrical.  ABDOMINAL: Abdomen soft, non-distended with moderate tenderness to palpation across the lower abdomen with mild tenderness to palpation diffusely elsewhere; no rebound tenderness or guarding. No CVAT, BL.  EXTREMITIES: No lower extremity swelling or edema.      NEURO: Alert and oriented to person, place and time.  Cranial nerves III-XII intact.  Strength 5/5 BL upper and lower extremities with sensation to light touch grossly intact.   PSYCH: Normal mood and affect.      Results     LAB:  All pertinent labs reviewed and interpreted  Labs Ordered and Resulted from Time of ED Arrival to Time of ED Departure   BASIC METABOLIC PANEL - Abnormal       Result Value    Sodium 140      Potassium 3.7      Chloride 99      Carbon Dioxide (CO2) 26      Anion Gap 15      Urea Nitrogen 17.0      Creatinine 0.98 (*)     GFR Estimate 61      Calcium 9.3      Glucose 99     HEPATIC FUNCTION PANEL - Abnormal    Protein Total 6.6      Albumin 3.8      Bilirubin Total 0.4      Alkaline Phosphatase 72      AST 50 (*)     ALT 48      Bilirubin Direct <0.20     LIPASE - Abnormal    Lipase 99 (*)    ROUTINE UA WITH MICROSCOPIC REFLEX TO CULTURE - Abnormal    Color Urine Yellow      Appearance Urine Clear      Glucose Urine Negative      Bilirubin Urine Negative      Ketones Urine Trace (*)     Specific Gravity Urine 1.015      Blood Urine Negative      pH Urine 6.0      Protein Albumin Urine 30 (*)     Urobilinogen Urine <2.0 "      Nitrite Urine Negative      Leukocyte Esterase Urine 75 Randy/uL (*)     Mucus Urine Present (*)     RBC Urine 2      WBC Urine 4      Squamous Epithelials Urine <1      Hyaline Casts Urine 14 (*)    MAGNESIUM - Normal    Magnesium 2.0     TSH WITH FREE T4 REFLEX - Normal    TSH 0.79     LACTIC ACID WHOLE BLOOD - Normal    Lactic Acid 0.8     TROPONIN T, HIGH SENSITIVITY - Normal    Troponin T, High Sensitivity 12     CBC WITH PLATELETS AND DIFFERENTIAL    WBC Count 7.6      RBC Count 3.85      Hemoglobin 12.4      Hematocrit 37.9      MCV 98      MCH 32.2      MCHC 32.7      RDW 15.0      Platelet Count 347      % Neutrophils 73      % Lymphocytes 25      % Monocytes 1      % Eosinophils 1      % Basophils 0      % Immature Granulocytes 0      NRBCs per 100 WBC 0      Absolute Neutrophils 5.6      Absolute Lymphocytes 1.9      Absolute Monocytes 0.1      Absolute Eosinophils 0.1      Absolute Basophils 0.0      Absolute Immature Granulocytes 0.0      Absolute NRBCs 0.0         RADIOLOGY:  CT Abdomen Pelvis w Contrast   Final Result   IMPRESSION:    1.  Acute diverticulitis of the distal descending and sigmoid colon.      Head CT w/o contrast   Final Result   IMPRESSION:   1.  No CT evidence for acute intracranial process.   2.  Brain atrophy and presumed chronic microvascular ischemic changes as above.          EC2024, 14:20; sinus tachycardia with rate of 109 bpm; normal intervals; normal conduction; non-specific T wave inversions with no ST-T wave elevation consistent with ACS or pericarditis; compared to previous EKG dated 2024, the T wave changes are new    EKG independently reviewed and interpreted by Oumou Larsen MD        I, Bridget Almanza, am serving as a scribe to document services personally performed by Oumou Larsen MD based on my observation and the provider's statements to me. I, Oumou Larsen MD attest that Bridget Almanza is acting in a scribe capacity, has observed my performance of  the services and has documented them in accordance with my direction.    Oumou aLrsen MD  Emergency Medicine  [facility] EMERGENCY DEPARTMENT          Oumou Larsen MD  08/25/24 9040

## 2024-08-24 NOTE — TELEPHONE ENCOUNTER
Nurse Triage SBAR    Situation: Confusion - Abdominal pain     Background: Son, Kenneth, ann. Consent: obtained verbally from patient. Last chemo was on Monday. 4 days prior to the chemo she started feeling ill and they thought it was due to spoiled food. Stomach ache, Nausea.     Assessment: Hallucinating. Unable to get up. Soiling herself. Dizziness. Sleeping all day. Poor appetite. Abdominal pain - when it cramps its an 8/10. Diarrhea. Only awake for an hour a day. Disorientated.     Protocol Recommended Disposition: Call 911    Recommendation: According to the protocol, Patient should Call 911. Advised Son to Call 911. Care advice given. Son verbalizes understanding and agrees with plan of care.     Anisha Riggs RN Nursing Advisor 8/24/2024 1:01 PM     Reason for Disposition   [1] Difficult to awaken or acting confused (e.g., disoriented, slurred speech) AND [2] present now AND [3] new-onset    Additional Information   Negative: [1] Difficult to awaken or acting confused (e.g., disoriented, slurred speech) AND [2] present now AND [3] diabetes mellitus    Protocols used: Confusion - Delirium-A-

## 2024-08-24 NOTE — ED TRIAGE NOTES
Patient presents to ER in wheelchair with son.  Endorses feeling intermittent dizzy and fatigued over the last 10 days.  Also experiences nausea, diarrhea over last 3 days with visual hallucinations.  Along with lower abdominal pain that feels cramping pain.  Took Midol that was helpful.      Had chemo on Monday.  Being treated for lung cancer.

## 2024-08-25 DIAGNOSIS — C34.31 MALIGNANT NEOPLASM OF LOWER LOBE OF RIGHT LUNG (H): ICD-10-CM

## 2024-08-25 DIAGNOSIS — C79.51 LUNG CANCER METASTATIC TO BONE (H): ICD-10-CM

## 2024-08-25 DIAGNOSIS — C34.90 LUNG CANCER METASTATIC TO BONE (H): ICD-10-CM

## 2024-08-26 ENCOUNTER — PATIENT OUTREACH (OUTPATIENT)
Dept: GERIATRIC MEDICINE | Facility: CLINIC | Age: 74
End: 2024-08-26
Payer: COMMERCIAL

## 2024-08-26 RX ORDER — FOLIC ACID 1 MG/1
1000 TABLET ORAL DAILY
Qty: 30 TABLET | Refills: 4 | Status: SHIPPED | OUTPATIENT
Start: 2024-08-26 | End: 2024-09-23

## 2024-08-27 NOTE — PROGRESS NOTES
CC received notification of Emergency Room visit.  ER visit occurred on 08/24 at [facility] with Dx of acute divertuclits.    CC contacted member and left a message requesting a return call.  Member has a follow-up appointment with PCP: Yes: will be followed by facility on-site care team.  Member has had a change in condition: No  New referrals placed: No  Home Visit Needed: No  Support Plan reviewed and updated.  PCP notified of ED visit via EMR.     Myriam Duggan [facility]  [phone]'

## 2024-08-28 ENCOUNTER — PATIENT OUTREACH (OUTPATIENT)
Dept: ONCOLOGY | Facility: HOSPITAL | Age: 74
End: 2024-08-28

## 2024-08-28 NOTE — PROGRESS NOTES
"[facility]: Transitions of Care Note  Chief Complaint   Patient presents with    Hospital F/U     In ER for 3 hours on 8/24/24       Most Recent Admission Date: 8/24/2024   Most Recent Admission Diagnosis:      Most Recent Discharge Date: 8/24/2024   Most Recent Discharge Diagnosis: Acute diverticulitis - K57.92 -patient is still taking her Augmentin that was prescribed    Transitions of Care Assessment    Discharge Assessment  How are you doing now that you are home?: \"pretty much staying in bed all the time\"  How are your symptoms? (Red Flag symptoms escalate to triage hotline per guidelines): Worsening  Do you know how to contact your clinic care team if you have future questions or changes to your health status? : Yes  Does the patient have their discharge instructions? : Yes  Does the patient have questions regarding their discharge instructions? : No  Were you started on any new medications or were there changes to any of your previous medications? : Yes  Does the patient have all of their medications?: Yes  Do you have questions regarding any of your medications? : No  Do you have all of your needed medical supplies or equipment (DME)?  (i.e. oxygen tank, CPAP, cane, etc.): Yes    Post-op (Clinicians Only)  Did the patient have surgery or a procedure: No  Fever: No  Chills: Yes  Eating & Drinking:  (forcing self to eat and drink)  PO Intake: regular diet  Additional Symptoms: decreased appetite    When I asked the patient how she feels she states \"awful.\" She is still very dizzy and fatigued. She states that she has chills on and off constantly. She states nothing is functioning how it should be. She cannot think straight. She has a \"horrible rash\" all over her body. She states that she has mouth sores.  She states that she forces herself to eat and drink.  She is drinking about 4, 8 ounce glasses of water per day.  When she eats she is just eating small bites.  She is drinking protein shakes.  I let " her know that I would touch base with Dr Miller and get back to her.    Cancer Care  RNCC Short Symptom Review:     Assessment completed with:: Patient;Family    General/Short Assessment  Does the patient have all their medications?: Yes  Is the patient experiencing any new or worsening symptoms?: Yes, See comments (See note)  Discussion with patient: Answered patient's questions and addressed concerns;Reviewed how and when to contact clinic;Reviewed patient's future appointments    Pain  Patient Reported Pain?: No    Patient Coping  Other (Comment) (Withdrawn)    Clinic Utilization  Patient Aware of Next Appointment?: Yes    Other Patient Concerns  Other Patient Reported Concerns: Yes, see notes    Follow up Plan     Discharge Follow-Up  Discharge follow up appointment scheduled in alignment with recommended follow up timeframe or Transitions of Risk Category? (Low = within 30 days; Moderate= within 14 days; High= within 7 days): Yes  Discharge Follow Up Appointment Date: 08/29/24  Discharge Follow Up Appointment Scheduled with?: Primary Care Provider    Future Appointments   Date Time Provider Department Center   8/29/2024 11:00 AM Jose Antonio Hinton MD [facility]RA [facility]   9/5/2024  1:30 PM [facility] PET CT 1 PET [facility]   9/9/2024  1:30 PM [facility] CHEMO LAB DRAW 2 [facility]   9/9/2024  2:15 PM Chino Miller MD [facility]   9/9/2024  3:00 PM [facility] CHEMO CHAIR [facility]       Outpatient Plan as outlined on AVS reviewed with patient.    For any urgent concerns, please contact our 24 hour clinic line:   [place]: [phone]     María Casillas RN

## 2024-08-28 NOTE — PROGRESS NOTES
[facility]: Cancer Care                                                                                          Per Dr Miller, we will check in with the patient again tomorrow.  If she does not seem to be getting better by tomorrow we will send in a Medrol Dosepak for her.  I did call and speak with both her and her son about this on speaker phone.  She really does not think she will be able to get out of bed and do normal things yet tomorrow.  I told her I would give her a call and check in and further discussed with Dr Miller.  They verbalized understanding.    Signature:  María Casillas RN

## 2024-08-29 ENCOUNTER — PATIENT OUTREACH (OUTPATIENT)
Dept: ONCOLOGY | Facility: HOSPITAL | Age: 74
End: 2024-08-29

## 2024-08-29 NOTE — PROGRESS NOTES
[facility]: Cancer Care                                                                                            Situation: Patient chart reviewed by care coordinator.    Background: Patient with a diagnosis of advanced stage IV non-small cell lung cancer.  See Dr Miller's note from 8/19/2024 for full diagnosis and treatment details.    Assessment: Patient had been in the ER and I called as a hospital follow-up phone call.  Dr Miller wanted me to follow-up with the patient today to check in and see how she is doing and if she was no better than we would be sending in a Medrol Dosepak.    Plan/Recommendations: I did talk to the patient's son and gave verbal consent to talk to his she was in the bath.  He states that she is feeling much better and has been up and moving around.  Overall doing so much better.    I went over PET scan as well as follow-up appointments in our clinic.  He verbalized understanding.    Signature:  María Casillas RN

## 2024-09-01 LAB
ATRIAL RATE - MUSE: 109 BPM
DIASTOLIC BLOOD PRESSURE - MUSE: 63 MMHG
INTERPRETATION ECG - MUSE: NORMAL
P AXIS - MUSE: 33 DEGREES
PR INTERVAL - MUSE: 146 MS
QRS DURATION - MUSE: 84 MS
QT - MUSE: 358 MS
QTC - MUSE: 482 MS
R AXIS - MUSE: 46 DEGREES
SYSTOLIC BLOOD PRESSURE - MUSE: 124 MMHG
T AXIS - MUSE: 92 DEGREES
VENTRICULAR RATE- MUSE: 109 BPM

## 2024-09-06 ENCOUNTER — HOSPITAL ENCOUNTER (OUTPATIENT)
Dept: PET IMAGING | Facility: HOSPITAL | Age: 74
Discharge: HOME | End: 2024-09-06
Attending: NURSE PRACTITIONER | Admitting: NURSE PRACTITIONER
Payer: COMMERCIAL

## 2024-09-06 LAB — GLUCOSE BLDC GLUCOMTR-MCNC: 83 MG/DL (ref 70–99)

## 2024-09-06 PROCEDURE — 343N000001 HC RX 343: Performed by: NURSE PRACTITIONER

## 2024-09-06 PROCEDURE — A9552 F18 FDG: HCPCS | Performed by: NURSE PRACTITIONER

## 2024-09-06 PROCEDURE — 78815 PET IMAGE W/CT SKULL-THIGH: CPT | Mod: PS

## 2024-09-06 PROCEDURE — 82962 GLUCOSE BLOOD TEST: CPT

## 2024-09-06 RX ORDER — FLUDEOXYGLUCOSE F 18 200 MCI/ML
7-18 INJECTION, SOLUTION INTRAVENOUS ONCE
Status: COMPLETED | OUTPATIENT
Start: 2024-09-06 | End: 2024-09-06

## 2024-09-06 RX ADMIN — FLUDEOXYGLUCOSE F 18 10.25 MILLICURIE: 200 INJECTION, SOLUTION INTRAVENOUS at 11:24

## 2024-09-09 ENCOUNTER — PATIENT OUTREACH (OUTPATIENT)
Dept: ONCOLOGY | Facility: HOSPITAL | Age: 74
End: 2024-09-09

## 2024-09-09 ENCOUNTER — ONCOLOGY VISIT (OUTPATIENT)
Dept: ONCOLOGY | Facility: HOSPITAL | Age: 74
End: 2024-09-09
Attending: INTERNAL MEDICINE
Payer: COMMERCIAL

## 2024-09-09 ENCOUNTER — LAB (OUTPATIENT)
Dept: INFUSION THERAPY | Facility: HOSPITAL | Age: 74
End: 2024-09-09
Attending: INTERNAL MEDICINE
Payer: COMMERCIAL

## 2024-09-09 VITALS
DIASTOLIC BLOOD PRESSURE: 61 MMHG | RESPIRATION RATE: 24 BRPM | BODY MASS INDEX: 19.99 KG/M2 | SYSTOLIC BLOOD PRESSURE: 123 MMHG | WEIGHT: 120 LBS | HEIGHT: 65 IN | OXYGEN SATURATION: 97 % | HEART RATE: 119 BPM | TEMPERATURE: 97.3 F

## 2024-09-09 DIAGNOSIS — C34.31 MALIGNANT NEOPLASM OF LOWER LOBE OF RIGHT LUNG (H): Primary | ICD-10-CM

## 2024-09-09 DIAGNOSIS — C79.51 LUNG CANCER METASTATIC TO BONE (H): Primary | ICD-10-CM

## 2024-09-09 DIAGNOSIS — C34.31 MALIGNANT NEOPLASM OF LOWER LOBE OF RIGHT LUNG (H): ICD-10-CM

## 2024-09-09 DIAGNOSIS — C34.90 LUNG CANCER METASTATIC TO BONE (H): ICD-10-CM

## 2024-09-09 DIAGNOSIS — C79.51 LUNG CANCER METASTATIC TO BONE (H): ICD-10-CM

## 2024-09-09 DIAGNOSIS — C34.90 LUNG CANCER METASTATIC TO BONE (H): Primary | ICD-10-CM

## 2024-09-09 LAB
ALBUMIN SERPL BCG-MCNC: 3.2 G/DL (ref 3.5–5.2)
ALP SERPL-CCNC: 118 U/L (ref 40–150)
ALT SERPL W P-5'-P-CCNC: 35 U/L (ref 0–50)
ANION GAP SERPL CALCULATED.3IONS-SCNC: 10 MMOL/L (ref 7–15)
AST SERPL W P-5'-P-CCNC: 53 U/L (ref 0–45)
BASOPHILS # BLD AUTO: 0.1 10E3/UL (ref 0–0.2)
BASOPHILS NFR BLD AUTO: 2 %
BILIRUB SERPL-MCNC: <0.2 MG/DL
BUN SERPL-MCNC: 7.2 MG/DL (ref 8–23)
CALCIUM SERPL-MCNC: 8.9 MG/DL (ref 8.8–10.4)
CHLORIDE SERPL-SCNC: 105 MMOL/L (ref 98–107)
CREAT SERPL-MCNC: 0.82 MG/DL (ref 0.51–0.95)
EGFRCR SERPLBLD CKD-EPI 2021: 75 ML/MIN/1.73M2
EOSINOPHIL # BLD AUTO: 0.3 10E3/UL (ref 0–0.7)
EOSINOPHIL NFR BLD AUTO: 4 %
ERYTHROCYTE [DISTWIDTH] IN BLOOD BY AUTOMATED COUNT: 16.5 % (ref 10–15)
GLUCOSE SERPL-MCNC: 113 MG/DL (ref 70–99)
HCO3 SERPL-SCNC: 26 MMOL/L (ref 22–29)
HCT VFR BLD AUTO: 33.9 % (ref 35–47)
HGB BLD-MCNC: 10.6 G/DL (ref 11.7–15.7)
IMM GRANULOCYTES # BLD: 0 10E3/UL
IMM GRANULOCYTES NFR BLD: 1 %
LYMPHOCYTES # BLD AUTO: 2.7 10E3/UL (ref 0.8–5.3)
LYMPHOCYTES NFR BLD AUTO: 41 %
MCH RBC QN AUTO: 31.2 PG (ref 26.5–33)
MCHC RBC AUTO-ENTMCNC: 31.3 G/DL (ref 31.5–36.5)
MCV RBC AUTO: 100 FL (ref 78–100)
MONOCYTES # BLD AUTO: 0.9 10E3/UL (ref 0–1.3)
MONOCYTES NFR BLD AUTO: 13 %
NEUTROPHILS # BLD AUTO: 2.7 10E3/UL (ref 1.6–8.3)
NEUTROPHILS NFR BLD AUTO: 40 %
NRBC # BLD AUTO: 0 10E3/UL
NRBC BLD AUTO-RTO: 0 /100
PLATELET # BLD AUTO: 643 10E3/UL (ref 150–450)
POTASSIUM SERPL-SCNC: 4 MMOL/L (ref 3.4–5.3)
PROT SERPL-MCNC: 5.7 G/DL (ref 6.4–8.3)
RBC # BLD AUTO: 3.4 10E6/UL (ref 3.8–5.2)
SODIUM SERPL-SCNC: 141 MMOL/L (ref 135–145)
T4 FREE SERPL-MCNC: 0.83 NG/DL (ref 0.9–1.7)
TSH SERPL DL<=0.005 MIU/L-ACNC: 4.27 UIU/ML (ref 0.3–4.2)
WBC # BLD AUTO: 6.6 10E3/UL (ref 4–11)

## 2024-09-09 PROCEDURE — 36591 DRAW BLOOD OFF VENOUS DEVICE: CPT | Performed by: INTERNAL MEDICINE

## 2024-09-09 PROCEDURE — 80053 COMPREHEN METABOLIC PANEL: CPT | Performed by: INTERNAL MEDICINE

## 2024-09-09 PROCEDURE — G2211 COMPLEX E/M VISIT ADD ON: HCPCS | Performed by: INTERNAL MEDICINE

## 2024-09-09 PROCEDURE — 99214 OFFICE O/P EST MOD 30 MIN: CPT | Performed by: INTERNAL MEDICINE

## 2024-09-09 PROCEDURE — 85049 AUTOMATED PLATELET COUNT: CPT | Performed by: INTERNAL MEDICINE

## 2024-09-09 PROCEDURE — G0463 HOSPITAL OUTPT CLINIC VISIT: HCPCS | Performed by: INTERNAL MEDICINE

## 2024-09-09 PROCEDURE — 84439 ASSAY OF FREE THYROXINE: CPT | Performed by: INTERNAL MEDICINE

## 2024-09-09 PROCEDURE — 84443 ASSAY THYROID STIM HORMONE: CPT | Performed by: INTERNAL MEDICINE

## 2024-09-09 RX ORDER — ALBUTEROL SULFATE 90 UG/1
1-2 AEROSOL, METERED RESPIRATORY (INHALATION)
Start: 2024-10-31

## 2024-09-09 RX ORDER — ALBUTEROL SULFATE 90 UG/1
1-2 AEROSOL, METERED RESPIRATORY (INHALATION)
Status: CANCELLED
Start: 2024-10-10

## 2024-09-09 RX ORDER — LORAZEPAM 2 MG/ML
0.5 INJECTION INTRAMUSCULAR EVERY 4 HOURS PRN
Status: CANCELLED | OUTPATIENT
Start: 2024-10-10

## 2024-09-09 RX ORDER — ONDANSETRON 2 MG/ML
8 INJECTION INTRAMUSCULAR; INTRAVENOUS ONCE
Start: 2024-10-31 | End: 2024-10-14

## 2024-09-09 RX ORDER — HEPARIN SODIUM,PORCINE 10 UNIT/ML
5-20 VIAL (ML) INTRAVENOUS DAILY PRN
Status: CANCELLED | OUTPATIENT
Start: 2024-10-10

## 2024-09-09 RX ORDER — HEPARIN SODIUM (PORCINE) LOCK FLUSH IV SOLN 100 UNIT/ML 100 UNIT/ML
5 SOLUTION INTRAVENOUS
OUTPATIENT
Start: 2024-10-31

## 2024-09-09 RX ORDER — ONDANSETRON 2 MG/ML
8 INJECTION INTRAMUSCULAR; INTRAVENOUS ONCE
Status: CANCELLED
Start: 2024-10-10 | End: 2024-09-23

## 2024-09-09 RX ORDER — EPINEPHRINE 1 MG/ML
0.3 INJECTION, SOLUTION INTRAMUSCULAR; SUBCUTANEOUS EVERY 5 MIN PRN
OUTPATIENT
Start: 2024-10-31

## 2024-09-09 RX ORDER — HEPARIN SODIUM,PORCINE 10 UNIT/ML
5-20 VIAL (ML) INTRAVENOUS DAILY PRN
OUTPATIENT
Start: 2024-10-31

## 2024-09-09 RX ORDER — HEPARIN SODIUM (PORCINE) LOCK FLUSH IV SOLN 100 UNIT/ML 100 UNIT/ML
5 SOLUTION INTRAVENOUS
Status: CANCELLED | OUTPATIENT
Start: 2024-10-10

## 2024-09-09 RX ORDER — EPINEPHRINE 1 MG/ML
0.3 INJECTION, SOLUTION INTRAMUSCULAR; SUBCUTANEOUS EVERY 5 MIN PRN
Status: CANCELLED | OUTPATIENT
Start: 2024-10-10

## 2024-09-09 RX ORDER — ALBUTEROL SULFATE 0.83 MG/ML
2.5 SOLUTION RESPIRATORY (INHALATION)
Status: CANCELLED | OUTPATIENT
Start: 2024-10-10

## 2024-09-09 RX ORDER — METHYLPREDNISOLONE SODIUM SUCCINATE 125 MG/2ML
125 INJECTION, POWDER, LYOPHILIZED, FOR SOLUTION INTRAMUSCULAR; INTRAVENOUS
Status: CANCELLED
Start: 2024-10-10

## 2024-09-09 RX ORDER — ONDANSETRON 8 MG/1
8 TABLET, FILM COATED ORAL EVERY 8 HOURS PRN
COMMUNITY
Start: 2024-08-25 | End: 2024-09-23

## 2024-09-09 RX ORDER — DIPHENHYDRAMINE HYDROCHLORIDE 50 MG/ML
50 INJECTION INTRAMUSCULAR; INTRAVENOUS
Start: 2024-10-31

## 2024-09-09 RX ORDER — METHYLPREDNISOLONE SODIUM SUCCINATE 125 MG/2ML
125 INJECTION, POWDER, LYOPHILIZED, FOR SOLUTION INTRAMUSCULAR; INTRAVENOUS
Start: 2024-10-31

## 2024-09-09 RX ORDER — ALBUTEROL SULFATE 0.83 MG/ML
2.5 SOLUTION RESPIRATORY (INHALATION)
OUTPATIENT
Start: 2024-10-31

## 2024-09-09 RX ORDER — DIPHENHYDRAMINE HYDROCHLORIDE 50 MG/ML
50 INJECTION INTRAMUSCULAR; INTRAVENOUS
Status: CANCELLED
Start: 2024-10-10

## 2024-09-09 RX ORDER — LORAZEPAM 2 MG/ML
0.5 INJECTION INTRAMUSCULAR EVERY 4 HOURS PRN
OUTPATIENT
Start: 2024-10-31

## 2024-09-09 ASSESSMENT — PAIN SCALES - GENERAL: PAINLEVEL: MODERATE PAIN (5)

## 2024-09-09 NOTE — PROGRESS NOTES
"Oncology Rooming Note    September 9, 2024 2:05 PM   Inez Rodriguez is a 74 year old female who presents for:    Chief Complaint   Patient presents with    Oncology Clinic Visit     Return visit with labs/infusion and prior PET Review     Initial Vitals: /61 (BP Location: Left arm, Patient Position: Sitting, Cuff Size: Adult Regular)   Pulse 119   Temp 97.3  F (36.3  C) (Tympanic)   Resp 24   Ht 1.651 m (5' 5\")   Wt 54.4 kg (120 lb)   LMP  (LMP Unknown)   SpO2 97%   BMI 19.97 kg/m   Estimated body mass index is 19.97 kg/m  as calculated from the following:    Height as of this encounter: 1.651 m (5' 5\").    Weight as of this encounter: 54.4 kg (120 lb). Body surface area is 1.58 meters squared.  Moderate Pain (5) Comment: Data Unavailable   No LMP recorded (lmp unknown). Patient is postmenopausal.  Allergies reviewed: Yes  Medications reviewed: Yes    Medications: Medication refills not needed today.  Pharmacy name entered into [facility]:    [id number] - [place] [id number] [facility] [address] [facility] [address]    Frailty Screening:   Is the patient here for a new oncology consult visit in cancer care? 2. No      Clinical concerns: pt fell about a week ago. Having pain on right side and spine  Dr Miller was notified.      FERCHO JORGENSEN CMA            "

## 2024-09-09 NOTE — LETTER
9/9/2024      Inez Rodriguez  [address]  [facility] [address]      Dear Colleague,    Thank you for referring your patient, Inez Rodriguez, to the [facility] [place]. Please see a copy of my visit note below.    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  A very pleasant 74 year old  woman with advanced stage IV non-small cell lung cancer.  The primary seems to be coming from the right upper lobe.  She has mediastinal lymph nodes as well as couple of osseous metastases. T1 in the T4 vertebral body and 1 in the sacrum.  Molecular testing does show that this is K-tray G12C mutated tumor.  No other targeted mutation present except for PTEN which is not significant.  She has been started on palliative chemotherapy with carboplatin Alimta and pembrolizumab.  She has had good response after 2 cycles and continuation of response after 4 cycles.  Carboplatin discontinued after 4 cycles.  CT 7/24 continues to shows further decrease in the size of the lymph nodes in the mediastinum. She has been feeling more fatigue, nausea, and constipation as the treatments continues. PET 9/6/24 after 10 cycles showed residual right perihilar, subcarinal, and right paratracheal lymph nodes suggesting continued response. However, she has become much weaker and fell 2 weeks  ago. She is not eating much and sleeping all day. Will defer treatment for 2 weeks to give her time to recover. Discussed need to continue treatment due to residual disease.   2.  Reports shooting pain down her right arm intermittently.   3. 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  Pathology is positive for non-small cell lung cancer.  Responding well to treatment.  Most of the bulky mediastinal lymphadenopathy has resolved.  4.  Creatinine has normalized today. Pt drinks 6 glasses of water daily.   5.  Prior history of smoking.  Currently patient does not smoke.  6.  Recently moved from [place].  Limited social support. Lives with her son.   7.  Continues to have chemotherapy side effects of fatigue, nausea, constipation, and dry mouth. She is not eating much due to poor appetite and has lost weight from her baseline. Weight more stable this cycles but she continues to be frail.   7.  TSH initially depressed, now elevated at 4.27. T4 mildly low at 0.83. May need to add levothyroxine soon.   8. Has allergy like symptoms of dripping nose and itchy eyes. Has not Claritin again.   9.  Had a fall after coming out of the shower 8/24/24. She fell on her butt. Was evaluated in the ED. Incidentally found diverticulitis. Completed course of Augmentin. Former constipation has resolved.   10.  Has dry mouth since starting chemotherapy    Plan and medical decision making      Presenting with moderate side effects of treatment. Reviewed notes from each unique source.  Reviewed each unique test.    Ordered tests.    Independent historian account obtained from her son.  Decision made to not proceed with treatment.    Defer treatment day with pembrolizumab and pemetrexed. Will reassess in 2 weeks. Need to continue both agents if possible as PET shows residual disease.   Also recommend signing up on the clinical trial since she does have K-tray G12C mutation so it makes her eligible for  sotorasib.  At this point she will decide on the trial but no intervention would be done.  Intervention would only be done if there is any negative PET scan down the road.  Continue with folic acid and B12 supplementation.  Discussed increasing physical activity to help with fatigue.   Encouraged taking oral antihistamine for a longer period to assess if it helps  Continue to monitor TSH and T4, may need to add levothyroxine soon   Encouraged eating small frequent meals   Encouraged having hard candies or gum to help with dry mouth  The longitudinal plan of care for the diagnosis(es)/condition(s) as documented were addressed during this visit. Due to the added complexity in care, I will continue to support Inez in the subsequent management and with ongoing continuity of care.     Clinical/pathological stage      Stage IV    History of present illness      Ms. Inez Rodriguez is a 74 year old  woman who came to the emergency room in the beginning of January 2024 with increasing shortness of breath.  Was also having some coughing which was not responding to treatment given by the urgent care.  In the emergency room she had a CT scan done to rule out any pulmonary embolism but was positive for multiple lymph nodes in the mediastinum as well as subcarinal area.  She was therefore admitted.  She was seen by pulmonology.  Underwent bronchoscopy and biopsy.  The bronchoscopy came back positive for adenocarcinoma involving subcarinal and mediastinal lymph nodes.    The patient does have a's history of smoking exposure but tells me that she has not smoked for several years.  No significant family history of cancer.  She recently moved from [place].    PET scan done on 29 January 2024 showed that she had extensive disease not only involving the right upper lobe but having extensive mediastinal hilar lymph node metastases and osseous metastases on the T4 vertebral body and right sacrum.    Has been started on chemoimmunotherapy  with carboplatin etoposide and pemetrexed.  She has tolerated with moderate side effects.  Comes in today for follow-up.  She has finished 4 cycles.  CT scan after 2 cycles showed decrease in the size of the right upper lobe mass as well as mediastinal lymphadenopathy.  Increasing sclerosis was noted in the T4 vertebral body.    After 4 cycles carboplatin was discontinued.  We continued on pemetrexed and pembrolizumab.  And has been tolerating that quite well.  She has symptoms lasting about a week after chemotherapy.  The last 2 weeks usually she is feeling better.  CT showed improvement in lymphadenopathy.     She is here with her son for cycle 10 with new left sided neck pain and right-sided axillary pain.  This has been new for the past 2 weeks and is bothersome.  She does not have any breathing changes.  She continues to have allergy-like symptoms with nasal dripping and itchy eyes.  She took Claritin for 1 day and did not notice improvement and has not taken it since.  She continues to have poor oral intake due to decreased appetite and fatigue.  She has lost 6 pounds in the last month.  She eats sometimes only yogurt in a day.  She sleeps 15 to 20 hours each day.  She has not been very active.  She also reports constipation and has not had a bowel movement in 5 days.  She also has had decreased intake.  She has not been drinking much water.  She has tried to senna yesterday without results.    She is here to review PET scan 9/6/24. She had a fall 2 weeks ago on 8/24/24 without injury and was found to have diverticulitis incidentally in the ED. She finished a course of Augmentin. She is continuing to feel weak and tired. She is barely eating and sleeps 20 hours each day. She is not feeling able to get treatment today. Her constipation has resolved. She has intermittent shooting pain down her right arm. She also gets mid back pain intermittently that resolves with Midol. She is drinking more water, 6 glasses  "daily.        Review of system      Details noted in the history of present illness.  A detailed review of systems is otherwise negative.    ECOG performance status of 1.    Physical exam        /61 (BP Location: Left arm, Patient Position: Sitting, Cuff Size: Adult Regular)   Pulse 119   Temp 97.3  F (36.3  C) (Tympanic)   Resp 24   Ht 1.651 m (5' 5\")   Wt 54.4 kg (120 lb)   LMP  (LMP Unknown)   SpO2 97%   BMI 19.97 kg/m      GENERAL: No acute distress. Cooperative in conversation.  Here with son  HEENT:  Pupils are equal, round and reactive. Oral mucosa intact with slight yellowish covering on the tongue. Dry mouth.  Torus palatinus noted.  No bleeding noted.  No neck lesions noted.  RESP:Chest symmetric lungs are clear bilaterally per auscultation. Regular respiratory rate.    CV: Normal S1 S2 Regular, rate and rhythm.     LYMPH: No cervical, supraclavicular, or axillary lymphadenopathy.   ABD: Nondistended, soft, nontender.   EXTREMITIES: No lower extremity edema.   NEURO: Non- focal. Alert and oriented x3.  Cranial nerves appear intact.  PSYCH: Within normal limits. No depression or anxiety.  SKIN: Warm dry intact.      Lab results Reviewed      Recent Results (from the past 168 hour(s))   Glucose by meter   Result Value Ref Range    GLUCOSE BY METER POCT 83 70 - 99 mg/dL   Comprehensive metabolic panel   Result Value Ref Range    Sodium 141 135 - 145 mmol/L    Potassium 4.0 3.4 - 5.3 mmol/L    Carbon Dioxide (CO2) 26 22 - 29 mmol/L    Anion Gap 10 7 - 15 mmol/L    Urea Nitrogen 7.2 (L) 8.0 - 23.0 mg/dL    Creatinine 0.82 0.51 - 0.95 mg/dL    GFR Estimate 75 >60 mL/min/1.73m2    Calcium 8.9 8.8 - 10.4 mg/dL    Chloride 105 98 - 107 mmol/L    Glucose 113 (H) 70 - 99 mg/dL    Alkaline Phosphatase 118 40 - 150 U/L    AST 53 (H) 0 - 45 U/L    ALT 35 0 - 50 U/L    Protein Total 5.7 (L) 6.4 - 8.3 g/dL    Albumin 3.2 (L) 3.5 - 5.2 g/dL    Bilirubin Total <0.2 <=1.2 mg/dL   TSH with free T4 reflex "   Result Value Ref Range    TSH 4.27 (H) 0.30 - 4.20 uIU/mL   CBC with platelets and differential   Result Value Ref Range    WBC Count 6.6 4.0 - 11.0 10e3/uL    RBC Count 3.40 (L) 3.80 - 5.20 10e6/uL    Hemoglobin 10.6 (L) 11.7 - 15.7 g/dL    Hematocrit 33.9 (L) 35.0 - 47.0 %     78 - 100 fL    MCH 31.2 26.5 - 33.0 pg    MCHC 31.3 (L) 31.5 - 36.5 g/dL    RDW 16.5 (H) 10.0 - 15.0 %    Platelet Count 643 (H) 150 - 450 10e3/uL    % Neutrophils 40 %    % Lymphocytes 41 %    % Monocytes 13 %    % Eosinophils 4 %    % Basophils 2 %    % Immature Granulocytes 1 %    NRBCs per 100 WBC 0 <1 /100    Absolute Neutrophils 2.7 1.6 - 8.3 10e3/uL    Absolute Lymphocytes 2.7 0.8 - 5.3 10e3/uL    Absolute Monocytes 0.9 0.0 - 1.3 10e3/uL    Absolute Eosinophils 0.3 0.0 - 0.7 10e3/uL    Absolute Basophils 0.1 0.0 - 0.2 10e3/uL    Absolute Immature Granulocytes 0.0 <=0.4 10e3/uL    Absolute NRBCs 0.0 10e3/uL   T4 free   Result Value Ref Range    Free T4 0.83 (L) 0.90 - 1.70 ng/dL       Imaging results Reviewed        PET Oncology (Eyes to Thighs)    Result Date: 9/6/2024  EXAM: PET ONCOLOGY (EYES TO THIGHS) LOCATION: [facility] DATE: 9/6/2024 INDICATION: Subsequent treatment planning and restaging for malignant neoplasm of lower lobe, right bronchus or lung. Currently receiving chemotherapy/immunotherapy. Monitor treatment response COMPARISON: FDG PET/CT dated 1/29/2024 and CT of the abdomen pelvis dated 8/24/2024 CONTRAST: None TECHNIQUE: Serum glucose level 83 mg/dL. One hour post intravenous administration of 10.3 mCi F-18 FDG, PET imaging was performed from the skull vertex to mid thighs, utilizing attenuation correction with concurrent axial CT and PET/CT image fusion. Dose  reduction techniques were used. PET/CT FINDINGS: Residual right perihilar (Max SUV 18.2, previously 17.9), subcarinal station 7 (Max SUV 6.3, previously 15.0), right lower paratracheal station 4R (Max SUV 7.5, previously 16.1),  and right upper paratracheal station 2R (Max SUV 4.0, previously 5.8) lymph nodes suggesting marked interval response to therapy. Degenerative shoulder and hip synovitis. CT FINDINGS: Mild senescent intercranial changes. Mild to moderate carotid artery bifurcation calcification. Non-FDG avid 1.2 cm nodule in the left thyroid lobe suggesting benignity. Left chest port with tip terminating in the mid SVC. Moderate coronary artery calcium cystic change in the anterior right upper lobe. Sigmoid diverticulosis. Pelvic phleboliths. Multilevel degenerative changes of the spine. The lower extremities are unremarkable.     IMPRESSION: Residual right perihilar, subcarinal, and right paratracheal lymph nodes suggesting marked interval response to therapy    CT Abdomen Pelvis w Contrast    Result Date: 8/24/2024  EXAM: CT ABDOMEN PELVIS W CONTRAST LOCATION: [facility] DATE: 8/24/2024 INDICATION: abdominal pain COMPARISON: 7/1/2024 TECHNIQUE: CT scan of the abdomen and pelvis was performed following injection of IV contrast. Multiplanar reformats were obtained. Dose reduction techniques were used. CONTRAST: isovue 370  58ml FINDINGS: LOWER CHEST: Mild dependent atelectasis. Coronary artery calcification. Small pericardial effusion. HEPATOBILIARY: A subcentimeter hypodensity in the left lobe which is too small to characterize. PANCREAS: Normal. SPLEEN: Normal. ADRENAL GLANDS: Normal. KIDNEYS/BLADDER: Simple renal cysts, no follow-up required. Subcentimeter renal hypodensities which are too small to characterize. BOWEL: Mild pericolonic inflammation and wall thickening extending from the distal descending colon into the sigmoid colon most likely represents acute diverticulitis, image 158:4. No extraluminal air or loculated fluid collection. No obstruction. Normal  appendix. LYMPH NODES: Normal. VASCULATURE: No abdominal aortic aneurysm. PELVIC ORGANS: Normal. MUSCULOSKELETAL: Degenerative changes. Stable  "small sclerotic lesion in the right iliac crest.     IMPRESSION: 1.  Acute diverticulitis of the distal descending and sigmoid colon.    Head CT w/o contrast    Result Date: 8/24/2024  EXAM: CT HEAD W/O CONTRAST LOCATION: [facility] DATE: 8/24/2024 INDICATION: visual hallucinations COMPARISON: MRI brain 26 January 2024 TECHNIQUE: Routine CT Head without IV contrast. Multiplanar reformats. Dose reduction techniques were used. FINDINGS: INTRACRANIAL CONTENTS: No intracranial hemorrhage, extraaxial collection, or mass effect.  No CT evidence of acute infarct. Mild presumed chronic small vessel ischemic changes. Mild generalized volume loss. No hydrocephalus. VISUALIZED ORBITS/SINUSES/MASTOIDS: No intraorbital abnormality. Mild mucosal thickening scattered about the paranasal sinuses. No middle ear or mastoid effusion. BONES/SOFT TISSUES: No acute abnormality. Right maxillary medial incisor periodontal disease.     IMPRESSION: 1.  No CT evidence for acute intracranial process. 2.  Brain atrophy and presumed chronic microvascular ischemic changes as above.     Chino Miller MD      This note has been dictated using voice recognition software. Any grammatical or context distortions are unintentional and inherent to the software     Oncology Rooming Note    September 9, 2024 2:05 PM   Inez Rodriguez is a 74 year old female who presents for:    Chief Complaint   Patient presents with     Oncology Clinic Visit     Return visit with labs/infusion and prior PET Review     Initial Vitals: /61 (BP Location: Left arm, Patient Position: Sitting, Cuff Size: Adult Regular)   Pulse 119   Temp 97.3  F (36.3  C) (Tympanic)   Resp 24   Ht 1.651 m (5' 5\")   Wt 54.4 kg (120 lb)   LMP  (LMP Unknown)   SpO2 97%   BMI 19.97 kg/m   Estimated body mass index is 19.97 kg/m  as calculated from the following:    Height as of this encounter: 1.651 m (5' 5\").    Weight as of this encounter: 54.4 kg (120 lb). " Body surface area is 1.58 meters squared.  Moderate Pain (5) Comment: Data Unavailable   No LMP recorded (lmp unknown). Patient is postmenopausal.  Allergies reviewed: Yes  Medications reviewed: Yes    Medications: Medication refills not needed today.  Pharmacy name entered into [facility]:    [id number] - [facility] [address] AT [facility] [address] [facility] [place] [id number] [facility]    Frailty Screening:   Is the patient here for a new oncology consult visit in cancer care? 2. No      Clinical concerns: pt fell about a week ago. Having pain on right side and spine  Dr Miller was notified.      FERCHO JORGENSEN CMA              Again, thank you for allowing me to participate in the care of your patient.        Sincerely,        Chino Miller MD

## 2024-09-09 NOTE — PROGRESS NOTES
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  A very pleasant 74 year old  woman with advanced stage IV non-small cell lung cancer.  The primary seems to be coming from the right upper lobe.  She has mediastinal lymph nodes as well as couple of osseous metastases. T1 in the T4 vertebral body and 1 in the sacrum.  Molecular testing does show that this is K-tray G12C mutated tumor.  No other targeted mutation present except for PTEN which is not significant.  She has been started on palliative chemotherapy with carboplatin Alimta and pembrolizumab.  She has had good response after 2 cycles and continuation of response after 4 cycles.  Carboplatin discontinued after 4 cycles.  CT 7/24 continues to shows further decrease in the size of the lymph nodes in the mediastinum. She has been feeling more fatigue, nausea, and constipation as the treatments continues. PET 9/6/24 after 10 cycles showed residual right perihilar, subcarinal, and right paratracheal lymph nodes suggesting continued response. However, she has become much weaker and fell 2 weeks ago. She is not eating much and sleeping all day. Will defer treatment for 2 weeks to give her time to recover. Discussed need to continue treatment due to residual disease.   2.  Reports shooting pain down her right arm intermittently.    3. 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  Pathology is positive for non-small cell lung cancer.  Responding well to treatment.  Most of the bulky mediastinal lymphadenopathy has resolved.  4.  Creatinine has normalized today. Pt drinks 6 glasses of water daily.   5.  Prior history of smoking.  Currently patient does not smoke.  6.  Recently moved from [place].  Limited social support. Lives with her son.   7.  Continues to have chemotherapy side effects of fatigue, nausea, constipation, and dry mouth. She is not eating much due to poor appetite and has lost weight from her baseline. Weight more stable this cycles but she continues to be frail.   7.  TSH initially depressed, now elevated at 4.27. T4 mildly low at 0.83. May need to add levothyroxine soon.   8. Has allergy like symptoms of dripping nose and itchy eyes. Has not Claritin again.   9.  Had a fall after coming out of the shower 8/24/24. She fell on her butt. Was evaluated in the ED. Incidentally found diverticulitis. Completed course of Augmentin. Former constipation has resolved.   10.  Has dry mouth since starting chemotherapy    Plan and medical decision making      Presenting with moderate side effects of treatment. Reviewed notes from each unique source.  Reviewed each unique test.    Ordered tests.    Independent historian account obtained from her son.  Decision made to not proceed with treatment.    Defer treatment day with pembrolizumab and pemetrexed. Will reassess in 2 weeks. Need to continue both agents if possible as PET shows residual disease.   Also recommend signing up on the clinical trial since she does have K-tray G12C mutation so it makes her eligible for sotorasib.  At this point she will decide on the trial but no intervention would be done.  Intervention would only be done if there is any negative PET scan down the road.  Continue with folic acid and B12 supplementation.  Discussed  increasing physical activity to help with fatigue.   Encouraged taking oral antihistamine for a longer period to assess if it helps  Continue to monitor TSH and T4, may need to add levothyroxine soon   Encouraged eating small frequent meals   Encouraged having hard candies or gum to help with dry mouth  The longitudinal plan of care for the diagnosis(es)/condition(s) as documented were addressed during this visit. Due to the added complexity in care, I will continue to support Inez in the subsequent management and with ongoing continuity of care.     Clinical/pathological stage      Stage IV    History of present illness      Ms. Inez Rodriguez is a 74 year old  woman who came to the emergency room in the beginning of January 2024 with increasing shortness of breath.  Was also having some coughing which was not responding to treatment given by the urgent care.  In the emergency room she had a CT scan done to rule out any pulmonary embolism but was positive for multiple lymph nodes in the mediastinum as well as subcarinal area.  She was therefore admitted.  She was seen by pulmonology.  Underwent bronchoscopy and biopsy.  The bronchoscopy came back positive for adenocarcinoma involving subcarinal and mediastinal lymph nodes.    The patient does have a's history of smoking exposure but tells me that she has not smoked for several years.  No significant family history of cancer.  She recently moved from [place].    PET scan done on 29 January 2024 showed that she had extensive disease not only involving the right upper lobe but having extensive mediastinal hilar lymph node metastases and osseous metastases on the T4 vertebral body and right sacrum.    Has been started on chemoimmunotherapy with carboplatin etoposide and pemetrexed.  She has tolerated with moderate side effects.  Comes in today for follow-up.  She has finished 4 cycles.  CT scan after 2 cycles showed decrease in the size of the right upper lobe mass as  well as mediastinal lymphadenopathy.  Increasing sclerosis was noted in the T4 vertebral body.    After 4 cycles carboplatin was discontinued.  We continued on pemetrexed and pembrolizumab.  And has been tolerating that quite well.  She has symptoms lasting about a week after chemotherapy.  The last 2 weeks usually she is feeling better.  CT showed improvement in lymphadenopathy.     She is here with her son for cycle 10 with new left sided neck pain and right-sided axillary pain.  This has been new for the past 2 weeks and is bothersome.  She does not have any breathing changes.  She continues to have allergy-like symptoms with nasal dripping and itchy eyes.  She took Claritin for 1 day and did not notice improvement and has not taken it since.  She continues to have poor oral intake due to decreased appetite and fatigue.  She has lost 6 pounds in the last month.  She eats sometimes only yogurt in a day.  She sleeps 15 to 20 hours each day.  She has not been very active.  She also reports constipation and has not had a bowel movement in 5 days.  She also has had decreased intake.  She has not been drinking much water.  She has tried to senna yesterday without results.    She is here to review PET scan 9/6/24. She had a fall 2 weeks ago on 8/24/24 without injury and was found to have diverticulitis incidentally in the ED. She finished a course of Augmentin. She is continuing to feel weak and tired. She is barely eating and sleeps 20 hours each day. She is not feeling able to get treatment today. Her constipation has resolved. She has intermittent shooting pain down her right arm. She also gets mid back pain intermittently that resolves with Midol. She is drinking more water, 6 glasses daily.        Review of system      Details noted in the history of present illness.  A detailed review of systems is otherwise negative.    ECOG performance status of 1.    Physical exam        /61 (BP Location: Left arm,  "Patient Position: Sitting, Cuff Size: Adult Regular)   Pulse 119   Temp 97.3  F (36.3  C) (Tympanic)   Resp 24   Ht 1.651 m (5' 5\")   Wt 54.4 kg (120 lb)   LMP  (LMP Unknown)   SpO2 97%   BMI 19.97 kg/m      GENERAL: No acute distress. Cooperative in conversation.  Here with son  HEENT:  Pupils are equal, round and reactive. Oral mucosa intact with slight yellowish covering on the tongue. Dry mouth.  Torus palatinus noted.  No bleeding noted.  No neck lesions noted.  RESP:Chest symmetric lungs are clear bilaterally per auscultation. Regular respiratory rate.    CV: Normal S1 S2 Regular, rate and rhythm.     LYMPH: No cervical, supraclavicular, or axillary lymphadenopathy.   ABD: Nondistended, soft, nontender.   EXTREMITIES: No lower extremity edema.   NEURO: Non- focal. Alert and oriented x3.  Cranial nerves appear intact.  PSYCH: Within normal limits. No depression or anxiety.  SKIN: Warm dry intact.      Lab results Reviewed      Recent Results (from the past 168 hour(s))   Glucose by meter   Result Value Ref Range    GLUCOSE BY METER POCT 83 70 - 99 mg/dL   Comprehensive metabolic panel   Result Value Ref Range    Sodium 141 135 - 145 mmol/L    Potassium 4.0 3.4 - 5.3 mmol/L    Carbon Dioxide (CO2) 26 22 - 29 mmol/L    Anion Gap 10 7 - 15 mmol/L    Urea Nitrogen 7.2 (L) 8.0 - 23.0 mg/dL    Creatinine 0.82 0.51 - 0.95 mg/dL    GFR Estimate 75 >60 mL/min/1.73m2    Calcium 8.9 8.8 - 10.4 mg/dL    Chloride 105 98 - 107 mmol/L    Glucose 113 (H) 70 - 99 mg/dL    Alkaline Phosphatase 118 40 - 150 U/L    AST 53 (H) 0 - 45 U/L    ALT 35 0 - 50 U/L    Protein Total 5.7 (L) 6.4 - 8.3 g/dL    Albumin 3.2 (L) 3.5 - 5.2 g/dL    Bilirubin Total <0.2 <=1.2 mg/dL   TSH with free T4 reflex   Result Value Ref Range    TSH 4.27 (H) 0.30 - 4.20 uIU/mL   CBC with platelets and differential   Result Value Ref Range    WBC Count 6.6 4.0 - 11.0 10e3/uL    RBC Count 3.40 (L) 3.80 - 5.20 10e6/uL    Hemoglobin 10.6 (L) 11.7 - 15.7 " g/dL    Hematocrit 33.9 (L) 35.0 - 47.0 %     78 - 100 fL    MCH 31.2 26.5 - 33.0 pg    MCHC 31.3 (L) 31.5 - 36.5 g/dL    RDW 16.5 (H) 10.0 - 15.0 %    Platelet Count 643 (H) 150 - 450 10e3/uL    % Neutrophils 40 %    % Lymphocytes 41 %    % Monocytes 13 %    % Eosinophils 4 %    % Basophils 2 %    % Immature Granulocytes 1 %    NRBCs per 100 WBC 0 <1 /100    Absolute Neutrophils 2.7 1.6 - 8.3 10e3/uL    Absolute Lymphocytes 2.7 0.8 - 5.3 10e3/uL    Absolute Monocytes 0.9 0.0 - 1.3 10e3/uL    Absolute Eosinophils 0.3 0.0 - 0.7 10e3/uL    Absolute Basophils 0.1 0.0 - 0.2 10e3/uL    Absolute Immature Granulocytes 0.0 <=0.4 10e3/uL    Absolute NRBCs 0.0 10e3/uL   T4 free   Result Value Ref Range    Free T4 0.83 (L) 0.90 - 1.70 ng/dL       Imaging results Reviewed        PET Oncology (Eyes to Thighs)    Result Date: 9/6/2024  EXAM: PET ONCOLOGY (EYES TO THIGHS) LOCATION: [facility] DATE: 9/6/2024 INDICATION: Subsequent treatment planning and restaging for malignant neoplasm of lower lobe, right bronchus or lung. Currently receiving chemotherapy/immunotherapy. Monitor treatment response COMPARISON: FDG PET/CT dated 1/29/2024 and CT of the abdomen pelvis dated 8/24/2024 CONTRAST: None TECHNIQUE: Serum glucose level 83 mg/dL. One hour post intravenous administration of 10.3 mCi F-18 FDG, PET imaging was performed from the skull vertex to mid thighs, utilizing attenuation correction with concurrent axial CT and PET/CT image fusion. Dose  reduction techniques were used. PET/CT FINDINGS: Residual right perihilar (Max SUV 18.2, previously 17.9), subcarinal station 7 (Max SUV 6.3, previously 15.0), right lower paratracheal station 4R (Max SUV 7.5, previously 16.1), and right upper paratracheal station 2R (Max SUV 4.0, previously 5.8) lymph nodes suggesting marked interval response to therapy. Degenerative shoulder and hip synovitis. CT FINDINGS: Mild senescent intercranial changes. Mild to  moderate carotid artery bifurcation calcification. Non-FDG avid 1.2 cm nodule in the left thyroid lobe suggesting benignity. Left chest port with tip terminating in the mid SVC. Moderate coronary artery calcium cystic change in the anterior right upper lobe. Sigmoid diverticulosis. Pelvic phleboliths. Multilevel degenerative changes of the spine. The lower extremities are unremarkable.     IMPRESSION: Residual right perihilar, subcarinal, and right paratracheal lymph nodes suggesting marked interval response to therapy    CT Abdomen Pelvis w Contrast    Result Date: 8/24/2024  EXAM: CT ABDOMEN PELVIS W CONTRAST LOCATION: [facility] DATE: 8/24/2024 INDICATION: abdominal pain COMPARISON: 7/1/2024 TECHNIQUE: CT scan of the abdomen and pelvis was performed following injection of IV contrast. Multiplanar reformats were obtained. Dose reduction techniques were used. CONTRAST: isovue 370  58ml FINDINGS: LOWER CHEST: Mild dependent atelectasis. Coronary artery calcification. Small pericardial effusion. HEPATOBILIARY: A subcentimeter hypodensity in the left lobe which is too small to characterize. PANCREAS: Normal. SPLEEN: Normal. ADRENAL GLANDS: Normal. KIDNEYS/BLADDER: Simple renal cysts, no follow-up required. Subcentimeter renal hypodensities which are too small to characterize. BOWEL: Mild pericolonic inflammation and wall thickening extending from the distal descending colon into the sigmoid colon most likely represents acute diverticulitis, image 158:4. No extraluminal air or loculated fluid collection. No obstruction. Normal  appendix. LYMPH NODES: Normal. VASCULATURE: No abdominal aortic aneurysm. PELVIC ORGANS: Normal. MUSCULOSKELETAL: Degenerative changes. Stable small sclerotic lesion in the right iliac crest.     IMPRESSION: 1.  Acute diverticulitis of the distal descending and sigmoid colon.    Head CT w/o contrast    Result Date: 8/24/2024  EXAM: CT HEAD W/O CONTRAST LOCATION: [facility]  [facility] DATE: 8/24/2024 INDICATION: visual hallucinations COMPARISON: MRI brain 26 January 2024 TECHNIQUE: Routine CT Head without IV contrast. Multiplanar reformats. Dose reduction techniques were used. FINDINGS: INTRACRANIAL CONTENTS: No intracranial hemorrhage, extraaxial collection, or mass effect.  No CT evidence of acute infarct. Mild presumed chronic small vessel ischemic changes. Mild generalized volume loss. No hydrocephalus. VISUALIZED ORBITS/SINUSES/MASTOIDS: No intraorbital abnormality. Mild mucosal thickening scattered about the paranasal sinuses. No middle ear or mastoid effusion. BONES/SOFT TISSUES: No acute abnormality. Right maxillary medial incisor periodontal disease.     IMPRESSION: 1.  No CT evidence for acute intracranial process. 2.  Brain atrophy and presumed chronic microvascular ischemic changes as above.     Chino Miller MD      This note has been dictated using voice recognition software. Any grammatical or context distortions are unintentional and inherent to the software

## 2024-09-09 NOTE — PROGRESS NOTES
[facility]: Cancer Care Follow-Up Note                                    Discussion with Patient:                                                      Patient comes in for follow-up with a PET scan prior to seeing Dr Miller today.     Goals          General    Maintain ability to perform ADLs without difficulty             Dates of Treatment:                                                      Infusion given in last 28 days       Administered MAR Action Medication Dose Rate Visit    08/19/2024 15:33 New Bag pembrolizumab (KEYTRUDA) 200 mg in sodium chloride 0.9 % 63 mL infusion 200 mg 126 mL/hr Infusion Therapy Visit on 08/19/2024 in [facility]    08/19/2024 16:07 New Bag PEMEtrexed (ALIMTA) 635 mg in sodium chloride 0.9 % 135.4 mL infusion 635 mg 812.4 mL/hr Infusion Therapy Visit on 08/19/2024 in [facility]            Assessment:                                                      Patient is quite weak today.  She did walk into the clinic but needed a wheelchair to get out of the clinic.  Dr Miller has held patient's treatment today and ran request that she come back in 2 weeks for labs, provider visit and an appointment for infusion.    Intervention/Education provided during outreach:                                                       Patient has been scheduled on 9/23/2024 for labs, Dr Miller and infusion.    Patient to follow up as scheduled at next appt.  Patient to call/[facility] message with updates.  Confirmed patient has clinic and triage numbers.    Signature:  María Casillas RN

## 2024-09-23 ENCOUNTER — PATIENT OUTREACH (OUTPATIENT)
Dept: ONCOLOGY | Facility: HOSPITAL | Age: 74
End: 2024-09-23

## 2024-09-23 ENCOUNTER — LAB (OUTPATIENT)
Dept: INFUSION THERAPY | Facility: HOSPITAL | Age: 74
End: 2024-09-23
Attending: INTERNAL MEDICINE
Payer: COMMERCIAL

## 2024-09-23 ENCOUNTER — TELEPHONE (OUTPATIENT)
Dept: ONCOLOGY | Facility: HOSPITAL | Age: 74
End: 2024-09-23

## 2024-09-23 ENCOUNTER — ONCOLOGY VISIT (OUTPATIENT)
Dept: ONCOLOGY | Facility: HOSPITAL | Age: 74
End: 2024-09-23
Payer: COMMERCIAL

## 2024-09-23 ENCOUNTER — MYC REFILL (OUTPATIENT)
Dept: INTERNAL MEDICINE | Facility: CLINIC | Age: 74
End: 2024-09-23

## 2024-09-23 VITALS
RESPIRATION RATE: 22 BRPM | TEMPERATURE: 97.5 F | HEART RATE: 131 BPM | BODY MASS INDEX: 20.21 KG/M2 | WEIGHT: 121.3 LBS | HEIGHT: 65 IN | DIASTOLIC BLOOD PRESSURE: 63 MMHG | SYSTOLIC BLOOD PRESSURE: 109 MMHG | OXYGEN SATURATION: 97 %

## 2024-09-23 DIAGNOSIS — C34.90 LUNG CANCER METASTATIC TO BONE (H): Primary | ICD-10-CM

## 2024-09-23 DIAGNOSIS — R42 DIZZINESS: ICD-10-CM

## 2024-09-23 DIAGNOSIS — C79.51 LUNG CANCER METASTATIC TO BONE (H): ICD-10-CM

## 2024-09-23 DIAGNOSIS — R25.1 TREMOR: ICD-10-CM

## 2024-09-23 DIAGNOSIS — C34.31 MALIGNANT NEOPLASM OF LOWER LOBE OF RIGHT LUNG (H): ICD-10-CM

## 2024-09-23 DIAGNOSIS — R79.89 ABNORMAL TSH: ICD-10-CM

## 2024-09-23 DIAGNOSIS — C79.51 LUNG CANCER METASTATIC TO BONE (H): Primary | ICD-10-CM

## 2024-09-23 DIAGNOSIS — I10 BENIGN ESSENTIAL HYPERTENSION: ICD-10-CM

## 2024-09-23 DIAGNOSIS — Z51.12 ENCOUNTER FOR ANTINEOPLASTIC IMMUNOTHERAPY: ICD-10-CM

## 2024-09-23 DIAGNOSIS — R11.0 NAUSEA: ICD-10-CM

## 2024-09-23 DIAGNOSIS — C34.90 LUNG CANCER METASTATIC TO BONE (H): ICD-10-CM

## 2024-09-23 DIAGNOSIS — R53.83 FATIGUE, UNSPECIFIED TYPE: ICD-10-CM

## 2024-09-23 DIAGNOSIS — H53.8 BLURRED VISION: ICD-10-CM

## 2024-09-23 LAB
ALBUMIN SERPL BCG-MCNC: 3.6 G/DL (ref 3.5–5.2)
ALP SERPL-CCNC: 107 U/L (ref 40–150)
ALT SERPL W P-5'-P-CCNC: 29 U/L (ref 0–50)
ANION GAP SERPL CALCULATED.3IONS-SCNC: 11 MMOL/L (ref 7–15)
AST SERPL W P-5'-P-CCNC: 64 U/L (ref 0–45)
BILIRUB SERPL-MCNC: 0.3 MG/DL
BUN SERPL-MCNC: 6.9 MG/DL (ref 8–23)
CALCIUM SERPL-MCNC: 9.4 MG/DL (ref 8.8–10.4)
CHLORIDE SERPL-SCNC: 104 MMOL/L (ref 98–107)
CREAT SERPL-MCNC: 0.92 MG/DL (ref 0.51–0.95)
EGFRCR SERPLBLD CKD-EPI 2021: 65 ML/MIN/1.73M2
ERYTHROCYTE [DISTWIDTH] IN BLOOD BY AUTOMATED COUNT: 16 % (ref 10–15)
GLUCOSE SERPL-MCNC: 168 MG/DL (ref 70–99)
HCO3 SERPL-SCNC: 25 MMOL/L (ref 22–29)
HCT VFR BLD AUTO: 34.5 % (ref 35–47)
HGB BLD-MCNC: 11.3 G/DL (ref 11.7–15.7)
MCH RBC QN AUTO: 32 PG (ref 26.5–33)
MCHC RBC AUTO-ENTMCNC: 32.8 G/DL (ref 31.5–36.5)
MCV RBC AUTO: 98 FL (ref 78–100)
PLATELET # BLD AUTO: 271 10E3/UL (ref 150–450)
POTASSIUM SERPL-SCNC: 4.2 MMOL/L (ref 3.4–5.3)
PROT SERPL-MCNC: 6.3 G/DL (ref 6.4–8.3)
RBC # BLD AUTO: 3.53 10E6/UL (ref 3.8–5.2)
SODIUM SERPL-SCNC: 140 MMOL/L (ref 135–145)
TSH SERPL DL<=0.005 MIU/L-ACNC: 0.86 UIU/ML (ref 0.3–4.2)
WBC # BLD AUTO: 8.4 10E3/UL (ref 4–11)

## 2024-09-23 PROCEDURE — 82040 ASSAY OF SERUM ALBUMIN: CPT

## 2024-09-23 PROCEDURE — 84443 ASSAY THYROID STIM HORMONE: CPT

## 2024-09-23 PROCEDURE — G2211 COMPLEX E/M VISIT ADD ON: HCPCS

## 2024-09-23 PROCEDURE — 36591 DRAW BLOOD OFF VENOUS DEVICE: CPT

## 2024-09-23 PROCEDURE — G0463 HOSPITAL OUTPT CLINIC VISIT: HCPCS

## 2024-09-23 PROCEDURE — 99215 OFFICE O/P EST HI 40 MIN: CPT

## 2024-09-23 PROCEDURE — 85014 HEMATOCRIT: CPT

## 2024-09-23 RX ORDER — FOLIC ACID 1 MG/1
1000 TABLET ORAL DAILY
Qty: 30 TABLET | Refills: 4 | Status: SHIPPED | OUTPATIENT
Start: 2024-09-23

## 2024-09-23 RX ORDER — PREDNISONE 20 MG/1
TABLET ORAL
Qty: 53 TABLET | Refills: 0 | Status: SHIPPED | OUTPATIENT
Start: 2024-09-23 | End: 2024-10-23

## 2024-09-23 RX ORDER — AMLODIPINE BESYLATE 5 MG/1
5 TABLET ORAL DAILY
Qty: 90 TABLET | Refills: 2 | OUTPATIENT
Start: 2024-09-23

## 2024-09-23 RX ORDER — ONDANSETRON 8 MG/1
8 TABLET, FILM COATED ORAL EVERY 8 HOURS PRN
Qty: 30 TABLET | Refills: 3 | Status: SHIPPED | OUTPATIENT
Start: 2024-09-23 | End: 2024-09-23

## 2024-09-23 RX ORDER — ONDANSETRON 8 MG/1
8 TABLET, FILM COATED ORAL EVERY 8 HOURS PRN
Qty: 30 TABLET | Refills: 3 | Status: SHIPPED | OUTPATIENT
Start: 2024-09-23

## 2024-09-23 RX ORDER — HEPARIN SODIUM (PORCINE) LOCK FLUSH IV SOLN 100 UNIT/ML 100 UNIT/ML
SOLUTION INTRAVENOUS
Status: COMPLETED
Start: 2024-09-23 | End: 2024-09-23

## 2024-09-23 ASSESSMENT — PAIN SCALES - GENERAL: PAINLEVEL: WORST PAIN (10)

## 2024-09-23 NOTE — PROGRESS NOTES
"Oncology Rooming Note    September 23, 2024 1:41 PM   Inez Rodriguez is a 74 year old female who presents for:    Chief Complaint   Patient presents with    Oncology Clinic Visit     2 week return visit with labs/infusion related to Malignant neoplasm of lower lobe of right lung.     Initial Vitals: /63 (BP Location: Left arm, Patient Position: Sitting, Cuff Size: Adult Regular)   Pulse (!) 131   Temp 97.5  F (36.4  C) (Tympanic)   Resp 22   Ht 1.651 m (5' 5\")   Wt 55 kg (121 lb 4.8 oz)   LMP  (LMP Unknown)   SpO2 97%   BMI 20.19 kg/m   Estimated body mass index is 20.19 kg/m  as calculated from the following:    Height as of this encounter: 1.651 m (5' 5\").    Weight as of this encounter: 55 kg (121 lb 4.8 oz). Body surface area is 1.59 meters squared.  Worst Pain (10) Comment: Data Unavailable   No LMP recorded (lmp unknown). Patient is postmenopausal.  Allergies reviewed: Yes  Medications reviewed: Yes    Medications: MEDICATION REFILLS NEEDED TODAY. Provider was notified.  Pharmacy name entered into [employer]:    [id number] [address] AT [facility] [address]    Frailty Screening:   Is the patient here for a new oncology consult visit in cancer care? 2. No      Clinical concerns: Inez would like refills of her Ondansetron and folic acid. She reports pain/neuropathy of arms, hands, legs, feet. She notes she is very unsteady on her feet, and has back pain and pain in her right armpit as well. She states she has not slept in 3 days, and has emesis after eating, and is unable to eat much at all. She would also like to review her PET scan of 9/6.  Perla was notified.      Thelma Zafar, NEO              "

## 2024-09-23 NOTE — LETTER
9/23/2024      Inez Rodriguez  [address]  [facility] [address]      Dear Colleague,    Thank you for referring your patient, Inez Rodriguez, to the [facility] [place]. Please see a copy of my visit note below.    [facility] Hematology and Oncology Progress Note    Patient: Inez Rodriguez  MRN: [id number]  Date of Service: Sep 23, 2024    Reason for Visit    Chief Complaint   Patient presents with     Oncology Clinic Visit     2 week return visit with labs/infusion related to Malignant neoplasm of lower lobe of right lung.       Assessment and Plan     Cancer Staging   No matching staging information was found for the patient.    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 She completed 4 cycles of caboplatin, Alimta, and pembrolizumab with good response on CT. Carboplatin discontinued after 4 cycles. Currently on maintenance Alimta and pembrolizumab. Recent PET 9/6/24 after 10 cycles shows continued response with residual disease in right perihilar, subcarinal, and right paratracheal lymph nodes. No uptake seen in previous osseous sites of T1, T4, and right sacrum. Cycle 11 was deferred two weeks ago due to poor performance status as she has been getting weaker and had a recent fall. She has continued to worsen with increasing weakness, positional dizziness, eye blurriness, and fatigue. She also reports new hand tremors and nausea with occasional vomiting. She is unable to move without a wheel chair. I reviewed labs today. TSH is normal. CMP shows no electrolyte abnormalities, however, AST is mildly increasing at 64. CBC shows improving Hgb of 11.3 with normal WBC and PLT count. Discussed case with Dr. Miller. Concern for immunotherapy toxicity with hypophysitis vs possible metastatic disease in brain. Initial brain MRI in January was negative.     Nausea and vomiting  Refilled ondansetron. Encouraged small, frequent meals.  Dr Sánchez aware of the mri results. No further orders at this time.       High TSH on 9/9/24.   Normal today at 0.86. Will continue to monitor.     HTN  On amlodipine 5 mg daily. BP today 109/63. Pt has a cuff at home.     Plan  Hold treatment for today   Will order stat brain MRI  Start prednisone 60 mg taper for possible immunotherapy toxicity after brain MRI. Stop dexamethasone.   Also ordered cortisol, testosterone, FSH, and LH today.   Will have her return in 3 days to review results with Dr. Miller.   Discussed monitoring bp at home daily and holding amlodipine when bp < 120/80. Will continue to montior.     ECOG Performance    3 - Confined to bed/chair > 50% of time, capable of limited self care    Distress Screening (within last 30 days)    No data recorded     Pain  Pain Score: Worst Pain (10)  Pain Loc: Arm (arms, legs, hands, feet)    Problem List    Patient Active Problem List   Diagnosis     Observation for suspected malignant neoplasm     Thyroid nodule     Chronic cough     Pulmonary nodules     Wheezing-associated respiratory infection (WARI)     Malignant neoplasm of lower lobe of right lung (H)     Postobstructive pneumonia     Lung cancer metastatic to bone (H)     Antineoplastic chemotherapy induced anemia        ______________________________________________________________________________      Diagnosis:   Lung cancer, January 2024 came to ER with SOB.   -1/7/24: Mediastinal and right hilar adenopathy most suggestive of metastatic adenopathy from a primary lung malignancy. A nodular opacity in the right upper lobe could represent a primary lung malignancy versus an infectious/inflammatory focus. Nichole conglomerate demonstrate mass effect on the right upper lobe bronchus and bronchus intermedius with associated narrowing without occlusion. Brain MRI negative.   -1/8/24: EBUS done and 4R, 11R and subcarinal nodes all positive for malignancy. NSCLC, favor adenocarcinoma. Right upper lobe nodule was also positive. Pleural fluid suspicious.   -1/29/24: PET: Findings  "suspicious for a right upper lobe primary lung neoplasm with extensive mediastinal/hilar lymph node metastases and osseous metastases to the T4 vertebral body and right sacrum.   Neogenomics: KRAS G12C mutation. Tp53 mutation. PTEN deletion +, MSI stable, PDL1 0%, TMB intermediate, Her2/leisa negative. Eveything else was negative.      Treatment:   -2/14/24: palliative treatment with Carboplatin, Alimta, Keytruda x 4 cycles  -5/6/24: Alimta and Keytruda  -9/9/24: Cycle 11 deferred due to weakness, recent fall  -9/23/24: Continued to defer due to weakness, fatigue, and tremors     Interval History    Inez is here to follow up 2 weeks after deferring treatment due to weakness, fatigue, and recent fall. She reports feeling much worse the past two weeks. Her fatigue and weakness prevent her from bathing or moving. She spends all her time in bed. She continues to have intermittent right arm shooting pain that is worsening. She is unable to watch TV due to worsening vision blurriness and positional dizziness. She has not passed out. She started getting a hand tremor L>R in the last two weeks and is unable to feed herself easily. She reports worsening nausea and has had several episodes of vomiting. She takes ondansetron twice daily before meals which has been helping. She is eating very little although her weight is stable. She is continuing to hydrate well. She notes the dexamethasone is the only thing that makes her feel better and gives her energy.     Review of Systems    Pertinent items are noted in HPI.    Past History    Past Medical History:   Diagnosis Date     Hypertension      SOB (shortness of breath)        Physical Exam        9/23/2024     1:26 PM   Vital Signs   Systolic 109   Diastolic 63   Pulse 131   Temperature 97.5  F (36.4  C)   Respirations 22   Weight (LB) 121 lb 4.8 oz   Height 5' 5\"   BMI (Calculated) 20.19   Pain Score 10 (Worst)   O2 97 %       General: alert, appears stated age, cooperative, and " fatigued  HEENT: Head: Normal, normocephalic, atraumatic. Anicteric   Chest: Normal chest wall and respirations. Clear to auscultation.  Cardiac: regular rhythm, tachycardia   Abdomen: abdomen is soft without significant tenderness, masses, organomegaly or guarding  Extremities: no lower extremity edema  Skin: no rashes or lesions.  CNS: mental status, speech normal, alert and oriented x3, OCTAVIA, and finger to nose and cerebellar exam normal. Normal Romberg. Nystagmus noted on right EOM. Upper and lower extremities 5/5 on strength.    Lymphatics: No cervical, supraclavicular, or axillary lymphadenopathy.       Lab Results    Recent Results (from the past 168 hour(s))   Comprehensive metabolic panel   Result Value Ref Range    Sodium 140 135 - 145 mmol/L    Potassium 4.2 3.4 - 5.3 mmol/L    Carbon Dioxide (CO2) 25 22 - 29 mmol/L    Anion Gap 11 7 - 15 mmol/L    Urea Nitrogen 6.9 (L) 8.0 - 23.0 mg/dL    Creatinine 0.92 0.51 - 0.95 mg/dL    GFR Estimate 65 >60 mL/min/1.73m2    Calcium 9.4 8.8 - 10.4 mg/dL    Chloride 104 98 - 107 mmol/L    Glucose 168 (H) 70 - 99 mg/dL    Alkaline Phosphatase 107 40 - 150 U/L    AST 64 (H) 0 - 45 U/L    ALT 29 0 - 50 U/L    Protein Total 6.3 (L) 6.4 - 8.3 g/dL    Albumin 3.6 3.5 - 5.2 g/dL    Bilirubin Total 0.3 <=1.2 mg/dL   CBC with platelets   Result Value Ref Range    WBC Count 8.4 4.0 - 11.0 10e3/uL    RBC Count 3.53 (L) 3.80 - 5.20 10e6/uL    Hemoglobin 11.3 (L) 11.7 - 15.7 g/dL    Hematocrit 34.5 (L) 35.0 - 47.0 %    MCV 98 78 - 100 fL    MCH 32.0 26.5 - 33.0 pg    MCHC 32.8 31.5 - 36.5 g/dL    RDW 16.0 (H) 10.0 - 15.0 %    Platelet Count 271 150 - 450 10e3/uL   TSH with free T4 reflex   Result Value Ref Range    TSH 0.86 0.30 - 4.20 uIU/mL       Imaging    PET Oncology (Eyes to Thighs)    Result Date: 9/6/2024  EXAM: PET ONCOLOGY (EYES TO THIGHS) LOCATION: [facility] DATE: 9/6/2024 INDICATION: Subsequent treatment planning and restaging for malignant  neoplasm of lower lobe, right bronchus or lung. Currently receiving chemotherapy/immunotherapy. Monitor treatment response COMPARISON: FDG PET/CT dated 1/29/2024 and CT of the abdomen pelvis dated 8/24/2024 CONTRAST: None TECHNIQUE: Serum glucose level 83 mg/dL. One hour post intravenous administration of 10.3 mCi F-18 FDG, PET imaging was performed from the skull vertex to mid thighs, utilizing attenuation correction with concurrent axial CT and PET/CT image fusion. Dose  reduction techniques were used. PET/CT FINDINGS: Residual right perihilar (Max SUV 18.2, previously 17.9), subcarinal station 7 (Max SUV 6.3, previously 15.0), right lower paratracheal station 4R (Max SUV 7.5, previously 16.1), and right upper paratracheal station 2R (Max SUV 4.0, previously 5.8) lymph nodes suggesting marked interval response to therapy. Degenerative shoulder and hip synovitis. CT FINDINGS: Mild senescent intercranial changes. Mild to moderate carotid artery bifurcation calcification. Non-FDG avid 1.2 cm nodule in the left thyroid lobe suggesting benignity. Left chest port with tip terminating in the mid SVC. Moderate coronary artery calcium cystic change in the anterior right upper lobe. Sigmoid diverticulosis. Pelvic phleboliths. Multilevel degenerative changes of the spine. The lower extremities are unremarkable.     IMPRESSION: Residual right perihilar, subcarinal, and right paratracheal lymph nodes suggesting marked interval response to therapy     The longitudinal plan of care for the diagnosis(es)/condition(s) as documented were addressed during this visit. Due to the added complexity in care, I will continue to support Inez in the subsequent management and with ongoing continuity of care.    Total time 60 minutes, to include face to face visit, review of EMR, ordering, documentation and coordination of care on date of service.    Signed by: Perla Sanchez PA-C    Oncology Rooming Note    September 23, 2024 1:41 PM  "  Inez Rodriguez is a 74 year old female who presents for:    Chief Complaint   Patient presents with     Oncology Clinic Visit     2 week return visit with labs/infusion related to Malignant neoplasm of lower lobe of right lung.     Initial Vitals: /63 (BP Location: Left arm, Patient Position: Sitting, Cuff Size: Adult Regular)   Pulse (!) 131   Temp 97.5  F (36.4  C) (Tympanic)   Resp 22   Ht 1.651 m (5' 5\")   Wt 55 kg (121 lb 4.8 oz)   LMP  (LMP Unknown)   SpO2 97%   BMI 20.19 kg/m   Estimated body mass index is 20.19 kg/m  as calculated from the following:    Height as of this encounter: 1.651 m (5' 5\").    Weight as of this encounter: 55 kg (121 lb 4.8 oz). Body surface area is 1.59 meters squared.  Worst Pain (10) Comment: Data Unavailable   No LMP recorded (lmp unknown). Patient is postmenopausal.  Allergies reviewed: Yes  Medications reviewed: Yes    Medications: MEDICATION REFILLS NEEDED TODAY. Provider was notified.  Pharmacy name entered into [employer]:    [id number] - [place] [id number] [facility] [address]    Frailty Screening:   Is the patient here for a new oncology consult visit in cancer care? 2. No      Clinical concerns: Inez would like refills of her Ondansetron and folic acid. She reports pain/neuropathy of arms, hands, legs, feet. She notes she is very unsteady on her feet, and has back pain and pain in her right armpit as well. She states she has not slept in 3 days, and has emesis after eating, and is unable to eat much at all. She would also like to review her PET scan of 9/6.  Perla was notified.      Thelma Zafar CMA                Again, thank you for allowing me to participate in the care of your patient.        Sincerely,        Perla Sanchez PA-C  "

## 2024-09-23 NOTE — PROGRESS NOTES
[facility] Hematology and Oncology Progress Note    Patient: Inez Rodriguez  MRN: [id number]  Date of Service: Sep 23, 2024    Reason for Visit    Chief Complaint   Patient presents with    Oncology Clinic Visit     2 week return visit with labs/infusion related to Malignant neoplasm of lower lobe of right lung.       Assessment and Plan     Cancer Staging   No matching staging information was found for the patient.    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 She completed 4 cycles of caboplatin, Alimta, and pembrolizumab with good response on CT. Carboplatin discontinued after 4 cycles. Currently on maintenance Alimta and pembrolizumab. Recent PET 9/6/24 after 10 cycles shows continued response with residual disease in right perihilar, subcarinal, and right paratracheal lymph nodes. No uptake seen in previous osseous sites of T1, T4, and right sacrum. Cycle 11 was deferred two weeks ago due to poor performance status as she has been getting weaker and had a recent fall. She has continued to worsen with increasing weakness, positional dizziness, eye blurriness, and fatigue. She also reports new hand tremors and nausea with occasional vomiting. She is unable to move without a wheel chair. I reviewed labs today. TSH is normal. CMP shows no electrolyte abnormalities, however, AST is mildly increasing at 64. CBC shows improving Hgb of 11.3 with normal WBC and PLT count. Discussed case with Dr. Miller. Concern for immunotherapy toxicity with hypophysitis vs possible metastatic disease in brain. Initial brain MRI in January was negative.     Nausea and vomiting  Refilled ondansetron. Encouraged small, frequent meals.     High TSH on 9/9/24.   Normal today at 0.86. Will continue to monitor.     HTN  On amlodipine 5 mg daily. BP today 109/63. Pt has a cuff at home.     Plan  Hold treatment for today   Will order stat brain MRI  Start prednisone 60 mg taper for  possible immunotherapy toxicity after brain MRI. Stop dexamethasone.   Also ordered cortisol, testosterone, FSH, and LH today.   Will have her return in 3 days to review results with Dr. Miller.   Discussed monitoring bp at home daily and holding amlodipine when bp < 120/80. Will continue to montior.     ECOG Performance    3 - Confined to bed/chair > 50% of time, capable of limited self care    Distress Screening (within last 30 days)    No data recorded     Pain  Pain Score: Worst Pain (10)  Pain Loc: Arm (arms, legs, hands, feet)    Problem List    Patient Active Problem List   Diagnosis    Observation for suspected malignant neoplasm    Thyroid nodule    Chronic cough    Pulmonary nodules    Wheezing-associated respiratory infection (WARI)    Malignant neoplasm of lower lobe of right lung (H)    Postobstructive pneumonia    Lung cancer metastatic to bone (H)    Antineoplastic chemotherapy induced anemia        ______________________________________________________________________________      Diagnosis:   Lung cancer, January 2024 came to ER with SOB.   -1/7/24: Mediastinal and right hilar adenopathy most suggestive of metastatic adenopathy from a primary lung malignancy. A nodular opacity in the right upper lobe could represent a primary lung malignancy versus an infectious/inflammatory focus. Nichole conglomerate demonstrate mass effect on the right upper lobe bronchus and bronchus intermedius with associated narrowing without occlusion. Brain MRI negative.   -1/8/24: EBUS done and 4R, 11R and subcarinal nodes all positive for malignancy. NSCLC, favor adenocarcinoma. Right upper lobe nodule was also positive. Pleural fluid suspicious.   -1/29/24: PET: Findings suspicious for a right upper lobe primary lung neoplasm with extensive mediastinal/hilar lymph node metastases and osseous metastases to the T4 vertebral body and right sacrum.   Neogenomics: KRAS G12C mutation. Tp53 mutation. PTEN deletion +, MSI stable,  "PDL1 0%, TMB intermediate, Her2/leisa negative. Eveything else was negative.      Treatment:   -2/14/24: palliative treatment with Carboplatin, Alimta, Keytruda x 4 cycles  -5/6/24: Alimta and Keytruda  -9/9/24: Cycle 11 deferred due to weakness, recent fall  -9/23/24: Continued to defer due to weakness, fatigue, and tremors     Interval History    Inez is here to follow up 2 weeks after deferring treatment due to weakness, fatigue, and recent fall. She reports feeling much worse the past two weeks. Her fatigue and weakness prevent her from bathing or moving. She spends all her time in bed. She continues to have intermittent right arm shooting pain that is worsening. She is unable to watch TV due to worsening vision blurriness and positional dizziness. She has not passed out. She started getting a hand tremor L>R in the last two weeks and is unable to feed herself easily. She reports worsening nausea and has had several episodes of vomiting. She takes ondansetron twice daily before meals which has been helping. She is eating very little although her weight is stable. She is continuing to hydrate well. She notes the dexamethasone is the only thing that makes her feel better and gives her energy.     Review of Systems    Pertinent items are noted in HPI.    Past History    Past Medical History:   Diagnosis Date    Hypertension     SOB (shortness of breath)        Physical Exam        9/23/2024     1:26 PM   Vital Signs   Systolic 109   Diastolic 63   Pulse 131   Temperature 97.5  F (36.4  C)   Respirations 22   Weight (LB) 121 lb 4.8 oz   Height 5' 5\"   BMI (Calculated) 20.19   Pain Score 10 (Worst)   O2 97 %       General: alert, appears stated age, cooperative, and fatigued  HEENT: Head: Normal, normocephalic, atraumatic. Anicteric   Chest: Normal chest wall and respirations. Clear to auscultation.  Cardiac: regular rhythm, tachycardia   Abdomen: abdomen is soft without significant tenderness, masses, organomegaly or " guarding  Extremities: no lower extremity edema  Skin: no rashes or lesions.  CNS: mental status, speech normal, alert and oriented x3, OCTAVIA, and finger to nose and cerebellar exam normal. Normal Romberg. Nystagmus noted on right EOM. Upper and lower extremities 5/5 on strength.    Lymphatics: No cervical, supraclavicular, or axillary lymphadenopathy.       Lab Results    Recent Results (from the past 168 hour(s))   Comprehensive metabolic panel   Result Value Ref Range    Sodium 140 135 - 145 mmol/L    Potassium 4.2 3.4 - 5.3 mmol/L    Carbon Dioxide (CO2) 25 22 - 29 mmol/L    Anion Gap 11 7 - 15 mmol/L    Urea Nitrogen 6.9 (L) 8.0 - 23.0 mg/dL    Creatinine 0.92 0.51 - 0.95 mg/dL    GFR Estimate 65 >60 mL/min/1.73m2    Calcium 9.4 8.8 - 10.4 mg/dL    Chloride 104 98 - 107 mmol/L    Glucose 168 (H) 70 - 99 mg/dL    Alkaline Phosphatase 107 40 - 150 U/L    AST 64 (H) 0 - 45 U/L    ALT 29 0 - 50 U/L    Protein Total 6.3 (L) 6.4 - 8.3 g/dL    Albumin 3.6 3.5 - 5.2 g/dL    Bilirubin Total 0.3 <=1.2 mg/dL   CBC with platelets   Result Value Ref Range    WBC Count 8.4 4.0 - 11.0 10e3/uL    RBC Count 3.53 (L) 3.80 - 5.20 10e6/uL    Hemoglobin 11.3 (L) 11.7 - 15.7 g/dL    Hematocrit 34.5 (L) 35.0 - 47.0 %    MCV 98 78 - 100 fL    MCH 32.0 26.5 - 33.0 pg    MCHC 32.8 31.5 - 36.5 g/dL    RDW 16.0 (H) 10.0 - 15.0 %    Platelet Count 271 150 - 450 10e3/uL   TSH with free T4 reflex   Result Value Ref Range    TSH 0.86 0.30 - 4.20 uIU/mL       Imaging    PET Oncology (Eyes to Thighs)    Result Date: 9/6/2024  EXAM: PET ONCOLOGY (EYES TO THIGHS) LOCATION: [facility] DATE: 9/6/2024 INDICATION: Subsequent treatment planning and restaging for malignant neoplasm of lower lobe, right bronchus or lung. Currently receiving chemotherapy/immunotherapy. Monitor treatment response COMPARISON: FDG PET/CT dated 1/29/2024 and CT of the abdomen pelvis dated 8/24/2024 CONTRAST: None TECHNIQUE: Serum glucose level 83  mg/dL. One hour post intravenous administration of 10.3 mCi F-18 FDG, PET imaging was performed from the skull vertex to mid thighs, utilizing attenuation correction with concurrent axial CT and PET/CT image fusion. Dose  reduction techniques were used. PET/CT FINDINGS: Residual right perihilar (Max SUV 18.2, previously 17.9), subcarinal station 7 (Max SUV 6.3, previously 15.0), right lower paratracheal station 4R (Max SUV 7.5, previously 16.1), and right upper paratracheal station 2R (Max SUV 4.0, previously 5.8) lymph nodes suggesting marked interval response to therapy. Degenerative shoulder and hip synovitis. CT FINDINGS: Mild senescent intercranial changes. Mild to moderate carotid artery bifurcation calcification. Non-FDG avid 1.2 cm nodule in the left thyroid lobe suggesting benignity. Left chest port with tip terminating in the mid SVC. Moderate coronary artery calcium cystic change in the anterior right upper lobe. Sigmoid diverticulosis. Pelvic phleboliths. Multilevel degenerative changes of the spine. The lower extremities are unremarkable.     IMPRESSION: Residual right perihilar, subcarinal, and right paratracheal lymph nodes suggesting marked interval response to therapy     The longitudinal plan of care for the diagnosis(es)/condition(s) as documented were addressed during this visit. Due to the added complexity in care, I will continue to support Inez in the subsequent management and with ongoing continuity of care.    Total time 60 minutes, to include face to face visit, review of EMR, ordering, documentation and coordination of care on date of service.    Signed by: Perla Sanchez PA-C

## 2024-09-23 NOTE — TELEPHONE ENCOUNTER
Retail Pharmacy Prior Authorization Team   Phone: [phone]    PA Initiation    Medication: PREDNISONE 20 MG PO TABS  Insurance Company: [employer] - Phone [phone] Fax [phone]  Pharmacy Filling the Rx: [place], [place] - [address] [place] [address]  Filling Pharmacy Phone: [phone]  Filling Pharmacy Fax: [phone]  Start Date: 9/23/2024

## 2024-09-24 ENCOUNTER — HOSPITAL ENCOUNTER (OUTPATIENT)
Dept: MRI IMAGING | Facility: HOSPITAL | Age: 74
Discharge: HOME | End: 2024-09-24
Payer: COMMERCIAL

## 2024-09-24 DIAGNOSIS — C79.51 LUNG CANCER METASTATIC TO BONE (H): ICD-10-CM

## 2024-09-24 DIAGNOSIS — R42 DIZZINESS: ICD-10-CM

## 2024-09-24 DIAGNOSIS — R25.1 TREMOR: ICD-10-CM

## 2024-09-24 DIAGNOSIS — H53.8 BLURRED VISION: ICD-10-CM

## 2024-09-24 DIAGNOSIS — C34.90 LUNG CANCER METASTATIC TO BONE (H): ICD-10-CM

## 2024-09-24 DIAGNOSIS — R53.83 FATIGUE, UNSPECIFIED TYPE: ICD-10-CM

## 2024-09-24 DIAGNOSIS — Z51.12 ENCOUNTER FOR ANTINEOPLASTIC IMMUNOTHERAPY: ICD-10-CM

## 2024-09-24 PROCEDURE — 255N000002 HC RX 255 OP 636

## 2024-09-24 PROCEDURE — 70553 MRI BRAIN STEM W/O & W/DYE: CPT

## 2024-09-24 PROCEDURE — A9585 GADOBUTROL INJECTION: HCPCS

## 2024-09-24 RX ORDER — GADOBUTROL 604.72 MG/ML
5.5 INJECTION INTRAVENOUS ONCE
Status: COMPLETED | OUTPATIENT
Start: 2024-09-24 | End: 2024-09-24

## 2024-09-24 RX ADMIN — GADOBUTROL 5.5 ML: 604.72 INJECTION INTRAVENOUS at 09:39

## 2024-09-24 NOTE — TELEPHONE ENCOUNTER
PRIOR AUTHORIZATION DENIED    Medication: PREDNISONE 20 MG PO TABS  Insurance Company: [employer] - Phone [phone] Fax [phone]  Denial Date: 9/23/2024  Denial Reason(s): Dx      Appeal Information:   [facility]  Fax:  [phone]

## 2024-09-24 NOTE — PROGRESS NOTES
[facility]: Cancer Care                                                                                            Situation: Patient chart reviewed by care coordinator.    Background: Patient with a diagnosis of advanced stage IV non-small cell lung cancer.  Please see Dr Miller's note from 9/9/2024 for full diagnosis and treatment details.    Assessment: Back on 9/9 patient's treatment was deferred for 2 weeks.  She was back in the clinic today for reassessment and potentially resuming treatment.    Plan/Recommendations: After reassessment and discussion with Dr Miller, SHERITA Trejo states that there is concern for immunotherapy toxicity with hypophysitis versus possible metastatic disease in the brain.  She did have an initial brain MRI in January which was negative.    It was decided that we would hold her treatment again today, get brain MRI and have her follow-up with Dr Miller.  She is also going to start prednisone 60 mg taper for possible immunotherapy toxicity.  She will not do this until after the brain MRI.    Brain MRI has been scheduled for 9/24 and she will follow-up with Dr Miller on 9/26.    Signature:  María Casillas RN

## 2024-09-24 NOTE — TELEPHONE ENCOUNTER
Prior Authorization Approval-We have now received an approval from [facility] for this    Medication: PREDNISONE 20 MG PO TABS  Authorization Effective Date: 9/23/2024  Authorization Expiration Date: 9/23/2025  Approved Dose/Quantity:   Reference #:     Insurance Company: [employer] - Phone [phone] Fax [phone]  Expected CoPay: $    CoPay Card Available:      Financial Assistance Needed:   Which Pharmacy is filling the prescription: [facility] [place], [place] - [address] [place] [address]  Pharmacy Notified: Yes  Patient Notified:

## 2024-09-26 ENCOUNTER — ONCOLOGY VISIT (OUTPATIENT)
Dept: ONCOLOGY | Facility: HOSPITAL | Age: 74
End: 2024-09-26
Payer: COMMERCIAL

## 2024-09-26 ENCOUNTER — TELEPHONE (OUTPATIENT)
Dept: ONCOLOGY | Facility: HOSPITAL | Age: 74
End: 2024-09-26
Payer: COMMERCIAL

## 2024-09-26 ENCOUNTER — PATIENT OUTREACH (OUTPATIENT)
Dept: ONCOLOGY | Facility: HOSPITAL | Age: 74
End: 2024-09-26

## 2024-09-26 VITALS
SYSTOLIC BLOOD PRESSURE: 140 MMHG | WEIGHT: 115.3 LBS | TEMPERATURE: 97.3 F | BODY MASS INDEX: 19.19 KG/M2 | RESPIRATION RATE: 16 BRPM | OXYGEN SATURATION: 98 % | HEART RATE: 110 BPM | DIASTOLIC BLOOD PRESSURE: 65 MMHG

## 2024-09-26 DIAGNOSIS — C79.51 LUNG CANCER METASTATIC TO BONE (H): Primary | ICD-10-CM

## 2024-09-26 DIAGNOSIS — C34.90 LUNG CANCER METASTATIC TO BONE (H): Primary | ICD-10-CM

## 2024-09-26 DIAGNOSIS — C34.31 MALIGNANT NEOPLASM OF LOWER LOBE OF RIGHT LUNG (H): ICD-10-CM

## 2024-09-26 PROCEDURE — G2211 COMPLEX E/M VISIT ADD ON: HCPCS | Performed by: INTERNAL MEDICINE

## 2024-09-26 PROCEDURE — 99215 OFFICE O/P EST HI 40 MIN: CPT | Performed by: INTERNAL MEDICINE

## 2024-09-26 PROCEDURE — G0463 HOSPITAL OUTPT CLINIC VISIT: HCPCS | Performed by: INTERNAL MEDICINE

## 2024-09-26 RX ORDER — HEPARIN SODIUM (PORCINE) LOCK FLUSH IV SOLN 100 UNIT/ML 100 UNIT/ML
5 SOLUTION INTRAVENOUS
OUTPATIENT
Start: 2024-10-10

## 2024-09-26 RX ORDER — ONDANSETRON 2 MG/ML
8 INJECTION INTRAMUSCULAR; INTRAVENOUS ONCE
Start: 2024-10-10 | End: 2024-10-10

## 2024-09-26 RX ORDER — HEPARIN SODIUM,PORCINE 10 UNIT/ML
5-20 VIAL (ML) INTRAVENOUS DAILY PRN
OUTPATIENT
Start: 2024-10-10

## 2024-09-26 RX ORDER — DIPHENHYDRAMINE HYDROCHLORIDE 50 MG/ML
50 INJECTION INTRAMUSCULAR; INTRAVENOUS
Start: 2024-10-10

## 2024-09-26 RX ORDER — METHYLPREDNISOLONE SODIUM SUCCINATE 125 MG/2ML
125 INJECTION, POWDER, LYOPHILIZED, FOR SOLUTION INTRAMUSCULAR; INTRAVENOUS
Start: 2024-10-10

## 2024-09-26 RX ORDER — ALBUTEROL SULFATE 90 UG/1
1-2 AEROSOL, METERED RESPIRATORY (INHALATION)
Start: 2024-10-10

## 2024-09-26 RX ORDER — EPINEPHRINE 1 MG/ML
0.3 INJECTION, SOLUTION INTRAMUSCULAR; SUBCUTANEOUS EVERY 5 MIN PRN
OUTPATIENT
Start: 2024-10-10

## 2024-09-26 RX ORDER — ALBUTEROL SULFATE 0.83 MG/ML
2.5 SOLUTION RESPIRATORY (INHALATION)
OUTPATIENT
Start: 2024-10-10

## 2024-09-26 RX ORDER — LORAZEPAM 2 MG/ML
0.5 INJECTION INTRAMUSCULAR EVERY 4 HOURS PRN
OUTPATIENT
Start: 2024-10-10

## 2024-09-26 ASSESSMENT — PAIN SCALES - GENERAL: PAINLEVEL: NO PAIN (0)

## 2024-09-26 NOTE — PROGRESS NOTES
[facility]: Cancer Care                                                                                            Situation: Patient chart reviewed by care coordinator.    Background: Patient with a diagnosis of advanced stage IV non-small cell lung cancer. Please see Dr Miller's note from 9/9/2024 for full diagnosis and treatment details.     Assessment: Patient was in the clinic earlier this week to see SHERITA Trejo.  A brain MRI was ordered stat and she was to follow-up with Dr Miller.    Plan/Recommendations: Patient comes back in today to review the brain MRI results.  He tells her that she may need to continue on steroids after she finishes the current taper.  He does not want to use Keytruda for at least some time he does recommend resuming Alimta therapy.  She should come back for that in the next couple weeks.  She should then see a provider back around 10/31.    Patient has been scheduled to come back on 10/10 for labs and Alimta therapy.  She will then come back on 10/31 for labs, Dr Miller and Alimta therapy.    I will call patient after she resumes treatment to make sure she is tolerating it okay.    Signature:  María Casillas RN

## 2024-09-26 NOTE — PROGRESS NOTES
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  A very pleasant 74 year old  woman with advanced stage IV non-small cell lung cancer.  The primary seems to be coming from the right upper lobe.  She has mediastinal lymph nodes as well as couple of osseous metastases. T1 in the T4 vertebral body and 1 in the sacrum.  Molecular testing does show that this is K-tray G12C mutated tumor.  No other targeted mutation present except for PTEN which is not significant.  She has been started on palliative chemotherapy with carboplatin Alimta and pembrolizumab.  She has had good response after 2 cycles and continuation of response after 4 cycles.  Carboplatin discontinued after 4 cycles.  CT 7/24 continues to shows further decrease in the size of the lymph nodes in the mediastinum. She has been feeling more fatigue, nausea, and constipation as the treatments continues. PET 9/6/24 after 10 cycles showed residual right perihilar, subcarinal, and right paratracheal lymph nodes suggesting continued response.  However she was having a lot of symptoms including feeling very weak, excessive sleepiness, poor p.o. intake etc.  She was given a small dose of steroids and that did make her feel better as long as she was taking them.  After stopping her symptoms returned again.  She is coming back after her brain MRI.  She also had a workup done to see if she was having autoimmune issues secondary to Keytruda affecting her endocrine system.   2.  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  Pathology is positive for non-small cell lung cancer.  Responding well to treatment.  Most of the bulky mediastinal lymphadenopathy has resolved but small amount still remains.  3.  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  4.  Some allergic symptoms as well.  5.  History of incidentally diagnosed diverticulitis.  6.  On hypothyroidism.      Plan and medical decision making   Patient presenting with severe side effects of autoimmune nature after Keytruda based therapy. Reviewed notes from each unique source.  Reviewed each unique test.    Ordered tests.    Independently interpreted lab tests and radiological exams performed by other physicians.  Personally reviewed the images of her brain MRI and the PET scan.  Independent historian account obtained from her son.  Decision made to resume treatment with Alimta alone.  Very close monitoring.  Will need to keep her on a small dose of steroids.      Discussed with the patient that based on her lab review she may need to be on some steroids after she finishes the current taper.  We discussed the course of action going forward.  Since we cannot use Keytruda for at least some time I would recommend resuming Alimta therapy.  We will plan on starting that in the next week or 2.  Continue on steroids for some time.  We will slowly taper down the dose.  She will most likely stay on 5-10 mg of prednisone daily.  Continue with folic acid and B12 supplementation.  Increase her physical activity.  Will closely monitor her T3-T4 TSH etc.  The longitudinal plan of care for the diagnosis(es)/condition(s) as documented were addressed during this visit. Due to the added complexity in care, I will continue to support Inez in the subsequent management and with ongoing continuity of care.     Clinical/pathological stage      Stage IV    History of present illness      Ms. Inez Rodriguez is a 74 year old  woman who  came to the emergency room in the beginning of January 2024 with increasing shortness of breath.  Was also having some coughing which was not responding to treatment given by the urgent care.  In the emergency room she had a CT scan done to rule out any pulmonary embolism but was positive for multiple lymph nodes in the mediastinum as well as subcarinal area.  She was therefore admitted.  She was seen by pulmonology.  Underwent bronchoscopy and biopsy.  The bronchoscopy came back positive for adenocarcinoma involving subcarinal and mediastinal lymph nodes.    The patient does have a's history of smoking exposure but tells me that she has not smoked for several years.  No significant family history of cancer.  She recently moved from [place].    PET scan done on 29 January 2024 showed that she had extensive disease not only involving the right upper lobe but having extensive mediastinal hilar lymph node metastases and osseous metastases on the T4 vertebral body and right sacrum.    Has been started on chemoimmunotherapy with carboplatin etoposide and pemetrexed.  She has tolerated with moderate side effects.  Comes in today for follow-up.  She has finished 4 cycles.  CT scan after 2 cycles showed decrease in the size of the right upper lobe mass as well as mediastinal lymphadenopathy.  Increasing sclerosis was noted in the T4 vertebral body.    After 4 cycles carboplatin was discontinued.  We continued on pemetrexed and pembrolizumab.  And has been tolerating that quite well.  She has symptoms lasting about a week after chemotherapy.  The last 2 weeks usually she is feeling better.  CT showed improvement in lymphadenopathy.     She is here with her son for cycle 10 with new left sided neck pain and right-sided axillary pain.  This has been new for the past 2 weeks and is bothersome.  She does not have any breathing changes.  She continues to have allergy-like symptoms with nasal dripping and itchy eyes.  She took  Claritin for 1 day and did not notice improvement and has not taken it since.  She continues to have poor oral intake due to decreased appetite and fatigue.  She has lost 6 pounds in the last month.  She eats sometimes only yogurt in a day.  She sleeps 15 to 20 hours each day.  She has not been very active.  She also reports constipation and has not had a bowel movement in 5 days.  She also has had decreased intake.  She has not been drinking much water.  She has tried to senna yesterday without results.    She had PET scan 9/6/24. She had a fall  on 8/24/24 without injury and was found to have diverticulitis incidentally in the ED. the PET scan did show good response but still some disease was active.  However she was having fair bit of side effects.  She was feeling very sleepy and sleeping more than 15 to 16 hours a day.  Not eating very well.  She had lost weight.  She was given small dose of steroids which did make her feel better throughout the time she was taking them.  She was again seen last week.  There was a concern that she might be developing autoimmune issues of endocrine nature.  Which might be causing her to feel very tired fatigue etc.  She had some labs for.  She Also Had a Brain MRI Done to Make Sure That She Was Not Developing Any New Brain Lesions.    Inez reports that after she got the MRI she had a large bowel movement.  She felt significantly better after that.  Then she started taking steroids.  After taking the steroids that she also felt better.  She also got some sleep.  Has been eating better.  Comes in today for follow-up.  Feeling remarkably better compared to before.    Review of system      Details noted in the history of present illness.  A detailed review of systems is otherwise negative.      Physical exam        BP (!) 140/65 (BP Location: Left arm, Patient Position: Sitting, Cuff Size: Adult Regular)   Pulse 110   Temp 97.3  F (36.3  C) (Tympanic)   Resp 16   Wt 52.3 kg (115  lb 4.8 oz)   LMP  (LMP Unknown)   SpO2 98%   BMI 19.19 kg/m      GENERAL: No acute distress. Cooperative in conversation.   HEENT:  Pupils are equal, round and reactive. Oral mucosa is clean and intact. No ulcerations or mucositis noted. No bleeding noted.  RESP:Chest symmetric lungs are clear bilaterally per auscultation. Regular respiratory rate. No wheezes or rhonchi.  CV: Normal S1 S2 Regular, rate and rhythm.     ABD: Nondistended, soft, nontender. Positive bowel sounds. No organomegaly.   EXTREMITIES: No lower extremity edema.   NEURO: Non- focal. Alert and oriented x3.  Cranial nerves appear intact.  PSYCH: Within normal limits. No depression or anxiety.  SKIN: Warm dry intact.      Lab results Reviewed      Recent Results (from the past 168 hour(s))   Comprehensive metabolic panel   Result Value Ref Range    Sodium 140 135 - 145 mmol/L    Potassium 4.2 3.4 - 5.3 mmol/L    Carbon Dioxide (CO2) 25 22 - 29 mmol/L    Anion Gap 11 7 - 15 mmol/L    Urea Nitrogen 6.9 (L) 8.0 - 23.0 mg/dL    Creatinine 0.92 0.51 - 0.95 mg/dL    GFR Estimate 65 >60 mL/min/1.73m2    Calcium 9.4 8.8 - 10.4 mg/dL    Chloride 104 98 - 107 mmol/L    Glucose 168 (H) 70 - 99 mg/dL    Alkaline Phosphatase 107 40 - 150 U/L    AST 64 (H) 0 - 45 U/L    ALT 29 0 - 50 U/L    Protein Total 6.3 (L) 6.4 - 8.3 g/dL    Albumin 3.6 3.5 - 5.2 g/dL    Bilirubin Total 0.3 <=1.2 mg/dL   CBC with platelets   Result Value Ref Range    WBC Count 8.4 4.0 - 11.0 10e3/uL    RBC Count 3.53 (L) 3.80 - 5.20 10e6/uL    Hemoglobin 11.3 (L) 11.7 - 15.7 g/dL    Hematocrit 34.5 (L) 35.0 - 47.0 %    MCV 98 78 - 100 fL    MCH 32.0 26.5 - 33.0 pg    MCHC 32.8 31.5 - 36.5 g/dL    RDW 16.0 (H) 10.0 - 15.0 %    Platelet Count 271 150 - 450 10e3/uL   TSH with free T4 reflex   Result Value Ref Range    TSH 0.86 0.30 - 4.20 uIU/mL   Cortisol   Result Value Ref Range    Cortisol 1.7   ug/dL   Follicle stimulating hormone   Result Value Ref Range    FSH 84.2 mIU/mL    Luteinizing Hormone   Result Value Ref Range    Luteinizing Hormone 46.2 mIU/mL   Sex Hormone Binding Globulin   Result Value Ref Range    Sex Hormone Binding Globulin 189 (H) 30 - 135 nmol/L   Testosterone Free and Total   Result Value Ref Range    Free Testosterone Calculated 0.06 ng/dL    Testosterone Total 13 8 - 60 ng/dL       Imaging results Reviewed        MRI Brain w & w/o contrast    Result Date: 9/24/2024  EXAM: MR BRAIN W/O and W CONTRAST LOCATION: [facility] DATE: 9/24/2024 INDICATION: Lung cancer metastatic to bone, Fatigue. Dizziness. Tremor. Blurred vision. COMPARISON: Head CT 8/24/2024. Brain MR 1/26/2024. CONTRAST: 5.5 mL Gadavist TECHNIQUE: Routine multiplanar multisequence head MRI without and with intravenous contrast. FINDINGS: INTRACRANIAL CONTENTS: No acute or subacute infarct. No mass, acute hemorrhage, or extra-axial fluid collections. Scattered nonspecific T2/FLAIR hyperintensities within the cerebral white matter most consistent with mild chronic microvascular ischemic change. Mild generalized cerebral atrophy. No hydrocephalus. Normal position of the cerebellar tonsils. No pathologic contrast enhancement. SELLA: No abnormality accounting for technique. OSSEOUS STRUCTURES/SOFT TISSUES: Normal marrow signal. The major intracranial vascular flow voids are maintained. ORBITS: No abnormality accounting for technique. SINUSES/MASTOIDS: No paranasal sinus mucosal disease. Right greater than left mastoid effusions.     IMPRESSION: 1.  No evidence of intracranial metastatic disease. 2.  No acute intracranial pathology. 3.  Mild diffuse parenchymal volume loss and white matter changes which are most likely due to chronic microvascular ischemic disease.    PET Oncology (Eyes to Thighs)    Result Date: 9/6/2024  EXAM: PET ONCOLOGY (EYES TO THIGHS) LOCATION: [facility] DATE: 9/6/2024 INDICATION: Subsequent treatment planning and restaging for  malignant neoplasm of lower lobe, right bronchus or lung. Currently receiving chemotherapy/immunotherapy. Monitor treatment response COMPARISON: FDG PET/CT dated 1/29/2024 and CT of the abdomen pelvis dated 8/24/2024 CONTRAST: None TECHNIQUE: Serum glucose level 83 mg/dL. One hour post intravenous administration of 10.3 mCi F-18 FDG, PET imaging was performed from the skull vertex to mid thighs, utilizing attenuation correction with concurrent axial CT and PET/CT image fusion. Dose  reduction techniques were used. PET/CT FINDINGS: Residual right perihilar (Max SUV 18.2, previously 17.9), subcarinal station 7 (Max SUV 6.3, previously 15.0), right lower paratracheal station 4R (Max SUV 7.5, previously 16.1), and right upper paratracheal station 2R (Max SUV 4.0, previously 5.8) lymph nodes suggesting marked interval response to therapy. Degenerative shoulder and hip synovitis. CT FINDINGS: Mild senescent intercranial changes. Mild to moderate carotid artery bifurcation calcification. Non-FDG avid 1.2 cm nodule in the left thyroid lobe suggesting benignity. Left chest port with tip terminating in the mid SVC. Moderate coronary artery calcium cystic change in the anterior right upper lobe. Sigmoid diverticulosis. Pelvic phleboliths. Multilevel degenerative changes of the spine. The lower extremities are unremarkable.     IMPRESSION: Residual right perihilar, subcarinal, and right paratracheal lymph nodes suggesting marked interval response to therapy       Signed by: Chino Miller MD      This note has been dictated using voice recognition software. Any grammatical or context distortions are unintentional and inherent to the software

## 2024-09-26 NOTE — PROGRESS NOTES
"Oncology Rooming Note    September 26, 2024 2:11 PM   Inez Rodriguez is a 74 year old female who presents for:    Chief Complaint   Patient presents with    Oncology Clinic Visit     Return visit 3 days. MRI 09/24/24. Lung cancer metastatic to bone.     Initial Vitals: BP (!) 140/65 (BP Location: Left arm, Patient Position: Sitting, Cuff Size: Adult Regular)   Pulse 110   Temp 97.3  F (36.3  C) (Tympanic)   Resp 16   Wt 52.3 kg (115 lb 4.8 oz)   LMP  (LMP Unknown)   SpO2 98%   BMI 19.19 kg/m   Estimated body mass index is 19.19 kg/m  as calculated from the following:    Height as of 9/23/24: 1.651 m (5' 5\").    Weight as of this encounter: 52.3 kg (115 lb 4.8 oz). Body surface area is 1.55 meters squared.  No Pain (0) Comment: Data Unavailable   No LMP recorded (lmp unknown). Patient is postmenopausal.  Allergies reviewed: Yes  Medications reviewed: Yes    Medications: Medication refills not needed today.  Pharmacy name entered into [facility]:    [facility] #[id number] - [facility] [address] [facility] [address] [facility] [place] [id number] [facility]    Frailty Screening:   Is the patient here for a new oncology consult visit in cancer care? 2. No      Clinical concerns: none       Malgorzata Fields CMA              "

## 2024-09-26 NOTE — LETTER
9/26/2024      Inez Rodriguez  [address]  [facility] [address]      Dear Colleague,    Thank you for referring your patient, Inez Rodriguez, to the [facility]. Please see a copy of my visit note below.    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  A very pleasant 74 year old  woman with advanced stage IV non-small cell lung cancer.  The primary seems to be coming from the right upper lobe.  She has mediastinal lymph nodes as well as couple of osseous metastases. T1 in the T4 vertebral body and 1 in the sacrum.  Molecular testing does show that this is K-tray G12C mutated tumor.  No other targeted mutation present except for PTEN which is not significant.  She has been started on palliative chemotherapy with carboplatin Alimta and pembrolizumab.  She has had good response after 2 cycles and continuation of response after 4 cycles.  Carboplatin discontinued after 4 cycles.  CT 7/24 continues to shows further decrease in the size of the lymph nodes in the mediastinum. She has been feeling more fatigue, nausea, and constipation as the treatments continues. PET 9/6/24 after 10 cycles showed residual right perihilar, subcarinal, and right paratracheal lymph nodes suggesting continued response.  However she was having a lot of symptoms including feeling very weak, excessive sleepiness, poor p.o. intake etc.  She was given a small dose of steroids and that did make her feel better as long as she was taking them.  After stopping her  symptoms returned again.  She is coming back after her brain MRI.  She also had a workup done to see if she was having autoimmune issues secondary to Keytruda affecting her endocrine system.   2.  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  Pathology is positive for non-small cell lung cancer.  Responding well to treatment.  Most of the bulky mediastinal lymphadenopathy has resolved but small amount still remains.  3.  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  4.  Some allergic symptoms as well.  5.  History of incidentally diagnosed diverticulitis.  6.  On hypothyroidism.      Plan and medical decision making   Patient presenting with severe side effects of autoimmune nature after Keytruda based therapy. Reviewed notes from each unique source.  Reviewed each unique test.    Ordered tests.    Independently interpreted lab tests and radiological exams performed by other physicians.  Personally reviewed the images of her brain MRI and the PET scan.  Independent historian account obtained from her son.  Decision made to resume treatment with Alimta alone.  Very close monitoring.  Will need to keep her on a small dose of steroids.      Discussed with the patient that based on her lab review she may need to be on some steroids after she finishes the current taper.  We discussed the course of action going forward.  Since we cannot use Keytruda for at least some time I would recommend resuming Alimta therapy.  We will plan on starting that in the next week or 2.  Continue on steroids for some time.  We will slowly taper down the dose.  She will most likely stay on 5-10 mg of prednisone daily.  Continue with folic acid and B12 supplementation.  Increase her physical activity.  Will closely monitor her T3-T4 TSH etc.  The longitudinal plan of care for the diagnosis(es)/condition(s) as documented were addressed during this visit. Due to the added complexity  in care, I will continue to support Inez in the subsequent management and with ongoing continuity of care.     Clinical/pathological stage      Stage IV    History of present illness      Ms. Inez Rodriguez is a 74 year old  woman who came to the emergency room in the beginning of January 2024 with increasing shortness of breath.  Was also having some coughing which was not responding to treatment given by the urgent care.  In the emergency room she had a CT scan done to rule out any pulmonary embolism but was positive for multiple lymph nodes in the mediastinum as well as subcarinal area.  She was therefore admitted.  She was seen by pulmonology.  Underwent bronchoscopy and biopsy.  The bronchoscopy came back positive for adenocarcinoma involving subcarinal and mediastinal lymph nodes.    The patient does have a's history of smoking exposure but tells me that she has not smoked for several years.  No significant family history of cancer.  She recently moved from [place].    PET scan done on 29 January 2024 showed that she had extensive disease not only involving the right upper lobe but having extensive mediastinal hilar lymph node metastases and osseous metastases on the T4 vertebral body and right sacrum.    Has been started on chemoimmunotherapy with carboplatin etoposide and pemetrexed.  She has tolerated with moderate side effects.  Comes in today for follow-up.  She has finished 4 cycles.  CT scan after 2 cycles showed decrease in the size of the right upper lobe mass as well as mediastinal lymphadenopathy.  Increasing sclerosis was noted in the T4 vertebral body.    After 4 cycles carboplatin was discontinued.  We continued on pemetrexed and pembrolizumab.  And has been tolerating that quite well.  She has symptoms lasting about a week after chemotherapy.  The last 2 weeks usually she is feeling better.  CT showed improvement in lymphadenopathy.     She is here with her son for cycle 10 with new left sided  neck pain and right-sided axillary pain.  This has been new for the past 2 weeks and is bothersome.  She does not have any breathing changes.  She continues to have allergy-like symptoms with nasal dripping and itchy eyes.  She took Claritin for 1 day and did not notice improvement and has not taken it since.  She continues to have poor oral intake due to decreased appetite and fatigue.  She has lost 6 pounds in the last month.  She eats sometimes only yogurt in a day.  She sleeps 15 to 20 hours each day.  She has not been very active.  She also reports constipation and has not had a bowel movement in 5 days.  She also has had decreased intake.  She has not been drinking much water.  She has tried to senna yesterday without results.    She had PET scan 9/6/24. She had a fall  on 8/24/24 without injury and was found to have diverticulitis incidentally in the ED. the PET scan did show good response but still some disease was active.  However she was having fair bit of side effects.  She was feeling very sleepy and sleeping more than 15 to 16 hours a day.  Not eating very well.  She had lost weight.  She was given small dose of steroids which did make her feel better throughout the time she was taking them.  She was again seen last week.  There was a concern that she might be developing autoimmune issues of endocrine nature.  Which might be causing her to feel very tired fatigue etc.  She had some labs for.  She Also Had a Brain MRI Done to Make Sure That She Was Not Developing Any New Brain Lesions.    Inez reports that after she got the MRI she had a large bowel movement.  She felt significantly better after that.  Then she started taking steroids.  After taking the steroids that she also felt better.  She also got some sleep.  Has been eating better.  Comes in today for follow-up.  Feeling remarkably better compared to before.    Review of system      Details noted in the history of present illness.  A detailed  review of systems is otherwise negative.      Physical exam        BP (!) 140/65 (BP Location: Left arm, Patient Position: Sitting, Cuff Size: Adult Regular)   Pulse 110   Temp 97.3  F (36.3  C) (Tympanic)   Resp 16   Wt 52.3 kg (115 lb 4.8 oz)   LMP  (LMP Unknown)   SpO2 98%   BMI 19.19 kg/m      GENERAL: No acute distress. Cooperative in conversation.   HEENT:  Pupils are equal, round and reactive. Oral mucosa is clean and intact. No ulcerations or mucositis noted. No bleeding noted.  RESP:Chest symmetric lungs are clear bilaterally per auscultation. Regular respiratory rate. No wheezes or rhonchi.  CV: Normal S1 S2 Regular, rate and rhythm.     ABD: Nondistended, soft, nontender. Positive bowel sounds. No organomegaly.   EXTREMITIES: No lower extremity edema.   NEURO: Non- focal. Alert and oriented x3.  Cranial nerves appear intact.  PSYCH: Within normal limits. No depression or anxiety.  SKIN: Warm dry intact.      Lab results Reviewed      Recent Results (from the past 168 hour(s))   Comprehensive metabolic panel   Result Value Ref Range    Sodium 140 135 - 145 mmol/L    Potassium 4.2 3.4 - 5.3 mmol/L    Carbon Dioxide (CO2) 25 22 - 29 mmol/L    Anion Gap 11 7 - 15 mmol/L    Urea Nitrogen 6.9 (L) 8.0 - 23.0 mg/dL    Creatinine 0.92 0.51 - 0.95 mg/dL    GFR Estimate 65 >60 mL/min/1.73m2    Calcium 9.4 8.8 - 10.4 mg/dL    Chloride 104 98 - 107 mmol/L    Glucose 168 (H) 70 - 99 mg/dL    Alkaline Phosphatase 107 40 - 150 U/L    AST 64 (H) 0 - 45 U/L    ALT 29 0 - 50 U/L    Protein Total 6.3 (L) 6.4 - 8.3 g/dL    Albumin 3.6 3.5 - 5.2 g/dL    Bilirubin Total 0.3 <=1.2 mg/dL   CBC with platelets   Result Value Ref Range    WBC Count 8.4 4.0 - 11.0 10e3/uL    RBC Count 3.53 (L) 3.80 - 5.20 10e6/uL    Hemoglobin 11.3 (L) 11.7 - 15.7 g/dL    Hematocrit 34.5 (L) 35.0 - 47.0 %    MCV 98 78 - 100 fL    MCH 32.0 26.5 - 33.0 pg    MCHC 32.8 31.5 - 36.5 g/dL    RDW 16.0 (H) 10.0 - 15.0 %    Platelet Count 271 150 - 450  10e3/uL   TSH with free T4 reflex   Result Value Ref Range    TSH 0.86 0.30 - 4.20 uIU/mL   Cortisol   Result Value Ref Range    Cortisol 1.7   ug/dL   Follicle stimulating hormone   Result Value Ref Range    FSH 84.2 mIU/mL   Luteinizing Hormone   Result Value Ref Range    Luteinizing Hormone 46.2 mIU/mL   Sex Hormone Binding Globulin   Result Value Ref Range    Sex Hormone Binding Globulin 189 (H) 30 - 135 nmol/L   Testosterone Free and Total   Result Value Ref Range    Free Testosterone Calculated 0.06 ng/dL    Testosterone Total 13 8 - 60 ng/dL       Imaging results Reviewed        MRI Brain w & w/o contrast    Result Date: 9/24/2024  EXAM: MR BRAIN W/O and W CONTRAST LOCATION: [facility] DATE: 9/24/2024 INDICATION: Lung cancer metastatic to bone, Fatigue. Dizziness. Tremor. Blurred vision. COMPARISON: Head CT 8/24/2024. Brain MR 1/26/2024. CONTRAST: 5.5 mL Gadavist TECHNIQUE: Routine multiplanar multisequence head MRI without and with intravenous contrast. FINDINGS: INTRACRANIAL CONTENTS: No acute or subacute infarct. No mass, acute hemorrhage, or extra-axial fluid collections. Scattered nonspecific T2/FLAIR hyperintensities within the cerebral white matter most consistent with mild chronic microvascular ischemic change. Mild generalized cerebral atrophy. No hydrocephalus. Normal position of the cerebellar tonsils. No pathologic contrast enhancement. SELLA: No abnormality accounting for technique. OSSEOUS STRUCTURES/SOFT TISSUES: Normal marrow signal. The major intracranial vascular flow voids are maintained. ORBITS: No abnormality accounting for technique. SINUSES/MASTOIDS: No paranasal sinus mucosal disease. Right greater than left mastoid effusions.     IMPRESSION: 1.  No evidence of intracranial metastatic disease. 2.  No acute intracranial pathology. 3.  Mild diffuse parenchymal volume loss and white matter changes which are most likely due to chronic microvascular ischemic  disease.    PET Oncology (Eyes to Thighs)    Result Date: 9/6/2024  EXAM: PET ONCOLOGY (EYES TO THIGHS) LOCATION: [facility] DATE: 9/6/2024 INDICATION: Subsequent treatment planning and restaging for malignant neoplasm of lower lobe, right bronchus or lung. Currently receiving chemotherapy/immunotherapy. Monitor treatment response COMPARISON: FDG PET/CT dated 1/29/2024 and CT of the abdomen pelvis dated 8/24/2024 CONTRAST: None TECHNIQUE: Serum glucose level 83 mg/dL. One hour post intravenous administration of 10.3 mCi F-18 FDG, PET imaging was performed from the skull vertex to mid thighs, utilizing attenuation correction with concurrent axial CT and PET/CT image fusion. Dose  reduction techniques were used. PET/CT FINDINGS: Residual right perihilar (Max SUV 18.2, previously 17.9), subcarinal station 7 (Max SUV 6.3, previously 15.0), right lower paratracheal station 4R (Max SUV 7.5, previously 16.1), and right upper paratracheal station 2R (Max SUV 4.0, previously 5.8) lymph nodes suggesting marked interval response to therapy. Degenerative shoulder and hip synovitis. CT FINDINGS: Mild senescent intercranial changes. Mild to moderate carotid artery bifurcation calcification. Non-FDG avid 1.2 cm nodule in the left thyroid lobe suggesting benignity. Left chest port with tip terminating in the mid SVC. Moderate coronary artery calcium cystic change in the anterior right upper lobe. Sigmoid diverticulosis. Pelvic phleboliths. Multilevel degenerative changes of the spine. The lower extremities are unremarkable.     IMPRESSION: Residual right perihilar, subcarinal, and right paratracheal lymph nodes suggesting marked interval response to therapy       Signed by: Chino Miller MD      This note has been dictated using voice recognition software. Any grammatical or context distortions are unintentional and inherent to the software      Oncology Rooming Note    September 26, 2024 2:11 PM   Inez CRANDALL  "Michael is a 74 year old female who presents for:    Chief Complaint   Patient presents with     Oncology Clinic Visit     Return visit 3 days. MRI 09/24/24. Lung cancer metastatic to bone.     Initial Vitals: BP (!) 140/65 (BP Location: Left arm, Patient Position: Sitting, Cuff Size: Adult Regular)   Pulse 110   Temp 97.3  F (36.3  C) (Tympanic)   Resp 16   Wt 52.3 kg (115 lb 4.8 oz)   LMP  (LMP Unknown)   SpO2 98%   BMI 19.19 kg/m   Estimated body mass index is 19.19 kg/m  as calculated from the following:    Height as of 9/23/24: 1.651 m (5' 5\").    Weight as of this encounter: 52.3 kg (115 lb 4.8 oz). Body surface area is 1.55 meters squared.  No Pain (0) Comment: Data Unavailable   No LMP recorded (lmp unknown). Patient is postmenopausal.  Allergies reviewed: Yes  Medications reviewed: Yes    Medications: Medication refills not needed today.  Pharmacy name entered into [facility]:    [facility][id number] [address] AT [facility] [address]    Frailty Screening:   Is the patient here for a new oncology consult visit in cancer care? 2. No      Clinical concerns: none       Malgorzata Fields CMA                Again, thank you for allowing me to participate in the care of your patient.        Sincerely,        Chino Miller MD  "

## 2024-09-30 DIAGNOSIS — M54.9 BACK PAIN: Primary | ICD-10-CM

## 2024-09-30 RX ORDER — DIAZEPAM 2 MG
2 TABLET ORAL EVERY 8 HOURS PRN
Qty: 12 TABLET | Refills: 0 | Status: SHIPPED | OUTPATIENT
Start: 2024-09-30

## 2024-09-30 NOTE — TELEPHONE ENCOUNTER
Kenneth and Inez called requesting a refill of Diazepam for Inez's back pain. She states this was helpful in the past to help calm the muscles. I let her know I would get a request to Dr. Miller today.     Sarah Garcia RN on 9/30/2024 at 12:50 PM

## 2024-10-01 ENCOUNTER — TELEPHONE (OUTPATIENT)
Dept: ONCOLOGY | Facility: HOSPITAL | Age: 74
End: 2024-10-01

## 2024-10-01 NOTE — TELEPHONE ENCOUNTER
Prior Authorization Approval    Medication: DIAZEPAM 2 MG PO TABS  Authorization Effective Date: 10/1/2024  Authorization Expiration Date: 10/1/2025  Approved Dose/Quantity:   Reference #:     Insurance Company: [employer] - Phone [phone] Fax [phone]  Expected CoPay: $    CoPay Card Available:      Financial Assistance Needed:   Which Pharmacy is filling the prescription: [facility] [place], [place] - [address]  Pharmacy Notified: Yes  Patient Notified:

## 2024-10-01 NOTE — TELEPHONE ENCOUNTER
Retail Pharmacy Prior Authorization Team   Phone: [phone]    PA Initiation    Medication: DIAZEPAM 2 MG PO TABS  Insurance Company: [employer] - Phone [phone] Fax [phone]  Pharmacy Filling the Rx: [place], [place] - [address] [place] [address]  Filling Pharmacy Phone: [phone]  Filling Pharmacy Fax: [phone]  Start Date: 10/1/2024

## 2024-10-10 ENCOUNTER — INFUSION THERAPY VISIT (OUTPATIENT)
Dept: INFUSION THERAPY | Facility: HOSPITAL | Age: 74
End: 2024-10-10
Attending: INTERNAL MEDICINE
Payer: COMMERCIAL

## 2024-10-10 VITALS
SYSTOLIC BLOOD PRESSURE: 172 MMHG | DIASTOLIC BLOOD PRESSURE: 78 MMHG | RESPIRATION RATE: 16 BRPM | TEMPERATURE: 99 F | HEART RATE: 82 BPM | OXYGEN SATURATION: 96 %

## 2024-10-10 DIAGNOSIS — C34.90 LUNG CANCER METASTATIC TO BONE (H): Primary | ICD-10-CM

## 2024-10-10 DIAGNOSIS — C79.51 LUNG CANCER METASTATIC TO BONE (H): Primary | ICD-10-CM

## 2024-10-10 DIAGNOSIS — C34.31 MALIGNANT NEOPLASM OF LOWER LOBE OF RIGHT LUNG (H): ICD-10-CM

## 2024-10-10 LAB
ALBUMIN SERPL BCG-MCNC: 3.7 G/DL (ref 3.5–5.2)
ALP SERPL-CCNC: 58 U/L (ref 40–150)
ALT SERPL W P-5'-P-CCNC: 21 U/L (ref 0–50)
ANION GAP SERPL CALCULATED.3IONS-SCNC: 10 MMOL/L (ref 7–15)
AST SERPL W P-5'-P-CCNC: 20 U/L (ref 0–45)
BASOPHILS # BLD AUTO: 0 10E3/UL (ref 0–0.2)
BASOPHILS NFR BLD AUTO: 0 %
BILIRUB SERPL-MCNC: 0.2 MG/DL
BUN SERPL-MCNC: 12.2 MG/DL (ref 8–23)
CALCIUM SERPL-MCNC: 8.5 MG/DL (ref 8.8–10.4)
CHLORIDE SERPL-SCNC: 106 MMOL/L (ref 98–107)
CREAT SERPL-MCNC: 0.78 MG/DL (ref 0.51–0.95)
EGFRCR SERPLBLD CKD-EPI 2021: 79 ML/MIN/1.73M2
EOSINOPHIL # BLD AUTO: 0.1 10E3/UL (ref 0–0.7)
EOSINOPHIL NFR BLD AUTO: 1 %
ERYTHROCYTE [DISTWIDTH] IN BLOOD BY AUTOMATED COUNT: 15.6 % (ref 10–15)
GLUCOSE SERPL-MCNC: 86 MG/DL (ref 70–99)
HCO3 SERPL-SCNC: 28 MMOL/L (ref 22–29)
HCT VFR BLD AUTO: 36 % (ref 35–47)
HGB BLD-MCNC: 11.5 G/DL (ref 11.7–15.7)
IMM GRANULOCYTES # BLD: 0 10E3/UL
IMM GRANULOCYTES NFR BLD: 0 %
LYMPHOCYTES # BLD AUTO: 4.4 10E3/UL (ref 0.8–5.3)
LYMPHOCYTES NFR BLD AUTO: 44 %
MCH RBC QN AUTO: 32 PG (ref 26.5–33)
MCHC RBC AUTO-ENTMCNC: 31.9 G/DL (ref 31.5–36.5)
MCV RBC AUTO: 100 FL (ref 78–100)
MONOCYTES # BLD AUTO: 0.8 10E3/UL (ref 0–1.3)
MONOCYTES NFR BLD AUTO: 8 %
NEUTROPHILS # BLD AUTO: 4.8 10E3/UL (ref 1.6–8.3)
NEUTROPHILS NFR BLD AUTO: 47 %
NRBC # BLD AUTO: 0 10E3/UL
NRBC BLD AUTO-RTO: 0 /100
PLATELET # BLD AUTO: 185 10E3/UL (ref 150–450)
POTASSIUM SERPL-SCNC: 3.7 MMOL/L (ref 3.4–5.3)
PROT SERPL-MCNC: 5.7 G/DL (ref 6.4–8.3)
RBC # BLD AUTO: 3.59 10E6/UL (ref 3.8–5.2)
SODIUM SERPL-SCNC: 144 MMOL/L (ref 135–145)
TSH SERPL DL<=0.005 MIU/L-ACNC: 0.38 UIU/ML (ref 0.3–4.2)
WBC # BLD AUTO: 10.2 10E3/UL (ref 4–11)

## 2024-10-10 PROCEDURE — 250N000011 HC RX IP 250 OP 636: Performed by: INTERNAL MEDICINE

## 2024-10-10 PROCEDURE — 96375 TX/PRO/DX INJ NEW DRUG ADDON: CPT

## 2024-10-10 PROCEDURE — 85025 COMPLETE CBC W/AUTO DIFF WBC: CPT | Performed by: INTERNAL MEDICINE

## 2024-10-10 PROCEDURE — 258N000003 HC RX IP 258 OP 636: Performed by: INTERNAL MEDICINE

## 2024-10-10 PROCEDURE — 80053 COMPREHEN METABOLIC PANEL: CPT | Performed by: INTERNAL MEDICINE

## 2024-10-10 PROCEDURE — 96413 CHEMO IV INFUSION 1 HR: CPT

## 2024-10-10 PROCEDURE — 84443 ASSAY THYROID STIM HORMONE: CPT | Performed by: INTERNAL MEDICINE

## 2024-10-10 PROCEDURE — 36591 DRAW BLOOD OFF VENOUS DEVICE: CPT | Performed by: INTERNAL MEDICINE

## 2024-10-10 RX ORDER — ONDANSETRON 2 MG/ML
8 INJECTION INTRAMUSCULAR; INTRAVENOUS ONCE
Status: COMPLETED | OUTPATIENT
Start: 2024-10-10 | End: 2024-10-10

## 2024-10-10 RX ORDER — SODIUM CHLORIDE 9 MG/ML
100 INJECTION, SOLUTION INTRAVENOUS ONCE
Status: COMPLETED | OUTPATIENT
Start: 2024-10-10 | End: 2024-10-10

## 2024-10-10 RX ORDER — ALBUTEROL SULFATE 0.83 MG/ML
2.5 SOLUTION RESPIRATORY (INHALATION)
Status: DISCONTINUED | OUTPATIENT
Start: 2024-10-10 | End: 2024-10-10 | Stop reason: HOSPADM

## 2024-10-10 RX ORDER — METHYLPREDNISOLONE SODIUM SUCCINATE 125 MG/2ML
125 INJECTION INTRAMUSCULAR; INTRAVENOUS
Status: DISCONTINUED | OUTPATIENT
Start: 2024-10-10 | End: 2024-10-10 | Stop reason: HOSPADM

## 2024-10-10 RX ORDER — DIPHENHYDRAMINE HYDROCHLORIDE 50 MG/ML
50 INJECTION INTRAMUSCULAR; INTRAVENOUS
Status: DISCONTINUED | OUTPATIENT
Start: 2024-10-10 | End: 2024-10-10 | Stop reason: HOSPADM

## 2024-10-10 RX ORDER — ALBUTEROL SULFATE 90 UG/1
1-2 INHALANT RESPIRATORY (INHALATION)
Status: DISCONTINUED | OUTPATIENT
Start: 2024-10-10 | End: 2024-10-10 | Stop reason: HOSPADM

## 2024-10-10 RX ORDER — HEPARIN SODIUM (PORCINE) LOCK FLUSH IV SOLN 100 UNIT/ML 100 UNIT/ML
5 SOLUTION INTRAVENOUS
Status: DISCONTINUED | OUTPATIENT
Start: 2024-10-10 | End: 2024-10-10 | Stop reason: HOSPADM

## 2024-10-10 RX ORDER — EPINEPHRINE 1 MG/ML
0.3 INJECTION, SOLUTION INTRAMUSCULAR; SUBCUTANEOUS EVERY 5 MIN PRN
Status: DISCONTINUED | OUTPATIENT
Start: 2024-10-10 | End: 2024-10-10 | Stop reason: HOSPADM

## 2024-10-10 RX ADMIN — Medication 5 ML: at 11:20

## 2024-10-10 RX ADMIN — SODIUM CHLORIDE 100 ML: 9 INJECTION, SOLUTION INTRAVENOUS at 11:00

## 2024-10-10 RX ADMIN — ONDANSETRON 8 MG: 2 INJECTION INTRAMUSCULAR; INTRAVENOUS at 10:24

## 2024-10-10 RX ADMIN — PEMETREXED DISODIUM 795 MG: 500 INJECTION, POWDER, LYOPHILIZED, FOR SOLUTION INTRAVENOUS at 11:00

## 2024-10-10 ASSESSMENT — PAIN SCALES - GENERAL: PAINLEVEL: NO PAIN (0)

## 2024-10-10 NOTE — PROGRESS NOTES
Infusion Nursing Note:  Inez Rodriguez presents today for C#11 D#1 of chemotherapy.    Patient seen by provider today: No   present during visit today: Not Applicable.    Note: Patient assessed and vital signs stable. Administered Zofran and Alimta IV as ordered per provider. Tolerated well without issue.       Intravenous Access:  Labs drawn without difficulty.  Implanted Port.    Treatment Conditions:  Lab Results   Component Value Date    HGB 11.5 (L) 10/10/2024    WBC 10.2 10/10/2024    ANEUTAUTO 4.8 10/10/2024     10/10/2024        Lab Results   Component Value Date     10/10/2024    POTASSIUM 3.7 10/10/2024    MAG 2.0 08/24/2024    CR 0.78 10/10/2024    CHELITA 8.5 (L) 10/10/2024    BILITOTAL 0.2 10/10/2024    ALBUMIN 3.7 10/10/2024    ALT 21 10/10/2024    AST 20 10/10/2024       Results reviewed, labs MET treatment parameters, ok to proceed with treatment.      Post Infusion Assessment:  Patient tolerated infusion without incident.  Blood return noted pre and post infusion.  Site patent and intact, free from redness, edema or discomfort.  No evidence of extravasations.  Access discontinued per protocol.       Discharge Plan:   Patient verbalized understanding of discharge instructions and all questions answered.  Patient discharged in stable condition accompanied by: self.  Departure Mode: Ambulatory.      ENMANUEL ESTRELLA RN

## 2024-10-14 ENCOUNTER — PATIENT OUTREACH (OUTPATIENT)
Dept: ONCOLOGY | Facility: HOSPITAL | Age: 74
End: 2024-10-14
Payer: COMMERCIAL

## 2024-10-14 NOTE — PROGRESS NOTES
[facility]: Cancer Care Follow-Up Note                                    Discussion with Patient:                                                      [employer] message sent to patient today to check in and see how she is doing after restarting treatment on 10/10/24 with alimta only.       Goals          General    Maintain ability to perform ADLs without difficulty             Dates of Treatment:                                                      Infusion given in last 28 days       Administered MAR Action Medication Dose Rate Visit    10/10/2024 11:00 New Bag PEMEtrexed (ALIMTA) 795 mg in sodium chloride 0.9 % 141.8 mL infusion 795 mg 850.8 mL/hr Infusion Therapy Visit on 10/10/2024 in [facility] [place]            Intervention/Education provided during outreach:                                                       I asked her to contact us back if she has any questions, concerns or side effects that she needs assistance with.    Patient to follow up as scheduled at next appt. Patient to call/[facility] message with updates. Confirmed patient has clinic and triage numbers.    Signature:  María Casillas RN

## 2024-10-21 NOTE — PROGRESS NOTES
[facility]: Cancer Care                                                                                          Patient called back and was very appreciative of the call. She is doing good.      Signature:  María Casillas RN

## 2024-10-27 ENCOUNTER — MYC MEDICAL ADVICE (OUTPATIENT)
Dept: ONCOLOGY | Facility: HOSPITAL | Age: 74
End: 2024-10-27
Payer: COMMERCIAL

## 2024-10-28 ENCOUNTER — TELEPHONE (OUTPATIENT)
Dept: ONCOLOGY | Facility: HOSPITAL | Age: 74
End: 2024-10-28
Payer: COMMERCIAL

## 2024-10-28 DIAGNOSIS — C34.90 LUNG CANCER METASTATIC TO BONE (H): Primary | ICD-10-CM

## 2024-10-28 DIAGNOSIS — C79.51 LUNG CANCER METASTATIC TO BONE (H): Primary | ICD-10-CM

## 2024-10-28 RX ORDER — DIPHENHYDRAMINE HYDROCHLORIDE 50 MG/ML
25 INJECTION INTRAMUSCULAR; INTRAVENOUS
Status: CANCELLED
Start: 2024-10-31

## 2024-10-28 RX ORDER — ALBUTEROL SULFATE 0.83 MG/ML
2.5 SOLUTION RESPIRATORY (INHALATION)
Status: CANCELLED | OUTPATIENT
Start: 2024-10-31

## 2024-10-28 RX ORDER — MEPERIDINE HYDROCHLORIDE 25 MG/ML
25 INJECTION INTRAMUSCULAR; INTRAVENOUS; SUBCUTANEOUS
Status: CANCELLED | OUTPATIENT
Start: 2024-10-31

## 2024-10-28 RX ORDER — PREDNISONE 5 MG/1
5 TABLET ORAL DAILY
Qty: 30 TABLET | Refills: 3 | Status: SHIPPED | OUTPATIENT
Start: 2024-10-28

## 2024-10-28 RX ORDER — EPINEPHRINE 1 MG/ML
0.3 INJECTION, SOLUTION, CONCENTRATE INTRAVENOUS EVERY 5 MIN PRN
Status: CANCELLED | OUTPATIENT
Start: 2024-10-31

## 2024-10-28 RX ORDER — ALBUTEROL SULFATE 90 UG/1
1-2 INHALANT RESPIRATORY (INHALATION)
Status: CANCELLED
Start: 2024-10-31

## 2024-10-28 RX ORDER — METHYLPREDNISOLONE SODIUM SUCCINATE 40 MG/ML
40 INJECTION INTRAMUSCULAR; INTRAVENOUS
Status: CANCELLED
Start: 2024-10-31

## 2024-10-28 RX ORDER — DIPHENHYDRAMINE HYDROCHLORIDE 50 MG/ML
50 INJECTION INTRAMUSCULAR; INTRAVENOUS
Status: CANCELLED
Start: 2024-10-31

## 2024-10-28 NOTE — TELEPHONE ENCOUNTER
Attempted to call Inez today to discuss the [employer] message she sent regarding symptoms. Her son said she was still asleep but he will have her call back later.     Sarah Garcia RN on 10/28/2024 at 10:29 AM

## 2024-10-28 NOTE — TELEPHONE ENCOUNTER
Return call placed to patient. Symptoms reviewed with Dr. Miller. He would like to have patient go back on prednisone 5 mg daily. She should keep her appointment on Thursday for evaluation. Patient is advised to call the clinic back if having any worsening symptoms. She verbalizes understanding.    Jen Persaud RN

## 2024-10-28 NOTE — TELEPHONE ENCOUNTER
Patient calls with concerns regarding symptoms. She reports that she developed a runny nose, sneezing and a white thick coating on her tongue over the weekend. Patient denies any fever, cough or sore throat. She has not had much of an appetite over the weekend, but is trying to push fluids. She reports that she has some dizziness with position changes but it goes away once she is up. She has been sleeping more, last night slept 15 hours. Patient denies any sores in her mouth, denies any mouth pain. Patient had D1C11 of Alimta on 10/10 for lung cancer metastatic to bone. She was recently tapered off of prednisone.    Will review with provider for further recommendations. Patient is advised to take and OTC covid-19 test.    Jen Persaud RN

## 2024-10-28 NOTE — TELEPHONE ENCOUNTER
Patient called regarding message. See further documentation in telephone encounter.    Jen Persaud RN

## 2024-10-29 ENCOUNTER — TELEPHONE (OUTPATIENT)
Dept: ONCOLOGY | Facility: HOSPITAL | Age: 74
End: 2024-10-29
Payer: COMMERCIAL

## 2024-10-29 NOTE — TELEPHONE ENCOUNTER
Retail Pharmacy Prior Authorization Team   Phone: [phone]    PA Initiation    Medication: PREDNISONE 5 MG PO TABS  Insurance Company: [employer] - Phone [phone] Fax [phone]  Pharmacy Filling the Rx: [place], [place] - [address] [place] [address]  Filling Pharmacy Phone: [phone]  Filling Pharmacy Fax: [phone]  Start Date: 10/29/2024

## 2024-10-31 ENCOUNTER — ONCOLOGY VISIT (OUTPATIENT)
Dept: ONCOLOGY | Facility: HOSPITAL | Age: 74
End: 2024-10-31
Attending: INTERNAL MEDICINE
Payer: COMMERCIAL

## 2024-10-31 ENCOUNTER — INFUSION THERAPY VISIT (OUTPATIENT)
Dept: INFUSION THERAPY | Facility: HOSPITAL | Age: 74
End: 2024-10-31
Attending: INTERNAL MEDICINE
Payer: COMMERCIAL

## 2024-10-31 VITALS
HEART RATE: 121 BPM | BODY MASS INDEX: 19.86 KG/M2 | WEIGHT: 119.2 LBS | OXYGEN SATURATION: 100 % | HEIGHT: 65 IN | DIASTOLIC BLOOD PRESSURE: 86 MMHG | TEMPERATURE: 98.8 F | RESPIRATION RATE: 22 BRPM | SYSTOLIC BLOOD PRESSURE: 176 MMHG

## 2024-10-31 DIAGNOSIS — C34.90 LUNG CANCER METASTATIC TO BONE (H): Primary | ICD-10-CM

## 2024-10-31 DIAGNOSIS — C79.51 LUNG CANCER METASTATIC TO BONE (H): ICD-10-CM

## 2024-10-31 DIAGNOSIS — C79.51 LUNG CANCER METASTATIC TO BONE (H): Primary | ICD-10-CM

## 2024-10-31 DIAGNOSIS — C34.31 MALIGNANT NEOPLASM OF LOWER LOBE OF RIGHT LUNG (H): ICD-10-CM

## 2024-10-31 DIAGNOSIS — C34.31 MALIGNANT NEOPLASM OF LOWER LOBE OF RIGHT LUNG (H): Primary | ICD-10-CM

## 2024-10-31 DIAGNOSIS — C34.90 LUNG CANCER METASTATIC TO BONE (H): ICD-10-CM

## 2024-10-31 LAB
ALBUMIN SERPL BCG-MCNC: 4.1 G/DL (ref 3.5–5.2)
ALP SERPL-CCNC: 68 U/L (ref 40–150)
ALT SERPL W P-5'-P-CCNC: 29 U/L (ref 0–50)
ANION GAP SERPL CALCULATED.3IONS-SCNC: 15 MMOL/L (ref 7–15)
AST SERPL W P-5'-P-CCNC: 26 U/L (ref 0–45)
BASOPHILS # BLD AUTO: 0 10E3/UL (ref 0–0.2)
BASOPHILS NFR BLD AUTO: 0 %
BILIRUB SERPL-MCNC: 0.3 MG/DL
BUN SERPL-MCNC: 15 MG/DL (ref 8–23)
CALCIUM SERPL-MCNC: 9.8 MG/DL (ref 8.8–10.4)
CHLORIDE SERPL-SCNC: 102 MMOL/L (ref 98–107)
CREAT SERPL-MCNC: 0.86 MG/DL (ref 0.51–0.95)
EGFRCR SERPLBLD CKD-EPI 2021: 70 ML/MIN/1.73M2
EOSINOPHIL # BLD AUTO: 0 10E3/UL (ref 0–0.7)
EOSINOPHIL NFR BLD AUTO: 0 %
ERYTHROCYTE [DISTWIDTH] IN BLOOD BY AUTOMATED COUNT: 14.9 % (ref 10–15)
GLUCOSE SERPL-MCNC: 133 MG/DL (ref 70–99)
HCO3 SERPL-SCNC: 23 MMOL/L (ref 22–29)
HCT VFR BLD AUTO: 36.8 % (ref 35–47)
HGB BLD-MCNC: 11.8 G/DL (ref 11.7–15.7)
IMM GRANULOCYTES # BLD: 0 10E3/UL
IMM GRANULOCYTES NFR BLD: 1 %
LYMPHOCYTES # BLD AUTO: 1.2 10E3/UL (ref 0.8–5.3)
LYMPHOCYTES NFR BLD AUTO: 17 %
MCH RBC QN AUTO: 31.4 PG (ref 26.5–33)
MCHC RBC AUTO-ENTMCNC: 32.1 G/DL (ref 31.5–36.5)
MCV RBC AUTO: 98 FL (ref 78–100)
MONOCYTES # BLD AUTO: 0.8 10E3/UL (ref 0–1.3)
MONOCYTES NFR BLD AUTO: 12 %
NEUTROPHILS # BLD AUTO: 5 10E3/UL (ref 1.6–8.3)
NEUTROPHILS NFR BLD AUTO: 71 %
NRBC # BLD AUTO: 0 10E3/UL
NRBC BLD AUTO-RTO: 0 /100
PLATELET # BLD AUTO: 327 10E3/UL (ref 150–450)
POTASSIUM SERPL-SCNC: 4.4 MMOL/L (ref 3.4–5.3)
PROT SERPL-MCNC: 6.5 G/DL (ref 6.4–8.3)
RBC # BLD AUTO: 3.76 10E6/UL (ref 3.8–5.2)
SODIUM SERPL-SCNC: 140 MMOL/L (ref 135–145)
T4 FREE SERPL-MCNC: 1 NG/DL (ref 0.9–1.7)
TSH SERPL DL<=0.005 MIU/L-ACNC: 0.21 UIU/ML (ref 0.3–4.2)
WBC # BLD AUTO: 7.1 10E3/UL (ref 4–11)

## 2024-10-31 PROCEDURE — 84443 ASSAY THYROID STIM HORMONE: CPT | Performed by: INTERNAL MEDICINE

## 2024-10-31 PROCEDURE — 36591 DRAW BLOOD OFF VENOUS DEVICE: CPT | Performed by: INTERNAL MEDICINE

## 2024-10-31 PROCEDURE — 96375 TX/PRO/DX INJ NEW DRUG ADDON: CPT

## 2024-10-31 PROCEDURE — 84155 ASSAY OF PROTEIN SERUM: CPT | Performed by: INTERNAL MEDICINE

## 2024-10-31 PROCEDURE — 250N000011 HC RX IP 250 OP 636: Mod: JZ | Performed by: INTERNAL MEDICINE

## 2024-10-31 PROCEDURE — 84439 ASSAY OF FREE THYROXINE: CPT | Performed by: INTERNAL MEDICINE

## 2024-10-31 PROCEDURE — G0463 HOSPITAL OUTPT CLINIC VISIT: HCPCS | Performed by: INTERNAL MEDICINE

## 2024-10-31 PROCEDURE — 96409 CHEMO IV PUSH SNGL DRUG: CPT

## 2024-10-31 PROCEDURE — 258N000003 HC RX IP 258 OP 636: Performed by: INTERNAL MEDICINE

## 2024-10-31 PROCEDURE — 85004 AUTOMATED DIFF WBC COUNT: CPT | Performed by: INTERNAL MEDICINE

## 2024-10-31 PROCEDURE — 82947 ASSAY GLUCOSE BLOOD QUANT: CPT | Performed by: INTERNAL MEDICINE

## 2024-10-31 PROCEDURE — G2211 COMPLEX E/M VISIT ADD ON: HCPCS | Performed by: INTERNAL MEDICINE

## 2024-10-31 PROCEDURE — 99214 OFFICE O/P EST MOD 30 MIN: CPT | Performed by: INTERNAL MEDICINE

## 2024-10-31 RX ORDER — DIPHENHYDRAMINE HYDROCHLORIDE 50 MG/ML
50 INJECTION INTRAMUSCULAR; INTRAVENOUS
Status: DISCONTINUED | OUTPATIENT
Start: 2024-10-31 | End: 2024-10-31 | Stop reason: HOSPADM

## 2024-10-31 RX ORDER — ONDANSETRON 2 MG/ML
8 INJECTION INTRAMUSCULAR; INTRAVENOUS ONCE
Start: 2024-12-12 | End: 2024-12-12

## 2024-10-31 RX ORDER — ALBUTEROL SULFATE 90 UG/1
1-2 INHALANT RESPIRATORY (INHALATION)
Start: 2024-12-12

## 2024-10-31 RX ORDER — ONDANSETRON 2 MG/ML
8 INJECTION INTRAMUSCULAR; INTRAVENOUS ONCE
Start: 2024-11-21 | End: 2024-11-21

## 2024-10-31 RX ORDER — DIPHENHYDRAMINE HYDROCHLORIDE 50 MG/ML
25 INJECTION INTRAMUSCULAR; INTRAVENOUS
Start: 2024-11-21

## 2024-10-31 RX ORDER — ONDANSETRON 2 MG/ML
8 INJECTION INTRAMUSCULAR; INTRAVENOUS ONCE
Status: COMPLETED | OUTPATIENT
Start: 2024-10-31 | End: 2024-10-31

## 2024-10-31 RX ORDER — EPINEPHRINE 1 MG/ML
0.3 INJECTION, SOLUTION INTRAMUSCULAR; SUBCUTANEOUS EVERY 5 MIN PRN
OUTPATIENT
Start: 2024-11-21

## 2024-10-31 RX ORDER — LORAZEPAM 2 MG/ML
0.5 INJECTION INTRAMUSCULAR EVERY 4 HOURS PRN
OUTPATIENT
Start: 2024-12-12

## 2024-10-31 RX ORDER — MEPERIDINE HYDROCHLORIDE 25 MG/ML
25 INJECTION INTRAMUSCULAR; INTRAVENOUS; SUBCUTANEOUS
OUTPATIENT
Start: 2024-11-21

## 2024-10-31 RX ORDER — DIPHENHYDRAMINE HYDROCHLORIDE 50 MG/ML
25 INJECTION INTRAMUSCULAR; INTRAVENOUS
Status: DISCONTINUED | OUTPATIENT
Start: 2024-10-31 | End: 2024-10-31 | Stop reason: HOSPADM

## 2024-10-31 RX ORDER — DIPHENHYDRAMINE HYDROCHLORIDE 50 MG/ML
50 INJECTION INTRAMUSCULAR; INTRAVENOUS
Start: 2024-12-12

## 2024-10-31 RX ORDER — ALBUTEROL SULFATE 0.83 MG/ML
2.5 SOLUTION RESPIRATORY (INHALATION)
OUTPATIENT
Start: 2024-11-21

## 2024-10-31 RX ORDER — ALBUTEROL SULFATE 90 UG/1
1-2 INHALANT RESPIRATORY (INHALATION)
Start: 2024-11-21

## 2024-10-31 RX ORDER — MEPERIDINE HYDROCHLORIDE 25 MG/ML
25 INJECTION INTRAMUSCULAR; INTRAVENOUS; SUBCUTANEOUS
Status: DISCONTINUED | OUTPATIENT
Start: 2024-10-31 | End: 2024-10-31 | Stop reason: HOSPADM

## 2024-10-31 RX ORDER — METHYLPREDNISOLONE SODIUM SUCCINATE 40 MG/ML
40 INJECTION INTRAMUSCULAR; INTRAVENOUS
Status: DISCONTINUED | OUTPATIENT
Start: 2024-10-31 | End: 2024-10-31 | Stop reason: HOSPADM

## 2024-10-31 RX ORDER — EPINEPHRINE 1 MG/ML
0.3 INJECTION, SOLUTION INTRAMUSCULAR; SUBCUTANEOUS EVERY 5 MIN PRN
Status: DISCONTINUED | OUTPATIENT
Start: 2024-10-31 | End: 2024-10-31 | Stop reason: HOSPADM

## 2024-10-31 RX ORDER — DIPHENHYDRAMINE HYDROCHLORIDE 50 MG/ML
25 INJECTION INTRAMUSCULAR; INTRAVENOUS
Start: 2024-12-12

## 2024-10-31 RX ORDER — METHYLPREDNISOLONE SODIUM SUCCINATE 40 MG/ML
40 INJECTION INTRAMUSCULAR; INTRAVENOUS
Start: 2024-12-12

## 2024-10-31 RX ORDER — ALBUTEROL SULFATE 90 UG/1
1-2 INHALANT RESPIRATORY (INHALATION)
Status: DISCONTINUED | OUTPATIENT
Start: 2024-10-31 | End: 2024-10-31 | Stop reason: HOSPADM

## 2024-10-31 RX ORDER — HEPARIN SODIUM,PORCINE 10 UNIT/ML
5-20 VIAL (ML) INTRAVENOUS DAILY PRN
OUTPATIENT
Start: 2024-11-21

## 2024-10-31 RX ORDER — HEPARIN SODIUM (PORCINE) LOCK FLUSH IV SOLN 100 UNIT/ML 100 UNIT/ML
5 SOLUTION INTRAVENOUS
OUTPATIENT
Start: 2024-12-12

## 2024-10-31 RX ORDER — MEPERIDINE HYDROCHLORIDE 25 MG/ML
25 INJECTION INTRAMUSCULAR; INTRAVENOUS; SUBCUTANEOUS
OUTPATIENT
Start: 2024-12-12

## 2024-10-31 RX ORDER — EPINEPHRINE 1 MG/ML
0.3 INJECTION, SOLUTION INTRAMUSCULAR; SUBCUTANEOUS EVERY 5 MIN PRN
OUTPATIENT
Start: 2024-12-12

## 2024-10-31 RX ORDER — ALBUTEROL SULFATE 0.83 MG/ML
2.5 SOLUTION RESPIRATORY (INHALATION)
OUTPATIENT
Start: 2024-12-12

## 2024-10-31 RX ORDER — FOLIC ACID 1 MG/1
1000 TABLET ORAL DAILY
Qty: 90 TABLET | Refills: 3 | Status: SHIPPED | OUTPATIENT
Start: 2024-10-31

## 2024-10-31 RX ORDER — HEPARIN SODIUM,PORCINE 10 UNIT/ML
5-20 VIAL (ML) INTRAVENOUS DAILY PRN
OUTPATIENT
Start: 2024-12-12

## 2024-10-31 RX ORDER — HEPARIN SODIUM (PORCINE) LOCK FLUSH IV SOLN 100 UNIT/ML 100 UNIT/ML
5 SOLUTION INTRAVENOUS
Status: DISCONTINUED | OUTPATIENT
Start: 2024-10-31 | End: 2024-10-31 | Stop reason: HOSPADM

## 2024-10-31 RX ORDER — DIPHENHYDRAMINE HYDROCHLORIDE 50 MG/ML
50 INJECTION INTRAMUSCULAR; INTRAVENOUS
Start: 2024-11-21

## 2024-10-31 RX ORDER — HEPARIN SODIUM (PORCINE) LOCK FLUSH IV SOLN 100 UNIT/ML 100 UNIT/ML
5 SOLUTION INTRAVENOUS
OUTPATIENT
Start: 2024-11-21

## 2024-10-31 RX ORDER — ALBUTEROL SULFATE 0.83 MG/ML
2.5 SOLUTION RESPIRATORY (INHALATION)
Status: DISCONTINUED | OUTPATIENT
Start: 2024-10-31 | End: 2024-10-31 | Stop reason: HOSPADM

## 2024-10-31 RX ORDER — LORAZEPAM 2 MG/ML
0.5 INJECTION INTRAMUSCULAR EVERY 4 HOURS PRN
OUTPATIENT
Start: 2024-11-21

## 2024-10-31 RX ORDER — METHYLPREDNISOLONE SODIUM SUCCINATE 40 MG/ML
40 INJECTION INTRAMUSCULAR; INTRAVENOUS
Start: 2024-11-21

## 2024-10-31 RX ADMIN — SODIUM CHLORIDE 250 ML: 9 INJECTION, SOLUTION INTRAVENOUS at 14:51

## 2024-10-31 RX ADMIN — PEMETREXED DISODIUM 800 MG: 500 INJECTION, POWDER, LYOPHILIZED, FOR SOLUTION INTRAVENOUS at 15:10

## 2024-10-31 RX ADMIN — ONDANSETRON 8 MG: 2 INJECTION INTRAMUSCULAR; INTRAVENOUS at 14:51

## 2024-10-31 RX ADMIN — Medication 5 ML: at 15:25

## 2024-10-31 ASSESSMENT — PAIN SCALES - GENERAL: PAINLEVEL_OUTOF10: NO PAIN (0)

## 2024-10-31 NOTE — PROGRESS NOTES
"Oncology Rooming Note    October 31, 2024 2:04 PM   Inez Rodriguez is a 74 year old female who presents for:    Chief Complaint   Patient presents with    Oncology Clinic Visit     5 week return visit with labs/infusion related to Lung cancer metastatic to bone.     Initial Vitals: BP (!) 176/86 (BP Location: Right arm, Patient Position: Sitting, Cuff Size: Adult Regular)   Pulse (!) 121   Temp 98.8  F (37.1  C) (Tympanic)   Resp 22   Ht 1.651 m (5' 5\")   Wt 54.1 kg (119 lb 3.2 oz)   LMP  (LMP Unknown)   SpO2 100%   BMI 19.84 kg/m   Estimated body mass index is 19.84 kg/m  as calculated from the following:    Height as of this encounter: 1.651 m (5' 5\").    Weight as of this encounter: 54.1 kg (119 lb 3.2 oz). Body surface area is 1.58 meters squared.  No Pain (0) Comment: Data Unavailable   No LMP recorded (lmp unknown). Patient is postmenopausal.  Allergies reviewed: Yes  Medications reviewed: Yes    Medications: MEDICATION REFILLS NEEDED TODAY. Provider was notified.  Pharmacy name entered into [employer]:    [facility] #[id number] [address]  AT [facility] [address]    Frailty Screening:   Is the patient here for a new oncology consult visit in cancer care? 2. No      Clinical concerns: Inez reports she is doing very well. She needs a refill on her folic acid.     Dr. Miller was notified.      Thelma Zafar CMA            "

## 2024-10-31 NOTE — PROGRESS NOTES
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  A very pleasant 74 year old  woman with advanced stage IV non-small cell lung cancer.  The primary seems to be coming from the right upper lobe.  She has mediastinal lymph nodes as well as couple of osseous metastases. T1 in the T4 vertebral body and 1 in the sacrum.  Molecular testing does show that this is K-tray G12C mutated tumor.  No other targeted mutation present except for PTEN which is not significant.  She has been started on palliative chemotherapy with carboplatin Alimta and pembrolizumab.  She has had good response after 2 cycles and continuation of response after 4 cycles.  Carboplatin discontinued after 4 cycles.  CT 7/24 continues to shows further decrease in the size of the lymph nodes in the mediastinum. She has been feeling more fatigue, nausea, and constipation as the treatments continues. PET 9/6/24 after 10 cycles showed residual right perihilar, subcarinal, and right paratracheal lymph nodes suggesting continued response.  However she was having a lot of symptoms including feeling very weak, excessive sleepiness, poor p.o. intake etc.  She was given a small dose of steroids and that did make her feel better as long as she was taking them.  After stopping her symptoms returned again.  We put her back on 5 mg of prednisone.  She is doing that very well.  Now back on Alimta starting 10 October 2024.  2.  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  Pathology is positive for non-small cell lung cancer.  Responding well to treatment.  Most of the bulky mediastinal lymphadenopathy has resolved but small amount still remains.  3.  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  5 mg of prednisone.  4.  Some allergic symptoms as well.  5.  History of incidentally diagnosed diverticulitis.  6.  On hypothyroidism.      Plan and medical decision making   Patient presenting with severe side effects of autoimmune nature after Keytruda based therapy. Reviewed notes from each unique source.  Reviewed each unique test.    Ordered tests.    Independently interpreted lab tests and radiological exams performed by other physicians.  Personally reviewed the images chest MRI showing no leptomeningeal disease.    Treatment with Alimta 1000 mg/m  every 3 weeks.  Will repeat a PET scan in 6 weeks timeframe.  Folic acid daily.  Prednisone 5 mg daily.  On the days she is taking dexamethasone no prednisone.  Very closely monitor for autoimmune side effects.  Increase physical activity as tolerated.  The longitudinal plan of care for the diagnosis(es)/condition(s) as documented were addressed during this visit. Due to the added complexity in care, I will continue to support Inez in the subsequent management and with ongoing continuity of care.     Clinical/pathological stage      Stage IV    History of present illness      Ms. Inez Rodriguez is a 74 year old  woman who came to the emergency room in the beginning of January 2024 with increasing shortness of breath.  Was also having some coughing which was not responding to treatment given by the urgent care.  In the emergency room she had a CT scan done to rule out any pulmonary embolism but was positive for multiple lymph nodes in the mediastinum as well as subcarinal area.  She was therefore admitted.  She was seen by pulmonology.   Underwent bronchoscopy and biopsy.  The bronchoscopy came back positive for adenocarcinoma involving subcarinal and mediastinal lymph nodes.    The patient does have a's history of smoking exposure but tells me that she has not smoked for several years.  No significant family history of cancer.  She recently moved from [place].    PET scan done on 29 January 2024 showed that she had extensive disease not only involving the right upper lobe but having extensive mediastinal hilar lymph node metastases and osseous metastases on the T4 vertebral body and right sacrum.    Has been started on chemoimmunotherapy with carboplatin etoposide and pemetrexed.  She has tolerated with moderate side effects.  Comes in today for follow-up.  She has finished 4 cycles.  CT scan after 2 cycles showed decrease in the size of the right upper lobe mass as well as mediastinal lymphadenopathy.  Increasing sclerosis was noted in the T4 vertebral body.    After 4 cycles carboplatin was discontinued.  We continued on pemetrexed and pembrolizumab.  And has been tolerating that quite well.  She has symptoms lasting about a week after chemotherapy.  The last 2 weeks usually she is feeling better.  CT showed improvement in lymphadenopathy.     She is here with her son for cycle 10 with new left sided neck pain and right-sided axillary pain.  This has been new for the past 2 weeks and is bothersome.  She does not have any breathing changes.  She continues to have allergy-like symptoms with nasal dripping and itchy eyes.  She took Claritin for 1 day and did not notice improvement and has not taken it since.  She continues to have poor oral intake due to decreased appetite and fatigue.  She has lost 6 pounds in the last month.  She eats sometimes only yogurt in a day.  She sleeps 15 to 20 hours each day.  She has not been very active.  She also reports constipation and has not had a bowel movement in 5 days.  She also has had decreased intake.  She  "has not been drinking much water.  She has tried to senna yesterday without results.    She had PET scan 9/6/24. She had a fall  on 8/24/24 without injury and was found to have diverticulitis incidentally in the ED. the PET scan did show good response but still some disease was active.  However she was having fair bit of side effects.  She was feeling very sleepy and sleeping more than 15 to 16 hours a day.  Not eating very well.  She had lost weight.  She was given small dose of steroids which did make her feel better throughout the time she was taking them.  She was again seen last week.  There was a concern that she might be developing autoimmune issues of endocrine nature.  Which might be causing her to feel very tired fatigue etc.  She had some labs for.  She Also Had a Brain MRI Done to Make Sure That She Was Not Developing Any New Brain Lesions.  The MRI fortunately was negative.    We resume treatment with single agent pemetrexed.  Inez has been able to tolerate that quite well.  She has been tapered down to 5 mg of prednisone.  She is overall feeling well.  Obviously when she is taking dexamethasone on day 0, day 1 and day 2 she has very hard time sleeping.  Her back spasms are better.        Review of system      Details noted in the history of present illness.  A detailed review of systems is otherwise negative.      Physical exam        BP (!) 176/86 (BP Location: Right arm, Patient Position: Sitting, Cuff Size: Adult Regular)   Pulse (!) 121   Temp 98.8  F (37.1  C) (Tympanic)   Resp 22   Ht 1.651 m (5' 5\")   Wt 54.1 kg (119 lb 3.2 oz)   LMP  (LMP Unknown)   SpO2 100%   BMI 19.84 kg/m      GENERAL: No acute distress. Cooperative in conversation.   HEENT:  Pupils are equal, round and reactive. Oral mucosa is clean and intact. No ulcerations or mucositis noted. No bleeding noted.  RESP:Chest symmetric lungs are clear bilaterally per auscultation. Regular respiratory rate. No wheezes or " rhonchi.  CV: Normal S1 S2 Regular, rate and rhythm.     ABD: Nondistended, soft, nontender. Positive bowel sounds. No organomegaly.   EXTREMITIES: No lower extremity edema.   NEURO: Non- focal. Alert and oriented x3.  Cranial nerves appear intact.  PSYCH: Within normal limits. No depression or anxiety.  SKIN: Warm dry intact.      Lab results Reviewed      Recent Results (from the past week)   CBC with platelets and differential   Result Value Ref Range    WBC Count 7.1 4.0 - 11.0 10e3/uL    RBC Count 3.76 (L) 3.80 - 5.20 10e6/uL    Hemoglobin 11.8 11.7 - 15.7 g/dL    Hematocrit 36.8 35.0 - 47.0 %    MCV 98 78 - 100 fL    MCH 31.4 26.5 - 33.0 pg    MCHC 32.1 31.5 - 36.5 g/dL    RDW 14.9 10.0 - 15.0 %    Platelet Count 327 150 - 450 10e3/uL    % Neutrophils 71 %    % Lymphocytes 17 %    % Monocytes 12 %    % Eosinophils 0 %    % Basophils 0 %    % Immature Granulocytes 1 %    NRBCs per 100 WBC 0 <1 /100    Absolute Neutrophils 5.0 1.6 - 8.3 10e3/uL    Absolute Lymphocytes 1.2 0.8 - 5.3 10e3/uL    Absolute Monocytes 0.8 0.0 - 1.3 10e3/uL    Absolute Eosinophils 0.0 0.0 - 0.7 10e3/uL    Absolute Basophils 0.0 0.0 - 0.2 10e3/uL    Absolute Immature Granulocytes 0.0 <=0.4 10e3/uL    Absolute NRBCs 0.0 10e3/uL       Imaging results Reviewed        No results found.      Signed by: Chino Miller MD      This note has been dictated using voice recognition software. Any grammatical or context distortions are unintentional and inherent to the software

## 2024-10-31 NOTE — PROGRESS NOTES
Infusion Nursing Note:  Inez Rodriguez presents today for C#12 D#1 chemotherapy.    Patient seen by provider today: Yes: Dr. Miller   present during visit today: Not Applicable.    Note: Administered Zofran and Alimta IV as ordered per provider. Tolerated well without issue.      Intravenous Access:  Labs drawn without difficulty.  Implanted Port.    Treatment Conditions:  Lab Results   Component Value Date    HGB 11.8 10/31/2024    WBC 7.1 10/31/2024    ANEUTAUTO 5.0 10/31/2024     10/31/2024        Lab Results   Component Value Date     10/31/2024    POTASSIUM 4.4 10/31/2024    MAG 2.0 08/24/2024    CR 0.86 10/31/2024    CHELITA 9.8 10/31/2024    BILITOTAL 0.3 10/31/2024    ALBUMIN 4.1 10/31/2024    ALT 29 10/31/2024    AST 26 10/31/2024       Results reviewed, labs MET treatment parameters, ok to proceed with treatment.      Post Infusion Assessment:  Patient tolerated infusion without incident.  Blood return noted pre and post infusion.  Site patent and intact, free from redness, edema or discomfort.  No evidence of extravasations.  Access discontinued per protocol.       Discharge Plan:   Patient and/or family verbalized understanding of discharge instructions and all questions answered.  Patient discharged in stable condition accompanied by: self.  Departure Mode: Ambulatory.      ENMANUEL ESTRELLA RN.

## 2024-10-31 NOTE — LETTER
"10/31/2024      Inez Rodriguez  [address]  [facility] [address]      Dear Colleague,    Thank you for referring your patient, Inez Rodriguez, to the [facility]. Please see a copy of my visit note below.    Oncology Rooming Note    October 31, 2024 2:04 PM   Inez Rodriguez is a 74 year old female who presents for:    Chief Complaint   Patient presents with     Oncology Clinic Visit     5 week return visit with labs/infusion related to Lung cancer metastatic to bone.     Initial Vitals: BP (!) 176/86 (BP Location: Right arm, Patient Position: Sitting, Cuff Size: Adult Regular)   Pulse (!) 121   Temp 98.8  F (37.1  C) (Tympanic)   Resp 22   Ht 1.651 m (5' 5\")   Wt 54.1 kg (119 lb 3.2 oz)   LMP  (LMP Unknown)   SpO2 100%   BMI 19.84 kg/m   Estimated body mass index is 19.84 kg/m  as calculated from the following:    Height as of this encounter: 1.651 m (5' 5\").    Weight as of this encounter: 54.1 kg (119 lb 3.2 oz). Body surface area is 1.58 meters squared.  No Pain (0) Comment: Data Unavailable   No LMP recorded (lmp unknown). Patient is postmenopausal.  Allergies reviewed: Yes  Medications reviewed: Yes    Medications: MEDICATION REFILLS NEEDED TODAY. Provider was notified.  Pharmacy name entered into [facility]:    [id number] [place] [id number] AT [facility] [address]    Frailty Screening:   Is the patient here for a new oncology consult visit in cancer care? 2. No      Clinical concerns: Inez reports she is doing very well. She needs a refill on her folic acid.     Dr. Miller was notified.      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  A very pleasant 74 year old  woman with advanced stage IV non-small cell lung cancer.  The primary seems to be coming from the right upper lobe.  She has mediastinal lymph nodes as well as couple of osseous metastases. T1 in the T4 vertebral body and 1 in the sacrum.  Molecular testing does show that this is K-tray G12C mutated tumor.  No other targeted mutation present except for PTEN which is not significant.  She has been started on palliative chemotherapy with carboplatin Alimta and pembrolizumab.  She has had good response after 2 cycles and continuation of response after 4 cycles.  Carboplatin discontinued after 4 cycles.  CT 7/24 continues to shows further decrease in the size of the lymph nodes in the mediastinum. She has been feeling more fatigue, nausea, and constipation as the treatments continues. PET 9/6/24 after 10 cycles showed residual right perihilar, subcarinal, and right paratracheal lymph nodes suggesting continued response.  However she was having a lot of symptoms including feeling very weak, excessive sleepiness, poor p.o. intake etc.  She was given a small dose of steroids and that did make her feel better as long as she was taking them.  After stopping her symptoms returned again.  We put her back on 5 mg of prednisone.  She is doing that very well.  Now back on Alimta starting 10 October 2024.  2.  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  Pathology is positive for non-small cell lung cancer.  Responding well to treatment.  Most of the bulky mediastinal lymphadenopathy has resolved but small amount still  remains.  3.  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  5 mg of prednisone.  4.  Some allergic symptoms as well.  5.  History of incidentally diagnosed diverticulitis.  6.  On hypothyroidism.      Plan and medical decision making   Patient presenting with severe side effects of autoimmune nature after Keytruda based therapy. Reviewed notes from each unique source.  Reviewed each unique test.    Ordered tests.    Independently interpreted lab tests and radiological exams performed by other physicians.  Personally reviewed the images chest MRI showing no leptomeningeal disease.    Treatment with Alimta 1000 mg/m  every 3 weeks.  Will repeat a PET scan in 6 weeks timeframe.  Folic acid daily.  Prednisone 5 mg daily.  On the days she is taking dexamethasone no prednisone.  Very closely monitor for autoimmune side effects.  Increase physical activity as tolerated.  The longitudinal plan of care for the diagnosis(es)/condition(s) as documented were addressed during this visit. Due to the added complexity in care, I will continue to support Inez in the subsequent management and with ongoing continuity of care.     Clinical/pathological stage      Stage IV    History of present illness      Ms. Inez Rodriguez is a 74 year old  woman who came to the emergency room in the beginning of January 2024 with increasing shortness of breath.  Was also having some coughing which was not responding to treatment given by the urgent care.  In the emergency room she had a CT scan done to rule out any pulmonary embolism but was positive for multiple lymph nodes in the mediastinum as well as subcarinal area.  She was therefore admitted.  She was seen by pulmonology.  Underwent bronchoscopy and biopsy.  The bronchoscopy came back positive for adenocarcinoma involving subcarinal and mediastinal lymph nodes.    The patient does have a's history of smoking exposure but tells me that she has not smoked for several years.   No significant family history of cancer.  She recently moved from [place].    PET scan done on 29 January 2024 showed that she had extensive disease not only involving the right upper lobe but having extensive mediastinal hilar lymph node metastases and osseous metastases on the T4 vertebral body and right sacrum.    Has been started on chemoimmunotherapy with carboplatin etoposide and pemetrexed.  She has tolerated with moderate side effects.  Comes in today for follow-up.  She has finished 4 cycles.  CT scan after 2 cycles showed decrease in the size of the right upper lobe mass as well as mediastinal lymphadenopathy.  Increasing sclerosis was noted in the T4 vertebral body.    After 4 cycles carboplatin was discontinued.  We continued on pemetrexed and pembrolizumab.  And has been tolerating that quite well.  She has symptoms lasting about a week after chemotherapy.  The last 2 weeks usually she is feeling better.  CT showed improvement in lymphadenopathy.     She is here with her son for cycle 10 with new left sided neck pain and right-sided axillary pain.  This has been new for the past 2 weeks and is bothersome.  She does not have any breathing changes.  She continues to have allergy-like symptoms with nasal dripping and itchy eyes.  She took Claritin for 1 day and did not notice improvement and has not taken it since.  She continues to have poor oral intake due to decreased appetite and fatigue.  She has lost 6 pounds in the last month.  She eats sometimes only yogurt in a day.  She sleeps 15 to 20 hours each day.  She has not been very active.  She also reports constipation and has not had a bowel movement in 5 days.  She also has had decreased intake.  She has not been drinking much water.  She has tried to senna yesterday without results.    She had PET scan 9/6/24. She had a fall  on 8/24/24 without injury and was found to have diverticulitis incidentally in the ED. the PET scan did show good response but  "still some disease was active.  However she was having fair bit of side effects.  She was feeling very sleepy and sleeping more than 15 to 16 hours a day.  Not eating very well.  She had lost weight.  She was given small dose of steroids which did make her feel better throughout the time she was taking them.  She was again seen last week.  There was a concern that she might be developing autoimmune issues of endocrine nature.  Which might be causing her to feel very tired fatigue etc.  She had some labs for.  She Also Had a Brain MRI Done to Make Sure That She Was Not Developing Any New Brain Lesions.  The MRI fortunately was negative.    We resume treatment with single agent pemetrexed.  Inez has been able to tolerate that quite well.  She has been tapered down to 5 mg of prednisone.  She is overall feeling well.  Obviously when she is taking dexamethasone on day 0, day 1 and day 2 she has very hard time sleeping.  Her back spasms are better.        Review of system      Details noted in the history of present illness.  A detailed review of systems is otherwise negative.      Physical exam        BP (!) 176/86 (BP Location: Right arm, Patient Position: Sitting, Cuff Size: Adult Regular)   Pulse (!) 121   Temp 98.8  F (37.1  C) (Tympanic)   Resp 22   Ht 1.651 m (5' 5\")   Wt 54.1 kg (119 lb 3.2 oz)   LMP  (LMP Unknown)   SpO2 100%   BMI 19.84 kg/m      GENERAL: No acute distress. Cooperative in conversation.   HEENT:  Pupils are equal, round and reactive. Oral mucosa is clean and intact. No ulcerations or mucositis noted. No bleeding noted.  RESP:Chest symmetric lungs are clear bilaterally per auscultation. Regular respiratory rate. No wheezes or rhonchi.  CV: Normal S1 S2 Regular, rate and rhythm.     ABD: Nondistended, soft, nontender. Positive bowel sounds. No organomegaly.   EXTREMITIES: No lower extremity edema.   NEURO: Non- focal. Alert and oriented x3.  Cranial nerves appear intact.  PSYCH: Within " normal limits. No depression or anxiety.  SKIN: Warm dry intact.      Lab results Reviewed      Recent Results (from the past week)   CBC with platelets and differential   Result Value Ref Range    WBC Count 7.1 4.0 - 11.0 10e3/uL    RBC Count 3.76 (L) 3.80 - 5.20 10e6/uL    Hemoglobin 11.8 11.7 - 15.7 g/dL    Hematocrit 36.8 35.0 - 47.0 %    MCV 98 78 - 100 fL    MCH 31.4 26.5 - 33.0 pg    MCHC 32.1 31.5 - 36.5 g/dL    RDW 14.9 10.0 - 15.0 %    Platelet Count 327 150 - 450 10e3/uL    % Neutrophils 71 %    % Lymphocytes 17 %    % Monocytes 12 %    % Eosinophils 0 %    % Basophils 0 %    % Immature Granulocytes 1 %    NRBCs per 100 WBC 0 <1 /100    Absolute Neutrophils 5.0 1.6 - 8.3 10e3/uL    Absolute Lymphocytes 1.2 0.8 - 5.3 10e3/uL    Absolute Monocytes 0.8 0.0 - 1.3 10e3/uL    Absolute Eosinophils 0.0 0.0 - 0.7 10e3/uL    Absolute Basophils 0.0 0.0 - 0.2 10e3/uL    Absolute Immature Granulocytes 0.0 <=0.4 10e3/uL    Absolute NRBCs 0.0 10e3/uL       Imaging results Reviewed        No results found.      Signed by: Chino Miller MD      This note has been dictated using voice recognition software. Any grammatical or context distortions are unintentional and inherent to the software       Again, thank you for allowing me to participate in the care of your patient.        Sincerely,        Chino Miller MD

## 2024-11-05 ENCOUNTER — MYC MEDICAL ADVICE (OUTPATIENT)
Dept: ONCOLOGY | Facility: HOSPITAL | Age: 74
End: 2024-11-05
Payer: COMMERCIAL

## 2024-11-06 NOTE — TELEPHONE ENCOUNTER
"I called Inez back to discuss SHERITA Duncan and Dr. Miller's recommendations:     \"There is no need for further imaging at this time. She should continue to manage her constipation well. Continue good fluid intake. We can reassess symptoms and physical exam at her visit in December as well as the upcoming scan. In the meantime, she should continue to monitor her symptoms and call us back if she develops fevers or bloody diarrhea.\"     Inez verbalized understanding and agrees with the plans to continue to monitor her symptoms. She will call back if he develops any fevers or blood in the stool.    Sarah Garcia RN on 11/6/2024 at 11:13 AM    "

## 2024-11-06 NOTE — TELEPHONE ENCOUNTER
"Inez sent a [employer] message last night with concerns of diverticulitis. She states when she saw Dr. Miller last he had mentioned that her scans showed some concerns of diverticulitis. She has been having some discomfort on her lower left abdomen. She states when she is sitting this pain \"can be ignored\" but if she is standing or if she pushes on the area it causes pain. She has no fever. No diarrhea. No blood in the stool. She has been using laxatives due to constipation. I let Inez know I would run this by a provider today and call her back.     Sarah Garcia RN on 11/6/2024 at 9:28 AM    "

## 2024-11-08 ENCOUNTER — TELEPHONE (OUTPATIENT)
Dept: ONCOLOGY | Facility: HOSPITAL | Age: 74
End: 2024-11-08
Payer: COMMERCIAL

## 2024-11-08 DIAGNOSIS — M54.9 BACK PAIN: ICD-10-CM

## 2024-11-08 RX ORDER — DIAZEPAM 2 MG/1
2 TABLET ORAL EVERY 8 HOURS PRN
Qty: 12 TABLET | Refills: 0 | Status: SHIPPED | OUTPATIENT
Start: 2024-11-08

## 2024-11-08 NOTE — TELEPHONE ENCOUNTER
Inez called requesting a refill of diazepam. She is using it sparingly for her back pain/spasms.  It was last refilled 9/30/24, #12, 0 refills.     Sarah Garcia RN on 11/8/2024 at 12:29 PM

## 2024-11-08 NOTE — TELEPHONE ENCOUNTER
"Inez called today to update Dr. Miller and team about an \"episode\" she had on 11/6/2024.  She states she got up to go to the bathroom and took a \"huge dump\" and when she thought she was done she got off the toilet but ended up on the floor on her stomach and states \"poop just kept pouring out of me.\"  She felt \"out of body\" and was banging her forehead on the floor.  She states this episode lasted about 15 minutes.  This is not the first time she experienced this.  She states the last time she had diverticulitis the same thing happened and she ended up in the emergency room.  She states the stool was the color and consistency of peanut butter.  There was no blood.  She has no fever.  Today, she is feeling \"pretty well.\"  She states the pain that she was feeling in her lower abdomen has gone away.  She is planning to rest and drink lots of fluids today.  I let her know I would get this information over to Dr. Miller and DELISA Daniel [facility] as an FYI.  If any questions for Inez, she can be reached at [phone].    Sarah Garcia RN on 11/8/2024 at 12:33 PM    "

## 2024-11-21 ENCOUNTER — PATIENT OUTREACH (OUTPATIENT)
Dept: GERIATRIC MEDICINE | Facility: CLINIC | Age: 74
End: 2024-11-21

## 2024-11-21 ENCOUNTER — INFUSION THERAPY VISIT (OUTPATIENT)
Dept: INFUSION THERAPY | Facility: HOSPITAL | Age: 74
End: 2024-11-21
Payer: COMMERCIAL

## 2024-11-21 VITALS
OXYGEN SATURATION: 98 % | SYSTOLIC BLOOD PRESSURE: 146 MMHG | DIASTOLIC BLOOD PRESSURE: 73 MMHG | TEMPERATURE: 97.6 F | HEART RATE: 112 BPM | RESPIRATION RATE: 18 BRPM

## 2024-11-21 DIAGNOSIS — C79.51 LUNG CANCER METASTATIC TO BONE (H): Primary | ICD-10-CM

## 2024-11-21 DIAGNOSIS — C34.90 LUNG CANCER METASTATIC TO BONE (H): Primary | ICD-10-CM

## 2024-11-21 DIAGNOSIS — C34.31 MALIGNANT NEOPLASM OF LOWER LOBE OF RIGHT LUNG (H): ICD-10-CM

## 2024-11-21 LAB
ALBUMIN SERPL BCG-MCNC: 4.1 G/DL (ref 3.5–5.2)
ALP SERPL-CCNC: 55 U/L (ref 40–150)
ALT SERPL W P-5'-P-CCNC: 20 U/L (ref 0–50)
ANION GAP SERPL CALCULATED.3IONS-SCNC: 12 MMOL/L (ref 7–15)
AST SERPL W P-5'-P-CCNC: 18 U/L (ref 0–45)
BASOPHILS # BLD AUTO: 0 10E3/UL (ref 0–0.2)
BASOPHILS NFR BLD AUTO: 0 %
BILIRUB SERPL-MCNC: 0.2 MG/DL
BUN SERPL-MCNC: 19.2 MG/DL (ref 8–23)
CALCIUM SERPL-MCNC: 9.3 MG/DL (ref 8.8–10.4)
CHLORIDE SERPL-SCNC: 105 MMOL/L (ref 98–107)
CREAT SERPL-MCNC: 0.92 MG/DL (ref 0.51–0.95)
EGFRCR SERPLBLD CKD-EPI 2021: 65 ML/MIN/1.73M2
EOSINOPHIL # BLD AUTO: 0 10E3/UL (ref 0–0.7)
EOSINOPHIL NFR BLD AUTO: 0 %
ERYTHROCYTE [DISTWIDTH] IN BLOOD BY AUTOMATED COUNT: 15.9 % (ref 10–15)
GLUCOSE SERPL-MCNC: 111 MG/DL (ref 70–99)
HCO3 SERPL-SCNC: 26 MMOL/L (ref 22–29)
HCT VFR BLD AUTO: 34.8 % (ref 35–47)
HGB BLD-MCNC: 11.1 G/DL (ref 11.7–15.7)
IMM GRANULOCYTES # BLD: 0.2 10E3/UL
IMM GRANULOCYTES NFR BLD: 1 %
LYMPHOCYTES # BLD AUTO: 1.1 10E3/UL (ref 0.8–5.3)
LYMPHOCYTES NFR BLD AUTO: 8 %
MCH RBC QN AUTO: 31.3 PG (ref 26.5–33)
MCHC RBC AUTO-ENTMCNC: 31.9 G/DL (ref 31.5–36.5)
MCV RBC AUTO: 98 FL (ref 78–100)
MONOCYTES # BLD AUTO: 1 10E3/UL (ref 0–1.3)
MONOCYTES NFR BLD AUTO: 7 %
NEUTROPHILS # BLD AUTO: 12.6 10E3/UL (ref 1.6–8.3)
NEUTROPHILS NFR BLD AUTO: 85 %
NRBC # BLD AUTO: 0 10E3/UL
NRBC BLD AUTO-RTO: 0 /100
PLATELET # BLD AUTO: 531 10E3/UL (ref 150–450)
POTASSIUM SERPL-SCNC: 4.3 MMOL/L (ref 3.4–5.3)
PROT SERPL-MCNC: 6.4 G/DL (ref 6.4–8.3)
RBC # BLD AUTO: 3.55 10E6/UL (ref 3.8–5.2)
SODIUM SERPL-SCNC: 143 MMOL/L (ref 135–145)
T4 FREE SERPL-MCNC: 1.02 NG/DL (ref 0.9–1.7)
TSH SERPL DL<=0.005 MIU/L-ACNC: 0.14 UIU/ML (ref 0.3–4.2)
WBC # BLD AUTO: 14.9 10E3/UL (ref 4–11)

## 2024-11-21 PROCEDURE — 96372 THER/PROPH/DIAG INJ SC/IM: CPT | Performed by: INTERNAL MEDICINE

## 2024-11-21 PROCEDURE — 84443 ASSAY THYROID STIM HORMONE: CPT | Performed by: INTERNAL MEDICINE

## 2024-11-21 PROCEDURE — 85004 AUTOMATED DIFF WBC COUNT: CPT | Performed by: INTERNAL MEDICINE

## 2024-11-21 PROCEDURE — 36591 DRAW BLOOD OFF VENOUS DEVICE: CPT | Performed by: INTERNAL MEDICINE

## 2024-11-21 PROCEDURE — 82040 ASSAY OF SERUM ALBUMIN: CPT | Performed by: INTERNAL MEDICINE

## 2024-11-21 PROCEDURE — 84439 ASSAY OF FREE THYROXINE: CPT | Performed by: INTERNAL MEDICINE

## 2024-11-21 PROCEDURE — 80053 COMPREHEN METABOLIC PANEL: CPT | Performed by: INTERNAL MEDICINE

## 2024-11-21 PROCEDURE — 250N000011 HC RX IP 250 OP 636: Performed by: INTERNAL MEDICINE

## 2024-11-21 PROCEDURE — 258N000003 HC RX IP 258 OP 636: Performed by: INTERNAL MEDICINE

## 2024-11-21 PROCEDURE — 85014 HEMATOCRIT: CPT | Performed by: INTERNAL MEDICINE

## 2024-11-21 RX ORDER — ALBUTEROL SULFATE 90 UG/1
1-2 INHALANT RESPIRATORY (INHALATION)
Status: DISCONTINUED | OUTPATIENT
Start: 2024-11-21 | End: 2024-11-21 | Stop reason: HOSPADM

## 2024-11-21 RX ORDER — HEPARIN SODIUM (PORCINE) LOCK FLUSH IV SOLN 100 UNIT/ML 100 UNIT/ML
5 SOLUTION INTRAVENOUS
Status: DISCONTINUED | OUTPATIENT
Start: 2024-11-21 | End: 2024-11-21 | Stop reason: HOSPADM

## 2024-11-21 RX ORDER — METHYLPREDNISOLONE SODIUM SUCCINATE 40 MG/ML
40 INJECTION INTRAMUSCULAR; INTRAVENOUS
Status: DISCONTINUED | OUTPATIENT
Start: 2024-11-21 | End: 2024-11-21 | Stop reason: HOSPADM

## 2024-11-21 RX ORDER — EPINEPHRINE 1 MG/ML
0.3 INJECTION, SOLUTION INTRAMUSCULAR; SUBCUTANEOUS EVERY 5 MIN PRN
Status: DISCONTINUED | OUTPATIENT
Start: 2024-11-21 | End: 2024-11-21 | Stop reason: HOSPADM

## 2024-11-21 RX ORDER — DIPHENHYDRAMINE HYDROCHLORIDE 50 MG/ML
50 INJECTION INTRAMUSCULAR; INTRAVENOUS
Status: DISCONTINUED | OUTPATIENT
Start: 2024-11-21 | End: 2024-11-21 | Stop reason: HOSPADM

## 2024-11-21 RX ORDER — ALBUTEROL SULFATE 0.83 MG/ML
2.5 SOLUTION RESPIRATORY (INHALATION)
Status: DISCONTINUED | OUTPATIENT
Start: 2024-11-21 | End: 2024-11-21 | Stop reason: HOSPADM

## 2024-11-21 RX ORDER — ONDANSETRON 2 MG/ML
8 INJECTION INTRAMUSCULAR; INTRAVENOUS ONCE
Status: COMPLETED | OUTPATIENT
Start: 2024-11-21 | End: 2024-11-21

## 2024-11-21 RX ORDER — DIPHENHYDRAMINE HYDROCHLORIDE 50 MG/ML
25 INJECTION INTRAMUSCULAR; INTRAVENOUS
Status: DISCONTINUED | OUTPATIENT
Start: 2024-11-21 | End: 2024-11-21 | Stop reason: HOSPADM

## 2024-11-21 RX ORDER — MEPERIDINE HYDROCHLORIDE 25 MG/ML
25 INJECTION INTRAMUSCULAR; INTRAVENOUS; SUBCUTANEOUS
Status: DISCONTINUED | OUTPATIENT
Start: 2024-11-21 | End: 2024-11-21 | Stop reason: HOSPADM

## 2024-11-21 RX ORDER — CYANOCOBALAMIN 1000 UG/ML
1000 INJECTION, SOLUTION INTRAMUSCULAR; SUBCUTANEOUS ONCE
Status: COMPLETED | OUTPATIENT
Start: 2024-11-21 | End: 2024-11-21

## 2024-11-21 RX ADMIN — SODIUM CHLORIDE 250 ML: 9 INJECTION, SOLUTION INTRAVENOUS at 14:19

## 2024-11-21 RX ADMIN — PEMETREXED DISODIUM 800 MG: 500 INJECTION, POWDER, LYOPHILIZED, FOR SOLUTION INTRAVENOUS at 14:51

## 2024-11-21 RX ADMIN — Medication 5 ML: at 15:07

## 2024-11-21 RX ADMIN — CYANOCOBALAMIN 1000 MCG: 1000 INJECTION, SOLUTION INTRAMUSCULAR; SUBCUTANEOUS at 14:20

## 2024-11-21 RX ADMIN — ONDANSETRON 8 MG: 2 INJECTION INTRAMUSCULAR; INTRAVENOUS at 14:20

## 2024-11-21 ASSESSMENT — PAIN SCALES - GENERAL: PAINLEVEL_OUTOF10: NO PAIN (0)

## 2024-11-21 NOTE — PROGRESS NOTES
Infusion Nursing Note:  Inez Rodriguez presents today for C#13 D#1 Alimta.    Patient seen by provider today: No   present during visit today: Not Applicable.    Note: Patient assessed and vital signs stable. Administered Zofran and Alimta IV as ordered per provider. Vitamin B12 also administered today. Tolerated well without issue.      Intravenous Access:  Labs drawn without difficulty.  Implanted Port.    Treatment Conditions:  Lab Results   Component Value Date    HGB 11.1 (L) 11/21/2024    WBC 14.9 (H) 11/21/2024    ANEUTAUTO 12.6 (H) 11/21/2024     (H) 11/21/2024        Lab Results   Component Value Date     11/21/2024    POTASSIUM 4.3 11/21/2024    MAG 2.0 08/24/2024    CR 0.92 11/21/2024    CHELITA 9.3 11/21/2024    BILITOTAL 0.2 11/21/2024    ALBUMIN 4.1 11/21/2024    ALT 20 11/21/2024    AST 18 11/21/2024       Results reviewed, labs MET treatment parameters, ok to proceed with treatment.      Post Infusion Assessment:  Patient tolerated infusion without incident.  Patient tolerated injection without incident.  Blood return noted pre and post infusion.  Site patent and intact, free from redness, edema or discomfort.  No evidence of extravasations.  Access discontinued per protocol.       Discharge Plan:   Patient and/or family verbalized understanding of discharge instructions and all questions answered.  Patient discharged in stable condition accompanied by: self.  Departure Mode: Ambulatory.      ENMANUEL ESTRELLA RN

## 2024-12-10 ENCOUNTER — HOSPITAL ENCOUNTER (OUTPATIENT)
Dept: PET IMAGING | Facility: HOSPITAL | Age: 74
Discharge: HOME | End: 2024-12-10
Attending: INTERNAL MEDICINE
Payer: COMMERCIAL

## 2024-12-10 DIAGNOSIS — C34.31 MALIGNANT NEOPLASM OF LOWER LOBE OF RIGHT LUNG (H): ICD-10-CM

## 2024-12-10 DIAGNOSIS — C79.51 LUNG CANCER METASTATIC TO BONE (H): ICD-10-CM

## 2024-12-10 DIAGNOSIS — C34.90 LUNG CANCER METASTATIC TO BONE (H): ICD-10-CM

## 2024-12-10 LAB — GLUCOSE BLDC GLUCOMTR-MCNC: 79 MG/DL (ref 70–99)

## 2024-12-10 PROCEDURE — 74177 CT ABD & PELVIS W/CONTRAST: CPT

## 2024-12-10 PROCEDURE — 250N000011 HC RX IP 250 OP 636: Performed by: INTERNAL MEDICINE

## 2024-12-10 PROCEDURE — 343N000001 HC RX 343 MED OP 636: Performed by: INTERNAL MEDICINE

## 2024-12-10 PROCEDURE — 82962 GLUCOSE BLOOD TEST: CPT

## 2024-12-10 PROCEDURE — 78816 PET IMAGE W/CT FULL BODY: CPT | Mod: PS

## 2024-12-10 PROCEDURE — 71260 CT THORAX DX C+: CPT

## 2024-12-10 PROCEDURE — A9552 F18 FDG: HCPCS | Performed by: INTERNAL MEDICINE

## 2024-12-10 RX ORDER — IOPAMIDOL 755 MG/ML
58 INJECTION, SOLUTION INTRAVASCULAR ONCE
Status: COMPLETED | OUTPATIENT
Start: 2024-12-10 | End: 2024-12-10

## 2024-12-10 RX ORDER — FLUDEOXYGLUCOSE F 18 200 MCI/ML
7-18 INJECTION, SOLUTION INTRAVENOUS ONCE
Status: COMPLETED | OUTPATIENT
Start: 2024-12-10 | End: 2024-12-10

## 2024-12-10 RX ADMIN — IOPAMIDOL 58 ML: 755 INJECTION, SOLUTION INTRAVENOUS at 14:40

## 2024-12-10 RX ADMIN — FLUDEOXYGLUCOSE F 18 10.15 MILLICURIE: 200 INJECTION, SOLUTION INTRAVENOUS at 13:35

## 2024-12-12 ENCOUNTER — ONCOLOGY VISIT (OUTPATIENT)
Dept: ONCOLOGY | Facility: HOSPITAL | Age: 74
End: 2024-12-12
Attending: INTERNAL MEDICINE
Payer: COMMERCIAL

## 2024-12-12 ENCOUNTER — INFUSION THERAPY VISIT (OUTPATIENT)
Dept: INFUSION THERAPY | Facility: HOSPITAL | Age: 74
End: 2024-12-12
Attending: INTERNAL MEDICINE
Payer: COMMERCIAL

## 2024-12-12 VITALS
WEIGHT: 121.7 LBS | DIASTOLIC BLOOD PRESSURE: 71 MMHG | OXYGEN SATURATION: 97 % | TEMPERATURE: 98 F | HEART RATE: 100 BPM | SYSTOLIC BLOOD PRESSURE: 143 MMHG | RESPIRATION RATE: 16 BRPM | BODY MASS INDEX: 20.25 KG/M2

## 2024-12-12 DIAGNOSIS — C34.31 MALIGNANT NEOPLASM OF LOWER LOBE OF RIGHT LUNG (H): ICD-10-CM

## 2024-12-12 DIAGNOSIS — C34.90 LUNG CANCER METASTATIC TO BONE (H): ICD-10-CM

## 2024-12-12 DIAGNOSIS — C34.90 LUNG CANCER METASTATIC TO BONE (H): Primary | ICD-10-CM

## 2024-12-12 DIAGNOSIS — C79.51 LUNG CANCER METASTATIC TO BONE (H): Primary | ICD-10-CM

## 2024-12-12 DIAGNOSIS — G89.29 CHRONIC MIDLINE LOW BACK PAIN WITHOUT SCIATICA: ICD-10-CM

## 2024-12-12 DIAGNOSIS — M54.50 CHRONIC MIDLINE LOW BACK PAIN WITHOUT SCIATICA: ICD-10-CM

## 2024-12-12 DIAGNOSIS — C79.51 LUNG CANCER METASTATIC TO BONE (H): ICD-10-CM

## 2024-12-12 DIAGNOSIS — C34.31 MALIGNANT NEOPLASM OF LOWER LOBE OF RIGHT LUNG (H): Primary | ICD-10-CM

## 2024-12-12 LAB
ALBUMIN SERPL BCG-MCNC: 4 G/DL (ref 3.5–5.2)
ALP SERPL-CCNC: 56 U/L (ref 40–150)
ALT SERPL W P-5'-P-CCNC: 25 U/L (ref 0–50)
ANION GAP SERPL CALCULATED.3IONS-SCNC: 11 MMOL/L (ref 7–15)
AST SERPL W P-5'-P-CCNC: 27 U/L (ref 0–45)
BASOPHILS # BLD AUTO: 0 10E3/UL (ref 0–0.2)
BASOPHILS NFR BLD AUTO: 1 %
BILIRUB SERPL-MCNC: <0.2 MG/DL
BUN SERPL-MCNC: 13.2 MG/DL (ref 8–23)
CALCIUM SERPL-MCNC: 9.1 MG/DL (ref 8.8–10.4)
CHLORIDE SERPL-SCNC: 104 MMOL/L (ref 98–107)
CREAT SERPL-MCNC: 0.9 MG/DL (ref 0.51–0.95)
EGFRCR SERPLBLD CKD-EPI 2021: 67 ML/MIN/1.73M2
EOSINOPHIL # BLD AUTO: 0.1 10E3/UL (ref 0–0.7)
EOSINOPHIL NFR BLD AUTO: 1 %
ERYTHROCYTE [DISTWIDTH] IN BLOOD BY AUTOMATED COUNT: 17 % (ref 10–15)
GLUCOSE SERPL-MCNC: 96 MG/DL (ref 70–99)
HCO3 SERPL-SCNC: 29 MMOL/L (ref 22–29)
HCT VFR BLD AUTO: 35.2 % (ref 35–47)
HGB BLD-MCNC: 11.3 G/DL (ref 11.7–15.7)
IMM GRANULOCYTES # BLD: 0 10E3/UL
IMM GRANULOCYTES NFR BLD: 0 %
LYMPHOCYTES # BLD AUTO: 3.3 10E3/UL (ref 0.8–5.3)
LYMPHOCYTES NFR BLD AUTO: 45 %
MCH RBC QN AUTO: 31.7 PG (ref 26.5–33)
MCHC RBC AUTO-ENTMCNC: 32.1 G/DL (ref 31.5–36.5)
MCV RBC AUTO: 99 FL (ref 78–100)
MONOCYTES # BLD AUTO: 0.6 10E3/UL (ref 0–1.3)
MONOCYTES NFR BLD AUTO: 8 %
NEUTROPHILS # BLD AUTO: 3.2 10E3/UL (ref 1.6–8.3)
NEUTROPHILS NFR BLD AUTO: 44 %
NRBC # BLD AUTO: 0 10E3/UL
NRBC BLD AUTO-RTO: 0 /100
PLATELET # BLD AUTO: 336 10E3/UL (ref 150–450)
POTASSIUM SERPL-SCNC: 3.9 MMOL/L (ref 3.4–5.3)
PROT SERPL-MCNC: 6.3 G/DL (ref 6.4–8.3)
RBC # BLD AUTO: 3.56 10E6/UL (ref 3.8–5.2)
SODIUM SERPL-SCNC: 144 MMOL/L (ref 135–145)
TSH SERPL DL<=0.005 MIU/L-ACNC: 1.12 UIU/ML (ref 0.3–4.2)
WBC # BLD AUTO: 7.3 10E3/UL (ref 4–11)

## 2024-12-12 PROCEDURE — 85014 HEMATOCRIT: CPT | Performed by: INTERNAL MEDICINE

## 2024-12-12 PROCEDURE — 258N000003 HC RX IP 258 OP 636: Performed by: INTERNAL MEDICINE

## 2024-12-12 PROCEDURE — 85004 AUTOMATED DIFF WBC COUNT: CPT | Performed by: INTERNAL MEDICINE

## 2024-12-12 PROCEDURE — 250N000011 HC RX IP 250 OP 636: Performed by: INTERNAL MEDICINE

## 2024-12-12 PROCEDURE — 84443 ASSAY THYROID STIM HORMONE: CPT | Performed by: INTERNAL MEDICINE

## 2024-12-12 PROCEDURE — 82247 BILIRUBIN TOTAL: CPT | Performed by: INTERNAL MEDICINE

## 2024-12-12 PROCEDURE — G0463 HOSPITAL OUTPT CLINIC VISIT: HCPCS | Performed by: INTERNAL MEDICINE

## 2024-12-12 PROCEDURE — 36591 DRAW BLOOD OFF VENOUS DEVICE: CPT | Performed by: INTERNAL MEDICINE

## 2024-12-12 PROCEDURE — 82040 ASSAY OF SERUM ALBUMIN: CPT | Performed by: INTERNAL MEDICINE

## 2024-12-12 RX ORDER — LORAZEPAM 2 MG/ML
0.5 INJECTION INTRAMUSCULAR EVERY 4 HOURS PRN
OUTPATIENT
Start: 2025-01-23

## 2024-12-12 RX ORDER — ALBUTEROL SULFATE 0.83 MG/ML
2.5 SOLUTION RESPIRATORY (INHALATION)
Status: DISCONTINUED | OUTPATIENT
Start: 2024-12-12 | End: 2024-12-12 | Stop reason: HOSPADM

## 2024-12-12 RX ORDER — HEPARIN SODIUM,PORCINE 10 UNIT/ML
5-20 VIAL (ML) INTRAVENOUS DAILY PRN
OUTPATIENT
Start: 2025-01-02

## 2024-12-12 RX ORDER — ALBUTEROL SULFATE 90 UG/1
1-2 INHALANT RESPIRATORY (INHALATION)
Status: DISCONTINUED | OUTPATIENT
Start: 2024-12-12 | End: 2024-12-12 | Stop reason: HOSPADM

## 2024-12-12 RX ORDER — MEPERIDINE HYDROCHLORIDE 25 MG/ML
25 INJECTION INTRAMUSCULAR; INTRAVENOUS; SUBCUTANEOUS
OUTPATIENT
Start: 2025-01-23

## 2024-12-12 RX ORDER — LORAZEPAM 2 MG/ML
0.5 INJECTION INTRAMUSCULAR EVERY 4 HOURS PRN
OUTPATIENT
Start: 2025-01-02

## 2024-12-12 RX ORDER — ONDANSETRON 2 MG/ML
8 INJECTION INTRAMUSCULAR; INTRAVENOUS ONCE
Status: COMPLETED | OUTPATIENT
Start: 2024-12-12 | End: 2024-12-12

## 2024-12-12 RX ORDER — HEPARIN SODIUM (PORCINE) LOCK FLUSH IV SOLN 100 UNIT/ML 100 UNIT/ML
5 SOLUTION INTRAVENOUS
OUTPATIENT
Start: 2025-01-23

## 2024-12-12 RX ORDER — ONDANSETRON 2 MG/ML
8 INJECTION INTRAMUSCULAR; INTRAVENOUS ONCE
Start: 2025-01-02 | End: 2025-01-02

## 2024-12-12 RX ORDER — DIPHENHYDRAMINE HYDROCHLORIDE 50 MG/ML
25 INJECTION INTRAMUSCULAR; INTRAVENOUS
Start: 2025-01-23

## 2024-12-12 RX ORDER — METHYLPREDNISOLONE SODIUM SUCCINATE 40 MG/ML
40 INJECTION INTRAMUSCULAR; INTRAVENOUS
Status: DISCONTINUED | OUTPATIENT
Start: 2024-12-12 | End: 2024-12-12 | Stop reason: HOSPADM

## 2024-12-12 RX ORDER — METHYLPREDNISOLONE SODIUM SUCCINATE 40 MG/ML
40 INJECTION INTRAMUSCULAR; INTRAVENOUS
Start: 2025-01-23

## 2024-12-12 RX ORDER — DIPHENHYDRAMINE HYDROCHLORIDE 50 MG/ML
50 INJECTION INTRAMUSCULAR; INTRAVENOUS
Start: 2025-01-23

## 2024-12-12 RX ORDER — DIPHENHYDRAMINE HYDROCHLORIDE 50 MG/ML
50 INJECTION INTRAMUSCULAR; INTRAVENOUS
Start: 2025-01-02

## 2024-12-12 RX ORDER — EPINEPHRINE 1 MG/ML
0.3 INJECTION, SOLUTION INTRAMUSCULAR; SUBCUTANEOUS EVERY 5 MIN PRN
Status: DISCONTINUED | OUTPATIENT
Start: 2024-12-12 | End: 2024-12-12 | Stop reason: HOSPADM

## 2024-12-12 RX ORDER — HEPARIN SODIUM (PORCINE) LOCK FLUSH IV SOLN 100 UNIT/ML 100 UNIT/ML
5 SOLUTION INTRAVENOUS
Status: DISCONTINUED | OUTPATIENT
Start: 2024-12-12 | End: 2024-12-12 | Stop reason: HOSPADM

## 2024-12-12 RX ORDER — HEPARIN SODIUM,PORCINE 10 UNIT/ML
5-20 VIAL (ML) INTRAVENOUS DAILY PRN
OUTPATIENT
Start: 2025-01-23

## 2024-12-12 RX ORDER — DIPHENHYDRAMINE HYDROCHLORIDE 50 MG/ML
50 INJECTION INTRAMUSCULAR; INTRAVENOUS
Status: DISCONTINUED | OUTPATIENT
Start: 2024-12-12 | End: 2024-12-12 | Stop reason: HOSPADM

## 2024-12-12 RX ORDER — EPINEPHRINE 1 MG/ML
0.3 INJECTION, SOLUTION INTRAMUSCULAR; SUBCUTANEOUS EVERY 5 MIN PRN
OUTPATIENT
Start: 2025-01-23

## 2024-12-12 RX ORDER — ALBUTEROL SULFATE 0.83 MG/ML
2.5 SOLUTION RESPIRATORY (INHALATION)
OUTPATIENT
Start: 2025-01-02

## 2024-12-12 RX ORDER — ALBUTEROL SULFATE 0.83 MG/ML
2.5 SOLUTION RESPIRATORY (INHALATION)
OUTPATIENT
Start: 2025-01-23

## 2024-12-12 RX ORDER — ONDANSETRON 2 MG/ML
8 INJECTION INTRAMUSCULAR; INTRAVENOUS ONCE
Start: 2025-01-23 | End: 2025-01-23

## 2024-12-12 RX ORDER — HEPARIN SODIUM (PORCINE) LOCK FLUSH IV SOLN 100 UNIT/ML 100 UNIT/ML
5 SOLUTION INTRAVENOUS
OUTPATIENT
Start: 2025-01-02

## 2024-12-12 RX ORDER — ALBUTEROL SULFATE 90 UG/1
1-2 INHALANT RESPIRATORY (INHALATION)
Start: 2025-01-23

## 2024-12-12 RX ORDER — METHYLPREDNISOLONE SODIUM SUCCINATE 40 MG/ML
40 INJECTION INTRAMUSCULAR; INTRAVENOUS
Start: 2025-01-02

## 2024-12-12 RX ORDER — EPINEPHRINE 1 MG/ML
0.3 INJECTION, SOLUTION INTRAMUSCULAR; SUBCUTANEOUS EVERY 5 MIN PRN
OUTPATIENT
Start: 2025-01-02

## 2024-12-12 RX ORDER — MEPERIDINE HYDROCHLORIDE 25 MG/ML
25 INJECTION INTRAMUSCULAR; INTRAVENOUS; SUBCUTANEOUS
Status: DISCONTINUED | OUTPATIENT
Start: 2024-12-12 | End: 2024-12-12 | Stop reason: HOSPADM

## 2024-12-12 RX ORDER — DIPHENHYDRAMINE HYDROCHLORIDE 50 MG/ML
25 INJECTION INTRAMUSCULAR; INTRAVENOUS
Start: 2025-01-02

## 2024-12-12 RX ORDER — MEPERIDINE HYDROCHLORIDE 25 MG/ML
25 INJECTION INTRAMUSCULAR; INTRAVENOUS; SUBCUTANEOUS
OUTPATIENT
Start: 2025-01-02

## 2024-12-12 RX ORDER — ALBUTEROL SULFATE 90 UG/1
1-2 INHALANT RESPIRATORY (INHALATION)
Start: 2025-01-02

## 2024-12-12 RX ORDER — LORAZEPAM 2 MG/ML
0.5 INJECTION INTRAMUSCULAR EVERY 4 HOURS PRN
Status: DISCONTINUED | OUTPATIENT
Start: 2024-12-12 | End: 2024-12-12 | Stop reason: HOSPADM

## 2024-12-12 RX ORDER — DIPHENHYDRAMINE HYDROCHLORIDE 50 MG/ML
25 INJECTION INTRAMUSCULAR; INTRAVENOUS
Status: DISCONTINUED | OUTPATIENT
Start: 2024-12-12 | End: 2024-12-12 | Stop reason: HOSPADM

## 2024-12-12 RX ADMIN — ONDANSETRON 8 MG: 2 INJECTION INTRAMUSCULAR; INTRAVENOUS at 14:31

## 2024-12-12 RX ADMIN — Medication 5 ML: at 15:09

## 2024-12-12 RX ADMIN — PEMETREXED DISODIUM 800 MG: 500 INJECTION, POWDER, LYOPHILIZED, FOR SOLUTION INTRAVENOUS at 14:55

## 2024-12-12 RX ADMIN — SODIUM CHLORIDE 250 ML: 9 INJECTION, SOLUTION INTRAVENOUS at 14:31

## 2024-12-12 ASSESSMENT — PAIN SCALES - GENERAL: PAINLEVEL_OUTOF10: WORST PAIN (10)

## 2024-12-12 NOTE — PROGRESS NOTES
"Oncology Rooming Note    December 12, 2024 1:54 PM   Inez Rodriguez is a 74 year old female who presents for:    Chief Complaint   Patient presents with    Oncology Clinic Visit     Return visit 6 weeks with lab and infusion. PET and CT 12/10/24. Lung cancer metastatic to bone.     Initial Vitals: BP (!) 143/71 (BP Location: Right arm, Patient Position: Sitting, Cuff Size: Adult Regular)   Pulse 100   Temp 98  F (36.7  C) (Oral)   Resp 16   Wt 55.2 kg (121 lb 11.2 oz)   LMP  (LMP Unknown)   SpO2 97%   BMI 20.25 kg/m   Estimated body mass index is 20.25 kg/m  as calculated from the following:    Height as of 10/31/24: 1.651 m (5' 5\").    Weight as of this encounter: 55.2 kg (121 lb 11.2 oz). Body surface area is 1.59 meters squared.  Worst Pain (10) Comment: Data Unavailable   No LMP recorded (lmp unknown). Patient is postmenopausal.  Allergies reviewed: Yes  Medications reviewed: Yes    Medications: Medication refills not needed today.  Pharmacy name entered into [facility]:    [facility][id number] [place] [id number] AT [facility] [address] [facility] [address]    Frailty Screening:   Is the patient here for a new oncology consult visit in cancer care? 2. No      Clinical concerns: back pain 7/10.  Dr Miller was notified.      Malgorzata Fields [facility]              "

## 2024-12-12 NOTE — PROGRESS NOTES
[facility] Hematology and Oncology progress note    Patient: Inez Rodriguez  MRN: [id number]  Date of Service: Dec 12, 2024           Reason for consultation      Problem List Items Addressed This Visit          Respiratory    Malignant neoplasm of lower lobe of right lung (H)    Lung cancer metastatic to bone (H) - Primary     Other Visit Diagnoses       Chronic midline low back pain without sciatica                  Assessment / number of problems addressed      1.  A very pleasant 74 year old  woman with advanced stage IV non-small cell lung cancer.  The primary seems to be coming from the right upper lobe.  She has mediastinal lymph nodes as well as couple of osseous metastases. T1 in the T4 vertebral body and 1 in the sacrum.  Molecular testing does show that this is K-tray G12C mutated tumor.  No other targeted mutation present except for PTEN which is not significant.  She has been started on palliative chemotherapy with carboplatin Alimta and pembrolizumab.  She has had good response after 2 cycles and continuation of response after 4 cycles.  Carboplatin discontinued after 4 cycles.  CT 7/24 continues to shows further decrease in the size of the lymph nodes in the mediastinum. PET 9/6/24 after 10 cycles showed residual right perihilar, subcarinal, and right paratracheal lymph nodes suggesting continued response.  However she was having a lot of symptoms including feeling very weak, excessive sleepiness, poor p.o. intake etc.  She was given a small dose of steroids and that did make her feel better as long as she was taking them.  After stopping her symptoms returned again.  We put her back on 5 mg of prednisone.  She is doing that very well.  Now back on Alimta starting 10 October 2024.  Current PET scan shows continued response.  Still some disease visible.    2.  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  Pathology is positive for non-small cell lung  cancer.  Responding well to treatment.  Most of the bulky mediastinal lymphadenopathy has resolved but small amount still remains.    3.  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  5 mg of prednisone.  4.  Some allergic symptoms as well.  5.  History of incidentally diagnosed diverticulitis.  6.  Has hypothyroidism.  On monitoring.      Plan and medical decision making     Reviewed notes from each unique source.  Reviewed each unique test.    Ordered tests.    Independently interpreted lab tests and radiological exams performed by other physicians.  Personally reviewed the images of the PET scan which shows very little hypermetabolic disease but still somewhat evident. No new lesion.  Independent historian account obtained from her son. Decision made to proceed with chemotherapy with close monitoring.    At this time I recommended we should continue Alimta at 1000 mg/m  every 3 weeks.  We would like to get the best response on a flight before we discontinue it or if she starts to get toxic from it. Then we will switch her to anti [id number] therapy like Sotarasib. Next PET scan in March.  She should take folic acid daily.  Prednisone 5 mg daily.  Taper down the dexamethasone also.  Increase physical activity.  The longitudinal plan of care for the diagnosis(es)/condition(s) as documented were addressed during this visit. Due to the added complexity in care, I will continue to support Inez in the subsequent management and with ongoing continuity of care.     Clinical/pathological stage      Stage IV    History of present illness      Ms. Inez Rodriguez is a 74 year old  woman who came to the emergency room in the beginning of January 2024 with increasing shortness of breath.  Was also having some coughing which was not responding to treatment given by the urgent care.  In the emergency room she had a CT scan done to rule out any pulmonary embolism but was positive for multiple lymph nodes in the  mediastinum as well as subcarinal area.  She was therefore admitted.  She was seen by pulmonology.  Underwent bronchoscopy and biopsy.  The bronchoscopy came back positive for adenocarcinoma involving subcarinal and mediastinal lymph nodes.    The patient does have a's history of smoking exposure but tells me that she has not smoked for several years.  No significant family history of cancer.  She recently moved from [place].    PET scan done on 29 January 2024 showed that she had extensive disease not only involving the right upper lobe but having extensive mediastinal hilar lymph node metastases and osseous metastases on the T4 vertebral body and right sacrum.    Has been started on chemoimmunotherapy with carboplatin etoposide and pemetrexed.  She has tolerated with moderate side effects.  Comes in today for follow-up.  She has finished 4 cycles.  CT scan after 2 cycles showed decrease in the size of the right upper lobe mass as well as mediastinal lymphadenopathy.  Increasing sclerosis was noted in the T4 vertebral body.    After 4 cycles carboplatin was discontinued.  We continued on pemetrexed and pembrolizumab.  And has been tolerating that quite well.  She has symptoms lasting about a week after chemotherapy.  The last 2 weeks usually she is feeling better.  CT showed improvement in lymphadenopathy.     She is here with her son for cycle 10 with new left sided neck pain and right-sided axillary pain.  This has been new for the past 2 weeks and is bothersome.  She does not have any breathing changes.  She continues to have allergy-like symptoms with nasal dripping and itchy eyes.  She took Claritin for 1 day and did not notice improvement and has not taken it since.  She continues to have poor oral intake due to decreased appetite and fatigue.  She has lost 6 pounds in the last month.  She eats sometimes only yogurt in a day.  She sleeps 15 to 20 hours each day.  She has not been very active.  She also  reports constipation and has not had a bowel movement in 5 days.  She also has had decreased intake.  She has not been drinking much water.  She has tried to senna yesterday without results.    She had PET scan 9/6/24. She had a fall  on 8/24/24 without injury and was found to have diverticulitis incidentally in the ED. the PET scan did show good response but still some disease was active.  However she was having fair bit of side effects.  She was feeling very sleepy and sleeping more than 15 to 16 hours a day.  Not eating very well.  She had lost weight.  She was given small dose of steroids which did make her feel better throughout the time she was taking them.  She was again seen last week.  There was a concern that she might be developing autoimmune issues of endocrine nature.  Which might be causing her to feel very tired fatigue etc.  She had some labs for.  She Also Had a Brain MRI Done to Make Sure That She Was Not Developing Any New Brain Lesions.  The MRI fortunately was negative.    We resume treatment with single agent pemetrexed.  Inez has been able to tolerate that quite well.  She has been tapered down to 5 mg of prednisone.  She is overall feeling well.  Obviously when she is taking dexamethasone on day 0, day 1 and day 2 she has very hard time sleeping.  Her back spasms are better.      Review of system      Details noted in the history of present illness.  A detailed review of systems is otherwise negative.      Physical exam        BP (!) 143/71 (BP Location: Right arm, Patient Position: Sitting, Cuff Size: Adult Regular)   Pulse 100   Temp 98  F (36.7  C) (Oral)   Resp 16   Wt 55.2 kg (121 lb 11.2 oz)   LMP  (LMP Unknown)   SpO2 97%   BMI 20.25 kg/m      GENERAL: No acute distress. Cooperative in conversation.   HEENT:  Pupils are equal, round and reactive. Oral mucosa is clean and intact. No ulcerations or mucositis noted. No bleeding noted.  RESP:Chest symmetric lungs are clear  bilaterally per auscultation. Regular respiratory rate. No wheezes or rhonchi.  CV: Normal S1 S2 Regular, rate and rhythm.     ABD: Nondistended, soft, nontender. Positive bowel sounds. No organomegaly.   EXTREMITIES: No lower extremity edema.   NEURO: Non- focal. Alert and oriented x3.  Cranial nerves appear intact.  PSYCH: Within normal limits. No depression or anxiety.  SKIN: Warm dry intact.      Lab results Reviewed      Recent Results (from the past week)   Glucose by meter   Result Value Ref Range    GLUCOSE BY METER POCT 79 70 - 99 mg/dL   Comprehensive metabolic panel   Result Value Ref Range    Sodium 144 135 - 145 mmol/L    Potassium 3.9 3.4 - 5.3 mmol/L    Carbon Dioxide (CO2) 29 22 - 29 mmol/L    Anion Gap 11 7 - 15 mmol/L    Urea Nitrogen 13.2 8.0 - 23.0 mg/dL    Creatinine 0.90 0.51 - 0.95 mg/dL    GFR Estimate 67 >60 mL/min/1.73m2    Calcium 9.1 8.8 - 10.4 mg/dL    Chloride 104 98 - 107 mmol/L    Glucose 96 70 - 99 mg/dL    Alkaline Phosphatase 56 40 - 150 U/L    AST 27 0 - 45 U/L    ALT 25 0 - 50 U/L    Protein Total 6.3 (L) 6.4 - 8.3 g/dL    Albumin 4.0 3.5 - 5.2 g/dL    Bilirubin Total <0.2 <=1.2 mg/dL   TSH with free T4 reflex   Result Value Ref Range    TSH 1.12 0.30 - 4.20 uIU/mL   CBC with platelets and differential   Result Value Ref Range    WBC Count 7.3 4.0 - 11.0 10e3/uL    RBC Count 3.56 (L) 3.80 - 5.20 10e6/uL    Hemoglobin 11.3 (L) 11.7 - 15.7 g/dL    Hematocrit 35.2 35.0 - 47.0 %    MCV 99 78 - 100 fL    MCH 31.7 26.5 - 33.0 pg    MCHC 32.1 31.5 - 36.5 g/dL    RDW 17.0 (H) 10.0 - 15.0 %    Platelet Count 336 150 - 450 10e3/uL    % Neutrophils 44 %    % Lymphocytes 45 %    % Monocytes 8 %    % Eosinophils 1 %    % Basophils 1 %    % Immature Granulocytes 0 %    NRBCs per 100 WBC 0 <1 /100    Absolute Neutrophils 3.2 1.6 - 8.3 10e3/uL    Absolute Lymphocytes 3.3 0.8 - 5.3 10e3/uL    Absolute Monocytes 0.6 0.0 - 1.3 10e3/uL    Absolute Eosinophils 0.1 0.0 - 0.7 10e3/uL    Absolute  Basophils 0.0 0.0 - 0.2 10e3/uL    Absolute Immature Granulocytes 0.0 <=0.4 10e3/uL    Absolute NRBCs 0.0 10e3/uL       Imaging results Reviewed        CT Chest/Abdomen/Pelvis w Contrast    Result Date: 12/11/2024  EXAM: PET ONCOLOGY WHOLE BODY, CT CHEST/ABDOMEN/PELVIS W CONTRAST LOCATION: [facility] DATE: 12/10/2024 INDICATION: Lung cancer, right, restaging. Malignant neoplasm of lower lobe, right bronchus or lung. Prior immunotherapy. Ongoing systemic chemotherapy. Subsequent treatment strategy. COMPARISON: PET/CT 9/6/2024 CONTRAST: 58 mL Isovue-370 TECHNIQUE: Serum glucose level 79 mg/dL. One hour post intravenous administration of 10.2 mCi F-18 FDG, PET imaging was performed from the skull vertex to feet, utilizing attenuation correction with concurrent axial CT and PET/CT image fusion. Separate diagnostic CT of the chest, abdomen, and pelvis was performed. Dose reduction techniques were used. PET/CT FINDINGS: Since 9/6/2024, a few scattered FDG avid mediastinal and right perihilar lymph nodes have continued to decrease in size and degree of FDG activity, including right upper paratracheal station 2R (SUVmax 3.3, previously 4.0), right lower paratracheal station 4R, right hilar station 10R, upper lobar station 12R and subcarinal station 7 (SUVmax 5.6, previously 6.3), suggesting a continued partial favorable treatment response. No new or enlarging FDG avid adenopathy in the chest or elsewhere. No suspicious focal uptake in the liver or skeleton. Normal adrenal glands. Left subclavian Port-A-Cath with tip in the low SVC. Focus of uptake at tip of port tubing from use of port for FDG injection. CT FINDINGS: Mild senescent intracranial changes. Mild linear scarring or atelectasis in the mid and lower lungs. Moderate atherosclerotic calcifications including the coronary arteries. Few benign bilateral renal cysts, no follow-up needed. Moderate colonic diverticulosis greatest in the sigmoid  region. Few small pelvic phleboliths. Tiny bilateral knee joint effusions. Mild scattered hypertrophic degenerative changes in the spine.     IMPRESSION: 1. Continued partial favorable treatment response of thoracic marialuisa metastases. 2. No new site of metastasis or evidence of progression.    PET Oncology Whole Body    Result Date: 12/11/2024  EXAM: PET ONCOLOGY WHOLE BODY, CT CHEST/ABDOMEN/PELVIS W CONTRAST LOCATION: [facility] DATE: 12/10/2024 INDICATION: Lung cancer, right, restaging. Malignant neoplasm of lower lobe, right bronchus or lung. Prior immunotherapy. Ongoing systemic chemotherapy. Subsequent treatment strategy. COMPARISON: PET/CT 9/6/2024 CONTRAST: 58 mL Isovue-370 TECHNIQUE: Serum glucose level 79 mg/dL. One hour post intravenous administration of 10.2 mCi F-18 FDG, PET imaging was performed from the skull vertex to feet, utilizing attenuation correction with concurrent axial CT and PET/CT image fusion. Separate diagnostic CT of the chest, abdomen, and pelvis was performed. Dose reduction techniques were used. PET/CT FINDINGS: Since 9/6/2024, a few scattered FDG avid mediastinal and right perihilar lymph nodes have continued to decrease in size and degree of FDG activity, including right upper paratracheal station 2R (SUVmax 3.3, previously 4.0), right lower paratracheal station 4R, right hilar station 10R, upper lobar station 12R and subcarinal station 7 (SUVmax 5.6, previously 6.3), suggesting a continued partial favorable treatment response. No new or enlarging FDG avid adenopathy in the chest or elsewhere. No suspicious focal uptake in the liver or skeleton. Normal adrenal glands. Left subclavian Port-A-Cath with tip in the low SVC. Focus of uptake at tip of port tubing from use of port for FDG injection. CT FINDINGS: Mild senescent intracranial changes. Mild linear scarring or atelectasis in the mid and lower lungs. Moderate atherosclerotic calcifications including the  coronary arteries. Few benign bilateral renal cysts, no follow-up needed. Moderate colonic diverticulosis greatest in the sigmoid region. Few small pelvic phleboliths. Tiny bilateral knee joint effusions. Mild scattered hypertrophic degenerative changes in the spine.     IMPRESSION: 1. Continued partial favorable treatment response of thoracic marialuisa metastases. 2. No new site of metastasis or evidence of progression.       Signed by: Chino Miller MD      This note has been dictated using voice recognition software. Any grammatical or context distortions are unintentional and inherent to the software

## 2024-12-12 NOTE — PROGRESS NOTES
Infusion Nursing Note:  Inez Rodriguez presents today for C14D1.    Patient seen by provider today: Yes: Dr. Miller   present during visit today: Not Applicable.    Note: Pt here for treatment after seeing Dr Miller.  No problem with treatment as directed.  Port flushed and deaccessed upon completion and pt annita.c ambulatory to lobby to meet her son.       Intravenous Access:  Implanted Port.    Treatment Conditions:  Lab Results   Component Value Date    HGB 11.3 (L) 12/12/2024    WBC 7.3 12/12/2024    ANEUTAUTO 3.2 12/12/2024     12/12/2024        Lab Results   Component Value Date     12/12/2024    POTASSIUM 3.9 12/12/2024    MAG 2.0 08/24/2024    CR 0.90 12/12/2024    CHELITA 9.1 12/12/2024    BILITOTAL <0.2 12/12/2024    ALBUMIN 4.0 12/12/2024    ALT 25 12/12/2024    AST 27 12/12/2024       Results reviewed, labs MET treatment parameters, ok to proceed with treatment.      Post Infusion Assessment:  Patient tolerated infusion without incident.  Blood return noted pre and post infusion.  No evidence of extravasations.  Access discontinued per protocol.       Discharge Plan:   Discharge instructions reviewed with: Patient.  Patient and/or family verbalized understanding of discharge instructions and all questions answered.  Patient discharged in stable condition accompanied by: self.  Departure Mode: Ambulatory.      Ashtyn Albright RN

## 2024-12-12 NOTE — LETTER
12/12/2024      Inez Rodriguez  [address]  [facility] [address]      Dear Colleague,    Thank you for referring your patient, Inez Rodriguez, to the [facility] [place]. Please see a copy of my visit note below.    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  A very pleasant 74 year old  woman with advanced stage IV non-small cell lung cancer.  The primary seems to be coming from the right upper lobe.  She has mediastinal lymph nodes as well as couple of osseous metastases. T1 in the T4 vertebral body and 1 in the sacrum.  Molecular testing does show that this is K-tray G12C mutated tumor.  No other targeted mutation present except for PTEN which is not significant.  She has been started on palliative chemotherapy with carboplatin Alimta and pembrolizumab.  She has had good response after 2 cycles and continuation of response after 4 cycles.  Carboplatin discontinued after 4 cycles.  CT 7/24 continues to shows further decrease in the size of the lymph nodes in the mediastinum. PET 9/6/24 after 10 cycles showed residual right perihilar, subcarinal, and right paratracheal lymph nodes suggesting continued response.  However she was having a lot of symptoms including feeling very weak, excessive sleepiness, poor p.o. intake etc.  She was given a small dose of steroids and that did make her feel better as long as she was taking them.  After stopping her symptoms returned again.  We put her back on 5 mg of prednisone.  She is doing that very well.  Now back on Alimta  I saw the patient as morning and overall he had no new complaints.  He remains short of breath and on oxygen at 2 L nasal cannula.  He did have a bowel movement within the last 24 hours.  He denies any pains.  Exam:  Visit Vitals  /74 (BP Location: RUE - Right upper extremity, Patient Position: Semi-Ann's)   Pulse 86   Temp 98.4 °F (36.9 °C) (Oral)   Resp 18   Ht 5' 8\" (1.727 m)   Wt 108.3 kg (238 lb 11.2 oz)   SpO2 96%   BMI 36.29 kg/m²     Is awake alert and does not appear uncomfortable  Heart is without tachycardia  Lungs are without congestion or wheezing  Abdomen is soft nontender    Labs:  WBC 25.5, due to steroids  Hemoglobin 10.8     Recent Labs   Lab 07/18/22 2027 07/19/22  0747 07/19/22  1147 07/19/22  1711   GLUCOSE BEDSIDE 182* 119* 148* 287*       Assessment:  Diabetes.  His blood sugars appear to be under reasonable control.  Acute hypoxemic respiratory failure    Plan:  Awaiting transition to skilled nursing facility due to severe deconditioning and dyspnea with exertion.  Patient would benefit from therapies.     starting 10 October 2024.  Current PET scan shows continued response.  Still some disease visible.    2.  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  Pathology is positive for non-small cell lung cancer.  Responding well to treatment.  Most of the bulky mediastinal lymphadenopathy has resolved but small amount still remains.    3.  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  5 mg of prednisone.  4.  Some allergic symptoms as well.  5.  History of incidentally diagnosed diverticulitis.  6.  Has hypothyroidism.  On monitoring.      Plan and medical decision making     Reviewed notes from each unique source.  Reviewed each unique test.    Ordered tests.    Independently interpreted lab tests and radiological exams performed by other physicians.  Personally reviewed the images of the PET scan which shows very little hypermetabolic disease but still somewhat evident. No new lesion.  Independent historian account obtained from her son. Decision made to proceed with chemotherapy with close monitoring.    At this time I recommended we should continue Alimta at 1000 mg/m  every 3 weeks.  We would like to get the best response on a flight before we discontinue it or if she starts to get toxic from it. Then we will switch her to anti [id number] therapy like Sotarasib. Next PET scan in March.  She should take folic acid daily.  Prednisone 5 mg daily.  Taper down the dexamethasone also.  Increase physical activity.  The longitudinal plan of care for the diagnosis(es)/condition(s) as documented were addressed during this visit. Due to the added complexity in care, I will continue to support Inez in the subsequent management and with ongoing continuity of care.     Clinical/pathological stage      Stage IV    History of present illness      Ms. Inez Rodriguez is a 74 year old  woman who came to the emergency room in the beginning of January 2024 with increasing  shortness of breath.  Was also having some coughing which was not responding to treatment given by the urgent care.  In the emergency room she had a CT scan done to rule out any pulmonary embolism but was positive for multiple lymph nodes in the mediastinum as well as subcarinal area.  She was therefore admitted.  She was seen by pulmonology.  Underwent bronchoscopy and biopsy.  The bronchoscopy came back positive for adenocarcinoma involving subcarinal and mediastinal lymph nodes.    The patient does have a's history of smoking exposure but tells me that she has not smoked for several years.  No significant family history of cancer.  She recently moved from [place].    PET scan done on 29 January 2024 showed that she had extensive disease not only involving the right upper lobe but having extensive mediastinal hilar lymph node metastases and osseous metastases on the T4 vertebral body and right sacrum.    Has been started on chemoimmunotherapy with carboplatin etoposide and pemetrexed.  She has tolerated with moderate side effects.  Comes in today for follow-up.  She has finished 4 cycles.  CT scan after 2 cycles showed decrease in the size of the right upper lobe mass as well as mediastinal lymphadenopathy.  Increasing sclerosis was noted in the T4 vertebral body.    After 4 cycles carboplatin was discontinued.  We continued on pemetrexed and pembrolizumab.  And has been tolerating that quite well.  She has symptoms lasting about a week after chemotherapy.  The last 2 weeks usually she is feeling better.  CT showed improvement in lymphadenopathy.     She is here with her son for cycle 10 with new left sided neck pain and right-sided axillary pain.  This has been new for the past 2 weeks and is bothersome.  She does not have any breathing changes.  She continues to have allergy-like symptoms with nasal dripping and itchy eyes.  She took Claritin for 1 day and did not notice improvement and has not taken it since.   She continues to have poor oral intake due to decreased appetite and fatigue.  She has lost 6 pounds in the last month.  She eats sometimes only yogurt in a day.  She sleeps 15 to 20 hours each day.  She has not been very active.  She also reports constipation and has not had a bowel movement in 5 days.  She also has had decreased intake.  She has not been drinking much water.  She has tried to senna yesterday without results.    She had PET scan 9/6/24. She had a fall  on 8/24/24 without injury and was found to have diverticulitis incidentally in the ED. the PET scan did show good response but still some disease was active.  However she was having fair bit of side effects.  She was feeling very sleepy and sleeping more than 15 to 16 hours a day.  Not eating very well.  She had lost weight.  She was given small dose of steroids which did make her feel better throughout the time she was taking them.  She was again seen last week.  There was a concern that she might be developing autoimmune issues of endocrine nature.  Which might be causing her to feel very tired fatigue etc.  She had some labs for.  She Also Had a Brain MRI Done to Make Sure That She Was Not Developing Any New Brain Lesions.  The MRI fortunately was negative.    We resume treatment with single agent pemetrexed.  Inez has been able to tolerate that quite well.  She has been tapered down to 5 mg of prednisone.  She is overall feeling well.  Obviously when she is taking dexamethasone on day 0, day 1 and day 2 she has very hard time sleeping.  Her back spasms are better.      Review of system      Details noted in the history of present illness.  A detailed review of systems is otherwise negative.      Physical exam        BP (!) 143/71 (BP Location: Right arm, Patient Position: Sitting, Cuff Size: Adult Regular)   Pulse 100   Temp 98  F (36.7  C) (Oral)   Resp 16   Wt 55.2 kg (121 lb 11.2 oz)   LMP  (LMP Unknown)   SpO2 97%   BMI 20.25 kg/m       GENERAL: No acute distress. Cooperative in conversation.   HEENT:  Pupils are equal, round and reactive. Oral mucosa is clean and intact. No ulcerations or mucositis noted. No bleeding noted.  RESP:Chest symmetric lungs are clear bilaterally per auscultation. Regular respiratory rate. No wheezes or rhonchi.  CV: Normal S1 S2 Regular, rate and rhythm.     ABD: Nondistended, soft, nontender. Positive bowel sounds. No organomegaly.   EXTREMITIES: No lower extremity edema.   NEURO: Non- focal. Alert and oriented x3.  Cranial nerves appear intact.  PSYCH: Within normal limits. No depression or anxiety.  SKIN: Warm dry intact.      Lab results Reviewed      Recent Results (from the past week)   Glucose by meter   Result Value Ref Range    GLUCOSE BY METER POCT 79 70 - 99 mg/dL   Comprehensive metabolic panel   Result Value Ref Range    Sodium 144 135 - 145 mmol/L    Potassium 3.9 3.4 - 5.3 mmol/L    Carbon Dioxide (CO2) 29 22 - 29 mmol/L    Anion Gap 11 7 - 15 mmol/L    Urea Nitrogen 13.2 8.0 - 23.0 mg/dL    Creatinine 0.90 0.51 - 0.95 mg/dL    GFR Estimate 67 >60 mL/min/1.73m2    Calcium 9.1 8.8 - 10.4 mg/dL    Chloride 104 98 - 107 mmol/L    Glucose 96 70 - 99 mg/dL    Alkaline Phosphatase 56 40 - 150 U/L    AST 27 0 - 45 U/L    ALT 25 0 - 50 U/L    Protein Total 6.3 (L) 6.4 - 8.3 g/dL    Albumin 4.0 3.5 - 5.2 g/dL    Bilirubin Total <0.2 <=1.2 mg/dL   TSH with free T4 reflex   Result Value Ref Range    TSH 1.12 0.30 - 4.20 uIU/mL   CBC with platelets and differential   Result Value Ref Range    WBC Count 7.3 4.0 - 11.0 10e3/uL    RBC Count 3.56 (L) 3.80 - 5.20 10e6/uL    Hemoglobin 11.3 (L) 11.7 - 15.7 g/dL    Hematocrit 35.2 35.0 - 47.0 %    MCV 99 78 - 100 fL    MCH 31.7 26.5 - 33.0 pg    MCHC 32.1 31.5 - 36.5 g/dL    RDW 17.0 (H) 10.0 - 15.0 %    Platelet Count 336 150 - 450 10e3/uL    % Neutrophils 44 %    % Lymphocytes 45 %    % Monocytes 8 %    % Eosinophils 1 %    % Basophils 1 %    % Immature Granulocytes 0  %    NRBCs per 100 WBC 0 <1 /100    Absolute Neutrophils 3.2 1.6 - 8.3 10e3/uL    Absolute Lymphocytes 3.3 0.8 - 5.3 10e3/uL    Absolute Monocytes 0.6 0.0 - 1.3 10e3/uL    Absolute Eosinophils 0.1 0.0 - 0.7 10e3/uL    Absolute Basophils 0.0 0.0 - 0.2 10e3/uL    Absolute Immature Granulocytes 0.0 <=0.4 10e3/uL    Absolute NRBCs 0.0 10e3/uL       Imaging results Reviewed        CT Chest/Abdomen/Pelvis w Contrast    Result Date: 12/11/2024  EXAM: PET ONCOLOGY WHOLE BODY, CT CHEST/ABDOMEN/PELVIS W CONTRAST LOCATION: [facility] DATE: 12/10/2024 INDICATION: Lung cancer, right, restaging. Malignant neoplasm of lower lobe, right bronchus or lung. Prior immunotherapy. Ongoing systemic chemotherapy. Subsequent treatment strategy. COMPARISON: PET/CT 9/6/2024 CONTRAST: 58 mL Isovue-370 TECHNIQUE: Serum glucose level 79 mg/dL. One hour post intravenous administration of 10.2 mCi F-18 FDG, PET imaging was performed from the skull vertex to feet, utilizing attenuation correction with concurrent axial CT and PET/CT image fusion. Separate diagnostic CT of the chest, abdomen, and pelvis was performed. Dose reduction techniques were used. PET/CT FINDINGS: Since 9/6/2024, a few scattered FDG avid mediastinal and right perihilar lymph nodes have continued to decrease in size and degree of FDG activity, including right upper paratracheal station 2R (SUVmax 3.3, previously 4.0), right lower paratracheal station 4R, right hilar station 10R, upper lobar station 12R and subcarinal station 7 (SUVmax 5.6, previously 6.3), suggesting a continued partial favorable treatment response. No new or enlarging FDG avid adenopathy in the chest or elsewhere. No suspicious focal uptake in the liver or skeleton. Normal adrenal glands. Left subclavian Port-A-Cath with tip in the low SVC. Focus of uptake at tip of port tubing from use of port for FDG injection. CT FINDINGS: Mild senescent intracranial changes. Mild linear scarring or  atelectasis in the mid and lower lungs. Moderate atherosclerotic calcifications including the coronary arteries. Few benign bilateral renal cysts, no follow-up needed. Moderate colonic diverticulosis greatest in the sigmoid region. Few small pelvic phleboliths. Tiny bilateral knee joint effusions. Mild scattered hypertrophic degenerative changes in the spine.     IMPRESSION: 1. Continued partial favorable treatment response of thoracic marialuisa metastases. 2. No new site of metastasis or evidence of progression.    PET Oncology Whole Body    Result Date: 12/11/2024  EXAM: PET ONCOLOGY WHOLE BODY, CT CHEST/ABDOMEN/PELVIS W CONTRAST LOCATION: [facility] DATE: 12/10/2024 INDICATION: Lung cancer, right, restaging. Malignant neoplasm of lower lobe, right bronchus or lung. Prior immunotherapy. Ongoing systemic chemotherapy. Subsequent treatment strategy. COMPARISON: PET/CT 9/6/2024 CONTRAST: 58 mL Isovue-370 TECHNIQUE: Serum glucose level 79 mg/dL. One hour post intravenous administration of 10.2 mCi F-18 FDG, PET imaging was performed from the skull vertex to feet, utilizing attenuation correction with concurrent axial CT and PET/CT image fusion. Separate diagnostic CT of the chest, abdomen, and pelvis was performed. Dose reduction techniques were used. PET/CT FINDINGS: Since 9/6/2024, a few scattered FDG avid mediastinal and right perihilar lymph nodes have continued to decrease in size and degree of FDG activity, including right upper paratracheal station 2R (SUVmax 3.3, previously 4.0), right lower paratracheal station 4R, right hilar station 10R, upper lobar station 12R and subcarinal station 7 (SUVmax 5.6, previously 6.3), suggesting a continued partial favorable treatment response. No new or enlarging FDG avid adenopathy in the chest or elsewhere. No suspicious focal uptake in the liver or skeleton. Normal adrenal glands. Left subclavian Port-A-Cath with tip in the low SVC. Focus of uptake at  "tip of port tubing from use of port for FDG injection. CT FINDINGS: Mild senescent intracranial changes. Mild linear scarring or atelectasis in the mid and lower lungs. Moderate atherosclerotic calcifications including the coronary arteries. Few benign bilateral renal cysts, no follow-up needed. Moderate colonic diverticulosis greatest in the sigmoid region. Few small pelvic phleboliths. Tiny bilateral knee joint effusions. Mild scattered hypertrophic degenerative changes in the spine.     IMPRESSION: 1. Continued partial favorable treatment response of thoracic marialuisa metastases. 2. No new site of metastasis or evidence of progression.       Signed by: Chino Miller MD      This note has been dictated using voice recognition software. Any grammatical or context distortions are unintentional and inherent to the software      Oncology Rooming Note    December 12, 2024 1:54 PM   Inez Rodriguez is a 74 year old female who presents for:    Chief Complaint   Patient presents with     Oncology Clinic Visit     Return visit 6 weeks with lab and infusion. PET and CT 12/10/24. Lung cancer metastatic to bone.     Initial Vitals: BP (!) 143/71 (BP Location: Right arm, Patient Position: Sitting, Cuff Size: Adult Regular)   Pulse 100   Temp 98  F (36.7  C) (Oral)   Resp 16   Wt 55.2 kg (121 lb 11.2 oz)   LMP  (LMP Unknown)   SpO2 97%   BMI 20.25 kg/m   Estimated body mass index is 20.25 kg/m  as calculated from the following:    Height as of 10/31/24: 1.651 m (5' 5\").    Weight as of this encounter: 55.2 kg (121 lb 11.2 oz). Body surface area is 1.59 meters squared.  Worst Pain (10) Comment: Data Unavailable   No LMP recorded (lmp unknown). Patient is postmenopausal.  Allergies reviewed: Yes  Medications reviewed: Yes    Medications: Medication refills not needed today.  Pharmacy name entered into [employer]:    [id number] - [place], [place] - [address] AT [facility] [address]  [facility] " [address]    Frailty Screening:   Is the patient here for a new oncology consult visit in cancer care? 2. No      Clinical concerns: back pain 7/10.  Dr Miller was notified.      Malgorzata Fields CMA                Again, thank you for allowing me to participate in the care of your patient.        Sincerely,        Chino Miller MD   n/a

## 2024-12-30 RX ORDER — CYANOCOBALAMIN 1000 UG/ML
1000 INJECTION, SOLUTION INTRAMUSCULAR; SUBCUTANEOUS ONCE
Status: CANCELLED | OUTPATIENT
Start: 2025-01-02 | End: 2025-01-02

## 2025-01-02 ENCOUNTER — INFUSION THERAPY VISIT (OUTPATIENT)
Dept: INFUSION THERAPY | Facility: HOSPITAL | Age: 75
End: 2025-01-02
Payer: COMMERCIAL

## 2025-01-02 VITALS
OXYGEN SATURATION: 99 % | SYSTOLIC BLOOD PRESSURE: 183 MMHG | RESPIRATION RATE: 18 BRPM | DIASTOLIC BLOOD PRESSURE: 86 MMHG | HEART RATE: 90 BPM | TEMPERATURE: 98.2 F

## 2025-01-02 DIAGNOSIS — C34.31 MALIGNANT NEOPLASM OF LOWER LOBE OF RIGHT LUNG (H): Primary | ICD-10-CM

## 2025-01-02 DIAGNOSIS — C79.51 LUNG CANCER METASTATIC TO BONE (H): ICD-10-CM

## 2025-01-02 DIAGNOSIS — C34.90 LUNG CANCER METASTATIC TO BONE (H): ICD-10-CM

## 2025-01-02 DIAGNOSIS — C34.31 MALIGNANT NEOPLASM OF LOWER LOBE OF RIGHT LUNG (H): ICD-10-CM

## 2025-01-02 DIAGNOSIS — C79.51 LUNG CANCER METASTATIC TO BONE (H): Primary | ICD-10-CM

## 2025-01-02 DIAGNOSIS — C34.90 LUNG CANCER METASTATIC TO BONE (H): Primary | ICD-10-CM

## 2025-01-02 LAB
ALBUMIN SERPL BCG-MCNC: 4.2 G/DL (ref 3.5–5.2)
ALP SERPL-CCNC: 52 U/L (ref 40–150)
ALT SERPL W P-5'-P-CCNC: 22 U/L (ref 0–50)
ANION GAP SERPL CALCULATED.3IONS-SCNC: 12 MMOL/L (ref 7–15)
AST SERPL W P-5'-P-CCNC: 22 U/L (ref 0–45)
BASOPHILS # BLD AUTO: 0 10E3/UL (ref 0–0.2)
BASOPHILS NFR BLD AUTO: 0 %
BILIRUB SERPL-MCNC: 0.2 MG/DL
BUN SERPL-MCNC: 14 MG/DL (ref 8–23)
CALCIUM SERPL-MCNC: 9.4 MG/DL (ref 8.8–10.4)
CHLORIDE SERPL-SCNC: 102 MMOL/L (ref 98–107)
CREAT SERPL-MCNC: 0.72 MG/DL (ref 0.51–0.95)
EGFRCR SERPLBLD CKD-EPI 2021: 87 ML/MIN/1.73M2
EOSINOPHIL # BLD AUTO: 0 10E3/UL (ref 0–0.7)
EOSINOPHIL NFR BLD AUTO: 0 %
ERYTHROCYTE [DISTWIDTH] IN BLOOD BY AUTOMATED COUNT: 16.6 % (ref 10–15)
GLUCOSE SERPL-MCNC: 131 MG/DL (ref 70–99)
HCO3 SERPL-SCNC: 26 MMOL/L (ref 22–29)
HCT VFR BLD AUTO: 37.8 % (ref 35–47)
HGB BLD-MCNC: 12 G/DL (ref 11.7–15.7)
IMM GRANULOCYTES # BLD: 0.1 10E3/UL
IMM GRANULOCYTES NFR BLD: 1 %
LYMPHOCYTES # BLD AUTO: 1.4 10E3/UL (ref 0.8–5.3)
LYMPHOCYTES NFR BLD AUTO: 11 %
MCH RBC QN AUTO: 31.3 PG (ref 26.5–33)
MCHC RBC AUTO-ENTMCNC: 31.7 G/DL (ref 31.5–36.5)
MCV RBC AUTO: 99 FL (ref 78–100)
MONOCYTES # BLD AUTO: 1.1 10E3/UL (ref 0–1.3)
MONOCYTES NFR BLD AUTO: 8 %
NEUTROPHILS # BLD AUTO: 10.6 10E3/UL (ref 1.6–8.3)
NEUTROPHILS NFR BLD AUTO: 80 %
NRBC # BLD AUTO: 0 10E3/UL
NRBC BLD AUTO-RTO: 0 /100
PLATELET # BLD AUTO: 319 10E3/UL (ref 150–450)
POTASSIUM SERPL-SCNC: 4.2 MMOL/L (ref 3.4–5.3)
PROT SERPL-MCNC: 6.3 G/DL (ref 6.4–8.3)
RBC # BLD AUTO: 3.83 10E6/UL (ref 3.8–5.2)
SODIUM SERPL-SCNC: 140 MMOL/L (ref 135–145)
T4 FREE SERPL-MCNC: 1.05 NG/DL (ref 0.9–1.7)
TSH SERPL DL<=0.005 MIU/L-ACNC: 0.1 UIU/ML (ref 0.3–4.2)
WBC # BLD AUTO: 13.1 10E3/UL (ref 4–11)

## 2025-01-02 PROCEDURE — 84443 ASSAY THYROID STIM HORMONE: CPT | Performed by: INTERNAL MEDICINE

## 2025-01-02 PROCEDURE — 84439 ASSAY OF FREE THYROXINE: CPT | Performed by: INTERNAL MEDICINE

## 2025-01-02 PROCEDURE — 96372 THER/PROPH/DIAG INJ SC/IM: CPT | Performed by: NURSE PRACTITIONER

## 2025-01-02 PROCEDURE — 85004 AUTOMATED DIFF WBC COUNT: CPT | Performed by: INTERNAL MEDICINE

## 2025-01-02 PROCEDURE — 258N000003 HC RX IP 258 OP 636: Performed by: INTERNAL MEDICINE

## 2025-01-02 PROCEDURE — 80053 COMPREHEN METABOLIC PANEL: CPT | Performed by: INTERNAL MEDICINE

## 2025-01-02 PROCEDURE — 250N000011 HC RX IP 250 OP 636: Performed by: INTERNAL MEDICINE

## 2025-01-02 PROCEDURE — 250N000011 HC RX IP 250 OP 636: Performed by: NURSE PRACTITIONER

## 2025-01-02 PROCEDURE — 36591 DRAW BLOOD OFF VENOUS DEVICE: CPT | Performed by: INTERNAL MEDICINE

## 2025-01-02 PROCEDURE — 85048 AUTOMATED LEUKOCYTE COUNT: CPT | Performed by: INTERNAL MEDICINE

## 2025-01-02 RX ORDER — HEPARIN SODIUM (PORCINE) LOCK FLUSH IV SOLN 100 UNIT/ML 100 UNIT/ML
5 SOLUTION INTRAVENOUS
Status: DISCONTINUED | OUTPATIENT
Start: 2025-01-02 | End: 2025-01-02 | Stop reason: HOSPADM

## 2025-01-02 RX ORDER — DIPHENHYDRAMINE HYDROCHLORIDE 50 MG/ML
25 INJECTION INTRAMUSCULAR; INTRAVENOUS
Status: DISCONTINUED | OUTPATIENT
Start: 2025-01-02 | End: 2025-01-02 | Stop reason: HOSPADM

## 2025-01-02 RX ORDER — METHYLPREDNISOLONE SODIUM SUCCINATE 40 MG/ML
40 INJECTION INTRAMUSCULAR; INTRAVENOUS
Status: DISCONTINUED | OUTPATIENT
Start: 2025-01-02 | End: 2025-01-02 | Stop reason: HOSPADM

## 2025-01-02 RX ORDER — DIPHENHYDRAMINE HYDROCHLORIDE 50 MG/ML
50 INJECTION INTRAMUSCULAR; INTRAVENOUS
Status: DISCONTINUED | OUTPATIENT
Start: 2025-01-02 | End: 2025-01-02 | Stop reason: HOSPADM

## 2025-01-02 RX ORDER — MEPERIDINE HYDROCHLORIDE 25 MG/ML
25 INJECTION INTRAMUSCULAR; INTRAVENOUS; SUBCUTANEOUS
Status: DISCONTINUED | OUTPATIENT
Start: 2025-01-02 | End: 2025-01-02 | Stop reason: HOSPADM

## 2025-01-02 RX ORDER — ALBUTEROL SULFATE 0.83 MG/ML
2.5 SOLUTION RESPIRATORY (INHALATION)
Status: DISCONTINUED | OUTPATIENT
Start: 2025-01-02 | End: 2025-01-02 | Stop reason: HOSPADM

## 2025-01-02 RX ORDER — ONDANSETRON 2 MG/ML
8 INJECTION INTRAMUSCULAR; INTRAVENOUS ONCE
Status: COMPLETED | OUTPATIENT
Start: 2025-01-02 | End: 2025-01-02

## 2025-01-02 RX ORDER — EPINEPHRINE 1 MG/ML
0.3 INJECTION, SOLUTION INTRAMUSCULAR; SUBCUTANEOUS EVERY 5 MIN PRN
Status: DISCONTINUED | OUTPATIENT
Start: 2025-01-02 | End: 2025-01-02 | Stop reason: HOSPADM

## 2025-01-02 RX ORDER — CYANOCOBALAMIN 1000 UG/ML
1000 INJECTION, SOLUTION INTRAMUSCULAR; SUBCUTANEOUS ONCE
Status: COMPLETED | OUTPATIENT
Start: 2025-01-02 | End: 2025-01-02

## 2025-01-02 RX ORDER — ALBUTEROL SULFATE 90 UG/1
1-2 INHALANT RESPIRATORY (INHALATION)
Status: DISCONTINUED | OUTPATIENT
Start: 2025-01-02 | End: 2025-01-02 | Stop reason: HOSPADM

## 2025-01-02 RX ADMIN — CYANOCOBALAMIN 1000 MCG: 1000 INJECTION, SOLUTION INTRAMUSCULAR; SUBCUTANEOUS at 14:30

## 2025-01-02 RX ADMIN — HEPARIN SODIUM (PORCINE) LOCK FLUSH IV SOLN 100 UNIT/ML 5 ML: 100 SOLUTION at 15:11

## 2025-01-02 RX ADMIN — ONDANSETRON 8 MG: 2 INJECTION INTRAMUSCULAR; INTRAVENOUS at 14:27

## 2025-01-02 RX ADMIN — SODIUM CHLORIDE 250 ML: 9 INJECTION, SOLUTION INTRAVENOUS at 14:26

## 2025-01-02 RX ADMIN — PEMETREXED DISODIUM 800 MG: 500 INJECTION, POWDER, LYOPHILIZED, FOR SOLUTION INTRAVENOUS at 14:55

## 2025-01-02 NOTE — PROGRESS NOTES
Infusion Nursing Note:  Inez Rodriguez presents today for Cycle 15 day 1 treatment with Pemetrexed.    Patient seen by provider today: No   present during visit today: Not Applicable.    Note: Pt hypertensive when she came to clinic and still hypertensive after 15 minutes of quiet waiting.  This was discussed with Elizabet Williamson CNP who wants pt to restart her Norvasc.  Pt was educated on this and is agreeable.  Per pt request pharmacy was called to verify refill status.  Pt can get a 30 day supply today.  Pt states she will do this on her way home.  Other vital signs are stable.  She was assessed.  She was educated on her plan of care and each medication given was reviewed prior to administration.  She received treatment as ordered.  Vitamin B12 was given IM today into her left deltoid.      Intravenous Access:  Implanted Port.    Treatment Conditions:  Lab Results   Component Value Date    HGB 12.0 01/02/2025    WBC 13.1 (H) 01/02/2025    ANEUTAUTO 10.6 (H) 01/02/2025     01/02/2025        Lab Results   Component Value Date     01/02/2025    POTASSIUM 4.2 01/02/2025    MAG 2.0 08/24/2024    CR 0.72 01/02/2025    CHELITA 9.4 01/02/2025    BILITOTAL 0.2 01/02/2025    ALBUMIN 4.2 01/02/2025    ALT 22 01/02/2025    AST 22 01/02/2025       Results reviewed, labs MET treatment parameters, ok to proceed with treatment.      Post Infusion Assessment:  Patient tolerated infusion without incident.  Blood return noted pre and post infusion.  Site patent and intact, free from redness, edema or discomfort.  No evidence of extravasations.  Access discontinued per protocol.       Discharge Plan:   Patient discharged in stable condition accompanied by: self.  Departure Mode: Ambulatory.      Alysha Lara RN

## 2025-01-23 ENCOUNTER — ONCOLOGY VISIT (OUTPATIENT)
Dept: ONCOLOGY | Facility: HOSPITAL | Age: 75
End: 2025-01-23
Payer: COMMERCIAL

## 2025-01-23 ENCOUNTER — INFUSION THERAPY VISIT (OUTPATIENT)
Dept: INFUSION THERAPY | Facility: HOSPITAL | Age: 75
End: 2025-01-23
Payer: COMMERCIAL

## 2025-01-23 VITALS
OXYGEN SATURATION: 100 % | TEMPERATURE: 98.7 F | RESPIRATION RATE: 16 BRPM | BODY MASS INDEX: 21.12 KG/M2 | HEART RATE: 109 BPM | WEIGHT: 126.9 LBS | DIASTOLIC BLOOD PRESSURE: 82 MMHG | SYSTOLIC BLOOD PRESSURE: 175 MMHG

## 2025-01-23 DIAGNOSIS — C34.90 LUNG CANCER METASTATIC TO BONE (H): ICD-10-CM

## 2025-01-23 DIAGNOSIS — C79.51 LUNG CANCER METASTATIC TO BONE (H): Primary | ICD-10-CM

## 2025-01-23 DIAGNOSIS — C34.90 LUNG CANCER METASTATIC TO BONE (H): Primary | ICD-10-CM

## 2025-01-23 DIAGNOSIS — C34.31 MALIGNANT NEOPLASM OF LOWER LOBE OF RIGHT LUNG (H): Primary | ICD-10-CM

## 2025-01-23 DIAGNOSIS — C79.51 LUNG CANCER METASTATIC TO BONE (H): ICD-10-CM

## 2025-01-23 DIAGNOSIS — C34.31 MALIGNANT NEOPLASM OF LOWER LOBE OF RIGHT LUNG (H): ICD-10-CM

## 2025-01-23 LAB
ALBUMIN SERPL BCG-MCNC: 4.3 G/DL (ref 3.5–5.2)
ALP SERPL-CCNC: 54 U/L (ref 40–150)
ALT SERPL W P-5'-P-CCNC: 32 U/L (ref 0–50)
ANION GAP SERPL CALCULATED.3IONS-SCNC: 14 MMOL/L (ref 7–15)
AST SERPL W P-5'-P-CCNC: 24 U/L (ref 0–45)
BASOPHILS # BLD AUTO: 0 10E3/UL (ref 0–0.2)
BASOPHILS NFR BLD AUTO: 0 %
BILIRUB SERPL-MCNC: 0.2 MG/DL
BUN SERPL-MCNC: 18.9 MG/DL (ref 8–23)
CALCIUM SERPL-MCNC: 10.3 MG/DL (ref 8.8–10.4)
CHLORIDE SERPL-SCNC: 102 MMOL/L (ref 98–107)
CREAT SERPL-MCNC: 0.71 MG/DL (ref 0.51–0.95)
EGFRCR SERPLBLD CKD-EPI 2021: 89 ML/MIN/1.73M2
EOSINOPHIL # BLD AUTO: 0 10E3/UL (ref 0–0.7)
EOSINOPHIL NFR BLD AUTO: 0 %
ERYTHROCYTE [DISTWIDTH] IN BLOOD BY AUTOMATED COUNT: 15.8 % (ref 10–15)
GLUCOSE SERPL-MCNC: 133 MG/DL (ref 70–99)
HCO3 SERPL-SCNC: 24 MMOL/L (ref 22–29)
HCT VFR BLD AUTO: 38.4 % (ref 35–47)
HGB BLD-MCNC: 12.4 G/DL (ref 11.7–15.7)
IMM GRANULOCYTES # BLD: 0.1 10E3/UL
IMM GRANULOCYTES NFR BLD: 1 %
LYMPHOCYTES # BLD AUTO: 1 10E3/UL (ref 0.8–5.3)
LYMPHOCYTES NFR BLD AUTO: 9 %
MCH RBC QN AUTO: 32.1 PG (ref 26.5–33)
MCHC RBC AUTO-ENTMCNC: 32.3 G/DL (ref 31.5–36.5)
MCV RBC AUTO: 100 FL (ref 78–100)
MONOCYTES # BLD AUTO: 0.6 10E3/UL (ref 0–1.3)
MONOCYTES NFR BLD AUTO: 6 %
NEUTROPHILS # BLD AUTO: 8.7 10E3/UL (ref 1.6–8.3)
NEUTROPHILS NFR BLD AUTO: 84 %
NRBC # BLD AUTO: 0 10E3/UL
NRBC BLD AUTO-RTO: 0 /100
PLATELET # BLD AUTO: 428 10E3/UL (ref 150–450)
POTASSIUM SERPL-SCNC: 4 MMOL/L (ref 3.4–5.3)
PROT SERPL-MCNC: 6.8 G/DL (ref 6.4–8.3)
RBC # BLD AUTO: 3.86 10E6/UL (ref 3.8–5.2)
SODIUM SERPL-SCNC: 140 MMOL/L (ref 135–145)
T4 FREE SERPL-MCNC: 1.07 NG/DL (ref 0.9–1.7)
TSH SERPL DL<=0.005 MIU/L-ACNC: 0.09 UIU/ML (ref 0.3–4.2)
WBC # BLD AUTO: 10.4 10E3/UL (ref 4–11)

## 2025-01-23 PROCEDURE — G0463 HOSPITAL OUTPT CLINIC VISIT: HCPCS | Performed by: NURSE PRACTITIONER

## 2025-01-23 PROCEDURE — 84439 ASSAY OF FREE THYROXINE: CPT | Performed by: INTERNAL MEDICINE

## 2025-01-23 PROCEDURE — 250N000011 HC RX IP 250 OP 636: Performed by: INTERNAL MEDICINE

## 2025-01-23 PROCEDURE — 258N000003 HC RX IP 258 OP 636: Performed by: INTERNAL MEDICINE

## 2025-01-23 PROCEDURE — 85041 AUTOMATED RBC COUNT: CPT | Performed by: INTERNAL MEDICINE

## 2025-01-23 PROCEDURE — 85004 AUTOMATED DIFF WBC COUNT: CPT | Performed by: INTERNAL MEDICINE

## 2025-01-23 PROCEDURE — 82310 ASSAY OF CALCIUM: CPT | Performed by: INTERNAL MEDICINE

## 2025-01-23 PROCEDURE — 36591 DRAW BLOOD OFF VENOUS DEVICE: CPT | Performed by: INTERNAL MEDICINE

## 2025-01-23 PROCEDURE — 84443 ASSAY THYROID STIM HORMONE: CPT | Performed by: INTERNAL MEDICINE

## 2025-01-23 PROCEDURE — 84450 TRANSFERASE (AST) (SGOT): CPT | Performed by: INTERNAL MEDICINE

## 2025-01-23 RX ORDER — DIPHENHYDRAMINE HYDROCHLORIDE 50 MG/ML
50 INJECTION INTRAMUSCULAR; INTRAVENOUS
Start: 2025-02-13

## 2025-01-23 RX ORDER — DIPHENHYDRAMINE HYDROCHLORIDE 50 MG/ML
25 INJECTION INTRAMUSCULAR; INTRAVENOUS
Start: 2025-02-13

## 2025-01-23 RX ORDER — HEPARIN SODIUM (PORCINE) LOCK FLUSH IV SOLN 100 UNIT/ML 100 UNIT/ML
5 SOLUTION INTRAVENOUS
OUTPATIENT
Start: 2025-02-13

## 2025-01-23 RX ORDER — DIPHENHYDRAMINE HYDROCHLORIDE 50 MG/ML
25 INJECTION INTRAMUSCULAR; INTRAVENOUS
Start: 2025-03-06

## 2025-01-23 RX ORDER — DIPHENHYDRAMINE HYDROCHLORIDE 50 MG/ML
50 INJECTION INTRAMUSCULAR; INTRAVENOUS
Start: 2025-03-27

## 2025-01-23 RX ORDER — ONDANSETRON 2 MG/ML
8 INJECTION INTRAMUSCULAR; INTRAVENOUS ONCE
Start: 2025-03-06 | End: 2025-03-06

## 2025-01-23 RX ORDER — ALBUTEROL SULFATE 0.83 MG/ML
2.5 SOLUTION RESPIRATORY (INHALATION)
OUTPATIENT
Start: 2025-03-27

## 2025-01-23 RX ORDER — LORAZEPAM 2 MG/ML
0.5 INJECTION INTRAMUSCULAR EVERY 4 HOURS PRN
OUTPATIENT
Start: 2025-03-06

## 2025-01-23 RX ORDER — LORAZEPAM 2 MG/ML
0.5 INJECTION INTRAMUSCULAR EVERY 4 HOURS PRN
OUTPATIENT
Start: 2025-02-13

## 2025-01-23 RX ORDER — HEPARIN SODIUM (PORCINE) LOCK FLUSH IV SOLN 100 UNIT/ML 100 UNIT/ML
5 SOLUTION INTRAVENOUS
Status: DISCONTINUED | OUTPATIENT
Start: 2025-01-23 | End: 2025-01-23 | Stop reason: HOSPADM

## 2025-01-23 RX ORDER — MEPERIDINE HYDROCHLORIDE 25 MG/ML
25 INJECTION INTRAMUSCULAR; INTRAVENOUS; SUBCUTANEOUS
OUTPATIENT
Start: 2025-03-27

## 2025-01-23 RX ORDER — ONDANSETRON 2 MG/ML
8 INJECTION INTRAMUSCULAR; INTRAVENOUS ONCE
Start: 2025-03-27 | End: 2025-03-27

## 2025-01-23 RX ORDER — LORAZEPAM 2 MG/ML
0.5 INJECTION INTRAMUSCULAR EVERY 4 HOURS PRN
OUTPATIENT
Start: 2025-03-27

## 2025-01-23 RX ORDER — MEPERIDINE HYDROCHLORIDE 25 MG/ML
25 INJECTION INTRAMUSCULAR; INTRAVENOUS; SUBCUTANEOUS
OUTPATIENT
Start: 2025-02-13

## 2025-01-23 RX ORDER — METHYLPREDNISOLONE SODIUM SUCCINATE 40 MG/ML
40 INJECTION INTRAMUSCULAR; INTRAVENOUS
Start: 2025-03-06

## 2025-01-23 RX ORDER — HEPARIN SODIUM (PORCINE) LOCK FLUSH IV SOLN 100 UNIT/ML 100 UNIT/ML
5 SOLUTION INTRAVENOUS
OUTPATIENT
Start: 2025-03-27

## 2025-01-23 RX ORDER — HEPARIN SODIUM,PORCINE 10 UNIT/ML
5-20 VIAL (ML) INTRAVENOUS DAILY PRN
OUTPATIENT
Start: 2025-02-13

## 2025-01-23 RX ORDER — ALBUTEROL SULFATE 90 UG/1
1-2 INHALANT RESPIRATORY (INHALATION)
Start: 2025-02-13

## 2025-01-23 RX ORDER — HEPARIN SODIUM,PORCINE 10 UNIT/ML
5-20 VIAL (ML) INTRAVENOUS DAILY PRN
OUTPATIENT
Start: 2025-03-06

## 2025-01-23 RX ORDER — MEPERIDINE HYDROCHLORIDE 25 MG/ML
25 INJECTION INTRAMUSCULAR; INTRAVENOUS; SUBCUTANEOUS
OUTPATIENT
Start: 2025-03-06

## 2025-01-23 RX ORDER — ONDANSETRON 2 MG/ML
8 INJECTION INTRAMUSCULAR; INTRAVENOUS ONCE
Start: 2025-02-13 | End: 2025-02-13

## 2025-01-23 RX ORDER — DIPHENHYDRAMINE HYDROCHLORIDE 50 MG/ML
50 INJECTION INTRAMUSCULAR; INTRAVENOUS
Start: 2025-03-06

## 2025-01-23 RX ORDER — ALBUTEROL SULFATE 0.83 MG/ML
2.5 SOLUTION RESPIRATORY (INHALATION)
OUTPATIENT
Start: 2025-02-13

## 2025-01-23 RX ORDER — METHYLPREDNISOLONE SODIUM SUCCINATE 40 MG/ML
40 INJECTION INTRAMUSCULAR; INTRAVENOUS
Start: 2025-02-13

## 2025-01-23 RX ORDER — ALBUTEROL SULFATE 90 UG/1
1-2 INHALANT RESPIRATORY (INHALATION)
Start: 2025-03-06

## 2025-01-23 RX ORDER — ALBUTEROL SULFATE 90 UG/1
1-2 INHALANT RESPIRATORY (INHALATION)
Start: 2025-03-27

## 2025-01-23 RX ORDER — ALBUTEROL SULFATE 0.83 MG/ML
2.5 SOLUTION RESPIRATORY (INHALATION)
OUTPATIENT
Start: 2025-03-06

## 2025-01-23 RX ORDER — CYANOCOBALAMIN 1000 UG/ML
1000 INJECTION, SOLUTION INTRAMUSCULAR; SUBCUTANEOUS ONCE
OUTPATIENT
Start: 2025-03-06 | End: 2025-03-06

## 2025-01-23 RX ORDER — HEPARIN SODIUM (PORCINE) LOCK FLUSH IV SOLN 100 UNIT/ML 100 UNIT/ML
5 SOLUTION INTRAVENOUS
OUTPATIENT
Start: 2025-03-06

## 2025-01-23 RX ORDER — EPINEPHRINE 1 MG/ML
0.3 INJECTION, SOLUTION INTRAMUSCULAR; SUBCUTANEOUS EVERY 5 MIN PRN
OUTPATIENT
Start: 2025-02-13

## 2025-01-23 RX ORDER — DIPHENHYDRAMINE HYDROCHLORIDE 50 MG/ML
25 INJECTION INTRAMUSCULAR; INTRAVENOUS
Start: 2025-03-27

## 2025-01-23 RX ORDER — EPINEPHRINE 1 MG/ML
0.3 INJECTION, SOLUTION INTRAMUSCULAR; SUBCUTANEOUS EVERY 5 MIN PRN
OUTPATIENT
Start: 2025-03-06

## 2025-01-23 RX ORDER — HEPARIN SODIUM,PORCINE 10 UNIT/ML
5-20 VIAL (ML) INTRAVENOUS DAILY PRN
OUTPATIENT
Start: 2025-03-27

## 2025-01-23 RX ORDER — ONDANSETRON 2 MG/ML
8 INJECTION INTRAMUSCULAR; INTRAVENOUS ONCE
Status: COMPLETED | OUTPATIENT
Start: 2025-01-23 | End: 2025-01-23

## 2025-01-23 RX ORDER — METHYLPREDNISOLONE SODIUM SUCCINATE 40 MG/ML
40 INJECTION INTRAMUSCULAR; INTRAVENOUS
Start: 2025-03-27

## 2025-01-23 RX ORDER — EPINEPHRINE 1 MG/ML
0.3 INJECTION, SOLUTION INTRAMUSCULAR; SUBCUTANEOUS EVERY 5 MIN PRN
OUTPATIENT
Start: 2025-03-27

## 2025-01-23 RX ADMIN — SODIUM CHLORIDE 250 ML: 9 INJECTION, SOLUTION INTRAVENOUS at 14:59

## 2025-01-23 RX ADMIN — ONDANSETRON 8 MG: 2 INJECTION INTRAMUSCULAR; INTRAVENOUS at 15:01

## 2025-01-23 RX ADMIN — HEPARIN 5 ML: 100 SYRINGE at 15:24

## 2025-01-23 RX ADMIN — PEMETREXED DISODIUM 800 MG: 500 INJECTION, POWDER, LYOPHILIZED, FOR SOLUTION INTRAVENOUS at 15:09

## 2025-01-23 ASSESSMENT — PAIN SCALES - GENERAL: PAINLEVEL_OUTOF10: MODERATE PAIN (4)

## 2025-01-23 NOTE — PROGRESS NOTES
Infusion Nursing Note:  Inez Rodriguez presents today for Cycle 16 day 1.    Patient seen by provider today: Yes: Elizabet MARTE   present during visit today: Not Applicable.    Note:   PT here ambulatory for txt after exam with NP. Labs drawn from port and results approved for txt. Txt reviewed and administered as ordered. PT tolerated txt without any problems. Txt completed and port flushed/deaccessed with 2x2 to site. PT dc'd steady gait/son was called and he is her transportation    Intravenous Access:  Implanted Port.    Treatment Conditions:  Results reviewed, labs MET treatment parameters, ok to proceed with treatment.      Post Infusion Assessment:  Patient tolerated infusion without incident.       Discharge Plan: Follow up reviewed.      Ashtyn Pineda RN

## 2025-01-23 NOTE — PROGRESS NOTES
"Oncology Rooming Note    January 23, 2025 2:04 PM   Inez Rodriguez is a 74 year old female who presents for:    Chief Complaint   Patient presents with    Oncology Clinic Visit     Return visit 6 weeks with lab and infusion. Lung cancer metastatic to bone.     Initial Vitals: BP (!) 193/89 (BP Location: Right arm, Patient Position: Sitting, Cuff Size: Adult Regular)   Pulse (!) 109   Temp 98.7  F (37.1  C) (Oral)   Resp 16   Wt 57.6 kg (126 lb 14.4 oz)   LMP  (LMP Unknown)   SpO2 100%   BMI 21.12 kg/m   Estimated body mass index is 21.12 kg/m  as calculated from the following:    Height as of 10/31/24: 1.651 m (5' 5\").    Weight as of this encounter: 57.6 kg (126 lb 14.4 oz). Body surface area is 1.63 meters squared.  Moderate Pain (4) Comment: Data Unavailable   No LMP recorded (lmp unknown). Patient is postmenopausal.  Allergies reviewed: Yes  Medications reviewed: Yes    Medications: Medication refills not needed today.  Pharmacy name entered into [employer]:    [facility][id number] [facility] [address] AT [facility] [address] [facility] [place] [id number] [facility]    Frailty Screening:   Is the patient here for a new oncology consult visit in cancer care? 2. No      Clinical concerns: back pain 4/10. Neuropathy in right axilla.  Elizabet Williamson CNP was notified.      Malgorzata Fields CMA            "

## 2025-01-23 NOTE — LETTER
"1/23/2025      Inez Rodriguez  [address]  [facility] [address]      Dear Colleague,    Thank you for referring your patient, Inez Rodriguez, to the [facility]. Please see a copy of my visit note below.    Oncology Rooming Note    January 23, 2025 2:04 PM   Inez Rodriguez is a 74 year old female who presents for:    Chief Complaint   Patient presents with     Oncology Clinic Visit     Return visit 6 weeks with lab and infusion. Lung cancer metastatic to bone.     Initial Vitals: BP (!) 193/89 (BP Location: Right arm, Patient Position: Sitting, Cuff Size: Adult Regular)   Pulse (!) 109   Temp 98.7  F (37.1  C) (Oral)   Resp 16   Wt 57.6 kg (126 lb 14.4 oz)   LMP  (LMP Unknown)   SpO2 100%   BMI 21.12 kg/m   Estimated body mass index is 21.12 kg/m  as calculated from the following:    Height as of 10/31/24: 1.651 m (5' 5\").    Weight as of this encounter: 57.6 kg (126 lb 14.4 oz). Body surface area is 1.63 meters squared.  Moderate Pain (4) Comment: Data Unavailable   No LMP recorded (lmp unknown). Patient is postmenopausal.  Allergies reviewed: Yes  Medications reviewed: Yes    Medications: Medication refills not needed today.  Pharmacy name entered into [facility]:    [id number] - [facility] [address] AT [facility] [address]    Frailty Screening:   Is the patient here for a new oncology consult visit in cancer care? 2. No      Clinical concerns: back pain 4/10. Neuropathy in right axilla.  Elizabet Williamson CNP was notified.      Malgorzata Fields, [facility] Hematology and Oncology Progress Note    Patient: Inez Rodriguez  MRN: [id number]  Date of Service: Jan 23, 2025      Oncologist: Dr. Miller    Reason for Visit    Chief Complaint   Patient presents with     Oncology Clinic Visit     Return visit 6 weeks with lab and infusion. Lung cancer " metastatic to bone.       Assessment and Plan     Cancer Staging   No matching staging information was found for the patient.    Metastatic non-small cell lung cancer, RUL with mediastinal lymph nodes and osseous mets of T4 and sacrum. Molecular testing shows KRAS G12C mutated tumor.   On palliative chemotherapy finished 4 cycles of Carboplatin, Alimta, and pembrolizumab with good response. Decided to drop carboplatin 5/6/24. CT 5/2/24 showed mildly improved mediastinal and perihilar adenopathy with stable RUL thickening.  She continued maintenance Alimta and pembrolizumab until August.  In September she started having really significant toxicity from the Keytruda so it was held and given steroids.  She felt much better.  In October 2024 we restarted treatment but adjusted single agent Alimta.  She has been on that since and has been feeling great.  Last imaging on December 10 was a PET scan that showed continued partial favorable treatment response.  No signs of new disease.  She is continued on maintenance Alimta as a single agent.  She will continue getting that every 3 weeks.  She will see Dr. Miller in March with another PET scan.    2. Low TSH  Patient has been quite low on this for a while.  Her T4 generally runs normal.  We will continue to monitor intermittently.  Her immunotherapy is now done so it should not be quite as affected.    ECOG Performance    0 - Independent    Distress Screening (within last 30 days)    No data recorded     Pain  Pain Score: Moderate Pain (4)  Pain Loc: Mid Back    Problem List    Patient Active Problem List   Diagnosis     Observation for suspected malignant neoplasm     Thyroid nodule     Chronic cough     Pulmonary nodules     Wheezing-associated respiratory infection (WARI)     Malignant neoplasm of lower lobe of right lung (H)     Postobstructive pneumonia     Lung cancer metastatic to bone (H)     Antineoplastic chemotherapy induced anemia         ______________________________________________________________________________       Diagnosis:   Lung cancer, January 2024 came to ER with SOB.   -1/7/24: Mediastinal and right hilar adenopathy most suggestive of metastatic adenopathy from a primary lung malignancy. A nodular opacity in the right upper lobe could represent a primary lung malignancy versus an infectious/inflammatory focus. Recommend   referral to pulmonology for tissue sampling. Nichole conglomerate demonstrate mass effect on the right upper lobe bronchus and bronchus intermedius with associated narrowing without occlusion. Brain MRI negative.   -1/8/24: EBUS done and 4R, 11R and subcarinal nodes all positive for malignancy. NSCLC, favor adenocarcinoma. Right upper lobe nodule was also positive. Pleural fluid suspicious.   -1/29/24: PET: Findings suspicious for a right upper lobe primary lung neoplasm with extensive mediastinal/hilar lymph node metastases and osseous metastases to the T4 vertebral body and right sacrum.   Neogenomics: KRAS G12C mutation. Tp53 mutation. PTEN deletion +, MSI stable, PDL1 0%, TMB intermediate, Her2/leisa negative. Eveything else was negative.      Treatment:   -2/14/24: palliative treatment with Carboplatin, Alimta, Keytruda x 4 cycles  -5/6/24-8/1924: Alimta and Keytruda.  Patient then started having significant side effects likely from the Keytruda so treatment was held.  Symptoms improved on steroids.  -10/10/24: Patient started on single agent Alimta.  Continues on that.    History of Present Illness    Patient here for continued treatment.  Overall she states that since she is just been getting the 1 medication in October she is felt quite amazing.  She says she has a couple of days where she does not feel good but she knows what the pattern will be and she understands how to manage the symptoms.  She denies any new bone or back pain issues.  She continues to have chronic muscle spasms in her back and she has to  be careful what she does but she tries to be as active as she can every day.  She denies any significant weight loss issues.  She states that she does not get out of the house much and is pretty sedentary but that is always worse in the winter.  She denies any infectious complaints.  States that she does have a lot of phlegm fevers chills.    Review of Systems    Pertinent items are noted in HPI.    Past History    Past Medical History:   Diagnosis Date     Hypertension      SOB (shortness of breath)        PHYSICAL EXAM  BP (!) 175/82 (BP Location: Right arm, Patient Position: Sitting, Cuff Size: Adult Regular)   Pulse (!) 109   Temp 98.7  F (37.1  C) (Oral)   Resp 16   Wt 57.6 kg (126 lb 14.4 oz)   LMP  (LMP Unknown)   SpO2 100%   BMI 21.12 kg/m      GENERAL: no acute distress. Cooperative in conversation. Alone in clinic  RESP: Regular respiratory rate. No expiratory wheezes   NEURO: non focal. Alert and oriented x3.   PSYCH: within normal limits. No depression or anxiety.  SKIN: exposed skin is dry intact.       Lab Results    Recent Results (from the past week)   Comprehensive metabolic panel   Result Value Ref Range    Sodium 140 135 - 145 mmol/L    Potassium 4.0 3.4 - 5.3 mmol/L    Carbon Dioxide (CO2) 24 22 - 29 mmol/L    Anion Gap 14 7 - 15 mmol/L    Urea Nitrogen 18.9 8.0 - 23.0 mg/dL    Creatinine 0.71 0.51 - 0.95 mg/dL    GFR Estimate 89 >60 mL/min/1.73m2    Calcium 10.3 8.8 - 10.4 mg/dL    Chloride 102 98 - 107 mmol/L    Glucose 133 (H) 70 - 99 mg/dL    Alkaline Phosphatase 54 40 - 150 U/L    AST 24 0 - 45 U/L    ALT 32 0 - 50 U/L    Protein Total 6.8 6.4 - 8.3 g/dL    Albumin 4.3 3.5 - 5.2 g/dL    Bilirubin Total 0.2 <=1.2 mg/dL   TSH with free T4 reflex   Result Value Ref Range    TSH 0.09 (L) 0.30 - 4.20 uIU/mL   CBC with platelets and differential   Result Value Ref Range    WBC Count 10.4 4.0 - 11.0 10e3/uL    RBC Count 3.86 3.80 - 5.20 10e6/uL    Hemoglobin 12.4 11.7 - 15.7 g/dL     Hematocrit 38.4 35.0 - 47.0 %     78 - 100 fL    MCH 32.1 26.5 - 33.0 pg    MCHC 32.3 31.5 - 36.5 g/dL    RDW 15.8 (H) 10.0 - 15.0 %    Platelet Count 428 150 - 450 10e3/uL    % Neutrophils 84 %    % Lymphocytes 9 %    % Monocytes 6 %    % Eosinophils 0 %    % Basophils 0 %    % Immature Granulocytes 1 %    NRBCs per 100 WBC 0 <1 /100    Absolute Neutrophils 8.7 (H) 1.6 - 8.3 10e3/uL    Absolute Lymphocytes 1.0 0.8 - 5.3 10e3/uL    Absolute Monocytes 0.6 0.0 - 1.3 10e3/uL    Absolute Eosinophils 0.0 0.0 - 0.7 10e3/uL    Absolute Basophils 0.0 0.0 - 0.2 10e3/uL    Absolute Immature Granulocytes 0.1 <=0.4 10e3/uL    Absolute NRBCs 0.0 10e3/uL   T4 free   Result Value Ref Range    Free T4 1.07 0.90 - 1.70 ng/dL         Imaging    No results found.    The longitudinal plan of care for the diagnosis(es)/condition(s) as documented were addressed during this visit. Due to the added complexity in care, I will continue to support Inez in the subsequent management and with ongoing continuity of care.           Again, thank you for allowing me to participate in the care of your patient.        Sincerely,        Elizabet Williamson, TANESHA CNP    Electronically signed

## 2025-01-23 NOTE — PROGRESS NOTES
[facility] Hematology and Oncology Progress Note    Patient: Inez Rodriguez  MRN: [id number]  Date of Service: Jan 23, 2025      Oncologist: Dr. Miller    Reason for Visit    Chief Complaint   Patient presents with    Oncology Clinic Visit     Return visit 6 weeks with lab and infusion. Lung cancer metastatic to bone.       Assessment and Plan     Cancer Staging   No matching staging information was found for the patient.    Metastatic non-small cell lung cancer, RUL with mediastinal lymph nodes and osseous mets of T4 and sacrum. Molecular testing shows KRAS G12C mutated tumor.   On palliative chemotherapy finished 4 cycles of Carboplatin, Alimta, and pembrolizumab with good response. Decided to drop carboplatin 5/6/24. CT 5/2/24 showed mildly improved mediastinal and perihilar adenopathy with stable RUL thickening.  She continued maintenance Alimta and pembrolizumab until August.  In September she started having really significant toxicity from the Keytruda so it was held and given steroids.  She felt much better.  In October 2024 we restarted treatment but adjusted single agent Alimta.  She has been on that since and has been feeling great.  Last imaging on December 10 was a PET scan that showed continued partial favorable treatment response.  No signs of new disease.  She is continued on maintenance Alimta as a single agent.  She will continue getting that every 3 weeks.  She will see Dr. Miller in March with another PET scan.    2. Low TSH  Patient has been quite low on this for a while.  Her T4 generally runs normal.  We will continue to monitor intermittently.  Her immunotherapy is now done so it should not be quite as affected.    ECOG Performance    0 - Independent    Distress Screening (within last 30 days)    No data recorded     Pain  Pain Score: Moderate Pain (4)  Pain Loc: Mid Back    Problem List    Patient Active Problem List   Diagnosis    Observation for suspected malignant neoplasm    Thyroid  nodule    Chronic cough    Pulmonary nodules    Wheezing-associated respiratory infection (WARI)    Malignant neoplasm of lower lobe of right lung (H)    Postobstructive pneumonia    Lung cancer metastatic to bone (H)    Antineoplastic chemotherapy induced anemia        ______________________________________________________________________________       Diagnosis:   Lung cancer, January 2024 came to ER with SOB.   -1/7/24: Mediastinal and right hilar adenopathy most suggestive of metastatic adenopathy from a primary lung malignancy. A nodular opacity in the right upper lobe could represent a primary lung malignancy versus an infectious/inflammatory focus. Recommend   referral to pulmonology for tissue sampling. Nichole conglomerate demonstrate mass effect on the right upper lobe bronchus and bronchus intermedius with associated narrowing without occlusion. Brain MRI negative.   -1/8/24: EBUS done and 4R, 11R and subcarinal nodes all positive for malignancy. NSCLC, favor adenocarcinoma. Right upper lobe nodule was also positive. Pleural fluid suspicious.   -1/29/24: PET: Findings suspicious for a right upper lobe primary lung neoplasm with extensive mediastinal/hilar lymph node metastases and osseous metastases to the T4 vertebral body and right sacrum.   Neogenomics: KRAS G12C mutation. Tp53 mutation. PTEN deletion +, MSI stable, PDL1 0%, TMB intermediate, Her2/leisa negative. Eveything else was negative.      Treatment:   -2/14/24: palliative treatment with Carboplatin, Alimta, Keytruda x 4 cycles  -5/6/24-8/1924: Alimta and Keytruda.  Patient then started having significant side effects likely from the Keytruda so treatment was held.  Symptoms improved on steroids.  -10/10/24: Patient started on single agent Alimta.  Continues on that.    History of Present Illness    Patient here for continued treatment.  Overall she states that since she is just been getting the 1 medication in October she is felt quite amazing.   She says she has a couple of days where she does not feel good but she knows what the pattern will be and she understands how to manage the symptoms.  She denies any new bone or back pain issues.  She continues to have chronic muscle spasms in her back and she has to be careful what she does but she tries to be as active as she can every day.  She denies any significant weight loss issues.  She states that she does not get out of the house much and is pretty sedentary but that is always worse in the winter.  She denies any infectious complaints.  States that she does have a lot of phlegm fevers chills.    Review of Systems    Pertinent items are noted in HPI.    Past History    Past Medical History:   Diagnosis Date    Hypertension     SOB (shortness of breath)        PHYSICAL EXAM  BP (!) 175/82 (BP Location: Right arm, Patient Position: Sitting, Cuff Size: Adult Regular)   Pulse (!) 109   Temp 98.7  F (37.1  C) (Oral)   Resp 16   Wt 57.6 kg (126 lb 14.4 oz)   LMP  (LMP Unknown)   SpO2 100%   BMI 21.12 kg/m      GENERAL: no acute distress. Cooperative in conversation. Alone in clinic  RESP: Regular respiratory rate. No expiratory wheezes   NEURO: non focal. Alert and oriented x3.   PSYCH: within normal limits. No depression or anxiety.  SKIN: exposed skin is dry intact.       Lab Results    Recent Results (from the past week)   Comprehensive metabolic panel   Result Value Ref Range    Sodium 140 135 - 145 mmol/L    Potassium 4.0 3.4 - 5.3 mmol/L    Carbon Dioxide (CO2) 24 22 - 29 mmol/L    Anion Gap 14 7 - 15 mmol/L    Urea Nitrogen 18.9 8.0 - 23.0 mg/dL    Creatinine 0.71 0.51 - 0.95 mg/dL    GFR Estimate 89 >60 mL/min/1.73m2    Calcium 10.3 8.8 - 10.4 mg/dL    Chloride 102 98 - 107 mmol/L    Glucose 133 (H) 70 - 99 mg/dL    Alkaline Phosphatase 54 40 - 150 U/L    AST 24 0 - 45 U/L    ALT 32 0 - 50 U/L    Protein Total 6.8 6.4 - 8.3 g/dL    Albumin 4.3 3.5 - 5.2 g/dL    Bilirubin Total 0.2 <=1.2 mg/dL   TSH  with free T4 reflex   Result Value Ref Range    TSH 0.09 (L) 0.30 - 4.20 uIU/mL   CBC with platelets and differential   Result Value Ref Range    WBC Count 10.4 4.0 - 11.0 10e3/uL    RBC Count 3.86 3.80 - 5.20 10e6/uL    Hemoglobin 12.4 11.7 - 15.7 g/dL    Hematocrit 38.4 35.0 - 47.0 %     78 - 100 fL    MCH 32.1 26.5 - 33.0 pg    MCHC 32.3 31.5 - 36.5 g/dL    RDW 15.8 (H) 10.0 - 15.0 %    Platelet Count 428 150 - 450 10e3/uL    % Neutrophils 84 %    % Lymphocytes 9 %    % Monocytes 6 %    % Eosinophils 0 %    % Basophils 0 %    % Immature Granulocytes 1 %    NRBCs per 100 WBC 0 <1 /100    Absolute Neutrophils 8.7 (H) 1.6 - 8.3 10e3/uL    Absolute Lymphocytes 1.0 0.8 - 5.3 10e3/uL    Absolute Monocytes 0.6 0.0 - 1.3 10e3/uL    Absolute Eosinophils 0.0 0.0 - 0.7 10e3/uL    Absolute Basophils 0.0 0.0 - 0.2 10e3/uL    Absolute Immature Granulocytes 0.1 <=0.4 10e3/uL    Absolute NRBCs 0.0 10e3/uL   T4 free   Result Value Ref Range    Free T4 1.07 0.90 - 1.70 ng/dL         Imaging    No results found.    The longitudinal plan of care for the diagnosis(es)/condition(s) as documented were addressed during this visit. Due to the added complexity in care, I will continue to support Inez in the subsequent management and with ongoing continuity of care.

## 2025-01-28 ENCOUNTER — PATIENT OUTREACH (OUTPATIENT)
Dept: ONCOLOGY | Facility: HOSPITAL | Age: 75
End: 2025-01-28
Payer: COMMERCIAL

## 2025-01-28 NOTE — PROGRESS NOTES
[facility]: Cancer Care Follow-Up Note                                    Discussion with Patient:                                                      Patient was in the clinic last week for follow-up with Elizabet Williamson CNP as well as receiving her maintenance treatment.     Goals          General    Maintain ability to perform ADLs without difficulty             Dates of Treatment:                                                      Infusion given in last 28 days       Administered MAR Action Medication Dose Rate Visit    01/02/2025 14:55 New Bag PEMEtrexed (ALIMTA) 800 mg in sodium chloride 0.9 % 142 mL infusion 800 mg 852 mL/hr Infusion Therapy Visit on 01/02/2025 in [facility]    01/23/2025 15:09 New Bag PEMEtrexed (ALIMTA) 800 mg in sodium chloride 0.9 % 142 mL infusion 800 mg 852 mL/hr Infusion Therapy Visit on 01/23/2025 in [facility]            Assessment:                                                      Patient sent a [employer] message in yesterday asking why her next scan has been pushed out an additional month or so.    Intervention/Education provided during outreach:                                                       I did respond to the patient's [employer] message after speaking with Dr Miller.  He states that this is good timing.  Her previous scan looked good and the timing of the PET scan on March 25 is good.  This will be 2 days prior to her seeing Dr Miller next in the clinic.    Patient to follow up as scheduled at next appt.  Patient to call/[employer] message with updates.  Confirmed patient has clinic and triage numbers.    Signature:  María Casillas RN

## 2025-02-12 DIAGNOSIS — C34.31 MALIGNANT NEOPLASM OF LOWER LOBE OF RIGHT LUNG (H): ICD-10-CM

## 2025-02-12 DIAGNOSIS — C34.90 LUNG CANCER METASTATIC TO BONE (H): ICD-10-CM

## 2025-02-12 DIAGNOSIS — C79.51 LUNG CANCER METASTATIC TO BONE (H): ICD-10-CM

## 2025-02-12 RX ORDER — DEXAMETHASONE 4 MG/1
4 TABLET ORAL 2 TIMES DAILY WITH MEALS
Qty: 6 TABLET | Refills: 3 | Status: SHIPPED | OUTPATIENT
Start: 2025-02-12

## 2025-02-13 ENCOUNTER — INFUSION THERAPY VISIT (OUTPATIENT)
Dept: INFUSION THERAPY | Facility: HOSPITAL | Age: 75
End: 2025-02-13
Payer: COMMERCIAL

## 2025-02-13 VITALS
SYSTOLIC BLOOD PRESSURE: 178 MMHG | OXYGEN SATURATION: 100 % | DIASTOLIC BLOOD PRESSURE: 75 MMHG | HEART RATE: 113 BPM | RESPIRATION RATE: 18 BRPM | TEMPERATURE: 98 F

## 2025-02-13 DIAGNOSIS — C34.90 LUNG CANCER METASTATIC TO BONE (H): Primary | ICD-10-CM

## 2025-02-13 DIAGNOSIS — C34.90 LUNG CANCER METASTATIC TO BONE (H): ICD-10-CM

## 2025-02-13 DIAGNOSIS — C34.31 MALIGNANT NEOPLASM OF LOWER LOBE OF RIGHT LUNG (H): ICD-10-CM

## 2025-02-13 DIAGNOSIS — C79.51 LUNG CANCER METASTATIC TO BONE (H): Primary | ICD-10-CM

## 2025-02-13 DIAGNOSIS — C79.51 LUNG CANCER METASTATIC TO BONE (H): ICD-10-CM

## 2025-02-13 DIAGNOSIS — C34.31 MALIGNANT NEOPLASM OF LOWER LOBE OF RIGHT LUNG (H): Primary | ICD-10-CM

## 2025-02-13 LAB
ALBUMIN SERPL BCG-MCNC: 4.3 G/DL (ref 3.5–5.2)
ALP SERPL-CCNC: 56 U/L (ref 40–150)
ALT SERPL W P-5'-P-CCNC: 33 U/L (ref 0–50)
ANION GAP SERPL CALCULATED.3IONS-SCNC: 12 MMOL/L (ref 7–15)
AST SERPL W P-5'-P-CCNC: 25 U/L (ref 0–45)
BASOPHILS # BLD AUTO: 0 10E3/UL (ref 0–0.2)
BASOPHILS NFR BLD AUTO: 0 %
BILIRUB SERPL-MCNC: 0.2 MG/DL
BUN SERPL-MCNC: 22.1 MG/DL (ref 8–23)
CALCIUM SERPL-MCNC: 10.4 MG/DL (ref 8.8–10.4)
CHLORIDE SERPL-SCNC: 103 MMOL/L (ref 98–107)
CREAT SERPL-MCNC: 0.95 MG/DL (ref 0.51–0.95)
EGFRCR SERPLBLD CKD-EPI 2021: 63 ML/MIN/1.73M2
EOSINOPHIL # BLD AUTO: 0 10E3/UL (ref 0–0.7)
EOSINOPHIL NFR BLD AUTO: 0 %
ERYTHROCYTE [DISTWIDTH] IN BLOOD BY AUTOMATED COUNT: 15.2 % (ref 10–15)
GLUCOSE SERPL-MCNC: 172 MG/DL (ref 70–99)
HCO3 SERPL-SCNC: 27 MMOL/L (ref 22–29)
HCT VFR BLD AUTO: 38.8 % (ref 35–47)
HGB BLD-MCNC: 12.6 G/DL (ref 11.7–15.7)
IMM GRANULOCYTES # BLD: 0.1 10E3/UL
IMM GRANULOCYTES NFR BLD: 1 %
LYMPHOCYTES # BLD AUTO: 1.1 10E3/UL (ref 0.8–5.3)
LYMPHOCYTES NFR BLD AUTO: 12 %
MCH RBC QN AUTO: 32.4 PG (ref 26.5–33)
MCHC RBC AUTO-ENTMCNC: 32.5 G/DL (ref 31.5–36.5)
MCV RBC AUTO: 100 FL (ref 78–100)
MONOCYTES # BLD AUTO: 0.6 10E3/UL (ref 0–1.3)
MONOCYTES NFR BLD AUTO: 6 %
NEUTROPHILS # BLD AUTO: 7.8 10E3/UL (ref 1.6–8.3)
NEUTROPHILS NFR BLD AUTO: 82 %
NRBC # BLD AUTO: 0 10E3/UL
NRBC BLD AUTO-RTO: 0 /100
PLATELET # BLD AUTO: 441 10E3/UL (ref 150–450)
POTASSIUM SERPL-SCNC: 4.4 MMOL/L (ref 3.4–5.3)
PROT SERPL-MCNC: 6.6 G/DL (ref 6.4–8.3)
RBC # BLD AUTO: 3.89 10E6/UL (ref 3.8–5.2)
SODIUM SERPL-SCNC: 142 MMOL/L (ref 135–145)
WBC # BLD AUTO: 9.5 10E3/UL (ref 4–11)

## 2025-02-13 PROCEDURE — 258N000003 HC RX IP 258 OP 636: Performed by: NURSE PRACTITIONER

## 2025-02-13 PROCEDURE — 250N000011 HC RX IP 250 OP 636: Performed by: NURSE PRACTITIONER

## 2025-02-13 PROCEDURE — 85025 COMPLETE CBC W/AUTO DIFF WBC: CPT | Performed by: NURSE PRACTITIONER

## 2025-02-13 PROCEDURE — 82947 ASSAY GLUCOSE BLOOD QUANT: CPT | Performed by: NURSE PRACTITIONER

## 2025-02-13 PROCEDURE — 80053 COMPREHEN METABOLIC PANEL: CPT | Performed by: NURSE PRACTITIONER

## 2025-02-13 PROCEDURE — 36591 DRAW BLOOD OFF VENOUS DEVICE: CPT | Performed by: NURSE PRACTITIONER

## 2025-02-13 RX ORDER — ONDANSETRON 2 MG/ML
8 INJECTION INTRAMUSCULAR; INTRAVENOUS ONCE
Status: COMPLETED | OUTPATIENT
Start: 2025-02-13 | End: 2025-02-13

## 2025-02-13 RX ORDER — HEPARIN SODIUM (PORCINE) LOCK FLUSH IV SOLN 100 UNIT/ML 100 UNIT/ML
5 SOLUTION INTRAVENOUS
Status: DISCONTINUED | OUTPATIENT
Start: 2025-02-13 | End: 2025-02-13 | Stop reason: HOSPADM

## 2025-02-13 RX ADMIN — HEPARIN 5 ML: 100 SYRINGE at 13:43

## 2025-02-13 RX ADMIN — PEMETREXED DISODIUM 800 MG: 500 INJECTION, POWDER, LYOPHILIZED, FOR SOLUTION INTRAVENOUS at 13:24

## 2025-02-13 RX ADMIN — ONDANSETRON 8 MG: 2 INJECTION, SOLUTION INTRAMUSCULAR; INTRAVENOUS at 12:48

## 2025-02-13 RX ADMIN — SODIUM CHLORIDE 250 ML: 0.9 INJECTION, SOLUTION INTRAVENOUS at 12:47

## 2025-02-13 NOTE — PROGRESS NOTES
Infusion Nursing Note:  Inez Rodriguez presents today for Cycle 7 Day 1.    Patient seen by provider today: No   present during visit today: Not Applicable.    Note: PT here ambulatory for txt. PT states tolerating txt well. Port accessed some blood return but unable to obtain lab specimen. Lab drawn from left ac. Lab results approved txt. Txt reviewed and administered as ordered. PT tolerated txt without any problems. Txt completed and port flushed/deaccessed with 2x2 to site.PT dc'd steady gait.      Intravenous Access:  Implanted Port.    Treatment Conditions:  Results reviewed, labs MET treatment parameters, ok to proceed with treatment.      Post Infusion Assessment:  Patient tolerated infusion without incident.       Discharge Plan:   Follow up reviewed.      Ashtyn Pineda RN

## 2025-03-06 ENCOUNTER — INFUSION THERAPY VISIT (OUTPATIENT)
Dept: INFUSION THERAPY | Facility: HOSPITAL | Age: 75
End: 2025-03-06
Payer: COMMERCIAL

## 2025-03-06 VITALS
DIASTOLIC BLOOD PRESSURE: 72 MMHG | HEART RATE: 94 BPM | OXYGEN SATURATION: 99 % | TEMPERATURE: 98.7 F | RESPIRATION RATE: 18 BRPM | SYSTOLIC BLOOD PRESSURE: 166 MMHG

## 2025-03-06 DIAGNOSIS — C34.90 LUNG CANCER METASTATIC TO BONE (H): ICD-10-CM

## 2025-03-06 DIAGNOSIS — C79.51 LUNG CANCER METASTATIC TO BONE (H): ICD-10-CM

## 2025-03-06 DIAGNOSIS — C34.90 LUNG CANCER METASTATIC TO BONE (H): Primary | ICD-10-CM

## 2025-03-06 DIAGNOSIS — C34.31 MALIGNANT NEOPLASM OF LOWER LOBE OF RIGHT LUNG (H): Primary | ICD-10-CM

## 2025-03-06 DIAGNOSIS — C79.51 LUNG CANCER METASTATIC TO BONE (H): Primary | ICD-10-CM

## 2025-03-06 DIAGNOSIS — C34.31 MALIGNANT NEOPLASM OF LOWER LOBE OF RIGHT LUNG (H): ICD-10-CM

## 2025-03-06 LAB
ALBUMIN SERPL BCG-MCNC: 4.3 G/DL (ref 3.5–5.2)
ALP SERPL-CCNC: 56 U/L (ref 40–150)
ALT SERPL W P-5'-P-CCNC: 25 U/L (ref 0–50)
ANION GAP SERPL CALCULATED.3IONS-SCNC: 13 MMOL/L (ref 7–15)
AST SERPL W P-5'-P-CCNC: 20 U/L (ref 0–45)
BASOPHILS # BLD AUTO: 0 10E3/UL (ref 0–0.2)
BASOPHILS NFR BLD AUTO: 0 %
BILIRUB SERPL-MCNC: <0.2 MG/DL
BUN SERPL-MCNC: 23.6 MG/DL (ref 8–23)
CALCIUM SERPL-MCNC: 9.9 MG/DL (ref 8.8–10.4)
CHLORIDE SERPL-SCNC: 103 MMOL/L (ref 98–107)
CREAT SERPL-MCNC: 0.89 MG/DL (ref 0.51–0.95)
EGFRCR SERPLBLD CKD-EPI 2021: 68 ML/MIN/1.73M2
EOSINOPHIL # BLD AUTO: 0 10E3/UL (ref 0–0.7)
EOSINOPHIL NFR BLD AUTO: 0 %
ERYTHROCYTE [DISTWIDTH] IN BLOOD BY AUTOMATED COUNT: 14.9 % (ref 10–15)
GLUCOSE SERPL-MCNC: 122 MG/DL (ref 70–99)
HCO3 SERPL-SCNC: 25 MMOL/L (ref 22–29)
HCT VFR BLD AUTO: 37 % (ref 35–47)
HGB BLD-MCNC: 11.9 G/DL (ref 11.7–15.7)
IMM GRANULOCYTES # BLD: 0.1 10E3/UL
IMM GRANULOCYTES NFR BLD: 1 %
LYMPHOCYTES # BLD AUTO: 0.8 10E3/UL (ref 0.8–5.3)
LYMPHOCYTES NFR BLD AUTO: 7 %
MCH RBC QN AUTO: 32.2 PG (ref 26.5–33)
MCHC RBC AUTO-ENTMCNC: 32.2 G/DL (ref 31.5–36.5)
MCV RBC AUTO: 100 FL (ref 78–100)
MONOCYTES # BLD AUTO: 0.7 10E3/UL (ref 0–1.3)
MONOCYTES NFR BLD AUTO: 5 %
NEUTROPHILS # BLD AUTO: 10.7 10E3/UL (ref 1.6–8.3)
NEUTROPHILS NFR BLD AUTO: 87 %
NRBC # BLD AUTO: 0 10E3/UL
NRBC BLD AUTO-RTO: 0 /100
PLATELET # BLD AUTO: 444 10E3/UL (ref 150–450)
POTASSIUM SERPL-SCNC: 4.3 MMOL/L (ref 3.4–5.3)
PROT SERPL-MCNC: 6.5 G/DL (ref 6.4–8.3)
RBC # BLD AUTO: 3.7 10E6/UL (ref 3.8–5.2)
SODIUM SERPL-SCNC: 141 MMOL/L (ref 135–145)
T4 FREE SERPL-MCNC: 1.02 NG/DL (ref 0.9–1.7)
TSH SERPL DL<=0.005 MIU/L-ACNC: 0.13 UIU/ML (ref 0.3–4.2)
WBC # BLD AUTO: 12.3 10E3/UL (ref 4–11)

## 2025-03-06 PROCEDURE — 84443 ASSAY THYROID STIM HORMONE: CPT | Performed by: NURSE PRACTITIONER

## 2025-03-06 PROCEDURE — 85004 AUTOMATED DIFF WBC COUNT: CPT | Performed by: NURSE PRACTITIONER

## 2025-03-06 PROCEDURE — 84155 ASSAY OF PROTEIN SERUM: CPT | Performed by: NURSE PRACTITIONER

## 2025-03-06 PROCEDURE — 96372 THER/PROPH/DIAG INJ SC/IM: CPT | Performed by: NURSE PRACTITIONER

## 2025-03-06 PROCEDURE — 84439 ASSAY OF FREE THYROXINE: CPT | Performed by: NURSE PRACTITIONER

## 2025-03-06 PROCEDURE — 36591 DRAW BLOOD OFF VENOUS DEVICE: CPT | Performed by: NURSE PRACTITIONER

## 2025-03-06 PROCEDURE — 258N000003 HC RX IP 258 OP 636: Performed by: NURSE PRACTITIONER

## 2025-03-06 PROCEDURE — 250N000011 HC RX IP 250 OP 636: Mod: JZ | Performed by: NURSE PRACTITIONER

## 2025-03-06 RX ORDER — ALBUTEROL SULFATE 90 UG/1
1-2 INHALANT RESPIRATORY (INHALATION)
Status: DISCONTINUED | OUTPATIENT
Start: 2025-03-06 | End: 2025-03-06 | Stop reason: HOSPADM

## 2025-03-06 RX ORDER — DIPHENHYDRAMINE HYDROCHLORIDE 50 MG/ML
25 INJECTION, SOLUTION INTRAMUSCULAR; INTRAVENOUS
Status: DISCONTINUED | OUTPATIENT
Start: 2025-03-06 | End: 2025-03-06 | Stop reason: HOSPADM

## 2025-03-06 RX ORDER — EPINEPHRINE 1 MG/ML
0.3 INJECTION, SOLUTION INTRAMUSCULAR; SUBCUTANEOUS EVERY 5 MIN PRN
Status: DISCONTINUED | OUTPATIENT
Start: 2025-03-06 | End: 2025-03-06 | Stop reason: HOSPADM

## 2025-03-06 RX ORDER — MEPERIDINE HYDROCHLORIDE 25 MG/ML
25 INJECTION INTRAMUSCULAR; INTRAVENOUS; SUBCUTANEOUS
Status: DISCONTINUED | OUTPATIENT
Start: 2025-03-06 | End: 2025-03-06 | Stop reason: HOSPADM

## 2025-03-06 RX ORDER — DIPHENHYDRAMINE HYDROCHLORIDE 50 MG/ML
50 INJECTION, SOLUTION INTRAMUSCULAR; INTRAVENOUS
Status: DISCONTINUED | OUTPATIENT
Start: 2025-03-06 | End: 2025-03-06 | Stop reason: HOSPADM

## 2025-03-06 RX ORDER — METHYLPREDNISOLONE SODIUM SUCCINATE 40 MG/ML
40 INJECTION INTRAMUSCULAR; INTRAVENOUS
Status: DISCONTINUED | OUTPATIENT
Start: 2025-03-06 | End: 2025-03-06 | Stop reason: HOSPADM

## 2025-03-06 RX ORDER — ALBUTEROL SULFATE 0.83 MG/ML
2.5 SOLUTION RESPIRATORY (INHALATION)
Status: DISCONTINUED | OUTPATIENT
Start: 2025-03-06 | End: 2025-03-06 | Stop reason: HOSPADM

## 2025-03-06 RX ORDER — ONDANSETRON 2 MG/ML
8 INJECTION INTRAMUSCULAR; INTRAVENOUS ONCE
Status: COMPLETED | OUTPATIENT
Start: 2025-03-06 | End: 2025-03-06

## 2025-03-06 RX ORDER — HEPARIN SODIUM (PORCINE) LOCK FLUSH IV SOLN 100 UNIT/ML 100 UNIT/ML
5 SOLUTION INTRAVENOUS
Status: DISCONTINUED | OUTPATIENT
Start: 2025-03-06 | End: 2025-03-06 | Stop reason: HOSPADM

## 2025-03-06 RX ORDER — CYANOCOBALAMIN 1000 UG/ML
1000 INJECTION, SOLUTION INTRAMUSCULAR; SUBCUTANEOUS ONCE
Status: COMPLETED | OUTPATIENT
Start: 2025-03-06 | End: 2025-03-06

## 2025-03-06 RX ADMIN — HEPARIN 5 ML: 100 SYRINGE at 15:06

## 2025-03-06 RX ADMIN — ONDANSETRON 8 MG: 2 INJECTION, SOLUTION INTRAMUSCULAR; INTRAVENOUS at 14:19

## 2025-03-06 RX ADMIN — SODIUM CHLORIDE 250 ML: 0.9 INJECTION, SOLUTION INTRAVENOUS at 14:17

## 2025-03-06 RX ADMIN — CYANOCOBALAMIN 1000 MCG: 1000 INJECTION, SOLUTION INTRAMUSCULAR; SUBCUTANEOUS at 14:22

## 2025-03-06 RX ADMIN — PEMETREXED DISODIUM 800 MG: 500 INJECTION, POWDER, LYOPHILIZED, FOR SOLUTION INTRAVENOUS at 14:49

## 2025-03-06 NOTE — PROGRESS NOTES
Infusion Nursing Note:  Inez Rodriguez presents today for cycle 18 day 1 treatment with Pemetrexed.    Patient seen by provider today: No   present during visit today: Not Applicable.    Note: VSS.  Pt assessed and is feeling well today.  She was educated on her plan of care and each medication given was reviewed prior to administration.  B12 injection given into her left deltoid.  She received treatment as ordered.  Her son Kenneth was called at the end of treatment for .      Intravenous Access:  Implanted Port.    Treatment Conditions:  Lab Results   Component Value Date    HGB 11.9 03/06/2025    WBC 12.3 (H) 03/06/2025    ANEUTAUTO 10.7 (H) 03/06/2025     03/06/2025        Lab Results   Component Value Date     03/06/2025    POTASSIUM 4.3 03/06/2025    MAG 2.0 08/24/2024    CR 0.89 03/06/2025    CHELITA 9.9 03/06/2025    BILITOTAL <0.2 03/06/2025    ALBUMIN 4.3 03/06/2025    ALT 25 03/06/2025    AST 20 03/06/2025       Results reviewed, labs MET treatment parameters, ok to proceed with treatment.      Post Infusion Assessment:  Patient tolerated infusion without incident.  Blood return noted pre and post infusion.  Site patent and intact, free from redness, edema or discomfort.  No evidence of extravasations.  Access discontinued per protocol.       Discharge Plan:   Patient discharged in stable condition accompanied by: self.  Departure Mode: Ambulatory.      Alysha Lara RN

## 2025-03-12 ENCOUNTER — PATIENT OUTREACH (OUTPATIENT)
Dept: GERIATRIC MEDICINE | Facility: CLINIC | Age: 75
End: 2025-03-12
Payer: COMMERCIAL

## 2025-03-12 ASSESSMENT — ACTIVITIES OF DAILY LIVING (ADL): DEPENDENT_IADLS:: CLEANING;LAUNDRY

## 2025-03-16 ENCOUNTER — MYC REFILL (OUTPATIENT)
Dept: ONCOLOGY | Facility: HOSPITAL | Age: 75
End: 2025-03-16
Payer: COMMERCIAL

## 2025-03-16 DIAGNOSIS — C79.51 LUNG CANCER METASTATIC TO BONE (H): ICD-10-CM

## 2025-03-16 DIAGNOSIS — C34.90 LUNG CANCER METASTATIC TO BONE (H): ICD-10-CM

## 2025-03-17 RX ORDER — PREDNISONE 5 MG/1
5 TABLET ORAL DAILY
Qty: 30 TABLET | Refills: 3 | Status: SHIPPED | OUTPATIENT
Start: 2025-03-17

## 2025-03-17 NOTE — TELEPHONE ENCOUNTER
Last Written Prescription Date:  10/28/24  Last Fill Quantity: 30,  # refills: 3   Last office visit provider:  1/23/25 with Elizabet Williamson CNP     Requested Prescriptions   Pending Prescriptions Disp Refills    predniSONE (DELTASONE) 5 MG tablet 30 tablet 3     Sig: Take 1 tablet (5 mg) by mouth daily.       There is no refill protocol information for this order          Jen Persaud RN 03/17/25 11:33 AM

## 2025-03-25 ENCOUNTER — HOSPITAL ENCOUNTER (OUTPATIENT)
Dept: PET IMAGING | Facility: HOSPITAL | Age: 75
Discharge: HOME | End: 2025-03-25
Attending: NURSE PRACTITIONER | Admitting: NURSE PRACTITIONER
Payer: COMMERCIAL

## 2025-03-25 ENCOUNTER — ANCILLARY ORDERS (OUTPATIENT)
Dept: ONCOLOGY | Facility: HOSPITAL | Age: 75
End: 2025-03-25

## 2025-03-25 DIAGNOSIS — C34.31 MALIGNANT NEOPLASM OF LOWER LOBE OF RIGHT LUNG (H): ICD-10-CM

## 2025-03-25 DIAGNOSIS — C34.90 LUNG CANCER METASTATIC TO BONE (H): Primary | ICD-10-CM

## 2025-03-25 DIAGNOSIS — C79.51 LUNG CANCER METASTATIC TO BONE (H): Primary | ICD-10-CM

## 2025-03-25 LAB — GLUCOSE BLDC GLUCOMTR-MCNC: 83 MG/DL (ref 70–99)

## 2025-03-25 PROCEDURE — 82962 GLUCOSE BLOOD TEST: CPT

## 2025-03-25 PROCEDURE — 78816 PET IMAGE W/CT FULL BODY: CPT | Mod: PS

## 2025-03-25 PROCEDURE — 343N000001 HC RX 343 MED OP 636: Performed by: NURSE PRACTITIONER

## 2025-03-25 PROCEDURE — A9552 F18 FDG: HCPCS | Performed by: NURSE PRACTITIONER

## 2025-03-25 RX ORDER — FLUDEOXYGLUCOSE F 18 200 MCI/ML
7-18 INJECTION, SOLUTION INTRAVENOUS ONCE
Status: COMPLETED | OUTPATIENT
Start: 2025-03-25 | End: 2025-03-25

## 2025-03-25 RX ADMIN — FLUDEOXYGLUCOSE F 18 10.21 MILLICURIE: 200 INJECTION, SOLUTION INTRAVENOUS at 09:48

## 2025-03-27 ENCOUNTER — PATIENT OUTREACH (OUTPATIENT)
Dept: ONCOLOGY | Facility: HOSPITAL | Age: 75
End: 2025-03-27

## 2025-03-27 ENCOUNTER — INFUSION THERAPY VISIT (OUTPATIENT)
Dept: INFUSION THERAPY | Facility: HOSPITAL | Age: 75
End: 2025-03-27
Attending: NURSE PRACTITIONER
Payer: COMMERCIAL

## 2025-03-27 ENCOUNTER — INFUSION THERAPY VISIT (OUTPATIENT)
Dept: INFUSION THERAPY | Facility: HOSPITAL | Age: 75
End: 2025-03-27
Attending: INTERNAL MEDICINE
Payer: COMMERCIAL

## 2025-03-27 ENCOUNTER — ONCOLOGY VISIT (OUTPATIENT)
Dept: ONCOLOGY | Facility: HOSPITAL | Age: 75
End: 2025-03-27
Attending: NURSE PRACTITIONER
Payer: COMMERCIAL

## 2025-03-27 VITALS
SYSTOLIC BLOOD PRESSURE: 183 MMHG | RESPIRATION RATE: 16 BRPM | TEMPERATURE: 98.2 F | DIASTOLIC BLOOD PRESSURE: 77 MMHG | OXYGEN SATURATION: 99 % | HEIGHT: 65 IN | WEIGHT: 130.2 LBS | HEART RATE: 92 BPM | BODY MASS INDEX: 21.69 KG/M2

## 2025-03-27 DIAGNOSIS — C34.90 LUNG CANCER METASTATIC TO BONE (H): Primary | ICD-10-CM

## 2025-03-27 DIAGNOSIS — C34.31 MALIGNANT NEOPLASM OF LOWER LOBE OF RIGHT LUNG (H): ICD-10-CM

## 2025-03-27 DIAGNOSIS — C79.51 LUNG CANCER METASTATIC TO BONE (H): Primary | ICD-10-CM

## 2025-03-27 DIAGNOSIS — C34.31 MALIGNANT NEOPLASM OF LOWER LOBE OF RIGHT LUNG (H): Primary | ICD-10-CM

## 2025-03-27 DIAGNOSIS — C79.51 LUNG CANCER METASTATIC TO BONE (H): ICD-10-CM

## 2025-03-27 DIAGNOSIS — C34.90 LUNG CANCER METASTATIC TO BONE (H): ICD-10-CM

## 2025-03-27 LAB
ALBUMIN SERPL BCG-MCNC: 4.2 G/DL (ref 3.5–5.2)
ALP SERPL-CCNC: 57 U/L (ref 40–150)
ALT SERPL W P-5'-P-CCNC: 34 U/L (ref 0–50)
ANION GAP SERPL CALCULATED.3IONS-SCNC: 11 MMOL/L (ref 7–15)
AST SERPL W P-5'-P-CCNC: 25 U/L (ref 0–45)
BASOPHILS # BLD AUTO: 0 10E3/UL (ref 0–0.2)
BASOPHILS NFR BLD AUTO: 0 %
BILIRUB SERPL-MCNC: 0.2 MG/DL
BUN SERPL-MCNC: 19.7 MG/DL (ref 8–23)
CALCIUM SERPL-MCNC: 9.6 MG/DL (ref 8.8–10.4)
CHLORIDE SERPL-SCNC: 102 MMOL/L (ref 98–107)
CREAT SERPL-MCNC: 0.77 MG/DL (ref 0.51–0.95)
EGFRCR SERPLBLD CKD-EPI 2021: 80 ML/MIN/1.73M2
EOSINOPHIL # BLD AUTO: 0 10E3/UL (ref 0–0.7)
EOSINOPHIL NFR BLD AUTO: 0 %
ERYTHROCYTE [DISTWIDTH] IN BLOOD BY AUTOMATED COUNT: 15 % (ref 10–15)
GLUCOSE SERPL-MCNC: 162 MG/DL (ref 70–99)
HCO3 SERPL-SCNC: 28 MMOL/L (ref 22–29)
HCT VFR BLD AUTO: 37.5 % (ref 35–47)
HGB BLD-MCNC: 12.3 G/DL (ref 11.7–15.7)
IMM GRANULOCYTES # BLD: 0.1 10E3/UL
IMM GRANULOCYTES NFR BLD: 1 %
LYMPHOCYTES # BLD AUTO: 1 10E3/UL (ref 0.8–5.3)
LYMPHOCYTES NFR BLD AUTO: 12 %
MCH RBC QN AUTO: 33 PG (ref 26.5–33)
MCHC RBC AUTO-ENTMCNC: 32.8 G/DL (ref 31.5–36.5)
MCV RBC AUTO: 101 FL (ref 78–100)
MONOCYTES # BLD AUTO: 0.5 10E3/UL (ref 0–1.3)
MONOCYTES NFR BLD AUTO: 6 %
NEUTROPHILS # BLD AUTO: 6.4 10E3/UL (ref 1.6–8.3)
NEUTROPHILS NFR BLD AUTO: 81 %
NRBC # BLD AUTO: 0 10E3/UL
NRBC BLD AUTO-RTO: 0 /100
PLATELET # BLD AUTO: 382 10E3/UL (ref 150–450)
POTASSIUM SERPL-SCNC: 4.3 MMOL/L (ref 3.4–5.3)
PROT SERPL-MCNC: 6.5 G/DL (ref 6.4–8.3)
RBC # BLD AUTO: 3.73 10E6/UL (ref 3.8–5.2)
SODIUM SERPL-SCNC: 141 MMOL/L (ref 135–145)
WBC # BLD AUTO: 7.9 10E3/UL (ref 4–11)

## 2025-03-27 PROCEDURE — G0463 HOSPITAL OUTPT CLINIC VISIT: HCPCS | Performed by: INTERNAL MEDICINE

## 2025-03-27 PROCEDURE — 36591 DRAW BLOOD OFF VENOUS DEVICE: CPT | Performed by: NURSE PRACTITIONER

## 2025-03-27 PROCEDURE — 258N000003 HC RX IP 258 OP 636: Performed by: NURSE PRACTITIONER

## 2025-03-27 PROCEDURE — 85004 AUTOMATED DIFF WBC COUNT: CPT | Performed by: NURSE PRACTITIONER

## 2025-03-27 PROCEDURE — 85048 AUTOMATED LEUKOCYTE COUNT: CPT | Performed by: NURSE PRACTITIONER

## 2025-03-27 PROCEDURE — 80053 COMPREHEN METABOLIC PANEL: CPT | Performed by: NURSE PRACTITIONER

## 2025-03-27 PROCEDURE — 250N000011 HC RX IP 250 OP 636: Mod: JZ | Performed by: NURSE PRACTITIONER

## 2025-03-27 RX ORDER — DIPHENHYDRAMINE HYDROCHLORIDE 50 MG/ML
50 INJECTION, SOLUTION INTRAMUSCULAR; INTRAVENOUS
Start: 2025-04-17

## 2025-03-27 RX ORDER — METHYLPREDNISOLONE SODIUM SUCCINATE 40 MG/ML
40 INJECTION INTRAMUSCULAR; INTRAVENOUS
Start: 2025-04-17

## 2025-03-27 RX ORDER — LORAZEPAM 2 MG/ML
0.5 INJECTION INTRAMUSCULAR EVERY 4 HOURS PRN
OUTPATIENT
Start: 2025-04-17

## 2025-03-27 RX ORDER — ALBUTEROL SULFATE 90 UG/1
1-2 INHALANT RESPIRATORY (INHALATION)
Start: 2025-04-17

## 2025-03-27 RX ORDER — HEPARIN SODIUM (PORCINE) LOCK FLUSH IV SOLN 100 UNIT/ML 100 UNIT/ML
5 SOLUTION INTRAVENOUS
Status: DISCONTINUED | OUTPATIENT
Start: 2025-03-27 | End: 2025-03-27 | Stop reason: HOSPADM

## 2025-03-27 RX ORDER — HEPARIN SODIUM,PORCINE 10 UNIT/ML
5-20 VIAL (ML) INTRAVENOUS DAILY PRN
OUTPATIENT
Start: 2025-04-17

## 2025-03-27 RX ORDER — DIPHENHYDRAMINE HYDROCHLORIDE 50 MG/ML
25 INJECTION, SOLUTION INTRAMUSCULAR; INTRAVENOUS
Start: 2025-04-17

## 2025-03-27 RX ORDER — ONDANSETRON 2 MG/ML
8 INJECTION INTRAMUSCULAR; INTRAVENOUS ONCE
Status: COMPLETED | OUTPATIENT
Start: 2025-03-27 | End: 2025-03-27

## 2025-03-27 RX ORDER — ONDANSETRON 2 MG/ML
8 INJECTION INTRAMUSCULAR; INTRAVENOUS ONCE
Start: 2025-04-17 | End: 2025-04-17

## 2025-03-27 RX ORDER — EPINEPHRINE 1 MG/ML
0.3 INJECTION, SOLUTION INTRAMUSCULAR; SUBCUTANEOUS EVERY 5 MIN PRN
OUTPATIENT
Start: 2025-04-17

## 2025-03-27 RX ORDER — MEPERIDINE HYDROCHLORIDE 25 MG/ML
25 INJECTION INTRAMUSCULAR; INTRAVENOUS; SUBCUTANEOUS
OUTPATIENT
Start: 2025-04-17

## 2025-03-27 RX ORDER — ALBUTEROL SULFATE 0.83 MG/ML
2.5 SOLUTION RESPIRATORY (INHALATION)
OUTPATIENT
Start: 2025-04-17

## 2025-03-27 RX ORDER — HEPARIN SODIUM (PORCINE) LOCK FLUSH IV SOLN 100 UNIT/ML 100 UNIT/ML
5 SOLUTION INTRAVENOUS
OUTPATIENT
Start: 2025-04-17

## 2025-03-27 RX ADMIN — HEPARIN 5 ML: 100 SYRINGE at 10:55

## 2025-03-27 RX ADMIN — ONDANSETRON 8 MG: 2 INJECTION, SOLUTION INTRAMUSCULAR; INTRAVENOUS at 10:12

## 2025-03-27 RX ADMIN — PEMETREXED DISODIUM 800 MG: 500 INJECTION, POWDER, LYOPHILIZED, FOR SOLUTION INTRAVENOUS at 10:32

## 2025-03-27 RX ADMIN — SODIUM CHLORIDE 250 ML: 0.9 INJECTION, SOLUTION INTRAVENOUS at 10:07

## 2025-03-27 ASSESSMENT — PAIN SCALES - GENERAL: PAINLEVEL_OUTOF10: NO PAIN (0)

## 2025-03-27 NOTE — LETTER
"3/27/2025      Inez Rodriguez  [address]  [facility] [address]      Dear Colleague,    Thank you for referring your patient, Inez Rodriguez, to the [facility] CANCER [facility]. Please see a copy of my visit note below.    Oncology Rooming Note    March 27, 2025 8:53 AM   Inez Rodriguez is a 74 year old female who presents for:    Chief Complaint   Patient presents with     Oncology Clinic Visit     Follow up 9 week labs     Initial Vitals: Ht 1.651 m (5' 5\")   LMP  (LMP Unknown)   BMI 21.12 kg/m   Estimated body mass index is 21.12 kg/m  as calculated from the following:    Height as of this encounter: 1.651 m (5' 5\").    Weight as of 1/23/25: 57.6 kg (126 lb 14.4 oz). Body surface area is 1.63 meters squared.  Data Unavailable Comment: Data Unavailable   No LMP recorded (lmp unknown). Patient is postmenopausal.  Allergies reviewed: Yes  Medications reviewed: Yes    Medications: Medication refills not needed today.  Pharmacy name entered into [employer]:    [facility] #[id number] - [place] [id number] AT [facility] [address]    Frailty Screening:   Is the patient here for a new oncology consult visit in cancer care? 2. No    PHQ9:  Did this patient require a PHQ9?: No      Clinical concerns: none       Anisha Ramsey [facility] Hematology and Oncology Progress Note    Patient: Inez Rodriguez  MRN: [id number]  Date of Service: Mar 27, 2025      Oncologist: Dr. Miller    Reason for Visit    Chief Complaint   Patient presents with     Oncology Clinic Visit     Follow up 9 week labs       Assessment and Plan     Cancer Staging   No matching staging information was found for the patient.    Metastatic non-small cell lung cancer, RUL with mediastinal lymph nodes and osseous mets of T4 and sacrum. Molecular testing shows KRAS G12C mutated tumor.   On palliative chemotherapy finished 4 " cycles of Carboplatin, Alimta, and pembrolizumab with good response. Decided to drop carboplatin 5/6/24. CT 5/2/24 showed mildly improved mediastinal and perihilar adenopathy with stable RUL thickening.  She continued maintenance Alimta and pembrolizumab until August.  In September she started having really significant toxicity from the Keytruda so it was held and given steroids.  She felt much better.  In October 2024 we restarted treatment but adjusted single agent Alimta.  She has been on that since and has been feeling great.  Last imaging on December 10 was a PET scan that showed continued partial favorable treatment response.    3/25/2025 PET/CT scan shows stable to decreased disease aside from increasing metabolic activity involving the nodularity in the right upper lobe.  SUV increased from 5.5-9.4.  No other evidence of disease progression.  Plan continue pemetrexed every 3 weeks with repeat CT scan of the chest abdomen and pelvis in 8 weeks        2. Low TSH  Patient has been quite low on this for a while.  Her T4 generally runs normal.  We will continue to monitor intermittently.  Her immunotherapy is now done so it should not be quite as affected.        ECOG Performance    0 - Independent    Distress Screening (within last 30 days)    No data recorded     Pain  Pain Score: No Pain (0)    Problem List    Patient Active Problem List   Diagnosis     Observation for suspected malignant neoplasm     Thyroid nodule     Chronic cough     Pulmonary nodules     Wheezing-associated respiratory infection (WARI)     Malignant neoplasm of lower lobe of right lung (H)     Postobstructive pneumonia     Lung cancer metastatic to bone (H)     Antineoplastic chemotherapy induced anemia        ______________________________________________________________________________       Diagnosis:   Lung cancer, January 2024 came to ER with SOB.   -1/7/24: Mediastinal and right hilar adenopathy most suggestive of metastatic  adenopathy from a primary lung malignancy. A nodular opacity in the right upper lobe could represent a primary lung malignancy versus an infectious/inflammatory focus. Recommend   referral to pulmonology for tissue sampling. [employer] demonstrate mass effect on the right upper lobe bronchus and bronchus intermedius with associated narrowing without occlusion. Brain MRI negative.   -1/8/24: EBUS done and 4R, 11R and subcarinal nodes all positive for malignancy. NSCLC, favor adenocarcinoma. Right upper lobe nodule was also positive. Pleural fluid suspicious.   -1/29/24: PET: Findings suspicious for a right upper lobe primary lung neoplasm with extensive mediastinal/hilar lymph node metastases and osseous metastases to the T4 vertebral body and right sacrum.   Neogenomics: KRAS G12C mutation. Tp53 mutation. PTEN deletion +, MSI stable, PDL1 0%, TMB intermediate, Her2/leisa negative. Eveything else was negative.      Treatment:   -2/14/24: palliative treatment with Carboplatin, Alimta, Keytruda x 4 cycles  -5/6/24-8/1924: Alimta and Keytruda.  Patient then started having significant side effects likely from the Keytruda so treatment was held.  Symptoms improved on steroids.  -10/10/24: Patient started on single agent Alimta.  Continues on that.    History of Present Illness    Patient here for continued treatment.  Overall she states that since she is just been getting the 1 medication in October she is felt quite amazing.  She says she has a couple of days where she does not feel good but she knows what the pattern will be and she understands how to manage the symptoms.  She denies any new bone or back pain issues.  She continues to have chronic muscle spasms in her back and she has to be careful what she does but she tries to be as active as she can every day.  She denies any significant weight loss issues.  She states that she does not get out of the house much and is pretty sedentary but that is always worse  "in the winter.  She denies any infectious complaints.  States that she does have a lot of phlegm fevers chills.    Review of Systems    Pertinent items are noted in HPI.    Past History    Past Medical History:   Diagnosis Date     Hypertension      SOB (shortness of breath)        PHYSICAL EXAM  BP (!) 183/77 (BP Location: Right arm, Patient Position: Sitting, Cuff Size: Adult Regular)   Pulse 92   Temp 98.2  F (36.8  C) (Oral)   Resp 16   Ht 1.651 m (5' 5\")   Wt 59.1 kg (130 lb 3.2 oz)   LMP  (LMP Unknown)   SpO2 99%   BMI 21.67 kg/m      GENERAL APPEARANCE: 74-year-old woman in no apparent distress.  HEAD: Atraumatic; normocephalic; without lesions.  EYES: Conjunctiva, corneas and eyelids normal; pupils equal, round, reactive to light; No Icterus.  MOUTH/OROPHARYNX: Not examined, facemask .  NECK: Supple with no nodes.  LUNGS:  Clear to auscultation and percussion with no extra sounds.  HEART: Regular rhythm and rate; S1 and S2 normal; no murmurs noted.  ABDOMEN: Soft; no masses or tenderness, no hepatosplenomegaly.  NEUROLOGIC: Alert and oriented.  No obvious focal findings.  EXTREMITIES: No cyanosis, or edema.  SKIN: No abnormal bruising or bleeding. No suspicious lesions noted on exposed skin.  PSYCHIATRIC: Mental status normal; no apparent psychiatric issues        Lab Results    Recent Results (from the past week)   Glucose by meter   Result Value Ref Range    GLUCOSE BY METER POCT 83 70 - 99 mg/dL   Comprehensive metabolic panel   Result Value Ref Range    Sodium 141 135 - 145 mmol/L    Potassium 4.3 3.4 - 5.3 mmol/L    Carbon Dioxide (CO2) 28 22 - 29 mmol/L    Anion Gap 11 7 - 15 mmol/L    Urea Nitrogen 19.7 8.0 - 23.0 mg/dL    Creatinine 0.77 0.51 - 0.95 mg/dL    GFR Estimate 80 >60 mL/min/1.73m2    Calcium 9.6 8.8 - 10.4 mg/dL    Chloride 102 98 - 107 mmol/L    Glucose 162 (H) 70 - 99 mg/dL    Alkaline Phosphatase 57 40 - 150 U/L    AST 25 0 - 45 U/L    ALT 34 0 - 50 U/L    Protein Total 6.5 6.4 " - 8.3 g/dL    Albumin 4.2 3.5 - 5.2 g/dL    Bilirubin Total 0.2 <=1.2 mg/dL   CBC with platelets and differential   Result Value Ref Range    WBC Count 7.9 4.0 - 11.0 10e3/uL    RBC Count 3.73 (L) 3.80 - 5.20 10e6/uL    Hemoglobin 12.3 11.7 - 15.7 g/dL    Hematocrit 37.5 35.0 - 47.0 %     (H) 78 - 100 fL    MCH 33.0 26.5 - 33.0 pg    MCHC 32.8 31.5 - 36.5 g/dL    RDW 15.0 10.0 - 15.0 %    Platelet Count 382 150 - 450 10e3/uL    % Neutrophils 81 %    % Lymphocytes 12 %    % Monocytes 6 %    % Eosinophils 0 %    % Basophils 0 %    % Immature Granulocytes 1 %    NRBCs per 100 WBC 0 <1 /100    Absolute Neutrophils 6.4 1.6 - 8.3 10e3/uL    Absolute Lymphocytes 1.0 0.8 - 5.3 10e3/uL    Absolute Monocytes 0.5 0.0 - 1.3 10e3/uL    Absolute Eosinophils 0.0 0.0 - 0.7 10e3/uL    Absolute Basophils 0.0 0.0 - 0.2 10e3/uL    Absolute Immature Granulocytes 0.1 <=0.4 10e3/uL    Absolute NRBCs 0.0 10e3/uL         Imaging    PET Oncology Whole Body    Result Date: 3/26/2025  EXAM: PET ONCOLOGY WHOLE BODY LOCATION: [facility] DATE: 3/25/2025 INDICATION: Subsequent treatment planning and restaging for malignant neoplasm of lower lobe, right bronchus or lung. Status post several rounds of chemotherapy/immunotherapy, currently receiving additional systemic therapy (Alimta). Monitor treatment response COMPARISON: FDG PET/CT dated 12/10/2024 CONTRAST: None TECHNIQUE: Serum glucose level 83 mg/dL. One hour post intravenous administration of 10.21mCi F18 FDG, PET imaging was performed from the skull vertex to feet, utilizing attenuation correction with concurrent axial CT and PET/CT image fusion. Dose reduction techniques were used. PET/CT FINDINGS: While the subcarinal station 7 lymph node has decreased in metabolic activity (Max SUV 4.3, previously 5.6) and there is broadly stable right lower paratracheal station 4R lymph node (Max SUV 7.0, previously 7.1), there is increasing metabolic activity involving  the nodularity in the central right upper lobe/perihilar region (Max SUV 9.4, previously 5.5) suggesting mild progression of disease. Degenerative shoulder and hip synovitis. CT FINDINGS: Mild senescent intercranial changes. Moderate carotid artery bifurcation calcification. Left chest port with tip terminating in the spiculation junction. Mild-to-moderate coronary artery calcium. Right upper lobe lung cysts/bullae. Lingular scarring/atelectatic change. Sigmoid diverticulosis. Pelvic phleboliths. Multilevel degenerative changes of the spine. The lower extremities are unremarkable.     IMPRESSION: While the subcarinal station 7 lymph node has decreased in metabolic activity and there is broadly stable right lower paratracheal station 4R lymph node, there is increasing metabolic activity involving the nodularity in the central right upper lobe/perihilar region suggesting mild progression of disease.       I spent 57 minutes on the patient's visit today.  This included preparation for the visit, face-to-face time with the patient and documentation following the visit.  It did not include teaching or procedure time.    Signed by: Peter E. Friedell, MD  .           Again, thank you for allowing me to participate in the care of your patient.        Sincerely,        Peter E. Friedell, MD    Electronically signed

## 2025-03-27 NOTE — PROGRESS NOTES
[facility] Hematology and Oncology Progress Note    Patient: Inez Rodriguez  MRN: [id number]  Date of Service: Mar 27, 2025      Oncologist: Dr. Miller    Reason for Visit    Chief Complaint   Patient presents with    Oncology Clinic Visit     Follow up 9 week labs       Assessment and Plan     Cancer Staging   No matching staging information was found for the patient.    Metastatic non-small cell lung cancer, RUL with mediastinal lymph nodes and osseous mets of T4 and sacrum. Molecular testing shows KRAS G12C mutated tumor.   On palliative chemotherapy finished 4 cycles of Carboplatin, Alimta, and pembrolizumab with good response. Decided to drop carboplatin 5/6/24. CT 5/2/24 showed mildly improved mediastinal and perihilar adenopathy with stable RUL thickening.  She continued maintenance Alimta and pembrolizumab until August.  In September she started having really significant toxicity from the Keytruda so it was held and given steroids.  She felt much better.  In October 2024 we restarted treatment but adjusted single agent Alimta.  She has been on that since and has been feeling great.  Last imaging on December 10 was a PET scan that showed continued partial favorable treatment response.    3/25/2025 PET/CT scan shows stable to decreased disease aside from increasing metabolic activity involving the nodularity in the right upper lobe.  SUV increased from 5.5-9.4.  No other evidence of disease progression.  Plan continue pemetrexed every 3 weeks with repeat CT scan of the chest abdomen and pelvis in 8 weeks        2. Low TSH  Patient has been quite low on this for a while.  Her T4 generally runs normal.  We will continue to monitor intermittently.  Her immunotherapy is now done so it should not be quite as affected.        ECOG Performance    0 - Independent    Distress Screening (within last 30 days)    No data recorded     Pain  Pain Score: No Pain (0)    Problem List    Patient Active Problem List    Diagnosis    Observation for suspected malignant neoplasm    Thyroid nodule    Chronic cough    Pulmonary nodules    Wheezing-associated respiratory infection (WARI)    Malignant neoplasm of lower lobe of right lung (H)    Postobstructive pneumonia    Lung cancer metastatic to bone (H)    Antineoplastic chemotherapy induced anemia        ______________________________________________________________________________       Diagnosis:   Lung cancer, January 2024 came to ER with SOB.   -1/7/24: Mediastinal and right hilar adenopathy most suggestive of metastatic adenopathy from a primary lung malignancy. A nodular opacity in the right upper lobe could represent a primary lung malignancy versus an infectious/inflammatory focus. Recommend   referral to pulmonology for tissue sampling. [employer] demonstrate mass effect on the right upper lobe bronchus and bronchus intermedius with associated narrowing without occlusion. Brain MRI negative.   -1/8/24: EBUS done and 4R, 11R and subcarinal nodes all positive for malignancy. NSCLC, favor adenocarcinoma. Right upper lobe nodule was also positive. Pleural fluid suspicious.   -1/29/24: PET: Findings suspicious for a right upper lobe primary lung neoplasm with extensive mediastinal/hilar lymph node metastases and osseous metastases to the T4 vertebral body and right sacrum.   Neogenomics: KRAS G12C mutation. Tp53 mutation. PTEN deletion +, MSI stable, PDL1 0%, TMB intermediate, Her2/leisa negative. Eveything else was negative.      Treatment:   -2/14/24: palliative treatment with Carboplatin, Alimta, Keytruda x 4 cycles  -5/6/24-8/1924: Alimta and Keytruda.  Patient then started having significant side effects likely from the Keytruda so treatment was held.  Symptoms improved on steroids.  -10/10/24: Patient started on single agent Alimta.  Continues on that.    History of Present Illness    Patient here for continued treatment.  Overall she states that since she is just  "been getting the 1 medication in October she is felt quite amazing.  She says she has a couple of days where she does not feel good but she knows what the pattern will be and she understands how to manage the symptoms.  She denies any new bone or back pain issues.  She continues to have chronic muscle spasms in her back and she has to be careful what she does but she tries to be as active as she can every day.  She denies any significant weight loss issues.  She states that she does not get out of the house much and is pretty sedentary but that is always worse in the winter.  She denies any infectious complaints.  States that she does have a lot of phlegm fevers chills.    Review of Systems    Pertinent items are noted in HPI.    Past History    Past Medical History:   Diagnosis Date    Hypertension     SOB (shortness of breath)        PHYSICAL EXAM  BP (!) 183/77 (BP Location: Right arm, Patient Position: Sitting, Cuff Size: Adult Regular)   Pulse 92   Temp 98.2  F (36.8  C) (Oral)   Resp 16   Ht 1.651 m (5' 5\")   Wt 59.1 kg (130 lb 3.2 oz)   LMP  (LMP Unknown)   SpO2 99%   BMI 21.67 kg/m      GENERAL APPEARANCE: 74-year-old woman in no apparent distress.  HEAD: Atraumatic; normocephalic; without lesions.  EYES: Conjunctiva, corneas and eyelids normal; pupils equal, round, reactive to light; No Icterus.  MOUTH/OROPHARYNX: Not examined, facemask .  NECK: Supple with no nodes.  LUNGS:  Clear to auscultation and percussion with no extra sounds.  HEART: Regular rhythm and rate; S1 and S2 normal; no murmurs noted.  ABDOMEN: Soft; no masses or tenderness, no hepatosplenomegaly.  NEUROLOGIC: Alert and oriented.  No obvious focal findings.  EXTREMITIES: No cyanosis, or edema.  SKIN: No abnormal bruising or bleeding. No suspicious lesions noted on exposed skin.  PSYCHIATRIC: Mental status normal; no apparent psychiatric issues        Lab Results    Recent Results (from the past week)   Glucose by meter   Result " Value Ref Range    GLUCOSE BY METER POCT 83 70 - 99 mg/dL   Comprehensive metabolic panel   Result Value Ref Range    Sodium 141 135 - 145 mmol/L    Potassium 4.3 3.4 - 5.3 mmol/L    Carbon Dioxide (CO2) 28 22 - 29 mmol/L    Anion Gap 11 7 - 15 mmol/L    Urea Nitrogen 19.7 8.0 - 23.0 mg/dL    Creatinine 0.77 0.51 - 0.95 mg/dL    GFR Estimate 80 >60 mL/min/1.73m2    Calcium 9.6 8.8 - 10.4 mg/dL    Chloride 102 98 - 107 mmol/L    Glucose 162 (H) 70 - 99 mg/dL    Alkaline Phosphatase 57 40 - 150 U/L    AST 25 0 - 45 U/L    ALT 34 0 - 50 U/L    Protein Total 6.5 6.4 - 8.3 g/dL    Albumin 4.2 3.5 - 5.2 g/dL    Bilirubin Total 0.2 <=1.2 mg/dL   CBC with platelets and differential   Result Value Ref Range    WBC Count 7.9 4.0 - 11.0 10e3/uL    RBC Count 3.73 (L) 3.80 - 5.20 10e6/uL    Hemoglobin 12.3 11.7 - 15.7 g/dL    Hematocrit 37.5 35.0 - 47.0 %     (H) 78 - 100 fL    MCH 33.0 26.5 - 33.0 pg    MCHC 32.8 31.5 - 36.5 g/dL    RDW 15.0 10.0 - 15.0 %    Platelet Count 382 150 - 450 10e3/uL    % Neutrophils 81 %    % Lymphocytes 12 %    % Monocytes 6 %    % Eosinophils 0 %    % Basophils 0 %    % Immature Granulocytes 1 %    NRBCs per 100 WBC 0 <1 /100    Absolute Neutrophils 6.4 1.6 - 8.3 10e3/uL    Absolute Lymphocytes 1.0 0.8 - 5.3 10e3/uL    Absolute Monocytes 0.5 0.0 - 1.3 10e3/uL    Absolute Eosinophils 0.0 0.0 - 0.7 10e3/uL    Absolute Basophils 0.0 0.0 - 0.2 10e3/uL    Absolute Immature Granulocytes 0.1 <=0.4 10e3/uL    Absolute NRBCs 0.0 10e3/uL         Imaging    PET Oncology Whole Body    Result Date: 3/26/2025  EXAM: PET ONCOLOGY WHOLE BODY LOCATION: [facility] DATE: 3/25/2025 INDICATION: Subsequent treatment planning and restaging for malignant neoplasm of lower lobe, right bronchus or lung. Status post several rounds of chemotherapy/immunotherapy, currently receiving additional systemic therapy (Alimta). Monitor treatment response COMPARISON: FDG PET/CT dated 12/10/2024  CONTRAST: None TECHNIQUE: Serum glucose level 83 mg/dL. One hour post intravenous administration of 10.21mCi F18 FDG, PET imaging was performed from the skull vertex to feet, utilizing attenuation correction with concurrent axial CT and PET/CT image fusion. Dose reduction techniques were used. PET/CT FINDINGS: While the subcarinal station 7 lymph node has decreased in metabolic activity (Max SUV 4.3, previously 5.6) and there is broadly stable right lower paratracheal station 4R lymph node (Max SUV 7.0, previously 7.1), there is increasing metabolic activity involving the nodularity in the central right upper lobe/perihilar region (Max SUV 9.4, previously 5.5) suggesting mild progression of disease. Degenerative shoulder and hip synovitis. CT FINDINGS: Mild senescent intercranial changes. Moderate carotid artery bifurcation calcification. Left chest port with tip terminating in the spiculation junction. Mild-to-moderate coronary artery calcium. Right upper lobe lung cysts/bullae. Lingular scarring/atelectatic change. Sigmoid diverticulosis. Pelvic phleboliths. Multilevel degenerative changes of the spine. The lower extremities are unremarkable.     IMPRESSION: While the subcarinal station 7 lymph node has decreased in metabolic activity and there is broadly stable right lower paratracheal station 4R lymph node, there is increasing metabolic activity involving the nodularity in the central right upper lobe/perihilar region suggesting mild progression of disease.       I spent 57 minutes on the patient's visit today.  This included preparation for the visit, face-to-face time with the patient and documentation following the visit.  It did not include teaching or procedure time.    Signed by: Peter E. Friedell, MD  .

## 2025-03-27 NOTE — PROGRESS NOTES
Infusion Nursing Note:  Inez Rodriguez presents today for Alimta.    Patient seen by provider today: Yes: Dr Friedell   present during visit today: Not Applicable.    Note: Inez was educated on her plan of care and each medication given was reviewed prior to administration.  She received treatment as ordered.      Intravenous Access:  Implanted Port.    Treatment Conditions:  Lab Results   Component Value Date    HGB 12.3 03/27/2025    WBC 7.9 03/27/2025    ANEUTAUTO 6.4 03/27/2025     03/27/2025        Lab Results   Component Value Date     03/27/2025    POTASSIUM 4.3 03/27/2025    MAG 2.0 08/24/2024    CR 0.77 03/27/2025    CHELITA 9.6 03/27/2025    BILITOTAL 0.2 03/27/2025    ALBUMIN 4.2 03/27/2025    ALT 34 03/27/2025    AST 25 03/27/2025       Results reviewed, labs MET treatment parameters, ok to proceed with treatment.      Post Infusion Assessment:  Patient tolerated infusion without incident.  Blood return noted pre and post infusion.  Site patent and intact, free from redness, edema or discomfort.  No evidence of extravasations.  Access discontinued per protocol.       Discharge Plan:   Patient discharged in stable condition accompanied by: son.  Departure Mode: Ambulatory.      Alysha Lara RN

## 2025-03-27 NOTE — PROGRESS NOTES
[facility]: Cancer Care                                                                                          Situation: Chart reviewed by RN Care Coordinator.    Background: Patient was seen in clinic today for follow-up regarding lung cancer.    Assessment: On maintenance Alimta.  Tolerating this well.  Recent scan shows stable to decreased disease.  No other evidence of disease progression.    Plan/Recommendations: Patient is to continue getting Alimta every 3 weeks.  Patient is to return and be seen in clinic in 6 weeks      Signature:  Amirah Freire RN Care Coordinator  [facility]  Cancer [facility]

## 2025-03-27 NOTE — PROGRESS NOTES
"Oncology Rooming Note    March 27, 2025 8:53 AM   Inez Rodriguez is a 74 year old female who presents for:    Chief Complaint   Patient presents with    Oncology Clinic Visit     Follow up 9 week labs     Initial Vitals: Ht 1.651 m (5' 5\")   LMP  (LMP Unknown)   BMI 21.12 kg/m   Estimated body mass index is 21.12 kg/m  as calculated from the following:    Height as of this encounter: 1.651 m (5' 5\").    Weight as of 1/23/25: 57.6 kg (126 lb 14.4 oz). Body surface area is 1.63 meters squared.  Data Unavailable Comment: Data Unavailable   No LMP recorded (lmp unknown). Patient is postmenopausal.  Allergies reviewed: Yes  Medications reviewed: Yes    Medications: Medication refills not needed today.  Pharmacy name entered into [facility]:    [id number] - [place], [place] - [address] AT [facility] [place] [facility] [address] [facility]    Frailty Screening:   Is the patient here for a new oncology consult visit in cancer care? 2. No    PHQ9:  Did this patient require a PHQ9?: No      Clinical concerns: none       Anisha Ramsey CMA            "

## 2025-04-08 ENCOUNTER — PATIENT OUTREACH (OUTPATIENT)
Dept: GERIATRIC MEDICINE | Facility: CLINIC | Age: 75
End: 2025-04-08
Payer: COMMERCIAL

## 2025-04-08 NOTE — PROGRESS NOTES
[facility] Care Coordination Contact    Received after visit chart from care coordinator.  Completed following tasks: Mailed copy of support plan to member, Mailed [place] signature sheet pages 3-4, Mailed Safe Medication Disposal , and Mailed Transition of Care Member Handout    Edie Way  Care Management Specialist  [facility]  [phone]

## 2025-04-08 NOTE — LETTER
April 8, 2025       ROCHELLE AVILA  [address]  [facility] [address]      Dear Rochelle,    At [facility], we re dedicated to improving your health and wellness. Enclosed is the Support Plan developed with you on 03/12/2025. Please review the Support Plan carefully.    As a reminder, during your visit we talked about:   Ways to manage your physical and mental health   Using health care to maintain and improve your health    Your preventive care needs      Remember to contact your care coordinator if you:   Are hospitalized or plan to be hospitalized    Have a fall     Have a change in your physical or mental health   Need help finding support or services    If you have questions or don t agree with your Support Plan, call me at [phone]. You can also call me if your needs change. TTY users call the [place] Relay at 984 or 1-437.888.4202 (zueqec-bj-ncwonh relay service).    Sincerely,       LOPEZ Razo  [phone]  [email or web address]                      [id number] accepted     (06/2024)                [address] [place] [address]  [phone]  fax [phone]  [facility]

## 2025-04-08 NOTE — PROGRESS NOTES
[facility] Home Visit Assessment     Home visit for Health Risk Assessment with Inez Rodriguez completed on March 12, 2025    Type of residence:: Private home - stairs  Current living arrangement:: I live in a private home with family     Assessment completed with:: Patient    Current Care Plan  Member currently receiving the following home care services:     Member currently receiving the following community resources: None      Medication Review  Medication reconciliation completed in Epic: Yes  Medication set-up & administration: Independent and sets up on own daily.  Self-administers medications.  Medication Risk Assessment Medication (1 or more, place referral to MTM): N/A: No risk factors identified  MTM Referral Placed: No: No risk factors idenified    Mental/Behavioral Health   Depression Screening:                    Falls Assessment:   Fallen 2 or more times in the past year?: No   Any fall with injury in the past year?: No    ADL/IADL Dependencies:   Dependent ADLs:: Independent  Dependent IADLs:: Cleaning, Laundry    Health Plan sponsored benefits: [facility]: Shared information regarding [employer]. Reviewed preventative health screening and health plan supplemental benefits/incentives. Reviewed medication disposal form and transition of care member handout.    CFSS Assessment completed at visit: [place], and assessment indicates that member does not meet access criteria for CFSS.     Elderly Waiver Eligibility: No longer meets criteria-will close EW    Care Plan & Recommendations: Inez has decided to terminate her homemaking services and the eldely waiver program will close.     See [place] for detailed assessment information.    Follow-Up Plan: Member informed of future contact, plan to f/u with member with a 6 month telephone assessment.  Contact information shared with member and family, encouraged member to call with any questions or  concerns at any time.    [place] care continuum providers: Please see Snapshot and Care Management Flowsheets for Specific details of care plan.    This CC note routed to PCP, Lynn Bowles.     Myriam MARROQUIN  [facility]  [phone]

## 2025-04-09 ENCOUNTER — PATIENT OUTREACH (OUTPATIENT)
Dept: GERIATRIC MEDICINE | Facility: CLINIC | Age: 75
End: 2025-04-09
Payer: COMMERCIAL

## 2025-04-09 NOTE — PROGRESS NOTES
[facility] Care Coordination Contact    Received a request to submit a DTR for the terminated of Homemaking and EW. Documentation completed and faxed to the health plan. Care Coordinator aware.    Bruna Horta RN  Utilization   [facility]  [phone]

## 2025-05-01 ENCOUNTER — PATIENT OUTREACH (OUTPATIENT)
Dept: ONCOLOGY | Facility: HOSPITAL | Age: 75
End: 2025-05-01
Payer: COMMERCIAL

## 2025-05-01 NOTE — PROGRESS NOTES
"[facility]: Cancer Care                                                                                            Situation: Patient chart reviewed by care coordinator.    Background: Patient with a diagnosis of metastatic non-small cell lung cancer.  This is in the right upper lobe with mediastinal lymph nodes and osseous metastases of T4 and sacrum.  Molecular testing shows K-tray G12C mutated tumor.    Assessment: Yesterday I was chart prepping for next week.  I noticed that the patient had a second opinion at [facility].  SH[employer] Print System messages sent to the patient asking her if I should keep her appointments scheduled on 5/7.  She would route back stating that she has transferred her care to [facility].  I asked her if I could go ahead and cancel the appointments in our system.    Plan/Recommendations: See SHIMAUMA Print System message from 4/30/2025.  She got quite upset after she initially said please and thank you.  She thought I was going to cancel her appointments with [facility].    Only appointment scheduled in our clinic have been canceled.    I have \"resolved\" her from care coordination and \"graduated\" her from enrollment.  I have remove myself from the care team.    Signature:  María Casillas RN    "

## 2025-05-06 ENCOUNTER — APPOINTMENT (OUTPATIENT)
Dept: CT IMAGING | Facility: HOSPITAL | Age: 75
End: 2025-05-06
Attending: EMERGENCY MEDICINE
Payer: COMMERCIAL

## 2025-05-06 ENCOUNTER — HOSPITAL ENCOUNTER (INPATIENT)
Facility: HOSPITAL | Age: 75
LOS: 6 days | Discharge: HOME | End: 2025-05-12
Attending: EMERGENCY MEDICINE
Payer: COMMERCIAL

## 2025-05-06 DIAGNOSIS — D84.9 IMMUNOCOMPROMISED: ICD-10-CM

## 2025-05-06 DIAGNOSIS — N17.9 AKI (ACUTE KIDNEY INJURY): ICD-10-CM

## 2025-05-06 DIAGNOSIS — C79.51 LUNG CANCER METASTATIC TO BONE (H): Primary | ICD-10-CM

## 2025-05-06 DIAGNOSIS — C34.31 MALIGNANT NEOPLASM OF LOWER LOBE OF RIGHT LUNG (H): ICD-10-CM

## 2025-05-06 DIAGNOSIS — J96.01 ACUTE RESPIRATORY FAILURE WITH HYPOXIA (H): ICD-10-CM

## 2025-05-06 DIAGNOSIS — C34.90 LUNG CANCER METASTATIC TO BONE (H): Primary | ICD-10-CM

## 2025-05-06 DIAGNOSIS — A41.9 SEPSIS, DUE TO UNSPECIFIED ORGANISM, UNSPECIFIED WHETHER ACUTE ORGAN DYSFUNCTION PRESENT (H): ICD-10-CM

## 2025-05-06 DIAGNOSIS — E78.5 DYSLIPIDEMIA: ICD-10-CM

## 2025-05-06 LAB
ALBUMIN UR-MCNC: 100 MG/DL
AMORPH CRY #/AREA URNS HPF: ABNORMAL /HPF
ANION GAP SERPL CALCULATED.3IONS-SCNC: 15 MMOL/L (ref 7–15)
APPEARANCE UR: ABNORMAL
BASOPHILS # BLD AUTO: 0 10E3/UL (ref 0–0.2)
BASOPHILS NFR BLD AUTO: 0 %
BILIRUB UR QL STRIP: ABNORMAL
BUN SERPL-MCNC: 26.4 MG/DL (ref 8–23)
CALCIUM SERPL-MCNC: 9.2 MG/DL (ref 8.8–10.4)
CHLORIDE SERPL-SCNC: 94 MMOL/L (ref 98–107)
COLOR UR AUTO: YELLOW
CREAT SERPL-MCNC: 1.4 MG/DL (ref 0.51–0.95)
EGFRCR SERPLBLD CKD-EPI 2021: 39 ML/MIN/1.73M2
EOSINOPHIL # BLD AUTO: 0.1 10E3/UL (ref 0–0.7)
EOSINOPHIL NFR BLD AUTO: 1 %
ERYTHROCYTE [DISTWIDTH] IN BLOOD BY AUTOMATED COUNT: 13.2 % (ref 10–15)
FLUAV RNA SPEC QL NAA+PROBE: NEGATIVE
FLUBV RNA RESP QL NAA+PROBE: NEGATIVE
GLUCOSE SERPL-MCNC: 117 MG/DL (ref 70–99)
GLUCOSE UR STRIP-MCNC: NEGATIVE MG/DL
HCO3 SERPL-SCNC: 26 MMOL/L (ref 22–29)
HCT VFR BLD AUTO: 37.3 % (ref 35–47)
HGB BLD-MCNC: 12.3 G/DL (ref 11.7–15.7)
HGB UR QL STRIP: NEGATIVE
IMM GRANULOCYTES # BLD: 0.1 10E3/UL
IMM GRANULOCYTES NFR BLD: 1 %
KETONES UR STRIP-MCNC: 10 MG/DL
LACTATE SERPL-SCNC: 0.8 MMOL/L (ref 0.7–2)
LEUKOCYTE ESTERASE UR QL STRIP: NEGATIVE
LYMPHOCYTES # BLD AUTO: 0.5 10E3/UL (ref 0.8–5.3)
LYMPHOCYTES NFR BLD AUTO: 6 %
MAGNESIUM SERPL-MCNC: 1.7 MG/DL (ref 1.7–2.3)
MCH RBC QN AUTO: 31.3 PG (ref 26.5–33)
MCHC RBC AUTO-ENTMCNC: 33 G/DL (ref 31.5–36.5)
MCV RBC AUTO: 95 FL (ref 78–100)
MONOCYTES # BLD AUTO: 0.8 10E3/UL (ref 0–1.3)
MONOCYTES NFR BLD AUTO: 8 %
NEUTROPHILS # BLD AUTO: 7.9 10E3/UL (ref 1.6–8.3)
NEUTROPHILS NFR BLD AUTO: 84 %
NITRATE UR QL: NEGATIVE
NRBC # BLD AUTO: 0 10E3/UL
NRBC BLD AUTO-RTO: 0 /100
PH UR STRIP: 5.5 [PH] (ref 5–7)
PLATELET # BLD AUTO: 207 10E3/UL (ref 150–450)
POTASSIUM SERPL-SCNC: 3.6 MMOL/L (ref 3.4–5.3)
PROCALCITONIN SERPL IA-MCNC: 0.74 NG/ML
RBC # BLD AUTO: 3.93 10E6/UL (ref 3.8–5.2)
RBC URINE: 0 /HPF
RSV RNA SPEC NAA+PROBE: NEGATIVE
SARS-COV-2 RNA RESP QL NAA+PROBE: NEGATIVE
SODIUM SERPL-SCNC: 135 MMOL/L (ref 135–145)
SP GR UR STRIP: 1.02 (ref 1–1.03)
SQUAMOUS EPITHELIAL: 1 /HPF
UROBILINOGEN UR STRIP-MCNC: 2 MG/DL
WBC # BLD AUTO: 9.3 10E3/UL (ref 4–11)
WBC URINE: 9 /HPF

## 2025-05-06 PROCEDURE — 83735 ASSAY OF MAGNESIUM: CPT | Performed by: EMERGENCY MEDICINE

## 2025-05-06 PROCEDURE — 74177 CT ABD & PELVIS W/CONTRAST: CPT

## 2025-05-06 PROCEDURE — 96365 THER/PROPH/DIAG IV INF INIT: CPT | Mod: 59

## 2025-05-06 PROCEDURE — 71275 CT ANGIOGRAPHY CHEST: CPT

## 2025-05-06 PROCEDURE — 99207 PR APP CREDIT; MD BILLING SHARED VISIT: CPT | Mod: FS

## 2025-05-06 PROCEDURE — 99223 1ST HOSP IP/OBS HIGH 75: CPT | Mod: FS | Performed by: STUDENT IN AN ORGANIZED HEALTH CARE EDUCATION/TRAINING PROGRAM

## 2025-05-06 PROCEDURE — 96375 TX/PRO/DX INJ NEW DRUG ADDON: CPT

## 2025-05-06 PROCEDURE — 84145 PROCALCITONIN (PCT): CPT | Performed by: EMERGENCY MEDICINE

## 2025-05-06 PROCEDURE — 85004 AUTOMATED DIFF WBC COUNT: CPT | Performed by: EMERGENCY MEDICINE

## 2025-05-06 PROCEDURE — 258N000003 HC RX IP 258 OP 636

## 2025-05-06 PROCEDURE — 81003 URINALYSIS AUTO W/O SCOPE: CPT | Performed by: EMERGENCY MEDICINE

## 2025-05-06 PROCEDURE — 258N000003 HC RX IP 258 OP 636: Performed by: EMERGENCY MEDICINE

## 2025-05-06 PROCEDURE — 87040 BLOOD CULTURE FOR BACTERIA: CPT | Performed by: EMERGENCY MEDICINE

## 2025-05-06 PROCEDURE — 250N000013 HC RX MED GY IP 250 OP 250 PS 637

## 2025-05-06 PROCEDURE — 83605 ASSAY OF LACTIC ACID: CPT | Performed by: EMERGENCY MEDICINE

## 2025-05-06 PROCEDURE — 36415 COLL VENOUS BLD VENIPUNCTURE: CPT | Performed by: EMERGENCY MEDICINE

## 2025-05-06 PROCEDURE — 87086 URINE CULTURE/COLONY COUNT: CPT

## 2025-05-06 PROCEDURE — 80048 BASIC METABOLIC PNL TOTAL CA: CPT | Performed by: EMERGENCY MEDICINE

## 2025-05-06 PROCEDURE — 250N000011 HC RX IP 250 OP 636: Performed by: EMERGENCY MEDICINE

## 2025-05-06 PROCEDURE — 99291 CRITICAL CARE FIRST HOUR: CPT | Mod: 25

## 2025-05-06 PROCEDURE — 96367 TX/PROPH/DG ADDL SEQ IV INF: CPT

## 2025-05-06 PROCEDURE — 87637 SARSCOV2&INF A&B&RSV AMP PRB: CPT | Performed by: EMERGENCY MEDICINE

## 2025-05-06 PROCEDURE — 96361 HYDRATE IV INFUSION ADD-ON: CPT

## 2025-05-06 PROCEDURE — 120N000001 HC R&B MED SURG/OB

## 2025-05-06 PROCEDURE — 93005 ELECTROCARDIOGRAM TRACING: CPT | Performed by: EMERGENCY MEDICINE

## 2025-05-06 PROCEDURE — 250N000013 HC RX MED GY IP 250 OP 250 PS 637: Performed by: EMERGENCY MEDICINE

## 2025-05-06 RX ORDER — ONDANSETRON 4 MG/1
4 TABLET, ORALLY DISINTEGRATING ORAL EVERY 6 HOURS PRN
Status: DISCONTINUED | OUTPATIENT
Start: 2025-05-06 | End: 2025-05-07

## 2025-05-06 RX ORDER — IOPAMIDOL 755 MG/ML
75 INJECTION, SOLUTION INTRAVASCULAR ONCE
Status: COMPLETED | OUTPATIENT
Start: 2025-05-06 | End: 2025-05-06

## 2025-05-06 RX ORDER — PROCHLORPERAZINE MALEATE 10 MG
10 TABLET ORAL EVERY 6 HOURS PRN
COMMUNITY
Start: 2025-04-22 | End: 2025-05-22

## 2025-05-06 RX ORDER — PROCHLORPERAZINE MALEATE 10 MG
10 TABLET ORAL EVERY 8 HOURS PRN
Status: DISCONTINUED | OUTPATIENT
Start: 2025-05-06 | End: 2025-05-12 | Stop reason: HOSPADM

## 2025-05-06 RX ORDER — AMLODIPINE BESYLATE 5 MG/1
5 TABLET ORAL DAILY
Status: DISCONTINUED | OUTPATIENT
Start: 2025-05-07 | End: 2025-05-12 | Stop reason: HOSPADM

## 2025-05-06 RX ORDER — CEFEPIME HYDROCHLORIDE 2 G/1
2 INJECTION, POWDER, FOR SOLUTION INTRAVENOUS ONCE
Status: COMPLETED | OUTPATIENT
Start: 2025-05-06 | End: 2025-05-06

## 2025-05-06 RX ORDER — AMOXICILLIN 250 MG
2 CAPSULE ORAL 2 TIMES DAILY PRN
Status: DISCONTINUED | OUTPATIENT
Start: 2025-05-06 | End: 2025-05-12 | Stop reason: HOSPADM

## 2025-05-06 RX ORDER — ONDANSETRON 2 MG/ML
4 INJECTION INTRAMUSCULAR; INTRAVENOUS EVERY 6 HOURS PRN
Status: DISCONTINUED | OUTPATIENT
Start: 2025-05-06 | End: 2025-05-07

## 2025-05-06 RX ORDER — GRANISETRON HYDROCHLORIDE 1 MG/1
1 TABLET, FILM COATED ORAL EVERY 12 HOURS PRN
COMMUNITY
Start: 2025-04-22 | End: 2025-08-20

## 2025-05-06 RX ORDER — ACETAMINOPHEN 325 MG/1
975 TABLET ORAL ONCE
Status: COMPLETED | OUTPATIENT
Start: 2025-05-06 | End: 2025-05-06

## 2025-05-06 RX ORDER — CEFEPIME HYDROCHLORIDE 2 G/1
2 INJECTION, POWDER, FOR SOLUTION INTRAVENOUS EVERY 12 HOURS
Status: DISCONTINUED | OUTPATIENT
Start: 2025-05-07 | End: 2025-05-09

## 2025-05-06 RX ORDER — SODIUM CHLORIDE 9 MG/ML
INJECTION, SOLUTION INTRAVENOUS CONTINUOUS
Status: ACTIVE | OUTPATIENT
Start: 2025-05-06 | End: 2025-05-07

## 2025-05-06 RX ORDER — CALCIUM CARBONATE 500 MG/1
1000 TABLET, CHEWABLE ORAL 4 TIMES DAILY PRN
Status: DISCONTINUED | OUTPATIENT
Start: 2025-05-06 | End: 2025-05-12 | Stop reason: HOSPADM

## 2025-05-06 RX ORDER — PREDNISOLONE 5 MG/1
2.5 TABLET ORAL EVERY MORNING
COMMUNITY
End: 2025-05-18

## 2025-05-06 RX ORDER — ACETAMINOPHEN 325 MG/1
650 TABLET ORAL EVERY 4 HOURS PRN
Status: DISCONTINUED | OUTPATIENT
Start: 2025-05-06 | End: 2025-05-12 | Stop reason: HOSPADM

## 2025-05-06 RX ORDER — ONDANSETRON 2 MG/ML
4 INJECTION INTRAMUSCULAR; INTRAVENOUS ONCE
Status: COMPLETED | OUTPATIENT
Start: 2025-05-06 | End: 2025-05-06

## 2025-05-06 RX ORDER — ACETAMINOPHEN 325 MG/1
975 TABLET ORAL ONCE
Status: DISCONTINUED | OUTPATIENT
Start: 2025-05-06 | End: 2025-05-06

## 2025-05-06 RX ORDER — AMOXICILLIN 250 MG
1 CAPSULE ORAL 2 TIMES DAILY PRN
Status: DISCONTINUED | OUTPATIENT
Start: 2025-05-06 | End: 2025-05-12 | Stop reason: HOSPADM

## 2025-05-06 RX ORDER — DIPHENOXYLATE HYDROCHLORIDE AND ATROPINE SULFATE 2.5; .025 MG/1; MG/1
2 TABLET ORAL 4 TIMES DAILY PRN
COMMUNITY
Start: 2025-04-22 | End: 2025-06-11

## 2025-05-06 RX ORDER — ACETAMINOPHEN 650 MG/1
650 SUPPOSITORY RECTAL EVERY 4 HOURS PRN
Status: DISCONTINUED | OUTPATIENT
Start: 2025-05-06 | End: 2025-05-12 | Stop reason: HOSPADM

## 2025-05-06 RX ORDER — LIDOCAINE 40 MG/G
CREAM TOPICAL
Status: DISCONTINUED | OUTPATIENT
Start: 2025-05-06 | End: 2025-05-12 | Stop reason: HOSPADM

## 2025-05-06 RX ADMIN — SODIUM CHLORIDE: 0.9 INJECTION, SOLUTION INTRAVENOUS at 21:48

## 2025-05-06 RX ADMIN — IOPAMIDOL 75 ML: 755 INJECTION, SOLUTION INTRAVENOUS at 16:21

## 2025-05-06 RX ADMIN — ACETAMINOPHEN 975 MG: 325 TABLET ORAL at 14:54

## 2025-05-06 RX ADMIN — ONDANSETRON 4 MG: 2 INJECTION, SOLUTION INTRAMUSCULAR; INTRAVENOUS at 14:55

## 2025-05-06 RX ADMIN — ACETAMINOPHEN 650 MG: 325 TABLET ORAL at 22:55

## 2025-05-06 RX ADMIN — SODIUM CHLORIDE 1250 MG: 0.9 INJECTION, SOLUTION INTRAVENOUS at 15:56

## 2025-05-06 RX ADMIN — SODIUM CHLORIDE 1773 ML: 0.9 INJECTION, SOLUTION INTRAVENOUS at 15:08

## 2025-05-06 RX ADMIN — CEFEPIME HYDROCHLORIDE 2 G: 2 INJECTION, POWDER, FOR SOLUTION INTRAVENOUS at 15:11

## 2025-05-06 ASSESSMENT — ACTIVITIES OF DAILY LIVING (ADL)
ADLS_ACUITY_SCORE: 55

## 2025-05-06 NOTE — ED NOTES
Bed: JNED-22  Expected date:   Expected time:   Means of arrival:   Comments:  ADALBERTO HATFIELD when boarder

## 2025-05-06 NOTE — ED PROVIDER NOTES
EMERGENCY DEPARTMENT ENCOUNTER      NAME: Inez Rodriguez  AGE: 74 year old female  YOB: 1950  MRN: [id number]  EVALUATION DATE & TIME: No admission date for patient encounter.    PCP: Lynn Bowles    ED PROVIDER: Kiara Russo M.D.      Chief Complaint   Patient presents with    Fatigue    Fever         FINAL IMPRESSION:  1. Sepsis, due to unspecified organism, unspecified whether acute organ dysfunction present (H)    2. Immunocompromised    3. Acute respiratory failure with hypoxia (H)    4. YAMILKA (acute kidney injury)          ED COURSE & MEDICAL DECISION MAKING:    ED Course as of 05/06/25 1750   Tue May 06, 2025   1425 Pt tachycardic and febrile with some mucous and cough with known lung CA on oral daily chemotherapy and last chemotherapy infusion a month ago, previously followed by [facility]/onc here and now following with [facility], with known metastatic disease to bone and brain, stat cultures and CBC pendign with procalcitonin and lactic aicd, and with some nausea/vomiting recently, zofran pendign with CT chest with runoff to abdomen. Antibiotics cefepime and vancomycin with 30cc/kg IVF bolus ordered with tylenol for fever, pending UA, and during boarding crisis patient was seen in the waiting room, level 2, charge RN aware of patient in the waiting area, viral PCR pending. Supplemental O2 underway, and will dispo pending imaging, hospital at home team updated re: patient in the ED.    1428 Hospital at home and pharmacist reviewing patient's case now   1452 Per hospital at home, patient does not have the correct insurance for placement into hospital at home   1555 Creatinine 1.4 and one month ago was 0.7 thus mild YAMILKA with dehydration likely. CBC with WBC 9.3 reassuringly, lactic acid nromal. Viral PCR negative, awaiting imaging and UA   1643 UA with 9 WBC with no LE and nitrite, possible contaminated sample, pending urine culture but covered with abx in case UTI is source. Viral PCR negative,  BP stable, HR downtrending with IVF, awaiting imaging results, and during boarding crisis, SOC transfer order placed   1646 CT abdomen negative for acute pathology   1654 CT chest without acute anomalies   1706 I spoke with Shyam from patient placement and he will seek med surg bed availability   1724 Per SOC, there is no bed available at [facility]. Hospitalist here paged for admission, patient and son updated and ok with plan.   1748 Pt endorsed to hospitalist to med surg   2:10 PM I met patient and performed my initial exam.   Pertinent Labs & Imaging studies reviewed. (See chart for details)    Medical Decision Making      Admit.    MIPS (CTPE, Dental pain, Ansari, Sinusitis, Asthma/COPD, Head Trauma): Not Applicable    SEPSIS: The patient has signs of sepsis   Sepsis ED evaluation   The patient has signs of sepsis as evidenced by:  1. Presence of 2 SIRS criteria, suspected infection, AND  2. Organ dysfunction: Sepsis work up in progress. Will continue to monitor for signs of organ dysfunction    Sepsis Care Initiation: Starting at 5:50 PM  on 05/06/25, until specified. Prior to this documentation, sepsis, severe sepsis, or septic shock was NOT thought to be a significant cause of illness. This order represents the first time infection was seriously considered to be affecting the patient.    Lactic Acid Results:  Recent Labs   Lab Test 05/06/25  1447 08/24/24  1434   LACT 0.8 0.8       3 Hour Bundle 6 Hour Bundle (Reassessment)   Blood Cultures before IV Antibiotics: Yes  Antibiotics given: see below  Prehospital fluid volume (mL):                     Total fluids given (ED +Pre-hospital):  Full 30 mL/kg bolus given based on weight: 1,770 mL   Repeat Lactic Acid Level: Ordered by reflex for 2 hours after initial lactic acid collection.  Vasopressors: MAP>65 after initial IVF bolus, will continue to monitor fluid status and vital signs.  Repeat perfusion exam: I attest to having performed a repeat sepsis exam and  assessment of perfusion at 5:50 PM .   BMI Readings from Last 1 Encounters:   03/27/25 21.67 kg/m        Anti-infectives (From admission through now)      Start     Dose/Rate Route Frequency Ordered Stop    05/06/25 1430  ceFEPIme (MAXIPIME) 2 g vial to attach to  mL bag for ADULTS or NS 50 mL bag for PEDS         2 g  over 30 Minutes Intravenous ONCE 05/06/25 1404 05/06/25 1541    05/06/25 1430  vancomycin (VANCOCIN) 1,250 mg in 0.9% NaCl 262.5 mL intermittent infusion         1,250 mg  over 90 Minutes Intravenous ONCE 05/06/25 1425 05/06/25 1726                Critical Care time was 45 minutes for this patient excluding procedures.      At the conclusion of the encounter I discussed the results of all of the tests and the disposition. The questions were answered. The patient or family acknowledged understanding and was agreeable with the care plan.     MEDICATIONS GIVEN IN THE EMERGENCY:  Medications   sodium chloride 0.9% BOLUS 1,773 mL (1,773 mLs Intravenous $New Bag 5/6/25 1508)   ceFEPIme (MAXIPIME) 2 g vial to attach to  mL bag for ADULTS or NS 50 mL bag for PEDS (0 g Intravenous Stopped 5/6/25 1541)   acetaminophen (TYLENOL) tablet 975 mg (975 mg Oral $Given 5/6/25 1454)   vancomycin (VANCOCIN) 1,250 mg in 0.9% NaCl 262.5 mL intermittent infusion ( Intravenous Restarted 5/6/25 1652)   ondansetron (ZOFRAN) injection 4 mg (4 mg Intravenous $Given 5/6/25 1455)   iopamidol (ISOVUE-370) solution 75 mL (75 mLs Intravenous $Given 5/6/25 1621)       NEW PRESCRIPTIONS STARTED AT TODAY'S ER VISIT  New Prescriptions    No medications on file          =================================================================    HPI      Inez Rodriguez is a 74 year old female with PMHx of HTN, metastatic cancer of the right lower lobe, thyroid nodule and pulmonary nodule who presents to the ED today via walk-in for fatigue and fever.     Per chart review, patient was seen at [facility] on 05/05/25 for symptoms  "assessment.     Per son, patient has history of metastatic lung cancer and started having fevers today. Son took patients temperature and the first reading was at 109F. The second reading was at 104 F. He then called her oncologist and was told to bring her into the ED. Son report patient has not bee able to \"keep anything down for the past 3-4 days\". She also  endorses nausea, vomiting, and diarrhea. Patient is not able to hold fluid down. She also had difficulty coughing up phlegm last night, but was able to cough up \"gunk\" this morning.     Patient is on chemotherapy pills. Her last chemo infusion was 4 weeks ago. She follows with [facility] for her cancer, and her oncology team has been concerned about her intake. No medications were taken today for the fever. No new blood clots or swelling. Patient does not smoke . No other complaints at this time.    REVIEW OF SYSTEMS   All other systems reviewed and are negative except as noted above in HPI.    PAST MEDICAL HISTORY:  Past Medical History:   Diagnosis Date    Hypertension     SOB (shortness of breath)        PAST SURGICAL HISTORY:  Past Surgical History:   Procedure Laterality Date    BRONCHOSCOPY RIGID OR FLEXIBLE W/TRANSENDOSCOPIC ENDOBRONCHIAL ULTRASOUND GUIDED N/A 1/8/2024    Procedure: BRONCHOSCOPY, WITH ENDOBRONCHIAL ULTRASOUND WITH FINE NEEDLE ASPIRATION OF LYMPH NODE;  Surgeon: Angelia Mustafa MD;  Location: [facility] OR     CHEST PORT PLACEMENT > 5 YRS OF AGE  2/12/2024    TONSILLECTOMY      at 8 years of age       CURRENT MEDICATIONS:    adagrasib (KRAZATI) 200 MG tablet  amLODIPine (NORVASC) 5 MG tablet  diphenoxylate-atropine (LOMOTIL) 2.5-0.025 MG tablet  folic acid (FOLVITE) 1 MG tablet  granisetron (KYTRIL) 1 MG tablet  Mag Aspart-Potassium Aspart (POTASSIUM & MAGNESIUM ASPARTAT PO)  Methylsulfonylmethane (MSM) 1000 MG TABS  multivitamin w/minerals (THERA-VIT-M) tablet  prednisoLONE 5 MG tablet  prochlorperazine (COMPAZINE) 10 MG " tablet  Vitamin D3 (CHOLECALCIFEROL) 125 MCG (5000 UT) tablet        ALLERGIES:  No Known Allergies    FAMILY HISTORY:  No family history on file.    SOCIAL HISTORY:   Social History     Socioeconomic History    Marital status:    Tobacco Use    Smoking status: Never    Smokeless tobacco: Never   Substance and Sexual Activity    Alcohol use: Never     Social Drivers of Health     Financial Resource Strain: Low Risk  (1/11/2024)    Financial Resource Strain     Within the past 12 months, have you or your family members you live with been unable to get utilities (heat, electricity) when it was really needed?: No   Food Insecurity: Low Risk  (1/11/2024)    Food Insecurity     Within the past 12 months, did you worry that your food would run out before you got money to buy more?: No     Within the past 12 months, did the food you bought just not last and you didn t have money to get more?: No   Transportation Needs: Low Risk  (1/11/2024)    Transportation Needs     Within the past 12 months, has lack of transportation kept you from medical appointments, getting your medicines, non-medical meetings or appointments, work, or from getting things that you need?: No   Interpersonal Safety: Low Risk  (1/11/2024)    Interpersonal Safety     Do you feel physically and emotionally safe where you currently live?: Yes     Within the past 12 months, have you been hit, slapped, kicked or otherwise physically hurt by someone?: No     Within the past 12 months, have you been humiliated or emotionally abused in other ways by your partner or ex-partner?: No   Housing Stability: Low Risk  (1/11/2024)    Housing Stability     Do you have housing? : Yes     Are you worried about losing your housing?: No       VITALS:  Patient Vitals for the past 24 hrs:   BP Temp Temp src Pulse Resp SpO2   05/06/25 1604 110/54 -- -- -- -- --   05/06/25 1601 -- -- -- 109 -- 95 %   05/06/25 1344 131/63 101.1  F (38.4  C) Oral (!) 126 23 (!) 87 %        PHYSICAL EXAM    GENERAL: Awake, alert.  In no acute distress.   HEENT: Normocephalic, atraumatic.  Pupils equal, round and reactive.  Conjunctiva normal.  EOMI.  NECK: No stridor or apparent deformity.  PULMONARY: Symmetrical breath sounds without distress.  Lungs clear to auscultation bilaterally without wheezes, rhonchi or rales.  CARDIO: Rapid regular rhythm.  No significant murmur, rub or gallop.  Radial pulses strong and symmetrical.  ABDOMINAL: Abdomen soft, non-distended and non-tender to palpation.  No CVAT, no palpable hepatosplenomegaly.  EXTREMITIES: No lower extremity swelling or edema.    NEURO: Alert and oriented to person, place and time.  Cranial nerves grossly intact.  No focal motor deficit.  PSYCH: Normal mood and affect  SKIN: No rashes. Left anterior chest power port present     LAB:  All pertinent labs reviewed and interpreted.  Results for orders placed or performed during the hospital encounter of 05/06/25   CT Chest Pulmonary Embolism w Contrast    Impression    IMPRESSION:  1.  No pulmonary embolism.  2.  Unchanged right perihilar soft tissue and nonenlarged mediastinal lymph nodes, which were FDG avid on the most recent PET/CT.     CT Abdomen Pelvis w Contrast    Impression    IMPRESSION:   1.  No acute abnormality identified.  2.  Mildly increased distention of the pancreas duct but no specific etiology can be seen. If relevant, this could be further evaluated with nonemergent MRCP/MRI.  3.  Colonic diverticulosis.   Basic metabolic panel   Result Value Ref Range    Sodium 135 135 - 145 mmol/L    Potassium 3.6 3.4 - 5.3 mmol/L    Chloride 94 (L) 98 - 107 mmol/L    Carbon Dioxide (CO2) 26 22 - 29 mmol/L    Anion Gap 15 7 - 15 mmol/L    Urea Nitrogen 26.4 (H) 8.0 - 23.0 mg/dL    Creatinine 1.40 (H) 0.51 - 0.95 mg/dL    GFR Estimate 39 (L) >60 mL/min/1.73m2    Calcium 9.2 8.8 - 10.4 mg/dL    Glucose 117 (H) 70 - 99 mg/dL   Lactic acid whole blood with 1x repeat in 2 hr when >2    Result Value Ref Range    Lactic Acid, Initial 0.8 0.7 - 2.0 mmol/L   Result Value Ref Range    Procalcitonin 0.74 (H) <0.50 ng/mL   Result Value Ref Range    Magnesium 1.7 1.7 - 2.3 mg/dL   UA with Microscopic reflex to Culture    Specimen: Urine, NOS   Result Value Ref Range    Color Urine Yellow Colorless, Straw, Light Yellow, Yellow    Appearance Urine Cloudy (A) Clear    Glucose Urine Negative Negative mg/dL    Bilirubin Urine 1.0 mg/dL (A) Negative    Ketones Urine 10 (A) Negative mg/dL    Specific Gravity Urine 1.022 1.001 - 1.030    Blood Urine Negative Negative    pH Urine 5.5 5.0 - 7.0    Protein Albumin Urine 100 (A) Negative mg/dL    Urobilinogen Urine 2.0 (A) Normal mg/dL    Nitrite Urine Negative Negative    Leukocyte Esterase Urine Negative Negative    Amorphous Crystals Urine Moderate (A) None Seen /HPF    RBC Urine 0 <=2 /HPF    WBC Urine 9 (H) <=5 /HPF    Squamous Epithelials Urine 1 <=1 /HPF   Influenza A/B, RSV and SARS-CoV2 PCR (COVID-19) Nose    Specimen: Nose; Swab   Result Value Ref Range    Influenza A PCR Negative Negative    Influenza B PCR Negative Negative    RSV PCR Negative Negative    SARS CoV2 PCR Negative Negative   CBC with platelets and differential   Result Value Ref Range    WBC Count 9.3 4.0 - 11.0 10e3/uL    RBC Count 3.93 3.80 - 5.20 10e6/uL    Hemoglobin 12.3 11.7 - 15.7 g/dL    Hematocrit 37.3 35.0 - 47.0 %    MCV 95 78 - 100 fL    MCH 31.3 26.5 - 33.0 pg    MCHC 33.0 31.5 - 36.5 g/dL    RDW 13.2 10.0 - 15.0 %    Platelet Count 207 150 - 450 10e3/uL    % Neutrophils 84 %    % Lymphocytes 6 %    % Monocytes 8 %    % Eosinophils 1 %    % Basophils 0 %    % Immature Granulocytes 1 %    NRBCs per 100 WBC 0 <1 /100    Absolute Neutrophils 7.9 1.6 - 8.3 10e3/uL    Absolute Lymphocytes 0.5 (L) 0.8 - 5.3 10e3/uL    Absolute Monocytes 0.8 0.0 - 1.3 10e3/uL    Absolute Eosinophils 0.1 0.0 - 0.7 10e3/uL    Absolute Basophils 0.0 0.0 - 0.2 10e3/uL    Absolute Immature Granulocytes  0.1 <=0.4 10e3/uL    Absolute NRBCs 0.0 10e3/uL       RADIOLOGY:  Reviewed all pertinent imaging. Please see official radiology report.  CT Abdomen Pelvis w Contrast   Final Result   IMPRESSION:    1.  No acute abnormality identified.   2.  Mildly increased distention of the pancreas duct but no specific etiology can be seen. If relevant, this could be further evaluated with nonemergent MRCP/MRI.   3.  Colonic diverticulosis.      CT Chest Pulmonary Embolism w Contrast   Final Result   IMPRESSION:   1.  No pulmonary embolism.   2.  Unchanged right perihilar soft tissue and nonenlarged mediastinal lymph nodes, which were FDG avid on the most recent PET/CT.               EKG:    Reviewed and interpreted as:   Performed at: 6-May-2025 14:07   Impression: Sinus tachycardia  Rate: 125 bpm  Rhythm: Sinus tachycardia  Axis: 38  NC Interval: 166 ms  QRS Interval: 88 ms  QTc Interval: 404 ms  ST Changes: No ST abnormalities.   Comparison: When compared to EKG of 24-Aug-2024 at 14:20, EKG is similar.     I have independently reviewed and interpreted the EKG(s) documented above.        I have independently reviewed and interpreted the EKG(s) documented above.        I, Molly Barnes, am serving as a scribe to document services personally performed by Dr. Kiara Russo based on my observation and the provider's statements to me. I, Kiara Russo MD attest that Molly Barnes is acting in a scribe capacity, has observed my performance of the services and has documented them in accordance with my direction.      Kiara Russo MD  05/06/25 7009

## 2025-05-06 NOTE — PHARMACY-VANCOMYCIN DOSING SERVICE
Pharmacy Vancomycin Initial Note  Date of Service May 6, 2025  Patient's  1950  74 year old, female    Indication: Febrile Neutropenia and Sepsis    Current estimated CrCl = Estimated Creatinine Clearance: 59.8 mL/min (based on SCr of 0.77 mg/dL).    Creatinine for last 3 days  No results found for requested labs within last 3 days.    Recent Vancomycin Level(s) for last 3 days  No results found for requested labs within last 3 days.      Vancomycin IV Administrations (past 72 hours)        No vancomycin orders with administrations in past 72 hours.                    Nephrotoxins and other renal medications (From now, onward)      Start     Dose/Rate Route Frequency Ordered Stop    25 1430  vancomycin (VANCOCIN) 1,250 mg in 0.9% NaCl 262.5 mL intermittent infusion         1,250 mg  over 90 Minutes Intravenous ONCE 25 1425              Contrast Orders - past 72 hours (72h ago, onward)      None                    Plan:  Start vancomycin  1250 mg IV once  If continuing inpatient, please reorder pharmacy to dose vancomycin consult    Tutu Miller, [facility]

## 2025-05-06 NOTE — ED TRIAGE NOTES
Patient with history of metastatic lung cancer. 1 week ago had her oral chemo regimen changed. Since then patient has been weak and fatigued with vomiting and diarrhea. Today she had a fever of 104 at home. In triage temp 101.1, tachycardic 120s, hypoxic at 87%. NC applied and titrated to 2 LPM with sats improved to 95%     Triage Assessment (Adult)       Row Name 05/06/25 1346          Triage Assessment    Airway WDL WDL        Respiratory WDL    Respiratory WDL X;rhythm/pattern     Rhythm/Pattern, Respiratory tachypneic        Skin Circulation/Temperature WDL    Skin Circulation/Temperature WDL X;temperature     Skin Temperature warm        Cardiac WDL    Cardiac WDL X;rhythm     Pulse Rate & Regularity tachycardic        Cognitive/Neuro/Behavioral WDL    Cognitive/Neuro/Behavioral WDL WDL

## 2025-05-06 NOTE — MEDICATION SCRIBE - ADMISSION MEDICATION HISTORY
Medication Scribe Admission Medication History    Admission medication history is complete. The information provided in this note is only as accurate as the sources available at the time of the update.    Information Source(s): Patient, Family member, and Prescription bottles via in-person    Pertinent Information: Patient's medications are being co managed by patient and son, Kenneth. Kenneth brougt with patient's medication bottles to be examined.     Krazati: Medication is with son. Ready to be relabeled by pharmacy if needed.     Patient reported to be no longer taking: Albuterol, Beta Glucan, Decadron, Valium, Fluconazole, Niacin, Emla, Malic Acid, Omeprazole, Zofran     Changes made to PTA medication list:  Added: Krazati, Lomotil, Kytril, Compazine   Deleted: Albuterol, Beta Glucan, Decadron, Valium, Fluconazole, Niacin, Emla, Malic Acid, Omeprazole, Zofran   Changed: Prednisone from 5 to 2.5 (per son)    Allergies reviewed with patient and updates made in EHR: yes    Medication History Completed By: Jesus Lancaster 5/6/2025 4:09 PM    PTA Med List   Medication Sig Last Dose/Taking    adagrasib (KRAZATI) 200 MG tablet Take 600 mg by mouth 2 times daily. Swallow whole; do not chew, crush or split tablets. Take with or without food. 5/6/2025 Morning    amLODIPine (NORVASC) 5 MG tablet Take 1 tablet (5 mg) by mouth daily. NEED TO BE SEEN IN PRIMARY CARE FOR ANY FURTHER FILLS. 5/6/2025 Morning    diphenoxylate-atropine (LOMOTIL) 2.5-0.025 MG tablet Take 2 tablets by mouth 4 times daily as needed. Taking As Needed    folic acid (FOLVITE) 1 MG tablet Take 1 tablet (1,000 mcg) by mouth daily. 5/6/2025 Morning    granisetron (KYTRIL) 1 MG tablet Take 1 mg by mouth every 12 hours as needed. Taking As Needed    Mag Aspart-Potassium Aspart (POTASSIUM & MAGNESIUM ASPARTAT PO) Take 2 capsules by mouth daily 5/6/2025 Morning    Methylsulfonylmethane (MSM) 1000 MG TABS Take 2 tablets by mouth daily. 5/6/2025 Morning     multivitamin w/minerals (THERA-VIT-M) tablet Take 1 tablet by mouth daily 5/6/2025 Morning    prednisoLONE 5 MG tablet Take 2.5 mg by mouth every morning. 5/6/2025 Morning    prochlorperazine (COMPAZINE) 10 MG tablet Take 10 mg by mouth every 6 hours as needed. Taking As Needed    Vitamin D3 (CHOLECALCIFEROL) 125 MCG (5000 UT) tablet Take 125 mcg by mouth daily 5/6/2025 Morning

## 2025-05-07 ENCOUNTER — APPOINTMENT (OUTPATIENT)
Dept: CT IMAGING | Facility: HOSPITAL | Age: 75
End: 2025-05-07
Attending: INTERNAL MEDICINE
Payer: COMMERCIAL

## 2025-05-07 ENCOUNTER — APPOINTMENT (OUTPATIENT)
Dept: CARDIOLOGY | Facility: HOSPITAL | Age: 75
End: 2025-05-07
Attending: INTERNAL MEDICINE
Payer: COMMERCIAL

## 2025-05-07 ENCOUNTER — PATIENT OUTREACH (OUTPATIENT)
Dept: GERIATRIC MEDICINE | Facility: CLINIC | Age: 75
End: 2025-05-07

## 2025-05-07 ENCOUNTER — APPOINTMENT (OUTPATIENT)
Dept: MRI IMAGING | Facility: HOSPITAL | Age: 75
End: 2025-05-07
Attending: INTERNAL MEDICINE
Payer: COMMERCIAL

## 2025-05-07 LAB
ADV 40+41 DNA STL QL NAA+NON-PROBE: NEGATIVE
ALBUMIN SERPL BCG-MCNC: 3.2 G/DL (ref 3.5–5.2)
ANION GAP SERPL CALCULATED.3IONS-SCNC: 13 MMOL/L (ref 7–15)
ASTRO TYP 1-8 RNA STL QL NAA+NON-PROBE: NEGATIVE
BACTERIA UR CULT: NORMAL
BUN SERPL-MCNC: 19.5 MG/DL (ref 8–23)
C CAYETANENSIS DNA STL QL NAA+NON-PROBE: NEGATIVE
C DIFF TOX B STL QL: NEGATIVE
CALCIUM SERPL-MCNC: 8.4 MG/DL (ref 8.8–10.4)
CAMPYLOBACTER DNA SPEC NAA+PROBE: NEGATIVE
CHLORIDE SERPL-SCNC: 98 MMOL/L (ref 98–107)
CHOLEST SERPL-MCNC: 201 MG/DL
CREAT SERPL-MCNC: 1.15 MG/DL (ref 0.51–0.95)
CRYPTOSP DNA STL QL NAA+NON-PROBE: NEGATIVE
E COLI O157 DNA STL QL NAA+NON-PROBE: NORMAL
E HISTOLYT DNA STL QL NAA+NON-PROBE: NEGATIVE
EAEC ASTA GENE ISLT QL NAA+PROBE: NEGATIVE
EC STX1+STX2 GENES STL QL NAA+NON-PROBE: NEGATIVE
EGFRCR SERPLBLD CKD-EPI 2021: 50 ML/MIN/1.73M2
EPEC EAE GENE STL QL NAA+NON-PROBE: NEGATIVE
ERYTHROCYTE [DISTWIDTH] IN BLOOD BY AUTOMATED COUNT: 13.8 % (ref 10–15)
EST. AVERAGE GLUCOSE BLD GHB EST-MCNC: 100 MG/DL
ETEC LTA+ST1A+ST1B TOX ST NAA+NON-PROBE: NEGATIVE
G LAMBLIA DNA STL QL NAA+NON-PROBE: NEGATIVE
GLUCOSE BLDC GLUCOMTR-MCNC: 76 MG/DL (ref 70–99)
GLUCOSE SERPL-MCNC: 80 MG/DL (ref 70–99)
HBA1C MFR BLD: 5.1 %
HCO3 SERPL-SCNC: 22 MMOL/L (ref 22–29)
HCT VFR BLD AUTO: 33.6 % (ref 35–47)
HDLC SERPL-MCNC: 20 MG/DL
HGB BLD-MCNC: 11.3 G/DL (ref 11.7–15.7)
LDLC SERPL CALC-MCNC: 118 MG/DL
LIPASE SERPL-CCNC: 127 U/L (ref 13–60)
MCH RBC QN AUTO: 32.5 PG (ref 26.5–33)
MCHC RBC AUTO-ENTMCNC: 33.6 G/DL (ref 31.5–36.5)
MCV RBC AUTO: 97 FL (ref 78–100)
NONHDLC SERPL-MCNC: 181 MG/DL
NOROVIRUS GI+II RNA STL QL NAA+NON-PROBE: NEGATIVE
P SHIGELLOIDES DNA STL QL NAA+NON-PROBE: NEGATIVE
PHOSPHATE SERPL-MCNC: 2.6 MG/DL (ref 2.5–4.5)
PLATELET # BLD AUTO: 170 10E3/UL (ref 150–450)
POTASSIUM SERPL-SCNC: 3.9 MMOL/L (ref 3.4–5.3)
RBC # BLD AUTO: 3.48 10E6/UL (ref 3.8–5.2)
RVA RNA STL QL NAA+NON-PROBE: NEGATIVE
SALMONELLA SP RPOD STL QL NAA+PROBE: NEGATIVE
SAPO I+II+IV+V RNA STL QL NAA+NON-PROBE: NEGATIVE
SHIGELLA SP+EIEC IPAH ST NAA+NON-PROBE: NEGATIVE
SODIUM SERPL-SCNC: 133 MMOL/L (ref 135–145)
TRIGL SERPL-MCNC: 316 MG/DL
V CHOLERAE DNA SPEC QL NAA+PROBE: NEGATIVE
VIBRIO DNA SPEC NAA+PROBE: NEGATIVE
WBC # BLD AUTO: 10 10E3/UL (ref 4–11)
Y ENTEROCOL DNA STL QL NAA+PROBE: NEGATIVE

## 2025-05-07 PROCEDURE — 250N000013 HC RX MED GY IP 250 OP 250 PS 637: Performed by: INTERNAL MEDICINE

## 2025-05-07 PROCEDURE — 99233 SBSQ HOSP IP/OBS HIGH 50: CPT | Performed by: INTERNAL MEDICINE

## 2025-05-07 PROCEDURE — 99255 IP/OBS CONSLTJ NEW/EST HI 80: CPT | Performed by: INTERNAL MEDICINE

## 2025-05-07 PROCEDURE — 250N000012 HC RX MED GY IP 250 OP 636 PS 637: Performed by: INTERNAL MEDICINE

## 2025-05-07 PROCEDURE — 82310 ASSAY OF CALCIUM: CPT | Performed by: INTERNAL MEDICINE

## 2025-05-07 PROCEDURE — 83690 ASSAY OF LIPASE: CPT | Performed by: INTERNAL MEDICINE

## 2025-05-07 PROCEDURE — 120N000001 HC R&B MED SURG/OB

## 2025-05-07 PROCEDURE — 255N000002 HC RX 255 OP 636: Performed by: INTERNAL MEDICINE

## 2025-05-07 PROCEDURE — 99254 IP/OBS CNSLTJ NEW/EST MOD 60: CPT | Performed by: STUDENT IN AN ORGANIZED HEALTH CARE EDUCATION/TRAINING PROGRAM

## 2025-05-07 PROCEDURE — 99223 1ST HOSP IP/OBS HIGH 75: CPT | Performed by: PSYCHIATRY & NEUROLOGY

## 2025-05-07 PROCEDURE — 93306 TTE W/DOPPLER COMPLETE: CPT | Mod: 26 | Performed by: INTERNAL MEDICINE

## 2025-05-07 PROCEDURE — 99449 NTRPROF PH1/NTRNET/EHR 31/>: CPT | Performed by: STUDENT IN AN ORGANIZED HEALTH CARE EDUCATION/TRAINING PROGRAM

## 2025-05-07 PROCEDURE — 250N000011 HC RX IP 250 OP 636: Performed by: INTERNAL MEDICINE

## 2025-05-07 PROCEDURE — 36415 COLL VENOUS BLD VENIPUNCTURE: CPT | Performed by: INTERNAL MEDICINE

## 2025-05-07 PROCEDURE — 99222 1ST HOSP IP/OBS MODERATE 55: CPT | Performed by: STUDENT IN AN ORGANIZED HEALTH CARE EDUCATION/TRAINING PROGRAM

## 2025-05-07 PROCEDURE — 87507 IADNA-DNA/RNA PROBE TQ 12-25: CPT

## 2025-05-07 PROCEDURE — 258N000003 HC RX IP 258 OP 636: Performed by: STUDENT IN AN ORGANIZED HEALTH CARE EDUCATION/TRAINING PROGRAM

## 2025-05-07 PROCEDURE — 250N000013 HC RX MED GY IP 250 OP 250 PS 637

## 2025-05-07 PROCEDURE — A9585 GADOBUTROL INJECTION: HCPCS | Performed by: INTERNAL MEDICINE

## 2025-05-07 PROCEDURE — 250N000011 HC RX IP 250 OP 636

## 2025-05-07 PROCEDURE — 250N000011 HC RX IP 250 OP 636: Performed by: STUDENT IN AN ORGANIZED HEALTH CARE EDUCATION/TRAINING PROGRAM

## 2025-05-07 PROCEDURE — 87493 C DIFF AMPLIFIED PROBE: CPT | Performed by: INTERNAL MEDICINE

## 2025-05-07 PROCEDURE — 93306 TTE W/DOPPLER COMPLETE: CPT

## 2025-05-07 PROCEDURE — 85018 HEMOGLOBIN: CPT

## 2025-05-07 PROCEDURE — 83036 HEMOGLOBIN GLYCOSYLATED A1C: CPT | Performed by: INTERNAL MEDICINE

## 2025-05-07 PROCEDURE — 36415 COLL VENOUS BLD VENIPUNCTURE: CPT

## 2025-05-07 PROCEDURE — 70553 MRI BRAIN STEM W/O & W/DYE: CPT

## 2025-05-07 PROCEDURE — 70450 CT HEAD/BRAIN W/O DYE: CPT

## 2025-05-07 PROCEDURE — 82465 ASSAY BLD/SERUM CHOLESTEROL: CPT | Performed by: INTERNAL MEDICINE

## 2025-05-07 PROCEDURE — 258N000003 HC RX IP 258 OP 636: Performed by: INTERNAL MEDICINE

## 2025-05-07 RX ORDER — PREDNISONE 2.5 MG/1
2.5 TABLET ORAL DAILY
Status: DISCONTINUED | OUTPATIENT
Start: 2025-05-07 | End: 2025-05-12 | Stop reason: HOSPADM

## 2025-05-07 RX ORDER — IBUPROFEN 200 MG
200 TABLET ORAL EVERY 6 HOURS PRN
Status: DISCONTINUED | OUTPATIENT
Start: 2025-05-07 | End: 2025-05-12 | Stop reason: HOSPADM

## 2025-05-07 RX ORDER — ONDANSETRON 4 MG/1
4 TABLET, ORALLY DISINTEGRATING ORAL 3 TIMES DAILY
Status: DISCONTINUED | OUTPATIENT
Start: 2025-05-07 | End: 2025-05-12 | Stop reason: HOSPADM

## 2025-05-07 RX ORDER — GADOBUTROL 604.72 MG/ML
6 INJECTION INTRAVENOUS ONCE
Status: COMPLETED | OUTPATIENT
Start: 2025-05-07 | End: 2025-05-07

## 2025-05-07 RX ORDER — IBUPROFEN 200 MG
200 TABLET ORAL EVERY 6 HOURS PRN
Status: DISCONTINUED | OUTPATIENT
Start: 2025-05-07 | End: 2025-05-07

## 2025-05-07 RX ORDER — SODIUM CHLORIDE 9 MG/ML
INJECTION, SOLUTION INTRAVENOUS CONTINUOUS
Status: DISCONTINUED | OUTPATIENT
Start: 2025-05-07 | End: 2025-05-08

## 2025-05-07 RX ORDER — LIDOCAINE 40 MG/G
CREAM TOPICAL
Status: DISCONTINUED | OUTPATIENT
Start: 2025-05-07 | End: 2025-05-08

## 2025-05-07 RX ORDER — FOLIC ACID 1 MG/1
1000 TABLET ORAL DAILY
Status: DISCONTINUED | OUTPATIENT
Start: 2025-05-07 | End: 2025-05-12 | Stop reason: HOSPADM

## 2025-05-07 RX ADMIN — CEFEPIME HYDROCHLORIDE 2 G: 2 INJECTION, POWDER, FOR SOLUTION INTRAVENOUS at 15:29

## 2025-05-07 RX ADMIN — AMLODIPINE BESYLATE 5 MG: 5 TABLET ORAL at 09:16

## 2025-05-07 RX ADMIN — ONDANSETRON 4 MG: 2 INJECTION, SOLUTION INTRAMUSCULAR; INTRAVENOUS at 09:13

## 2025-05-07 RX ADMIN — DIPHENHYDRAMINE HYDROCHLORIDE: 25 CAPSULE ORAL at 20:40

## 2025-05-07 RX ADMIN — PANTOPRAZOLE SODIUM 40 MG: 40 INJECTION, POWDER, FOR SOLUTION INTRAVENOUS at 11:13

## 2025-05-07 RX ADMIN — SODIUM CHLORIDE 1250 MG: 0.9 INJECTION, SOLUTION INTRAVENOUS at 16:56

## 2025-05-07 RX ADMIN — PREDNISONE 2.5 MG: 2.5 TABLET ORAL at 11:09

## 2025-05-07 RX ADMIN — CEFEPIME HYDROCHLORIDE 2 G: 2 INJECTION, POWDER, FOR SOLUTION INTRAVENOUS at 03:16

## 2025-05-07 RX ADMIN — ONDANSETRON 4 MG: 4 TABLET, ORALLY DISINTEGRATING ORAL at 20:10

## 2025-05-07 RX ADMIN — ACETAMINOPHEN 650 MG: 325 TABLET ORAL at 09:16

## 2025-05-07 RX ADMIN — FOLIC ACID 1000 MCG: 1 TABLET ORAL at 11:12

## 2025-05-07 RX ADMIN — SODIUM CHLORIDE: 0.9 INJECTION, SOLUTION INTRAVENOUS at 11:06

## 2025-05-07 RX ADMIN — ONDANSETRON 4 MG: 4 TABLET, ORALLY DISINTEGRATING ORAL at 15:34

## 2025-05-07 RX ADMIN — GADOBUTROL 6 ML: 604.72 INJECTION INTRAVENOUS at 13:47

## 2025-05-07 ASSESSMENT — ACTIVITIES OF DAILY LIVING (ADL)
ADLS_ACUITY_SCORE: 64
ADLS_ACUITY_SCORE: 64
ADLS_ACUITY_SCORE: 40
ADLS_ACUITY_SCORE: 58
ADLS_ACUITY_SCORE: 63
ADLS_ACUITY_SCORE: 62
ADLS_ACUITY_SCORE: 58
ADLS_ACUITY_SCORE: 64
ADLS_ACUITY_SCORE: 58
ADLS_ACUITY_SCORE: 58
ADLS_ACUITY_SCORE: 64
ADLS_ACUITY_SCORE: 63
ADLS_ACUITY_SCORE: 63
ADLS_ACUITY_SCORE: 40
ADLS_ACUITY_SCORE: 62
ADLS_ACUITY_SCORE: 58
ADLS_ACUITY_SCORE: 40
ADLS_ACUITY_SCORE: 63
ADLS_ACUITY_SCORE: 63
ADLS_ACUITY_SCORE: 58
ADLS_ACUITY_SCORE: 64
ADLS_ACUITY_SCORE: 63
ADLS_ACUITY_SCORE: 63

## 2025-05-07 NOTE — H&P
[facility]    History and Physical - Hospitalist Service       Date of Admission:  5/6/2025    Assessment & Plan      Inez Rodriguez is a 74 year old female with PMHx of HTN, metastatic cancer of the right lower lobe, thyroid nodule and pulmonary nodule who presents for fatigue and fever. Patient has history of metastatic lung cancer and started having fevers today.  Max temp 104 at home.  She also endorses nausea, vomiting, and diarrhea.  States she has been coughing up phlegm.  Chest CT negative for pneumonia.  Blood cultures pending.  Oncology consult recommendations pending    # Sepsis  WBC 9.3, febrile  Heart rate tachycardic on admission  Patient complaining of fever chills and cough  Max temp 104 today at home  Chest CT result:No pulmonary embolism.  Unchanged right perihilar soft tissue and nonenlarged mediastinal lymph nodes, which were FDG avid on the most recent PET/CT.   Lactic acid 0.8  Normal saline fluid bolus given in ED  IV vancomycin  IV cefepime  Blood cultures pending  Normal saline 100 mL maintenance IV fluid  Respiratory viral panel negative  Procalcitonin 0.74  UA abnormal  Urine culture pending  Trend CBC in a.m.    # Immunocompromise  # Lung cancer  On oral daily daily chemotherapy  Last chemotherapy infusion 1 month ago  Oncology consult recommendations pending   following with [place] oncology outpatient  Resume home adagrasib    # Nausea and vomiting and diarrhea  Abdomen CT results:No acute abnormality identified.   Mildly increased distention of the pancreas duct but no specific etiology can be seen. If relevant, this could be further evaluated with nonemergent MRCP/MRI.   Colonic diverticulosis   Normal saline 100 mL/h  Compazine as needed for nausea  Zofran as needed for nausea  Stool cultures pending    # Acute respiratory failure with hypoxia  Patient does not use home oxygen  Patient sats 87% on room air on admission  O2 sats 95% on 2L  Chest CT results see  above  home oxygen eval assessment prior to discharge may be needed    # YAMILKA  Creatinine 1.4 on admission  Baseline creatinine approximately 0.7  IV fluid bolus given in ED  Normal saline 100-hour maintenance IV fluid  Trend BMP in a.m.    # Fatigue  # Generalized weakness  PT OT consult recommendations pending    # Hypertension  Monitor blood pressure stable at this time  Resume home amlodipine          Diet: Combination Diet Regular Diet Adult    DVT Prophylaxis: Pneumatic Compression Devices  Ansari Catheter: Not present  Lines: PRESENT      Port A Cath Single 02/12/24 Left Chest wall-Site Assessment: WDL      Cardiac Monitoring: ACTIVE order. Indication: Tachyarrhythmias, acute (48 hours)  Code Status: Full Code      Clinically Significant Risk Factors Present on Admission          # Hypochloremia: Lowest Cl = 94 mmol/L in last 2 days, will monitor as appropriate         # Acute Kidney Injury, unspecified: based on a >150% or 0.3 mg/dL increase in last creatinine compared to past 90 day average, will monitor renal function                       Disposition Plan     Medically Ready for Discharge: Anticipated in 2-4 Days         The patient's care was discussed with the Attending Physician, Dr. Del Toro .    Kristine Coleman NP  Hospitalist Service  [facility]  Securely message with [employer] (more info)  Text page via [facility] Paging/Directory     ______________________________________________________________________    Chief Complaint   Fatigue  Fever    History is obtained from the patient, family, and chart    History of Present Illness     Inez Rodriguez is a 74 year old female with PMHx of HTN, metastatic cancer of the right lower lobe, thyroid nodule and pulmonary nodule who presents for fatigue and fever. Patient has history of metastatic lung cancer and started having fevers today. Son took patients temperature and the first reading was at 109F. The second reading was at 104 F. He then  "called her oncologist and was told to bring her into the ED. Son report patient has not bee able to \"keep anything down for the past 3-4 days\". She also  endorses nausea, vomiting, and diarrhea. Patient is not able to hold fluid down. She also had difficulty coughing up phlegm last night, but was able to cough up \"gunk\" this morning. Patient is on chemotherapy pills. Her last chemo infusion was 4 weeks ago. She follows with [facility] for her cancer, and her oncology team has been concerned about her intake. No medications were taken today for the fever. No new blood clots or swelling. Patient does not smoke . No other complaints at this time.  Patient denies chest pain shortness of breath or dizzy.  Patient denies headache neck pain fever chills.  Patient states nausea is improved and denies abdominal pain.     Past Medical History    Past Medical History:   Diagnosis Date    Hypertension     SOB (shortness of breath)        Past Surgical History   Past Surgical History:   Procedure Laterality Date    BRONCHOSCOPY RIGID OR FLEXIBLE W/TRANSENDOSCOPIC ENDOBRONCHIAL ULTRASOUND GUIDED N/A 1/8/2024    Procedure: BRONCHOSCOPY, WITH ENDOBRONCHIAL ULTRASOUND WITH FINE NEEDLE ASPIRATION OF LYMPH NODE;  Surgeon: Angelia Mustafa MD;  Location: [facility] OR     CHEST PORT PLACEMENT > 5 YRS OF AGE  2/12/2024    TONSILLECTOMY      at 8 years of age       Prior to Admission Medications   Prior to Admission Medications   Prescriptions Last Dose Informant Patient Reported? Taking?   Mag Aspart-Potassium Aspart (POTASSIUM & MAGNESIUM ASPARTAT PO) 5/6/2025 Morning  Yes Yes   Sig: Take 2 capsules by mouth daily   Methylsulfonylmethane (MSM) 1000 MG TABS 5/6/2025 Morning  Yes Yes   Sig: Take 2 tablets by mouth daily.   Vitamin D3 (CHOLECALCIFEROL) 125 MCG (5000 UT) tablet 5/6/2025 Morning Self Yes Yes   Sig: Take 125 mcg by mouth daily   adagrasib (KRAZATI) 200 MG tablet 5/6/2025 Morning  Yes Yes   Sig: Take 600 mg by mouth 2 " times daily. Swallow whole; do not chew, crush or split tablets. Take with or without food.   amLODIPine (NORVASC) 5 MG tablet 5/6/2025 Morning  No Yes   Sig: Take 1 tablet (5 mg) by mouth daily. NEED TO BE SEEN IN PRIMARY CARE FOR ANY FURTHER FILLS.   diphenoxylate-atropine (LOMOTIL) 2.5-0.025 MG tablet   Yes Yes   Sig: Take 2 tablets by mouth 4 times daily as needed.   folic acid (FOLVITE) 1 MG tablet 5/6/2025 Morning  No Yes   Sig: Take 1 tablet (1,000 mcg) by mouth daily.   granisetron (KYTRIL) 1 MG tablet   Yes Yes   Sig: Take 1 mg by mouth every 12 hours as needed.   multivitamin w/minerals (THERA-VIT-M) tablet 5/6/2025 Morning Self Yes Yes   Sig: Take 1 tablet by mouth daily   prednisoLONE 5 MG tablet 5/6/2025 Morning  Yes Yes   Sig: Take 2.5 mg by mouth every morning.   prochlorperazine (COMPAZINE) 10 MG tablet   Yes Yes   Sig: Take 10 mg by mouth every 6 hours as needed.      Facility-Administered Medications: None           Physical Exam   Vital Signs: Temp: 97.8  F (36.6  C) Temp src: Oral BP: 122/58 Pulse: 93   Resp: 22 SpO2: 97 % O2 Device: Nasal cannula Oxygen Delivery: 2 LPM  Weight: 0 lbs 0 oz        Medical Decision Making             Data

## 2025-05-07 NOTE — PROGRESS NOTES
[facility] Progress Note - Hospitalist Service    Date of Admission:  5/6/2025    Assessment & Plan     74 year old female with PMHx of HTN, metastatic lung cancer of the right lower lobe, thyroid nodule and pulmonary nodule who presents for fatigue and fever.She notes all symptoms of fever, n/v/d all started after starting oral chemo days prior.    1.Sepsis  -concern sepsis with chemo pt  -pan cx  -stool pending  -ct no clear infection on imaging  -IV vancomycin  -IV cefepime  -get ID to see  -follow up cx    2.N/V/D  -check stools  -would also check head ct now, she notes hx of mets to brain and she told me hope was oral chemo was to help that--however her symptoms all started after this med started--hold it now  -IVF    3.Metastatic lung cancer  -follows at [facility]() now only on po adagrasib started days ago  -in past when seen by  was on carboplatin, pembrolizumab Feb 2024-March 2025  -was seen here with  before and ok seeing now  -onc to see here  -pall care--she needs help with symptoms management and goals of care--she states she wants to be done with all of this but feels pressure from son to keep going  -would hold oral chemo until onc re-eval  -she is on pred 2.5 mg daily      --head ct here shows 5/7/25  .  Focus of decreased attenuation within the right centrum semiovale, new since the prior exams from 2024. Given history of metastatic lung cancer, findings raise concern for vasogenic edema secondary to development of intracranial metastasis. Recommend   further evaluation with brain MRI with and without IV contrast.  2.  No acute intracranial hemorrhage.  3.  Mild generalized cerebral volume loss and presumed chronic microvascular ischemic changes of the white matter  --will try to update mri here     --mri outside [facility]--4/7/25--a month ago  1. Interval development of numerous small metastasis throughout the bilateral cerebral hemispheres.   2.  Development of multiple foci of restricted diffusion. Some of these are without enhancement, suggesting acute to subacute infarction. Others demonstrate minimal enhancement, suggesting these may be metastatic in etiology. Follow-up imaging would be   helpful in further evaluation of evolution. No significant mass effect or hemorrhagic transformation.     4.Dilation pancreatic duct  -check lipase  -get GI to see     5.Acute hypoxic resp failure  -ct neg pe  -not on home O2   -monitor here    6.YAMILAK  -on admit elevated creat, from gi losses  -creat 1.4-->1.15  -ivf  -creat today better      7.Fatigue/weakness  -from all above  -pt/ot    8.HTN  - amlodipine       Code-I asked her she wants DNR/DNI      --at 1020 I called son to update                  Diet: Combination Diet Regular Diet Adult    DVT Prophylaxis: Pneumatic Compression Devices  Ansari Catheter: Not present  Lines: PRESENT      Port A Cath Single 02/12/24 Left Chest wall-Site Assessment: WDL      Cardiac Monitoring: ACTIVE order. Indication: Tachyarrhythmias, acute (48 hours)  Code Status: No CPR- Do NOT Intubate      Clinically Significant Risk Factors Present on Admission         # Hyponatremia: Lowest Na = 133 mmol/L in last 2 days, will monitor as appropriate  # Hypochloremia: Lowest Cl = 94 mmol/L in last 2 days, will monitor as appropriate  # Hypocalcemia: Lowest Ca = 8.4 mg/dL in last 2 days, will monitor and replace as appropriate     # Hypoalbuminemia: Lowest albumin = 3.2 g/dL at 5/7/2025  8:00 AM, will monitor as appropriate                          Social Drivers of Health    Depression: At risk (4/29/2025)    Received from [facility]    PHQ-2     PHQ-2 Score: 4   Tobacco Use: Medium Risk (4/16/2025)    Received from [facility]    Patient History     Smoking Tobacco Use: Former     Smokeless Tobacco Use: Never          Disposition Plan     Medically Ready for Discharge: Anticipated in 2-4 Days             Otilia Espana MD  Hospitalist  Service  [facility]  Securely message with [employer] (more info)  Text page via [employer] Paging/Directory   ______________________________________________________________________    Interval History   She notes only days after starting po chemo from [facility] , n/v/d and feels terrible on it  She notes some headache with it too  She told me she  is tired of cancer and treatment, feels pressure from son to continue      Physical Exam   Vital Signs: Temp: 101  F (38.3  C) Temp src: Oral BP: (!) 176/77 Pulse: 113   Resp: 20 SpO2: 95 % O2 Device: Nasal cannula Oxygen Delivery: 2 LPM  Weight: 0 lbs 0 oz    Constitutional: awake, alert, cooperative, and no apparent distress  Eyes: sclera clear  Respiratory: no increased work of breathing, good air exchange, no retractions, and clear to auscultation  Cardiovascular: regular rate and rhythm and normal S1 and S2  GI: normal bowel sounds, soft, non-distended, and non-tender  Skin: no bruising or bleeding  Musculoskeletal: no lower extremity pitting edema present  Neurologic: Mental Status Exam:  Level of Alertness:   awake  Neuropsychiatric: General: normal, calm, and normal eye contact    Medical Decision Making       55 MINUTES SPENT BY ME on the date of service doing chart review, history, exam, documentation & further activities per the note.      Data     I have personally reviewed the following data over the past 24 hrs:    10.0  \   11.3 (L)   / 170     133 (L) 98 19.5 /  80   3.9 22 1.15 (H) \     ALT: N/A AST: N/A AP: N/A TBILI: N/A   ALB: 3.2 (L) TOT PROTEIN: N/A LIPASE: 127 (H)     Procal: 0.74 (H) CRP: N/A Lactic Acid: 0.8         Imaging results reviewed over the past 24 hrs:   Recent Results (from the past 24 hours)   CT Chest Pulmonary Embolism w Contrast    Narrative    EXAM: CT CHEST PULMONARY EMBOLISM W CONTRAST  LOCATION: [facility]  DATE: 5/6/2025    INDICATION: lung CA metastatic to bone with fever and hypoxia and  SOB  COMPARISON: PET-CT 3/25/2025 and prior  TECHNIQUE: CT chest pulmonary angiogram during arterial phase injection of IV contrast. Multiplanar reformats and MIP reconstructions were performed. Dose reduction techniques were used.   CONTRAST: isovue 370 75ml    FINDINGS:  ANGIOGRAM CHEST: Pulmonary arteries are normal caliber and negative for pulmonary emboli. Thoracic aorta is negative for dissection. No CT evidence of right heart strain.    LUNGS AND PLEURA: Unchanged linear scarring in the right upper lobe. No new or enlarging suspicious nodules.    MEDIASTINUM/AXILLAE: Unchanged ill-defined right perihilar soft tissue, which was FDG avid on the most recent PET/CT. Also unchanged nonenlarged previously FDG avid paratracheal and subcarinal lymph nodes. No enlarging lymph nodes. Left chest port   catheter tip in the lower SVC.    CORONARY ARTERY CALCIFICATION: Severe.    UPPER ABDOMEN: Normal.    MUSCULOSKELETAL: Normal.      Impression    IMPRESSION:  1.  No pulmonary embolism.  2.  Unchanged right perihilar soft tissue and nonenlarged mediastinal lymph nodes, which were FDG avid on the most recent PET/CT.     CT Abdomen Pelvis w Contrast    Narrative    EXAM: CT ABDOMEN PELVIS W CONTRAST  LOCATION: [facility]  DATE: 5/6/2025    INDICATION: Nausea, vomiting with lung CA metastatic and fever,   COMPARISON: PET/CT 3/25/2025.  TECHNIQUE: CT scan of the abdomen and pelvis was performed following injection of IV contrast. Multiplanar reformats were obtained. Dose reduction techniques were used.  CONTRAST: isovue 370 75ml    FINDINGS:   LOWER CHEST: No acute abnormality at the lower lungs.    HEPATOBILIARY: No focal hepatic observation. No calcified gallstones identified. Common bile duct is 6 mm.    PANCREAS: No focal pancreas lesion identified. There is mild dilatation of the main pancreas duct measuring 4 mm, previously 2 to 3 mm. Pancreas divisum. No focal pancreas lesion  identified.    SPLEEN: Normal.    ADRENAL GLANDS: Normal.    KIDNEYS/BLADDER: No significant mass, stones, or hydronephrosis. There are simple or benign cysts. No follow up is needed. Decompressed bladder.    BOWEL: No evidence for appendicitis. No bowel obstruction. Colonic diverticula without focal diverticulitis. No free fluid or free air. No abscess.    LYMPH NODES: Normal.    VASCULATURE: Scattered vascular calcifications.    PELVIC ORGANS: No acute pelvic abnormality identified.    MUSCULOSKELETAL: No focal bony abnormality identified.      Impression    IMPRESSION:   1.  No acute abnormality identified.  2.  Mildly increased distention of the pancreas duct but no specific etiology can be seen. If relevant, this could be further evaluated with nonemergent MRCP/MRI.  3.  Colonic diverticulosis.   CT Head w/o Contrast    Narrative    EXAM: CT HEAD W/O CONTRAST  LOCATION: [facility]  DATE: 5/7/2025    INDICATION: metastatic lung to brain, n v d, headaches  COMPARISON: Brain MRI 09/24/2024. Head CT 08/24/2024.  TECHNIQUE: Routine CT Head without IV contrast. Multiplanar reformats. Dose reduction techniques were used.    FINDINGS:  INTRACRANIAL CONTENTS: No intracranial hemorrhage, extraaxial collection, or mass effect.  Focus of decreased attenuation within the right centrum semiovale, which is new since the prior exams from 2024. Findings may represent a focus of vasogenic edema.   Mild presumed chronic small vessel ischemic changes. Mild generalized volume loss. No hydrocephalus.     VISUALIZED ORBITS/SINUSES/MASTOIDS: No intraorbital abnormality. Mild mucosal thickening scattered about the paranasal sinuses. No middle ear or mastoid effusion.    BONES/SOFT TISSUES: No acute abnormality.      Impression    IMPRESSION:  1.  Focus of decreased attenuation within the right centrum semiovale, new since the prior exams from 2024. Given history of metastatic lung cancer, findings raise concern  for vasogenic edema secondary to development of intracranial metastasis. Recommend   further evaluation with brain MRI with and without IV contrast.  2.  No acute intracranial hemorrhage.  3.  Mild generalized cerebral volume loss and presumed chronic microvascular ischemic changes of the white matter.

## 2025-05-07 NOTE — CONSULTS
[facility] Inpatient Consult Note    Patient: Inez Rodriguez  MRN: [id number]  Date of Service: 5/7/2025      Reason for Visit  Fever, nausea vomiting and diarrhea      Assessment  Sepsis   Gastroenteritis with nausea vomiting and diarrhea  Metastatic non-small cell lung cancer on adagrasib  Intracranial metastasis    Plan:  I have reviewed her chart in detail.  Recently started on adagrasib 600 mg twice daily for progressive metastatic lung cancer with intracranial mets.  Now with febrile illness and gastroenteritis type symptoms.  Has significant nausea vomiting and diarrhea.  Infectious workup is pending.  I explained to her that adagrasib can cause significant GI toxicity and some of her symptoms could be from that but need to rule out and treat infection first.  However because adagrasib can worsen GI symptoms I think we should hold the drug while she is inpatient.  We discussed the prognosis of metastatic lung cancer with K-tray G12 C mutation.  I explained to her that generally prognosis is better compared to similar stage lung cancer without K-tray G12 C mutation.  She was thinking about stopping treatment and considering hospice.  I do not think this is appropriate time to decide that.  I think with proper supportive care she should get better.  Probably a dose reduction from 600 twice daily to 400 twice daily when it is time to restart adagrasib as an outpatient.  This can be done through her oncologist at [facility].  Agree with aggressive supportive care with IV fluids and IV antiemetics.  Appreciate input from ID and GI teams.    CT chest abdomen pelvis showed similar looking lung lesions and mediastinal adenopathy.  Significant change noted.  It is too soon to see a response.    She had a CT of the head done today which showed increased attenuation within the right centrum semiovale which was thought to be new since 2024.  MRI was also completed but results are still  pending.    Staging History    Cancer Staging   No matching staging information was found for the patient.        History  Ms. Inez Rodriguez is a 74 year old female with history of metastatic non-small cell lung cancer with intracranial mets currently on aggressive who was hospitalized with fever, nausea vomiting and diarrhea has been consulted by the medicine team to discuss further evaluation management of metastatic lung cancer.    I have reviewed her chart in detail. Has a known history of metastatic non-small cell lung cancer diagnosed initially in January 2024.  Biopsy showed adenocarcinoma.  Treated with carboplatinum pemetrexed and pembrolizumab.  She was on maintenance therapy with Keytruda until earlier this year.  PET scan done in March showed progressive disease.  She also had evidence of intracranial metastasis with multiple small brain parenchymal mets.  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  Met with [facility] and started on adagrasib about a week ago.  600 mg twice daily.  Has developed significant GI symptoms after that.  Presented with fever.  She tells me that her GI symptoms probably started at the time of starting adagrasib or maybe slightly before that.  But definitely things have worsened.  ID on board.  Currently on broad-spectrum antibiotics.  Cultures pending.  Looks like potential viral gastroenteritis.  She has normal white count.  Has mildly low hemoglobin.  Mildly low sodium.  Normal potassium.  Normal phosphorus and magnesium.  Mildly elevated creatinine at 1.15.  She did sustain a fall recently.  Had an MRI of the brain today.  Results are pending.    Review of systems.    A 14 point review of systems was obtained.  Positive findings noted in the history.  Rest of the review of system is otherwise negative.      Past History  Past Medical History:   Diagnosis Date    Hypertension     SOB (shortness of breath)      Past Surgical History:   Procedure Laterality Date    BRONCHOSCOPY  RIGID OR FLEXIBLE W/TRANSENDOSCOPIC ENDOBRONCHIAL ULTRASOUND GUIDED N/A 1/8/2024    Procedure: BRONCHOSCOPY, WITH ENDOBRONCHIAL ULTRASOUND WITH FINE NEEDLE ASPIRATION OF LYMPH NODE;  Surgeon: Angelia Mustafa MD;  Location: [facility] [place] OR     CHEST PORT PLACEMENT > 5 YRS OF AGE  2/12/2024    TONSILLECTOMY      at 8 years of age     No family history on file.  Social History     Socioeconomic History    Marital status:      Spouse name: None    Number of children: None    Years of education: None    Highest education level: None   Tobacco Use    Smoking status: Never    Smokeless tobacco: Never   Substance and Sexual Activity    Alcohol use: Never     Social Drivers of Health     Financial Resource Strain: Low Risk  (1/11/2024)    Financial Resource Strain     Within the past 12 months, have you or your family members you live with been unable to get utilities (heat, electricity) when it was really needed?: No   Food Insecurity: Low Risk  (1/11/2024)    Food Insecurity     Within the past 12 months, did you worry that your food would run out before you got money to buy more?: No     Within the past 12 months, did the food you bought just not last and you didn t have money to get more?: No   Transportation Needs: Low Risk  (1/11/2024)    Transportation Needs     Within the past 12 months, has lack of transportation kept you from medical appointments, getting your medicines, non-medical meetings or appointments, work, or from getting things that you need?: No   Interpersonal Safety: Low Risk  (1/11/2024)    Interpersonal Safety     Do you feel physically and emotionally safe where you currently live?: Yes     Within the past 12 months, have you been hit, slapped, kicked or otherwise physically hurt by someone?: No     Within the past 12 months, have you been humiliated or emotionally abused in other ways by your partner or ex-partner?: No   Housing Stability: Low Risk  (1/11/2024)    Housing Stability     Do  you have housing? : Yes     Are you worried about losing your housing?: No       Allergies    No Known Allergies       Physical Exam    BP (!) 176/77   Pulse 113   Temp 101  F (38.3  C)   Resp 20   LMP  (LMP Unknown)   SpO2 95%     GENERAL: Alert and oriented to time place and person. In no distress.    HEAD: Atraumatic and normocephalic.    EYES: OCTAVIA, EOMI. No pallor. No icterus.    Oral cavity: moist mucosa    NECK: supple.    LYMPH NODES: No significant lymphadenopathy.    CHEST: Symmetrical breath movements bilaterally.    CVS: RRR    ABDOMEN: Soft.     EXTREMITIES: Warm.    NEUROLOGICAL: Preserved orientation.  No focal deficits    SKIN: no rash, or bruising or purpura.      Lab Results  Recent Results (from the past 24 hours)   Basic metabolic panel    Collection Time: 05/06/25  2:47 PM   Result Value Ref Range    Sodium 135 135 - 145 mmol/L    Potassium 3.6 3.4 - 5.3 mmol/L    Chloride 94 (L) 98 - 107 mmol/L    Carbon Dioxide (CO2) 26 22 - 29 mmol/L    Anion Gap 15 7 - 15 mmol/L    Urea Nitrogen 26.4 (H) 8.0 - 23.0 mg/dL    Creatinine 1.40 (H) 0.51 - 0.95 mg/dL    GFR Estimate 39 (L) >60 mL/min/1.73m2    Calcium 9.2 8.8 - 10.4 mg/dL    Glucose 117 (H) 70 - 99 mg/dL   Lactic acid whole blood with 1x repeat in 2 hr when >2    Collection Time: 05/06/25  2:47 PM   Result Value Ref Range    Lactic Acid, Initial 0.8 0.7 - 2.0 mmol/L   Procalcitonin    Collection Time: 05/06/25  2:47 PM   Result Value Ref Range    Procalcitonin 0.74 (H) <0.50 ng/mL   Magnesium    Collection Time: 05/06/25  2:47 PM   Result Value Ref Range    Magnesium 1.7 1.7 - 2.3 mg/dL   Blood Culture Line, venous    Collection Time: 05/06/25  2:47 PM    Specimen: Line, venous; Blood   Result Value Ref Range    Culture No growth after 12 hours    CBC with platelets and differential    Collection Time: 05/06/25  2:47 PM   Result Value Ref Range    WBC Count 9.3 4.0 - 11.0 10e3/uL    RBC Count 3.93 3.80 - 5.20 10e6/uL    Hemoglobin 12.3 11.7  - 15.7 g/dL    Hematocrit 37.3 35.0 - 47.0 %    MCV 95 78 - 100 fL    MCH 31.3 26.5 - 33.0 pg    MCHC 33.0 31.5 - 36.5 g/dL    RDW 13.2 10.0 - 15.0 %    Platelet Count 207 150 - 450 10e3/uL    % Neutrophils 84 %    % Lymphocytes 6 %    % Monocytes 8 %    % Eosinophils 1 %    % Basophils 0 %    % Immature Granulocytes 1 %    NRBCs per 100 WBC 0 <1 /100    Absolute Neutrophils 7.9 1.6 - 8.3 10e3/uL    Absolute Lymphocytes 0.5 (L) 0.8 - 5.3 10e3/uL    Absolute Monocytes 0.8 0.0 - 1.3 10e3/uL    Absolute Eosinophils 0.1 0.0 - 0.7 10e3/uL    Absolute Basophils 0.0 0.0 - 0.2 10e3/uL    Absolute Immature Granulocytes 0.1 <=0.4 10e3/uL    Absolute NRBCs 0.0 10e3/uL   Blood Culture Line, venous    Collection Time: 05/06/25  3:07 PM    Specimen: Line, venous; Blood   Result Value Ref Range    Culture No growth after 12 hours    UA with Microscopic reflex to Culture    Collection Time: 05/06/25  3:59 PM    Specimen: Urine, NOS   Result Value Ref Range    Color Urine Yellow Colorless, Straw, Light Yellow, Yellow    Appearance Urine Cloudy (A) Clear    Glucose Urine Negative Negative mg/dL    Bilirubin Urine 1.0 mg/dL (A) Negative    Ketones Urine 10 (A) Negative mg/dL    Specific Gravity Urine 1.022 1.001 - 1.030    Blood Urine Negative Negative    pH Urine 5.5 5.0 - 7.0    Protein Albumin Urine 100 (A) Negative mg/dL    Urobilinogen Urine 2.0 (A) Normal mg/dL    Nitrite Urine Negative Negative    Leukocyte Esterase Urine Negative Negative    Amorphous Crystals Urine Moderate (A) None Seen /HPF    RBC Urine 0 <=2 /HPF    WBC Urine 9 (H) <=5 /HPF    Squamous Epithelials Urine 1 <=1 /HPF   Urine Culture    Collection Time: 05/06/25  3:59 PM    Specimen: Urine, NOS   Result Value Ref Range    Culture 10,000-50,000 CFU/mL Mixture of Urogenital Arlet    Enteric Bacteria and Virus Panel PCR    Collection Time: 05/07/25  6:50 AM    Specimen: Per Rectum; Stool   Result Value Ref Range    Campylobacter species Negative Negative     Salmonella species Negative Negative    Vibrio species Negative Negative    Vibrio cholerae Negative Negative    Yersinia enterocolitica Negative Negative    Enteropathogenic E. coli (EPEC) Negative Negative, NA    Shiga-like toxin-producing E. coli (STEC) Negative Negative    Shigella/Enteroinvasive E. coli (EIEC) Negative Negative    Cryptosporidium species Negative Negative    Giardia lamblia Negative Negative    Norovirus Gl/Gll Negative Negative    Rotavirus A Negative Negative    Plesiomonas shigelloides Negative Negative    Enteroaggregative E. coli (EAEC) Negative Negative    Enterotoxigenic E. coli (ETEC) Negative Negative    E. coli O157 NA Negative, NA    Cyclospora cayetanensis Negative Negative    Entamoeba histolytica Negative Negative    Adenovirus F40/41 Negative Negative    Astrovirus Negative Negative    Sapovirus Negative Negative   C. difficile Toxin B PCR with reflex to C. difficile EIA    Collection Time: 05/07/25  6:50 AM    Specimen: Per Rectum; Stool   Result Value Ref Range    C Difficile Toxin B by PCR Negative Negative   CBC with platelets    Collection Time: 05/07/25  6:59 AM   Result Value Ref Range    WBC Count 10.0 4.0 - 11.0 10e3/uL    RBC Count 3.48 (L) 3.80 - 5.20 10e6/uL    Hemoglobin 11.3 (L) 11.7 - 15.7 g/dL    Hematocrit 33.6 (L) 35.0 - 47.0 %    MCV 97 78 - 100 fL    MCH 32.5 26.5 - 33.0 pg    MCHC 33.6 31.5 - 36.5 g/dL    RDW 13.8 10.0 - 15.0 %    Platelet Count 170 150 - 450 10e3/uL   Lipase    Collection Time: 05/07/25  8:00 AM   Result Value Ref Range    Lipase 127 (H) 13 - 60 U/L   Renal panel    Collection Time: 05/07/25  8:00 AM   Result Value Ref Range    Sodium 133 (L) 135 - 145 mmol/L    Potassium 3.9 3.4 - 5.3 mmol/L    Chloride 98 98 - 107 mmol/L    Carbon Dioxide (CO2) 22 22 - 29 mmol/L    Anion Gap 13 7 - 15 mmol/L    Glucose 80 70 - 99 mg/dL    Urea Nitrogen 19.5 8.0 - 23.0 mg/dL    Creatinine 1.15 (H) 0.51 - 0.95 mg/dL    GFR Estimate 50 (L) >60 mL/min/1.73m2     Calcium 8.4 (L) 8.8 - 10.4 mg/dL    Albumin 3.2 (L) 3.5 - 5.2 g/dL    Phosphorus 2.6 2.5 - 4.5 mg/dL        Imaging Results    CT Head w/o Contrast  Result Date: 5/7/2025  EXAM: CT HEAD W/O CONTRAST LOCATION: [facility] DATE: 5/7/2025 INDICATION: metastatic lung to brain, n v d, headaches COMPARISON: Brain MRI 09/24/2024. Head CT 08/24/2024. TECHNIQUE: Routine CT Head without IV contrast. Multiplanar reformats. Dose reduction techniques were used. FINDINGS: INTRACRANIAL CONTENTS: No intracranial hemorrhage, extraaxial collection, or mass effect.  Focus of decreased attenuation within the right centrum semiovale, which is new since the prior exams from 2024. Findings may represent a focus of vasogenic edema.  Mild presumed chronic small vessel ischemic changes. Mild generalized volume loss. No hydrocephalus. VISUALIZED ORBITS/SINUSES/MASTOIDS: No intraorbital abnormality. Mild mucosal thickening scattered about the paranasal sinuses. No middle ear or mastoid effusion. BONES/SOFT TISSUES: No acute abnormality.     IMPRESSION: 1.  Focus of decreased attenuation within the right centrum semiovale, new since the prior exams from 2024. Given history of metastatic lung cancer, findings raise concern for vasogenic edema secondary to development of intracranial metastasis. Recommend  further evaluation with brain MRI with and without IV contrast. 2.  No acute intracranial hemorrhage. 3.  Mild generalized cerebral volume loss and presumed chronic microvascular ischemic changes of the white matter.    CT Chest Pulmonary Embolism w Contrast  Result Date: 5/6/2025  EXAM: CT CHEST PULMONARY EMBOLISM W CONTRAST LOCATION: [facility] DATE: 5/6/2025 INDICATION: lung CA metastatic to bone with fever and hypoxia and SOB COMPARISON: PET-CT 3/25/2025 and prior TECHNIQUE: CT chest pulmonary angiogram during arterial phase injection of IV contrast. Multiplanar reformats and MIP  reconstructions were performed. Dose reduction techniques were used. CONTRAST: isovue 370 75ml FINDINGS: ANGIOGRAM CHEST: Pulmonary arteries are normal caliber and negative for pulmonary emboli. Thoracic aorta is negative for dissection. No CT evidence of right heart strain. LUNGS AND PLEURA: Unchanged linear scarring in the right upper lobe. No new or enlarging suspicious nodules. MEDIASTINUM/AXILLAE: Unchanged ill-defined right perihilar soft tissue, which was FDG avid on the most recent PET/CT. Also unchanged nonenlarged previously FDG avid paratracheal and subcarinal lymph nodes. No enlarging lymph nodes. Left chest port catheter tip in the lower SVC. CORONARY ARTERY CALCIFICATION: Severe. UPPER ABDOMEN: Normal. MUSCULOSKELETAL: Normal.     IMPRESSION: 1.  No pulmonary embolism. 2.  Unchanged right perihilar soft tissue and nonenlarged mediastinal lymph nodes, which were FDG avid on the most recent PET/CT.     CT Abdomen Pelvis w Contrast  Result Date: 5/6/2025  EXAM: CT ABDOMEN PELVIS W CONTRAST LOCATION: [facility] DATE: 5/6/2025 INDICATION: Nausea, vomiting with lung CA metastatic and fever,  COMPARISON: PET/CT 3/25/2025. TECHNIQUE: CT scan of the abdomen and pelvis was performed following injection of IV contrast. Multiplanar reformats were obtained. Dose reduction techniques were used. CONTRAST: isovue 370 75ml FINDINGS: LOWER CHEST: No acute abnormality at the lower lungs. HEPATOBILIARY: No focal hepatic observation. No calcified gallstones identified. Common bile duct is 6 mm. PANCREAS: No focal pancreas lesion identified. There is mild dilatation of the main pancreas duct measuring 4 mm, previously 2 to 3 mm. Pancreas divisum. No focal pancreas lesion identified. SPLEEN: Normal. ADRENAL GLANDS: Normal. KIDNEYS/BLADDER: No significant mass, stones, or hydronephrosis. There are simple or benign cysts. No follow up is needed. Decompressed bladder. BOWEL: No evidence for  appendicitis. No bowel obstruction. Colonic diverticula without focal diverticulitis. No free fluid or free air. No abscess. LYMPH NODES: Normal. VASCULATURE: Scattered vascular calcifications. PELVIC ORGANS: No acute pelvic abnormality identified. MUSCULOSKELETAL: No focal bony abnormality identified.     IMPRESSION: 1.  No acute abnormality identified. 2.  Mildly increased distention of the pancreas duct but no specific etiology can be seen. If relevant, this could be further evaluated with nonemergent MRCP/MRI. 3.  Colonic diverticulosis.     A total of 40 min was spent today on this visit which included face to face conversation with the patient, EMR review, counseling and co-ordination of care and medical documentation.      Signed by: Virginia Guan MD

## 2025-05-07 NOTE — PROGRESS NOTES
TRANSITIONS OF CARE (BRIT) LOG    BRIT tasks should be completed by the CC within one (1) business day of notification of each transition. Follow up contact with member is required after return to their usual care setting.  Note:  If CC finds out about the transitions fifteen (15) days or more after the member has returned to their usual care setting, no BRIT log is needed. However, the CC should check in with the member to discuss the transition process, any changes needed to the care plan and document it in a case note.     Member Name:  [facility]nez [facility]    Product: [facility] Care Coordinator Contact:  LOPEZ Razo Agency/County/Care System: [facility]   Transition Communication Actions from Care Management Contact   Transition #1   Notification Date: 05/07 Transition Date:   05/06 Transition From: Home     Is this the member s usual care setting?               yes Transition To: [facility]   Transition Type:  Unplanned    Documentation from conversation with the member/responsible party, provider, discharging and receiving facility:   Date: 05/07: Received notification of admission to hospital with dx of sepsis  CC contacted Hospital /discharge planner, (Name) via the "[employer]" Transitional Care Hand-In Process, with community care plan included.  CC reached out to adult john young regarding transition and left a message requesting a return call.  Reviewed and update support plan as needed.  Notified community service providers and placed services None on hold as needed.  Transition log initiated.   PCP, Lynn Bowles, notified of hospitalization via EMR.    Inez chose to terminate waivered services at annual.      Transition #2   Notification Date: 05/12 Transition Date:   05/12 Transition From: [facility]     Is this the member s usual care setting?               no  Transition To: Home   Transition Type:  Planned    Documentation from conversation with the member/responsible party, provider, discharging and receiving facility:   Date: 05/12: Received notification of transition to home.  CC contacted member and left a message requesting a return call.  Member has a follow-up appointment with PCP in 7 days: No: Offered Assistance with setting up a follow up appointment   Member has had a change in condition: No  Home visit needed: No  Support Plan reviewed and updated.  The following home based services None were resumed.  New referrals placed: No  Transition log completed.   PCP, Lynn Bowles, notified of transition back to home via EMR.     Transition #3   Notification Date:  Transition Date:    Transition From:      Is this the member s usual care setting?                Transition To:    Transition Type:      Documentation from conversation with the member/responsible party, provider, discharging and receiving facility:   Date: :        Transition #4   Notification Date:  Transition Date:    Transition From:      Is this the member s usual care setting?                Transition To:    Transition Type:      Documentation from conversation with the member/responsible party, provider, discharging and receiving facility:   Date: :        Transition #5   Notification Date:  Transition Date:    Transition From:      Is this the member s usual care setting?                Transition To:    Transition Type:    Documentation from conversation with the member/responsible party, provider, discharging and receiving facility:   Date: :        *RETURN TO USUAL CARE SETTING: *Complete tasks below when the member is discharging TO their usual care setting within one (1) business day of notification..      For situations where the Care Coordinator is notified of the discharge prior to the date of discharge, the Care Coordinator must follow up with the member or designated  representative to confirm that discharge actually occurred and discuss required BRIT tasks as outlined in the BRIT Instructions.  (This includes situations where it may be a  new  usual care setting for the member. (i.e., a community member who decides upon permanent nursing home placement following hospitalization and rehab).    Discuss with Member/Responsible Party:    Check  Yes  - if the member, family member and/or SNF/facility staff manages the following:    If  No  provide explanation in the comments section.          Date completed: 05/12 Communicated with member or their designated representative about the following:  care transition process; about changes to the member s health status; plan of care updates; education about transitions and how to prevent unplanned transitions/readmissions    Four Pillars for Optimal Transition:    Check  Yes  - if the member, family member and/or SNF/facility staff manages the following:    If  No  provide explanation in the comments section.          []  Yes     []  No Does the member have a follow-up appointment scheduled with primary care or specialist? (Mental health hospitalizations--the appt. should be w/in 7 days)              For mental health hospitalizations:  []  Yes     []  No     Does the member have a follow-up appointment scheduled with a mental health practitioner within 7 days of discharge?  []  Yes     []  No     Has a medication review been completed with member? If no, refer to PCP, home care nurse, MTM, pharmacist  []  Yes     []  No     Can the member manage their medications or is there a system in place to manage medications (e.g. home care set-up)?         []  Yes     []  No     Can the member verbalize warning signs and symptoms to watch for and how to respond?  []  Yes     []  No     Does the member have a copy of and understand their discharge instructions?  If no, assist to obtain copy of discharge instructions, review discharge instructions, and  assist to contact PCP to discuss questions about their recent hospitalization.  []  Yes     []  No     Does the member have adequate food, housing and transportation?  If no, add goal and discuss additional supports available to the member                                                                                                                                                                                 []  Yes     []  No     Is the member safe in their home?  If no, document needs and support provided                                                                                                                                                                          []  Yes     []  No     Are there any concerns of vulnerability, abuse, or neglect?  If yes, document concerns and actions taken by Care Coordinator as a mandated                                                                                                                                                                              []  Yes     []  No     Does the member use a Personal Health Care Record?  Check  Yes  if visit summary, discharge summary, and/or healthcare summary are being used as a PHR.                                                                                                                                                                                  []  Yes     []  No     Have you reviewed the discharge summary with the member? If  No  provide explanation in comments.  []  Yes     []  No     Have you updated the member s care plan/support plan? Add new diagnosis, medications, treatments, goals & interventions, as applicable. If No, provide explanation in comments.    Comments:           Notes from conversation with the member/responsible party, provider, discharging and receiving facility (as applicable):

## 2025-05-07 NOTE — PLAN OF CARE
"  Problem: Adult Inpatient Plan of Care  Goal: Absence of Hospital-Acquired Illness or Injury  Outcome: Progressing  Intervention: Identify and Manage Fall Risk  Recent Flowsheet Documentation  Taken 5/6/2025 1920 by Glenda Rodriguez RN  Safety Promotion/Fall Prevention:   lighting adjusted   patient and family education   room near nurse's station   room organization consistent   safety round/check completed   toileting scheduled  Intervention: Prevent Skin Injury  Recent Flowsheet Documentation  Taken 5/6/2025 1920 by Glenda Rodriguez RN  Body Position: position changed independently  Intervention: Prevent Infection  Recent Flowsheet Documentation  Taken 5/6/2025 1920 by Glenda Rodriguez RN  Infection Prevention: single patient room provided  Goal: Optimal Comfort and Wellbeing  Outcome: Progressing  Intervention: Monitor Pain and Promote Comfort  Recent Flowsheet Documentation  Taken 5/6/2025 2120 by Glenda Rodriguez RN  Pain Management Interventions:   medication offered but refused   rest  Goal: Readiness for Transition of Care  Outcome: Progressing     Problem: Delirium  Goal: Optimal Coping  Outcome: Progressing  Intervention: Optimize Psychosocial Adjustment to Delirium  Recent Flowsheet Documentation  Taken 5/6/2025 1920 by Glenda Rodriguez RN  Family/Support System Care: (family member at bedside) --  Goal: Improved Behavioral Control  Outcome: Progressing  Intervention: Minimize Safety Risk  Recent Flowsheet Documentation  Taken 5/6/2025 1920 by Glenda Rodriguez RN  Enhanced Safety Measures:   pain management   room near unit station  Goal: Improved Attention and Thought Clarity  Outcome: Progressing  Goal: Improved Sleep  Outcome: Progressing   Goal Outcome Evaluation:             Pt moved  to ER room 22 at approx 1830, family member at bedside for a few hrs. Pt a/o x4. Denied pain most of time pt reported intermittent head ache 5/10. Declined prn med. C/o chills, \"goosebumps\"  at hs, then fever noted awhile " later. Prn tylenol given. Pt declined supper, just wanted to drink water. Then had 1/2 of banana at hs w/ med. Resting in bed. Reminded pt to use call light for needs and not to get oob alone. Pure wick in place. Report given to Carissa HERCULES.

## 2025-05-07 NOTE — CONSULTS
"  Palliative Care Consultation Note  [facility]      Patient: Inez Rodriguez  Date of Admission:  5/6/2025    Requesting Clinician / Team: Hospitalist   Reason for consult: Symptom management, Goals of care       Recommendations & Counseling     GOALS OF CARE:   Goals have historically been life-prolonging; patient said today that she is feeling worse with each cancer treatment and \"what is the point\", referring to her incurable cancer and a feeling that she is \"ready to die\".  Patient recalls hearing that her prognosis was around 5 months without treatment at the time of diagnosis in January 2024, although longer with treatment and she did endure systemic chemotherapy thereafter.  Inez says she has not felt well in over a year and, over the past week since starting adagrasib, she correlates N/V/D and upset stomach without pain.  Patient is saying that right now her goal is to return home and discontinue any further cancer treatment, would like to focus comfort while remaining in the home until end-of-life.  However, before hospital discharge she would like to find out whether she has an infection or symptoms that can be treated.  Patient is awaiting diagnostic testing and medical recommendations, then hopeful for support with a goals of care conversation to include bart Cooper before hospice enrollment.      ADVANCE CARE PLANNING:  No health care directive on file.   Bart Rodriguez is next of kin and only primary family member.  There is no POLST form on file, recommend to complete prior to DC.  Code status: No CPR- Do NOT Intubate    MEDICAL MANAGEMENT:   #Nausea, uncertain etiology (infectious vs side effect of adagrasib)   -Pharmacologic management   Recommend scheduling ondansetron 4 mg ODT TID-ordered by palliative.  Recommend available prochlorperazine 10 mg tab PO q6 PRN  -Nonpharmacologic management  Small, frequent intake  Assess and avoid aggravating factors  Accupressure " (C-band)  Aromatherapy, alcohol swabs known to be effective     #Diarrhea, uncertain etiology (infectious vs side effect of adagrasib)   Patient describes estimated 4 days of watery nonbloody diarrhea, 2-3 episodes per day  Caution with Imodium pending infectious workup      PSYCHOSOCIAL/SPIRITUAL SUPPORT:  Family; son Kenneth is her only primary family member  Friends   [place]ie community: No [employer]     Palliative Care will continue to follow. Thank you for the consult and allowing us to aid in the care of Inez Rodriguez.    These recommendations have been discussed with team.      Mitchell Manzo, DO  Emergency Medicine / Palliative Medicine   Securely message with the Vocera Web Console (learn more here) or  Text page via [facility] Paging/Directory         Assessment      Inez Rodriguez is a 74 year old female with a past medical history of advanced stage IV non-small cell lung cancer originating in [facility] (dx 01/2024) and metastasized to sacrum and thoracic vertebrae, subsequently underwent combination chemo-immunotherapy, progressive disease confirmed early 2025, and began seeking care through [facility] 04/2025 and since started oral treatment as second-line therapy with adagrasib she was admitted to hospital 5/6/25 for evaluation and management of symptoms of nausea with vomiting and diarrhea; fevers (Tmax of 104  F) meeting SIRS criteria.      Today, the patient was seen for:  Symptom assessment, facilitation of medical communication, anticipatory guidance and support with goals of care    History of Present Illness   Met with patient today to introduce our role as an extra layer of support and how we help patients and families dealing with serious, potentially life-limiting illnesses. I explained the composition of the palliative care team.  Palliative care helps patients and families navigate their care while focusing on the whole person; providing emotional, social and spiritual support  Palliative care often assists  "with symptom management, information sharing about what to expect from the illness, available treatment options and what effect those options may have on the disease course, and provide effective communication and caring support.    Met with Inez today while still boarding in the emergency department, she bluntly says \"I am ready to die\".  She says that she has not felt well since prior to her initial cancer diagnosis, she is aware that her stage IV cancer will continue to progress and eventually result in end-of-life.  However, along the way she has perceived undesirable side effects from systemic cancer treatment affecting her quality of life.  Most recently, the treatment she began 1 week ago is correlated with nausea, vomiting, and diarrhea.  She says that now her primary goal is to remain in her home and as comfortable as possible until end-of-life.  Only close family member is john Cooper, she says he is in a stage of life when he should be focusing on career and starting a family, not taking care of her.    Patient recalls originally hearing a prognosis of around 5 months without cancer treatment, and that was over 1 year ago.  She now does not prioritize life-prolonging therapies unless they will also make her feel better, time sounds to be less important than quality of life as she is preparing for end-of-life.  With patient permission, provider reviewed the Medicare hospice benefit including potential resources and care limitations of the program.  She says that level of support seems appropriate at this stage of her illness, and she would like to discuss further after hospital diagnostics return and john Cooper is available to prepare for next steps.  We also discussed the challenges of providing care in the home at end-of-life, and how most patients will require some higher level of care eventually.  Again, patient would like to talk with john Cooper about the impact on his life while planning for the end " of hers.    Symptomatically, the patient describes feeling nauseous and even vomiting throughout the day, very little appetite.  Diarrhea is described as watery and nonbloody, occurring 2-3 times per day.  Primary concern is nausea and upset stomach without pain.  Provider offered to schedule low-dose ondansetron and keep prochlorperazine available, on an as needed basis.  Patient seems comfortable with this plan, denies pain or anxiety unaddressed.  No other concerns today.  Palliative will continue to follow along offering support and anticipatory guidance as indicated.      Prognosis, Goals, & Planning:   Functional Status just prior to this current hospitalization:  Outpatient Palliative Performance Score (PPS) 60%  Significant disease.  Normal or reduced intake. Normal LOC or confusion. Reduced ambulation, some assist w/self-care, unable to work/do housework.      Prognosis, Goals, and/or Advance Care Planning:  We discussed general treatment options (full/restorative, selective/conservatives, and comfort only/hospice).     Code Status was addressed today:   No      Patient's decision making preferences: independently        Patient has decision-making capacity today for complex decisions: Intact           Coping, Meaning, & Spirituality:   Mood, coping, and/or meaning in the context of serious illness were addressed today: Yes    Social:   Living situation: lives alone  Important relationships/caregivers: john Cooper  Areas of fulfillment/radhika: writing, yoga, housework    Medications:  Reviewed this patient's medication profile and medications from this hospitalization.     [place] Board of Pharmacy Data Base Reviewed: Yes:   reviewed - controlled substances reflected in medication list..    ROS:  Comprehensive ROS is reviewed and is negative except as here & per HPI:     Physical Exam   BP (!) 176/77   Pulse 113   Temp 101  F (38.3  C)   Resp 20   LMP  (LMP Unknown)   SpO2 95%   General:  Appears  stated age.  NAD.   HENT:  Atraumatic.  Hearing intact.     Eyes:  Conjunctivae are clear.  Sclera is nonicteric.    Cardiovascular:  Without cyanosis or mottling.   Respiratory:  Breathing comfortably without increased work of breathing or signs of respiratory distress.  Able to speak in complete sentences.    Skin:  Dry, without diaphoresis.   Neuro:  Alert, attentive, speech clear and fluent.    Psych:  Appropriate mood and affect.  No sign of confusion or delusion.     Wt Readings from Last 8 Encounters:   03/27/25 59.1 kg (130 lb 3.2 oz)   01/23/25 57.6 kg (126 lb 14.4 oz)   12/12/24 55.2 kg (121 lb 11.2 oz)   10/31/24 54.1 kg (119 lb 3.2 oz)   09/26/24 52.3 kg (115 lb 4.8 oz)   09/23/24 55 kg (121 lb 4.8 oz)   09/09/24 54.4 kg (120 lb)   08/24/24 53.5 kg (118 lb)         Data reviewed:  Results for orders placed or performed during the hospital encounter of 05/06/25 (from the past 24 hours)   Influenza A/B, RSV and SARS-CoV2 PCR (COVID-19) Nose    Specimen: Nose; Swab   Result Value Ref Range    Influenza A PCR Negative Negative    Influenza B PCR Negative Negative    RSV PCR Negative Negative    SARS CoV2 PCR Negative Negative    Narrative    Testing was performed using the Xpert Xpress CoV2/Flu/RSV Assay on the [employer] Gene[employer]pert Instrument. This test should be ordered for the detection of SARS-CoV2, influenza, and RSV viruses in individuals with signs and symptoms of respiratory tract infection. This test is for in vitro diagnostic use under the [place] FDA for laboratories certified under CLIA to perform high or moderate complexity testing. This test has been [place] FDA cleared. A negative result does not rule out the presence of PCR inhibitors in the specimen or target RNA in concentration below the limit of detection for the assay. If only one viral target is positive but coinfection with multiple targets is suspected, the sample should be re-tested with another FDA cleared, approved, or authorized test, if  coninfection would change clinical management. This test was validated by the [facility]. These laboratories are certified under the Clinical Laboratory Improvement Amendments of 1988 (CLIA-88) as qualified to perfom high complexity laboratory testing.   CBC with platelets differential    Narrative    The following orders were created for panel order CBC with platelets differential.  Procedure                               Abnormality         Status                     ---------                               -----------         ------                     CBC with platelets and ...[[id number]]  Abnormal            Final result                 Please view results for these tests on the individual orders.   Basic metabolic panel   Result Value Ref Range    Sodium 135 135 - 145 mmol/L    Potassium 3.6 3.4 - 5.3 mmol/L    Chloride 94 (L) 98 - 107 mmol/L    Carbon Dioxide (CO2) 26 22 - 29 mmol/L    Anion Gap 15 7 - 15 mmol/L    Urea Nitrogen 26.4 (H) 8.0 - 23.0 mg/dL    Creatinine 1.40 (H) 0.51 - 0.95 mg/dL    GFR Estimate 39 (L) >60 mL/min/1.73m2    Calcium 9.2 8.8 - 10.4 mg/dL    Glucose 117 (H) 70 - 99 mg/dL   Lactic acid whole blood with 1x repeat in 2 hr when >2   Result Value Ref Range    Lactic Acid, Initial 0.8 0.7 - 2.0 mmol/L   Procalcitonin   Result Value Ref Range    Procalcitonin 0.74 (H) <0.50 ng/mL   Magnesium   Result Value Ref Range    Magnesium 1.7 1.7 - 2.3 mg/dL   Blood Culture Line, venous    Specimen: Line, venous; Blood   Result Value Ref Range    Culture No growth after 12 hours    CBC with platelets and differential   Result Value Ref Range    WBC Count 9.3 4.0 - 11.0 10e3/uL    RBC Count 3.93 3.80 - 5.20 10e6/uL    Hemoglobin 12.3 11.7 - 15.7 g/dL    Hematocrit 37.3 35.0 - 47.0 %    MCV 95 78 - 100 fL    MCH 31.3 26.5 - 33.0 pg    MCHC 33.0 31.5 - 36.5 g/dL    RDW 13.2 10.0 - 15.0 %    Platelet Count 207 150 - 450 10e3/uL    % Neutrophils 84 %    % Lymphocytes 6 %    %  Monocytes 8 %    % Eosinophils 1 %    % Basophils 0 %    % Immature Granulocytes 1 %    NRBCs per 100 WBC 0 <1 /100    Absolute Neutrophils 7.9 1.6 - 8.3 10e3/uL    Absolute Lymphocytes 0.5 (L) 0.8 - 5.3 10e3/uL    Absolute Monocytes 0.8 0.0 - 1.3 10e3/uL    Absolute Eosinophils 0.1 0.0 - 0.7 10e3/uL    Absolute Basophils 0.0 0.0 - 0.2 10e3/uL    Absolute Immature Granulocytes 0.1 <=0.4 10e3/uL    Absolute NRBCs 0.0 10e3/uL   Blood Culture Line, venous    Specimen: Line, venous; Blood   Result Value Ref Range    Culture No growth after 12 hours    UA with Microscopic reflex to Culture    Specimen: Urine, NOS   Result Value Ref Range    Color Urine Yellow Colorless, Straw, Light Yellow, Yellow    Appearance Urine Cloudy (A) Clear    Glucose Urine Negative Negative mg/dL    Bilirubin Urine 1.0 mg/dL (A) Negative    Ketones Urine 10 (A) Negative mg/dL    Specific Gravity Urine 1.022 1.001 - 1.030    Blood Urine Negative Negative    pH Urine 5.5 5.0 - 7.0    Protein Albumin Urine 100 (A) Negative mg/dL    Urobilinogen Urine 2.0 (A) Normal mg/dL    Nitrite Urine Negative Negative    Leukocyte Esterase Urine Negative Negative    Amorphous Crystals Urine Moderate (A) None Seen /HPF    RBC Urine 0 <=2 /HPF    WBC Urine 9 (H) <=5 /HPF    Squamous Epithelials Urine 1 <=1 /HPF    Narrative    Urine Culture not indicated   Urine Culture    Specimen: Urine, NOS   Result Value Ref Range    Culture 10,000-50,000 CFU/mL Mixture of Urogenital Arlet    CT Chest Pulmonary Embolism w Contrast    Narrative    EXAM: CT CHEST PULMONARY EMBOLISM W CONTRAST  LOCATION: [facility]  DATE: 5/6/2025    INDICATION: lung CA metastatic to bone with fever and hypoxia and SOB  COMPARISON: PET-CT 3/25/2025 and prior  TECHNIQUE: CT chest pulmonary angiogram during arterial phase injection of IV contrast. Multiplanar reformats and MIP reconstructions were performed. Dose reduction techniques were used.   CONTRAST: isovue 370  75ml    FINDINGS:  ANGIOGRAM CHEST: Pulmonary arteries are normal caliber and negative for pulmonary emboli. Thoracic aorta is negative for dissection. No CT evidence of right heart strain.    LUNGS AND PLEURA: Unchanged linear scarring in the right upper lobe. No new or enlarging suspicious nodules.    MEDIASTINUM/AXILLAE: Unchanged ill-defined right perihilar soft tissue, which was FDG avid on the most recent PET/CT. Also unchanged nonenlarged previously FDG avid paratracheal and subcarinal lymph nodes. No enlarging lymph nodes. Left chest port   catheter tip in the lower SVC.    CORONARY ARTERY CALCIFICATION: Severe.    UPPER ABDOMEN: Normal.    MUSCULOSKELETAL: Normal.      Impression    IMPRESSION:  1.  No pulmonary embolism.  2.  Unchanged right perihilar soft tissue and nonenlarged mediastinal lymph nodes, which were FDG avid on the most recent PET/CT.     CT Abdomen Pelvis w Contrast    Narrative    EXAM: CT ABDOMEN PELVIS W CONTRAST  LOCATION: [facility]  DATE: 5/6/2025    INDICATION: Nausea, vomiting with lung CA metastatic and fever,   COMPARISON: PET/CT 3/25/2025.  TECHNIQUE: CT scan of the abdomen and pelvis was performed following injection of IV contrast. Multiplanar reformats were obtained. Dose reduction techniques were used.  CONTRAST: isovue 370 75ml    FINDINGS:   LOWER CHEST: No acute abnormality at the lower lungs.    HEPATOBILIARY: No focal hepatic observation. No calcified gallstones identified. Common bile duct is 6 mm.    PANCREAS: No focal pancreas lesion identified. There is mild dilatation of the main pancreas duct measuring 4 mm, previously 2 to 3 mm. Pancreas divisum. No focal pancreas lesion identified.    SPLEEN: Normal.    ADRENAL GLANDS: Normal.    KIDNEYS/BLADDER: No significant mass, stones, or hydronephrosis. There are simple or benign cysts. No follow up is needed. Decompressed bladder.    BOWEL: No evidence for appendicitis. No bowel obstruction.  Colonic diverticula without focal diverticulitis. No free fluid or free air. No abscess.    LYMPH NODES: Normal.    VASCULATURE: Scattered vascular calcifications.    PELVIC ORGANS: No acute pelvic abnormality identified.    MUSCULOSKELETAL: No focal bony abnormality identified.      Impression    IMPRESSION:   1.  No acute abnormality identified.  2.  Mildly increased distention of the pancreas duct but no specific etiology can be seen. If relevant, this could be further evaluated with nonemergent MRCP/MRI.  3.  Colonic diverticulosis.   Enteric Bacteria and Virus Panel PCR    Specimen: Per Rectum; Stool   Result Value Ref Range    Campylobacter species Negative Negative    Salmonella species Negative Negative    Vibrio species Negative Negative    Vibrio cholerae Negative Negative    Yersinia enterocolitica Negative Negative    Enteropathogenic E. coli (EPEC) Negative Negative, NA    Shiga-like toxin-producing E. coli (STEC) Negative Negative    Shigella/Enteroinvasive E. coli (EIEC) Negative Negative    Cryptosporidium species Negative Negative    Giardia lamblia Negative Negative    Norovirus Gl/Gll Negative Negative    Rotavirus A Negative Negative    Plesiomonas shigelloides Negative Negative    Enteroaggregative E. coli (EAEC) Negative Negative    Enterotoxigenic E. coli (ETEC) Negative Negative    E. coli O157 NA Negative, NA    Cyclospora cayetanensis Negative Negative    Entamoeba histolytica Negative Negative    Adenovirus F40/41 Negative Negative    Astrovirus Negative Negative    Sapovirus Negative Negative    Narrative    Assay performed using the FDA-cleared FilmArray GI Panel from [employer]  A negative result should not rule out infection in patients with a probability for gastrointestinal infection. The assay does not test for all potential infectious agents of diarrheal disease.  Positive results do not distinguish between a viable or replicating organism and the presence of a  nonviable organism or nucleic acid, nor do they exclude the possibility of coinfection by organisms not in the panel.  Results are intended to aid in the diagnosis of illness and are meant to be used in conjunction with other clinical findings.  This test has been verified and is performed by the [facility] at [facility]. This laboratory is certified under the Clinical Laboratory Improvement Amendments of 1988 (CLIA-88) as qualified to perform high complexity clinical laboratory testing.   C. difficile Toxin B PCR with reflex to C. difficile EIA    Specimen: Per Rectum; Stool   Result Value Ref Range    C Difficile Toxin B by PCR Negative Negative    Narrative    The [employer] Xpert C. difficile Assay, performed on the [employer], is a qualitative in vitro diagnostic test for rapid detection of toxin B gene sequences from unformed (liquid or soft) stool specimens collected from patients suspected of having Clostridioides difficile infection (CDI). The test utilizes automated real-time polymerase chain reaction (PCR) to detect toxin gene sequences associated with toxin producing C. difficile. The Xpert C. difficile Assay is intended as an aid in the diagnosis of CDI.   CBC with platelets   Result Value Ref Range    WBC Count 10.0 4.0 - 11.0 10e3/uL    RBC Count 3.48 (L) 3.80 - 5.20 10e6/uL    Hemoglobin 11.3 (L) 11.7 - 15.7 g/dL    Hematocrit 33.6 (L) 35.0 - 47.0 %    MCV 97 78 - 100 fL    MCH 32.5 26.5 - 33.0 pg    MCHC 33.6 31.5 - 36.5 g/dL    RDW 13.8 10.0 - 15.0 %    Platelet Count 170 150 - 450 10e3/uL   Lipase   Result Value Ref Range    Lipase 127 (H) 13 - 60 U/L   Renal panel   Result Value Ref Range    Sodium 133 (L) 135 - 145 mmol/L    Potassium 3.9 3.4 - 5.3 mmol/L    Chloride 98 98 - 107 mmol/L    Carbon Dioxide (CO2) 22 22 - 29 mmol/L    Anion Gap 13 7 - 15 mmol/L    Glucose 80 70 - 99 mg/dL    Urea Nitrogen 19.5 8.0 - 23.0 mg/dL     Creatinine 1.15 (H) 0.51 - 0.95 mg/dL    GFR Estimate 50 (L) >60 mL/min/1.73m2    Calcium 8.4 (L) 8.8 - 10.4 mg/dL    Albumin 3.2 (L) 3.5 - 5.2 g/dL    Phosphorus 2.6 2.5 - 4.5 mg/dL   CT Head w/o Contrast    Narrative    EXAM: CT HEAD W/O CONTRAST  LOCATION: [facility]  DATE: 5/7/2025    INDICATION: metastatic lung to brain, n v d, headaches  COMPARISON: Brain MRI 09/24/2024. Head CT 08/24/2024.  TECHNIQUE: Routine CT Head without IV contrast. Multiplanar reformats. Dose reduction techniques were used.    FINDINGS:  INTRACRANIAL CONTENTS: No intracranial hemorrhage, extraaxial collection, or mass effect.  Focus of decreased attenuation within the right centrum semiovale, which is new since the prior exams from 2024. Findings may represent a focus of vasogenic edema.   Mild presumed chronic small vessel ischemic changes. Mild generalized volume loss. No hydrocephalus.     VISUALIZED ORBITS/SINUSES/MASTOIDS: No intraorbital abnormality. Mild mucosal thickening scattered about the paranasal sinuses. No middle ear or mastoid effusion.    BONES/SOFT TISSUES: No acute abnormality.      Impression    IMPRESSION:  1.  Focus of decreased attenuation within the right centrum semiovale, new since the prior exams from 2024. Given history of metastatic lung cancer, findings raise concern for vasogenic edema secondary to development of intracranial metastasis. Recommend   further evaluation with brain MRI with and without IV contrast.  2.  No acute intracranial hemorrhage.  3.  Mild generalized cerebral volume loss and presumed chronic microvascular ischemic changes of the white matter.       Medical Decision Making       Please see A&P for additional details of medical decision making.  MANAGEMENT DISCUSSED with the following over the past 24 hours: Bedside RN, ID   NOTE(S)/MEDICAL RECORDS REVIEWED over the past 24 hours: ID, GI, medicine  Tests personally interpreted in the past 24 hours:  - EKG  tracing showing QTc of 482  Tests ORDERED & REVIEWED in the past 24 hours:  - See lab/imaging results included in the data section of the note  Medical complexity over the past 24 hours:  -------------------------- HIGH RISK FOR MORBIDITY -------------------------------------------------------------  - Decision to continue to pursue versus de-escalate care based on prognosis

## 2025-05-07 NOTE — CONSULTS
CONSULTING PHYSICIAN   Otilia Espana MD     REASON FOR CONSULTATION   Nausea, vomiting, diarrhea     IMPRESSION   This is a 74-year-old woman with history of metastatic non-small cell lung cancer of the right lower lobe, with bone and cranial metastatic disease, hypertension who presents with the followin.  Fever: With acute gastrointestinal symptoms.  She may have a viral gastroenteritis process in evolution.  Stool studies negative however.  No other clear infection sources noted on imaging.  Urinalysis with protein but not clearly infected.  Negative influenza, COVID, RSV.  2.  Nausea/vomiting/diarrhea: As above, may be acute gastroenteritis.  Chemotherapy related gastrointestinal symptoms is also possible, given her recent new treatment.  3.  Mild acute kidney injury, likely prerenal  4.  Metastatic malignancy: The patient states that she does not want to live if she feels like this.  She is interested in hospice and would like to discuss further with palliative medicine today.  5.  Initial hypoxia: Resolved, no obvious finding on CT imaging.     RECOMMENDATIONS   1.  Supportive care for gastrointestinal symptoms, diet as tolerated.  2.  Defer to primary service on workup of her fever and antibiotic therapy.  3.  Her adagrasib has been held.  4.  Agree with palliative care consultation.  5.  IV fluid hydration.  6.  I will plan to follow-up tomorrow although I am not convinced that I will be offering much from a GI perspective, given that I suspect this will either reflect acute gastroenteritis in evolution versus medication induced symptoms.       HISTORY OF PRESENT ILLNESS   Inez Rodriguez is a pleasant 74 year old female with history of metastatic non-small cell lung cancer of the right lower lobe, with bone and cranial metastatic disease, hypertension who presented to the hospital with a fever to 104, hypoxia, vomiting and diarrhea.  She has had  gastrointestinal symptoms for 4 days now, with limited oral intake.  She started a new chemotherapy pill, adagrasib, 10 days ago.  Every time she takes the pill she has nausea.  She has tolerated some oral intake here in the hospital.  Stool studies are negative for C. difficile and viral/bacterial pathogens.  She is not neutropenic.  CT imaging does not show obvious source for her fever.    She has not had history of ulcer disease or diarrhea in the past.  She denies travel or sick contacts.    The patient states that she is fed up with feeling ill and would like to see someone from hospice.     PAST HISTORY   Past Medical History:   Diagnosis Date    Hypertension     SOB (shortness of breath)       Past Surgical History:   Procedure Laterality Date    BRONCHOSCOPY RIGID OR FLEXIBLE W/TRANSENDOSCOPIC ENDOBRONCHIAL ULTRASOUND GUIDED N/A 1/8/2024    Procedure: BRONCHOSCOPY, WITH ENDOBRONCHIAL ULTRASOUND WITH FINE NEEDLE ASPIRATION OF LYMPH NODE;  Surgeon: Angelia Mustafa MD;  [place] [facility] [place]     CHEST PORT PLACEMENT > 5 YRS OF AGE  2/12/2024    TONSILLECTOMY      at 8 years of age        Family History Social History   No family history on file. Social History     Socioeconomic History    Marital status:      Spouse name: None    Number of children: None    Years of education: None    Highest education level: None   Tobacco Use    Smoking status: Never    Smokeless tobacco: Never   Substance and Sexual Activity    Alcohol use: Never     Social Drivers of Health     Financial Resource Strain: Low Risk  (1/11/2024)    Financial Resource Strain     Within the past 12 months, have you or your family members you live with been unable to get utilities (heat, electricity) when it was really needed?: No   Food Insecurity: Low Risk  (1/11/2024)    Food Insecurity     Within the past 12 months, did you worry that your food would run out before you got money to buy more?: No     Within the past 12 months,  did the food you bought just not last and you didn t have money to get more?: No   Transportation Needs: Low Risk  (1/11/2024)    Transportation Needs     Within the past 12 months, has lack of transportation kept you from medical appointments, getting your medicines, non-medical meetings or appointments, work, or from getting things that you need?: No   Interpersonal Safety: Low Risk  (1/11/2024)    Interpersonal Safety     Do you feel physically and emotionally safe where you currently live?: Yes     Within the past 12 months, have you been hit, slapped, kicked or otherwise physically hurt by someone?: No     Within the past 12 months, have you been humiliated or emotionally abused in other ways by your partner or ex-partner?: No   Housing Stability: Low Risk  (1/11/2024)    Housing Stability     Do you have housing? : Yes     Are you worried about losing your housing?: No         MEDICATIONS & ALLERGIES   (Not in a hospital admission)       ALLERGIES   No Known Allergies      REVIEW OF SYSTEMS    See HPI for pertinent positives and negatives. A comprehensive review of systems was performed and was otherwise noncontributory.     OBJECTIVE   Vitals Blood pressure (!) 176/77, pulse 113, temperature 101  F (38.3  C), resp. rate 20, SpO2 95%.           Physical  Exam  GENERAL: Tired, alert and oriented, no acute distress.    HEENT: atraumatic, anicteric, moist mucous membranes, neck soft/supple    PULMONARY: normal resp effort, breath sounds clear to auscultation bilaterally   CARDIOVASCULAR: normal rate and rhythm, no murmurs   ABDOMEN: soft, no tenderness, no distention, bowel sounds normal. No hepatosplenomegaly.  No ascites.   MUSCULOSKELETAL: joints grossly normal   NEUROLOGICAL: appropriate mental status, grossly intact   PSYCHIATRIC: normal mood, affect and insight   SKIN: warm and dry, no rashes   EXT: no edema        LABORATORY    ELECTROLYTE PANEL   Recent Labs   Lab 05/07/25  0800 05/06/25  1447   *  135   POTASSIUM 3.9 3.6   CHLORIDE 98 94*   CO2 22 26   GLC 80 117*   CR 1.15* 1.40*   BUN 19.5 26.4*      HEMATOLOGY PANEL   Recent Labs   Lab 05/07/25  0659 05/06/25  1447   HGB 11.3* 12.3   MCV 97 95   WBC 10.0 9.3    207      LIVER AND PANCREAS PANEL   Recent Labs   Lab 05/07/25  0800   LIPASE 127*     IMAGING STUDIES    EXAM: CT HEAD W/O CONTRAST  LOCATION: [facility]  DATE: 5/7/2025     INDICATION: metastatic lung to brain, n v d, headaches  COMPARISON: Brain MRI 09/24/2024. Head CT 08/24/2024.  TECHNIQUE: Routine CT Head without IV contrast. Multiplanar reformats. Dose reduction techniques were used.     FINDINGS:  INTRACRANIAL CONTENTS: No intracranial hemorrhage, extraaxial collection, or mass effect.  Focus of decreased attenuation within the right centrum semiovale, which is new since the prior exams from 2024. Findings may represent a focus of vasogenic edema.   Mild presumed chronic small vessel ischemic changes. Mild generalized volume loss. No hydrocephalus.      VISUALIZED ORBITS/SINUSES/MASTOIDS: No intraorbital abnormality. Mild mucosal thickening scattered about the paranasal sinuses. No middle ear or mastoid effusion.     BONES/SOFT TISSUES: No acute abnormality.                                                                      IMPRESSION:  1.  Focus of decreased attenuation within the right centrum semiovale, new since the prior exams from 2024. Given history of metastatic lung cancer, findings raise concern for vasogenic edema secondary to development of intracranial metastasis. Recommend   further evaluation with brain MRI with and without IV contrast.  2.  No acute intracranial hemorrhage.  3.  Mild generalized cerebral volume loss and presumed chronic microvascular ischemic changes of the white matter.    EXAM: CT ABDOMEN PELVIS W CONTRAST  LOCATION: [facility]  DATE: 5/6/2025     INDICATION: Nausea, vomiting with lung CA metastatic  and fever,   COMPARISON: PET/CT 3/25/2025.  TECHNIQUE: CT scan of the abdomen and pelvis was performed following injection of IV contrast. Multiplanar reformats were obtained. Dose reduction techniques were used.  CONTRAST: isovue 370 75ml     FINDINGS:   LOWER CHEST: No acute abnormality at the lower lungs.     HEPATOBILIARY: No focal hepatic observation. No calcified gallstones identified. Common bile duct is 6 mm.     PANCREAS: No focal pancreas lesion identified. There is mild dilatation of the main pancreas duct measuring 4 mm, previously 2 to 3 mm. Pancreas divisum. No focal pancreas lesion identified.     SPLEEN: Normal.     ADRENAL GLANDS: Normal.     KIDNEYS/BLADDER: No significant mass, stones, or hydronephrosis. There are simple or benign cysts. No follow up is needed. Decompressed bladder.     BOWEL: No evidence for appendicitis. No bowel obstruction. Colonic diverticula without focal diverticulitis. No free fluid or free air. No abscess.     LYMPH NODES: Normal.     VASCULATURE: Scattered vascular calcifications.     PELVIC ORGANS: No acute pelvic abnormality identified.     MUSCULOSKELETAL: No focal bony abnormality identified.                                                                      IMPRESSION:   1.  No acute abnormality identified.  2.  Mildly increased distention of the pancreas duct but no specific etiology can be seen. If relevant, this could be further evaluated with nonemergent MRCP/MRI.  3.  Colonic diverticulosis.    EXAM: CT CHEST PULMONARY EMBOLISM W CONTRAST  LOCATION: [facility]  DATE: 5/6/2025     INDICATION: lung CA metastatic to bone with fever and hypoxia and SOB  COMPARISON: PET-CT 3/25/2025 and prior  TECHNIQUE: CT chest pulmonary angiogram during arterial phase injection of IV contrast. Multiplanar reformats and MIP reconstructions were performed. Dose reduction techniques were used.   CONTRAST: isovue 370 75ml     FINDINGS:  ANGIOGRAM  CHEST: Pulmonary arteries are normal caliber and negative for pulmonary emboli. Thoracic aorta is negative for dissection. No CT evidence of right heart strain.     LUNGS AND PLEURA: Unchanged linear scarring in the right upper lobe. No new or enlarging suspicious nodules.     MEDIASTINUM/AXILLAE: Unchanged ill-defined right perihilar soft tissue, which was FDG avid on the most recent PET/CT. Also unchanged nonenlarged previously FDG avid paratracheal and subcarinal lymph nodes. No enlarging lymph nodes. Left chest port   catheter tip in the lower SVC.     CORONARY ARTERY CALCIFICATION: Severe.     UPPER ABDOMEN: Normal.     MUSCULOSKELETAL: Normal.                                                                      IMPRESSION:  1.  No pulmonary embolism.  2.  Unchanged right perihilar soft tissue and nonenlarged mediastinal lymph nodes, which were FDG avid on the most recent PET/CT.     I have reviewed the current diagnostic and laboratory tests.             Total time spent providing patient care: 33 minutes with at least 50% spent in reviewing documentation/test results, decision making, and coordination of care.           Malgorzata Keating MD  Thank you for the opportunity to participate in the care of this patient.   Please feel free to call me with any questions or concerns.  Phone number [phone].

## 2025-05-07 NOTE — CONSULTS
"This is a inpatient neurologic consultation    Admitted to hospital 2025    Chief complaint  Metastatic cancer  Immunocompromised host with fever  Sepsis/gastroenteritis  Nausea vomiting diarrhea  Intracranial metastases    Treatment team  Hospital service  Infectious disease  Oncology  Gastroenterology  Radiation oncology      Hospital course  74-year-old came to the ER on 2025  Had fever with reading of \"104 \"  Nausea and vomiting  Patient on chemotherapy and is immunosuppressed  Follows at the [facility] for her cancer    Patient has a history of metastatic cancer of the right lower lobe.  Patient is on adagrasib, for treatment of her cancer and has not felt well over the last year  Metastatic lung cancer right lobe (stage Kylee non-small cell lung cancer)  Metastatic to lymph node and brain  Status post chemotherapy with carboplatin, pemetrexed and pembrolizumab from 2024 to 2025     Has been seen by palliative care today 2025  Patient stated her goal was to return home discontinue further cancer treatment and like to focus on comfort while remaining in the home until end-of-life.    Discussed the case twice with primary MD/hospitalist Dr. Espana.      Past medical history  Metastatic lung cancer right lobe (stage Kylee non-small cell lung cancer)  Metastatic to lymph node and brain      Habits  Current non-smoker (quit )  Does not drink alcohol  Currently       Family history  Father brain cancer  at age 45        Workup  MRI head 2025 compared to 2024 see official report  1. Interval development of numerous small metastasis throughout the bilateral cerebral hemispheres.   2. Development of multiple foci of restricted diffusion.      Some of these are without enhancement, suggesting acute to subacute infarction.      (Bilateral occipital lobes/cerebellar hemisphere/bilateral centrum semiovale subacute stroke versus metastases, largest is in the right centrum " semiovale)     Others demonstrate minimal enhancement, suggesting these may be metastatic in etiology.      Follow-up imaging would be helpful in further evaluation of evolution.      No significant mass effect or hemorrhagic transformation.   3. Other miscellaneous findings, and details, see official report.   MRI brain with and without contrast 5/7/2025 see official report  1.  Small focus of apparent diffusion restriction within the right centrum semiovale is favored to represent a subacute infarction.  2.  Several small presumable metastases within the frontal lobes that are without mass effect.  3.  Small enhancing foci within the frontal and occipital calvarium are suspicious for metastases.  CT head 5/7/2025 see official report  1.  Focus of decreased attenuation within the right centrum semiovale, new since the prior exams from 2024.       Given history of metastatic lung cancer, findings raise concern for vasogenic edema secondary to development of intracranial metastasis.   2.  No acute intracranial hemorrhage.  3.  Mild generalized cerebral volume loss and presumed chronic microvascular ischemic changes of the white matter.    Laboratory data  Sodium 133 potassium 3.99, BUN 19.5, creatinine 1.15  Glucose 80  WBC 10.0, hemoglobin 11.3, platelets 170,000  C. difficile negative  COVID/influenza A/influenza B/RSV negative        Exam  Blood pressure 176/77, pulse 113, temperature 101  Blood pressure range 110//77  Pulse range 93-1 26  Tmax 101.1    Review of system  Pertinent positives and negatives  Nausea if she eats anything less so when she is not eating  Denies stiff neck  Has some headache but it moves around not on just 1 place  No diplopia no dysarthria no dysphagia  No focal weakness more weight generalized fatigue  Had some diarrhea  Otherwise review of systems negative    General Exam per primary MD  Alert and attentive oriented x 3  Neck supple  HEENT unremarkable  Lungs moving air  bilaterally  Heart rate regular  No edema in the feet  Abdomen soft    Neurologic exam  Alert oriented x 3  Normal prosody of speech  Normal naming  Normal comprehension  Normal repetition  No aphasia  No neglect  Finer memory not tested    Cranial nerves II through XII  Pupils equal  Visual fields intact  No ophthalmoplegia  No nystagmus  Face symmetrical  Tongue twisters good    Upper extremities  Rapid altering movements symmetrical    Lower extremities  Moves okay in the bed    Gait  Deferred          Assessment/plan    1.  Lung cancer metastatic with lesions in the brain       Treatment per oncology    2.  Fever immunocompromised question infection/sepsis       Workup per ID    3.  Most recent MRI head 5/7/2025 brings up the concern of subacute ischemia right centrum semiovale       Patient had some changes in the similar distribution on MRI head 4/7/2025.           Diagnosis  Lung cancer  Metastatic disease to the brain    Patient had previous diffusion changes back in April 2025  Unclear if these are truly ischemic versus low-grade metastatic disease with some diffusion change    At this point patient is being treated for fever and infection  Hold off on further aggressive workup for vascular disease.  Patient contemplating backing off on aggressive treatment and considering palliative/hospice/comfort type measures.    I did talk personally with hospitalist face-to-face  My partner Dr. Gonzales will follow-up tomorrow if patient's wishes have changed we can do further workup and interventions at that time.    I did review the previous scans and the scans above    Total care time today 80 minutes evaluating and assessing patient and data.

## 2025-05-07 NOTE — CONSULTS
[facility] Radiation Oncology Inpatient Consult Note    Patient: Inez Rodriguez  MRN: [id number]  Date of Service: 5/7/2025      Reason for Visit    I was consulted by .No ref. provider found regarding Brain mets     Assessment/Plan    Active Problems:    Immunocompromised    YAMILKA (acute kidney injury)    Acute respiratory failure with hypoxia (H)    Sepsis, due to unspecified organism, unspecified whether acute organ dysfunction present (H)     Patient with small bilateral brain metastases without significant edema, unlikely contributing to symptoms.  No need for urgent radiation therapy.    Patient considering hospice, if not pursued and she desires further therapy:  Could follow-up with [facility] as outpatient for consideration of brain radiation as previously discussed with them.    Also happy to see the patient if she prefers to come here for radiation as outpatient for brain radiation.    ECOG Performance Status: (2) Ambulatory and capable of self care, unable to carry out work activity, up and about > 50% or waking hours    Staging History    Cancer Staging   No matching staging information was found for the patient.    History    Has a known history of metastatic non-small cell lung cancer diagnosed initially in January 2024. Biopsy showed adenocarcinoma. Treated with carboplatinum pemetrexed and pembrolizumab. She was on maintenance therapy with Keytruda until earlier this year. PET scan done in March showed progressive disease.  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 Met with [facility] and started on adagrasib about a week ago. 600 mg twice daily.     4/7/2025 MRI brain: numerous small metastasis throughout the bilateral cerebral hemispheres. Development of multiple foci of restricted diffusion. Some of these are without enhancement, suggesting acute to subacute infarction. Others demonstrate minimal enhancement, suggesting these may be metastatic in etiology. Follow-up imaging would be  "helpful in further evaluation of evolution.    Plan per notes from [place] : \"MRI showed multiple small lesions consistent with metastases. She is completely asymptomatic. We discussed the option of starting 1st on Adagrasib given the CNS efficacy of around 45% and delaying the option of whole-brain radiation for the next 6 weeks.\"    5/6/2024 patient presented to the ED with sepsis, acute respiratory failure with hypoxia and acute kidney injury-on vancomycin and cefepime.     5/7/2025 CT head : Focus of decreased attenuation within the right centrum semiovale, new since the prior exams from 2024. Given history of metastatic lung cancer, findings raise concern for vasogenic edema secondary to development of intracranial metastasis. Recommend  further evaluation with brain MRI with and without IV contrast.     5/7/2025's MRI brain: Small focus diffusion restriction within the right centrum semiovale favored to represent subacute infarct.  Several small presumable metastasis within the frontal lobes without mass effect.  Small enhancing foci in the frontal and occipital calvarium.    Seen by Neurology given concern for new subacute infarct.  Seen by palliative care and considering hospice    Past History    Past Medical History:   Diagnosis Date    Hypertension     SOB (shortness of breath)     No family history on file.  Past Surgical History:   Procedure Laterality Date    BRONCHOSCOPY RIGID OR FLEXIBLE W/TRANSENDOSCOPIC ENDOBRONCHIAL ULTRASOUND GUIDED N/A 1/8/2024    Procedure: BRONCHOSCOPY, WITH ENDOBRONCHIAL ULTRASOUND WITH FINE NEEDLE ASPIRATION OF LYMPH NODE;  Surgeon: Angelia Mustafa MD;  Location: [facility] OR     CHEST PORT PLACEMENT > 5 YRS OF AGE  2/12/2024    TONSILLECTOMY      at 8 years of age      Social History     Socioeconomic History    Marital status:      Spouse name: Not on file    Number of children: Not on file    Years of education: Not on file    Highest education level: Not on " file   Occupational History    Not on file   Tobacco Use    Smoking status: Never    Smokeless tobacco: Never   Vaping Use    Vaping status: Not on file   Substance and Sexual Activity    Alcohol use: Never    Drug use: Not on file    Sexual activity: Not on file   Other Topics Concern    Not on file   Social History Narrative    Not on file     Social Drivers of Health     Financial Resource Strain: Low Risk  (1/11/2024)    Financial Resource Strain     Within the past 12 months, have you or your family members you live with been unable to get utilities (heat, electricity) when it was really needed?: No   Food Insecurity: Low Risk  (1/11/2024)    Food Insecurity     Within the past 12 months, did you worry that your food would run out before you got money to buy more?: No     Within the past 12 months, did the food you bought just not last and you didn t have money to get more?: No   Transportation Needs: Low Risk  (1/11/2024)    Transportation Needs     Within the past 12 months, has lack of transportation kept you from medical appointments, getting your medicines, non-medical meetings or appointments, work, or from getting things that you need?: No   Physical Activity: Not on file   Stress: Not on file   Social Connections: Not on file   Interpersonal Safety: Low Risk  (1/11/2024)    Interpersonal Safety     Do you feel physically and emotionally safe where you currently live?: Yes     Within the past 12 months, have you been hit, slapped, kicked or otherwise physically hurt by someone?: No     Within the past 12 months, have you been humiliated or emotionally abused in other ways by your partner or ex-partner?: No   Housing Stability: Low Risk  (1/11/2024)    Housing Stability     Do you have housing? : Yes     Are you worried about losing your housing?: No       Allergies    No Known Allergies     Physical Exam    No exam performed today     Lab Results     Results for orders placed or performed in visit on  25   Comprehensive metabolic panel    Collection Time: 25  8:30 AM   Result Value Ref Range    Sodium 141 135 - 145 mmol/L    Potassium 4.3 3.4 - 5.3 mmol/L    Carbon Dioxide (CO2) 28 22 - 29 mmol/L    Anion Gap 11 7 - 15 mmol/L    Urea Nitrogen 19.7 8.0 - 23.0 mg/dL    Creatinine 0.77 0.51 - 0.95 mg/dL    GFR Estimate 80 >60 mL/min/1.73m2    Calcium 9.6 8.8 - 10.4 mg/dL    Chloride 102 98 - 107 mmol/L    Glucose 162 (H) 70 - 99 mg/dL    Alkaline Phosphatase 57 40 - 150 U/L    AST 25 0 - 45 U/L    ALT 34 0 - 50 U/L    Protein Total 6.5 6.4 - 8.3 g/dL    Albumin 4.2 3.5 - 5.2 g/dL    Bilirubin Total 0.2 <=1.2 mg/dL     No results found for this or any previous visit.       Personally reviewed current and prior imaging including previous brain MRIs and head CT   Imaging Results    Echocardiogram Complete  Result Date: 2025  [id number]^[email or web address]^[place] [address]  Name: ROCHELLE AVILA MRN: [id number] : 1950 Study Date: 2025 03:39 PM Age: 74 yrs Gender: Female Patient Location: [facility] Reason For Study: CVA Ordering Physician: DEIDRA CAST Performed By: WILMA  BSA: 1.6 m2 Height: 65 in Weight: 130 lb HR: 94 BP: 176/77 mmHg ______________________________________________________________________________ Procedure Echocardiogram with two-dimensional, color and spectral Doppler. ______________________________________________________________________________ Interpretation Summary  1. Normal left ventricular size and systolic performance. The visually estimated ejection fraction is 55-60%. 2. There is trace-mild aortic insufficiency. 3. Normal right ventricular size and systolic performance. ______________________________________________________________________________ Left ventricle: Normal left ventricular size and systolic performance. The visually estimated ejection fraction is 55-60%. There is normal regional wall  motion. Left ventricular wall thickness is normal.  Assessment of LV Diastolic Function: The cumulative findings suggest normal diastolic filling [The septal e' velocity is < 7 cm/s & lateral e' velocity is < 10 cm/s. The average E/e' is < 14. TR velocity is < 2.8 m/s. Left atrial volume index is less than 34 mL/mÂ ].  Right ventricle: Normal right ventricular size and systolic performance.  Left atrium: The left atrium is of normal size.  Right atrium: The right atrium is of normal size.  IVC: The IVC is of normal caliber.  Aortic valve: The aortic valve is comprised of three cusps. There is no significant aortic stenosis. There is trace-mild aortic insufficiency.  Mitral valve: The mitral valve appears morphologically normal. There is trace mitral insufficiency.  Tricuspid valve: The tricuspid valve is grossly morphologically normal. There is trace tricuspid insufficiency.  Pulmonic valve: The pulmonic valve is grossly morphologically normal.  Thoracic aorta: The aortic root and proximal ascending aorta are of normal dimension.  Pericardium: There is no significant pericardial effusion. ______________________________________________________________________________ ______________________________________________________________________________ MMode/2D Measurements & Calculations IVSd: 0.81 cm LVIDd: 4.8 cm LVIDs: 3.1 cm LVPWd: 0.82 cm FS: 35.3 % LV mass(C)d: 131.1 grams LV mass(C)dI: 79.6 grams/m2 Ao root diam: 3.3 cm LA dimension: 2.9 cm asc Aorta Diam: 3.4 cm LA/Ao: 0.88 LVOT diam: 2.1 cm LVOT area: 3.5 cm2 Ao root diam index Ht(cm/m): 2.0 Ao root diam index BSA (cm/m2): 2.0 Asc Ao diam index BSA (cm/m2): 2.1 Asc Ao diam index Ht(cm/m): 2.1 EF Biplane: 57.3 % LA Volume (BP): 39.0 ml  LA Volume Index (BP): 23.6 ml/m2 LA Volume Indexed (AL/bp): 26.3 ml/m2 RWT: 0.34 TAPSE: 2.0 cm  Time Measurements MM HR: 94.0 BPM  Doppler Measurements & Calculations MV E max paul: 81.4 cm/sec MV A max paul: 117.0 cm/sec MV E/A: 0.70  MV dec slope: 566.0 cm/sec2 MV dec time: 0.14 sec Ao V2 max: 132.0 cm/sec Ao max P.0 mmHg Ao V2 mean: 92.2 cm/sec Ao mean P.0 mmHg Ao V2 VTI: 23.0 cm SHARRI(I,D): 2.8 cm2 SHARRI(V,D): 2.5 cm2 LV V1 max PG: 3.8 mmHg LV V1 max: 96.9 cm/sec LV V1 VTI: 18.4 cm SV(LVOT): 63.6 ml SI(LVOT): 38.6 ml/m2 PA acc time: 0.06 sec TR max delgado: 217.0 cm/sec TR max P.8 mmHg AV Delgado Ratio (DI): 0.73 SHARRI Index (cm2/m2): 1.7 E/E': 8.5 E/E' avg: 10.5  Lateral E/e': 8.5 Medial E/e': 12.5 Peak E' Delgado: 9.6 cm/sec RV S Delgado: 17.3 cm/sec  ______________________________________________________________________________ Report approved by: Jesse Giraldo MD on 2025 04:41 PM       MR Brain w/o & w Contrast  Result Date: 2025  EXAM: MR BRAIN W/O and W CONTRAST LOCATION: [facility] DATE: 2025 INDICATION: 74 y o with metastatic lung cancer, to brain, last MRI 4 7 25 outsider on care everywhere, more n v headache, ?more mets COMPARISON: 2024, 2025 outside MRI report CONTRAST: 6 ml Gadavist TECHNIQUE: Routine multiplanar multisequence head MRI without and with intravenous contrast. FINDINGS: INTRACRANIAL CONTENTS: Small focus of apparent diffusion restriction within the centrum semiovale of the right frontal lobe that displays faint T2 hyperintense signal but no ADC hypointense signal. There is no mass effect or midline shift. Mild degree of  cerebral parenchymal volume loss. Numerous subcentimeter presumable metastases within the frontal lobes (series 1100, image 160 and 156). SELLA: No abnormality accounting for technique. OSSEOUS STRUCTURES/SOFT TISSUES: Small focus of enhancement within the occipital calvarium (series 1100, image 98) measuring 3 x 3 mm and within the diploic space of the frontal calvarium measuring 6 x 5 mm (series 1100, image 173). ORBITS: No abnormality accounting for technique. SINUSES/MASTOIDS: No paranasal sinus mucosal disease. Scattered fluid/membrane thickening in  the mastoid air cells bilaterally.     IMPRESSION: 1.  Small focus of apparent diffusion restriction within the right centrum semiovale is favored to represent a subacute infarction. 2.  Several small presumable metastases within the frontal lobes that are without mass effect. 3.  Small enhancing foci within the frontal and occipital calvarium are suspicious for metastases.    CT Head w/o Contrast  Result Date: 5/7/2025  EXAM: CT HEAD W/O CONTRAST LOCATION: [facility] DATE: 5/7/2025 INDICATION: metastatic lung to brain, n v d, headaches COMPARISON: Brain MRI 09/24/2024. Head CT 08/24/2024. TECHNIQUE: Routine CT Head without IV contrast. Multiplanar reformats. Dose reduction techniques were used. FINDINGS: INTRACRANIAL CONTENTS: No intracranial hemorrhage, extraaxial collection, or mass effect.  Focus of decreased attenuation within the right centrum semiovale, which is new since the prior exams from 2024. Findings may represent a focus of vasogenic edema.  Mild presumed chronic small vessel ischemic changes. Mild generalized volume loss. No hydrocephalus. VISUALIZED ORBITS/SINUSES/MASTOIDS: No intraorbital abnormality. Mild mucosal thickening scattered about the paranasal sinuses. No middle ear or mastoid effusion. BONES/SOFT TISSUES: No acute abnormality.     IMPRESSION: 1.  Focus of decreased attenuation within the right centrum semiovale, new since the prior exams from 2024. Given history of metastatic lung cancer, findings raise concern for vasogenic edema secondary to development of intracranial metastasis. Recommend  further evaluation with brain MRI with and without IV contrast. 2.  No acute intracranial hemorrhage. 3.  Mild generalized cerebral volume loss and presumed chronic microvascular ischemic changes of the white matter.    CT Chest Pulmonary Embolism w Contrast  Result Date: 5/6/2025  EXAM: CT CHEST PULMONARY EMBOLISM W CONTRAST LOCATION: [facility]  DATE: 5/6/2025 INDICATION: lung CA metastatic to bone with fever and hypoxia and SOB COMPARISON: PET-CT 3/25/2025 and prior TECHNIQUE: CT chest pulmonary angiogram during arterial phase injection of IV contrast. Multiplanar reformats and MIP reconstructions were performed. Dose reduction techniques were used. CONTRAST: isovue 370 75ml FINDINGS: ANGIOGRAM CHEST: Pulmonary arteries are normal caliber and negative for pulmonary emboli. Thoracic aorta is negative for dissection. No CT evidence of right heart strain. LUNGS AND PLEURA: Unchanged linear scarring in the right upper lobe. No new or enlarging suspicious nodules. MEDIASTINUM/AXILLAE: Unchanged ill-defined right perihilar soft tissue, which was FDG avid on the most recent PET/CT. Also unchanged nonenlarged previously FDG avid paratracheal and subcarinal lymph nodes. No enlarging lymph nodes. Left chest port catheter tip in the lower SVC. CORONARY ARTERY CALCIFICATION: Severe. UPPER ABDOMEN: Normal. MUSCULOSKELETAL: Normal.     IMPRESSION: 1.  No pulmonary embolism. 2.  Unchanged right perihilar soft tissue and nonenlarged mediastinal lymph nodes, which were FDG avid on the most recent PET/CT.     CT Abdomen Pelvis w Contrast  Result Date: 5/6/2025  EXAM: CT ABDOMEN PELVIS W CONTRAST LOCATION: [facility] DATE: 5/6/2025 INDICATION: Nausea, vomiting with lung CA metastatic and fever,  COMPARISON: PET/CT 3/25/2025. TECHNIQUE: CT scan of the abdomen and pelvis was performed following injection of IV contrast. Multiplanar reformats were obtained. Dose reduction techniques were used. CONTRAST: isovue 370 75ml FINDINGS: LOWER CHEST: No acute abnormality at the lower lungs. HEPATOBILIARY: No focal hepatic observation. No calcified gallstones identified. Common bile duct is 6 mm. PANCREAS: No focal pancreas lesion identified. There is mild dilatation of the main pancreas duct measuring 4 mm, previously 2 to 3 mm. Pancreas divisum. No focal  pancreas lesion identified. SPLEEN: Normal. ADRENAL GLANDS: Normal. KIDNEYS/BLADDER: No significant mass, stones, or hydronephrosis. There are simple or benign cysts. No follow up is needed. Decompressed bladder. BOWEL: No evidence for appendicitis. No bowel obstruction. Colonic diverticula without focal diverticulitis. No free fluid or free air. No abscess. LYMPH NODES: Normal. VASCULATURE: Scattered vascular calcifications. PELVIC ORGANS: No acute pelvic abnormality identified. MUSCULOSKELETAL: No focal bony abnormality identified.     IMPRESSION: 1.  No acute abnormality identified. 2.  Mildly increased distention of the pancreas duct but no specific etiology can be seen. If relevant, this could be further evaluated with nonemergent MRCP/MRI. 3.  Colonic diverticulosis.    MR Brain without and with IV Contrast  Result Date: 4/7/2025  EXAM: MR BRAIN WITHOUT AND WITH IV CONTRAST COMPARISON: Brain MRI 9/24/2024. FINDINGS: Since 9/24/2024 interval development of numerous small focus of enhancement throughout the bilateral cerebral hemispheres is consistent with metastasis. The largest one is in the left frontal lobe, measuring 0.6 cm (series 9 image 124). No substantial surrounding edema or mass effect. Interval development of foci of restricted diffusion and T2/FLAIR high signal intensity in bilateral occipital lobes (series 6 image 57,58 ), cerebellar hemispheres (series 6 image46, 49, 52), bilateral centrum semiovale (series 6 image 65) may represent  acute infarction, though some of these could be small foci of metastatic disease. The largest one in the right centrum semiovale (series 4 image 26) with less restricted diffusion, is likely subacute infarction. No significant mass effect or hemorrhagic  transformation. Stable mild chronic small vessel ischemic changes and generalized parenchymal volume loss. Intracranial vascular flow voids are intact. Trace effusion in bilateral mastoid air cells. Scattered mucosal  thickening in paranasal sinuses.    1. Interval development of numerous small metastasis throughout the bilateral cerebral hemispheres. 2. Development of multiple foci of restricted diffusion. Some of these are without enhancement, suggesting acute to subacute infarction. Others demonstrate minimal enhancement, suggesting these may be metastatic in etiology. Follow-up imaging would be helpful in further evaluation of evolution. No significant mass effect or hemorrhagic transformation. 3. Other miscellaneous findings, and details, as noted.       Pathology    No results found for this or any previous visit (from the past 8760 hours).    Total time of this visit, including time spent face-to-face with patient and or via video/audio, and also in preparing for today's visit for MDM and documentation. Medical decision-making included consideration and possible diagnoses, management options, complex record review, review of diagnostic tests and information, consideration and discussion of significant complications based on comorbidities, discussion with providers involved in the care of the patient.     40 Minutes spent.     This note has been dictated using voice recognition software and as a result may contain minor grammatical errors and unintended word substitutions.    Signed by: Lupe Sherman MD

## 2025-05-07 NOTE — PHARMACY-VANCOMYCIN DOSING SERVICE
Pharmacy Vancomycin Initial Note  Date of Service May 6, 2025  Patient's  1950  74 year old, female    Indication: Sepsis    Current estimated CrCl = Estimated Creatinine Clearance: 32.9 mL/min (A) (based on SCr of 1.4 mg/dL (H)).    Creatinine for last 3 days  2025:  2:47 PM Creatinine 1.40 mg/dL    Recent Vancomycin Level(s) for last 3 days  No results found for requested labs within last 3 days.      Vancomycin IV Administrations (past 72 hours)                     vancomycin (VANCOCIN) 1,250 mg in 0.9% NaCl 262.5 mL intermittent infusion ()  Restarted 25 1652     1,250 mg New Bag  1556                    Nephrotoxins and other renal medications (From now, onward)      None            Contrast Orders - past 72 hours (72h ago, onward)      Start     Dose/Rate Route Frequency Stop    25 1630  iopamidol (ISOVUE-370) solution 75 mL         75 mL Intravenous ONCE 25 1621            [employer] Prediction of Planned Initial Vancomycin Regimen  Loading dose: N/A  Regimen: 1250 mg IV every 36 hours.  Start time: 16:00 on 2025- 24 hours after last dose  Exposure target: AUC24 (range)400-600 mg/L.hr   AUC24,ss: 492 mg/L.hr  Probability of AUC24 > 400: 74 %  Ctrough,ss: 14.2 mg/L  Probability of Ctrough,ss > 20: 18 %  Probability of nephrotoxicity (Lodise KEVIN ): 9 %        Plan:  Start vancomycin  1250 mg IV q36h.   Vancomycin monitoring method: AUC  Vancomycin therapeutic monitoring goal: 400-600 mg*h/L  Pharmacy will check vancomycin levels as appropriate in 1-3 Days.    Serum creatinine levels will be ordered daily for the first week of therapy and at least twice weekly for subsequent weeks.    Pt currently in YAMILKA, suspect CrCl improvement with am labs, will adjust dose/frequency as needed if this is the case    Mary Vasquez, PharmD, BCPS 25 7:47 PM

## 2025-05-07 NOTE — CONSULTS
Consultation - INFECTIOUS DISEASE CONSULTATION  Inez Rodriguez,  1950, MRN [id number]      Sepsis, due to unspecified organism, unspecified whether acute organ dysfunction present (H) [A41.9]  Immunocompromised [D84.9]  Acute respiratory failure with hypoxia (H) [J96.01]  YAMILKA (acute kidney injury) [N17.9]    PCP: Lynn Bowles, [phone]   Code status:  No CPR- Do NOT Intubate               Chief Complaint: Sepsis.     Assessment:  Inez Rodriguez is a 74 year old old female with   Metastatic lung cancer of the right lower lobe (stage Kylee non-small cell lung cancer ) with metastasis to lymph node and to the brain.  Status post chemotherapy with carboplatin, pemetrexed and pembrolizumab from 2024 to 2025. On therapy with adagrasib orally.  Follows with Dr. Enrique Shah through the [facility].  Sepsis with fever, nausea, vomiting, diarrhea.  Blood cultures in process.  UA not impressive.  UCx in process.  Very mild elevation of procalcitonin.  Influenza A/B, RSV, SARS-CoV-2 PCR negative.  C. difficile negative.  Enteric bacterial and viral panel in process. Suspecting side effects from Adagrasib since nausea, vomiting and diarrhea started since she was started on this agent. The fever is new and warrants further investigation as being done.   YAMILKA.  Improving.  Acute hypoxic respiratory failure      Recommendations:   Continue vancomycin and cefepime for now.  Follow-up pending enteric bacterial and viral panel.  Follow-up pending blood cultures.  Monitor temperature, oxygen need.  Consider holding Adagrasib.     Discussed with the patient, nursing staff.    Thank you for letting us be part of the patient care team. We will follow.    Jennifer Georges MD,MD  [facility].   [facility]  Office Telephone [phone].  Fax [phone]  [facility] paging    HPI:    Inez Rodriguez is a 74 year old old female. History is provided by the  patient, chart review.  ID is asked to see this patient for sepsis.  The patient has a history of stage IV lung cancer with metastasis to the brain and lymph node with detail treatment as above.  She is status post chemotherapy with carboplatin, pemetrexed and pembrolizumab from February 2024 to March 2025.  Currently on adagrasib.  She is admitted with a constellation of symptoms including documented fever 104 at home, nausea, vomiting, diarrhea.  She was also found to have hypoxic respiratory failure.  Workup in process as detailed above.  On IV vancomycin and cefepime.  Seen today, patient feels about the same. Her nausea, vomiting and diarrhea started since initiation of therapy with Adagrasib. The fever is new for a day prior to admission. Denies any joint pain, swelling or cough.         ===========================================    Medical History  Past Medical History:   Diagnosis Date    Hypertension     SOB (shortness of breath)         Surgical History  Past Surgical History:   Procedure Laterality Date    BRONCHOSCOPY RIGID OR FLEXIBLE W/TRANSENDOSCOPIC ENDOBRONCHIAL ULTRASOUND GUIDED N/A 1/8/2024    Procedure: BRONCHOSCOPY, WITH ENDOBRONCHIAL ULTRASOUND WITH FINE NEEDLE ASPIRATION OF LYMPH NODE;  Surgeon: Angelia Mustafa MD;  Location: [facility] OR     CHEST PORT PLACEMENT > 5 YRS OF AGE  2/12/2024    TONSILLECTOMY      at 8 years of age            Social History  Reviewed, and she  reports that she has never smoked. She has never used smokeless tobacco. She reports that she does not drink alcohol.        Family History  No family history on file.   Psychosocial Needs  Social History     Social History Narrative    Not on file     Additional psychosocial needs reviewed per nursing assessment.       No Known Allergies     (Not in a hospital admission)       Review of Systems:  Negative except for findings in the HPI Physical Exam:  Temp:  [97.8  F (36.6  C)-101.1  F (38.4  C)] 101  F (38.3   C)  Pulse:  [] 113  Resp:  [20-24] 20  BP: (110-176)/(54-77) 176/77  SpO2:  [87 %-97 %] 95 %      Gen: Pleasant in no acute distress.  HEENT: NCAT. EOMI. PERRL.  Neck: No bruit, JVD or thyromegaly.  Lungs: Clear to ascultation bilat with no crackles or wheezes.  Card: RRR. NSR. No RMG. Peripheral pulses present and symmetric. No edema.  Abd: Soft NT ND. No mass. Normal bowel sounds.  Skin: No rash.  Extr: No edema.  Neuro: Alert and oriented to place time and person. Cranial nerves II to XII intact. Motor and sensory intact.        ============================    Pertinent Labs  personally reviewed.   Recent Labs   Lab 05/07/25  0659 05/06/25  1447   WBC 10.0 9.3   HGB 11.3* 12.3   HCT 33.6* 37.3    207       Recent Labs   Lab 05/07/25  0800 05/06/25  1447   * 135   CO2 22 26   BUN 19.5 26.4*   ALBUMIN 3.2*  --        MICROBIOLOGY DATA:  Personally reviewed      Pertinent Radiology  personally reviewed.   Study Result    Narrative & Impression   EXAM: CT CHEST PULMONARY EMBOLISM W CONTRAST  LOCATION: [facility]  DATE: 5/6/2025     INDICATION: lung CA metastatic to bone with fever and hypoxia and SOB  COMPARISON: PET-CT 3/25/2025 and prior  TECHNIQUE: CT chest pulmonary angiogram during arterial phase injection of IV contrast. Multiplanar reformats and MIP reconstructions were performed. Dose reduction techniques were used.   CONTRAST: isovue 370 75ml     FINDINGS:  ANGIOGRAM CHEST: Pulmonary arteries are normal caliber and negative for pulmonary emboli. Thoracic aorta is negative for dissection. No CT evidence of right heart strain.     LUNGS AND PLEURA: Unchanged linear scarring in the right upper lobe. No new or enlarging suspicious nodules.     MEDIASTINUM/AXILLAE: Unchanged ill-defined right perihilar soft tissue, which was FDG avid on the most recent PET/CT. Also unchanged nonenlarged previously FDG avid paratracheal and subcarinal lymph nodes. No enlarging lymph  nodes. Left chest port   catheter tip in the lower SVC.     CORONARY ARTERY CALCIFICATION: Severe.     UPPER ABDOMEN: Normal.     MUSCULOSKELETAL: Normal.                                                                      IMPRESSION:  1.  No pulmonary embolism.  2.  Unchanged right perihilar soft tissue and nonenlarged mediastinal lymph nodes, which were FDG avid on the most recent PET/CT.        Narrative & Impression   EXAM: CT ABDOMEN PELVIS W CONTRAST  LOCATION: [facility]  DATE: 5/6/2025     INDICATION: Nausea, vomiting with lung CA metastatic and fever,   COMPARISON: PET/CT 3/25/2025.  TECHNIQUE: CT scan of the abdomen and pelvis was performed following injection of IV contrast. Multiplanar reformats were obtained. Dose reduction techniques were used.  CONTRAST: isovue 370 75ml     FINDINGS:   LOWER CHEST: No acute abnormality at the lower lungs.     HEPATOBILIARY: No focal hepatic observation. No calcified gallstones identified. Common bile duct is 6 mm.     PANCREAS: No focal pancreas lesion identified. There is mild dilatation of the main pancreas duct measuring 4 mm, previously 2 to 3 mm. Pancreas divisum. No focal pancreas lesion identified.     SPLEEN: Normal.     ADRENAL GLANDS: Normal.     KIDNEYS/BLADDER: No significant mass, stones, or hydronephrosis. There are simple or benign cysts. No follow up is needed. Decompressed bladder.     BOWEL: No evidence for appendicitis. No bowel obstruction. Colonic diverticula without focal diverticulitis. No free fluid or free air. No abscess.     LYMPH NODES: Normal.     VASCULATURE: Scattered vascular calcifications.     PELVIC ORGANS: No acute pelvic abnormality identified.     MUSCULOSKELETAL: No focal bony abnormality identified.                                                                      IMPRESSION:   1.  No acute abnormality identified.  2.  Mildly increased distention of the pancreas duct but no specific etiology  can be seen. If relevant, this could be further evaluated with nonemergent MRCP/MRI.  3.  Colonic diverticulosis.

## 2025-05-07 NOTE — PROGRESS NOTES
Patient alert, oriented x 4. Complained of back and left leg pain rating 4/10, declined pain medication. On oxygen 2 LPM, saturation 93-95 %. Had fever 100.7 but improved to 98.4. Ambulated with assist x 1 to the bathroom. Tolerated cefepime infusion okay. Will continue to monitor the patient.

## 2025-05-08 ENCOUNTER — APPOINTMENT (OUTPATIENT)
Dept: OCCUPATIONAL THERAPY | Facility: HOSPITAL | Age: 75
End: 2025-05-08
Payer: COMMERCIAL

## 2025-05-08 ENCOUNTER — APPOINTMENT (OUTPATIENT)
Dept: ULTRASOUND IMAGING | Facility: HOSPITAL | Age: 75
End: 2025-05-08
Attending: PSYCHIATRY & NEUROLOGY
Payer: COMMERCIAL

## 2025-05-08 LAB
ALBUMIN SERPL BCG-MCNC: 2.8 G/DL (ref 3.5–5.2)
ANION GAP SERPL CALCULATED.3IONS-SCNC: 13 MMOL/L (ref 7–15)
BUN SERPL-MCNC: 15 MG/DL (ref 8–23)
CALCIUM SERPL-MCNC: 8.3 MG/DL (ref 8.8–10.4)
CHLORIDE SERPL-SCNC: 102 MMOL/L (ref 98–107)
CREAT SERPL-MCNC: 0.92 MG/DL (ref 0.51–0.95)
EGFRCR SERPLBLD CKD-EPI 2021: 65 ML/MIN/1.73M2
ERYTHROCYTE [DISTWIDTH] IN BLOOD BY AUTOMATED COUNT: 13.9 % (ref 10–15)
GLUCOSE BLDC GLUCOMTR-MCNC: 128 MG/DL (ref 70–99)
GLUCOSE BLDC GLUCOMTR-MCNC: 153 MG/DL (ref 70–99)
GLUCOSE BLDC GLUCOMTR-MCNC: 62 MG/DL (ref 70–99)
GLUCOSE BLDC GLUCOMTR-MCNC: 63 MG/DL (ref 70–99)
GLUCOSE BLDC GLUCOMTR-MCNC: 68 MG/DL (ref 70–99)
GLUCOSE BLDC GLUCOMTR-MCNC: 69 MG/DL (ref 70–99)
GLUCOSE BLDC GLUCOMTR-MCNC: 75 MG/DL (ref 70–99)
GLUCOSE BLDC GLUCOMTR-MCNC: 91 MG/DL (ref 70–99)
GLUCOSE SERPL-MCNC: 80 MG/DL (ref 70–99)
HCO3 SERPL-SCNC: 21 MMOL/L (ref 22–29)
HCT VFR BLD AUTO: 28.5 % (ref 35–47)
HGB BLD-MCNC: 9.6 G/DL (ref 11.7–15.7)
MCH RBC QN AUTO: 31.9 PG (ref 26.5–33)
MCHC RBC AUTO-ENTMCNC: 33.7 G/DL (ref 31.5–36.5)
MCV RBC AUTO: 95 FL (ref 78–100)
PHOSPHATE SERPL-MCNC: 2.7 MG/DL (ref 2.5–4.5)
PLATELET # BLD AUTO: 188 10E3/UL (ref 150–450)
POTASSIUM SERPL-SCNC: 3.5 MMOL/L (ref 3.4–5.3)
RBC # BLD AUTO: 3.01 10E6/UL (ref 3.8–5.2)
SODIUM SERPL-SCNC: 136 MMOL/L (ref 135–145)
WBC # BLD AUTO: 9.3 10E3/UL (ref 4–11)

## 2025-05-08 PROCEDURE — 85027 COMPLETE CBC AUTOMATED: CPT | Performed by: INTERNAL MEDICINE

## 2025-05-08 PROCEDURE — 99233 SBSQ HOSP IP/OBS HIGH 50: CPT | Performed by: STUDENT IN AN ORGANIZED HEALTH CARE EDUCATION/TRAINING PROGRAM

## 2025-05-08 PROCEDURE — 250N000012 HC RX MED GY IP 250 OP 636 PS 637: Mod: JB | Performed by: NURSE PRACTITIONER

## 2025-05-08 PROCEDURE — 120N000001 HC R&B MED SURG/OB

## 2025-05-08 PROCEDURE — 97165 OT EVAL LOW COMPLEX 30 MIN: CPT | Mod: GO

## 2025-05-08 PROCEDURE — 250N000013 HC RX MED GY IP 250 OP 250 PS 637: Performed by: INTERNAL MEDICINE

## 2025-05-08 PROCEDURE — 250N000011 HC RX IP 250 OP 636: Performed by: INTERNAL MEDICINE

## 2025-05-08 PROCEDURE — 250N000012 HC RX MED GY IP 250 OP 636 PS 637: Performed by: INTERNAL MEDICINE

## 2025-05-08 PROCEDURE — 250N000011 HC RX IP 250 OP 636: Performed by: STUDENT IN AN ORGANIZED HEALTH CARE EDUCATION/TRAINING PROGRAM

## 2025-05-08 PROCEDURE — 999N000226 HC STATISTIC SLP IP EVAL DEFER

## 2025-05-08 PROCEDURE — 99232 SBSQ HOSP IP/OBS MODERATE 35: CPT | Performed by: NURSE PRACTITIONER

## 2025-05-08 PROCEDURE — 250N000013 HC RX MED GY IP 250 OP 250 PS 637: Performed by: NURSE PRACTITIONER

## 2025-05-08 PROCEDURE — 93880 EXTRACRANIAL BILAT STUDY: CPT

## 2025-05-08 PROCEDURE — 99233 SBSQ HOSP IP/OBS HIGH 50: CPT | Performed by: PSYCHIATRY & NEUROLOGY

## 2025-05-08 PROCEDURE — 97535 SELF CARE MNGMENT TRAINING: CPT | Mod: GO

## 2025-05-08 PROCEDURE — 250N000011 HC RX IP 250 OP 636

## 2025-05-08 PROCEDURE — 99233 SBSQ HOSP IP/OBS HIGH 50: CPT | Performed by: INTERNAL MEDICINE

## 2025-05-08 PROCEDURE — 258N000003 HC RX IP 258 OP 636: Performed by: INTERNAL MEDICINE

## 2025-05-08 PROCEDURE — 82310 ASSAY OF CALCIUM: CPT | Performed by: INTERNAL MEDICINE

## 2025-05-08 RX ORDER — OCTREOTIDE ACETATE 100 UG/ML
100 INJECTION, SOLUTION INTRAVENOUS; SUBCUTANEOUS 3 TIMES DAILY
Status: DISCONTINUED | OUTPATIENT
Start: 2025-05-08 | End: 2025-05-11

## 2025-05-08 RX ORDER — DEXTROSE MONOHYDRATE AND SODIUM CHLORIDE 5; .9 G/100ML; G/100ML
INJECTION, SOLUTION INTRAVENOUS CONTINUOUS
Status: DISCONTINUED | OUTPATIENT
Start: 2025-05-08 | End: 2025-05-10

## 2025-05-08 RX ORDER — LOPERAMIDE HYDROCHLORIDE 2 MG/1
2 CAPSULE ORAL 3 TIMES DAILY PRN
Status: DISCONTINUED | OUTPATIENT
Start: 2025-05-08 | End: 2025-05-08

## 2025-05-08 RX ORDER — ATORVASTATIN CALCIUM 10 MG/1
20 TABLET, FILM COATED ORAL EVERY EVENING
Status: DISCONTINUED | OUTPATIENT
Start: 2025-05-08 | End: 2025-05-12 | Stop reason: HOSPADM

## 2025-05-08 RX ORDER — LOPERAMIDE HYDROCHLORIDE 2 MG/1
4 CAPSULE ORAL EVERY 4 HOURS
Status: DISCONTINUED | OUTPATIENT
Start: 2025-05-08 | End: 2025-05-10

## 2025-05-08 RX ORDER — DIPHENHYDRAMINE HCL 25 MG
25 CAPSULE ORAL EVERY 6 HOURS PRN
Status: DISCONTINUED | OUTPATIENT
Start: 2025-05-08 | End: 2025-05-12 | Stop reason: HOSPADM

## 2025-05-08 RX ADMIN — ONDANSETRON 4 MG: 4 TABLET, ORALLY DISINTEGRATING ORAL at 21:10

## 2025-05-08 RX ADMIN — FOLIC ACID 1000 MCG: 1 TABLET ORAL at 08:20

## 2025-05-08 RX ADMIN — CEFEPIME HYDROCHLORIDE 2 G: 2 INJECTION, POWDER, FOR SOLUTION INTRAVENOUS at 02:06

## 2025-05-08 RX ADMIN — ONDANSETRON 4 MG: 4 TABLET, ORALLY DISINTEGRATING ORAL at 13:52

## 2025-05-08 RX ADMIN — IBUPROFEN 200 MG: 200 TABLET, FILM COATED ORAL at 21:10

## 2025-05-08 RX ADMIN — OCTREOTIDE ACETATE 100 MCG: 100 INJECTION, SOLUTION INTRAVENOUS; SUBCUTANEOUS at 13:52

## 2025-05-08 RX ADMIN — LOPERAMIDE HYDROCHLORIDE 4 MG: 2 CAPSULE ORAL at 12:25

## 2025-05-08 RX ADMIN — LOPERAMIDE HYDROCHLORIDE 2 MG: 2 CAPSULE ORAL at 10:06

## 2025-05-08 RX ADMIN — SODIUM CHLORIDE 1250 MG: 0.9 INJECTION, SOLUTION INTRAVENOUS at 13:35

## 2025-05-08 RX ADMIN — DEXTROSE AND SODIUM CHLORIDE: 5; 900 INJECTION, SOLUTION INTRAVENOUS at 11:13

## 2025-05-08 RX ADMIN — CEFEPIME HYDROCHLORIDE 2 G: 2 INJECTION, POWDER, FOR SOLUTION INTRAVENOUS at 15:49

## 2025-05-08 RX ADMIN — LOPERAMIDE HYDROCHLORIDE 4 MG: 2 CAPSULE ORAL at 15:55

## 2025-05-08 RX ADMIN — ATORVASTATIN CALCIUM 20 MG: 10 TABLET, FILM COATED ORAL at 21:09

## 2025-05-08 RX ADMIN — ONDANSETRON 4 MG: 4 TABLET, ORALLY DISINTEGRATING ORAL at 08:20

## 2025-05-08 RX ADMIN — OCTREOTIDE ACETATE 100 MCG: 100 INJECTION, SOLUTION INTRAVENOUS; SUBCUTANEOUS at 21:12

## 2025-05-08 RX ADMIN — AMLODIPINE BESYLATE 5 MG: 5 TABLET ORAL at 08:20

## 2025-05-08 RX ADMIN — PREDNISONE 2.5 MG: 2.5 TABLET ORAL at 08:20

## 2025-05-08 RX ADMIN — PANTOPRAZOLE SODIUM 40 MG: 40 INJECTION, POWDER, FOR SOLUTION INTRAVENOUS at 10:26

## 2025-05-08 RX ADMIN — SODIUM CHLORIDE: 0.9 INJECTION, SOLUTION INTRAVENOUS at 10:07

## 2025-05-08 RX ADMIN — LOPERAMIDE HYDROCHLORIDE 4 MG: 2 CAPSULE ORAL at 21:10

## 2025-05-08 RX ADMIN — DIPHENHYDRAMINE HYDROCHLORIDE 25 MG: 25 CAPSULE ORAL at 17:24

## 2025-05-08 ASSESSMENT — ACTIVITIES OF DAILY LIVING (ADL)
DEPENDENT_IADLS:: CLEANING;LAUNDRY
ADLS_ACUITY_SCORE: 38
ADLS_ACUITY_SCORE: 40
IADL_COMMENTS: IND. W/ IADL ROUTINE
ADLS_ACUITY_SCORE: 40
ADLS_ACUITY_SCORE: 39
ADLS_ACUITY_SCORE: 40
ADLS_ACUITY_SCORE: 39
ADLS_ACUITY_SCORE: 40
ADLS_ACUITY_SCORE: 40
ADLS_ACUITY_SCORE: 39
ADLS_ACUITY_SCORE: 39
ADLS_ACUITY_SCORE: 38
ADLS_ACUITY_SCORE: 39
ADLS_ACUITY_SCORE: 39
ADLS_ACUITY_SCORE: 38
ADLS_ACUITY_SCORE: 39
ADLS_ACUITY_SCORE: 40
ADLS_ACUITY_SCORE: 39
ADLS_ACUITY_SCORE: 40

## 2025-05-08 NOTE — PLAN OF CARE
Goal Outcome Evaluation:                          A&Ox4. Assist of 1 to the bathroom. On tele, sinus tach. NIHSS score was 0, pt refused second NIHSS assessement. Carotid US completed. Pt had 4 loose BM today, MD notified, Imodium and Octreotide ordered. Appetite and PO intake is poor, pt did not eat breakfast but did eat some soup for lunch. Blood sugars were 68, 62, and 91, MD notified, D5 NS ordered. IV fluids infusing @ 50ml/hr per orders. Denies pain.

## 2025-05-08 NOTE — PROGRESS NOTES
Occupational Therapy      05/08/25 0730   Appointment Info   Signing Clinician's Name / Credentials (OT) Emi Sheldon OTR/L   Living Environment   People in Home child(bernice), adult  (lives w/ son in home)   Current Living Arrangements house   Home Accessibility stairs within home   Number of Stairs, Within Home, Primary greater than 10 stairs   Stair Railings, Within Home, Primary railings safe and in good condition   Transportation Anticipated family or friend will provide   Living Environment Comments Pt lives in home w/s son pt son is able to assist as needed per pt report   Self-Care   Usual Activity Tolerance good   Current Activity Tolerance moderate   Regular Exercise No   Equipment Currently Used at Home none   Fall history within last six months yes   Number of times patient has fallen within last six months 1   Activity/Exercise/Self-Care Comment Ind. w/ ADL tasks at baseline   Instrumental Activities of Daily Living (IADL)   IADL Comments Ind. w/ IADL routine   General Information   Onset of Illness/Injury or Date of Surgery 05/06/25   Referring Physician Otilia Espana MD   Additional Occupational Profile Info/Pertinent History of Current Problem 74 year old female with PMHx of HTN, metastatic lung cancer of the right lower lobe, thyroid nodule and pulmonary nodule who presents for fatigue and fever.She notes all symptoms of fever, n/v/d all started after starting oral chemo days prior.   Existing Precautions/Restrictions fall   Cognitive Status Examination   Orientation Status orientation to person, place and time   Range of Motion Comprehensive   General Range of Motion no range of motion deficits identified   Bed Mobility   Bed Mobility No deficits identified   Transfers   Transfers sit-stand transfer   Sit-Stand Transfer   Sit-Stand [place] (Transfers) contact guard;verbal cues;supervision   Balance   Balance Assessment sit to stand dynamic balance   Sit-to-Stand Balance supervision;verbal  cues;contact guard   Activities of Daily Living   BADL Assessment/Intervention lower body dressing   Lower Body Dressing Assessment/Training   [place] (Lower Body Dressing) pants/bottoms;contact guard assist   Clinical Impression   Criteria for Skilled Therapeutic Interventions Met (OT) Yes, treatment indicated   OT Diagnosis decreased act. tolerance   OT Problem List-Impairments impacting ADL problems related to;activity tolerance impaired;balance;strength   Assessment of Occupational Performance 1-3 Performance Deficits   Identified Performance Deficits decreased act. tolerance/act. tolerance   Planned Therapy Interventions (OT) ADL retraining;balance training;strengthening;transfer training;home program guidelines;progressive activity/exercise   Clinical Decision Making Complexity (OT) problem focused assessment/low complexity   Risk & Benefits of therapy have been explained evaluation/treatment results reviewed;risks/benefits reviewed;patient   OT Total Evaluation Time   OT Eval, Low Complexity Minutes (87939) 10   OT Goals   Therapy Frequency (OT) 5 times/week   OT Predicted Duration/Target Date for Goal Attainment 05/15/25   OT Goals Hygiene/Grooming;Lower Body Dressing;Toilet Transfer/Toileting;Aerobic Activity   OT: Hygiene/Grooming modified independent;while standing   OT: Lower Body Dressing Modified independent;using adaptive equipment   OT: Toilet Transfer/Toileting Modified independent;using adaptive equipment   OT: Perform aerobic activity with stable cardiovascular response continuous activity;15 minutes;ambulation   Self-Care/Home Management   Self-Care/Home Mgmt/ADL, Compensatory, Meal Prep Minutes (23001) 10   Symptoms Noted During/After Treatment (Meal Preparation/Planning Training) fatigue   Treatment Detail/Skilled Intervention Pt up in bed upon arrival agreeable to OOB act. w/ encouragement, pt reporting have freq. loose BM after drinking fluids, once standing pt reporting feeling  she was incontinent of BM, ambulation to<>from bathroom SBA/CGA w/o device no LOB, extended time for toileting task cues for tech/safety for LB dressing, SBA/CGA to stand to pull up clothing and g/h at sink slightly unsteady no LOB. Pt up in recliner at end.   OT Discharge Planning   OT Plan g/h at sink, monitor balance w/ basic ADL routine   OT Discharge Recommendation (DC Rec) home with assist   OT Rationale for DC Rec Pt reporting she lives in home w/ son who can assist as needed w/ IADL routine. Pt reports she is very weak since starting new chemo meds and has had a fall at home recently.   OT Brief overview of current status CGA trnfs   Total Session Time   Timed Code Treatment Minutes 10   Total Session Time (sum of timed and untimed services) 20
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
## 2025-05-08 NOTE — CONSULTS
"CLINICAL NUTRITION SERVICES - ASSESSMENT NOTE    RECOMMENDATIONS FOR MDs/PROVIDERS TO ORDER:  If having nausea with meals recommend scheduling zofran with meals instead of tid    Registered Dietitian Interventions:  New weight  Trial corie farms x 1, gel plus and magic cup    Future/Additional Recommendations:  -Monitor ability to add mvi with minerals d/t not meeting RDIs with inadequate intake, when able to tolerate p.o  -Adjust supplements pending intake, weight, tolerance, acceptance, GOC, labs       REASON FOR ASSESSMENT  Provider order- cancer pt . albumin 2.8, gi issues     INFORMATION OBTAINED  Assessed patient in room. Pt reports she has not had diarrhea after drinking liquids this am. She does not have nausea at this time but has not eaten yet. She reports not much appetite but did order lunch (grilled cheese and tomato soup) and \"we'll see how it goes\". Pt is willing to try some supplements she hasn't had     NUTRITION HISTORY  Pt with N/V/D x 4 days PTA, limited intake.. Recently started new oral chemo 10 days PTA    Pt working with palliative/oncology/GI to define etiology of sx  with possible to stop chemo at home and focus on comfort    Pt has not had any past RD encounters  Pt reports N/V/D and not being able to keep any intake down since last Wednesday (1 week).   She has tried ensure and boost in the past and does not like them. She prefers healthier, organic whole foods and makes her own protein shakes with collagen powder    CURRENT NUTRITION ORDERS  Diet: Orders Placed This Encounter      Combination Diet Regular Diet Adult      CURRENT INTAKE/TOLERANCE  Intake yesterday not documented- 3 meals ordered at 1736 kcal, 82 g protein  Ordered breakfast this am  Ate 1/2 banana first night    Receiving NS IVF at 50 m/hr    LABS  Nutrition-relevant labs:   No concerns. YAMILKA resolved  BG 63-75 mg/dl. Hypoglycemia at 0200    MEDICATIONS  Nutrition-relevant medications:   Iv abx, folic acid, zofran tid, iv " "protonix, prednisone. Prn imodium added    ANTHROPOMETRICS  Height: 165.1 cm (5' 5\")  IBW: 57 kg  BMI: Body mass index is 21.67 kg/m .   Weight History: Weights vary PTA. Trending up since last September. 2+ BLE edema  No weight taken here  04/16/25 57.2 kg (126 lb 1.7 oz)   Wt Readings from Last 10 Encounters:   03/27/25 59.1 kg (130 lb 3.2 oz)   01/23/25 57.6 kg (126 lb 14.4 oz)   12/12/24 55.2 kg (121 lb 11.2 oz)   10/31/24 54.1 kg (119 lb 3.2 oz)   09/26/24 52.3 kg (115 lb 4.8 oz)   09/23/24 55 kg (121 lb 4.8 oz)   09/09/24 54.4 kg (120 lb)   08/24/24 53.5 kg (118 lb)   08/19/24 54 kg (119 lb)   07/30/24 57 kg (125 lb 9.6 oz)   05/16/24 57.5 kg (126 lb 12.8 oz    Dosing Weight: 57.2 kg, based on last known wt    ASSESSED NUTRITION NEEDS  Estimated Energy Needs: 7827-2438 kcals/day (30 - 35 kcals/kg)  Justification: Increased needs  Estimated Protein Needs: 57-68 grams protein/day (1 - 1.2 grams of pro/kg)  Justification: Hypercatabolism with acute illness and Repletion  Estimated Fluid Needs: 7851-0541 mL/day (25 - 30 mL/kg)  Justification: Maintenance    SYSTEM AND PHYSICAL FINDINGS    Skin is loose/dry  GI symptoms:   Intermittent nausea  1 loose BM yesterday. 3 today so far  Skin/wounds:   No concerns    MALNUTRITION  % Intake: </= 50% for >/= 5 days (severe)  % Weight Loss: Unable to assess - no recent wt taken  Subcutaneous Fat Loss: Buccal: Mild  Muscle Loss: Temples (temporalis muscle): Mild, Interosseous muscles: Mild, and Thigh (quadriceps): Mild  Fluid Accumulation/Edema: Moderate to severe, 2-4+  Malnutrition Diagnosis: Severe malnutrition in the context of acute illness or injury  Malnutrition Present on Admission: Yes    NUTRITION DIAGNOSIS  Malnutrition (undernutrition) related to altered GI function as evidenced by N/V/D x 1 week, minimal intake, BLE edema    GOALS  -Meet > 75% of estimated nutrition needs pending GOC  -Maintain weight     MONITORING/EVALUATION  Progress toward goals will be " monitored and evaluated per policy.

## 2025-05-08 NOTE — CONSULTS
Care Management Initial Consult    General Information  Assessment completed with: Patient,    Type of CM/SW Visit: Initial Assessment    Primary Care Provider verified and updated as needed: No   Readmission within the last 30 days: no previous admission in last 30 days      Reason for Consult: discharge planning, end of life/hospice  Advance Care Planning: Advance Care Planning Reviewed: verified with patient          Communication Assessment  Patient's communication style: spoken language (English or Bilingual)    Hearing Difficulty or Deaf: no   Wear Glasses or Blind: yes    Cognitive  Cognitive/Neuro/Behavioral: WDL, orientation        Orientation: oriented x 4             Living Environment:   People in home: child(bernice), adult  son Kenneth  Current living Arrangements: house      Able to return to prior arrangements: other (see comments) (TBD)       Family/Social Support:  Care provided by: self  Provides care for: no one     Support system: Children          Description of Support System: Supportive, Involved         Current Resources:   Patient receiving home care services: No        Community Resources: None  Equipment currently used at home: none  Supplies currently used at home: None    Employment/Financial:  Employment Status: retired        Financial Concerns: none   Referral to Financial Worker: No       Does the patient's insurance plan have a 3 day qualifying hospital stay waiver?  Yes     Which insurance plan 3 day waiver is available? Alternative insurance waiver    Will the waiver be used for post-acute placement? Undetermined at this time    Lifestyle & Psychosocial Needs:  Social Drivers of Health     Food Insecurity: Low Risk  (5/7/2025)    Food Insecurity     Within the past 12 months, did you worry that your food would run out before you got money to buy more?: No     Within the past 12 months, did the food you bought just not last and you didn t have money to get more?: No   Depression: At  risk (4/29/2025)    Received from [facility]    PHQ-2     PHQ-2 Score: 4   Housing Stability: Low Risk  (5/7/2025)    Housing Stability     Do you have housing? : Yes     Are you worried about losing your housing?: No   Tobacco Use: Medium Risk (4/16/2025)    Received from [facility]    Patient History     Smoking Tobacco Use: Former     Smokeless Tobacco Use: Never     Passive Exposure: Not on file   Financial Resource Strain: Low Risk  (5/7/2025)    Financial Resource Strain     Within the past 12 months, have you or your family members you live with been unable to get utilities (heat, electricity) when it was really needed?: No   Alcohol Use: Not on file   Transportation Needs: Low Risk  (5/7/2025)    Transportation Needs     Within the past 12 months, has lack of transportation kept you from medical appointments, getting your medicines, non-medical meetings or appointments, work, or from getting things that you need?: No   Physical Activity: Not on file   Interpersonal Safety: Low Risk  (5/7/2025)    Interpersonal Safety     Do you feel physically and emotionally safe where you currently live?: Yes     Within the past 12 months, have you been hit, slapped, kicked or otherwise physically hurt by someone?: No     Within the past 12 months, have you been humiliated or emotionally abused in other ways by your partner or ex-partner?: No   Stress: Not on file   Social Connections: Not on file   Health Literacy: Not on file       Functional Status:  Prior to admission patient needed assistance:   Dependent ADLs:: Independent  Dependent IADLs:: Cleaning, Laundry       Mental Health Status:          Chemical Dependency Status:                Values/Beliefs:  Spiritual, Cultural Beliefs, Mormonism Practices, Values that affect care:                 Discussed  Partnership in Safe Discharge Planning  document with patient/family: No    Additional Information:  Met with patient to review role of care management,  progression of care and possible need for services at discharge, including OP services, home care, or skilled nursing care. Patient alert, oriented and engaged in the conversation but sleepy . Writer then verified patient demographics and updated any changes needed in the patient chart.  Patient lives in a house with her adult son Kenneth. She is independent with her adls and son helps with chores around the house.   Patient has metastatic cancer. MD placed consult for hospice. [facility] request.   Patient would like to meet with hospice and make plans for discharge    Next Steps: CM to follow and assist with discharge planning as needed    Aspen Gamboa, DELISACC

## 2025-05-08 NOTE — PHARMACY-VANCOMYCIN DOSING SERVICE
Vancomycin dosing. Sepsis  Current dosing = 1250mg q36hrs.  Renal function improving, creat 1.4 to 0.92    Using insight-rx vancomycin dosing.  And goal -600.    With current dosing. AUC <400  Regimen: 1250 mg IV every 36 hours.  Start time: 04:56 on 05/09/2025  Exposure target: AUC24 (range)400-600 mg/L.hr   AUC24,ss: 375 mg/L.hr  Probability of AUC24 > 400: 43 %  Ctrough,ss: 9.4 mg/L  Probability of Ctrough,ss > 20: 7 %  Probability of nephrotoxicity (Lodise KEVIN 2009): 5 %    Will adjust dose to 1250mg q24h    Regimen: 1250 mg IV every 24 hours.  Start time: 04:56 on 05/09/2025  Exposure target: AUC24 (range)400-600 mg/L.hr   AUC24,ss: 548 mg/L.hr  Probability of AUC24 > 400: 82 %  Ctrough,ss: 16 mg/L  Probability of Ctrough,ss > 20: 31 %  Probability of nephrotoxicity (Lodise KEVIN 2009): 11 %  Will continue to follow.  Víctor Benavides [facility] on 5/8/2025 at 8:01 AM

## 2025-05-08 NOTE — PLAN OF CARE
Patient arrived to room P106 with belongings and son at bedside. Cancer medications sent to pharmacy and returned to floor. Patient on tele and Ivf infusing.

## 2025-05-08 NOTE — PROGRESS NOTES
SLP orders received. Chart reviewed and discussed with care team as well as the pt in her room.? Speech-Language Pathology Evaluations not indicated due to no reported or resolved deficits related to speech, language, or cognition. Patient tolerating current diet of Regular and Thin with no s/s aspiration per RN. No acute SLP needs identified.? Defer discharge recommendations to treatment team.? Will complete orders.

## 2025-05-08 NOTE — PROGRESS NOTES
"  PALLIATIVE CARE PROGRESS NOTE  [facility]     Patient Name: Inez Rodriguez  Date of Admission: 5/6/2025   Today the patient was seen for: Goals of care follow up, patient support, symptom management     Recommendations & Counseling       GOALS OF CARE:   Goals have historically been life-prolonging; patient said today that she is feeling worse with each cancer treatment and has decided to pursue hospice enrollment at discharge.   Patient is saying that right now her goal is to return home and discontinue any further cancer treatment, states that she is \"wasting time\" continuing on cancer treatment and just does not feel that it will meaningfully improve or extend her life at this point.  Patient states her son is having difficulty with her diagnosis and with the idea of her discontinuing chemotherapy.  Patient disclosed that she wishes to discuss her choice for hospice enrollment directly with Kenneth in lieu of a formal family meeting while she is hospitalized.  Primary concern today is management of diarrhea and planning for discharge home with hospice.    ADVANCE CARE PLANNING:  No health care directive on file.   Son Kenneth Rodriguez is next of kin and only primary family member.  There is no POLST form on file, recommend to complete prior to DC.  Code status: No CPR- Do NOT Intubate    MEDICAL MANAGEMENT:   #Nausea, uncertain etiology (infectious vs side effect of adagrasib).  Reports it is improved today.  -Pharmacologic management   Recommend scheduling ondansetron 4 mg ODT TID-ordered by palliative.  Recommend available prochlorperazine 10 mg tab PO q6 PRN  -Nonpharmacologic management  Small, frequent intake  Assess and avoid aggravating factors  Accupressure (C-band)  Aromatherapy, alcohol swabs known to be effective     #Diarrhea, uncertain etiology (infectious vs side effect of adagrasib)   Patient describes estimated 4 days of watery nonbloody diarrhea, unable to eat or drink " "without prompt episode of diarrhea.  Hypoglycemic this morning.  Imodium 4 mg every 4 hours  Octreotide 100 mcg subcutaneous three times daily (scheduled)    PSYCHOSOCIAL/SPIRITUAL:  Family; son Kenneth is her only primary family member  Friends   Stephanie community: No [place]     Palliative Care will continue to follow. Thank you for the consult and allowing us to aid in the care of Inez Rodriguez.    These recommendations have been discussed with hospital medicine, nursing staff, chaplaincy.    TANESHA Tolentino CNP  [facility], Palliative Care  Securely message with the [employer] Web Console (learn more here) or  Text page via [facility] Paging/Directory        Assessment          Inez Rodriguez is a 74 year old female with a past medical history of advanced stage IV non-small cell lung cancer originating in [facility] (dx 01/2024) and metastasized to sacrum and thoracic vertebrae, subsequently underwent combination chemo-immunotherapy, progressive disease confirmed early 2025, and began seeking care through [facility] 04/2025 and since started oral treatment as second-line therapy with adagrasib she was admitted to hospital 5/6/25 for evaluation and management of symptoms of nausea with vomiting and diarrhea; fevers (Tmax of 104  F) meeting SIRS criteria.     Today the patient was seen for:  Symptom assessment and management.  Anticipatory guidance  Support with goals of care      Interval History:     Multidisciplinary collaboration:  CM involved to facilitate discharge home with hospice enrollment.      Patient/family narrative  Met with Inez at the bedside.  Reviewed morning conversation held with hospitalist and plan to pursue hospice enrollment at discharge with plan to return home.  Reviewed patient's feelings of \"being done\".  She reflected that her life is not quality with the persistent side effects of chemotherapy.  States that she does not want to dwindle with these side effects and wishes to be comfortable and " living life to the best of her ability.      Discussed Kenneth's struggle with her thoughts surrounding discontinuation of chemotherapy.  Stated that she felt she would like to disclose her choice for hospice directly to him and declined a formal family conference.      Reviewed her concerns regarding diarrhea today.  States that she cannot eat or drink anything without immediate episode of watery diarrhea.  Reviewed escalation of treatments for today and ongoing case management involvement to plan discharge to home. She declined questions or concerns.  Will reach out to palliative care should she change her mind about a family meeting.     Review of Systems:     Besides above, ROS was reviewed and is unremarkable        Physical Exam:   Temp:  [97.6  F (36.4  C)-98.8  F (37.1  C)] 98.2  F (36.8  C)  Pulse:  [] 116  Resp:  [16-20] 20  BP: (110-147)/(56-66) 146/66  SpO2:  [90 %-95 %] 93 %  0 lbs 0 oz    Physical Exam  HENT:      Mouth/Throat:      Mouth: Mucous membranes are dry.      Pharynx: Oropharynx is clear.   Cardiovascular:      Rate and Rhythm: Normal rate.      Pulses: Normal pulses.   Pulmonary:      Effort: Pulmonary effort is normal.   Abdominal:      General: Abdomen is flat. Bowel sounds are increased.   Musculoskeletal:         General: Normal range of motion.   Skin:     General: Skin is warm and dry.   Neurological:      General: No focal deficit present.      Mental Status: She is alert.   Psychiatric:         Mood and Affect: Mood normal.         Behavior: Behavior normal.         Thought Content: Thought content normal.         Judgment: Judgment normal.           Data Reviewed:     Results for orders placed or performed during the hospital encounter of 05/06/25 (from the past 24 hours)   MR Brain w/o & w Contrast    Narrative    EXAM: MR BRAIN W/O and W CONTRAST  LOCATION: [facility]  DATE: 5/7/2025    INDICATION: 74 y o with metastatic lung cancer, to brain, last  MRI 4 7 25 outsider on care everywhere, more n v headache, ?more mets  COMPARISON: 2024, 2025 outside MRI report  CONTRAST: 6 ml Gadavist  TECHNIQUE: Routine multiplanar multisequence head MRI without and with intravenous contrast.    FINDINGS:  INTRACRANIAL CONTENTS: Small focus of apparent diffusion restriction within the centrum semiovale of the right frontal lobe that displays faint T2 hyperintense signal but no ADC hypointense signal. There is no mass effect or midline shift. Mild degree of   cerebral parenchymal volume loss. Numerous subcentimeter presumable metastases within the frontal lobes (series 1100, image 160 and 156).     SELLA: No abnormality accounting for technique.    OSSEOUS STRUCTURES/SOFT TISSUES: Small focus of enhancement within the occipital calvarium (series 1100, image 98) measuring 3 x 3 mm and within the diploic space of the frontal calvarium measuring 6 x 5 mm (series 1100, image 173).     ORBITS: No abnormality accounting for technique.     SINUSES/MASTOIDS: No paranasal sinus mucosal disease. Scattered fluid/membrane thickening in the mastoid air cells bilaterally.       Impression    IMPRESSION:    1.  Small focus of apparent diffusion restriction within the right centrum semiovale is favored to represent a subacute infarction.  2.  Several small presumable metastases within the frontal lobes that are without mass effect.  3.  Small enhancing foci within the frontal and occipital calvarium are suspicious for metastases.   Echocardiogram Complete    Narrative    [id number]  [id number]  [id number]  [id number]^DEIDRA^[place] [address]     Name: ROCHELLE AVILA  MRN: [id number]  : 1950  Study Date: 2025 03:39 PM  Age: 74 yrs  Gender: Female  Patient Location: [facility]  Reason For Study: CVA  Ordering Physician: DEIDRA CAST  Performed By: WILMA     BSA: 1.6 m2  Height: 65 in  Weight: 130 lb  HR: 94  BP: 176/77  mmHg  ______________________________________________________________________________  Procedure  Echocardiogram with two-dimensional, color and spectral Doppler.  ______________________________________________________________________________  Interpretation Summary     1. Normal left ventricular size and systolic performance. The visually  estimated ejection fraction is 55-60%.  2. There is trace-mild aortic insufficiency.  3. Normal right ventricular size and systolic performance.  ______________________________________________________________________________  Left ventricle:  Normal left ventricular size and systolic performance. The visually estimated  ejection fraction is 55-60%. There is normal regional wall motion. Left  ventricular wall thickness is normal.     Assessment of LV Diastolic Function: The cumulative findings suggest normal  diastolic filling [The septal e' velocity is < 7 cm/s & lateral e' velocity  is  < 10 cm/s. The average E/e' is < 14. TR velocity is < 2.8 m/s. Left atrial  volume index is less than 34 mL/mÂ ].     Right ventricle:  Normal right ventricular size and systolic performance.     Left atrium:  The left atrium is of normal size.     Right atrium:  The right atrium is of normal size.     IVC:  The IVC is of normal caliber.     Aortic valve:  The aortic valve is comprised of three cusps. There is no significant aortic  stenosis. There is trace-mild aortic insufficiency.     Mitral valve:  The mitral valve appears morphologically normal. There is trace mitral  insufficiency.     Tricuspid valve:  The tricuspid valve is grossly morphologically normal. There is trace  tricuspid insufficiency.     Pulmonic valve:  The pulmonic valve is grossly morphologically normal.     Thoracic aorta:  The aortic root and proximal ascending aorta are of normal dimension.     Pericardium:  There is no significant pericardial  effusion.  ______________________________________________________________________________  ______________________________________________________________________________  MMode/2D Measurements & Calculations  IVSd: 0.81 cm  LVIDd: 4.8 cm  LVIDs: 3.1 cm  LVPWd: 0.82 cm  FS: 35.3 %  LV mass(C)d: 131.1 grams  LV mass(C)dI: 79.6 grams/m2  Ao root diam: 3.3 cm  LA dimension: 2.9 cm  asc Aorta Diam: 3.4 cm  LA/Ao: 0.88  LVOT diam: 2.1 cm  LVOT area: 3.5 cm2  Ao root diam index Ht(cm/m): 2.0  Ao root diam index BSA (cm/m2): 2.0  Asc Ao diam index BSA (cm/m2): 2.1  Asc Ao diam index Ht(cm/m): 2.1  EF Biplane: 57.3 %  LA Volume (BP): 39.0 ml     LA Volume Index (BP): 23.6 ml/m2  LA Volume Indexed (AL/bp): 26.3 ml/m2  RWT: 0.34  TAPSE: 2.0 cm     Time Measurements  MM HR: 94.0 BPM     Doppler Measurements & Calculations  MV E max delgado: 81.4 cm/sec  MV A max delgado: 117.0 cm/sec  MV E/A: 0.70  MV dec slope: 566.0 cm/sec2  MV dec time: 0.14 sec  Ao V2 max: 132.0 cm/sec  Ao max P.0 mmHg  Ao V2 mean: 92.2 cm/sec  Ao mean P.0 mmHg  Ao V2 VTI: 23.0 cm  SHARRI(I,D): 2.8 cm2  SHARRI(V,D): 2.5 cm2  LV V1 max PG: 3.8 mmHg  LV V1 max: 96.9 cm/sec  LV V1 VTI: 18.4 cm  SV(LVOT): 63.6 ml  SI(LVOT): 38.6 ml/m2  PA acc time: 0.06 sec  TR max delgado: 217.0 cm/sec  TR max P.8 mmHg  AV Delgado Ratio (DI): 0.73  SHARRI Index (cm2/m2): 1.7  E/E': 8.5  E/E' avg: 10.5     Lateral E/e': 8.5  Medial E/e': 12.5  Peak E' Delgado: 9.6 cm/sec  RV S Delgado: 17.3 cm/sec     ______________________________________________________________________________  Report approved by: Jesse Giraldo MD on 2025 04:41 PM         Glucose by meter   Result Value Ref Range    GLUCOSE BY METER POCT 76 70 - 99 mg/dL   Glucose by meter   Result Value Ref Range    GLUCOSE BY METER POCT 63 (L) 70 - 99 mg/dL   Glucose by meter   Result Value Ref Range    GLUCOSE BY METER POCT 69 (L) 70 - 99 mg/dL   Glucose by meter   Result Value Ref Range    GLUCOSE BY METER POCT 75 70 - 99 mg/dL    Renal panel   Result Value Ref Range    Sodium 136 135 - 145 mmol/L    Potassium 3.5 3.4 - 5.3 mmol/L    Chloride 102 98 - 107 mmol/L    Carbon Dioxide (CO2) 21 (L) 22 - 29 mmol/L    Anion Gap 13 7 - 15 mmol/L    Glucose 80 70 - 99 mg/dL    Urea Nitrogen 15.0 8.0 - 23.0 mg/dL    Creatinine 0.92 0.51 - 0.95 mg/dL    GFR Estimate 65 >60 mL/min/1.73m2    Calcium 8.3 (L) 8.8 - 10.4 mg/dL    Albumin 2.8 (L) 3.5 - 5.2 g/dL    Phosphorus 2.7 2.5 - 4.5 mg/dL   CBC with platelets   Result Value Ref Range    WBC Count 9.3 4.0 - 11.0 10e3/uL    RBC Count 3.01 (L) 3.80 - 5.20 10e6/uL    Hemoglobin 9.6 (L) 11.7 - 15.7 g/dL    Hematocrit 28.5 (L) 35.0 - 47.0 %    MCV 95 78 - 100 fL    MCH 31.9 26.5 - 33.0 pg    MCHC 33.7 31.5 - 36.5 g/dL    RDW 13.9 10.0 - 15.0 %    Platelet Count 188 150 - 450 10e3/uL         Medical Decision Making       Please see A&P for additional details of medical decision making.  MANAGEMENT DISCUSSED with the following over the past 24 hours: hospital medicine, nursing staff, chaplaincy   NOTE(S)/MEDICAL RECORDS REVIEWED over the past 24 hours: Provider progress notes, specialist notes, nursing notes  Tests REVIEWED in the past 24 hours:  - See lab/imaging results included in the data section of the note  Medical complexity over the past 24 hours:  - Decision to DE-ESCALATE CARE based on prognosis

## 2025-05-08 NOTE — PLAN OF CARE
Problem: Gas Exchange Impaired  Goal: Optimal Gas Exchange  Outcome: Progressing     Problem: Adult Inpatient Plan of Care  Goal: Optimal Comfort and Wellbeing  Outcome: Progressing   Goal Outcome Evaluation:    Patient alert and oriented with VSS on 2L O2 via NC. Denies pain at rest. NIH scoring 0. Tele reading sinus tachy. Up SBA with IV pole, IVF running at 50 mL/hr. Port intact and patent. BG 63 overnight tolerated juice to correct. Wants imodium for loose stools but no BM overnight - sticky note left. No nausea reported. Voiding without issue.

## 2025-05-08 NOTE — PROGRESS NOTES
"[facility] Inpatient follow up       Patient:  Inez Rodriguez  Date of birth 1950, Medical record number [id number]  Date of Visit:  05/08/2025  Attending Physician: Otilia Espana MD         Assessment and Recommendations:   Assessment:  Inez Rodriguez is a 74 year old female with   Metastatic lung cancer of the right lower lobe (stage Kylee non-small cell lung cancer ) with metastasis to lymph node and to the brain.  Status post chemotherapy with carboplatin, pemetrexed and pembrolizumab from February 2024 to March 2025. On therapy with adagrasib orally.  Follows with Dr. Enrique Shah through the [facility].  Sepsis with fever, nausea, vomiting, diarrhea.  Blood cultures no growth so far.  UA not impressive.  UCx in process.  Very mild elevation of procalcitonin.  Influenza A/B, RSV, SARS-CoV-2 PCR negative.  C. difficile negative.  Enteric bacterial and viral panel negative. Suspecting side effects from Adagrasib since nausea, vomiting and diarrhea started since she was started on this agent. The fever is new and warrants further investigation as being done.   YAMILKA.  Improving.  Acute hypoxic respiratory failure       Recommendations:  Continue IV ceftriaxone and vancomycin.  If no positive culture or clear source of infection identified by tomorrow, will stop antibiotics.  Hold Adagrasib.  Patient wants to stop.  This makes sense.    Discussed with patient, nursing staff.    ID will follow.      Jennifer Goerges MD.  [facility].   Formerly [facility]  Office Telephone [phone].  Fax [phone]  [facility] paging            Interval History:     HPI:  The interval history was reviewed.   Feels better today.  Considering stopping chemotherapy.  All cultures reviewed.  So far no growth.    Pertinent cultures include:  No results found for: \"CULT\"    Recent Inflammatory Biomarkers:   Recent Labs   Lab Test 05/08/25  0559 " 25  0659 25  1447 25  0830 25  1322 25  1213 24  0740 24  0714   PCAL  --   --  0.74*  --   --   --   --  0.41   WBC 9.3 10.0 9.3 7.9 12.3* 9.5   < > 12.1*    < > = values in this interval not displayed.            Review of Systems:   CONSTITUTIONAL:    Temp Max: Temp (24hrs), Av.1  F (36.7  C), Min:97.6  F (36.4  C), Max:98.8  F (37.1  C)   .  Negative except for findings in the HPI.           Current Medications (antimicrobials listed in bold):     Current Facility-Administered Medications   Medication Dose Route Frequency Provider Last Rate Last Admin    [Held by provider] adagrasib (KRAZATI) tablet 600 mg  600 mg Oral BID Kristine Coleman NP   600 mg at 25 0916    amLODIPine (NORVASC) tablet 5 mg  5 mg Oral Daily Otilia Espana MD   5 mg at 25 0916    ceFEPIme (MAXIPIME) 2 g vial to attach to  mL bag for ADULTS or NS 50 mL bag for PEDS  2 g Intravenous Q12H Kristine Coleman NP 0 mL/hr at 25 1832 2 g at 25 0206    folic acid (FOLVITE) tablet 1,000 mcg  1,000 mcg Oral Daily Otilia Espana MD   1,000 mcg at 25 1112    ondansetron (ZOFRAN ODT) ODT tab 4 mg  4 mg Oral TID Mitchell Manzo DO   4 mg at 25    pantoprazole (PROTONIX) IV push injection 40 mg  40 mg Intravenous Q24H Otilia Espana MD   40 mg at 25 1113    predniSONE (DELTASONE) tablet 2.5 mg  2.5 mg Oral Daily Otilia Espana MD   2.5 mg at 25 1109    sodium chloride (PF) 0.9% PF flush 3 mL  3 mL Intracatheter Q8H [facility] Otilia Espana MD        sodium chloride (PF) 0.9% PF flush 3 mL  3 mL Intracatheter Q8H Kristine Sun NP   3 mL at 25 2148    vancomycin (VANCOCIN) 1,250 mg in 0.9% NaCl 262.5 mL intermittent infusion  1,250 mg Intravenous Q24H Otilia Espana MD                  Allergies:   No Known Allergies         Physical Exam:   Vitals were reviewed  Patient Vitals for the past 24 hrs:   BP Temp Temp src Pulse  Resp SpO2   05/08/25 0750 (!) (P) 146/66 (P) 98.2  F (36.8  C) (P) Oral (P) 116 (P) 20 (!) (P) 90 %   05/08/25 0459 (!) 147/64 97.8  F (36.6  C) Oral 98 20 92 %   05/07/25 2300 110/56 98.2  F (36.8  C) Oral 92 18 92 %   05/07/25 1915 114/56 97.6  F (36.4  C) Oral 95 16 95 %   05/07/25 1840 114/56 98.8  F (37.1  C) -- 93 18 95 %       Physical Examination:  Gen: Pleasant in no acute distress.  HEENT: NCAT. EOMI. PERRL.  Neck: No bruit, JVD or thyromegaly.  Lungs: Clear to ascultation bilat with no crackles or wheezes.  Card: RRR. NSR. No RMG. Peripheral pulses present and symmetric. No edema.  Abd: Soft NT ND. No mass. Normal bowel sounds.  Skin: No rash.  Extr: No edema.  Neuro: Alert and oriented to place time and person. Cranial nerves II to XII intact. Motor and sensory intact. Normal gait.            Laboratory Data:   ID Labs:  Microbiology labs:  Enteric Bacteria and Virus Panel PCR  Order: [id number]   Status: Final result    Test Result Released: Yes (not seen)    Specimen Information: Per Rectum; Stool   0 Result Notes         Component  Ref Range & Units (hover) 1 d ago    Campylobacter species Negative    Salmonella species Negative    Vibrio species Negative    Vibrio cholerae Negative    Yersinia enterocolitica Negative    Enteropathogenic E. coli (EPEC) Negative    Shiga-like toxin-producing E. coli (STEC) Negative    Shigella/Enteroinvasive E. coli (EIEC) Negative    Cryptosporidium species Negative    Giardia lamblia Negative    Norovirus Gl/Gll Negative    Rotavirus A Negative    Plesiomonas shigelloides Negative    Enteroaggregative E. coli (EAEC) Negative    Enterotoxigenic E. coli (ETEC) Negative    E. coli O157 NA    Cyclospora cayetanensis Negative    Entamoeba histolytica Negative    Adenovirus F40/41 Negative    Astrovirus Negative    Sapovirus Negative   Resulting Agency [facility]              Narrative  Performed by: [facility]  Assay performed using the FDA-cleared FilmArray GI Panel from [employer]  A negative result should not rule out infection in patients with a probability for gastrointestinal infection. The assay does not test for all potential infectious agents of diarrheal disease.  Positive results do not distinguish between a viable or replicating organism and the presence of a nonviable organism or nucleic acid, nor do they exclude the possibility of coinfection by organisms not in the panel.  Results are intended to aid in the diagnosis of illness and are meant to be used in conjunction with other clinical findings.  This test has been verified and is performed by the [facility] Diagnostic Laboratory at [facility]. This laboratory is certified under the Clinical Laboratory Improvement Amendments of 1988 (CLIA-88) as qualified to perform high complexity clinical laboratory testing.   Specimen Collected: 05/07/25  6:50 AM Last Resulted: 05/07/25 10:15 AM         No lab results found.  Recent Labs   Lab Test 05/08/25 0559 05/07/25  0659 05/06/25  1447 03/27/25  0830 03/06/25  1322 02/13/25  1213   WBC 9.3 10.0 9.3 7.9 12.3* 9.5     Recent Labs   Lab Test 05/08/25  0559 05/07/25  0800 05/06/25  1447 03/27/25  0830   CR 0.92 1.15* 1.40* 0.77   GFRESTIMATED 65 50* 39* 80       Hematology Studies  Recent Labs   Lab Test 05/08/25 0559 05/07/25  0659 05/06/25  1447 03/27/25  0830 03/06/25  1322 02/13/25  1213 01/23/25  1332 01/02/25  1333   WBC 9.3 10.0 9.3 7.9 12.3* 9.5 10.4 13.1*   ANEU  --   --  7.9 6.4 10.7* 7.8 8.7* 10.6*   AEOS  --   --  0.1 0.0 0.0 0.0 0.0 0.0   HGB 9.6* 11.3* 12.3 12.3 11.9 12.6 12.4 12.0   HCT 28.5* 33.6* 37.3 37.5 37.0 38.8 38.4 37.8    170 207 382 444 441 428 319       Metabolic  Recent Labs   Lab Test 05/08/25  0559 05/07/25  0800 05/06/25  1447    133* 135   BUN 15.0 19.5 26.4*   CO2 21* 22 26   CR 0.92 1.15* 1.40*   GFRESTIMATED 65 50* 39*       Hepatic Studies  Recent Labs   Lab Test 05/08/25  0559 05/07/25  0800 03/27/25  0830  03/06/25  1322 02/13/25  1213   BILITOTAL  --   --  0.2 <0.2 0.2   ALKPHOS  --   --  57 56 56   ALBUMIN 2.8* 3.2* 4.2 4.3 4.3   AST  --   --  25 20 25   ALT  --   --  34 25 33       ImmunologlobulinsNo lab results found.         Imaging Data:   Reviewed   Study Result    Narrative & Impression   EXAM: MR BRAIN W/O and W CONTRAST  LOCATION: [facility]  DATE: 5/7/2025     INDICATION: 74 y o with metastatic lung cancer, to brain, last MRI 4 7 25 outsider on care everywhere, more n v headache, ?more mets  COMPARISON: 09/24/2024, 04/07/2025 outside MRI report  CONTRAST: 6 ml Gadavist  TECHNIQUE: Routine multiplanar multisequence head MRI without and with intravenous contrast.     FINDINGS:  INTRACRANIAL CONTENTS: Small focus of apparent diffusion restriction within the centrum semiovale of the right frontal lobe that displays faint T2 hyperintense signal but no ADC hypointense signal. There is no mass effect or midline shift. Mild degree of   cerebral parenchymal volume loss. Numerous subcentimeter presumable metastases within the frontal lobes (series 1100, image 160 and 156).      SELLA: No abnormality accounting for technique.     OSSEOUS STRUCTURES/SOFT TISSUES: Small focus of enhancement within the occipital calvarium (series 1100, image 98) measuring 3 x 3 mm and within the diploic space of the frontal calvarium measuring 6 x 5 mm (series 1100, image 173).      ORBITS: No abnormality accounting for technique.      SINUSES/MASTOIDS: No paranasal sinus mucosal disease. Scattered fluid/membrane thickening in the mastoid air cells bilaterally.                                                                       IMPRESSION:     1.  Small focus of apparent diffusion restriction within the right centrum semiovale is favored to represent a subacute infarction.  2.  Several small presumable metastases within the frontal lobes that are without mass effect.  3.  Small enhancing foci within the  frontal and occipital calvarium are suspicious for metastases.

## 2025-05-08 NOTE — PROGRESS NOTES
NEUROLOGY PROGRESS NOTE     ASSESSMENT & PLAN   Visit diagnosis: Stroke    Stroke  History of metastatic lung cancer with metastases to the brain  Immunocompromise status    MRI brain shows multiple small metastases throughout the bilateral cerebral hemispheres  MRI brain further shows a small focus of restricted diffusion in the right centrum semiovale suggestive of subacute infarct  Carotid ultrasound shows 50 to 69% right ICA stenosis.  Could consider vascular surgery consultation depending on patient's goals.  Echocardiogram shows 50 to 60% ejection fraction  Cardiac monitoring  Continue with aspirin and Plavix for now though patient might benefit from anticoagulation with Lovenox as she potentially does have a hypercoagulable state from the malignancy.  Lipid panel and statin-  Blood pressure can slowly be normalize  PT/OT who  Palliative care involved to help establish goals of care.  Patient looking into hospice though has not made her decision yet.    Neurology Discharge Planning : Per primary team.    Patient Active Problem List    Diagnosis Date Noted    Immunocompromised 05/06/2025     Priority: Medium    YAMILKA (acute kidney injury) 05/06/2025     Priority: Medium    Acute respiratory failure with hypoxia (H) 05/06/2025     Priority: Medium    Sepsis, due to unspecified organism, unspecified whether acute organ dysfunction present (H) 05/06/2025     Priority: Medium    Antineoplastic chemotherapy induced anemia 07/08/2024     Priority: Medium    Lung cancer metastatic to bone (H) 01/31/2024     Priority: Medium    Malignant neoplasm of lower lobe of right lung (H) 01/17/2024     Priority: Medium    Postobstructive pneumonia 01/17/2024     Priority: Medium    Observation for suspected malignant neoplasm 01/07/2024     Priority: Medium    Thyroid nodule 01/07/2024     Priority: Medium    Chronic cough 01/07/2024     Priority: Medium    Pulmonary nodules 01/07/2024     Priority: Medium     "Wheezing-associated respiratory infection (WARI) 01/07/2024     Priority: Medium     Medical History  Past Medical History:   Diagnosis Date    Hypertension     SOB (shortness of breath)         SUBJECTIVE     Patient seen in follow-up for Dr. Reyna.  74-year-old came to the ER on 5/6/2025  Had fever with reading of \"104 \"  Nausea and vomiting  Patient on chemotherapy and is immunosuppressed  Follows at the [facility] for her cancer     Patient has a history of metastatic cancer of the right lower lobe.  Patient is on adagrasib, for treatment of her cancer and has not felt well over the last year  Metastatic lung cancer right lobe (stage Kylee non-small cell lung cancer)  Metastatic to lymph node and brain  Status post chemotherapy with carboplatin, pemetrexed and pembrolizumab from February 2024 to March 2025      Has been seen by palliative care today 5/7/2025  Patient stated her goal was to return home discontinue further cancer treatment and like to focus on comfort while remaining in the home until end-of-life.     Discussed the case twice with primary MD/hospitalist Dr. Espana.    5/8  No new complaints/ongoing focal symptoms.  Palliative care involved.  Tolerating medications.  Did complete the carotid ultrasound.  Has not decided about hospice so far.     OBJECTIVE     Vital signs in last 24 hours  Temp:  [97.6  F (36.4  C)-101  F (38.3  C)] 97.8  F (36.6  C)  Pulse:  [] 98  Resp:  [16-20] 20  BP: (110-176)/(56-77) 147/64  SpO2:  [92 %-95 %] 92 %  Review of Systems   No new complaints.    PHYSICAL EXAMINATION  VITALS: /62 (BP Location: Right arm)   Pulse 90   Temp 98.6  F (37  C) (Oral)   Resp 16   Ht 1.651 m (5' 5\")   LMP  (LMP Unknown)   SpO2 (!) 91%   BMI 21.67 kg/m    GENERAL -Health appearing, No apparent distress  EYES- No scleral icterus, no eyelid droop, Pupils - see Neuro section  HEENT - Normocephalic, atraumatic, Hearing grossly intact; Oral mucosa moist and pink in color. " External Ears and nose intact.   Neck - supple   PULM - Good spontaneous respiratory effort  CV-extremities warm to touch  MSK- Gait - see Neuro section; Strength and tone- see Neuro section; Range of motion grossly intact.  PSYCH -cooperative    Neurological  Mental status - Patient is awake and partially oriented to self, place and time. Attention span is normal. Language is fluent and follows commands appropriately.   Cranial nerves - CN II-XII intact. Pupils are reactive and symmetric; EOMI, VFIT, NLF symmetric  Motor - There is no pronator drift. Motor exam shows 5/5 strength in all extremities.  Tone - Tone is symmetric bilaterally in upper and lower extremities.  Reflexes -toes mute.  Sensation - Sensory exam is grossly intact to light touch, pain.  Coordination - Finger to nose is without dysmetria.  Gait and station --unable to safely ambulate.  Formal gait testing cannot be done due to safety concerns from ongoing medical issues.     DIAGNOSTIC STUDIES     Pertinent Radiology   MRI brain April 2025    1. Interval development of numerous small metastasis throughout the bilateral cerebral hemispheres.   2. Development of multiple foci of restricted diffusion. Some of these are without enhancement, suggesting acute to subacute infarction. Others demonstrate minimal enhancement, suggesting these may be metastatic in etiology. Follow-up imaging would be   helpful in further evaluation of evolution. No significant mass effect or hemorrhagic transformation.   3. Other miscellaneous findings, and details, as noted.     MRI brain  IMPRESSION:     1.  Small focus of apparent diffusion restriction within the right centrum semiovale is favored to represent a subacute infarction.  2.  Several small presumable metastases within the frontal lobes that are without mass effect.  3.  Small enhancing foci within the frontal and occipital calvarium are suspicious for metastases.    Component      Latest Ref Rng 5/7/2025  6:59  AM 5/7/2025  8:00 AM   Cholesterol      <200 mg/dL  201 (H)    Triglycerides      <150 mg/dL  316 (H)    HDL Cholesterol      >=50 mg/dL  20 (L)    LDL Cholesterol Calculated      <100 mg/dL  118 (H)    Non HDL Cholesterol      <130 mg/dL  181 (H)    Estimated Average Glucose      <117 mg/dL 100     Hemoglobin A1C      <5.7 % 5.1        Legend:  (H) High  (L) Low    ECHO  1. Normal left ventricular size and systolic performance. The visually  estimated ejection fraction is 55-60%.  2. There is trace-mild aortic insufficiency.  3. Normal right ventricular size and systolic performance.    Carotid US  IMPRESSION:  1.  Moderate plaque formation, velocities consistent with 50-69% stenosis in the right internal carotid artery.  2.  Severe plaque formation, velocities consistent with greater than 70% stenosis in the left internal carotid artery.  3.  Flow within the vertebral arteries is antegrade.      Recent Results (from the past 24 hours)   Enteric Bacteria and Virus Panel PCR    Collection Time: 05/07/25  6:50 AM    Specimen: Per Rectum; Stool   Result Value Ref Range    Campylobacter species Negative Negative    Salmonella species Negative Negative    Vibrio species Negative Negative    Vibrio cholerae Negative Negative    Yersinia enterocolitica Negative Negative    Enteropathogenic E. coli (EPEC) Negative Negative, NA    Shiga-like toxin-producing E. coli (STEC) Negative Negative    Shigella/Enteroinvasive E. coli (EIEC) Negative Negative    Cryptosporidium species Negative Negative    Giardia lamblia Negative Negative    Norovirus Gl/Gll Negative Negative    Rotavirus A Negative Negative    Plesiomonas shigelloides Negative Negative    Enteroaggregative E. coli (EAEC) Negative Negative    Enterotoxigenic E. coli (ETEC) Negative Negative    E. coli O157 NA Negative, NA    Cyclospora cayetanensis Negative Negative    Entamoeba histolytica Negative Negative    Adenovirus F40/41 Negative Negative    Astrovirus  Negative Negative    Sapovirus Negative Negative   C. difficile Toxin B PCR with reflex to C. difficile EIA    Collection Time: 05/07/25  6:50 AM    Specimen: Per Rectum; Stool   Result Value Ref Range    C Difficile Toxin B by PCR Negative Negative   CBC with platelets    Collection Time: 05/07/25  6:59 AM   Result Value Ref Range    WBC Count 10.0 4.0 - 11.0 10e3/uL    RBC Count 3.48 (L) 3.80 - 5.20 10e6/uL    Hemoglobin 11.3 (L) 11.7 - 15.7 g/dL    Hematocrit 33.6 (L) 35.0 - 47.0 %    MCV 97 78 - 100 fL    MCH 32.5 26.5 - 33.0 pg    MCHC 33.6 31.5 - 36.5 g/dL    RDW 13.8 10.0 - 15.0 %    Platelet Count 170 150 - 450 10e3/uL   Hemoglobin A1c    Collection Time: 05/07/25  6:59 AM   Result Value Ref Range    Estimated Average Glucose 100 <117 mg/dL    Hemoglobin A1C 5.1 <5.7 %   Lipase    Collection Time: 05/07/25  8:00 AM   Result Value Ref Range    Lipase 127 (H) 13 - 60 U/L   Renal panel    Collection Time: 05/07/25  8:00 AM   Result Value Ref Range    Sodium 133 (L) 135 - 145 mmol/L    Potassium 3.9 3.4 - 5.3 mmol/L    Chloride 98 98 - 107 mmol/L    Carbon Dioxide (CO2) 22 22 - 29 mmol/L    Anion Gap 13 7 - 15 mmol/L    Glucose 80 70 - 99 mg/dL    Urea Nitrogen 19.5 8.0 - 23.0 mg/dL    Creatinine 1.15 (H) 0.51 - 0.95 mg/dL    GFR Estimate 50 (L) >60 mL/min/1.73m2    Calcium 8.4 (L) 8.8 - 10.4 mg/dL    Albumin 3.2 (L) 3.5 - 5.2 g/dL    Phosphorus 2.6 2.5 - 4.5 mg/dL   Lipid panel reflex to direct LDL: Non-fasting    Collection Time: 05/07/25  8:00 AM   Result Value Ref Range    Cholesterol 201 (H) <200 mg/dL    Triglycerides 316 (H) <150 mg/dL    Direct Measure HDL 20 (L) >=50 mg/dL    LDL Cholesterol Calculated 118 (H) <100 mg/dL    Non HDL Cholesterol 181 (H) <130 mg/dL   Glucose by meter    Collection Time: 05/07/25  7:51 PM   Result Value Ref Range    GLUCOSE BY METER POCT 76 70 - 99 mg/dL   Glucose by meter    Collection Time: 05/08/25  2:09 AM   Result Value Ref Range    GLUCOSE BY METER POCT 63 (L) 70 - 99  mg/dL   Glucose by meter    Collection Time: 05/08/25  2:58 AM   Result Value Ref Range    GLUCOSE BY METER POCT 69 (L) 70 - 99 mg/dL   Glucose by meter    Collection Time: 05/08/25  4:57 AM   Result Value Ref Range    GLUCOSE BY METER POCT 75 70 - 99 mg/dL         HOSPITAL MEDICATIONS     Current Facility-Administered Medications   Medication Dose Route Frequency Provider Last Rate Last Admin    [Held by provider] adagrasib (KRAZATI) tablet 600 mg  600 mg Oral BID Kristine Coleman NP   600 mg at 05/07/25 0916    amLODIPine (NORVASC) tablet 5 mg  5 mg Oral Daily Otilia Espana MD   5 mg at 05/07/25 0916    ceFEPIme (MAXIPIME) 2 g vial to attach to  mL bag for ADULTS or NS 50 mL bag for PEDS  2 g Intravenous Q12H Kristine Coleman NP 0 mL/hr at 05/07/25 1832 2 g at 05/08/25 0206    folic acid (FOLVITE) tablet 1,000 mcg  1,000 mcg Oral Daily Otilia Espana MD   1,000 mcg at 05/07/25 1112    ondansetron (ZOFRAN ODT) ODT tab 4 mg  4 mg Oral TID Mitchell Manzo DO   4 mg at 05/07/25 2010    pantoprazole (PROTONIX) IV push injection 40 mg  40 mg Intravenous Q24H Otilia Espana MD   40 mg at 05/07/25 1113    predniSONE (DELTASONE) tablet 2.5 mg  2.5 mg Oral Daily Otilia Espana MD   2.5 mg at 05/07/25 1109    sodium chloride (PF) 0.9% PF flush 3 mL  3 mL Intracatheter Q8H [facility] Otilia Espana MD        sodium chloride (PF) 0.9% PF flush 3 mL  3 mL Intracatheter Q8H [facility] Kristine Coleman NP   3 mL at 05/06/25 2148    vancomycin (VANCOCIN) 1,250 mg in 0.9% NaCl 262.5 mL intermittent infusion  1,250 mg Intravenous Q36H Brody Denny MD   1,250 mg at 05/07/25 1656        Total time spent for face to face visit, reviewing labs/imaging studies, counseling and coordination of care was: Over 50 min More than 50% of this time was spent on counseling and coordination of care.    Counseling patient.  Reviewing chart.  Patient new to me.  High risk.  Coordination of care with the nursing staff.   Reviewing testing.    Michael Lorenzo MD  Neurologist  [facility]  Tel:- [phone]

## 2025-05-08 NOTE — PROGRESS NOTES
"  GASTROENTEROLOGY PROGRESS NOTE     SUBJECTIVE   No diarrhea overnight but she is not eating anything.  She is going to try to have some lunch.  No nausea currently.  She would like to go home with hospice.     OBJECTIVE     Vitals Blood pressure (!) 146/66, pulse 116, temperature 98.2  F (36.8  C), temperature source Oral, resp. rate 20, height 1.651 m (5' 5\"), SpO2 93%.      Physical exam:    Alert and oriented, no acute distress    LABORATORY    ELECTROLYTE PANEL   Recent Labs   Lab 25  1033 25  0956 25  0559 25  1951 25  0800 25  1447   NA  --   --  136  --  133* 135   POTASSIUM  --   --  3.5  --  3.9 3.6   CHLORIDE  --   --  102  --  98 94*   CO2  --   --  21*  --  22 26   GLC 62* 68* 80   < > 80 117*   CR  --   --  0.92  --  1.15* 1.40*   BUN  --   --  15.0  --  19.5 26.4*    < > = values in this interval not displayed.      HEMATOLOGY PANEL   Recent Labs   Lab 25  0559 25  0659 25  1447   HGB 9.6* 11.3* 12.3   MCV 95 97 95   WBC 9.3 10.0 9.3    170 207      LIVER AND PANCREAS PANEL   Recent Labs   Lab 25  0800   LIPASE 127*     IMAGING STUDIES        I have reviewed the current diagnostic and laboratory tests.              IMPRESSION   This is a 74-year-old woman with history of metastatic non-small cell lung cancer of the right lower lobe, with bone and cranial metastatic disease, hypertension who presents with the followin.  Fever: Resolved and negative infectious workup thus far.  May have been from her adagrasib versus a self-limited gastroenteritis (negative stool studies however).  2.  Nausea/vomiting/diarrhea: Suspect chemotherapy related, oncology has recommended a dose reduction.  Negative stool studies.  3.  Mild acute kidney injury, resolved  4.  Metastatic malignancy: The patient is pursuing hospice at discharge.    5.  Initial hypoxia: Resolved, no obvious finding on CT imaging.     RECOMMENDATIONS   I do not recommend GI " evaluation at this time.  I am okay with the use of Imodium for diarrhea, potential fiber supplement, and antiemetics.    I will sign off, please call with questions.     Total time spent: 16 minutes with at least 50% spent in reviewing documentation/test results, decision making, and coordination of care. .        Malgorzata Keating MD  Thank you for the opportunity to participate in the care of this patient.   Please feel free to call me with any questions or concerns.  Phone number [phone].

## 2025-05-08 NOTE — PROGRESS NOTES
[facility]    Medicine Progress Note - Hospitalist Service    Date of Admission:  5/6/2025    Assessment & Plan   74 year old female with PMHx of HTN, metastatic lung cancer of the right lower lobe, thyroid nodule and pulmonary nodule who presents for fatigue and fever.She notes all symptoms of fever, n/v/d all started after starting oral chemo days prior.    1.Sepsis, fever 104 on admit  -concern sepsis with chemo pt  -pan cx--so far NTD  -stool c diff and extended panel neg  -ct no clear infection on imaging  -IV vancomycin  -IV cefepime  -appreciate ID seeing    2.N/V/D  -check stools--neg, ok trial Imodium now  -IVF adjust for hypoglycemia and now D5NS    3.Metastatic lung cancer  -follows at [place]() now only on po adagrasib started days ago  -in past when seen by  was on carboplatin, pembrolizumab Feb 2024-March 2025  -was seen here with  before and ok seeing now  -onc idid see here and agreed hold oral chemo and only consider restart as outpt and perhaps lower dose--she is aware of this  -pall care seeing and focused now on nausea  -this am, 5/8/25 she told me she wants to be done and wants to hear more about hospice options--I went over with her process and now she would like consult--I have reached out to care management and they will reach out to outpt hospice to come here to see her      --head ct here shows 5/7/25  .  Focus of decreased attenuation within the right centrum semiovale, new since the prior exams from 2024. Given history of metastatic lung cancer, findings raise concern for vasogenic edema secondary to development of intracranial metastasis. Recommend   further evaluation with brain MRI with and without IV contrast.  2.  No acute intracranial hemorrhage.  3.  Mild generalized cerebral volume loss and presumed chronic microvascular ischemic changes of the white matter  --will try to update mri here     --mri outside [place]--4/7/25--a month  ago  1. Interval development of numerous small metastasis throughout the bilateral cerebral hemispheres.   2. Development of multiple foci of restricted diffusion. Some of these are without enhancement, suggesting acute to subacute infarction. Others demonstrate minimal enhancement, suggesting these may be metastatic in etiology. Follow-up imaging would be   helpful in further evaluation of evolution. No significant mass effect or hemorrhagic transformation.     4.Subacute CVA  -seen on MRI  -complex  with tumor issues  -per neurology will not use plavix or asa with risk with mets  -tele  -carotid ultrasound, echo  -pt/ot   -she is willing to try statin    5.Dilation pancreatic duct  -checked lipase  -gi did see    6.Acute hypoxic resp failure  -ct neg pe  -not on home O2   -monitor here, on 2 liters     7.YAMILKA--improving  -on admit elevated creat, from gi losses  -creat 1.4-->1.15-->0.92  -ivf  -creat today better , still needs ivf , a lot of loose stools still     8.Fatigue/weakness  -from all above  -pt/ot    9.HTN  - amlodipine       Code-DNR/DNI      -Goals of care--she was very clear with me this am, no more cancer treatment and has been thinking of this prior to coming to hospital  -getting hospice consult     --I called son to update 1609            Diet: Combination Diet Regular Diet Adult    DVT Prophylaxis: Pneumatic Compression Devices  Ansari Catheter: Not present  Lines: PRESENT      Port A Cath Single 02/12/24 Left Chest wall-Site Assessment: WDL      Cardiac Monitoring: ACTIVE order. Indication: Stroke, acute (48 hours)  Code Status: No CPR- Do NOT Intubate      Clinically Significant Risk Factors         # Hyponatremia: Lowest Na = 133 mmol/L in last 2 days, will monitor as appropriate  # Hypochloremia: Lowest Cl = 94 mmol/L in last 2 days, will monitor as appropriate      # Hypoalbuminemia: Lowest albumin = 2.8 g/dL at 5/8/2025  5:59 AM, will monitor as appropriate                           Social  "Drivers of Health    Depression: At risk (4/29/2025)    Received from [facility]    PHQ-2     PHQ-2 Score: 4   Tobacco Use: Medium Risk (4/16/2025)    Received from [facility]    Patient History     Smoking Tobacco Use: Former     Smokeless Tobacco Use: Never          Disposition Plan     Medically Ready for Discharge: Anticipated in 2-4 Days             Otilia Espana MD  Hospitalist Service  [facility]  Securely message with [employer] (more info)  Text page via [employer] Paging/Directory   ______________________________________________________________________    Interval History   She has a lot of loose stools still  Wants imodium  No abd pain  Not much appetite  Some lower BS  Then told me very clearly , \"I am done\" no more chemo, wants to talk about hospice options--feels no one listening to her about this and does not feel she needs to wait any longer on this      Physical Exam   Vital Signs: Temp: 98.2  F (36.8  C) Temp src: Oral BP: (!) 146/66 Pulse: 116   Resp: 20 SpO2: 93 % O2 Device: Nasal cannula Oxygen Delivery: 2 LPM  Weight: 0 lbs 0 oz    Constitutional: awake, fatigued, alert, cooperative, and no apparent distress  Eyes: sclera clear  Respiratory: no increased work of breathing, good air exchange, no retractions, and clear to auscultation  Cardiovascular: regular rate and rhythm and normal S1 and S2  GI: normal bowel sounds, soft, non-distended, and non-tender  Skin: no bruising or bleeding  Musculoskeletal: no lower extremity pitting edema present  Neurologic: Mental Status Exam:  Level of Alertness:   awake  Neuropsychiatric: General: normal, calm, and normal eye contact    Medical Decision Making       55 MINUTES SPENT BY ME on the date of service doing chart review, history, exam, documentation & further activities per the note.      Data     I have personally reviewed the following data over the past 24 hrs:    9.3  \   9.6 (L)   / 188     136 102 15.0 /  68 (L)   3.5 21 " (L) 0.92 \     ALT: N/A AST: N/A AP: N/A TBILI: N/A   ALB: 2.8 (L) TOT PROTEIN: N/A LIPASE: N/A     TSH: N/A T4: N/A A1C: N/A       Imaging results reviewed over the past 24 hrs:   Recent Results (from the past 24 hours)   MR Brain w/o & w Contrast    Narrative    EXAM: MR BRAIN W/O and W CONTRAST  LOCATION: [facility]  DATE: 5/7/2025    INDICATION: 74 y o with metastatic lung cancer, to brain, last MRI 4 7 25 outsider on care everywhere, more n v headache, ?more mets  COMPARISON: 09/24/2024, 04/07/2025 outside MRI report  CONTRAST: 6 ml Gadavist  TECHNIQUE: Routine multiplanar multisequence head MRI without and with intravenous contrast.    FINDINGS:  INTRACRANIAL CONTENTS: Small focus of apparent diffusion restriction within the centrum semiovale of the right frontal lobe that displays faint T2 hyperintense signal but no ADC hypointense signal. There is no mass effect or midline shift. Mild degree of   cerebral parenchymal volume loss. Numerous subcentimeter presumable metastases within the frontal lobes (series 1100, image 160 and 156).     SELLA: No abnormality accounting for technique.    OSSEOUS STRUCTURES/SOFT TISSUES: Small focus of enhancement within the occipital calvarium (series 1100, image 98) measuring 3 x 3 mm and within the diploic space of the frontal calvarium measuring 6 x 5 mm (series 1100, image 173).     ORBITS: No abnormality accounting for technique.     SINUSES/MASTOIDS: No paranasal sinus mucosal disease. Scattered fluid/membrane thickening in the mastoid air cells bilaterally.       Impression    IMPRESSION:    1.  Small focus of apparent diffusion restriction within the right centrum semiovale is favored to represent a subacute infarction.  2.  Several small presumable metastases within the frontal lobes that are without mass effect.  3.  Small enhancing foci within the frontal and occipital calvarium are suspicious for metastases.   Echocardiogram Complete     [facility]    [id number]  [id number]  [id number]  [id number]^DEIDRA^[place] [address]     Name: ROCHELLE AVILA  MRN: [id number]  : 1950  Study Date: 2025 03:39 PM  Age: 74 yrs  Gender: Female  Patient Location: [facility]  Reason For Study: CVA  Ordering Physician: DEIDRA CAST  Performed By: WILMA     BSA: 1.6 m2  Height: 65 in  Weight: 130 lb  HR: 94  BP: 176/77 mmHg  ______________________________________________________________________________  Procedure  Echocardiogram with two-dimensional, color and spectral Doppler.  ______________________________________________________________________________  Interpretation Summary     1. Normal left ventricular size and systolic performance. The visually  estimated ejection fraction is 55-60%.  2. There is trace-mild aortic insufficiency.  3. Normal right ventricular size and systolic performance.  ______________________________________________________________________________  Left ventricle:  Normal left ventricular size and systolic performance. The visually estimated  ejection fraction is 55-60%. There is normal regional wall motion. Left  ventricular wall thickness is normal.     Assessment of LV Diastolic Function: The cumulative findings suggest normal  diastolic filling [The septal e' velocity is < 7 cm/s & lateral e' velocity  is  < 10 cm/s. The average E/e' is < 14. TR velocity is < 2.8 m/s. Left atrial  volume index is less than 34 mL/mÂ ].     Right ventricle:  Normal right ventricular size and systolic performance.     Left atrium:  The left atrium is of normal size.     Right atrium:  The right atrium is of normal size.     IVC:  The IVC is of normal caliber.     Aortic valve:  The aortic valve is comprised of three cusps. There is no significant aortic  stenosis. There is trace-mild aortic insufficiency.     Mitral valve:  The mitral valve appears morphologically normal. There is trace  mitral  insufficiency.     Tricuspid valve:  The tricuspid valve is grossly morphologically normal. There is trace  tricuspid insufficiency.     Pulmonic valve:  The pulmonic valve is grossly morphologically normal.     Thoracic aorta:  The aortic root and proximal ascending aorta are of normal dimension.     Pericardium:  There is no significant pericardial effusion.  ______________________________________________________________________________  ______________________________________________________________________________  MMode/2D Measurements & Calculations  IVSd: 0.81 cm  LVIDd: 4.8 cm  LVIDs: 3.1 cm  LVPWd: 0.82 cm  FS: 35.3 %  LV mass(C)d: 131.1 grams  LV mass(C)dI: 79.6 grams/m2  Ao root diam: 3.3 cm  LA dimension: 2.9 cm  asc Aorta Diam: 3.4 cm  LA/Ao: 0.88  LVOT diam: 2.1 cm  LVOT area: 3.5 cm2  Ao root diam index Ht(cm/m): 2.0  Ao root diam index BSA (cm/m2): 2.0  Asc Ao diam index BSA (cm/m2): 2.1  Asc Ao diam index Ht(cm/m): 2.1  EF Biplane: 57.3 %  LA Volume (BP): 39.0 ml     LA Volume Index (BP): 23.6 ml/m2  LA Volume Indexed (AL/bp): 26.3 ml/m2  RWT: 0.34  TAPSE: 2.0 cm     Time Measurements  MM HR: 94.0 BPM     Doppler Measurements & Calculations  MV E max delgado: 81.4 cm/sec  MV A max delgado: 117.0 cm/sec  MV E/A: 0.70  MV dec slope: 566.0 cm/sec2  MV dec time: 0.14 sec  Ao V2 max: 132.0 cm/sec  Ao max P.0 mmHg  Ao V2 mean: 92.2 cm/sec  Ao mean P.0 mmHg  Ao V2 VTI: 23.0 cm  SHARRI(I,D): 2.8 cm2  SHARRI(V,D): 2.5 cm2  LV V1 max PG: 3.8 mmHg  LV V1 max: 96.9 cm/sec  LV V1 VTI: 18.4 cm  SV(LVOT): 63.6 ml  SI(LVOT): 38.6 ml/m2  PA acc time: 0.06 sec  TR max delgado: 217.0 cm/sec  TR max P.8 mmHg  AV Delgado Ratio (DI): 0.73  SHARRI Index (cm2/m2): 1.7  E/E': 8.5  E/E' avg: 10.5     Lateral E/e': 8.5  Medial E/e': 12.5  Peak E' Delgado: 9.6 cm/sec  RV S Delgado: 17.3 cm/sec     ______________________________________________________________________________  Report approved by: Jesse Giraldo MD on 2025 04:41  PM         US Carotid Bilateral    Narrative    EXAM: US CAROTID BILATERAL  LOCATION: [facility]  DATE: 5/8/2025    INDICATION: stroke  COMPARISON: None.  TECHNIQUE: Duplex exam performed utilizing 2D gray-scale imaging, Doppler interrogation with color-flow and spectral waveform analysis. The percent diameter stenosis is determined using Updated Recommendations for Carotid Stenosis Interpretation Criteria   from [employer] [facility].    FINDINGS:    RIGHT: Moderate plaque at the bifurcation. The peak systolic velocity in the ICA is 180-230 cm/sec, consistent with 50-69% stenosis. Normal velocities in the ECA. Antegrade flow within the vertebral artery.     LEFT: Severe plaque at the bifurcation. The peak systolic velocity in the ICA is greater than 230 cm/sec, consistent with greater than 70% stenosis. Elevated velocity in the left external carotid artery. Antegrade flow within the vertebral artery.    VELOCITY CHART:  CCA   Right: 114 cm/s   Left: 139 cm/s  ICA   Right: 199 cm/s   Left: 430 cm/s  ECA   Right: 140 cm/s   Left: 267 cm/s  ICA/CCA PSV Ratio   Right: 2.0   Left: 4.3      Impression    IMPRESSION:  1.  Moderate plaque formation, velocities consistent with 50-69% stenosis in the right internal carotid artery.  2.  Severe plaque formation, velocities consistent with greater than 70% stenosis in the left internal carotid artery.  3.  Flow within the vertebral arteries is antegrade.

## 2025-05-09 ENCOUNTER — APPOINTMENT (OUTPATIENT)
Dept: RADIOLOGY | Facility: HOSPITAL | Age: 75
End: 2025-05-09
Attending: INTERNAL MEDICINE
Payer: COMMERCIAL

## 2025-05-09 LAB
ALBUMIN SERPL BCG-MCNC: 2.6 G/DL (ref 3.5–5.2)
ANION GAP SERPL CALCULATED.3IONS-SCNC: 5 MMOL/L (ref 7–15)
BUN SERPL-MCNC: 11.3 MG/DL (ref 8–23)
CALCIUM SERPL-MCNC: 8 MG/DL (ref 8.8–10.4)
CHLORIDE SERPL-SCNC: 106 MMOL/L (ref 98–107)
CREAT SERPL-MCNC: 1.02 MG/DL (ref 0.51–0.95)
EGFRCR SERPLBLD CKD-EPI 2021: 57 ML/MIN/1.73M2
ERYTHROCYTE [DISTWIDTH] IN BLOOD BY AUTOMATED COUNT: 14.3 % (ref 10–15)
GLUCOSE BLDC GLUCOMTR-MCNC: 100 MG/DL (ref 70–99)
GLUCOSE BLDC GLUCOMTR-MCNC: 106 MG/DL (ref 70–99)
GLUCOSE BLDC GLUCOMTR-MCNC: 108 MG/DL (ref 70–99)
GLUCOSE BLDC GLUCOMTR-MCNC: 96 MG/DL (ref 70–99)
GLUCOSE SERPL-MCNC: 103 MG/DL (ref 70–99)
HCO3 SERPL-SCNC: 25 MMOL/L (ref 22–29)
HCT VFR BLD AUTO: 28.7 % (ref 35–47)
HGB BLD-MCNC: 9.3 G/DL (ref 11.7–15.7)
MAGNESIUM SERPL-MCNC: 1.4 MG/DL (ref 1.7–2.3)
MCH RBC QN AUTO: 30.7 PG (ref 26.5–33)
MCHC RBC AUTO-ENTMCNC: 32.4 G/DL (ref 31.5–36.5)
MCV RBC AUTO: 95 FL (ref 78–100)
PHOSPHATE SERPL-MCNC: 2.8 MG/DL (ref 2.5–4.5)
PLATELET # BLD AUTO: 212 10E3/UL (ref 150–450)
POTASSIUM SERPL-SCNC: 3.4 MMOL/L (ref 3.4–5.3)
RBC # BLD AUTO: 3.03 10E6/UL (ref 3.8–5.2)
SODIUM SERPL-SCNC: 136 MMOL/L (ref 135–145)
WBC # BLD AUTO: 8.6 10E3/UL (ref 4–11)

## 2025-05-09 PROCEDURE — 250N000011 HC RX IP 250 OP 636: Performed by: INTERNAL MEDICINE

## 2025-05-09 PROCEDURE — 250N000012 HC RX MED GY IP 250 OP 636 PS 637: Performed by: INTERNAL MEDICINE

## 2025-05-09 PROCEDURE — 99232 SBSQ HOSP IP/OBS MODERATE 35: CPT | Performed by: STUDENT IN AN ORGANIZED HEALTH CARE EDUCATION/TRAINING PROGRAM

## 2025-05-09 PROCEDURE — 250N000011 HC RX IP 250 OP 636: Performed by: STUDENT IN AN ORGANIZED HEALTH CARE EDUCATION/TRAINING PROGRAM

## 2025-05-09 PROCEDURE — 80069 RENAL FUNCTION PANEL: CPT | Performed by: INTERNAL MEDICINE

## 2025-05-09 PROCEDURE — 99232 SBSQ HOSP IP/OBS MODERATE 35: CPT | Performed by: PSYCHIATRY & NEUROLOGY

## 2025-05-09 PROCEDURE — 36415 COLL VENOUS BLD VENIPUNCTURE: CPT | Performed by: INTERNAL MEDICINE

## 2025-05-09 PROCEDURE — 83735 ASSAY OF MAGNESIUM: CPT | Performed by: INTERNAL MEDICINE

## 2025-05-09 PROCEDURE — 250N000009 HC RX 250: Performed by: INTERNAL MEDICINE

## 2025-05-09 PROCEDURE — 99233 SBSQ HOSP IP/OBS HIGH 50: CPT | Performed by: INTERNAL MEDICINE

## 2025-05-09 PROCEDURE — 250N000013 HC RX MED GY IP 250 OP 250 PS 637: Performed by: NURSE PRACTITIONER

## 2025-05-09 PROCEDURE — 250N000011 HC RX IP 250 OP 636

## 2025-05-09 PROCEDURE — 73600 X-RAY EXAM OF ANKLE: CPT | Mod: LT

## 2025-05-09 PROCEDURE — 250N000013 HC RX MED GY IP 250 OP 250 PS 637: Performed by: INTERNAL MEDICINE

## 2025-05-09 PROCEDURE — 85027 COMPLETE CBC AUTOMATED: CPT | Performed by: INTERNAL MEDICINE

## 2025-05-09 PROCEDURE — 120N000001 HC R&B MED SURG/OB

## 2025-05-09 RX ORDER — MAGNESIUM SULFATE HEPTAHYDRATE 40 MG/ML
2 INJECTION, SOLUTION INTRAVENOUS ONCE
Status: COMPLETED | OUTPATIENT
Start: 2025-05-09 | End: 2025-05-09

## 2025-05-09 RX ORDER — IPRATROPIUM BROMIDE AND ALBUTEROL SULFATE 2.5; .5 MG/3ML; MG/3ML
3 SOLUTION RESPIRATORY (INHALATION) ONCE
Status: COMPLETED | OUTPATIENT
Start: 2025-05-09 | End: 2025-05-09

## 2025-05-09 RX ADMIN — LOPERAMIDE HYDROCHLORIDE 4 MG: 2 CAPSULE ORAL at 20:59

## 2025-05-09 RX ADMIN — DIPHENHYDRAMINE HYDROCHLORIDE 25 MG: 25 CAPSULE ORAL at 20:57

## 2025-05-09 RX ADMIN — LOPERAMIDE HYDROCHLORIDE 4 MG: 2 CAPSULE ORAL at 08:32

## 2025-05-09 RX ADMIN — FOLIC ACID 1000 MCG: 1 TABLET ORAL at 08:32

## 2025-05-09 RX ADMIN — PANTOPRAZOLE SODIUM 40 MG: 40 INJECTION, POWDER, FOR SOLUTION INTRAVENOUS at 11:15

## 2025-05-09 RX ADMIN — MAGNESIUM SULFATE HEPTAHYDRATE 2 G: 2 INJECTION, SOLUTION INTRAVENOUS at 11:15

## 2025-05-09 RX ADMIN — ONDANSETRON 4 MG: 4 TABLET, ORALLY DISINTEGRATING ORAL at 20:58

## 2025-05-09 RX ADMIN — IPRATROPIUM BROMIDE AND ALBUTEROL SULFATE 3 ML: .5; 3 SOLUTION RESPIRATORY (INHALATION) at 21:01

## 2025-05-09 RX ADMIN — ONDANSETRON 4 MG: 4 TABLET, ORALLY DISINTEGRATING ORAL at 08:32

## 2025-05-09 RX ADMIN — ATORVASTATIN CALCIUM 20 MG: 10 TABLET, FILM COATED ORAL at 20:56

## 2025-05-09 RX ADMIN — MAGNESIUM SULFATE HEPTAHYDRATE 2 G: 2 INJECTION, SOLUTION INTRAVENOUS at 18:06

## 2025-05-09 RX ADMIN — AMLODIPINE BESYLATE 5 MG: 5 TABLET ORAL at 08:32

## 2025-05-09 RX ADMIN — CEFEPIME HYDROCHLORIDE 2 G: 2 INJECTION, POWDER, FOR SOLUTION INTRAVENOUS at 01:06

## 2025-05-09 RX ADMIN — PREDNISONE 2.5 MG: 2.5 TABLET ORAL at 08:32

## 2025-05-09 ASSESSMENT — ACTIVITIES OF DAILY LIVING (ADL)
ADLS_ACUITY_SCORE: 39

## 2025-05-09 NOTE — PROGRESS NOTES
"CLINICAL NUTRITION NOTE    Diet: Regular  Supplement: gel plus daily, magic cup daily     Corie blevins also sent x 1    Information Obtained  Pt reports she tolerated a full omelet and hot chocolate today and is tolerating it well. She has not tried the corie Exacter shake or the gel but is willing. The magic cup was ok and she Is ok to continue to receive it. Pt reports she has felt \"out of it\" since she was admitted    Intervention  Continue gel plus and magic cup to help meet nutrition needs  "

## 2025-05-09 NOTE — PLAN OF CARE
Problem: Adult Inpatient Plan of Care  Goal: Optimal Comfort and Wellbeing  Outcome: Progressing   Goal Outcome Evaluation:    Pt is A+O x4, on RA. Denies pain. Afebrile. Not scoring on NIHSS. Pt has VANCE, saturations 92% on 2L of o2. NSR on tele.  Has sinus pressure, prn benadryl x1. L ankle swollen and bruised- ice pack prn. Eating, drinking and voiding well. Able to make her needs known. Plan to meet with hospice tomorrow.     Adelita Yanes RN.

## 2025-05-09 NOTE — PROGRESS NOTES
[facility]    Medicine Progress Note - Hospitalist Service    Date of Admission:  5/6/2025    Assessment & Plan   74 year old female with PMHx of HTN, metastatic lung cancer of the right lower lobe, thyroid nodule and pulmonary nodule who presents for fatigue and fever.She notes all symptoms of fever, n/v/d all started after starting oral chemo days prior.    1.Sepsis, fever 104 on admit  -concern sepsis with chemo pt  -pan cx--so far NTD  -stool c diff and extended panel neg  -ct no clear infection on imaging  -IV vancomycin  -IV cefepime  -appreciate ID seeing  -with blood cx neg from 5/6 I anticipate ID will soon stop antibiotics     2.N/V/D  -I do think all of this was from new oral chemo reaction  -checked stools--neg c diff and neg for panel, ok trial Imodium now  -IVF adjust for hypoglycemia and now D5NS  -better today    3.Metastatic lung cancer to brain  -follows at [place]() now only on po adagrasib started days ago  -in past when seen by  was on carboplatin, pembrolizumab Feb 2024-March 2025  -was seen here with  before and ok seeing now  -onc idid see here and agreed hold oral chemo and only consider restart as outpt and perhaps lower dose--she is aware of this  -pall care seeing and focused now on nausea  -then on, 5/8/25 she told me she wants to be done and wants to hear more about hospice options--I went over with her process and now she would like consult--I have reached out to care management and they will reach out to outpt hospice to come here to see her  -[facility] coming for consult 5/9/25      --head ct here shows 5/7/25  .  Focus of decreased attenuation within the right centrum semiovale, new since the prior exams from 2024. Given history of metastatic lung cancer, findings raise concern for vasogenic edema secondary to development of intracranial metastasis. Recommend   further evaluation with brain MRI with and without IV contrast.  2.   No acute intracranial hemorrhage.  3.  Mild generalized cerebral volume loss and presumed chronic microvascular ischemic changes of the white matter  --will try to update mri here     --mri outside [place]--4/7/25--a month ago  1. Interval development of numerous small metastasis throughout the bilateral cerebral hemispheres.   2. Development of multiple foci of restricted diffusion. Some of these are without enhancement, suggesting acute to subacute infarction. Others demonstrate minimal enhancement, suggesting these may be metastatic in etiology. Follow-up imaging would be   helpful in further evaluation of evolution. No significant mass effect or hemorrhagic transformation.     4.Subacute CVA  -seen on MRI  -complex  with tumor issues  -per neurology() and per pt decision will not use plavix or asa with risk with mets  -tele  -carotid ultrasound, echo  -pt/ot   -she is willing to try statin  -told her about serious carotid stenosis and how connected to risk cva and due to her cancer , she is not interested in seeing vascular    5.Dilation pancreatic duct  -checked lipase  -gi did see, did not rec further work up    6.Acute hypoxic resp failure  -ct neg pe  -not on home O2   -monitor here, on 2 liters --try to wean here    7.YAMILKA--improving  -on admit elevated creat, from gi losses  -creat 1.4-->1.15-->0.92-->1.02  -ivf  -, still needs ivf     8.Fatigue/weakness  -from all above  -pt/ot    9.HTN  - amlodipine    10.Hypomag  -iv mag today 2 doses     11.Ankle bruising  -xray neg  -likely sprain from fall at home       Code-DNR/DNI      -Goals of care--hospice consult today     ---I called son at 1528. He notes that they are leaning to going home when they can and after home then will decide when to get [facility] involved          Diet: Combination Diet Regular Diet Adult  Snacks/Supplements Adult: Gelatein Plus; Between Meals  Snacks/Supplements Adult: Magic Cup; With Meals    DVT Prophylaxis: Pneumatic  Compression Devices  Ansari Catheter: Not present  Lines: PRESENT      Port A Cath Single 02/12/24 Left Chest wall-Site Assessment: WDL      Cardiac Monitoring: ACTIVE order. Indication: Stroke, acute (48 hours)  Code Status: No CPR- Do NOT Intubate      Clinically Significant Risk Factors             # Hypomagnesemia: Lowest Mg = 1.4 mg/dL in last 2 days, will replace as needed   # Hypoalbuminemia: Lowest albumin = 2.6 g/dL at 5/9/2025  7:05 AM, will monitor as appropriate                 # Severe Malnutrition: based on nutrition assessment and treatment provided per dietitian's recommendations., PRESENT ON ADMISSION   # Financial/Environmental Concerns: none         Social Drivers of Health    Depression: At risk (4/29/2025)    Received from [facility]    PHQ-2     PHQ-2 Score: 4   Tobacco Use: Medium Risk (4/16/2025)    Received from [facility]    Patient History     Smoking Tobacco Use: Former     Smokeless Tobacco Use: Never          Disposition Plan     Medically Ready for Discharge: Anticipated in 2-4 Days             Otilia Espana MD  Hospitalist Service  [facility]  Securely message with [employer] (more info)  Text page via [facility] Paging/Directory   ______________________________________________________________________    Interval History   She notes no loose stools today  Starting to take more po  Still not feeling ready for home      Physical Exam   Vital Signs: Temp: 97.9  F (36.6  C) Temp src: Oral BP: 126/60 Pulse: 87   Resp: 20 SpO2: 93 % O2 Device: Nasal cannula Oxygen Delivery: 2 LPM  Weight: 0 lbs 0 oz    Constitutional: awake and alert  Eyes: sclera clear  Respiratory: no increased work of breathing, good air exchange, no retractions, and clear to auscultation  Cardiovascular: regular rate and rhythm and normal S1 and S2  GI: normal bowel sounds, soft, non-distended, and non-tender  Skin: ecchymosis on left ankle(s)  Musculoskeletal: no lower extremity pitting edema  present  Neurologic: Mental Status Exam:  Level of Alertness:   awake  Motor Exam:  moves all extremities well and symmetrically  Neuropsychiatric: General: normal, calm, and normal eye contact    Medical Decision Making       55 MINUTES SPENT BY ME on the date of service doing chart review, history, exam, documentation & further activities per the note.      Data     I have personally reviewed the following data over the past 24 hrs:    8.6  \   9.3 (L)   / 212     136 106 11.3 /  96   3.4 25 1.02 (H) \     ALT: N/A AST: N/A AP: N/A TBILI: N/A   ALB: 2.6 (L) TOT PROTEIN: N/A LIPASE: N/A       Imaging results reviewed over the past 24 hrs:   Recent Results (from the past 24 hours)   XR Ankle Left 2 Views    Narrative    EXAM: XR ANKLE LEFT 2 VIEWS  LOCATION: [facility]  DATE: 5/9/2025    INDICATION: pain , fall left ankle, please do portable  COMPARISON: None.      Impression    IMPRESSION: Normal joint spaces and alignment. No fracture.

## 2025-05-09 NOTE — PROGRESS NOTES
Care Management Follow Up    Length of Stay (days): 3    Expected Discharge Date: 05/09/2025     Concerns to be Addressed: discharge planning     Patient plan of care discussed at interdisciplinary rounds: Yes    Anticipated Discharge Disposition:     TBD           Anticipated Discharge Services:  hospice  Anticipated Discharge DME:  TBD    Patient/family educated on Medicare website which has current facility and service quality ratings:    Education Provided on the Discharge Plan:    Patient/Family in Agreement with the Plan:      Referrals Placed by CM/SW:    Private pay costs discussed: Not applicable    Discussed  Partnership in Safe Discharge Planning  document with patient/family: No     Handoff Completed: No, handoff not indicated or clinically appropriate    Additional Information:  Plan is for [facility] to meet with patient and family today.     Next Steps: follow up after with hospice and patient    Aspen Gamboa RNCC

## 2025-05-09 NOTE — PROGRESS NOTES
NEUROLOGY PROGRESS NOTE     ASSESSMENT & PLAN   Visit diagnosis: Stroke    Stroke  History of metastatic lung cancer with metastases to the brain  Immunocompromise status    MRI brain shows multiple small metastases throughout the bilateral cerebral hemispheres  MRI brain further shows a small focus of restricted diffusion in the right centrum semiovale suggestive of subacute infarct  Carotid ultrasound shows 50 to 69% right ICA stenosis.  Could consider vascular surgery consultation depending on patient's goals.  Echocardiogram shows 50 to 60% ejection fraction  Cardiac monitoring  Continue with aspirin and Plavix for now though patient might benefit from anticoagulation with Lovenox as she potentially does have a hypercoagulable state from the malignancy.  Lipid panel and statin-  Blood pressure can slowly be normalize  PT/OT who  Palliative care involved to help establish goals of care.  Patient looking into hospice though has not made her decision yet.  Meeting set up for later today.    Neurology Discharge Planning : Per primary team.  Will sign off.  Please call with further questions.    Patient Active Problem List    Diagnosis Date Noted    Immunocompromised 05/06/2025     Priority: Medium    YAMILKA (acute kidney injury) 05/06/2025     Priority: Medium    Acute respiratory failure with hypoxia (H) 05/06/2025     Priority: Medium    Sepsis, due to unspecified organism, unspecified whether acute organ dysfunction present (H) 05/06/2025     Priority: Medium    Antineoplastic chemotherapy induced anemia 07/08/2024     Priority: Medium    Lung cancer metastatic to bone (H) 01/31/2024     Priority: Medium    Malignant neoplasm of lower lobe of right lung (H) 01/17/2024     Priority: Medium    Postobstructive pneumonia 01/17/2024     Priority: Medium    Observation for suspected malignant neoplasm 01/07/2024     Priority: Medium    Thyroid nodule 01/07/2024     Priority: Medium    Chronic cough 01/07/2024      "Priority: Medium    Pulmonary nodules 01/07/2024     Priority: Medium    Wheezing-associated respiratory infection (WARI) 01/07/2024     Priority: Medium     Medical History  Past Medical History:   Diagnosis Date    Hypertension     SOB (shortness of breath)         SUBJECTIVE     Patient seen in follow-up for Dr. Reyna.  74-year-old came to the ER on 5/6/2025  Had fever with reading of \"104 \"  Nausea and vomiting  Patient on chemotherapy and is immunosuppressed  Follows at the [facility] for her cancer     Patient has a history of metastatic cancer of the right lower lobe.  Patient is on adagrasib, for treatment of her cancer and has not felt well over the last year  Metastatic lung cancer right lobe (stage Kylee non-small cell lung cancer)  Metastatic to lymph node and brain  Status post chemotherapy with carboplatin, pemetrexed and pembrolizumab from February 2024 to March 2025      Has been seen by palliative care today 5/7/2025  Patient stated her goal was to return home discontinue further cancer treatment and like to focus on comfort while remaining in the home until end-of-life.     Discussed the case twice with primary MD/hospitalist Dr. Espana.    5/8  No new complaints/ongoing focal symptoms.  Palliative care involved.  Tolerating medications.  Did complete the carotid ultrasound.  Has not decided about hospice so far.    5/9  She reports no stroke symptoms.  Plan to make the decision for hospice later today.  She has not decided herself and will see what they say today.     OBJECTIVE     Vital signs in last 24 hours  Temp:  [97.9  F (36.6  C)-98.9  F (37.2  C)] 98  F (36.7  C)  Pulse:  [80-99] 87  Resp:  [16-20] 20  BP: (109-136)/(45-63) 136/63  SpO2:  [89 %-96 %] 96 %  Review of Systems   No new complaints.    PHYSICAL EXAMINATION  VITALS: /63 (BP Location: Right arm)   Pulse 87   Temp 98  F (36.7  C) (Oral)   Resp 20   Ht 1.651 m (5' 5\")   LMP  (LMP Unknown)   SpO2 96%   BMI 21.67 kg/m  "   GENERAL -Health appearing, No apparent distress  EYES- No scleral icterus, no eyelid droop, Pupils - see Neuro section  HEENT - Normocephalic, atraumatic, Hearing grossly intact; Oral mucosa moist and pink in color. External Ears and nose intact.   Neck - supple   PULM - Good spontaneous respiratory effort  CV-extremities warm to touch  MSK- Gait - see Neuro section; Strength and tone- see Neuro section; Range of motion grossly intact.  PSYCH -cooperative    Neurological  Mental status - Patient is awake and partially oriented to self, place and time. Attention span is normal. Language is fluent and follows commands appropriately.   Cranial nerves - CN II-XII intact. Pupils are reactive and symmetric; EOMI, VFIT, NLF symmetric  Motor - There is no pronator drift. Motor exam shows 5/5 strength in all extremities.  Tone - Tone is symmetric bilaterally in upper and lower extremities.  Reflexes -toes mute.  Sensation - Sensory exam is grossly intact to light touch, pain.  Coordination - Finger to nose is without dysmetria.  Gait and station --unable to safely ambulate.  Formal gait testing cannot be done due to safety concerns from ongoing medical issues.  Exam stable/nonfocal.     DIAGNOSTIC STUDIES     Pertinent Radiology   MRI brain April 2025    1. Interval development of numerous small metastasis throughout the bilateral cerebral hemispheres.   2. Development of multiple foci of restricted diffusion. Some of these are without enhancement, suggesting acute to subacute infarction. Others demonstrate minimal enhancement, suggesting these may be metastatic in etiology. Follow-up imaging would be   helpful in further evaluation of evolution. No significant mass effect or hemorrhagic transformation.   3. Other miscellaneous findings, and details, as noted.     MRI brain  IMPRESSION:     1.  Small focus of apparent diffusion restriction within the right centrum semiovale is favored to represent a subacute  infarction.  2.  Several small presumable metastases within the frontal lobes that are without mass effect.  3.  Small enhancing foci within the frontal and occipital calvarium are suspicious for metastases.    Component      Latest Ref Rng 5/7/2025  6:59 AM 5/7/2025  8:00 AM   Cholesterol      <200 mg/dL  201 (H)    Triglycerides      <150 mg/dL  316 (H)    HDL Cholesterol      >=50 mg/dL  20 (L)    LDL Cholesterol Calculated      <100 mg/dL  118 (H)    Non HDL Cholesterol      <130 mg/dL  181 (H)    Estimated Average Glucose      <117 mg/dL 100     Hemoglobin A1C      <5.7 % 5.1        Legend:  (H) High  (L) Low    ECHO  1. Normal left ventricular size and systolic performance. The visually  estimated ejection fraction is 55-60%.  2. There is trace-mild aortic insufficiency.  3. Normal right ventricular size and systolic performance.    Carotid US  IMPRESSION:  1.  Moderate plaque formation, velocities consistent with 50-69% stenosis in the right internal carotid artery.  2.  Severe plaque formation, velocities consistent with greater than 70% stenosis in the left internal carotid artery.  3.  Flow within the vertebral arteries is antegrade.      Recent Results (from the past 24 hours)   Glucose by meter    Collection Time: 05/08/25  5:15 PM   Result Value Ref Range    GLUCOSE BY METER POCT 128 (H) 70 - 99 mg/dL   Glucose by meter    Collection Time: 05/08/25  9:24 PM   Result Value Ref Range    GLUCOSE BY METER POCT 153 (H) 70 - 99 mg/dL   Renal panel    Collection Time: 05/09/25  7:05 AM   Result Value Ref Range    Sodium 136 135 - 145 mmol/L    Potassium 3.4 3.4 - 5.3 mmol/L    Chloride 106 98 - 107 mmol/L    Carbon Dioxide (CO2) 25 22 - 29 mmol/L    Anion Gap 5 (L) 7 - 15 mmol/L    Glucose 103 (H) 70 - 99 mg/dL    Urea Nitrogen 11.3 8.0 - 23.0 mg/dL    Creatinine 1.02 (H) 0.51 - 0.95 mg/dL    GFR Estimate 57 (L) >60 mL/min/1.73m2    Calcium 8.0 (L) 8.8 - 10.4 mg/dL    Albumin 2.6 (L) 3.5 - 5.2 g/dL     Phosphorus 2.8 2.5 - 4.5 mg/dL   CBC with platelets    Collection Time: 05/09/25  7:05 AM   Result Value Ref Range    WBC Count 8.6 4.0 - 11.0 10e3/uL    RBC Count 3.03 (L) 3.80 - 5.20 10e6/uL    Hemoglobin 9.3 (L) 11.7 - 15.7 g/dL    Hematocrit 28.7 (L) 35.0 - 47.0 %    MCV 95 78 - 100 fL    MCH 30.7 26.5 - 33.0 pg    MCHC 32.4 31.5 - 36.5 g/dL    RDW 14.3 10.0 - 15.0 %    Platelet Count 212 150 - 450 10e3/uL   Magnesium    Collection Time: 05/09/25  7:05 AM   Result Value Ref Range    Magnesium 1.4 (L) 1.7 - 2.3 mg/dL   Glucose by meter    Collection Time: 05/09/25  8:37 AM   Result Value Ref Range    GLUCOSE BY METER POCT 96 70 - 99 mg/dL   Glucose by meter    Collection Time: 05/09/25 11:54 AM   Result Value Ref Range    GLUCOSE BY METER POCT 108 (H) 70 - 99 mg/dL         HOSPITAL MEDICATIONS     Current Facility-Administered Medications   Medication Dose Route Frequency Provider Last Rate Last Admin    [Held by provider] adagrasib (KRAZATI) tablet 600 mg  600 mg Oral BID Kristine Coleman NP   600 mg at 05/07/25 0916    amLODIPine (NORVASC) tablet 5 mg  5 mg Oral Daily Otilia Espana MD   5 mg at 05/09/25 0832    atorvastatin (LIPITOR) tablet 20 mg  20 mg Oral QPM Otilia Espana MD   20 mg at 05/08/25 2109    folic acid (FOLVITE) tablet 1,000 mcg  1,000 mcg Oral Daily Otilia Espana MD   1,000 mcg at 05/09/25 0832    ipratropium - albuterol 0.5 mg/2.5 mg/3 mL (DUONEB) neb solution 3 mL  3 mL Nebulization Once Otilia Espana MD        loperamide (IMODIUM) capsule 4 mg  4 mg Oral Q4H Milena Stanley APRN CNP   4 mg at 05/09/25 0832    magnesium sulfate 2 g in 50 mL sterile water intermittent infusion  2 g Intravenous Once Otilia Espana MD        octreotide (sandoSTATIN) injection 100 mcg  100 mcg Subcutaneous TID Milena Stanley APRN CNP   100 mcg at 05/08/25 2112    ondansetron (ZOFRAN ODT) ODT tab 4 mg  4 mg Oral TID Mitchell Manzo DO   4 mg at 05/09/25 0832     pantoprazole (PROTONIX) IV push injection 40 mg  40 mg Intravenous Q24H Otilia Espana MD   40 mg at 05/09/25 1115    predniSONE (DELTASONE) tablet 2.5 mg  2.5 mg Oral Daily Otilia Espana MD   2.5 mg at 05/09/25 0832    sodium chloride (PF) 0.9% PF flush 3 mL  3 mL Intracatheter Q8H [facility] Otilia Espana MD   3 mL at 05/08/25 2114    sodium chloride (PF) 0.9% PF flush 3 mL  3 mL Intracatheter Q8H [facility] Kristine Coleman NP   3 mL at 05/06/25 2148        Total time spent for face to face visit, reviewing labs/imaging studies, counseling and coordination of care was: Over 35 min More than 50% of this time was spent on counseling and coordination of care.    Counseling patient.  Reviewing chart.  Coordination of care with the nursing staff.    Michael Lorenzo MD  Neurologist  [facility]  Tel:- [phone]

## 2025-05-09 NOTE — PLAN OF CARE
Goal Outcome Evaluation:                        A&Ox4. Assist of 1 to bathroom. Denies pain. Denies nausea. No BM this shift. Poor appetite and PO intake. Ate 50% of breakfast, refused lunch. Blood sugars were 96 and 108. IV fluids infusing @ 50ml/hr per orders. NIH score is 0. Pt and her son had a meeting with hospice this afternoon.

## 2025-05-09 NOTE — PLAN OF CARE
Problem: Gas Exchange Impaired  Goal: Optimal Gas Exchange  Outcome: Progressing     Problem: Comorbidity Management  Goal: Blood Pressure in Desired Range  Outcome: Progressing   Goal Outcome Evaluation:  Inez had a good night, appeared to sleep well. NIH 0, no neuro changes. 2L NC, did not try to wean. NSR on telemetry. SBA. Denied pain. Unable to get blood return off port this AM.

## 2025-05-09 NOTE — PROGRESS NOTES
"[facility] Inpatient follow up       Patient:  Inez Rodriguez  Date of birth 1950, Medical record number [id number]  Date of Visit:  05/09/2025  Attending Physician: Otilia Espana MD         Assessment and Recommendations:   Assessment:  Inez Rodriguez is a 74 year old female with   Metastatic lung cancer of the right lower lobe (stage Kylee non-small cell lung cancer ) with metastasis to lymph node and to the brain.  Status post chemotherapy with carboplatin, pemetrexed and pembrolizumab from February 2024 to March 2025. On therapy with adagrasib orally.  Follows with Dr. Enrique Shah through the [facility].  Sepsis with fever, nausea, vomiting, diarrhea.  Blood cultures no growth so far.  UA not impressive.  UCx no growth.  Very mild elevation of procalcitonin.  Influenza A/B, RSV, SARS-CoV-2 PCR negative.  C. difficile negative.  Enteric bacterial and viral panel negative. Suspecting side effects from Adagrasib since nausea, vomiting and diarrhea started since she was started on this agent. The fever is new and warrants further investigation as being done.   YAMILKA.  Improving.  Acute hypoxic respiratory failure       Recommendations:  Discontinue IV ceftriaxone and vancomycin.      Discussed with patient, nursing staff.    ID will sign off.  Call us with questions.      Jennifer Georges MD.  [facility].   Formerly [facility]  Office Telephone [phone].  Fax [phone]  [facility] paging            Interval History:     HPI:  The interval history was reviewed.   She feels better.  Off chemotherapy.  No more nausea, vomiting, diarrhea.    Pertinent cultures include:  No results found for: \"CULT\"    Recent Inflammatory Biomarkers:   Recent Labs   Lab Test 05/09/25  0705 05/08/25  0559 05/07/25  0659 05/06/25  1447 03/27/25  0830 03/06/25  1322 01/09/24  0740 01/08/24  0714   PCAL  --   --   --  0.74*  --   --   --  0.41   WBC 8.6 9.3 " 10.0 9.3 7.9 12.3*   < > 12.1*    < > = values in this interval not displayed.            Review of Systems:   CONSTITUTIONAL:    Temp Max: Temp (24hrs), Av.1  F (36.7  C), Min:97.6  F (36.4  C), Max:98.8  F (37.1  C)   .  Negative except for findings in the HPI.           Current Medications (antimicrobials listed in bold):     Current Facility-Administered Medications   Medication Dose Route Frequency Provider Last Rate Last Admin    [Held by provider] adagrasib (KRAZATI) tablet 600 mg  600 mg Oral BID Kristine Coleman NP   600 mg at 25 0916    amLODIPine (NORVASC) tablet 5 mg  5 mg Oral Daily Otilia Espana MD   5 mg at 25 0832    atorvastatin (LIPITOR) tablet 20 mg  20 mg Oral QPM Otilia Espana MD   20 mg at 25    folic acid (FOLVITE) tablet 1,000 mcg  1,000 mcg Oral Daily Otilia Espana MD   1,000 mcg at 25 0832    loperamide (IMODIUM) capsule 4 mg  4 mg Oral Q4H Milena Stanley APRN CNP   4 mg at 25 0832    magnesium sulfate 2 g in 50 mL sterile water intermittent infusion  2 g Intravenous Once Otilia Espana MD        magnesium sulfate 2 g in 50 mL sterile water intermittent infusion  2 g Intravenous Once Otilia Espana MD        octreotide (sandoSTATIN) injection 100 mcg  100 mcg Subcutaneous TID Milena Stanley APRN CNP   100 mcg at 25 211    ondansetron (ZOFRAN ODT) ODT tab 4 mg  4 mg Oral TID Mitchell Manzo DO   4 mg at 25 0832    pantoprazole (PROTONIX) IV push injection 40 mg  40 mg Intravenous Q24H Otilia Espana MD   40 mg at 25 1026    predniSONE (DELTASONE) tablet 2.5 mg  2.5 mg Oral Daily Otilia Espana MD   2.5 mg at 25 0832    sodium chloride (PF) 0.9% PF flush 3 mL  3 mL Intracatheter Q8H Otilia Fernandes MD   3 mL at 25 211    sodium chloride (PF) 0.9% PF flush 3 mL  3 mL Intracatheter Q8H Kristine Sun NP   3 mL at 25 2148              Allergies:   No  Known Allergies         Physical Exam:   Vitals were reviewed  Patient Vitals for the past 24 hrs:   BP Temp Temp src Pulse Resp SpO2   05/09/25 0830 126/60 -- -- 87 20 93 %   05/09/25 0357 117/57 97.9  F (36.6  C) Oral 80 18 93 %   05/09/25 0002 109/45 98.9  F (37.2  C) Oral 87 16 (!) 91 %   05/08/25 1900 129/62 98.6  F (37  C) Oral 90 16 (!) 91 %   05/08/25 1546 -- -- -- -- -- 92 %   05/08/25 1530 116/56 98.7  F (37.1  C) Oral 99 16 (!) 89 %       Physical Examination:  Gen: Pleasant in no acute distress.  HEENT: NCAT. EOMI. PERRL.  Neck: No bruit, JVD or thyromegaly.  Lungs: Clear to ascultation bilat with no crackles or wheezes.  Card: RRR. NSR. No RMG. Peripheral pulses present and symmetric. No edema.  Abd: Soft NT ND. No mass. Normal bowel sounds.  Skin: No rash.  Extr: No edema.  Neuro: Alert and oriented to place time and person. Cranial nerves II to XII intact. Motor and sensory intact. Normal gait.            Laboratory Data:   ID Labs:  Microbiology labs:  Enteric Bacteria and Virus Panel PCR  Order: [id number]   Status: Final result    Test Result Released: Yes (not seen)    Specimen Information: Per Rectum; Stool   0 Result Notes         Component  Ref Range & Units (hover) 1 d ago    Campylobacter species Negative    Salmonella species Negative    Vibrio species Negative    Vibrio cholerae Negative    Yersinia enterocolitica Negative    Enteropathogenic E. coli (EPEC) Negative    Shiga-like toxin-producing E. coli (STEC) Negative    Shigella/Enteroinvasive E. coli (EIEC) Negative    Cryptosporidium species Negative    Giardia lamblia Negative    Norovirus Gl/Gll Negative    Rotavirus A Negative    Plesiomonas shigelloides Negative    Enteroaggregative E. coli (EAEC) Negative    Enterotoxigenic E. coli (ETEC) Negative    E. coli O157 NA    Cyclospora cayetanensis Negative    Entamoeba histolytica Negative    Adenovirus F40/41 Negative    Astrovirus Negative    Sapovirus Negative   Resulting Agency [facility]               Narrative  Performed by: [facility]  Assay performed using the FDA-cleared FilmArray GI Panel from [employer]  A negative result should not rule out infection in patients with a probability for gastrointestinal infection. The assay does not test for all potential infectious agents of diarrheal disease.  Positive results do not distinguish between a viable or replicating organism and the presence of a nonviable organism or nucleic acid, nor do they exclude the possibility of coinfection by organisms not in the panel.  Results are intended to aid in the diagnosis of illness and are meant to be used in conjunction with other clinical findings.  This test has been verified and is performed by the Infectious Diseases Diagnostic Laboratory at [facility]. This laboratory is certified under the Clinical Laboratory Improvement Amendments of 1988 (CLIA-88) as qualified to perform high complexity clinical laboratory testing.   Specimen Collected: 05/07/25  6:50 AM Last Resulted: 05/07/25 10:15 AM         No lab results found.  Recent Labs   Lab Test 05/09/25  0705 05/08/25  0559 05/07/25  0659 05/06/25  1447 03/27/25  0830 03/06/25  1322   WBC 8.6 9.3 10.0 9.3 7.9 12.3*     Recent Labs   Lab Test 05/09/25  0705 05/08/25  0559 05/07/25  0800 05/06/25  1447   CR 1.02* 0.92 1.15* 1.40*   GFRESTIMATED 57* 65 50* 39*       Hematology Studies  Recent Labs   Lab Test 05/09/25  0705 05/08/25  0559 05/07/25  0659 05/06/25  1447 03/27/25  0830 03/06/25  1322 02/13/25  1213 01/23/25  1332 01/02/25  1333   WBC 8.6 9.3 10.0 9.3 7.9 12.3* 9.5 10.4 13.1*   ANEU  --   --   --  7.9 6.4 10.7* 7.8 8.7* 10.6*   AEOS  --   --   --  0.1 0.0 0.0 0.0 0.0 0.0   HGB 9.3* 9.6* 11.3* 12.3 12.3 11.9 12.6 12.4 12.0   HCT 28.7* 28.5* 33.6* 37.3 37.5 37.0 38.8 38.4 37.8    188 170 207 382 444 441 428 319       Metabolic  Recent Labs   Lab Test 05/09/25  0705 05/08/25  0559 05/07/25  0800    136 133*   BUN 11.3 15.0  19.5   CO2 25 21* 22   CR 1.02* 0.92 1.15*   GFRESTIMATED 57* 65 50*       Hepatic Studies  Recent Labs   Lab Test 05/09/25  0705 05/08/25  0559 05/07/25  0800 03/27/25  0830 03/06/25  1322 02/13/25  1213   BILITOTAL  --   --   --  0.2 <0.2 0.2   ALKPHOS  --   --   --  57 56 56   ALBUMIN 2.6* 2.8* 3.2* 4.2 4.3 4.3   AST  --   --   --  25 20 25   ALT  --   --   --  34 25 33       ImmunologlobulinsNo lab results found.         Imaging Data:   Reviewed   Study Result    Narrative & Impression   EXAM: MR BRAIN W/O and W CONTRAST  LOCATION: [facility]  DATE: 5/7/2025     INDICATION: 74 y o with metastatic lung cancer, to brain, last MRI 4 7 25 outsider on care everywhere, more n v headache, ?more mets  COMPARISON: 09/24/2024, 04/07/2025 outside MRI report  CONTRAST: 6 ml Gadavist  TECHNIQUE: Routine multiplanar multisequence head MRI without and with intravenous contrast.     FINDINGS:  INTRACRANIAL CONTENTS: Small focus of apparent diffusion restriction within the centrum semiovale of the right frontal lobe that displays faint T2 hyperintense signal but no ADC hypointense signal. There is no mass effect or midline shift. Mild degree of   cerebral parenchymal volume loss. Numerous subcentimeter presumable metastases within the frontal lobes (series 1100, image 160 and 156).      SELLA: No abnormality accounting for technique.     OSSEOUS STRUCTURES/SOFT TISSUES: Small focus of enhancement within the occipital calvarium (series 1100, image 98) measuring 3 x 3 mm and within the diploic space of the frontal calvarium measuring 6 x 5 mm (series 1100, image 173).      ORBITS: No abnormality accounting for technique.      SINUSES/MASTOIDS: No paranasal sinus mucosal disease. Scattered fluid/membrane thickening in the mastoid air cells bilaterally.                                                                       IMPRESSION:     1.  Small focus of apparent diffusion restriction within the right  centrum semiovale is favored to represent a subacute infarction.  2.  Several small presumable metastases within the frontal lobes that are without mass effect.  3.  Small enhancing foci within the frontal and occipital calvarium are suspicious for metastases.

## 2025-05-10 ENCOUNTER — APPOINTMENT (OUTPATIENT)
Dept: RADIOLOGY | Facility: HOSPITAL | Age: 75
End: 2025-05-10
Attending: INTERNAL MEDICINE
Payer: COMMERCIAL

## 2025-05-10 ENCOUNTER — APPOINTMENT (OUTPATIENT)
Dept: PHYSICAL THERAPY | Facility: HOSPITAL | Age: 75
End: 2025-05-10
Payer: COMMERCIAL

## 2025-05-10 LAB
ALBUMIN SERPL BCG-MCNC: 2.6 G/DL (ref 3.5–5.2)
ANION GAP SERPL CALCULATED.3IONS-SCNC: 8 MMOL/L (ref 7–15)
BUN SERPL-MCNC: 7.9 MG/DL (ref 8–23)
CALCIUM SERPL-MCNC: 8.1 MG/DL (ref 8.8–10.4)
CHLORIDE SERPL-SCNC: 102 MMOL/L (ref 98–107)
CREAT SERPL-MCNC: 1.01 MG/DL (ref 0.51–0.95)
EGFRCR SERPLBLD CKD-EPI 2021: 58 ML/MIN/1.73M2
ERYTHROCYTE [DISTWIDTH] IN BLOOD BY AUTOMATED COUNT: 14.4 % (ref 10–15)
GLUCOSE BLDC GLUCOMTR-MCNC: 101 MG/DL (ref 70–99)
GLUCOSE BLDC GLUCOMTR-MCNC: 119 MG/DL (ref 70–99)
GLUCOSE BLDC GLUCOMTR-MCNC: 131 MG/DL (ref 70–99)
GLUCOSE BLDC GLUCOMTR-MCNC: 96 MG/DL (ref 70–99)
GLUCOSE BLDC GLUCOMTR-MCNC: 99 MG/DL (ref 70–99)
GLUCOSE SERPL-MCNC: 99 MG/DL (ref 70–99)
HCO3 SERPL-SCNC: 25 MMOL/L (ref 22–29)
HCT VFR BLD AUTO: 26.4 % (ref 35–47)
HGB BLD-MCNC: 8.7 G/DL (ref 11.7–15.7)
MAGNESIUM SERPL-MCNC: 1.7 MG/DL (ref 1.7–2.3)
MCH RBC QN AUTO: 30.9 PG (ref 26.5–33)
MCHC RBC AUTO-ENTMCNC: 33 G/DL (ref 31.5–36.5)
MCV RBC AUTO: 94 FL (ref 78–100)
PHOSPHATE SERPL-MCNC: 2.4 MG/DL (ref 2.5–4.5)
PLATELET # BLD AUTO: 252 10E3/UL (ref 150–450)
POTASSIUM SERPL-SCNC: 2.8 MMOL/L (ref 3.4–5.3)
POTASSIUM SERPL-SCNC: 3.6 MMOL/L (ref 3.4–5.3)
RBC # BLD AUTO: 2.82 10E6/UL (ref 3.8–5.2)
SODIUM SERPL-SCNC: 135 MMOL/L (ref 135–145)
WBC # BLD AUTO: 8 10E3/UL (ref 4–11)

## 2025-05-10 PROCEDURE — 250N000011 HC RX IP 250 OP 636: Performed by: INTERNAL MEDICINE

## 2025-05-10 PROCEDURE — 97161 PT EVAL LOW COMPLEX 20 MIN: CPT | Mod: GP | Performed by: PHYSICAL THERAPIST

## 2025-05-10 PROCEDURE — 250N000013 HC RX MED GY IP 250 OP 250 PS 637: Performed by: INTERNAL MEDICINE

## 2025-05-10 PROCEDURE — 99232 SBSQ HOSP IP/OBS MODERATE 35: CPT | Performed by: INTERNAL MEDICINE

## 2025-05-10 PROCEDURE — 250N000009 HC RX 250: Performed by: INTERNAL MEDICINE

## 2025-05-10 PROCEDURE — 85027 COMPLETE CBC AUTOMATED: CPT | Performed by: INTERNAL MEDICINE

## 2025-05-10 PROCEDURE — 120N000001 HC R&B MED SURG/OB

## 2025-05-10 PROCEDURE — 71045 X-RAY EXAM CHEST 1 VIEW: CPT

## 2025-05-10 PROCEDURE — 97116 GAIT TRAINING THERAPY: CPT | Mod: GP | Performed by: PHYSICAL THERAPIST

## 2025-05-10 PROCEDURE — 84132 ASSAY OF SERUM POTASSIUM: CPT | Performed by: INTERNAL MEDICINE

## 2025-05-10 PROCEDURE — 250N000012 HC RX MED GY IP 250 OP 636 PS 637: Performed by: INTERNAL MEDICINE

## 2025-05-10 PROCEDURE — 258N000003 HC RX IP 258 OP 636: Performed by: INTERNAL MEDICINE

## 2025-05-10 PROCEDURE — 83735 ASSAY OF MAGNESIUM: CPT | Performed by: INTERNAL MEDICINE

## 2025-05-10 PROCEDURE — 80069 RENAL FUNCTION PANEL: CPT | Performed by: INTERNAL MEDICINE

## 2025-05-10 PROCEDURE — 250N000011 HC RX IP 250 OP 636: Performed by: STUDENT IN AN ORGANIZED HEALTH CARE EDUCATION/TRAINING PROGRAM

## 2025-05-10 RX ORDER — LORATADINE 10 MG/1
10 TABLET ORAL DAILY
Status: DISCONTINUED | OUTPATIENT
Start: 2025-05-10 | End: 2025-05-12 | Stop reason: HOSPADM

## 2025-05-10 RX ORDER — POTASSIUM CHLORIDE 1500 MG/1
20 TABLET, EXTENDED RELEASE ORAL ONCE
Status: COMPLETED | OUTPATIENT
Start: 2025-05-10 | End: 2025-05-10

## 2025-05-10 RX ORDER — SODIUM CHLORIDE 9 MG/ML
INJECTION, SOLUTION INTRAVENOUS CONTINUOUS
Status: ACTIVE | OUTPATIENT
Start: 2025-05-10 | End: 2025-05-11

## 2025-05-10 RX ORDER — PANTOPRAZOLE SODIUM 40 MG/1
40 TABLET, DELAYED RELEASE ORAL
Status: DISCONTINUED | OUTPATIENT
Start: 2025-05-11 | End: 2025-05-12 | Stop reason: HOSPADM

## 2025-05-10 RX ORDER — POTASSIUM CHLORIDE 1.5 G/1.58G
40 POWDER, FOR SOLUTION ORAL ONCE
Status: COMPLETED | OUTPATIENT
Start: 2025-05-10 | End: 2025-05-10

## 2025-05-10 RX ORDER — POTASSIUM CHLORIDE 1.5 G/1.58G
20 POWDER, FOR SOLUTION ORAL ONCE
Status: DISCONTINUED | OUTPATIENT
Start: 2025-05-10 | End: 2025-05-10 | Stop reason: ALTCHOICE

## 2025-05-10 RX ORDER — LOPERAMIDE HYDROCHLORIDE 2 MG/1
4 CAPSULE ORAL 4 TIMES DAILY PRN
Status: DISCONTINUED | OUTPATIENT
Start: 2025-05-10 | End: 2025-05-12 | Stop reason: HOSPADM

## 2025-05-10 RX ORDER — MAGNESIUM SULFATE HEPTAHYDRATE 40 MG/ML
2 INJECTION, SOLUTION INTRAVENOUS ONCE
Status: COMPLETED | OUTPATIENT
Start: 2025-05-10 | End: 2025-05-10

## 2025-05-10 RX ADMIN — ONDANSETRON 4 MG: 4 TABLET, ORALLY DISINTEGRATING ORAL at 21:10

## 2025-05-10 RX ADMIN — POTASSIUM CHLORIDE 20 MEQ: 1500 TABLET, EXTENDED RELEASE ORAL at 14:07

## 2025-05-10 RX ADMIN — ONDANSETRON 4 MG: 4 TABLET, ORALLY DISINTEGRATING ORAL at 09:59

## 2025-05-10 RX ADMIN — MAGNESIUM SULFATE HEPTAHYDRATE 2 G: 40 INJECTION, SOLUTION INTRAVENOUS at 10:22

## 2025-05-10 RX ADMIN — POTASSIUM CHLORIDE 40 MEQ: 1.5 POWDER, FOR SOLUTION ORAL at 11:05

## 2025-05-10 RX ADMIN — DEXTROSE AND SODIUM CHLORIDE: 5; 900 INJECTION, SOLUTION INTRAVENOUS at 03:56

## 2025-05-10 RX ADMIN — AMLODIPINE BESYLATE 5 MG: 5 TABLET ORAL at 09:58

## 2025-05-10 RX ADMIN — FOLIC ACID 1000 MCG: 1 TABLET ORAL at 09:59

## 2025-05-10 RX ADMIN — SODIUM PHOSPHATE, MONOBASIC, MONOHYDRATE AND SODIUM PHOSPHATE, DIBASIC, ANHYDROUS 9 MMOL: 276; 142 INJECTION, SOLUTION INTRAVENOUS at 11:43

## 2025-05-10 RX ADMIN — PANTOPRAZOLE SODIUM 40 MG: 40 INJECTION, POWDER, FOR SOLUTION INTRAVENOUS at 09:54

## 2025-05-10 RX ADMIN — PREDNISONE 2.5 MG: 2.5 TABLET ORAL at 09:59

## 2025-05-10 RX ADMIN — LORATADINE 10 MG: 10 TABLET ORAL at 16:33

## 2025-05-10 RX ADMIN — ATORVASTATIN CALCIUM 20 MG: 10 TABLET, FILM COATED ORAL at 21:10

## 2025-05-10 RX ADMIN — SODIUM CHLORIDE: 0.9 INJECTION, SOLUTION INTRAVENOUS at 09:53

## 2025-05-10 ASSESSMENT — ACTIVITIES OF DAILY LIVING (ADL)
ADLS_ACUITY_SCORE: 38
ADLS_ACUITY_SCORE: 40
ADLS_ACUITY_SCORE: 39
ADLS_ACUITY_SCORE: 38
ADLS_ACUITY_SCORE: 39
ADLS_ACUITY_SCORE: 42
ADLS_ACUITY_SCORE: 40
ADLS_ACUITY_SCORE: 40
ADLS_ACUITY_SCORE: 39
ADLS_ACUITY_SCORE: 38
ADLS_ACUITY_SCORE: 39
ADLS_ACUITY_SCORE: 42
ADLS_ACUITY_SCORE: 40
ADLS_ACUITY_SCORE: 39
ADLS_ACUITY_SCORE: 40
ADLS_ACUITY_SCORE: 42
ADLS_ACUITY_SCORE: 39
ADLS_ACUITY_SCORE: 40

## 2025-05-10 NOTE — PROGRESS NOTES
Care Management Follow Up    Length of Stay (days): 4    Expected Discharge Date: 05/10/2025     Concerns to be Addressed: discharge planning     Patient plan of care discussed at interdisciplinary rounds: Yes    Anticipated Discharge Disposition:  Hospice     Anticipated Discharge Services:  hospice  Anticipated Discharge DME:  TBD    Patient/family educated on Medicare website which has current facility and service quality ratings:  Yes  Education Provided on the Discharge Plan:  Yes  Patient/Family in Agreement with the Plan:  Yes    Referrals Placed by CM/SW:  Hospice  Private pay costs discussed: Not applicable    Discussed  Partnership in Safe Discharge Planning  document with patient/family: No     Handoff Completed: No, handoff not indicated or clinically appropriate    Additional Information:  [facility] to [facility] admission into their program. [facility] will return call with plan.   8:49: Spoke with Ilsa at [facility], patient accepted to program at time of discharge. Patient not medically ready to discharge today.     Next Steps: follow up with [facility] at discharge.    Anisha Barroso RNCC       English

## 2025-05-10 NOTE — PLAN OF CARE
Problem: Adult Inpatient Plan of Care  Goal: Optimal Comfort and Wellbeing  Outcome: Progressing   Goal Outcome Evaluation:    Pt is A+O x4, on RA. Denies pain. Afebrile. NIHS score is 0. NSR on tele. Poor appetite. Pt having sinus congestion, requested prn benadryl x1.  Up standby to bathroom. Visited with family at bedside. Able to make her needs known.     Adelita Yanes RN.

## 2025-05-10 NOTE — PLAN OF CARE
Problem: Adult Inpatient Plan of Care  Goal: Plan of Care Review  Description: The Plan of Care Review/Shift note should be completed every shift.  The Outcome Evaluation is a brief statement about your assessment that the patient is improving, declining, or no change.  This information will be displayed automatically on your shiftnote.  Outcome: Progressing  Flowsheets (Taken 5/10/2025 0621)  Plan of Care Reviewed With: patient  Overall Patient Progress: improving   Goal Outcome Evaluation:      Plan of Care Reviewed With: patient    Overall Patient Progress: improvingOverall Patient Progress: improving             Assumed care for this patient on 5/9/25 at 2300. Patient is admitted inpatient with immunocompromised. Lungs CA with mets to brain. Neuro following for subacute cva. NIH 0 and 0. No nausea, vomiting or diarrhea this shift. Patient refused scheduled loperamide.  Tele monitoring NSR.  D5+0.9 NS running at 50 ml/hr.

## 2025-05-10 NOTE — PROGRESS NOTES
05/10/25 0830   Appointment Info   Signing Clinician's Name / Credentials (PT) LUZ Coreas   Living Environment   People in Home child(bernice), adult   Current Living Arrangements house   Home Accessibility stairs within home   Number of Stairs, Within Home, Primary greater than 10 stairs   Stair Railings, Within Home, Primary railings safe and in good condition   Transportation Anticipated family or friend will provide   Living Environment Comments pt lives with son in 2 level home.   Self-Care   Usual Activity Tolerance good   Current Activity Tolerance fair   Equipment Currently Used at Home none   Fall history within last six months yes   Number of times patient has fallen within last six months 1   Activity/Exercise/Self-Care Comment Pt is able to get assist from son for cook, clean, laundry, she is IND in bath, dress, transfer   General Information   Onset of Illness/Injury or Date of Surgery 05/06/25   Referring Physician Kristine Coleman NP   Patient/Family Therapy Goals Statement (PT) Pt wishes to go home at this time.  She is not sure if she would like to have anymore treatment for her cancer.   Pertinent History of Current Problem (include personal factors and/or comorbidities that impact the POC) Per chart review: 74 year old female with PMHx of HTN, metastatic lung cancer of the right lower lobe, thyroid nodule and pulmonary nodule who presents for fatigue and fever.She notes all symptoms of fever, n/v/d all started after starting oral chemo days prior   Cognition   Affect/Mental Status (Cognition) WFL;sad/depressed affect;emotionally labile;anxious;agitated   Orientation Status (Cognition) oriented x 4   Follows Commands (Cognition) WFL   Range of Motion (ROM)   Range of Motion ROM is WNL   Strength (Manual Muscle Testing)   Strength (Manual Muscle Testing) strength is WFL   Bed Mobility   Bed Mobility no deficits identified   Transfers   Transfers no deficits identified   Gait/Stairs (Locomotion)    Addison Level (Gait) contact guard   Distance in Feet (Gait) 5   Pattern (Gait) step-through   Clinical Impression   Criteria for Skilled Therapeutic Intervention Evaluation only   PT Diagnosis (PT) Impaired functional mobility, gait abnormality   Influenced by the following impairments decreased strength, decreased endurance   Functional limitations due to impairments Gait   Clinical Presentation (PT Evaluation Complexity) evolving   Clinical Presentation Rationale Pt presents as clinically diagnosed.   Clinical Decision Making (Complexity) low complexity   Planned Therapy Interventions (PT) gait training;home exercise program   Risk & Benefits of therapy have been explained evaluation/treatment results reviewed;patient   PT Total Evaluation Time   PT Eval, Low Complexity Minutes (96929) 12   Physical Therapy Goals   PT Frequency One time eval and treatment only   PT Predicted Duration/Target Date for Goal Attainment 05/17/25   PT Goals Gait   PT: Gait Supervision/stand-by assist;25 feet;Goal Met   Interventions   Interventions Quick Adds Gait Training   Gait Training   Gait Training Minutes (29798) 15   Symptoms Noted During/After Treatment (Gait Training) fatigue   Treatment Detail/Skilled Intervention pt amb 30 ft with no AD, CGA.  Pt stops to chat frequently.  Pt sat IND at EOB for addional 8 min before deciding to get back into bed.  IND mobilty bed and transfers   Distance in Feet 30   Addison Level (Gait Training) contact guard   Physical Assistance Level (Gait Training) supervision;verbal cues   Pattern Analysis (Gait Training) swing-through gait   Gait Analysis Deviations decreased ester   Impairments (Gait Analysis/Training) strength decreased   PT Discharge Planning   PT Plan DC to HEP   PT Discharge Recommendation (DC Rec) home with assist   PT Rationale for DC Rec Pt is moving well at this time and wishes to go home with family.   PT Brief overview of current status amb 35 ft with no AD  CGA, IND bed mob and transfers   PT Total Distance Amb During Session (feet) 35

## 2025-05-10 NOTE — PLAN OF CARE
Goal Outcome Evaluation:                            A&Ox4. SBA to the bathroom. NIH score is 0. On tele, SR. Blood sugars were 96 and 99. Port is accessed and patent. NS infusing @ 50ml/hr. K and Phos protocols done, replaced, recheck this evening. No BM this shift. Denies pain. Denies nausea. Poor PO intake and appetite. Drank 1 [place] and ate half of a banana today. Pt complains of nasal congestion and crackles noted in lower left lobe, MD notified, Chest X-ray ordered and completed. O2 titrated to 1L, O2 sats 91% 1L NC. Son at the bedside.

## 2025-05-10 NOTE — PROGRESS NOTES
[facility]    Medicine Progress Note - Hospitalist Service    Date of Admission:  5/6/2025    Assessment & Plan   74 year old female with PMHx of HTN, metastatic lung cancer of the right lower lobe, thyroid nodule and pulmonary nodule who presents for fatigue and fever.She notes all symptoms of fever, n/v/d all started after starting oral chemo days prior.    1.Sepsis, fever 104 on admit  -concern sepsis with chemo pt  -pan cx--so far NTD  -stool c diff and extended panel neg  -ct no clear infection on imaging  -IV vancomycin and cefepime on admit and stopped by Id on 5/9/25  -appreciate ID seeing  -with blood cx neg from 5/6     2.N/V/D  -I do think all of this was from new oral chemo reaction  -checked stools--neg c diff and neg for panel, ok trial Imodium now  -hold octreotide today and see how she does  -IVF adjust for hypoglycemia and now D5NS--now eating, will switch IVF to NS and watch BS      3.Metastatic lung cancer to brain  -follows at [facility]() now only on po adagrasib started days ago  -in past when seen by  was on carboplatin, pembrolizumab Feb 2024-March 2025  -was seen here with  before and ok seeing now  -onc idid see here and agreed hold oral chemo and only consider restart as outpt and perhaps lower dose--she is aware of this  -pall care seeing and focused now on nausea  -then on, 5/8/25 she told me she wants to be done and wants to hear more about hospice options--I went over with her process and now she would like consult--I have reached out to care management and they will reach out to outpt hospice to come here to see her  -[facility] consult  5/9/25-this am on 5/10 she seems to not be sure when she wants to get them started and is leaning to getting home and then seeing how she does few days first      --head ct here shows 5/7/25  .  Focus of decreased attenuation within the right centrum semiovale, new since the prior exams from 2024.  Given history of metastatic lung cancer, findings raise concern for vasogenic edema secondary to development of intracranial metastasis. Recommend   further evaluation with brain MRI with and without IV contrast.  2.  No acute intracranial hemorrhage.  3.  Mild generalized cerebral volume loss and presumed chronic microvascular ischemic changes of the white matter  --will try to update mri here     --mri outside [place]--4/7/25--a month ago  1. Interval development of numerous small metastasis throughout the bilateral cerebral hemispheres.   2. Development of multiple foci of restricted diffusion. Some of these are without enhancement, suggesting acute to subacute infarction. Others demonstrate minimal enhancement, suggesting these may be metastatic in etiology. Follow-up imaging would be   helpful in further evaluation of evolution. No significant mass effect or hemorrhagic transformation.     4.Subacute CVA  -seen on MRI  -complex  with tumor issues  -per neurology() and per pt decision will not use plavix or asa with risk with mets  -tele  -carotid ultrasound, echo  -pt/ot   -she is willing to try statin  -told her about serious carotid stenosis and how connected to risk cva and due to her cancer , she is not interested in seeing vascular    5.Dilation pancreatic duct  -checked lipase  -gi did see, did not rec further work up    6.Acute hypoxic resp failure  -ct neg pe  -not on home O2   -monitor here, on 2 liters --try to wean here    7.YAMILKA--improving  -on admit elevated creat, from gi losses  -creat 1.4-->1.15-->0.92-->1.02-->1.01  -ivf  -, still needs ivf     8.Fatigue/weakness  -from all above  -pt/ot    9.HTN  - amlodipine    10.Hypomag  -iv mag again today    11.Hypokalemia  -replace k    12.Hypophos  -replace phos    13.Ankle bruising  -xray neg  -likely sprain from fall at home       Code-DNR/DNI      -Goals of care--not ready to sign onto hospice    -I updated son in room at 1445          Diet:  Combination Diet Regular Diet Adult  Snacks/Supplements Adult: Gelatein Plus; Between Meals  Snacks/Supplements Adult: Magic Cup; With Meals    DVT Prophylaxis: Pneumatic Compression Devices  Ansari Catheter: Not present  Lines: PRESENT      Port A Cath Single 02/12/24 Left Chest wall-Site Assessment: WDL      Cardiac Monitoring: ACTIVE order. Indication: cva  Code Status: No CPR- Do NOT Intubate      Clinically Significant Risk Factors        # Hypokalemia: Lowest K = 2.8 mmol/L in last 2 days, will replace as needed      # Hypomagnesemia: Lowest Mg = 1.4 mg/dL in last 2 days, will replace as needed   # Hypoalbuminemia: Lowest albumin = 2.6 g/dL at 5/10/2025  6:42 AM, will monitor as appropriate                 # Severe Malnutrition: based on nutrition assessment and treatment provided per dietitian's recommendations., PRESENT ON ADMISSION   # Financial/Environmental Concerns: none         Social Drivers of Health    Depression: At risk (4/29/2025)    Received from [facility]    PHQ-2     PHQ-2 Score: 4   Tobacco Use: Medium Risk (4/16/2025)    Received from [facility]    Patient History     Smoking Tobacco Use: Former     Smokeless Tobacco Use: Never          Disposition Plan     Medically Ready for Discharge: Anticipated Tomorrow             Otilia Espana MD  Hospitalist Service  [facility]  Securely message with [employer] (more info)  Text page via [employer] Paging/Directory   ______________________________________________________________________    Interval History   She feels not sure what she wants to do  Thought meeting helpful yesterday\    No more loose stools  Eating some  [place] that she woke up overnight and was confused, better now  No pain      Physical Exam   Vital Signs: Temp: 98.5  F (36.9  C) Temp src: Oral BP: 123/58 Pulse: 96   Resp: 18 SpO2: 95 % O2 Device: Nasal cannula Oxygen Delivery: 2.5 LPM  Weight: 134 lbs 0 oz    Constitutional: awake, alert, cooperative, and no  apparent distress  Eyes: sclera clear  Respiratory: no increased work of breathing, good air exchange, no retractions, and clear to auscultation  Cardiovascular: regular rate and rhythm and normal S1 and S2  GI: normal bowel sounds, soft, non-distended, and non-tender  Skin: no bruising or bleeding  Musculoskeletal: no lower extremity pitting edema present  Neurologic: Mental Status Exam:  Level of Alertness:   awake  Neuropsychiatric: General: normal, calm, and normal eye contact    Medical Decision Making       35 MINUTES SPENT BY ME on the date of service doing chart review, history, exam, documentation & further activities per the note.      Data     I have personally reviewed the following data over the past 24 hrs:    8.0  \   8.7 (L)   / 252     135 102 7.9 (L) /  96   2.8 (L) 25 1.01 (H) \     ALT: N/A AST: N/A AP: N/A TBILI: N/A   ALB: 2.6 (L) TOT PROTEIN: N/A LIPASE: N/A       Imaging results reviewed over the past 24 hrs:   No results found for this or any previous visit (from the past 24 hours).

## 2025-05-11 LAB
ALBUMIN SERPL BCG-MCNC: 2.6 G/DL (ref 3.5–5.2)
ANION GAP SERPL CALCULATED.3IONS-SCNC: 6 MMOL/L (ref 7–15)
BACTERIA BLD CULT: NO GROWTH
BACTERIA BLD CULT: NO GROWTH
BUN SERPL-MCNC: 6.1 MG/DL (ref 8–23)
CALCIUM SERPL-MCNC: 7.9 MG/DL (ref 8.8–10.4)
CHLORIDE SERPL-SCNC: 107 MMOL/L (ref 98–107)
CREAT SERPL-MCNC: 0.94 MG/DL (ref 0.51–0.95)
EGFRCR SERPLBLD CKD-EPI 2021: 63 ML/MIN/1.73M2
ERYTHROCYTE [DISTWIDTH] IN BLOOD BY AUTOMATED COUNT: 14.6 % (ref 10–15)
GLUCOSE BLDC GLUCOMTR-MCNC: 123 MG/DL (ref 70–99)
GLUCOSE BLDC GLUCOMTR-MCNC: 92 MG/DL (ref 70–99)
GLUCOSE BLDC GLUCOMTR-MCNC: 95 MG/DL (ref 70–99)
GLUCOSE SERPL-MCNC: 83 MG/DL (ref 70–99)
HCO3 SERPL-SCNC: 29 MMOL/L (ref 22–29)
HCT VFR BLD AUTO: 25.7 % (ref 35–47)
HGB BLD-MCNC: 8.7 G/DL (ref 11.7–15.7)
MAGNESIUM SERPL-MCNC: 1.7 MG/DL (ref 1.7–2.3)
MCH RBC QN AUTO: 31.8 PG (ref 26.5–33)
MCHC RBC AUTO-ENTMCNC: 33.9 G/DL (ref 31.5–36.5)
MCV RBC AUTO: 94 FL (ref 78–100)
PHOSPHATE SERPL-MCNC: 3.1 MG/DL (ref 2.5–4.5)
PLATELET # BLD AUTO: 281 10E3/UL (ref 150–450)
POTASSIUM SERPL-SCNC: 3.1 MMOL/L (ref 3.4–5.3)
POTASSIUM SERPL-SCNC: 3.7 MMOL/L (ref 3.4–5.3)
RBC # BLD AUTO: 2.74 10E6/UL (ref 3.8–5.2)
SODIUM SERPL-SCNC: 142 MMOL/L (ref 135–145)
WBC # BLD AUTO: 7.2 10E3/UL (ref 4–11)

## 2025-05-11 PROCEDURE — 84132 ASSAY OF SERUM POTASSIUM: CPT | Performed by: INTERNAL MEDICINE

## 2025-05-11 PROCEDURE — 250N000013 HC RX MED GY IP 250 OP 250 PS 637: Performed by: INTERNAL MEDICINE

## 2025-05-11 PROCEDURE — 85014 HEMATOCRIT: CPT | Performed by: INTERNAL MEDICINE

## 2025-05-11 PROCEDURE — 82310 ASSAY OF CALCIUM: CPT | Performed by: INTERNAL MEDICINE

## 2025-05-11 PROCEDURE — 250N000011 HC RX IP 250 OP 636: Performed by: INTERNAL MEDICINE

## 2025-05-11 PROCEDURE — 250N000011 HC RX IP 250 OP 636: Performed by: STUDENT IN AN ORGANIZED HEALTH CARE EDUCATION/TRAINING PROGRAM

## 2025-05-11 PROCEDURE — 99233 SBSQ HOSP IP/OBS HIGH 50: CPT | Performed by: INTERNAL MEDICINE

## 2025-05-11 PROCEDURE — 258N000003 HC RX IP 258 OP 636: Performed by: INTERNAL MEDICINE

## 2025-05-11 PROCEDURE — 83735 ASSAY OF MAGNESIUM: CPT | Performed by: INTERNAL MEDICINE

## 2025-05-11 PROCEDURE — 250N000012 HC RX MED GY IP 250 OP 636 PS 637: Performed by: INTERNAL MEDICINE

## 2025-05-11 PROCEDURE — 120N000001 HC R&B MED SURG/OB

## 2025-05-11 RX ORDER — POTASSIUM CHLORIDE 1500 MG/1
40 TABLET, EXTENDED RELEASE ORAL ONCE
Status: COMPLETED | OUTPATIENT
Start: 2025-05-11 | End: 2025-05-11

## 2025-05-11 RX ORDER — MAGNESIUM SULFATE HEPTAHYDRATE 40 MG/ML
2 INJECTION, SOLUTION INTRAVENOUS ONCE
Status: COMPLETED | OUTPATIENT
Start: 2025-05-11 | End: 2025-05-11

## 2025-05-11 RX ADMIN — ONDANSETRON 4 MG: 4 TABLET, ORALLY DISINTEGRATING ORAL at 09:33

## 2025-05-11 RX ADMIN — FOLIC ACID 1000 MCG: 1 TABLET ORAL at 09:33

## 2025-05-11 RX ADMIN — PREDNISONE 2.5 MG: 2.5 TABLET ORAL at 09:33

## 2025-05-11 RX ADMIN — PANTOPRAZOLE SODIUM 40 MG: 40 TABLET, DELAYED RELEASE ORAL at 09:33

## 2025-05-11 RX ADMIN — POTASSIUM CHLORIDE 40 MEQ: 1500 TABLET, EXTENDED RELEASE ORAL at 09:33

## 2025-05-11 RX ADMIN — SODIUM CHLORIDE: 0.9 INJECTION, SOLUTION INTRAVENOUS at 04:49

## 2025-05-11 RX ADMIN — LORATADINE 10 MG: 10 TABLET ORAL at 09:32

## 2025-05-11 RX ADMIN — AMLODIPINE BESYLATE 5 MG: 5 TABLET ORAL at 09:33

## 2025-05-11 RX ADMIN — MAGNESIUM SULFATE HEPTAHYDRATE 2 G: 2 INJECTION, SOLUTION INTRAVENOUS at 09:37

## 2025-05-11 ASSESSMENT — ACTIVITIES OF DAILY LIVING (ADL)
ADLS_ACUITY_SCORE: 42
ADLS_ACUITY_SCORE: 41
ADLS_ACUITY_SCORE: 42
ADLS_ACUITY_SCORE: 42
ADLS_ACUITY_SCORE: 41
ADLS_ACUITY_SCORE: 42
ADLS_ACUITY_SCORE: 41
ADLS_ACUITY_SCORE: 42
ADLS_ACUITY_SCORE: 41
ADLS_ACUITY_SCORE: 42
ADLS_ACUITY_SCORE: 42
ADLS_ACUITY_SCORE: 41
ADLS_ACUITY_SCORE: 42
ADLS_ACUITY_SCORE: 41
ADLS_ACUITY_SCORE: 42
ADLS_ACUITY_SCORE: 42

## 2025-05-11 NOTE — PLAN OF CARE
Problem: Adult Inpatient Plan of Care  Goal: Absence of Hospital-Acquired Illness or Injury  Outcome: Progressing  Intervention: Identify and Manage Fall Risk  Recent Flowsheet Documentation  Taken 5/11/2025 0000 by Vilma Humphrey RN  Safety Promotion/Fall Prevention:   activity supervised   mobility aid in reach   nonskid shoes/slippers when out of bed   safety round/check completed  Intervention: Prevent Skin Injury  Recent Flowsheet Documentation  Taken 5/11/2025 0000 by Vilma Humphrey RN  Body Position: position changed independently  Intervention: Prevent and Manage VTE (Venous Thromboembolism) Risk  Recent Flowsheet Documentation  Taken 5/11/2025 0400 by Vilma Humphrey RN  VTE Prevention/Management: SCDs off (sequential compression devices)  Taken 5/11/2025 0000 by Vilma Humphrey RN  VTE Prevention/Management: SCDs on (sequential compression devices)  Intervention: Prevent Infection  Recent Flowsheet Documentation  Taken 5/11/2025 0000 by Vilma Humphrey RN  Infection Prevention:   hand hygiene promoted   rest/sleep promoted   single patient room provided     Problem: Delirium  Goal: Improved Attention and Thought Clarity  Intervention: Maximize Cognitive Function  Recent Flowsheet Documentation  Taken 5/11/2025 0000 by Vilma Humphrey RN  Sensory Stimulation Regulation: lighting decreased  Reorientation Measures: clock in view   Goal Outcome Evaluation:    Blood pressure 131/68, pulse 90, temperature 98.1  F, resp. rate 18, SpO2 93% on RA    A/O but forgetful, denied pain, denied n/v    NIHS: 0    Neuros intact     Tolerating NS at 50 ml/hr    SBA when ambulating, bed alarm on     Tele: NSR

## 2025-05-11 NOTE — PLAN OF CARE
Problem: Adult Inpatient Plan of Care  Goal: Optimal Comfort and Wellbeing  Outcome: Progressing   Goal Outcome Evaluation:    Pt is A+O x4. Scored 0 on NIHSS. Denies pain. Poor appetite. NSR on telemetry. Electrolyte protocols recheck in AM.  Scheduled Claritin for sinus congestion. No diarrhea - imodium switched to prn. Visited with son at bedside. Up standby assist. Pt is able to make her needs known.     Adelita Yanes RN.

## 2025-05-11 NOTE — PROGRESS NOTES
[facility]    Medicine Progress Note - Hospitalist Service    Date of Admission:  5/6/2025    Assessment & Plan   74 year old female with PMHx of HTN, metastatic lung cancer of the right lower lobe, thyroid nodule and pulmonary nodule who presents for fatigue and fever.She notes all symptoms of fever, n/v/d all started after starting oral chemo days prior.    1.Sepsis, fever 104 on admit  -concern sepsis with chemo pt  -pan cx--so far NTD  -stool c diff and extended panel negative   -ct no clear infection on imaging  -IV vancomycin and cefepime on admit and stopped by Id on 5/9/25  -appreciate ID seeing  -with blood cx neg from 5/6     2.N/V/D  -I do think all of this was from new oral chemo reaction  -checked stools--neg c diff and neg for panel, ok trial Imodium now  -hold octreotide and make imodium prn -much improved  -IVF adjust -on NS now and BS stable(had some hypoglycemia at first)  -trying to eat more  -still need lytes replaced      3.Metastatic lung cancer to brain  -follows at [facility]() now only on po adagrasib started days ago  -in past when seen by  was on carboplatin, pembrolizumab Feb 2024-March 2025  -was seen here with  before and ok seeing now  -onc idid see here and agreed hold oral chemo and only consider restart as outpt and perhaps lower dose--she is aware of this  -pall care seeing and focused now on nausea  -then on, 5/8/25 she told me she wants to be done and wants to hear more about hospice options--I went over with her process and now she would like consult--I have reached out to care management and they will reach out to outpt hospice to come here to see her  -[facility] consult  5/9/25-this am on 5/10 she seems to not be sure when she wants to get them started and is leaning to getting home with home care RN and then seeing how she does few days first--she will call [facility] and will consider trial of lower dose oral chemo and then  decide when to do hospice later       --head ct here shows 5/7/25  .  Focus of decreased attenuation within the right centrum semiovale, new since the prior exams from 2024. Given history of metastatic lung cancer, findings raise concern for vasogenic edema secondary to development of intracranial metastasis. Recommend   further evaluation with brain MRI with and without IV contrast.  2.  No acute intracranial hemorrhage.  3.  Mild generalized cerebral volume loss and presumed chronic microvascular ischemic changes of the white matter  --will try to update mri here     --mri outside [place]--4/7/25--a month ago  1. Interval development of numerous small metastasis throughout the bilateral cerebral hemispheres.   2. Development of multiple foci of restricted diffusion. Some of these are without enhancement, suggesting acute to subacute infarction. Others demonstrate minimal enhancement, suggesting these may be metastatic in etiology. Follow-up imaging would be   helpful in further evaluation of evolution. No significant mass effect or hemorrhagic transformation.     4.Subacute CVA  -seen on MRI  -complex  with tumor issues  -per neurology() and per pt decision will not use plavix or asa with risk with mets  -tele  -carotid ultrasound, echo  -pt/ot   -she is willing to try statin--started here  -told her about serious carotid stenosis and how connected to risk cva and due to her cancer , she is not interested in seeing vascular due to her severe cancer    5.Dilation pancreatic duct  -checked lipase  -gi did see, did not rec further work up    6.Acute hypoxic resp failure  -ct neg pe  -not on home O2   -monitor here, on 2 liters --try to wean here--now RA    7.YAMILKA--improving  -on admit elevated creat, from gi losses  -creat 1.4-->1.15-->0.92-->1.02-->1.01-->0.94  -wean ivf     8.Fatigue/weakness  -from all above  -pt/ot    9.HTN  - amlodipine    10.Hypomag  -iv mag again today for 1.7    11.Hypokalemia  -replace  k again today    12.Hypophos  -replace phos--improved    13.Ankle bruising  -xray neg  -likely sprain from fall at home    14.Seasonal allergies, some congestion nasal  -tried claritin and loved it       Code-DNR/DNI      -Goals of care--not ready to sign onto hospice--when she goes, will go home with home care RN. Son and pt will call [facility] for follow up      --I updated son in room today at 1500            Diet: Combination Diet Regular Diet Adult  Snacks/Supplements Adult: Gelatein Plus; Between Meals  Snacks/Supplements Adult: Magic Cup; With Meals    DVT Prophylaxis: Pneumatic Compression Devices  Ansari Catheter: Not present  Lines: PRESENT      Port A Cath Single 02/12/24 Left Chest wall-Site Assessment: WDL      Cardiac Monitoring: ACTIVE order. Indication: low electrolytes  Code Status: No CPR- Do NOT Intubate      Clinically Significant Risk Factors        # Hypokalemia: Lowest K = 2.8 mmol/L in last 2 days, will replace as needed        # Hypoalbuminemia: Lowest albumin = 2.6 g/dL at 5/11/2025  7:15 AM, will monitor as appropriate                 # Severe Malnutrition: based on nutrition assessment and treatment provided per dietitian's recommendations.    # Financial/Environmental Concerns: none         Social Drivers of Health    Depression: At risk (4/29/2025)    Received from [facility]    PHQ-2     PHQ-2 Score: 4   Tobacco Use: Medium Risk (4/16/2025)    Received from [facility]    Patient History     Smoking Tobacco Use: Former     Smokeless Tobacco Use: Never          Disposition Plan     Medically Ready for Discharge: Anticipated Tomorrow             Otilia Espana MD  Hospitalist Service  [facility]  Securely message with [employer] (more info)  Text page via [facility] Paging/Directory   ______________________________________________________________________    Interval History   She did eat noodles some last night son brought  She feels better, not dizzy or  lightheaded, no pain  Liked the claritin, feels no nasal congestion today   Open to home RN    Physical Exam   Vital Signs: Temp: 98.2  F (36.8  C) Temp src: Oral BP: 126/56 Pulse: 96   Resp: 16 SpO2: 93 % O2 Device: None (Room air) Oxygen Delivery: 1 LPM  Weight: 134 lbs 0 oz    Constitutional: awake, alert, cooperative, and no apparent distress--mood upbeat today and looks much better  Eyes: sclera clear  Respiratory: no increased work of breathing, good air exchange, no retractions, and clear to auscultation  Cardiovascular: regular rate and rhythm and normal S1 and S2  GI: normal bowel sounds, soft, non-distended, and non-tender  Skin: ecchymosis left ankle area  Musculoskeletal: edema legs trace  Neurologic: Mental Status Exam:  Level of Alertness:   awake  Neuropsychiatric: General: normal, calm, and normal eye contact    Medical Decision Making       55 MINUTES SPENT BY ME on the date of service doing chart review, history, exam, documentation & further activities per the note.      Data     I have personally reviewed the following data over the past 24 hrs:    7.2  \   8.7 (L)   / 281     142 107 6.1 (L) /  83   3.1 (L) 29 0.94 \     ALT: N/A AST: N/A AP: N/A TBILI: N/A   ALB: 2.6 (L) TOT PROTEIN: N/A LIPASE: N/A       Imaging results reviewed over the past 24 hrs:   Recent Results (from the past 24 hours)   XR Chest Port 1 View    Narrative    EXAM: XR CHEST PORT 1 VIEW  LOCATION: [facility]  DATE: 5/10/2025    INDICATION: check pulm edema lung cancer pt  COMPARISON: None.      Impression    IMPRESSION: Port-A-Cath tip in SVC. Strand of linear atelectasis or fibrosis in the left lower lung. The lungs are otherwise clear. Normal heart size and pulmonary vascularity. No effusions seen.

## 2025-05-11 NOTE — PROGRESS NOTES
Care Management Follow Up    Length of Stay (days): 5    Expected Discharge Date: 05/12/2025     Concerns to be Addressed: discharge planning     Patient plan of care discussed at interdisciplinary rounds: Yes    Anticipated Discharge Disposition:  home     Anticipated Discharge Services:  home care RN  Anticipated Discharge DME:  TBD    Patient/family educated on Medicare website which has current facility and service quality ratings:  Yes  Education Provided on the Discharge Plan:  Yes  Patient/Family in Agreement with the Plan:  Yes    Referrals Placed by CM/SW:  Hospice, home care  Private pay costs discussed: Not applicable    Discussed  Partnership in Safe Discharge Planning  document with patient/family: No     Handoff Completed: No, handoff not indicated or clinically appropriate    Additional Information:    Per Hospitalist, plan is to to dischagre home and start lower dose of chemo thru [facility]. Patient plans to hold off on hospice at this [facility].    CM updated [facility].    Per Hospitalist, patient need home care RN for lab draws to check electrolytes.   CM placed referral to [facility].  A couple home care agencies may be able to accept, they asked: how many days after discharge does lab draw need be be collected and at what frequency will lab draws need to be collected.  CM sent message to provider.  Per provider: check in 2 days and then check twice a week   CM updated Home Care liaison.   [facility] accepted with no delay in SOC.    Next Steps: send discharge orders to home care agency at discharge.    Kristen Zaragoza RNCC

## 2025-05-11 NOTE — PLAN OF CARE
Goal Outcome Evaluation:                          A&Ox4. SBA to the bathroom. Port present and patent. NS infusing @ 50ml/hr per orders. Poor PO intake and no appetite. Ate a yogurt today and has been sipping on a [place]. Denies pain. Denies nausea. O2 sats 92% RA. NIH score is 0. On tele, SRJuli RIDLEY protocol done, replaced, recheck at 14:00, recheck again in AM.  Pt expresses her congestion seems better since starting Claritin. Son at the bedside.

## 2025-05-12 VITALS
DIASTOLIC BLOOD PRESSURE: 59 MMHG | BODY MASS INDEX: 22.33 KG/M2 | OXYGEN SATURATION: 93 % | SYSTOLIC BLOOD PRESSURE: 108 MMHG | HEIGHT: 65 IN | HEART RATE: 91 BPM | RESPIRATION RATE: 18 BRPM | TEMPERATURE: 98.4 F | WEIGHT: 134 LBS

## 2025-05-12 LAB
ANION GAP SERPL CALCULATED.3IONS-SCNC: 7 MMOL/L (ref 7–15)
ATRIAL RATE - MUSE: 125 BPM
BUN SERPL-MCNC: 6.6 MG/DL (ref 8–23)
CALCIUM SERPL-MCNC: 8.3 MG/DL (ref 8.8–10.4)
CHLORIDE SERPL-SCNC: 104 MMOL/L (ref 98–107)
CREAT SERPL-MCNC: 0.87 MG/DL (ref 0.51–0.95)
DIASTOLIC BLOOD PRESSURE - MUSE: NORMAL MMHG
EGFRCR SERPLBLD CKD-EPI 2021: 70 ML/MIN/1.73M2
ERYTHROCYTE [DISTWIDTH] IN BLOOD BY AUTOMATED COUNT: 14.6 % (ref 10–15)
GLUCOSE SERPL-MCNC: 84 MG/DL (ref 70–99)
HCO3 SERPL-SCNC: 29 MMOL/L (ref 22–29)
HCT VFR BLD AUTO: 28.2 % (ref 35–47)
HGB BLD-MCNC: 8.9 G/DL (ref 11.7–15.7)
INTERPRETATION ECG - MUSE: NORMAL
MAGNESIUM SERPL-MCNC: 1.7 MG/DL (ref 1.7–2.3)
MCH RBC QN AUTO: 30.4 PG (ref 26.5–33)
MCHC RBC AUTO-ENTMCNC: 31.6 G/DL (ref 31.5–36.5)
MCV RBC AUTO: 96 FL (ref 78–100)
P AXIS - MUSE: 39 DEGREES
PHOSPHATE SERPL-MCNC: 3.4 MG/DL (ref 2.5–4.5)
PLATELET # BLD AUTO: 368 10E3/UL (ref 150–450)
POTASSIUM SERPL-SCNC: 3.4 MMOL/L (ref 3.4–5.3)
PR INTERVAL - MUSE: 166 MS
QRS DURATION - MUSE: 88 MS
QT - MUSE: 280 MS
QTC - MUSE: 404 MS
R AXIS - MUSE: 38 DEGREES
RBC # BLD AUTO: 2.93 10E6/UL (ref 3.8–5.2)
SODIUM SERPL-SCNC: 140 MMOL/L (ref 135–145)
SYSTOLIC BLOOD PRESSURE - MUSE: NORMAL MMHG
T AXIS - MUSE: 89 DEGREES
VENTRICULAR RATE- MUSE: 125 BPM
WBC # BLD AUTO: 8.2 10E3/UL (ref 4–11)

## 2025-05-12 PROCEDURE — 99232 SBSQ HOSP IP/OBS MODERATE 35: CPT | Performed by: STUDENT IN AN ORGANIZED HEALTH CARE EDUCATION/TRAINING PROGRAM

## 2025-05-12 PROCEDURE — 250N000011 HC RX IP 250 OP 636: Performed by: INTERNAL MEDICINE

## 2025-05-12 PROCEDURE — 80048 BASIC METABOLIC PNL TOTAL CA: CPT | Performed by: INTERNAL MEDICINE

## 2025-05-12 PROCEDURE — 250N000013 HC RX MED GY IP 250 OP 250 PS 637: Performed by: INTERNAL MEDICINE

## 2025-05-12 PROCEDURE — 85027 COMPLETE CBC AUTOMATED: CPT | Performed by: INTERNAL MEDICINE

## 2025-05-12 PROCEDURE — 250N000012 HC RX MED GY IP 250 OP 636 PS 637: Performed by: INTERNAL MEDICINE

## 2025-05-12 PROCEDURE — 250N000011 HC RX IP 250 OP 636: Performed by: STUDENT IN AN ORGANIZED HEALTH CARE EDUCATION/TRAINING PROGRAM

## 2025-05-12 PROCEDURE — 83735 ASSAY OF MAGNESIUM: CPT | Performed by: INTERNAL MEDICINE

## 2025-05-12 PROCEDURE — 99239 HOSP IP/OBS DSCHRG MGMT >30: CPT | Performed by: INTERNAL MEDICINE

## 2025-05-12 PROCEDURE — 84100 ASSAY OF PHOSPHORUS: CPT | Performed by: INTERNAL MEDICINE

## 2025-05-12 RX ORDER — ATORVASTATIN CALCIUM 20 MG/1
20 TABLET, FILM COATED ORAL EVERY EVENING
Qty: 30 TABLET | Refills: 2 | Status: ON HOLD | OUTPATIENT
Start: 2025-05-12 | End: 2025-05-19

## 2025-05-12 RX ORDER — POTASSIUM CHLORIDE 1500 MG/1
40 TABLET, EXTENDED RELEASE ORAL ONCE
Status: COMPLETED | OUTPATIENT
Start: 2025-05-12 | End: 2025-05-12

## 2025-05-12 RX ORDER — HEPARIN SODIUM (PORCINE) LOCK FLUSH IV SOLN 100 UNIT/ML 100 UNIT/ML
5-10 SOLUTION INTRAVENOUS
Status: DISCONTINUED | OUTPATIENT
Start: 2025-05-12 | End: 2025-05-12 | Stop reason: HOSPADM

## 2025-05-12 RX ADMIN — POTASSIUM CHLORIDE 40 MEQ: 1500 TABLET, EXTENDED RELEASE ORAL at 09:19

## 2025-05-12 RX ADMIN — LORATADINE 10 MG: 10 TABLET ORAL at 09:19

## 2025-05-12 RX ADMIN — ONDANSETRON 4 MG: 4 TABLET, ORALLY DISINTEGRATING ORAL at 09:18

## 2025-05-12 RX ADMIN — FOLIC ACID 1000 MCG: 1 TABLET ORAL at 09:19

## 2025-05-12 RX ADMIN — PREDNISONE 2.5 MG: 2.5 TABLET ORAL at 09:18

## 2025-05-12 RX ADMIN — HEPARIN 5 ML: 100 SYRINGE at 11:39

## 2025-05-12 RX ADMIN — PANTOPRAZOLE SODIUM 40 MG: 40 TABLET, DELAYED RELEASE ORAL at 06:07

## 2025-05-12 ASSESSMENT — ACTIVITIES OF DAILY LIVING (ADL)
ADLS_ACUITY_SCORE: 42

## 2025-05-12 NOTE — PLAN OF CARE
"  Problem: Adult Inpatient Plan of Care  Goal: Absence of Hospital-Acquired Illness or Injury  Outcome: Progressing  Intervention: Prevent and Manage VTE (Venous Thromboembolism) Risk  Recent Flowsheet Documentation  Taken 5/12/2025 0915 by Claudia Leary RN  VTE Prevention/Management: SCDs on (sequential compression devices)     /59 (BP Location: Right arm)   Pulse 91   Temp 98.4  F (36.9  C) (Oral)   Resp 18   Ht 1.651 m (5' 5\")   Wt 60.8 kg (134 lb)   LMP  (LMP Unknown)   SpO2 93%   BMI 22.30 kg/m      Pt up in room with SBA. Call light within reach and pt calls appropriately. All medications taken as ordered. Potassium replaced. Pt denies pain. All belongings taken with pt, all discharge paperwork completed and discussed.   "

## 2025-05-12 NOTE — PROGRESS NOTES
PALLIATIVE CARE PROGRESS NOTE  [facility]     Patient Name: Inez Rodriguez  Date of Admission: 5/6/2025   Today the patient was seen for: Goals of care follow up, patient support, symptom management     Recommendations & Counseling     GOALS OF CARE:   Goals are life-prolonging with limits  Patient says that she is aware that she is slowly dying, and still interested in hearing further about cancer-treatment options if side effects thought to be be minimal.  Provider previously introduced Medicare hospice benefit including potential resources and care limitations of the program, patient anticipates a time in the future in which services would be helpful.      ADVANCE CARE PLANNING:  No health care directive on file.   Bart Rodriguez is next of kin and only primary family member.  There is no POLST form on file, recommend to complete prior to DC.  Code status: No CPR- Do NOT Intubate      MEDICAL MANAGEMENT:   Palliative is not actively managing symptoms.  Patient denies active symptoms or concerns.      PSYCHOSOCIAL/SPIRITUAL:  Family; bart Cooper is her only primary family member  Friends   [place]phanie community: No Jain       Palliative Care will continue to follow. Thank you for the consult and allowing us to aid in the care of Inez Rodriguez.    These recommendations have been discussed with hospital medicine, nursing staff, chaplaincy.      Mitchell Manzo, DO  Emergency Medicine / Palliative Medicine   Securely message with the [employer] Web Console (learn more here) or  Text page via [facility] Paging/Directory          Assessment          Inez Rodriguez is a 74 year old female with a past medical history of advanced stage IV non-small cell lung cancer originating in [facility] (dx 01/2024) and metastasized to sacrum and thoracic vertebrae, subsequently underwent combination chemo-immunotherapy, progressive disease confirmed early 2025, and began seeking care through [facility] 04/2025 and since  "started oral treatment as second-line therapy with adagrasib she was admitted to hospital 5/6/25 for evaluation and management of symptoms of nausea with vomiting and diarrhea; fevers (Tmax of 104  F) meeting SIRS criteria.         Interval History:     Multidisciplinary collaboration:  Independently reviewed notes within EMR.  Communicated directly bedside RN and case management regarding patient care.    Patient/family narrative  Met patient today in her hospital room, she anticipates being discharged later today.  She admittedly feels much better than when she arrived, nausea and diarrhea both improved, appetite remains mild but she hopes to eat more when she returns home.  She maintains that she is fully aware that end-of-life is in her future, yet also hopeful that oncology may have some further life-prolonging therapy with minimal side effects available.  She looks forward to getting home today, and subsequently following up with oncology team about potential options.      Review of Systems:     Besides above, ROS was reviewed and is unremarkable        Physical Exam:   /59 (BP Location: Right arm)   Pulse 91   Temp 98.4  F (36.9  C) (Oral)   Resp 18   Ht 1.651 m (5' 5\")   Wt 60.8 kg (134 lb)   LMP  (LMP Unknown)   SpO2 93%   BMI 22.30 kg/m    General:  Appears stated age.  NAD.   HENT:  Atraumatic.  Hearing intact.   Eyes:  Conjunctivae are clear.  Sclera is nonicteric.   Cardiovascular:  Without cyanosis or mottling.   Respiratory:  Breathing comfortably without increased work of breathing or signs of respiratory distress.  Able to speak in complete sentences.    Skin:  Dry, without diaphoresis.   Neuro:  Alert, attentive, speech clear and fluent.   Psych:  Appropriate mood and affect.  No sign of confusion or delusion.     Wt Readings from Last 8 Encounters:   05/10/25 60.8 kg (134 lb)   03/27/25 59.1 kg (130 lb 3.2 oz)   01/23/25 57.6 kg (126 lb 14.4 oz)   12/12/24 55.2 kg (121 lb 11.2 oz) "   10/31/24 54.1 kg (119 lb 3.2 oz)   09/26/24 52.3 kg (115 lb 4.8 oz)   09/23/24 55 kg (121 lb 4.8 oz)   09/09/24 54.4 kg (120 lb)           Data Reviewed:     Recent Labs   Lab Test 05/12/25  0650 05/11/25  0715 05/10/25  0642 05/07/25  0659 05/06/25  1447 03/27/25  0830 03/06/25  1322   WBC 8.2 7.2 8.0   < > 9.3 7.9 12.3*   HGB 8.9* 8.7* 8.7*   < > 12.3 12.3 11.9   MCV 96 94 94   < > 95 101* 100    281 252   < > 207 382 444   NEUTROPHIL  --   --   --   --  84 81 87    < > = values in this interval not displayed.     Recent Labs   Lab Test 05/12/25  0650 05/11/25  1418 05/11/25  0715 05/10/25  1638 05/10/25  0642     --  142  --  135   POTASSIUM 3.4 3.7 3.1*   < > 2.8*   CHLORIDE 104  --  107  --  102   CO2 29  --  29  --  25   ANIONGAP 7  --  6*  --  8   BUN 6.6*  --  6.1*  --  7.9*   CR 0.87  --  0.94  --  1.01*   CHELITA 8.3*  --  7.9*  --  8.1*    < > = values in this interval not displayed.     Recent Labs   Lab Test 05/11/25  0715 05/10/25  0642 05/09/25  0705 05/07/25  0800 03/27/25  0830 03/06/25  1322 02/13/25  1213   AST  --   --   --   --  25 20 25   ALT  --   --   --   --  34 25 33   ALKPHOS  --   --   --   --  57 56 56   BILITOTAL  --   --   --   --  0.2 <0.2 0.2   ALBUMIN 2.6* 2.6* 2.6*   < > 4.2 4.3 4.3   PROTTOTAL  --   --   --   --  6.5 6.5 6.6    < > = values in this interval not displayed.     Recent Labs   Lab Test 01/08/24  0714   INR 1.10         Medical Decision Making       30 MINUTES SPENT BY ME on the date of service doing chart review, history, exam, documentation & further activities per the note.

## 2025-05-12 NOTE — PROGRESS NOTES
Care Management Discharge Note    Discharge Date: 05/12/2025       Discharge Disposition:  home    Discharge Services:  Home care RN    Discharge DME:      Discharge Transportation: family or friend will provide    Private pay costs discussed: Not applicable    Does the patient's insurance plan have a 3 day qualifying hospital stay waiver?  No    PAS Confirmation Code:  NA  Patient/family educated on Medicare website which has current facility and service quality ratings:  NA    Education Provided on the Discharge Plan:  yes  Persons Notified of Discharge Plans: patient  Patient/Family in Agreement with the Plan:  yes    Handoff Referral Completed: No, handoff not indicated or clinically appropriate    Additional Information:  Patient to discharge home with family transport. Orders sent to [facility] for RN via [employer].    [facility] declined. Orders sent to [facility].    LOPEZ Andrew

## 2025-05-12 NOTE — PLAN OF CARE
Patient received discharge information and port was de accessed. Patient brought to the exit via wheelchair.

## 2025-05-12 NOTE — PLAN OF CARE
Problem: Adult Inpatient Plan of Care  Goal: Optimal Comfort and Wellbeing  Outcome: Progressing     Problem: Delirium  Goal: Improved Sleep  Outcome: Progressing     Problem: Gas Exchange Impaired  Goal: Optimal Gas Exchange  Outcome: Progressing     Problem: Comorbidity Management  Goal: Blood Pressure in Desired Range  Outcome: Progressing   Goal Outcome Evaluation:  Pt alert and oriented x4. NIH 0. She denies pain, nausea, sob. No BM this shift. Voiding without difficulty. SCD on. Edema LE +1. Stand by assist. VSS. On RA.

## 2025-05-12 NOTE — PLAN OF CARE
Problem: Adult Inpatient Plan of Care  Goal: Optimal Comfort and Wellbeing  Outcome: Progressing   Goal Outcome Evaluation:    Pt is A+O x4, on RA. Pt has VANCE, crackles in lower lobes. Denies pain or nausea. 0 on NIHSS. NSR on telemetry. Electrolyte protocols recheck in AM. Poor apptite. Fluids discontinued. Central line dressing changed. Up standby assist. Visited with family at bedside. Pt is able to make her needs Adelita ratliff RN.

## 2025-05-12 NOTE — PLAN OF CARE
Occupational Therapy Discharge Summary    Reason for therapy discharge:    Discharged to home.    Progress towards therapy goal(s). See goals on Care Plan in [facility] electronic health record for goal details.  Goals partially met.  Barriers to achieving goals:   discharge from facility.    Therapy recommendation(s):    No further therapy is recommended.    Carissa Meraz, OTR/L 5/12/25

## 2025-05-12 NOTE — DISCHARGE SUMMARY
[facility]  Hospitalist Discharge Summary      Date of Admission:  5/6/2025  Date of Discharge:  5/12/2025 11:45 AM  Discharging Provider: Mandie Galeana MD  Discharge Service: Hospitalist Service    Discharge Diagnoses   Nausea, vomiting and diarrhea suspect due to cancer treatment  Sepsis suspected on admission and ruled out  YAMILKA  Acute hypoxic respiratory failure  Metastatic lung cancer with brain metastasis  Subacute CVA in the right centrum semiovale  Severe malnutrition      Clinically Significant Risk Factors     # Severe Malnutrition: based on nutrition assessment and treatment provided per dietitian's recommendations.      Follow-ups Needed After Discharge   Follow-up Appointments       Hospital Follow-up with Existing Primary Care Provider (PCP)          Schedule Primary Care visit within: 7 Days   Recommended labs and Imaging (to be ordered by Primary Care Provider): CBC and BMP               Unresulted Labs Ordered in the Past 30 Days of this Admission       No orders found from 4/6/2025 to 5/7/2025.        These results will be followed up by     Discharge Disposition   Discharged to home  Condition at discharge: Stable    Hospital Course   Inez Rodriguez is a 74 year old female with PMHx including metastatic lung cancer on treatment with adagrasib who presented to [facility] for evaluation of fever, nausea, vomiting, diarrhea.  Sepsis was considered and ruled out.  Suspect GI symptoms were related to adagrasib and completely resolved.  Patient was instructed to reach out to oncology team at the [facility] to discuss possible dose reduction or alternative treatments.  Palliative care was consulted, patient is not ready for hospice at this time.    Acute kidney injury resolved after IV hydration.  Patient was requiring up to 2 L supplemental O2 but subsequently was weaned off.    Brain showed incidental finding of subacute stroke in the right centrum semiovale.   Patient was agreeable to start statin.  Neurology recommends not using antiplatelets due to bleeding risk in the setting of cerebral metastases.  Patient was discharged home with family support.       Consultations This Hospital Stay   PHARMACY TO DOSE VANCO  PHARMACY TO DOSE VANCO  PHYSICAL THERAPY ADULT IP CONSULT  OCCUPATIONAL THERAPY ADULT IP CONSULT  HEMATOLOGY & ONCOLOGY IP CONSULT  INFECTIOUS DISEASES IP CONSULT  GASTROENTEROLOGY IP CONSULT  PALLIATIVE CARE ADULT IP CONSULT  RADIATION ONCOLOGY IP CONSULT  NEUROLOGY IP CONSULT  SPEECH LANGUAGE PATH ADULT IP CONSULT  PHARMACY IP CONSULT  PHARMACY IP CONSULT  PHYSICAL THERAPY ADULT IP CONSULT  OCCUPATIONAL THERAPY ADULT IP CONSULT  REHAB ADMISSIONS LIAISON IP CONSULT  CARE MANAGEMENT / SOCIAL WORK IP CONSULT  CARE MANAGEMENT / SOCIAL WORK IP CONSULT  NUTRITION SERVICES ADULT IP CONSULT    Code Status   Prior    Time Spent on this Encounter   I, Mandie Galeana MD, personally saw the patient today and spent greater than 30 minutes discharging this patient.       Mandie Galeana MD  [address] [address]  Phone: [phone]  Fax: [phone]  ______________________________________________________________________    Physical Exam   Vital Signs:                  Temperature 98.4, heart rate 91, /59, respiratory rate 18, O2 sats 93% on room air  Weight: 134 lbs 0 oz  General Appearance: No acute distress, laying in bed  Respiratory: Respirations unlabored  Cardiovascular: Regular rate and rhythm.  Normal S1-S2.  No edema  GI: Abdomen soft and nontender  Other: Alert       Primary Care Physician   Lynn Bowles    Discharge Orders      Home Care Referral      Reason for your hospital stay    Nausea, vomiting, diarrhea, low electrolytes     Activity    Your activity upon discharge: activity as tolerated     Tubes and Drains    Current Tubes and Drains:     CVC Line  Duration            Port A Cath Single 02/12/24 Left Chest wall 454 days              Discharge Instructions    Please call [facility] and discuss if you should resume cancer treatment     Diet    Follow this diet upon discharge: Current Diet:Orders Placed This Encounter      Snacks/Supplements Adult: Gelatein Plus; Between Meals      Snacks/Supplements Adult: Magic Cup; With Meals      Combination Diet Regular Diet Adult     Hospital Follow-up with Existing Primary Care Provider (PCP)            Significant Results and Procedures   Most Recent 3 CBC's:  Recent Labs   Lab Test 05/12/25  0650 05/11/25  0715 05/10/25  0642   WBC 8.2 7.2 8.0   HGB 8.9* 8.7* 8.7*   MCV 96 94 94    281 252     Most Recent 3 BMP's:  Recent Labs   Lab Test 05/12/25  0650 05/11/25  2057 05/11/25  1722 05/11/25  1418 05/11/25  1241 05/11/25  0715 05/10/25  0803 05/10/25  0642     --   --   --   --  142  --  135   POTASSIUM 3.4  --   --  3.7  --  3.1*   < > 2.8*   CHLORIDE 104  --   --   --   --  107  --  102   CO2 29  --   --   --   --  29  --  25   BUN 6.6*  --   --   --   --  6.1*  --  7.9*   CR 0.87  --   --   --   --  0.94  --  1.01*   ANIONGAP 7  --   --   --   --  6*  --  8   CHELITA 8.3*  --   --   --   --  7.9*  --  8.1*   GLC 84 123* 95  --    < > 83   < > 99    < > = values in this interval not displayed.     7-Day Micro Results       Collected Updated Procedure Result Status      05/07/2025 0650 05/07/2025 1015 Enteric Bacteria and Virus Panel PCR [[id number]]    Stool from Per Rectum    Final result Component Value   Campylobacter species Negative   Salmonella species Negative   Vibrio species Negative   Vibrio cholerae Negative   Yersinia enterocolitica Negative   Enteropathogenic E. coli (EPEC) Negative   Shiga-like toxin-producing E. coli (STEC) Negative   Shigella/Enteroinvasive E. coli (EIEC) Negative   Cryptosporidium species Negative   Giardia lamblia Negative   Norovirus Gl/Gll Negative   Rotavirus A Negative   Plesiomonas  shigelloides Negative   Enteroaggregative E. coli (EAEC) Negative   Enterotoxigenic E. coli (ETEC) Negative   E. coli O157 NA   Cyclospora cayetanensis Negative   Entamoeba histolytica Negative   Adenovirus F40/41 Negative   Astrovirus Negative   Sapovirus Negative            05/07/2025 0650 05/07/2025 0936 C. difficile Toxin B PCR with reflex to C. difficile EIA [[id number]]    Stool from Per Rectum    Final result Component Value   C Difficile Toxin B by PCR Negative   A negative result does not exclude actual disease due to C. difficile and may be due to improper collection, handling and storage of the specimen or the number of organisms in the specimen is below the detection limit of the assay.                ,   Results for orders placed or performed during the hospital encounter of 05/06/25   CT Chest Pulmonary Embolism w Contrast    Narrative    EXAM: CT CHEST PULMONARY EMBOLISM W CONTRAST  LOCATION: [facility]  DATE: 5/6/2025    INDICATION: lung CA metastatic to bone with fever and hypoxia and SOB  COMPARISON: PET-CT 3/25/2025 and prior  TECHNIQUE: CT chest pulmonary angiogram during arterial phase injection of IV contrast. Multiplanar reformats and MIP reconstructions were performed. Dose reduction techniques were used.   CONTRAST: isovue 370 75ml    FINDINGS:  ANGIOGRAM CHEST: Pulmonary arteries are normal caliber and negative for pulmonary emboli. Thoracic aorta is negative for dissection. No CT evidence of right heart strain.    LUNGS AND PLEURA: Unchanged linear scarring in the right upper lobe. No new or enlarging suspicious nodules.    MEDIASTINUM/AXILLAE: Unchanged ill-defined right perihilar soft tissue, which was FDG avid on the most recent PET/CT. Also unchanged nonenlarged previously FDG avid paratracheal and subcarinal lymph nodes. No enlarging lymph nodes. Left chest port   catheter tip in the lower SVC.    CORONARY ARTERY CALCIFICATION: Severe.    UPPER ABDOMEN:  Normal.    MUSCULOSKELETAL: Normal.      Impression    IMPRESSION:  1.  No pulmonary embolism.  2.  Unchanged right perihilar soft tissue and nonenlarged mediastinal lymph nodes, which were FDG avid on the most recent PET/CT.     CT Abdomen Pelvis w Contrast    Narrative    EXAM: CT ABDOMEN PELVIS W CONTRAST  LOCATION: [facility]  DATE: 5/6/2025    INDICATION: Nausea, vomiting with lung CA metastatic and fever,   COMPARISON: PET/CT 3/25/2025.  TECHNIQUE: CT scan of the abdomen and pelvis was performed following injection of IV contrast. Multiplanar reformats were obtained. Dose reduction techniques were used.  CONTRAST: isovue 370 75ml    FINDINGS:   LOWER CHEST: No acute abnormality at the lower lungs.    HEPATOBILIARY: No focal hepatic observation. No calcified gallstones identified. Common bile duct is 6 mm.    PANCREAS: No focal pancreas lesion identified. There is mild dilatation of the main pancreas duct measuring 4 mm, previously 2 to 3 mm. Pancreas divisum. No focal pancreas lesion identified.    SPLEEN: Normal.    ADRENAL GLANDS: Normal.    KIDNEYS/BLADDER: No significant mass, stones, or hydronephrosis. There are simple or benign cysts. No follow up is needed. Decompressed bladder.    BOWEL: No evidence for appendicitis. No bowel obstruction. Colonic diverticula without focal diverticulitis. No free fluid or free air. No abscess.    LYMPH NODES: Normal.    VASCULATURE: Scattered vascular calcifications.    PELVIC ORGANS: No acute pelvic abnormality identified.    MUSCULOSKELETAL: No focal bony abnormality identified.      Impression    IMPRESSION:   1.  No acute abnormality identified.  2.  Mildly increased distention of the pancreas duct but no specific etiology can be seen. If relevant, this could be further evaluated with nonemergent MRCP/MRI.  3.  Colonic diverticulosis.   CT Head w/o Contrast    Narrative    EXAM: CT HEAD W/O CONTRAST  LOCATION: [facility]  [facility]  DATE: 5/7/2025    INDICATION: metastatic lung to brain, n v d, headaches  COMPARISON: Brain MRI 09/24/2024. Head CT 08/24/2024.  TECHNIQUE: Routine CT Head without IV contrast. Multiplanar reformats. Dose reduction techniques were used.    FINDINGS:  INTRACRANIAL CONTENTS: No intracranial hemorrhage, extraaxial collection, or mass effect.  Focus of decreased attenuation within the right centrum semiovale, which is new since the prior exams from 2024. Findings may represent a focus of vasogenic edema.   Mild presumed chronic small vessel ischemic changes. Mild generalized volume loss. No hydrocephalus.     VISUALIZED ORBITS/SINUSES/MASTOIDS: No intraorbital abnormality. Mild mucosal thickening scattered about the paranasal sinuses. No middle ear or mastoid effusion.    BONES/SOFT TISSUES: No acute abnormality.      Impression    IMPRESSION:  1.  Focus of decreased attenuation within the right centrum semiovale, new since the prior exams from 2024. Given history of metastatic lung cancer, findings raise concern for vasogenic edema secondary to development of intracranial metastasis. Recommend   further evaluation with brain MRI with and without IV contrast.  2.  No acute intracranial hemorrhage.  3.  Mild generalized cerebral volume loss and presumed chronic microvascular ischemic changes of the white matter.   MR Brain w/o & w Contrast    Narrative    EXAM: MR BRAIN W/O and W CONTRAST  LOCATION: [facility]  DATE: 5/7/2025    INDICATION: 74 y o with metastatic lung cancer, to brain, last MRI 4 7 25 outsider on care everywhere, more n v headache, ?more mets  COMPARISON: 09/24/2024, 04/07/2025 outside MRI report  CONTRAST: 6 ml Gadavist  TECHNIQUE: Routine multiplanar multisequence head MRI without and with intravenous contrast.    FINDINGS:  INTRACRANIAL CONTENTS: Small focus of apparent diffusion restriction within the centrum semiovale of the right frontal lobe that displays  faint T2 hyperintense signal but no ADC hypointense signal. There is no mass effect or midline shift. Mild degree of   cerebral parenchymal volume loss. Numerous subcentimeter presumable metastases within the frontal lobes (series 1100, image 160 and 156).     SELLA: No abnormality accounting for technique.    OSSEOUS STRUCTURES/SOFT TISSUES: Small focus of enhancement within the occipital calvarium (series 1100, image 98) measuring 3 x 3 mm and within the diploic space of the frontal calvarium measuring 6 x 5 mm (series 1100, image 173).     ORBITS: No abnormality accounting for technique.     SINUSES/MASTOIDS: No paranasal sinus mucosal disease. Scattered fluid/membrane thickening in the mastoid air cells bilaterally.       Impression    IMPRESSION:    1.  Small focus of apparent diffusion restriction within the right centrum semiovale is favored to represent a subacute infarction.  2.  Several small presumable metastases within the frontal lobes that are without mass effect.  3.  Small enhancing foci within the frontal and occipital calvarium are suspicious for metastases.   US Carotid Bilateral    Narrative    EXAM: US CAROTID BILATERAL  LOCATION: [facility]  DATE: 5/8/2025    INDICATION: stroke  COMPARISON: None.  TECHNIQUE: Duplex exam performed utilizing 2D gray-scale imaging, Doppler interrogation with color-flow and spectral waveform analysis. The percent diameter stenosis is determined using Updated Recommendations for Carotid Stenosis Interpretation Criteria   from [employer].    FINDINGS:    RIGHT: Moderate plaque at the bifurcation. The peak systolic velocity in the ICA is 180-230 cm/sec, consistent with 50-69% stenosis. Normal velocities in the ECA. Antegrade flow within the vertebral artery.     LEFT: Severe plaque at the bifurcation. The peak systolic velocity in the ICA is greater than 230 cm/sec, consistent with greater than 70% stenosis. Elevated velocity in the  left external carotid artery. Antegrade flow within the vertebral artery.    VELOCITY CHART:  CCA   Right: 114 cm/s   Left: 139 cm/s  ICA   Right: 199 cm/s   Left: 430 cm/s  ECA   Right: 140 cm/s   Left: 267 cm/s  ICA/CCA PSV Ratio   Right: 2.0   Left: 4.3      Impression    IMPRESSION:  1.  Moderate plaque formation, velocities consistent with 50-69% stenosis in the right internal carotid artery.  2.  Severe plaque formation, velocities consistent with greater than 70% stenosis in the left internal carotid artery.  3.  Flow within the vertebral arteries is antegrade.   XR Ankle Left 2 Views    Narrative    EXAM: XR ANKLE LEFT 2 VIEWS  LOCATION: [facility]  DATE: 2025    INDICATION: pain , fall left ankle, please do portable  COMPARISON: None.      Impression    IMPRESSION: Normal joint spaces and alignment. No fracture.   XR Chest Port 1 View    Narrative    EXAM: XR CHEST PORT 1 VIEW  LOCATION: [facility]  DATE: 5/10/2025    INDICATION: check pulm edema lung cancer pt  COMPARISON: None.      Impression    IMPRESSION: Port-A-Cath tip in SVC. Strand of linear atelectasis or fibrosis in the left lower lung. The lungs are otherwise clear. Normal heart size and pulmonary vascularity. No effusions seen.   Echocardiogram Complete    Narrative    [id number]  [id number]  [id number]  [id number]^SEGUN^DEIDRA^[place] [address]     Name: ROCHELLE AVILA  MRN: [id number]  : 1950  Study Date: 2025 03:39 PM  Age: 74 yrs  Gender: Female  Patient Location: [facility]  Reason For Study: CVA  Ordering Physician: DEIDRA CAST  Performed By: WILMA     BSA: 1.6 m2  Height: 65 in  Weight: 130 lb  HR: 94  BP: 176/77 mmHg  ______________________________________________________________________________  Procedure  Echocardiogram with two-dimensional, color and spectral  Doppler.  ______________________________________________________________________________  Interpretation Summary     1. Normal left ventricular size and systolic performance. The visually  estimated ejection fraction is 55-60%.  2. There is trace-mild aortic insufficiency.  3. Normal right ventricular size and systolic performance.  ______________________________________________________________________________  Left ventricle:  Normal left ventricular size and systolic performance. The visually estimated  ejection fraction is 55-60%. There is normal regional wall motion. Left  ventricular wall thickness is normal.     Assessment of LV Diastolic Function: The cumulative findings suggest normal  diastolic filling [The septal e' velocity is < 7 cm/s & lateral e' velocity  is  < 10 cm/s. The average E/e' is < 14. TR velocity is < 2.8 m/s. Left atrial  volume index is less than 34 mL/mÂ ].     Right ventricle:  Normal right ventricular size and systolic performance.     Left atrium:  The left atrium is of normal size.     Right atrium:  The right atrium is of normal size.     IVC:  The IVC is of normal caliber.     Aortic valve:  The aortic valve is comprised of three cusps. There is no significant aortic  stenosis. There is trace-mild aortic insufficiency.     Mitral valve:  The mitral valve appears morphologically normal. There is trace mitral  insufficiency.     Tricuspid valve:  The tricuspid valve is grossly morphologically normal. There is trace  tricuspid insufficiency.     Pulmonic valve:  The pulmonic valve is grossly morphologically normal.     Thoracic aorta:  The aortic root and proximal ascending aorta are of normal dimension.     Pericardium:  There is no significant pericardial effusion.  ______________________________________________________________________________  ______________________________________________________________________________  MMode/2D Measurements & Calculations  IVSd: 0.81 cm  LVIDd:  4.8 cm  LVIDs: 3.1 cm  LVPWd: 0.82 cm  FS: 35.3 %  LV mass(C)d: 131.1 grams  LV mass(C)dI: 79.6 grams/m2  Ao root diam: 3.3 cm  LA dimension: 2.9 cm  asc Aorta Diam: 3.4 cm  LA/Ao: 0.88  LVOT diam: 2.1 cm  LVOT area: 3.5 cm2  Ao root diam index Ht(cm/m): 2.0  Ao root diam index BSA (cm/m2): 2.0  Asc Ao diam index BSA (cm/m2): 2.1  Asc Ao diam index Ht(cm/m): 2.1  EF Biplane: 57.3 %  LA Volume (BP): 39.0 ml     LA Volume Index (BP): 23.6 ml/m2  LA Volume Indexed (AL/bp): 26.3 ml/m2  RWT: 0.34  TAPSE: 2.0 cm     Time Measurements  MM HR: 94.0 BPM     Doppler Measurements & Calculations  MV E max delgado: 81.4 cm/sec  MV A max delgado: 117.0 cm/sec  MV E/A: 0.70  MV dec slope: 566.0 cm/sec2  MV dec time: 0.14 sec  Ao V2 max: 132.0 cm/sec  Ao max P.0 mmHg  Ao V2 mean: 92.2 cm/sec  Ao mean P.0 mmHg  Ao V2 VTI: 23.0 cm  SHARRI(I,D): 2.8 cm2  SHARRI(V,D): 2.5 cm2  LV V1 max PG: 3.8 mmHg  LV V1 max: 96.9 cm/sec  LV V1 VTI: 18.4 cm  SV(LVOT): 63.6 ml  SI(LVOT): 38.6 ml/m2  PA acc time: 0.06 sec  TR max delgado: 217.0 cm/sec  TR max P.8 mmHg  AV Delgado Ratio (DI): 0.73  SHARRI Index (cm2/m2): 1.7  E/E': 8.5  E/E' avg: 10.5     Lateral E/e': 8.5  Medial E/e': 12.5  Peak E' Delgado: 9.6 cm/sec  RV S Delgado: 17.3 cm/sec     ______________________________________________________________________________  Report approved by: Jesse Giraldo MD on 2025 04:41 PM             Discharge Medications   Discharge Medication List as of 2025 10:26 AM        START taking these medications    Details   atorvastatin (LIPITOR) 20 MG tablet Take 1 tablet (20 mg) by mouth every evening., Disp-30 tablet, R-2, E-Prescribe           CONTINUE these medications which have CHANGED    Details   adagrasib (KRAZATI) 200 MG tablet Take 3 tablets (600 mg) by mouth 2 times daily. Swallow whole; do not chew, crush or split tablets. Take with or without food.  Please call [facility] to discuss if you should resume adagrasib, No Print Out           CONTINUE these  medications which have NOT CHANGED    Details   amLODIPine (NORVASC) 5 MG tablet Take 1 tablet (5 mg) by mouth daily. NEED TO BE SEEN IN PRIMARY CARE FOR ANY FURTHER FILLS., Disp-90 tablet, R-1, E-Prescribe      diphenoxylate-atropine (LOMOTIL) 2.5-0.025 MG tablet Take 2 tablets by mouth 4 times daily as needed., Historical      folic acid (FOLVITE) 1 MG tablet Take 1 tablet (1,000 mcg) by mouth daily., Disp-90 tablet, R-3, E-Prescribe      granisetron (KYTRIL) 1 MG tablet Take 1 mg by mouth every 12 hours as needed., Historical      Mag Aspart-Potassium Aspart (POTASSIUM & MAGNESIUM ASPARTAT PO) Take 2 capsules by mouth daily, Historical      Methylsulfonylmethane (MSM) 1000 MG TABS Take 2 tablets by mouth daily., Historical      multivitamin w/minerals (THERA-VIT-M) tablet Take 1 tablet by mouth daily, Historical      prednisoLONE 5 MG tablet Take 2.5 mg by mouth every morning., Historical      prochlorperazine (COMPAZINE) 10 MG tablet Take 10 mg by mouth every 6 hours as needed., Historical      Vitamin D3 (CHOLECALCIFEROL) 125 MCG (5000 UT) tablet Take 125 mcg by mouth daily, Historical           Allergies   No Known Allergies

## 2025-05-12 NOTE — PROGRESS NOTES
Physical Therapy Discharge Summary    Reason for therapy discharge:    Discharged to home with home care.    Progress towards therapy goal(s). See goals on Care Plan in [facility] electronic health record for goal details.  Goals partially met.  Barriers to achieving goals:   discharge from facility.    Therapy recommendation(s):    Further recommended therapy is related to documented deficits, and is necessary to maximize functional independence in order for patient to return to prior level of function.      Judy Billings, PETE 5/12/2025

## 2025-05-14 ENCOUNTER — MEDICAL CORRESPONDENCE (OUTPATIENT)
Dept: HEALTH INFORMATION MANAGEMENT | Facility: CLINIC | Age: 75
End: 2025-05-14
Payer: COMMERCIAL

## 2025-05-14 ENCOUNTER — TELEPHONE (OUTPATIENT)
Dept: INTERNAL MEDICINE | Facility: CLINIC | Age: 75
End: 2025-05-14

## 2025-05-14 DIAGNOSIS — C34.90 LUNG CANCER METASTATIC TO BONE (H): ICD-10-CM

## 2025-05-14 DIAGNOSIS — E78.5 DYSLIPIDEMIA: ICD-10-CM

## 2025-05-14 DIAGNOSIS — C79.51 LUNG CANCER METASTATIC TO BONE (H): ICD-10-CM

## 2025-05-14 NOTE — TELEPHONE ENCOUNTER
Home Care is calling regarding an established patient with [facility].  Requesting orders from: Lynn Bowles  PROVIDER AUTHORIZATION REQUIRED: RN unable to provide verbal approval for all orders.  See below for additional information.  RN will contact Home care with information after provider review.  Is this a request for a temporary pause in the home care episode?  No    Orders Requested    Skilled Nursing  Request for initial certification (first set of orders)   Frequency: Nursing visit Once a week for one week and then lab draws twice a week for 8 weeks, and then 3 PRN visits  RN gave verbal order: Yes      Phone number Home Care can be reached at: [phone]  Okay to leave a detailed message?: Yes        PCP PLEASE ADVISE  Twila from [facility] requesting for loratadine to be added onto medication list if appropriate per pt request. Pt bought this OTC. In addition, they'd like an updated medication list faxed to them at [phone] when loratadine is added. If loratadine not added, please still fax updated medication list.  Twila stated Atorvastatin and Adagrasib can cause some kind of interaction when taken together and was wondering if they can be taken together. If yes interaction, please add this note into medication list as well. Any questions call her at [phone]  Twila stated lab draw was supposed to be done today but after 2 attempts, unable to get sample. Twila was wondering if she can try next week or the earliest this Friday.    Bibiana Tatum RN

## 2025-05-15 RX ORDER — LORATADINE 10 MG/1
10 TABLET ORAL DAILY
Qty: 90 TABLET | Refills: 0 | Status: CANCELLED | OUTPATIENT
Start: 2025-05-15

## 2025-05-15 RX ORDER — LORATADINE 10 MG/1
10 TABLET ORAL DAILY
COMMUNITY

## 2025-05-15 NOTE — TELEPHONE ENCOUNTER
Left message on secured voice mail Re:adding Loratadine to med list, interaction of Adagrasib and Atorvastatin and OK to retry labs on Friday.     Routing encounter for faxing to home care. Please refer to incoming call/encounter for faxing directions and providers request to add medication interaction note to med list.

## 2025-05-15 NOTE — TELEPHONE ENCOUNTER
Okay to follow for home care since she does have appt with me next week (I actually have never met her, just have been listed as PCP for a year or so).     We can add loratadine 10 mg daily to med list   Adagrasib can increase serum concentration of atorvastatin. Patient has severe coronary calcification, so we would want on high intensity statin (which would be 40-80 mg of atorvastatin). Her current dose is 20 mg, so this interaction should actually raise concentration to preferred dose of atorvastatin. So, this is okay. Please add this to note on med list.   Fine to retry labs on Friday.

## 2025-05-16 ENCOUNTER — RESULTS FOLLOW-UP (OUTPATIENT)
Dept: INTERNAL MEDICINE | Facility: CLINIC | Age: 75
End: 2025-05-16

## 2025-05-16 ENCOUNTER — LAB REQUISITION (OUTPATIENT)
Dept: LAB | Facility: HOSPITAL | Age: 75
End: 2025-05-16
Payer: COMMERCIAL

## 2025-05-16 DIAGNOSIS — A41.9 SEPSIS, UNSPECIFIED ORGANISM (H): ICD-10-CM

## 2025-05-16 LAB
ANION GAP SERPL CALCULATED.3IONS-SCNC: 10 MMOL/L (ref 7–15)
BUN SERPL-MCNC: 8.6 MG/DL (ref 8–23)
CALCIUM SERPL-MCNC: 8.9 MG/DL (ref 8.8–10.4)
CHLORIDE SERPL-SCNC: 98 MMOL/L (ref 98–107)
CREAT SERPL-MCNC: 1.11 MG/DL (ref 0.51–0.95)
EGFRCR SERPLBLD CKD-EPI 2021: 52 ML/MIN/1.73M2
GLUCOSE SERPL-MCNC: 82 MG/DL (ref 70–99)
HCO3 SERPL-SCNC: 31 MMOL/L (ref 22–29)
MAGNESIUM SERPL-MCNC: 1.7 MG/DL (ref 1.7–2.3)
POTASSIUM SERPL-SCNC: 3.8 MMOL/L (ref 3.4–5.3)
SODIUM SERPL-SCNC: 139 MMOL/L (ref 135–145)

## 2025-05-17 ENCOUNTER — HOSPITAL ENCOUNTER (INPATIENT)
Facility: HOSPITAL | Age: 75
LOS: 1 days | Discharge: HOSPICE | DRG: 682 | End: 2025-05-19
Attending: EMERGENCY MEDICINE | Admitting: INTERNAL MEDICINE
Payer: COMMERCIAL

## 2025-05-17 DIAGNOSIS — C79.51 LUNG CANCER METASTATIC TO BONE (H): Chronic | ICD-10-CM

## 2025-05-17 DIAGNOSIS — E86.0 DEHYDRATION: ICD-10-CM

## 2025-05-17 DIAGNOSIS — Z71.89 OTHER SPECIFIED COUNSELING: Chronic | ICD-10-CM

## 2025-05-17 DIAGNOSIS — R09.02 HYPOXIA: ICD-10-CM

## 2025-05-17 DIAGNOSIS — N17.9 ACUTE KIDNEY INJURY: ICD-10-CM

## 2025-05-17 DIAGNOSIS — R53.1 GENERALIZED WEAKNESS: ICD-10-CM

## 2025-05-17 DIAGNOSIS — R53.81 PHYSICAL DECONDITIONING: Primary | ICD-10-CM

## 2025-05-17 DIAGNOSIS — I63.311 CEREBROVASCULAR ACCIDENT (CVA) DUE TO THROMBOSIS OF RIGHT MIDDLE CEREBRAL ARTERY (H): ICD-10-CM

## 2025-05-17 DIAGNOSIS — C34.90 LUNG CANCER METASTATIC TO BONE (H): Chronic | ICD-10-CM

## 2025-05-17 PROCEDURE — 99285 EMERGENCY DEPT VISIT HI MDM: CPT | Mod: 25

## 2025-05-17 PROCEDURE — 93005 ELECTROCARDIOGRAM TRACING: CPT | Performed by: EMERGENCY MEDICINE

## 2025-05-17 ASSESSMENT — COLUMBIA-SUICIDE SEVERITY RATING SCALE - C-SSRS
2. HAVE YOU ACTUALLY HAD ANY THOUGHTS OF KILLING YOURSELF IN THE PAST MONTH?: NO
1. IN THE PAST MONTH, HAVE YOU WISHED YOU WERE DEAD OR WISHED YOU COULD GO TO SLEEP AND NOT WAKE UP?: NO
6. HAVE YOU EVER DONE ANYTHING, STARTED TO DO ANYTHING, OR PREPARED TO DO ANYTHING TO END YOUR LIFE?: NO

## 2025-05-18 ENCOUNTER — APPOINTMENT (OUTPATIENT)
Dept: PHYSICAL THERAPY | Facility: HOSPITAL | Age: 75
DRG: 682 | End: 2025-05-18
Attending: INTERNAL MEDICINE
Payer: COMMERCIAL

## 2025-05-18 ENCOUNTER — APPOINTMENT (OUTPATIENT)
Dept: CT IMAGING | Facility: HOSPITAL | Age: 75
DRG: 682 | End: 2025-05-18
Attending: INTERNAL MEDICINE
Payer: COMMERCIAL

## 2025-05-18 ENCOUNTER — APPOINTMENT (OUTPATIENT)
Dept: MRI IMAGING | Facility: HOSPITAL | Age: 75
DRG: 682 | End: 2025-05-18
Attending: INTERNAL MEDICINE
Payer: COMMERCIAL

## 2025-05-18 ENCOUNTER — APPOINTMENT (OUTPATIENT)
Dept: RADIOLOGY | Facility: HOSPITAL | Age: 75
DRG: 682 | End: 2025-05-18
Attending: EMERGENCY MEDICINE
Payer: COMMERCIAL

## 2025-05-18 ENCOUNTER — APPOINTMENT (OUTPATIENT)
Dept: OCCUPATIONAL THERAPY | Facility: HOSPITAL | Age: 75
DRG: 682 | End: 2025-05-18
Attending: INTERNAL MEDICINE
Payer: COMMERCIAL

## 2025-05-18 PROBLEM — R68.81 EARLY SATIETY: Status: ACTIVE | Noted: 2025-05-18

## 2025-05-18 PROBLEM — T45.1X5A ANTINEOPLASTIC CHEMOTHERAPY INDUCED ANEMIA: Chronic | Status: ACTIVE | Noted: 2024-07-08

## 2025-05-18 PROBLEM — R53.81 PHYSICAL DECONDITIONING: Status: ACTIVE | Noted: 2025-05-18

## 2025-05-18 PROBLEM — I63.311 CEREBROVASCULAR ACCIDENT (CVA) DUE TO THROMBOSIS OF RIGHT MIDDLE CEREBRAL ARTERY (H): Status: ACTIVE | Noted: 2025-05-18

## 2025-05-18 PROBLEM — C79.31 MALIGNANT NEOPLASM METASTATIC TO BRAIN (H): Chronic | Status: ACTIVE | Noted: 2025-05-18

## 2025-05-18 PROBLEM — E87.1 HYPONATREMIA: Status: ACTIVE | Noted: 2025-05-18

## 2025-05-18 PROBLEM — J18.9 POSTOBSTRUCTIVE PNEUMONIA: Status: RESOLVED | Noted: 2024-01-17 | Resolved: 2025-05-18

## 2025-05-18 PROBLEM — M62.81 GENERALIZED MUSCLE WEAKNESS: Status: ACTIVE | Noted: 2025-05-18

## 2025-05-18 PROBLEM — N17.9 ACUTE KIDNEY INJURY: Status: ACTIVE | Noted: 2025-05-18

## 2025-05-18 PROBLEM — E43 SEVERE PROTEIN-CALORIE MALNUTRITION: Status: ACTIVE | Noted: 2025-05-18

## 2025-05-18 PROBLEM — J96.01 ACUTE RESPIRATORY FAILURE WITH HYPOXIA (H): Status: RESOLVED | Noted: 2025-05-06 | Resolved: 2025-05-18

## 2025-05-18 PROBLEM — Z03.89 OBSERVATION FOR SUSPECTED MALIGNANT NEOPLASM: Status: RESOLVED | Noted: 2024-01-07 | Resolved: 2025-05-18

## 2025-05-18 PROBLEM — E87.8 HYPOCHLOREMIA: Status: ACTIVE | Noted: 2025-05-18

## 2025-05-18 PROBLEM — D64.81 ANTINEOPLASTIC CHEMOTHERAPY INDUCED ANEMIA: Chronic | Status: ACTIVE | Noted: 2024-07-08

## 2025-05-18 PROBLEM — R20.0 NUMBNESS AND TINGLING OF RIGHT UPPER AND LOWER EXTREMITY: Status: ACTIVE | Noted: 2025-05-18

## 2025-05-18 PROBLEM — R53.1 GENERALIZED WEAKNESS: Status: ACTIVE | Noted: 2025-05-18

## 2025-05-18 PROBLEM — R79.89 ELEVATED LFTS: Status: ACTIVE | Noted: 2025-05-18

## 2025-05-18 PROBLEM — C79.51 LUNG CANCER METASTATIC TO BONE (H): Chronic | Status: ACTIVE | Noted: 2024-01-31

## 2025-05-18 PROBLEM — D84.9 IMMUNOCOMPROMISED: Chronic | Status: ACTIVE | Noted: 2025-05-06

## 2025-05-18 PROBLEM — C79.31 MALIGNANT NEOPLASM METASTATIC TO BRAIN (H): Status: ACTIVE | Noted: 2025-05-18

## 2025-05-18 PROBLEM — R09.02 HYPOXIA: Status: ACTIVE | Noted: 2025-05-18

## 2025-05-18 PROBLEM — A41.9 SEPSIS, DUE TO UNSPECIFIED ORGANISM, UNSPECIFIED WHETHER ACUTE ORGAN DYSFUNCTION PRESENT (H): Status: RESOLVED | Noted: 2025-05-06 | Resolved: 2025-05-18

## 2025-05-18 PROBLEM — R63.8 POOR FLUID INTAKE: Status: ACTIVE | Noted: 2025-05-18

## 2025-05-18 PROBLEM — Z71.89 OTHER SPECIFIED COUNSELING: Chronic | Status: ACTIVE | Noted: 2025-05-18

## 2025-05-18 PROBLEM — E86.0 DEHYDRATION: Status: ACTIVE | Noted: 2025-05-18

## 2025-05-18 PROBLEM — C34.90 LUNG CANCER METASTATIC TO BONE (H): Chronic | Status: ACTIVE | Noted: 2024-01-31

## 2025-05-18 PROBLEM — C34.31 MALIGNANT NEOPLASM OF LOWER LOBE OF RIGHT LUNG (H): Chronic | Status: ACTIVE | Noted: 2024-01-17

## 2025-05-18 PROBLEM — R20.2 NUMBNESS AND TINGLING OF RIGHT UPPER AND LOWER EXTREMITY: Status: ACTIVE | Noted: 2025-05-18

## 2025-05-18 LAB
ALBUMIN SERPL BCG-MCNC: 2.8 G/DL (ref 3.5–5.2)
ALBUMIN SERPL BCG-MCNC: 2.8 G/DL (ref 3.5–5.2)
ALBUMIN UR-MCNC: NEGATIVE MG/DL
ALP SERPL-CCNC: 169 U/L (ref 40–150)
ALP SERPL-CCNC: 179 U/L (ref 40–150)
ALT SERPL W P-5'-P-CCNC: 30 U/L (ref 0–50)
ALT SERPL W P-5'-P-CCNC: 30 U/L (ref 0–50)
ANION GAP SERPL CALCULATED.3IONS-SCNC: 7 MMOL/L (ref 7–15)
ANION GAP SERPL CALCULATED.3IONS-SCNC: 8 MMOL/L (ref 7–15)
APPEARANCE UR: CLEAR
AST SERPL W P-5'-P-CCNC: 53 U/L (ref 0–45)
AST SERPL W P-5'-P-CCNC: 59 U/L (ref 0–45)
BASOPHILS # BLD AUTO: 0.1 10E3/UL (ref 0–0.2)
BASOPHILS # BLD AUTO: 0.1 10E3/UL (ref 0–0.2)
BASOPHILS NFR BLD AUTO: 1 %
BASOPHILS NFR BLD AUTO: 1 %
BILIRUB DIRECT SERPL-MCNC: 0.15 MG/DL (ref 0–0.3)
BILIRUB SERPL-MCNC: 0.4 MG/DL
BILIRUB SERPL-MCNC: 0.4 MG/DL
BILIRUB UR QL STRIP: NEGATIVE
BUN SERPL-MCNC: 14.9 MG/DL (ref 8–23)
BUN SERPL-MCNC: 16.2 MG/DL (ref 8–23)
CALCIUM SERPL-MCNC: 7.8 MG/DL (ref 8.8–10.4)
CALCIUM SERPL-MCNC: 7.9 MG/DL (ref 8.8–10.4)
CHLORIDE SERPL-SCNC: 100 MMOL/L (ref 98–107)
CHLORIDE SERPL-SCNC: 96 MMOL/L (ref 98–107)
CK SERPL-CCNC: 261 U/L (ref 26–192)
COLOR UR AUTO: NORMAL
CREAT SERPL-MCNC: 1.4 MG/DL (ref 0.51–0.95)
CREAT SERPL-MCNC: 1.69 MG/DL (ref 0.51–0.95)
EGFRCR SERPLBLD CKD-EPI 2021: 31 ML/MIN/1.73M2
EGFRCR SERPLBLD CKD-EPI 2021: 39 ML/MIN/1.73M2
EOSINOPHIL # BLD AUTO: 0.4 10E3/UL (ref 0–0.7)
EOSINOPHIL # BLD AUTO: 0.4 10E3/UL (ref 0–0.7)
EOSINOPHIL NFR BLD AUTO: 3 %
EOSINOPHIL NFR BLD AUTO: 4 %
ERYTHROCYTE [DISTWIDTH] IN BLOOD BY AUTOMATED COUNT: 14.9 % (ref 10–15)
ERYTHROCYTE [DISTWIDTH] IN BLOOD BY AUTOMATED COUNT: 15 % (ref 10–15)
GLUCOSE SERPL-MCNC: 105 MG/DL (ref 70–99)
GLUCOSE SERPL-MCNC: 98 MG/DL (ref 70–99)
GLUCOSE UR STRIP-MCNC: NEGATIVE MG/DL
HCO3 SERPL-SCNC: 30 MMOL/L (ref 22–29)
HCO3 SERPL-SCNC: 31 MMOL/L (ref 22–29)
HCT VFR BLD AUTO: 28.5 % (ref 35–47)
HCT VFR BLD AUTO: 28.8 % (ref 35–47)
HGB BLD-MCNC: 8.9 G/DL (ref 11.7–15.7)
HGB BLD-MCNC: 9.5 G/DL (ref 11.7–15.7)
HGB UR QL STRIP: NEGATIVE
IMM GRANULOCYTES # BLD: 0.4 10E3/UL
IMM GRANULOCYTES # BLD: 0.4 10E3/UL
IMM GRANULOCYTES NFR BLD: 3 %
IMM GRANULOCYTES NFR BLD: 4 %
KETONES UR STRIP-MCNC: NEGATIVE MG/DL
LACTATE SERPL-SCNC: 0.6 MMOL/L (ref 0.7–2)
LEUKOCYTE ESTERASE UR QL STRIP: NEGATIVE
LIPASE SERPL-CCNC: 139 U/L (ref 13–60)
LYMPHOCYTES # BLD AUTO: 1.5 10E3/UL (ref 0.8–5.3)
LYMPHOCYTES # BLD AUTO: 2.1 10E3/UL (ref 0.8–5.3)
LYMPHOCYTES NFR BLD AUTO: 15 %
LYMPHOCYTES NFR BLD AUTO: 19 %
MAGNESIUM SERPL-MCNC: 1.8 MG/DL (ref 1.7–2.3)
MCH RBC QN AUTO: 30.2 PG (ref 26.5–33)
MCH RBC QN AUTO: 31.1 PG (ref 26.5–33)
MCHC RBC AUTO-ENTMCNC: 31.2 G/DL (ref 31.5–36.5)
MCHC RBC AUTO-ENTMCNC: 33 G/DL (ref 31.5–36.5)
MCV RBC AUTO: 94 FL (ref 78–100)
MCV RBC AUTO: 97 FL (ref 78–100)
MONOCYTES # BLD AUTO: 1.3 10E3/UL (ref 0–1.3)
MONOCYTES # BLD AUTO: 1.5 10E3/UL (ref 0–1.3)
MONOCYTES NFR BLD AUTO: 13 %
MONOCYTES NFR BLD AUTO: 14 %
NEUTROPHILS # BLD AUTO: 6 10E3/UL (ref 1.6–8.3)
NEUTROPHILS # BLD AUTO: 6.8 10E3/UL (ref 1.6–8.3)
NEUTROPHILS NFR BLD AUTO: 60 %
NEUTROPHILS NFR BLD AUTO: 63 %
NITRATE UR QL: NEGATIVE
NRBC # BLD AUTO: 0 10E3/UL
NRBC # BLD AUTO: 0 10E3/UL
NRBC BLD AUTO-RTO: 0 /100
NRBC BLD AUTO-RTO: 0 /100
PH UR STRIP: 5.5 [PH] (ref 5–7)
PHOSPHATE SERPL-MCNC: 4.4 MG/DL (ref 2.5–4.5)
PLATELET # BLD AUTO: 325 10E3/UL (ref 150–450)
PLATELET # BLD AUTO: 362 10E3/UL (ref 150–450)
POTASSIUM SERPL-SCNC: 3.8 MMOL/L (ref 3.4–5.3)
POTASSIUM SERPL-SCNC: 3.8 MMOL/L (ref 3.4–5.3)
PROT SERPL-MCNC: 4.9 G/DL (ref 6.4–8.3)
PROT SERPL-MCNC: 5.2 G/DL (ref 6.4–8.3)
RBC # BLD AUTO: 2.95 10E6/UL (ref 3.8–5.2)
RBC # BLD AUTO: 3.05 10E6/UL (ref 3.8–5.2)
RBC URINE: 0 /HPF
SODIUM SERPL-SCNC: 134 MMOL/L (ref 135–145)
SODIUM SERPL-SCNC: 138 MMOL/L (ref 135–145)
SP GR UR STRIP: 1.01 (ref 1–1.03)
SQUAMOUS EPITHELIAL: <1 /HPF
TROPONIN T SERPL HS-MCNC: 16 NG/L
TROPONIN T SERPL HS-MCNC: 21 NG/L
UROBILINOGEN UR STRIP-MCNC: NORMAL MG/DL
WBC # BLD AUTO: 11.4 10E3/UL (ref 4–11)
WBC # BLD AUTO: 9.7 10E3/UL (ref 4–11)
WBC URINE: <1 /HPF

## 2025-05-18 PROCEDURE — 99207 PR APP CREDIT; MD BILLING SHARED VISIT: CPT | Performed by: INTERNAL MEDICINE

## 2025-05-18 PROCEDURE — 258N000003 HC RX IP 258 OP 636: Performed by: INTERNAL MEDICINE

## 2025-05-18 PROCEDURE — 83735 ASSAY OF MAGNESIUM: CPT | Performed by: EMERGENCY MEDICINE

## 2025-05-18 PROCEDURE — 83690 ASSAY OF LIPASE: CPT | Performed by: INTERNAL MEDICINE

## 2025-05-18 PROCEDURE — 96360 HYDRATION IV INFUSION INIT: CPT | Mod: 59

## 2025-05-18 PROCEDURE — 85025 COMPLETE CBC W/AUTO DIFF WBC: CPT | Performed by: EMERGENCY MEDICINE

## 2025-05-18 PROCEDURE — 84155 ASSAY OF PROTEIN SERUM: CPT | Performed by: EMERGENCY MEDICINE

## 2025-05-18 PROCEDURE — 250N000013 HC RX MED GY IP 250 OP 250 PS 637: Performed by: INTERNAL MEDICINE

## 2025-05-18 PROCEDURE — 84484 ASSAY OF TROPONIN QUANT: CPT | Performed by: EMERGENCY MEDICINE

## 2025-05-18 PROCEDURE — 97116 GAIT TRAINING THERAPY: CPT | Mod: GP

## 2025-05-18 PROCEDURE — 36415 COLL VENOUS BLD VENIPUNCTURE: CPT | Performed by: INTERNAL MEDICINE

## 2025-05-18 PROCEDURE — 81001 URINALYSIS AUTO W/SCOPE: CPT | Performed by: EMERGENCY MEDICINE

## 2025-05-18 PROCEDURE — 255N000002 HC RX 255 OP 636: Performed by: INTERNAL MEDICINE

## 2025-05-18 PROCEDURE — 250N000011 HC RX IP 250 OP 636: Mod: JZ | Performed by: INTERNAL MEDICINE

## 2025-05-18 PROCEDURE — 99223 1ST HOSP IP/OBS HIGH 75: CPT | Performed by: INTERNAL MEDICINE

## 2025-05-18 PROCEDURE — 97162 PT EVAL MOD COMPLEX 30 MIN: CPT | Mod: GP

## 2025-05-18 PROCEDURE — 85025 COMPLETE CBC W/AUTO DIFF WBC: CPT | Performed by: INTERNAL MEDICINE

## 2025-05-18 PROCEDURE — 84075 ASSAY ALKALINE PHOSPHATASE: CPT | Performed by: INTERNAL MEDICINE

## 2025-05-18 PROCEDURE — 70450 CT HEAD/BRAIN W/O DYE: CPT

## 2025-05-18 PROCEDURE — 99418 PROLNG IP/OBS E/M EA 15 MIN: CPT | Performed by: INTERNAL MEDICINE

## 2025-05-18 PROCEDURE — 82550 ASSAY OF CK (CPK): CPT | Performed by: EMERGENCY MEDICINE

## 2025-05-18 PROCEDURE — 36415 COLL VENOUS BLD VENIPUNCTURE: CPT | Performed by: EMERGENCY MEDICINE

## 2025-05-18 PROCEDURE — 71045 X-RAY EXAM CHEST 1 VIEW: CPT

## 2025-05-18 PROCEDURE — 84100 ASSAY OF PHOSPHORUS: CPT | Performed by: EMERGENCY MEDICINE

## 2025-05-18 PROCEDURE — 83605 ASSAY OF LACTIC ACID: CPT | Performed by: EMERGENCY MEDICINE

## 2025-05-18 PROCEDURE — 120N000001 HC R&B MED SURG/OB

## 2025-05-18 PROCEDURE — 97165 OT EVAL LOW COMPLEX 30 MIN: CPT | Mod: GO

## 2025-05-18 PROCEDURE — 70553 MRI BRAIN STEM W/O & W/DYE: CPT

## 2025-05-18 PROCEDURE — A9585 GADOBUTROL INJECTION: HCPCS | Performed by: INTERNAL MEDICINE

## 2025-05-18 PROCEDURE — 258N000003 HC RX IP 258 OP 636: Performed by: EMERGENCY MEDICINE

## 2025-05-18 RX ORDER — NALOXONE HYDROCHLORIDE 0.4 MG/ML
0.2 INJECTION, SOLUTION INTRAMUSCULAR; INTRAVENOUS; SUBCUTANEOUS
Status: DISCONTINUED | OUTPATIENT
Start: 2025-05-18 | End: 2025-05-19 | Stop reason: HOSPADM

## 2025-05-18 RX ORDER — ONDANSETRON 4 MG/1
4 TABLET, ORALLY DISINTEGRATING ORAL EVERY 6 HOURS PRN
Status: DISCONTINUED | OUTPATIENT
Start: 2025-05-18 | End: 2025-05-19 | Stop reason: HOSPADM

## 2025-05-18 RX ORDER — NALOXONE HYDROCHLORIDE 0.4 MG/ML
0.4 INJECTION, SOLUTION INTRAMUSCULAR; INTRAVENOUS; SUBCUTANEOUS
Status: DISCONTINUED | OUTPATIENT
Start: 2025-05-18 | End: 2025-05-19 | Stop reason: HOSPADM

## 2025-05-18 RX ORDER — VITAMIN B COMPLEX
125 TABLET ORAL DAILY
Status: DISCONTINUED | OUTPATIENT
Start: 2025-05-18 | End: 2025-05-19 | Stop reason: HOSPADM

## 2025-05-18 RX ORDER — FOLIC ACID 1 MG/1
1000 TABLET ORAL DAILY
Status: DISCONTINUED | OUTPATIENT
Start: 2025-05-18 | End: 2025-05-19 | Stop reason: HOSPADM

## 2025-05-18 RX ORDER — MULTIPLE VITAMINS W/ MINERALS TAB 9MG-400MCG
1 TAB ORAL DAILY
Status: DISCONTINUED | OUTPATIENT
Start: 2025-05-18 | End: 2025-05-19 | Stop reason: HOSPADM

## 2025-05-18 RX ORDER — ONDANSETRON 2 MG/ML
4 INJECTION INTRAMUSCULAR; INTRAVENOUS EVERY 6 HOURS PRN
Status: DISCONTINUED | OUTPATIENT
Start: 2025-05-18 | End: 2025-05-19 | Stop reason: HOSPADM

## 2025-05-18 RX ORDER — AMOXICILLIN 250 MG
2 CAPSULE ORAL 2 TIMES DAILY PRN
Status: DISCONTINUED | OUTPATIENT
Start: 2025-05-18 | End: 2025-05-19 | Stop reason: HOSPADM

## 2025-05-18 RX ORDER — SODIUM CHLORIDE 9 MG/ML
INJECTION, SOLUTION INTRAVENOUS CONTINUOUS
Status: ACTIVE | OUTPATIENT
Start: 2025-05-18 | End: 2025-05-19

## 2025-05-18 RX ORDER — AMOXICILLIN 250 MG
1 CAPSULE ORAL 2 TIMES DAILY PRN
Status: DISCONTINUED | OUTPATIENT
Start: 2025-05-18 | End: 2025-05-19 | Stop reason: HOSPADM

## 2025-05-18 RX ORDER — PROCHLORPERAZINE MALEATE 5 MG/1
5 TABLET ORAL EVERY 6 HOURS PRN
Status: DISCONTINUED | OUTPATIENT
Start: 2025-05-18 | End: 2025-05-19 | Stop reason: HOSPADM

## 2025-05-18 RX ORDER — HYDROMORPHONE HCL IN WATER/PF 6 MG/30 ML
0.2 PATIENT CONTROLLED ANALGESIA SYRINGE INTRAVENOUS
Refills: 0 | Status: DISCONTINUED | OUTPATIENT
Start: 2025-05-18 | End: 2025-05-19 | Stop reason: HOSPADM

## 2025-05-18 RX ORDER — OXYCODONE HYDROCHLORIDE 5 MG/1
5 TABLET ORAL EVERY 4 HOURS PRN
Refills: 0 | Status: DISCONTINUED | OUTPATIENT
Start: 2025-05-18 | End: 2025-05-19 | Stop reason: HOSPADM

## 2025-05-18 RX ORDER — SODIUM CHLORIDE 9 MG/ML
INJECTION, SOLUTION INTRAVENOUS CONTINUOUS
Status: DISCONTINUED | OUTPATIENT
Start: 2025-05-18 | End: 2025-05-18

## 2025-05-18 RX ORDER — HYDROMORPHONE HCL IN WATER/PF 6 MG/30 ML
0.4 PATIENT CONTROLLED ANALGESIA SYRINGE INTRAVENOUS
Refills: 0 | Status: DISCONTINUED | OUTPATIENT
Start: 2025-05-18 | End: 2025-05-19 | Stop reason: HOSPADM

## 2025-05-18 RX ORDER — ACETAMINOPHEN 325 MG/1
650 TABLET ORAL EVERY 4 HOURS PRN
Status: DISCONTINUED | OUTPATIENT
Start: 2025-05-18 | End: 2025-05-19 | Stop reason: HOSPADM

## 2025-05-18 RX ORDER — AMLODIPINE BESYLATE 5 MG/1
5 TABLET ORAL DAILY
Status: DISCONTINUED | OUTPATIENT
Start: 2025-05-18 | End: 2025-05-19 | Stop reason: HOSPADM

## 2025-05-18 RX ORDER — GADOBUTROL 604.72 MG/ML
5 INJECTION INTRAVENOUS ONCE
Status: COMPLETED | OUTPATIENT
Start: 2025-05-18 | End: 2025-05-18

## 2025-05-18 RX ORDER — CALCIUM CARBONATE 500 MG/1
1 TABLET, CHEWABLE ORAL 2 TIMES DAILY PRN
COMMUNITY

## 2025-05-18 RX ORDER — LORATADINE 10 MG/1
10 TABLET ORAL DAILY
Status: DISCONTINUED | OUTPATIENT
Start: 2025-05-18 | End: 2025-05-19 | Stop reason: HOSPADM

## 2025-05-18 RX ORDER — ATORVASTATIN CALCIUM 10 MG/1
20 TABLET, FILM COATED ORAL DAILY
Status: DISCONTINUED | OUTPATIENT
Start: 2025-05-18 | End: 2025-05-19 | Stop reason: HOSPADM

## 2025-05-18 RX ORDER — ACETAMINOPHEN 650 MG/1
650 SUPPOSITORY RECTAL EVERY 4 HOURS PRN
Status: DISCONTINUED | OUTPATIENT
Start: 2025-05-18 | End: 2025-05-19 | Stop reason: HOSPADM

## 2025-05-18 RX ADMIN — GADOBUTROL 5 ML: 604.72 INJECTION INTRAVENOUS at 05:14

## 2025-05-18 RX ADMIN — ALTEPLASE 2 MG: 2.2 INJECTION, POWDER, LYOPHILIZED, FOR SOLUTION INTRAVENOUS at 04:11

## 2025-05-18 RX ADMIN — Medication 1 LOZENGE: at 13:42

## 2025-05-18 RX ADMIN — SODIUM CHLORIDE 1000 ML: 0.9 INJECTION, SOLUTION INTRAVENOUS at 00:30

## 2025-05-18 RX ADMIN — Medication 1 TABLET: at 09:03

## 2025-05-18 RX ADMIN — SODIUM CHLORIDE: 0.9 INJECTION, SOLUTION INTRAVENOUS at 12:37

## 2025-05-18 RX ADMIN — LORATADINE 10 MG: 10 TABLET ORAL at 09:04

## 2025-05-18 RX ADMIN — ATORVASTATIN CALCIUM 20 MG: 10 TABLET, FILM COATED ORAL at 09:04

## 2025-05-18 RX ADMIN — Medication 125 MCG: at 09:03

## 2025-05-18 RX ADMIN — SODIUM CHLORIDE 1000 ML: 0.9 INJECTION, SOLUTION INTRAVENOUS at 03:59

## 2025-05-18 RX ADMIN — FOLIC ACID 1000 MCG: 1 TABLET ORAL at 09:03

## 2025-05-18 ASSESSMENT — ACTIVITIES OF DAILY LIVING (ADL)
ADLS_ACUITY_SCORE: 55
ADLS_ACUITY_SCORE: 59
ADLS_ACUITY_SCORE: 38
ADLS_ACUITY_SCORE: 38
ADLS_ACUITY_SCORE: 55
ADLS_ACUITY_SCORE: 59
ADLS_ACUITY_SCORE: 55
DEPENDENT_IADLS:: CLEANING;LAUNDRY;TRANSPORTATION
ADLS_ACUITY_SCORE: 59
DRESSING/BATHING_DIFFICULTY: NO
DIFFICULTY_EATING/SWALLOWING: NO
ADLS_ACUITY_SCORE: 55
ADLS_ACUITY_SCORE: 55
CONCENTRATING,_REMEMBERING_OR_MAKING_DECISIONS_DIFFICULTY: NO
WALKING_OR_CLIMBING_STAIRS_DIFFICULTY: NO
ADLS_ACUITY_SCORE: 59
ADLS_ACUITY_SCORE: 59
ADLS_ACUITY_SCORE: 38
ADLS_ACUITY_SCORE: 59
ADLS_ACUITY_SCORE: 55
ADLS_ACUITY_SCORE: 38
WEAR_GLASSES_OR_BLIND: YES
DOING_ERRANDS_INDEPENDENTLY_DIFFICULTY: NO
TOILETING_ISSUES: NO
ADLS_ACUITY_SCORE: 59
ADLS_ACUITY_SCORE: 59
FALL_HISTORY_WITHIN_LAST_SIX_MONTHS: YES
ADLS_ACUITY_SCORE: 55
ADLS_ACUITY_SCORE: 61
ADLS_ACUITY_SCORE: 61
ADLS_ACUITY_SCORE: 59
ADLS_ACUITY_SCORE: 59

## 2025-05-18 ASSESSMENT — ENCOUNTER SYMPTOMS
FATIGUE: 1
TREMORS: 1
NAUSEA: 0
APPETITE CHANGE: 1
WEAKNESS: 1
DIARRHEA: 0
VOMITING: 0

## 2025-05-18 NOTE — PHARMACY-ADMISSION MEDICATION HISTORY
Medication Scribe Admission Medication History    Admission medication history is complete. The information provided in this note is only as accurate as the sources available at the time of the update.    Information Source(s): Patient and Family member via in-person    Pertinent Information: Patient's medications are being co managed by patient and son, Kenneth ([phone]) .     Manojzati: Medication is at home son stated he could bring it if needed  to be relabeled by pharmacy .     Changes made to PTA medication list:  Added:   Calcium carbonate  per son     Deleted:   Prednisolone per sone     Changed: None    Allergies reviewed with patient and updates made in EHR: yes    Medication History Completed By: Aklilu Gebreyesus 5/18/2025 12:42 AM    PTA Med List   Medication Sig Last Dose/Taking    adagrasib (KRAZATI) 200 MG tablet Take 3 tablets (600 mg) by mouth 2 times daily. Swallow whole; do not chew, crush or split tablets. Take with or without food.  Please call [place] to discuss if you should resume adagrasib 5/17/2025 Evening    amLODIPine (NORVASC) 5 MG tablet Take 1 tablet (5 mg) by mouth daily. NEED TO BE SEEN IN PRIMARY CARE FOR ANY FURTHER FILLS. 5/17/2025 Morning    atorvastatin (LIPITOR) 20 MG tablet Take 1 tablet (20 mg) by mouth every evening. 5/17/2025 Morning    calcium carbonate (TUMS) 500 MG chewable tablet Take 1 chew tab by mouth 2 times daily as needed for heartburn. 5/17/2025 Morning    diphenoxylate-atropine (LOMOTIL) 2.5-0.025 MG tablet Take 2 tablets by mouth 4 times daily as needed. Past Week    folic acid (FOLVITE) 1 MG tablet Take 1 tablet (1,000 mcg) by mouth daily. 5/17/2025 Morning    granisetron (KYTRIL) 1 MG tablet Take 1 mg by mouth every 12 hours as needed. 5/17/2025 Evening    loratadine (CLARITIN) 10 MG tablet Take 10 mg by mouth daily. 5/17/2025 Morning    Mag Aspart-Potassium Aspart (POTASSIUM & MAGNESIUM ASPARTAT PO) Take 2 capsules by mouth daily 5/17/2025 Morning     Methylsulfonylmethane (MSM) 1000 MG TABS Take 2 tablets by mouth daily. Past Week Morning    multivitamin w/minerals (THERA-VIT-M) tablet Take 1 tablet by mouth daily 5/17/2025 Morning    prochlorperazine (COMPAZINE) 10 MG tablet Take 10 mg by mouth every 6 hours as needed. 5/17/2025 Evening    Vitamin D3 (CHOLECALCIFEROL) 125 MCG (5000 UT) tablet Take 125 mcg by mouth daily 5/17/2025 Morning

## 2025-05-18 NOTE — PLAN OF CARE
Problem: Fall Injury Risk  Goal: Absence of Fall and Fall-Related Injury  Outcome: Progressing  Intervention: Identify and Manage Contributors  Recent Flowsheet Documentation  Taken 5/18/2025 1249 by Derik Espino RN  Medication Review/Management: medications reviewed  Intervention: Promote Injury-Free Environment  Recent Flowsheet Documentation  Taken 5/18/2025 1249 by Derik Espino, RN  Safety Promotion/Fall Prevention:   activity supervised   clutter free environment maintained   increased rounding and observation   mobility aid in reach   nonskid shoes/slippers when out of bed   room near nurse's station   safety round/check completed     Problem: Pain Acute  Goal: Optimal Pain Control and Function  Outcome: Progressing  Intervention: Prevent or Manage Pain  Recent Flowsheet Documentation  Taken 5/18/2025 1249 by Derik Espino RN  Medication Review/Management: medications reviewed     Goal Outcome Evaluation:      Plan of Care Reviewed With: patient      A/ox4. 2L NC. OT/PT/Nutrition following. On tele. C/o sore throat. R foot numbness reported - MD aware per ED RN. L port SL. L AC running NS 75ml. Up sba. Continue poc.

## 2025-05-18 NOTE — PLAN OF CARE
Patient admitted to room 12 at approximately 1215 via cart from emergency room.  Reason for Admission: YAMILKA  Report received from: Aura  Patient was accompanied by Self.  Discharge transportation provided by:  Patient ambulated/transferred:  with stand-by assist. self.  Patient is alert and orientated x 4.  Outpatient Observation education provided to: (patient, family, friend)  MDRO Education done if applicable (MRSA, VRE, etc)  Safety risks were identified during admission:  fall.   Yellow risk/fall band applied:  Yes  Home meds sent home: No  Home meds sent to pharmacy:Yes IF YES add 1/2 sheet laminated page reminder to chart/clipboard   Detailed Belongings: clothing, slippers, glasses, 1 crd card, 1 home med sent down to rx.

## 2025-05-18 NOTE — ED TRIAGE NOTES
Chemo patient with lung cancer who is on oral chemo. Complaint is poor appetite, weakness, and general limb weakness. Speech clear, A&O. VSS. Patient and son concerned about patient's creatine level     Triage Assessment (Adult)       Row Name 05/17/25 2898          Triage Assessment    Airway WDL WDL        Respiratory WDL    Respiratory WDL WDL        Skin Circulation/Temperature WDL    Skin Circulation/Temperature WDL WDL        Cardiac WDL    Cardiac WDL WDL        Peripheral/Neurovascular WDL    Peripheral Neurovascular WDL WDL        Cognitive/Neuro/Behavioral WDL    Cognitive/Neuro/Behavioral WDL WDL

## 2025-05-18 NOTE — PLAN OF CARE
Goal Outcome Evaluation:      Plan of Care Reviewed With: patient          Outcome Evaluation: Plan return home with home care nursing services when medically ready for discharge.

## 2025-05-18 NOTE — ED NOTES
Able to successfully draw blood from Port-a-Cath at this time after initial dose of Alteplase was allowed to dwell for additional 90 minutes per order.  Morning lab work obtained.

## 2025-05-18 NOTE — PROGRESS NOTES
[facility]    Medicine Progress Note - Hospitalist Service    Date of Admission:  5/17/2025    Assessment & Plan   Inez Rodriguez is a 74 year old female with history of metastatic lung cancer on treatment with adagrasib admitted on 5/17/2025 with poor oral intake, nausea, vomiting, and suspected YAMILKA.      Patient was recently hospitalized here at [facility] about a week ago for similar symptoms at which time he was advised to follow-up at [facility] to discuss adagrasib dose reduction or alternative treatments. Dosage of adagrasib was recently decreased from 6 to 2 tablets per patient.     She was started on IV fluids due to YAMILKA. During evaluation on 5/18/2025, she expressed interest in being evaluated by the palliative care team to help establish goals of care. She mentioned that she no longer wishes to continue living under her current condition, citing poor tolerance to chemotherapy and other medications.  Posted palliative care service    Brain MRI on admission showed unchanged scattered punctate foci of enhancement within the frontal lobe cortex/subcortical white matter and left caudate and evolving now chronic appearing right frontal lobe corona radiata infarct. Neurology recommends avoiding antiplatelets due to bleeding risk in the setting of cerebral metastases during last hospital stay.      Metastatic lung cancer on treatment with adagrasib   Hold adagrasib for now  Follow-up palliative care consult    Fluid depletion  Likely secondary to poor oral intake, nausea and vomiting  Gentle IV hydration  Monitor electrolytes and replace as needed    Acute kidney injury  Possibly secondary to poor oral intake, nausea and vomiting in the setting of oral chemotherapy  Continue IV hydration  Monitor BMP  Avoid nephrotoxin    Suspected cerebral metastasis  Chronic appearing right frontal lobe infarct  Recent CVA  Brain MRI on admission showed unchanged scattered punctate foci of  enhancement within the frontal lobe cortex/subcortical white matter and left caudate and evolving now chronic appearing right frontal lobe corona radiata infarct. Neurology recommends avoiding antiplatelets due to bleeding risk in the setting of cerebral metastases during last hospital stay.    Avoid antiplatelet given risk of bleeding in the setting of cerebral metastasis  Atorvastatin 20 mg daily            Diet: Regular Diet Adult    DVT Prophylaxis: Pneumatic Compression Devices  Ansari Catheter: Not present  Lines: PRESENT             Cardiac Monitoring: ACTIVE order. Indication: mendoza, recent cva  Code Status: No CPR- Do NOT Intubate      Clinically Significant Risk Factors Present on Admission         # Hyponatremia: Lowest Na = 134 mmol/L in last 2 days, will monitor as appropriate  # Hypochloremia: Lowest Cl = 96 mmol/L in last 2 days, will monitor as appropriate      # Hypoalbuminemia: Lowest albumin = 2.8 g/dL at 5/18/2025  6:24 AM, will monitor as appropriate    # Acute Kidney Injury, unspecified: based on a >150% or 0.3 mg/dL increase in last creatinine compared to past 90 day average, will monitor renal function       # Anemia: based on hgb <11           # Financial/Environmental Concerns: none         Social Drivers of Health    Depression: At risk (4/29/2025)    Received from [facility]    PHQ-2     PHQ-2 Score: 4   Tobacco Use: Medium Risk (4/16/2025)    Received from [facility]    Patient History     Smoking Tobacco Use: Former     Smokeless Tobacco Use: Never          Disposition Plan     Medically Ready for Discharge: Anticipated in 2-4 Days      Lisa Jarquin MD  Hospitalist Service  [facility]  Securely message with [employer] (more info)  Text page via [employer] Paging/Directory   ______________________________________________________________________    Interval History   She reports worsening fatigue, excessive somnolence, and poor oral intake, attributing these symptoms  "to her chemotherapeutic regimen. She notes progressive functional decline and states that \"this is not living\" and considering stopping treatment.  She states that her body can no longer tolerate the medications. Her creatinine has worsened from 1.1 on admission to 1.4 currently. She previously experienced diarrhea and vomiting, though no emesis was reported yesterday.  Dosage of adagrasib was recently reduced from 6 to 2 tablets.       Physical Exam   Vital Signs: Temp: 98.9  F (37.2  C) Temp src: Oral BP: 102/52 Pulse: 91   Resp: 18 SpO2: 98 % O2 Device: Nasal cannula Oxygen Delivery: 2 LPM  Weight: 120 lbs 0 oz    General appearance: Frail elderly woman cooperative, not in any obvious distress and appears stated age   HEENT: Normocephalic, atraumatic, conjunctiva clear without icterus and ears without discharge  Lungs: Diminished air exchange, normal work of breathing, mild rales on the right  Cardiovascular: Regular Rate and Rythm, normal apical impulse, normal S1 and S2, no lower extremity edema bilaterally  Abdomen: Soft, non-tender and Non-distended, active bowel sounds  Skin: Skin color, texture normal and bruising or bleeding. No rashes or lesions over face, neck, arms and legs, turgor normal.  Musculoskeletal: No bony deformities or joint tenderness. Normal ROM upon flexion & extension.   Neurologic: Alert & Oriented X 3, Facial symmetry preserved.  Power 2-3/5 globally  Psychiatric: Unable to assess      Medical Decision Making       54 MINUTES SPENT BY ME on the date of service doing chart review, history, exam, documentation & further activities per the note.      Data     I have personally reviewed the following data over the past 24 hrs:    9.7  \   8.9 (L)   / 325     138 100 14.9 /  98   3.8 30 (H) 1.40 (H) \     ALT: 30 AST: 59 (H) AP: 169 (H) TBILI: 0.4   ALB: 2.8 (L) TOT PROTEIN: 4.9 (L) LIPASE: 139 (H)     Trop: 16 (H) BNP: N/A     Procal: N/A CRP: N/A Lactic Acid: 0.6 (L)         Imaging " results reviewed over the past 24 hrs:   Recent Results (from the past 24 hours)   XR Chest Port 1 View    Narrative    EXAM: XR CHEST PORT 1 VIEW  LOCATION: [facility]  DATE: 5/18/2025    INDICATION: hypoxia  COMPARISON: 5/10/2025.      Impression    IMPRESSION: Left internal jugular venous Port-A-Cath terminates near the superior cavoatrial junction. Low lung volumes. Elevation of the right hemidiaphragm. Mild bibasilar atelectasis. Normal heart size and pulmonary vascularity. No pneumothorax.   CT Head w/o Contrast    Narrative    EXAM: CT HEAD W/O CONTRAST  LOCATION: [facility]  DATE: 5/18/2025    INDICATION: right distal LE numbness + Right distal UE numbness, acute, c f stroke  COMPARISON: MRI brain May 7, 2025   TECHNIQUE: Routine CT Head without IV contrast. Multiplanar reformats. Dose reduction techniques were used.    FINDINGS:  INTRACRANIAL CONTENTS: No intracranial hemorrhage, extraaxial collection, or mass effect.  Evolving right frontal lobe corona radiata infarct. No CT evidence for new regions of ischemia.  Mild to moderate presumed chronic small vessel ischemic changes.   Moderate generalized volume loss. No hydrocephalus. Scattered sulcal calcifications, likely vascular.    VISUALIZED ORBITS/SINUSES/MASTOIDS: No intraorbital abnormality. No paranasal sinus mucosal disease. No middle ear or mastoid effusion.    BONES/SOFT TISSUES: No acute abnormality.      Impression    IMPRESSION:  1.  Evolving right frontal lobe corona radiata infarct. No CT evidence for new regions of ischemia.  2.  No acute intracranial hemorrhage.     MR Brain w/o & w Contrast    Narrative    EXAM: MR BRAIN W/O and W CONTRAST  LOCATION: [facility]  DATE: 5/18/2025    INDICATION: known brain mets, questionable vasogenic edema on prior imaging, subacute right corona radiatia infarct. New RLE RUE numbness, concern for new ischemia vs edema vs  mets.  COMPARISON:  CT head May 13, 2025. MRI brain May 7, 2025.  CONTRAST: 5ml gadavist  TECHNIQUE: Routine multiplanar multisequence head MRI without and with intravenous contrast.    FINDINGS:  INTRACRANIAL CONTENTS: No acute or subacute infarct. Evolving now chronic appearing right frontal lobe corona radiata infarct. No mass, acute hemorrhage, or extra-axial fluid collections. Patchy nonspecific T2/FLAIR hyperintensities within the cerebral   white matter most consistent with mild to moderate chronic microvascular ischemic change. Mild generalized cerebral atrophy. No hydrocephalus. Normal position of the cerebellar tonsils. Unchanged scattered punctate foci of enhancement within the frontal   lobe cortex/subcortical white matter and left caudate. No new focus of enhancement identified.    SELLA: Empty sella morphology with flattening of the pituitary gland along the sellar floor.    OSSEOUS STRUCTURES/SOFT TISSUES: Normal marrow signal. The major intracranial vascular flow voids are maintained.     ORBITS: No abnormality accounting for technique.     SINUSES/MASTOIDS: No paranasal sinus mucosal disease. Scattered fluid/membrane thickening in the mastoid air cells bilaterally.       Impression    IMPRESSION:  1.  No acute intracranial abnormality.  2.  Unchanged scattered punctate foci of enhancement within the frontal lobe cortex/subcortical white matter and left caudate. No new focus of enhancement identified.  3.  Evolving now chronic appearing right frontal lobe corona radiata infarct.

## 2025-05-18 NOTE — PLAN OF CARE
"PRIMARY DIAGNOSIS: \"GENERIC\" NURSING  OUTPATIENT/OBSERVATION GOALS TO BE MET BEFORE DISCHARGE:  ADLs back to baseline: Yes    Activity and level of assistance: Up with standby assistance.    Pain status: Improved-controlled with oral pain medications.    Return to near baseline physical activity: Yes     Discharge Planner Nurse   Safe discharge environment identified: Yes  Barriers to discharge: Yes       Entered by: Derik Espino RN 05/18/2025 12:44 PM     Please review provider order for any additional goals.   Nurse to notify provider when observation goals have been met and patient is ready for discharge.  "

## 2025-05-18 NOTE — ED PROVIDER NOTES
NAME: Inez Rodriguez  AGE: 74 year old female  YOB: 1950  MRN: [id number]  EVALUATION DATE & TIME: No admission date for patient encounter.    PCP: Lynn Bowles    ED PROVIDER: Deshawn Rodriguez M.D.      Chief Complaint   Patient presents with    Generalized Weakness     FINAL IMPRESSION:  1. Generalized weakness    2. Hypoxia    3. Dehydration    4. Acute kidney injury      MEDICAL DECISION MAKIN:24 PM I met with the patient, obtained history, performed an initial exam, and discussed options and plan for diagnostics and treatment here in the ED.   1:25 AM preliminary independent review of x-ray shows port in left chest and no obvious infiltrates.  Low lung volumes.  1:35 AM I updated the patient on their results.  2:14 AM I Spoke with Dr. Oleary, Hospitalist. We further discussed the patient's case, reviewed ED work-up so far, and they accepted the patient for admit.      Patient was clinically assessed and consented to treatment. After assessment, medical decision making and workup were discussed with the patient. The patient was agreeable to plan for testing, workup, and treatment.  Pertinent Labs & Imaging studies reviewed. (See chart for details)       Medical Decision Making  I obtained history from Family Member/Significant Other  Care impacted by Cancer/Chemotherapy and Hypertension  Admit.    MIPS (CTPE, Dental pain, Ansari, Sinusitis, Asthma/COPD, Head Trauma): Not Applicable    SEPSIS: None    Inez Rodriguez is a 74 year old female who presents with generalized weakness.   Differential diagnosis includes but not limited to dehydration, acute kidney injury, hypocalcemia, hypomagnesemia, hypokalemia, malnutrition, deconditioning, hypoxemia, urinary tract infection.  Patient is 74-year-old female undergoing chemotherapy for lung cancer.  Patient previously admitted on May 6- due to uncontrolled nausea, vomiting, and diarrhea following new chemo agent.  Patient has  had that agent adjusted to one third dose now and is doing better with regard to that but still not eating very much and not drinking very much.  Patient has felt weak for the last several days and  called EMS to bring her in after she was unable to walk or grab things tonight.  Patient does not have unilateral weakness but on exam is quite shaky and attempts to extend and hold her extremities up.  She starts to have some tremors and then the arms will drop when she tries to hold them up.  Patient does appear quite dry and dehydrated from mucous membranes.  IV was established and labs were sent.  IV fluids were given along with antiemetics.  Patient did have initial stable oxygen however after getting into bed and with the weakness she did not seem to have poor ventilation and oxygen did drop to 84%.  Patient placed on 2 L with good recovery oxygen.  Chest x-ray was added on.  EKG did not show any acute ischemic findings.  Labs showed equivocal troponin we will plan for repeat in 2 hours.  LFTs did show findings of slight LFT elevation but also decreased albumin and protein which would be consistent with the poor nutrition and deconditioning.  Creatinine was up from previous as her oncologist had just checked and it was up to around 1.2 and now up to 1.7.  Patient will be given further fluids and I would recommend admission for hydration and given the weakness.  Magnesium electrolytes were within normal limits.  CBC did not show any acute leukocytosis.  Chest x-ray did not show any acute infiltrates as well.  After discussion with patient she will plan for admission and was discussed with hospitalist for observation admission for rehydration and PT eval.    0 minutes of critical care time    MEDICATIONS GIVEN IN THE EMERGENCY:  Medications   senna-docusate (SENOKOT-S/PERICOLACE) 8.6-50 MG per tablet 1 tablet (has no administration in time range)     Or   senna-docusate (SENOKOT-S/PERICOLACE) 8.6-50 MG per  tablet 2 tablet (has no administration in time range)   ondansetron (ZOFRAN ODT) ODT tab 4 mg (has no administration in time range)     Or   ondansetron (ZOFRAN) injection 4 mg (has no administration in time range)   prochlorperazine (COMPAZINE) injection 5 mg (has no administration in time range)     Or   prochlorperazine (COMPAZINE) tablet 5 mg (has no administration in time range)   acetaminophen (TYLENOL) tablet 650 mg (has no administration in time range)     Or   acetaminophen (TYLENOL) Suppository 650 mg (has no administration in time range)   oxyCODONE IR (ROXICODONE) half-tab 2.5 mg (has no administration in time range)   oxyCODONE (ROXICODONE) tablet 5 mg (has no administration in time range)   HYDROmorphone (DILAUDID) injection 0.2 mg (has no administration in time range)   HYDROmorphone (DILAUDID) injection 0.4 mg (has no administration in time range)   melatonin tablet 1 mg (has no administration in time range)   naloxone (NARCAN) injection 0.2 mg (has no administration in time range)     Or   naloxone (NARCAN) injection 0.4 mg (has no administration in time range)     Or   naloxone (NARCAN) injection 0.2 mg (has no administration in time range)     Or   naloxone (NARCAN) injection 0.4 mg (has no administration in time range)   sodium chloride 0.9% BOLUS 1,000 mL (has no administration in time range)   sodium chloride 0.9% BOLUS 1,000 mL (0 mLs Intravenous Stopped 5/18/25 0142)       NEW PRESCRIPTIONS STARTED AT TODAY'S ER VISIT:  New Prescriptions    No medications on file          =================================================================    HPI    Patient information was obtained from: The patient    Use of : N/A         Inez Rodriguez is a 74 year old female with a past medical history of hypertension, chronic cough, lung cancer metastatic to bone, who presents with generalized weakness.    The patient has been feeling weak and fatigued today. She endorses shakiness in her hands and  legs. She is on oral chemotherapy (KRAZATI) and was switched to a 1/3 dosage level after her recent visit in the ED for GI symptoms. The patient's GI symptoms have improved as she continues to take anti-emetics and the lowered dosage KRAZATI. Her son reports she has neither been drinking nor drinking much. She usually gets most of her nutrition through powdered shakes. She had labs done recently and was advised by her oncologist to be seen in the ER because her creatinine and electrolytes were low. She currently follows up at [facility].     No complaints of nausea, vomiting, diarrhea, or any other associated symptoms.    Per chart review:    The patient was hospitalized at [facility] from 05/06/2025-05/12/2025 for nausea, vomiting, and diarrhea. She is on treatment for metastatic lung cancer with adagrasib. Sepsis was considered and ruled out. She was instructed to reach out to oncology team at the [facility] to discuss possible dose reduction or alternative treatments. Palliative care was consulted, patient was not ready for hospice at this time. MR Brain on 05/07/2025 showed incidental finding of subacute stroke in the right centrum semiovale. Patient was agreeable to start statin. Neurology recommends not using antiplatelets due to bleeding risk in the setting of cerebral metastases. She was discharged home with family support.    REVIEW OF SYSTEMS   Review of Systems   Constitutional:  Positive for appetite change (decreased) and fatigue.   Gastrointestinal:  Negative for diarrhea, nausea and vomiting.   Neurological:  Positive for tremors (hands and legs) and weakness (Bilateral upper and lower extremities).   All other systems reviewed and are negative.       PAST MEDICAL HISTORY:  Past Medical History:   Diagnosis Date    Acute respiratory failure with hypoxia (H) 05/06/2025    Hypertension     Postobstructive pneumonia 01/17/2024    Sepsis, due to unspecified organism,  unspecified whether acute organ dysfunction present (H) 05/06/2025    SOB (shortness of breath)        PAST SURGICAL HISTORY:  Past Surgical History:   Procedure Laterality Date    BRONCHOSCOPY RIGID OR FLEXIBLE W/TRANSENDOSCOPIC ENDOBRONCHIAL ULTRASOUND GUIDED N/A 1/8/2024    Procedure: BRONCHOSCOPY, WITH ENDOBRONCHIAL ULTRASOUND WITH FINE NEEDLE ASPIRATION OF LYMPH NODE;  Surgeon: Angelia Mustafa MD;  [place] [facility] [place]     CHEST PORT PLACEMENT > 5 YRS OF AGE  2/12/2024    TONSILLECTOMY      at 8 years of age       CURRENT MEDICATIONS:      Current Facility-Administered Medications:     acetaminophen (TYLENOL) tablet 650 mg, 650 mg, Oral, Q4H PRN **OR** acetaminophen (TYLENOL) Suppository 650 mg, 650 mg, Rectal, Q4H PRN, Dharmesh Oleary MD    HYDROmorphone (DILAUDID) injection 0.2 mg, 0.2 mg, Intravenous, Q2H PRN, Dharmesh Oleary MD    HYDROmorphone (DILAUDID) injection 0.4 mg, 0.4 mg, Intravenous, Q2H PRN, Dharmesh Oleary MD    melatonin tablet 1 mg, 1 mg, Oral, At Bedtime PRN, Dharmesh Oleary MD    naloxone (NARCAN) injection 0.2 mg, 0.2 mg, Intravenous, Q2 Min PRN **OR** naloxone (NARCAN) injection 0.4 mg, 0.4 mg, Intravenous, Q2 Min PRN **OR** naloxone (NARCAN) injection 0.2 mg, 0.2 mg, Intramuscular, Q2 Min PRN **OR** naloxone (NARCAN) injection 0.4 mg, 0.4 mg, Intramuscular, Q2 Min PRN, Dharmesh Oleary MD    ondansetron (ZOFRAN ODT) ODT tab 4 mg, 4 mg, Oral, Q6H PRN **OR** ondansetron (ZOFRAN) injection 4 mg, 4 mg, Intravenous, Q6H PRN, Dharmesh Oleary MD    oxyCODONE (ROXICODONE) tablet 5 mg, 5 mg, Oral, Q4H PRN, Dharmesh Oleary MD    oxyCODONE IR (ROXICODONE) half-tab 2.5 mg, 2.5 mg, Oral, Q4H PRN, Dharmesh Oleary MD    prochlorperazine (COMPAZINE) injection 5 mg, 5 mg, Intravenous, Q6H PRN **OR** prochlorperazine (COMPAZINE) tablet 5 mg, 5 mg, Oral, Q6H PRN, Dharmesh Oleary MD    senna-docusate (SENOKOT-S/PERICOLACE) 8.6-50 MG per tablet 1 tablet, 1 tablet, Oral, BID  PRN **OR** senna-docusate (SENOKOT-S/PERICOLACE) 8.6-50 MG per tablet 2 tablet, 2 tablet, Oral, BID PRN, Dharmesh Oleary MD    sodium chloride 0.9% BOLUS 1,000 mL, 1,000 mL, Intravenous, Once, Dharmesh Oleary MD    Current Outpatient Medications:     adagrasib (KRAZATI) 200 MG tablet, Take 3 tablets (600 mg) by mouth 2 times daily. Swallow whole; do not chew, crush or split tablets. Take with or without food. Please call [facility] to discuss if you should resume adagrasib, Disp: , Rfl:     amLODIPine (NORVASC) 5 MG tablet, Take 1 tablet (5 mg) by mouth daily. NEED TO BE SEEN IN PRIMARY CARE FOR ANY FURTHER FILLS., Disp: 90 tablet, Rfl: 1    atorvastatin (LIPITOR) 20 MG tablet, Take 1 tablet (20 mg) by mouth every evening., Disp: 30 tablet, Rfl: 2    calcium carbonate (TUMS) 500 MG chewable tablet, Take 1 chew tab by mouth 2 times daily as needed for heartburn., Disp: , Rfl:     diphenoxylate-atropine (LOMOTIL) 2.5-0.025 MG tablet, Take 2 tablets by mouth 4 times daily as needed., Disp: , Rfl:     folic acid (FOLVITE) 1 MG tablet, Take 1 tablet (1,000 mcg) by mouth daily., Disp: 90 tablet, Rfl: 3    granisetron (KYTRIL) 1 MG tablet, Take 1 mg by mouth every 12 hours as needed., Disp: , Rfl:     loratadine (CLARITIN) 10 MG tablet, Take 10 mg by mouth daily., Disp: , Rfl:     Mag Aspart-Potassium Aspart (POTASSIUM & MAGNESIUM ASPARTAT PO), Take 2 capsules by mouth daily, Disp: , Rfl:     Methylsulfonylmethane (MSM) 1000 MG TABS, Take 2 tablets by mouth daily., Disp: , Rfl:     multivitamin w/minerals (THERA-VIT-M) tablet, Take 1 tablet by mouth daily, Disp: , Rfl:     prochlorperazine (COMPAZINE) 10 MG tablet, Take 10 mg by mouth every 6 hours as needed., Disp: , Rfl:     Vitamin D3 (CHOLECALCIFEROL) 125 MCG (5000 UT) tablet, Take 125 mcg by mouth daily, Disp: , Rfl:     ALLERGIES:  No Known Allergies    FAMILY HISTORY:  No family history on file.    SOCIAL HISTORY:   Social History     Socioeconomic History     "Marital status:    Tobacco Use    Smoking status: Never    Smokeless tobacco: Never   Substance and Sexual Activity    Alcohol use: Never     Social Drivers of Health     Financial Resource Strain: Low Risk  (5/7/2025)    Financial Resource Strain     Within the past 12 months, have you or your family members you live with been unable to get utilities (heat, electricity) when it was really needed?: No   Food Insecurity: Low Risk  (5/7/2025)    Food Insecurity     Within the past 12 months, did you worry that your food would run out before you got money to buy more?: No     Within the past 12 months, did the food you bought just not last and you didn t have money to get more?: No   Transportation Needs: Low Risk  (5/7/2025)    Transportation Needs     Within the past 12 months, has lack of transportation kept you from medical appointments, getting your medicines, non-medical meetings or appointments, work, or from getting things that you need?: No   Interpersonal Safety: Low Risk  (5/7/2025)    Interpersonal Safety     Do you feel physically and emotionally safe where you currently live?: Yes     Within the past 12 months, have you been hit, slapped, kicked or otherwise physically hurt by someone?: No     Within the past 12 months, have you been humiliated or emotionally abused in other ways by your partner or ex-partner?: No   Housing Stability: Low Risk  (5/7/2025)    Housing Stability     Do you have housing? : Yes     Are you worried about losing your housing?: No       PHYSICAL EXAM:    Vitals: /54   Pulse 88   Temp 98  F (36.7  C) (Oral)   Resp 30   Ht 1.651 m (5' 5\")   Wt 54.4 kg (120 lb)   LMP  (LMP Unknown)   SpO2 92%   BMI 19.97 kg/m     Physical Exam  Vitals and nursing note reviewed.   Constitutional:       General: She is not in acute distress.     Appearance: Normal appearance. She is normal weight.   HENT:      Head: Normocephalic.      Mouth/Throat:      Mouth: Mucous membranes " are dry.      Pharynx: Oropharynx is clear.   Eyes:      Conjunctiva/sclera: Conjunctivae normal.   Cardiovascular:      Rate and Rhythm: Normal rate and regular rhythm.      Heart sounds: Normal heart sounds.   Pulmonary:      Effort: Pulmonary effort is normal. No respiratory distress.      Breath sounds: Normal breath sounds.   Abdominal:      General: Abdomen is flat. There is no distension.      Palpations: Abdomen is soft.      Tenderness: There is no abdominal tenderness. There is no right CVA tenderness, left CVA tenderness or guarding.   Musculoskeletal:         General: No signs of injury.      Cervical back: Normal range of motion and neck supple.      Right lower leg: No edema.      Left lower leg: No edema.   Skin:     General: Skin is warm and dry.      Coloration: Skin is not jaundiced or pale.   Neurological:      Mental Status: She is alert and oriented to person, place, and time.      Cranial Nerves: No cranial nerve deficit.      Sensory: No sensory deficit.      Motor: No weakness.   Psychiatric:         Behavior: Behavior normal.           LAB:  All pertinent labs reviewed and interpreted.  Labs Ordered and Resulted from Time of ED Arrival to Time of ED Departure   COMPREHENSIVE METABOLIC PANEL - Abnormal       Result Value    Sodium 134 (*)     Potassium 3.8      Carbon Dioxide (CO2) 31 (*)     Anion Gap 7      Urea Nitrogen 16.2      Creatinine 1.69 (*)     GFR Estimate 31 (*)     Calcium 7.9 (*)     Chloride 96 (*)     Glucose 105 (*)     Alkaline Phosphatase 179 (*)     AST 53 (*)     ALT 30      Protein Total 5.2 (*)     Albumin 2.8 (*)     Bilirubin Total 0.4     TROPONIN T, HIGH SENSITIVITY - Abnormal    Troponin T, High Sensitivity 21 (*)    LACTIC ACID WHOLE BLOOD WITH 1X REPEAT IN 2 HR WHEN >2 - Abnormal    Lactic Acid, Initial 0.6 (*)    CK TOTAL - Abnormal     (*)    CBC WITH PLATELETS AND DIFFERENTIAL - Abnormal    WBC Count 11.4 (*)     RBC Count 3.05 (*)     Hemoglobin 9.5  (*)     Hematocrit 28.8 (*)     MCV 94      MCH 31.1      MCHC 33.0      RDW 14.9      Platelet Count 362      % Neutrophils 60      % Lymphocytes 19      % Monocytes 13      % Eosinophils 3      % Basophils 1      % Immature Granulocytes 3      NRBCs per 100 WBC 0      Absolute Neutrophils 6.8      Absolute Lymphocytes 2.1      Absolute Monocytes 1.5 (*)     Absolute Eosinophils 0.4      Absolute Basophils 0.1      Absolute Immature Granulocytes 0.4      Absolute NRBCs 0.0     LIPASE - Abnormal    Lipase 139 (*)    MAGNESIUM - Normal    Magnesium 1.8     PHOSPHORUS - Normal    Phosphorus 4.4     ROUTINE UA WITH MICROSCOPIC REFLEX TO CULTURE   TROPONIN T, HIGH SENSITIVITY   BASIC METABOLIC PANEL   HEPATIC FUNCTION PANEL       RADIOLOGY:  XR Chest Port 1 View   Final Result   IMPRESSION: Left internal jugular venous Port-A-Cath terminates near the superior cavoatrial junction. Low lung volumes. Elevation of the right hemidiaphragm. Mild bibasilar atelectasis. Normal heart size and pulmonary vascularity. No pneumothorax.      CT Head w/o Contrast    (Results Pending)       EKG:   Performed at: 05/18/2025 at 00:09  Impression: Sinus rhythm, no signs of acute ST elevation ischemia, no inverted T waves or acute changes compared to prior EKG from May 6.  Rate: 92 bpm  Rhythm: Sinus rhythm  QRS Interval: 82 ms  QTc Interval: 467 ms  Comparison: Nonspecific T wave abnormality no longer evident in Inferior leads when compared with ECG of 05/06/2025 at 14:07    I have independently reviewed and interpreted the EKG(s) documented above.     PROCEDURES:   Procedures       I, Ace Ochoa, am serving as a scribe to document services personally performed by Dr. Deshawn Rodriguez  based on my observation and the provider's statements to me. I, Deshawn Rodriguez MD attest that Ace Ochoa is acting in a scribe capacity, has observed my performance of the services and has documented them in accordance with my direction.      Deshawn  Michael WONG  Emergency Medicine  [facility]       Deshawn Rodriguez MD  05/18/25 8938

## 2025-05-18 NOTE — PROGRESS NOTES
"   05/18/25 1408   Appointment Info   Signing Clinician's Name / Credentials (OT) Cheryl JULIETTE Urena/L   Living Environment   People in Home child(bernice), adult   Current Living Arrangements house   Home Accessibility stairs within home   Number of Stairs, Within Home, Primary greater than 10 stairs   Stair Railings, Within Home, Primary railings safe and in good condition   Transportation Anticipated family or friend will provide   Living Environment Comments Pt is able to stay on one level   Self-Care   Current Activity Tolerance fair   Equipment Currently Used at Home none   Activity/Exercise/Self-Care Comment Pt is normally indepndent with ADLs and mobility   General Information   Onset of Illness/Injury or Date of Surgery 05/17/25   Referring Physician Dharmesh Oleary MD   Patient/Family Therapy Goal Statement (OT) \"I want to be done\"   Additional Occupational Profile Info/Pertinent History of Current Problem Pt admitted with weakness, lung CA   Existing Precautions/Restrictions fall   General Observations and Info Pt makes many comments about wanting to be done and that she has lived a good life.   Cognitive Status Examination   Cognitive Status Comments Pt is able to give history and follows all directions.   Visual Perception   Visual Impairment/Limitations WFL   Sensory   Sensory Comments UE's intact, R foot numbness   Range of Motion Comprehensive   Comment, General Range of Motion WFLs for ADLs   Strength Comprehensive (MMT)   Comment, General Manual Muscle Testing (MMT) Assessment generalized weakness B UE;s   Coordination   Upper Extremity Coordination No deficits were identified   Bed Mobility   Comment (Bed Mobility) SBA   Transfers   Transfer Comments SBA/CGA   Balance   Balance Comments SBA/CGA   Activities of Daily Living   BADL Assessment/Intervention lower body dressing   Lower Body Dressing Assessment/Training   Comment, (Lower Body Dressing) SBA to don and doff shoes/socks "   Clinical Impression   Criteria for Skilled Therapeutic Interventions Met (OT) Evaluation only   OT Diagnosis Impaired endurance for ADLs   OT Problem List-Impairments impacting ADL activity tolerance impaired;balance   Assessment of Occupational Performance 1-3 Performance Deficits   Clinical Decision Making Complexity (OT) problem focused assessment/low complexity   Risk & Benefits of therapy have been explained evaluation/treatment results reviewed;participants included;patient   Clinical Impression Comments Pt seen bedside for OT eval.  Pt is able to complete basic ADLs and mobility with SBA.  OT will defer balance and endurance for PT.  Recommend home with family assist as needed.   OT Total Evaluation Time   OT Eval, Low Complexity Minutes ([id number]) 15   OT Discharge Planning   OT Plan Eval only   OT Discharge Recommendation (DC Rec) home with assist   OT Rationale for DC Rec Recommend home with assist as needed.  Pt is doing well well with basic ADLs   OT Brief overview of current status SBA

## 2025-05-18 NOTE — CONSULTS
Care Management Initial Consult    General Information  Assessment completed with: Patient,    Type of CM/SW Visit: Initial Assessment    Primary Care Provider verified and updated as needed: Yes   Readmission within the last 30 days: current reason for admission unrelated to previous admission   Return Category: Exacerbation of disease  Reason for Consult: discharge planning (elevated risk score)  Advance Care Planning: Advance Care Planning Reviewed: no concerns identified          Communication Assessment  Patient's communication style: spoken language (English or Bilingual)             Cognitive  Cognitive/Neuro/Behavioral: WDL                      Living Environment:   People in home: child(bernice), adult     Current living Arrangements: house      Able to return to prior arrangements: yes       Family/Social Support:  Care provided by: self  Provides care for: no one  Marital Status:   Support system: Children          Description of Support System: Supportive, Involved    Support Assessment: Patient communicates needs well met    Current Resources:   Patient receiving home care services: Yes  Skilled Home Care Services: Skilled Nursing     Community Resources: Home Care  Equipment currently used at home: none  Supplies currently used at home: None    Employment/Financial:  Employment Status: retired        Financial Concerns: none   Referral to Financial Worker: No       Does the patient's insurance plan have a 3 day qualifying hospital stay waiver?  Yes     Which insurance plan 3 day waiver is available? Alternative insurance waiver    Will the waiver be used for post-acute placement? No    Lifestyle & Psychosocial Needs:  Social Drivers of Health     Food Insecurity: Low Risk  (5/7/2025)    Food Insecurity     Within the past 12 months, did you worry that your food would run out before you got money to buy more?: No     Within the past 12 months, did the food you bought just not last and you didn t have  money to get more?: No   Depression: At risk (4/29/2025)    Received from [facility]    PHQ-2     PHQ-2 Score: 4   Housing Stability: Low Risk  (5/7/2025)    Housing Stability     Do you have housing? : Yes     Are you worried about losing your housing?: No   Tobacco Use: Medium Risk (4/16/2025)    Received from [facility]    Patient History     Smoking Tobacco Use: Former     Smokeless Tobacco Use: Never     Passive Exposure: Not on file   Financial Resource Strain: Low Risk  (5/7/2025)    Financial Resource Strain     Within the past 12 months, have you or your family members you live with been unable to get utilities (heat, electricity) when it was really needed?: No   Alcohol Use: Not on file   Transportation Needs: Low Risk  (5/7/2025)    Transportation Needs     Within the past 12 months, has lack of transportation kept you from medical appointments, getting your medicines, non-medical meetings or appointments, work, or from getting things that you need?: No   Physical Activity: Not on file   Interpersonal Safety: Low Risk  (5/7/2025)    Interpersonal Safety     Do you feel physically and emotionally safe where you currently live?: Yes     Within the past 12 months, have you been hit, slapped, kicked or otherwise physically hurt by someone?: No     Within the past 12 months, have you been humiliated or emotionally abused in other ways by your partner or ex-partner?: No   Stress: Not on file   Social Connections: Not on file   Health Literacy: Not on file       Functional Status:  Prior to admission patient needed assistance:   Dependent ADLs:: Independent  Dependent IADLs:: Cleaning, Laundry, Transportation       Mental Health Status:  Mental Health Status: No Current Concerns       Chemical Dependency Status:  Chemical Dependency Status: No Current Concerns             Values/Beliefs:  Spiritual, Cultural Beliefs, Synagogue Practices, Values that affect care: no               Discussed  Partnership in Safe  Discharge Planning  document with patient/family: No    Additional Information:  CM consult received for discharge planning.  Met with patient at bedside to introduce CM role and perform initial assessment.  Demographics verified and updated as needed.  Reviewed observation status billing under Medicare guidelines and provided a copy of the [employer] brochure.  Inez lives in a house with her son Kenneth.  She is independent with ADLs and son assists with some household IADLs.  Was hospitalized at [facility] 5/6-5/12 and discharged home with [facility] for RN services.  Plan to return home with current home care when medically ready for discharge.  Son can transport.      Next Steps: CM to follow for medical progression of care, discharge recommendations, and final discharge plan.  Update home care on discharge plans.      Skip Hanna, LUÍS

## 2025-05-18 NOTE — H&P
[facility]    History and Physical - Hospitalist Service       Date of Admission:  5/17/2025    Assessment & Plan      Inez Rodriguez is a 74 year old female admitted on 5/17/2025. She presents with generalized weakness, YAMILKA, all likely due to dehydration from poor PO intake. Also has some hypoxia again seen, may be related to known lung cancer and physical deconditioning. IVF overnight, recheck BMP in AM. PT/OT/Nutr/RT assessment in morning to determine if any additional inpatient needs.     Problem-based Assessment & Plan:  YAMILKA, stage 2, likely due to dehydration from poor PO intake given early satiety  IVF bolus in ED, given additional 1L over next 10 hours for further fluid management. Anticipate improvement in her creatinine with IVF, reassess BMP in AM  Hyponatremia and hypochloremia, 2/2 to dehydration as noted above, treatment noted above as well.  Hypoalbuminemia and poor PO intake, low BMI, suspect severe malnutrition, will consult with Nutrition services for additional assessment and supplementation recommendation.   Consider megestrol for appetite stimulant. Mirtazapine may cause excessive drowsiness at this time, but could be consider in future for appetite.  Elevated LFTs, mild, however new compared to last check in April 2025.  CT imaging from last admission without hepatic mets  May be related to dehydration, Poor intake. Is without abdominal pain suggestive of cholecystitis.   Had prior abnormality noted of the pancreatic duct on CT A/P from last admission, consider further evaluation with MRCP vs MRI, or could consider initial RUQ US for LFT evaluation.   Recent subacute CVA (R centrum semiovale, R MCA distrubution), without any notable deficit, however, now reporting new onset numbness/paresthesias to the RLE/RUE.  Possible new stroke/vasogenic edema from brain mets, will check stat CTH, likely need brain MRI in AM. Pending results, involve Neurology if positive for acute  "findings.   CTH negative for acute findings, shows \"evolving right frontal lobe corona radiata infarct\" that was seen on prior CTH imaging last admission.  Proceed with brain MRI imaging.   Generalized muscle weakness and physical deconditioning, exacerbated due to acute/chronic illness as described above.  PT/OT consultations for assessment.   HTN, chronic, stable. Continue PTA anti-HTN medication regimen without changes  BP is on lower side and would be considered aggressively controlled in her weakned state. Consider discontinuation of amlodipine altogether.  Hypercholesterolemia, chronic, stable, continue PTA statin regimen without change   Consider discontinuation of this medication given poor prognosis in setting of metastatic lung cancer, low benefit from statin usage to reduce 10 yr ASCVD risk and or other atherosclerotic disease.   Anemia 2/2 antineoplastic treatment, stable at baseline right now, monitor daily CBC.  Lung cancer, metastatic to bone and brain  Poor functional status at this time and overall functional decline recently. PT/OT assessment. Follows with palliative care at [facility], consider having our palliative care team follow while inpatient as well.   Immunocompromised from chemotherapy treatment, at this time do not suspect any acute infection. Monitor closely for any signs of infection while inpatient.        Observation Goals: -diagnostic tests and consults completed and resulted, -vital signs normal or at patient baseline, -tolerating oral intake to maintain hydration, -dyspnea improved and O2 sats greater than 88% on room air or prior home oxygen levels, -safe disposition plan has been identified, Nurse to notify provider when observation goals have been met and patient is ready for discharge.  Diet: Regular Diet Adult  DVT Prophylaxis: Pneumatic Compression Devices  Ansari Catheter: Not present  Lines: PRESENT             Cardiac Monitoring: None  Code Status: No CPR- Do NOT " Intubate    Clinically Significant Risk Factors Present on Admission         # Hyponatremia: Lowest Na = 134 mmol/L in last 2 days, will monitor as appropriate  # Hypochloremia: Lowest Cl = 96 mmol/L in last 2 days, will monitor as appropriate      # Hypoalbuminemia: Lowest albumin = 2.8 g/dL at 5/18/2025 12:29 AM, will monitor as appropriate      # Acute Kidney Injury, unspecified: based on a >150% or 0.3 mg/dL increase in last creatinine compared to past 90 day average, will monitor renal function       # Anemia: based on hgb <11           # Financial/Environmental Concerns:           Disposition Plan     Medically Ready for Discharge: Anticipated Tomorrow pending repeat labs, and PT/OT evaluation. Potentially may require/meet inpatient needs after evaluations.           Dharmesh Oleary MD  Hospitalist Service  [facility]  Securely message with Snap[employer] (more info)  Text page via [facility] Paging/Directory     ______________________________________________________________________    Chief Complaint   Generalized weakness    History is obtained from the patient, electronic health record, and emergency department physician    History of Present Illness   Inez Rodriguez is a 74 year old female with known metastatic lung cancers (mets to bone and brain), who presents with weakness    For past 1 day, increasing feelings of fatigue, weakness, and shakiness. Reports poor PO intake over past 1 week. After a recent admission that was felt related to her chemotherapy treatment (see reviewed encounter dates below), her previous GI symptoms are improved with dose reduction to one third previous dosage. Treatment is under oncology with the [facility].     She had some outpatient lab testing recently, with creatinine elevation up from 0.8 to 1.11, and was recommended by her oncologist to go to nearby ED for repeat lab evaluation and infection rule out. (personally reviewed provider documentation from  "5/17/25).    Patient also reveals to be that she had new numbness sensation in her RLE (mainly ankle and foot) and right hand. This was noticed after she work up in the morning.     Personally review the following vitals signs during this current ED encounter:  Vitals   --> 86  /53  RR 15-20  SpO2 93% RA --> become hypoxic 83% while lying down in ED --> 99% on 2L NC.   T 98.0.    Personally review the following laboratory studies during this current encounter ordered by the ED provider:  CMP  Na 134  K 3.8  Cl 96  Ca 7.9 (corrects to 8.9 considering  CO2 31  AG 7  Cr 1.69 (baseline ~0.8-0.9 range)  Glucose 105  Hepatic Function/LFTs   Albumin 2.8  Total Bili 0.4    AST 53  ALT 30  CBC  WBC 11.4 (increased from 6 days ago and with last admission, which was always WNL)  Hgb 9.5 (similar to recent baseline last admission)  Plt 362  Trop T: 21  Lactic Acid: 0.6    Mag 1.8  UA: (pending collection and/or result at this time)    Personally reviewed the following imaging studies as ordered by the ED provider this ED encounter:  CXR is negative for acute cardiopulmonary processes including infiltrate, consolidation, effusion, or any features suggestive of pneumonia.  ECG: NSR      Reviewed the following ED provider orders/treatments:  IV NS 1L.     Prior inpatient/outpatient encounters personally reviewed within Epic:  Discharge Summary from hospital admission 5/6/2025 - 5/12/2025 at [facility], personally reviewed provider documentation. Concern was for sepsis, but this was ruled out and presenting symptom at the time felt related to chemotherapy (adagrasib).  Palliative Care consult note [facility] 4/23/25, personally reviewed provider documentation.   Oncology OV 4/16/25, personally reviewed provider documentation.       Other records personally reviewed within Epic:  CT A/P results from last admission 5/6/25, no hepatic mets, but did note \"Mildly increased distention " "of the pancreas duct but no specific etiology can be seen. If relevant, this could be further evaluated with nonemergent MRCP/MRI\"      Past Medical History    Past Medical History:   Diagnosis Date    Acute respiratory failure with hypoxia (H) 05/06/2025    Hypertension     Postobstructive pneumonia 01/17/2024    Sepsis, due to unspecified organism, unspecified whether acute organ dysfunction present (H) 05/06/2025    SOB (shortness of breath)        Past Surgical History   Past Surgical History:   Procedure Laterality Date    BRONCHOSCOPY RIGID OR FLEXIBLE W/TRANSENDOSCOPIC ENDOBRONCHIAL ULTRASOUND GUIDED N/A 1/8/2024    Procedure: BRONCHOSCOPY, WITH ENDOBRONCHIAL ULTRASOUND WITH FINE NEEDLE ASPIRATION OF LYMPH NODE;  Surgeon: Angelia Mustafa MD;  Location: [facility] [place] OR     CHEST PORT PLACEMENT > 5 YRS OF AGE  2/12/2024    TONSILLECTOMY      at 8 years of age       Prior to Admission Medications   Prior to Admission Medications   Prescriptions Last Dose Informant Patient Reported? Taking?   Mag Aspart-Potassium Aspart (POTASSIUM & MAGNESIUM ASPARTAT PO) 5/17/2025 Morning  Yes Yes   Sig: Take 2 capsules by mouth daily   Methylsulfonylmethane (MSM) 1000 MG TABS Past Week Morning  Yes Yes   Sig: Take 2 tablets by mouth daily.   Vitamin D3 (CHOLECALCIFEROL) 125 MCG (5000 UT) tablet 5/17/2025 Morning Self Yes Yes   Sig: Take 125 mcg by mouth daily   adagrasib (KRAZATI) 200 MG tablet 5/17/2025 Evening  No Yes   Sig: Take 3 tablets (600 mg) by mouth 2 times daily. Swallow whole; do not chew, crush or split tablets. Take with or without food.  Please call [place] to discuss if you should resume adagrasib   amLODIPine (NORVASC) 5 MG tablet 5/17/2025 Morning  No Yes   Sig: Take 1 tablet (5 mg) by mouth daily. NEED TO BE SEEN IN PRIMARY CARE FOR ANY FURTHER FILLS.   atorvastatin (LIPITOR) 20 MG tablet 5/17/2025 Morning  No Yes   Sig: Take 1 tablet (20 mg) by mouth every evening.   calcium carbonate (TUMS) 500 MG chewable " tablet 5/17/2025 Morning  Yes Yes   Sig: Take 1 chew tab by mouth 2 times daily as needed for heartburn.   diphenoxylate-atropine (LOMOTIL) 2.5-0.025 MG tablet Past Week Evening  Yes Yes   Sig: Take 2 tablets by mouth 4 times daily as needed.   folic acid (FOLVITE) 1 MG tablet 5/17/2025 Morning  No Yes   Sig: Take 1 tablet (1,000 mcg) by mouth daily.   granisetron (KYTRIL) 1 MG tablet 5/17/2025 Evening  Yes Yes   Sig: Take 1 mg by mouth every 12 hours as needed.   loratadine (CLARITIN) 10 MG tablet 5/17/2025 Morning  Yes Yes   Sig: Take 10 mg by mouth daily.   multivitamin w/minerals (THERA-VIT-M) tablet 5/17/2025 Morning Self Yes Yes   Sig: Take 1 tablet by mouth daily   prochlorperazine (COMPAZINE) 10 MG tablet 5/17/2025 Evening  Yes Yes   Sig: Take 10 mg by mouth every 6 hours as needed.      Facility-Administered Medications: None           Physical Exam   Vital Signs: Temp: 98  F (36.7  C) Temp src: Oral BP: 116/53 Pulse: 90   Resp: 16 SpO2: 97 % O2 Device: Nasal cannula Oxygen Delivery: 2 LPM  Weight: 120 lbs 0 oz    Physical Exam  Constitutional:       General: She is not in acute distress.     Appearance: She is ill-appearing. She is not toxic-appearing or diaphoretic.   HENT:      Head: Normocephalic and atraumatic.   Eyes:      General: No scleral icterus.     Conjunctiva/sclera: Conjunctivae normal.   Cardiovascular:      Rate and Rhythm: Normal rate and regular rhythm.   Pulmonary:      Effort: Pulmonary effort is normal. No respiratory distress.   Abdominal:      General: There is no distension.      Tenderness: There is no abdominal tenderness.   Musculoskeletal:      Right lower leg: No edema.   Neurological:      General: No focal deficit present.      Mental Status: She is alert and oriented to person, place, and time. Mental status is at baseline.      Cranial Nerves: No cranial nerve deficit.      Sensory: Sensory deficit (subjectively decreased light touch and pinpoint sensation in the RLE at  shin and distal. RUE at wrist and more distal as well.) present.      Motor: Weakness (4/5 strength in all extremities.) present.   Psychiatric:         Mood and Affect: Mood normal.         Behavior: Behavior normal.           Medical Decision Making       79 MINUTES SPENT BY ME on the date of service doing chart review, history, exam, documentation & further activities per the note.      Data     I have personally reviewed the following data over the past 24 hrs:    11.4 (H)  \   9.5 (L)   / 362     134 (L) 96 (L) 16.2 /  105 (H)   3.8 31 (H) 1.69 (H) \     ALT: 30 AST: 53 (H) AP: 179 (H) TBILI: 0.4   ALB: 2.8 (L) TOT PROTEIN: 5.2 (L) LIPASE: 139 (H)     Trop: 21 (H) BNP: N/A     Procal: N/A CRP: N/A Lactic Acid: 0.6 (L)         Imaging results reviewed over the past 24 hrs:   Recent Results (from the past 24 hours)   XR Chest Port 1 View    Narrative    EXAM: XR CHEST PORT 1 VIEW  LOCATION: [facility]  DATE: 5/18/2025    INDICATION: hypoxia  COMPARISON: 5/10/2025.      Impression    IMPRESSION: Left internal jugular venous Port-A-Cath terminates near the superior cavoatrial junction. Low lung volumes. Elevation of the right hemidiaphragm. Mild bibasilar atelectasis. Normal heart size and pulmonary vascularity. No pneumothorax.

## 2025-05-19 ENCOUNTER — APPOINTMENT (OUTPATIENT)
Dept: PHYSICAL THERAPY | Facility: HOSPITAL | Age: 75
DRG: 682 | End: 2025-05-19
Payer: COMMERCIAL

## 2025-05-19 ENCOUNTER — PATIENT OUTREACH (OUTPATIENT)
Dept: GERIATRIC MEDICINE | Facility: CLINIC | Age: 75
End: 2025-05-19
Payer: COMMERCIAL

## 2025-05-19 VITALS
SYSTOLIC BLOOD PRESSURE: 103 MMHG | WEIGHT: 132.3 LBS | BODY MASS INDEX: 22.04 KG/M2 | OXYGEN SATURATION: 99 % | HEART RATE: 93 BPM | HEIGHT: 65 IN | RESPIRATION RATE: 20 BRPM | TEMPERATURE: 97.5 F | DIASTOLIC BLOOD PRESSURE: 53 MMHG

## 2025-05-19 LAB
BASOPHILS # BLD AUTO: 0.1 10E3/UL (ref 0–0.2)
BASOPHILS NFR BLD AUTO: 1 %
C PNEUM DNA SPEC QL NAA+PROBE: NOT DETECTED
EOSINOPHIL # BLD AUTO: 0.4 10E3/UL (ref 0–0.7)
EOSINOPHIL NFR BLD AUTO: 4 %
ERYTHROCYTE [DISTWIDTH] IN BLOOD BY AUTOMATED COUNT: 14.6 % (ref 10–15)
FLUAV H1 2009 PAND RNA SPEC QL NAA+PROBE: NOT DETECTED
FLUAV H1 RNA SPEC QL NAA+PROBE: NOT DETECTED
FLUAV H3 RNA SPEC QL NAA+PROBE: NOT DETECTED
FLUAV RNA SPEC QL NAA+PROBE: NOT DETECTED
FLUBV RNA SPEC QL NAA+PROBE: NOT DETECTED
HADV DNA SPEC QL NAA+PROBE: NOT DETECTED
HCOV PNL SPEC NAA+PROBE: NOT DETECTED
HCT VFR BLD AUTO: 26.8 % (ref 35–47)
HGB BLD-MCNC: 8.6 G/DL (ref 11.7–15.7)
HMPV RNA SPEC QL NAA+PROBE: NOT DETECTED
HPIV1 RNA SPEC QL NAA+PROBE: NOT DETECTED
HPIV2 RNA SPEC QL NAA+PROBE: NOT DETECTED
HPIV3 RNA SPEC QL NAA+PROBE: NOT DETECTED
HPIV4 RNA SPEC QL NAA+PROBE: NOT DETECTED
IMM GRANULOCYTES # BLD: 0.2 10E3/UL
IMM GRANULOCYTES NFR BLD: 2 %
LACTATE SERPL-SCNC: 0.5 MMOL/L (ref 0.7–2)
LYMPHOCYTES # BLD AUTO: 1.6 10E3/UL (ref 0.8–5.3)
LYMPHOCYTES NFR BLD AUTO: 17 %
M PNEUMO DNA SPEC QL NAA+PROBE: NOT DETECTED
MAGNESIUM SERPL-MCNC: 1.5 MG/DL (ref 1.7–2.3)
MCH RBC QN AUTO: 30.3 PG (ref 26.5–33)
MCHC RBC AUTO-ENTMCNC: 32.1 G/DL (ref 31.5–36.5)
MCV RBC AUTO: 94 FL (ref 78–100)
MONOCYTES # BLD AUTO: 1.1 10E3/UL (ref 0–1.3)
MONOCYTES NFR BLD AUTO: 12 %
NEUTROPHILS # BLD AUTO: 6.1 10E3/UL (ref 1.6–8.3)
NEUTROPHILS NFR BLD AUTO: 65 %
NRBC # BLD AUTO: 0 10E3/UL
NRBC BLD AUTO-RTO: 0 /100
PHOSPHATE SERPL-MCNC: 3.6 MG/DL (ref 2.5–4.5)
PLATELET # BLD AUTO: 306 10E3/UL (ref 150–450)
POTASSIUM SERPL-SCNC: 3.7 MMOL/L (ref 3.4–5.3)
PROCALCITONIN SERPL IA-MCNC: 0.28 NG/ML
RBC # BLD AUTO: 2.84 10E6/UL (ref 3.8–5.2)
RSV RNA SPEC QL NAA+PROBE: NOT DETECTED
RSV RNA SPEC QL NAA+PROBE: NOT DETECTED
RV+EV RNA SPEC QL NAA+PROBE: NOT DETECTED
WBC # BLD AUTO: 9.3 10E3/UL (ref 4–11)

## 2025-05-19 PROCEDURE — 84132 ASSAY OF SERUM POTASSIUM: CPT | Performed by: INTERNAL MEDICINE

## 2025-05-19 PROCEDURE — 84100 ASSAY OF PHOSPHORUS: CPT | Performed by: INTERNAL MEDICINE

## 2025-05-19 PROCEDURE — 83605 ASSAY OF LACTIC ACID: CPT | Performed by: HOSPITALIST

## 2025-05-19 PROCEDURE — 83735 ASSAY OF MAGNESIUM: CPT | Performed by: INTERNAL MEDICINE

## 2025-05-19 PROCEDURE — 85025 COMPLETE CBC W/AUTO DIFF WBC: CPT | Performed by: HOSPITALIST

## 2025-05-19 PROCEDURE — 258N000003 HC RX IP 258 OP 636: Performed by: INTERNAL MEDICINE

## 2025-05-19 PROCEDURE — 36415 COLL VENOUS BLD VENIPUNCTURE: CPT | Performed by: HOSPITALIST

## 2025-05-19 PROCEDURE — 84145 PROCALCITONIN (PCT): CPT | Performed by: HOSPITALIST

## 2025-05-19 PROCEDURE — 99239 HOSP IP/OBS DSCHRG MGMT >30: CPT | Performed by: FAMILY MEDICINE

## 2025-05-19 PROCEDURE — 250N000013 HC RX MED GY IP 250 OP 250 PS 637: Performed by: INTERNAL MEDICINE

## 2025-05-19 PROCEDURE — 87633 RESP VIRUS 12-25 TARGETS: CPT | Performed by: HOSPITALIST

## 2025-05-19 PROCEDURE — 87040 BLOOD CULTURE FOR BACTERIA: CPT | Performed by: HOSPITALIST

## 2025-05-19 PROCEDURE — 250N000011 HC RX IP 250 OP 636: Performed by: FAMILY MEDICINE

## 2025-05-19 PROCEDURE — 97116 GAIT TRAINING THERAPY: CPT | Mod: GP

## 2025-05-19 PROCEDURE — 99254 IP/OBS CNSLTJ NEW/EST MOD 60: CPT | Performed by: NURSE PRACTITIONER

## 2025-05-19 RX ORDER — MAGNESIUM SULFATE 4 G/50ML
4 INJECTION INTRAVENOUS ONCE
Status: COMPLETED | OUTPATIENT
Start: 2025-05-19 | End: 2025-05-19

## 2025-05-19 RX ORDER — SENNOSIDES 8.6 MG
1 TABLET ORAL 2 TIMES DAILY
Qty: 60 TABLET | Refills: 0 | Status: SHIPPED | OUTPATIENT
Start: 2025-05-19 | End: 2025-06-18

## 2025-05-19 RX ORDER — HEPARIN SODIUM (PORCINE) LOCK FLUSH IV SOLN 100 UNIT/ML 100 UNIT/ML
5-10 SOLUTION INTRAVENOUS
Status: DISCONTINUED | OUTPATIENT
Start: 2025-05-19 | End: 2025-05-19 | Stop reason: HOSPADM

## 2025-05-19 RX ADMIN — Medication 125 MCG: at 08:47

## 2025-05-19 RX ADMIN — FOLIC ACID 1000 MCG: 1 TABLET ORAL at 08:46

## 2025-05-19 RX ADMIN — HEPARIN 5 ML: 100 SYRINGE at 13:43

## 2025-05-19 RX ADMIN — Medication 1 TABLET: at 08:46

## 2025-05-19 RX ADMIN — MAGNESIUM SULFATE HEPTAHYDRATE 4 G: 4 INJECTION, SOLUTION INTRAVENOUS at 08:49

## 2025-05-19 RX ADMIN — SODIUM CHLORIDE: 0.9 INJECTION, SOLUTION INTRAVENOUS at 00:19

## 2025-05-19 RX ADMIN — LORATADINE 10 MG: 10 TABLET ORAL at 08:46

## 2025-05-19 RX ADMIN — ATORVASTATIN CALCIUM 20 MG: 10 TABLET, FILM COATED ORAL at 08:46

## 2025-05-19 RX ADMIN — ACETAMINOPHEN 650 MG: 325 TABLET ORAL at 03:26

## 2025-05-19 ASSESSMENT — ACTIVITIES OF DAILY LIVING (ADL)
ADLS_ACUITY_SCORE: 38
ADLS_ACUITY_SCORE: 39
ADLS_ACUITY_SCORE: 38
ADLS_ACUITY_SCORE: 39
ADLS_ACUITY_SCORE: 38
ADLS_ACUITY_SCORE: 39
ADLS_ACUITY_SCORE: 38

## 2025-05-19 NOTE — PROVIDER NOTIFICATION
Paged hospitalist on call:    hi, pt has temp of 101, gave tylenol. Also HR has been increasing throughout this shift, at 2000 HR was 96, now HR has been up to 114. denies pain. any interventions? Thanks, Natalee

## 2025-05-19 NOTE — PROGRESS NOTES
AVS reviewed with patient and family. No further questions. TELE NSR. PORT removed with no complications. Pt assisted MYRNA via wheelchair. Hospice to follow up with patient.

## 2025-05-19 NOTE — PLAN OF CARE
Problem: Adult Inpatient Plan of Care  Goal: Absence of Hospital-Acquired Illness or Injury  Intervention: Identify and Manage Fall Risk  Recent Flowsheet Documentation  Taken 5/18/2025 1955 by Ines Mckenna RN  Safety Promotion/Fall Prevention:   activity supervised   clutter free environment maintained   nonskid shoes/slippers when out of bed   safety round/check completed   supervised activity  Taken 5/18/2025 1547 by Ines Mckenna RN  Safety Promotion/Fall Prevention:   activity supervised   clutter free environment maintained   nonskid shoes/slippers when out of bed   safety round/check completed  Intervention: Prevent Skin Injury  Recent Flowsheet Documentation  Taken 5/18/2025 1547 by Ines Mckenna RN  Body Position: position changed independently  Intervention: Prevent Infection  Recent Flowsheet Documentation  Taken 5/18/2025 1955 by Ines Mckenna RN  Infection Prevention:   cohorting utilized   hand hygiene promoted  Taken 5/18/2025 1547 by Ines Mckenna RN  Infection Prevention:   cohorting utilized   hand hygiene promoted     Problem: Delirium  Goal: Improved Behavioral Control  Intervention: Minimize Safety Risk  Recent Flowsheet Documentation  Taken 5/18/2025 1955 by Ines Mckenna RN  Enhanced Safety Measures: room near unit station  Taken 5/18/2025 1547 by Ines Mckenna RN  Enhanced Safety Measures: room near unit station     Problem: Fall Injury Risk  Goal: Absence of Fall and Fall-Related Injury  Intervention: Identify and Manage Contributors  Recent Flowsheet Documentation  Taken 5/18/2025 1955 by Ines Mckenna, RN  Medication Review/Management: medications reviewed  Taken 5/18/2025 1547 by Ines Mckenna RN  Medication Review/Management: medications reviewed  Intervention: Promote Injury-Free Environment  Recent Flowsheet Documentation  Taken 5/18/2025 1955 by Ines Mckenna, RN  Safety Promotion/Fall Prevention:   activity supervised   clutter free  environment maintained   nonskid shoes/slippers when out of bed   safety round/check completed   supervised activity  Taken 5/18/2025 1547 by Ines Mckenna, RN  Safety Promotion/Fall Prevention:   activity supervised   clutter free environment maintained   nonskid shoes/slippers when out of bed   safety round/check completed     Problem: Pain Acute  Goal: Optimal Pain Control and Function  Intervention: Prevent or Manage Pain  Recent Flowsheet Documentation  Taken 5/18/2025 1955 by Ines Mckenna, RN  Medication Review/Management: medications reviewed  Taken 5/18/2025 1547 by Ines Mckenna, RN  Medication Review/Management: medications reviewed   Goal Outcome Evaluation:       Alert&O, On 2 L NC, SR on tele, denies pain,  On q4h Neuro checks & continuous pulse oxi, On mag & phos & K protocol recheck AM, NS infusing at 75 ml/hr, SBA to the bathroom.

## 2025-05-19 NOTE — DISCHARGE SUMMARY
[facility]  Hospitalist Discharge Summary      Date of Admission:  5/17/2025  Date of Discharge:  5/19/2025  2:14 PM  Discharging Provider: Dona Walters MD  Discharge Service: Hospitalist Service    Discharge Diagnoses     Metastatic lung cancer; referral for hospice upon discharge.  YAMILKA, improved  Hyponatremia, secondary to dehydration, improved  Subacute CVA  Suspected cerebral metastases  Hypertension; blood pressure was good while here with holding medication.  Stop amlodipine for discharge    Clinically Significant Risk Factors          Follow-ups Needed After Discharge   Follow-up Appointments       Follow Up      Follow up hospice upon discharge.        Hospital Follow-up with Existing Primary Care Provider (PCP)          Schedule Primary Care visit within: 7 Days             Unresulted Labs Ordered in the Past 30 Days of this Admission       Date and Time Order Name Status Description    5/19/2025  3:43 AM Blood Culture Peripheral blood (BC) Hand, Right In process     5/19/2025  3:43 AM Blood Culture Peripheral blood (BC) Arm, Right In process             Discharge Disposition   Discharged to home  Condition at discharge: Stable    Hospital Course     74-year-old female with known metastatic lung cancer currently treated at [facility] and recent hospitalization for nausea and vomiting felt related to cancer treatment along with YAMILKA and subacute CVA who came back into the hospital less than a week after discharge with weakness.  Poor appetite, poor p.o. intake.  ED evaluation showed mild AYMILKA and patient was admitted.  Patient was treated with IV fluids, PT OT evaluation recommending home with assist as well as palliative care consultation.  While here she voiced desire to forego any further chemotherapy understanding her metastatic disease and again voices to palliative care who she had met in the past.  She was agreeable to hospice referral upon discharge and this was placed and she  will be meeting with [facility] hospice.  Had no pain or other issues and was tolerating diet with holding of her chemo medications and discharged home with recommendations and referral for home hospice in good condition.    Consultations This Hospital Stay   CARE MANAGEMENT / SOCIAL WORK IP CONSULT  PHYSICAL THERAPY ADULT IP CONSULT  OCCUPATIONAL THERAPY ADULT IP CONSULT  NUTRITION SERVICES ADULT IP CONSULT  CARE MANAGEMENT / SOCIAL WORK IP CONSULT  PALLIATIVE CARE ADULT IP CONSULT  PALLIATIVE CARE ADULT IP CONSULT    Code Status   No CPR- Do NOT Intubate    Time Spent on this Encounter   I, Dona Walters MD, personally saw the patient today and spent greater than 30 minutes discharging this patient.       Dona Walters MD  [facility] EXTENDED RECOVERY AND SHORT STAY  [address] [place] [id number]  Phone: [phone]  Fax: [phone]  ______________________________________________________________________    Physical Exam   Vital Signs: Temp: 97.5  F (36.4  C) Temp src: Oral BP: 103/53 Pulse: 93   Resp: 20 SpO2: 99 % O2 Device: Nasal cannula Oxygen Delivery: 2 LPM  Weight: 132 lbs 4.8 oz  General Appearance: Pleasant female no apparent distress  Respiratory: Clear to auscultation  Cardiovascular: Regular rate and rhythm  GI: Soft and nontender without hepatosplenomegaly or masses or obvious ascites  Skin: No significant lower extremity edema or open areas or rashes or jaundice  Other: Neurologically grossly intact without focal deficits appreciated       Primary Care Physician   Lynn Bowles    Discharge Orders      Primary Care - Care Coordination Referral      Hospice Referral      Reason for your hospital stay    Weakness. Metastatic cancer     Activity    Your activity upon discharge: activity as tolerated     Follow Up    Follow up hospice upon discharge.     Walker Order     Diet    Follow this diet upon discharge: Current Diet:Orders Placed This Encounter       Regular [facility] Adult     Hospital Follow-up with Existing Primary Care Provider (PCP)            Significant Results and Procedures   Most Recent 3 CBC's:  Recent Labs   Lab Test 05/19/25  0428 05/18/25  0624 05/18/25  0029   WBC 9.3 9.7 11.4*   HGB 8.6* 8.9* 9.5*   MCV 94 97 94    325 362     Most Recent 3 BMP's:  Recent Labs   Lab Test 05/19/25  0428 05/18/25  0624 05/18/25  0029 05/16/25  1249   NA  --  138 134* 139   POTASSIUM 3.7 3.8 3.8 3.8   CHLORIDE  --  100 96* 98   CO2  --  30* 31* 31*   BUN  --  14.9 16.2 8.6   CR  --  1.40* 1.69* 1.11*   ANIONGAP  --  8 7 10   CHELITA  --  7.8* 7.9* 8.9   GLC  --  98 105* 82   ,   Results for orders placed or performed during the hospital encounter of 05/17/25   XR Chest Port 1 View    Narrative    EXAM: XR CHEST PORT 1 VIEW  LOCATION: [facility]  DATE: 5/18/2025    INDICATION: hypoxia  COMPARISON: 5/10/2025.      Impression    IMPRESSION: Left internal jugular venous Port-A-Cath terminates near the superior cavoatrial junction. Low lung volumes. Elevation of the right hemidiaphragm. Mild bibasilar atelectasis. Normal heart size and pulmonary vascularity. No pneumothorax.   CT Head w/o Contrast    Narrative    EXAM: CT HEAD W/O CONTRAST  LOCATION: [facility]  DATE: 5/18/2025    INDICATION: right distal LE numbness + Right distal UE numbness, acute, c f stroke  COMPARISON: MRI brain May 7, 2025   TECHNIQUE: Routine CT Head without IV contrast. Multiplanar reformats. Dose reduction techniques were used.    FINDINGS:  INTRACRANIAL CONTENTS: No intracranial hemorrhage, extraaxial collection, or mass effect.  Evolving right frontal lobe corona radiata infarct. No CT evidence for new regions of ischemia.  Mild to moderate presumed chronic small vessel ischemic changes.   Moderate generalized volume loss. No hydrocephalus. Scattered sulcal calcifications, likely vascular.    VISUALIZED ORBITS/SINUSES/MASTOIDS: No intraorbital  abnormality. No paranasal sinus mucosal disease. No middle ear or mastoid effusion.    BONES/SOFT TISSUES: No acute abnormality.      Impression    IMPRESSION:  1.  Evolving right frontal lobe corona radiata infarct. No CT evidence for new regions of ischemia.  2.  No acute intracranial hemorrhage.     MR Brain w/o & w Contrast    Narrative    EXAM: MR BRAIN W/O and W CONTRAST  LOCATION: [facility]  DATE: 5/18/2025    INDICATION: known brain mets, questionable vasogenic edema on prior imaging, subacute right corona radiatia infarct. New RLE RUE numbness, concern for new ischemia vs edema vs mets.  COMPARISON:  CT head May 13, 2025. MRI brain May 7, 2025.  CONTRAST: 5ml gadavist  TECHNIQUE: Routine multiplanar multisequence head MRI without and with intravenous contrast.    FINDINGS:  INTRACRANIAL CONTENTS: No acute or subacute infarct. Evolving now chronic appearing right frontal lobe corona radiata infarct. No mass, acute hemorrhage, or extra-axial fluid collections. Patchy nonspecific T2/FLAIR hyperintensities within the cerebral   white matter most consistent with mild to moderate chronic microvascular ischemic change. Mild generalized cerebral atrophy. No hydrocephalus. Normal position of the cerebellar tonsils. Unchanged scattered punctate foci of enhancement within the frontal   lobe cortex/subcortical white matter and left caudate. No new focus of enhancement identified.    SELLA: Empty sella morphology with flattening of the pituitary gland along the sellar floor.    OSSEOUS STRUCTURES/SOFT TISSUES: Normal marrow signal. The major intracranial vascular flow voids are maintained.     ORBITS: No abnormality accounting for technique.     SINUSES/MASTOIDS: No paranasal sinus mucosal disease. Scattered fluid/membrane thickening in the mastoid air cells bilaterally.       Impression    IMPRESSION:  1.  No acute intracranial abnormality.  2.  Unchanged scattered punctate foci of enhancement  within the frontal lobe cortex/subcortical white matter and left caudate. No new focus of enhancement identified.  3.  Evolving now chronic appearing right frontal lobe corona radiata infarct.         Discharge Medications   Discharge Medication List as of 5/19/2025  1:15 PM        START taking these medications    Details   sennosides (SENOKOT) 8.6 MG tablet Take 1 tablet by mouth 2 times daily., Disp-60 tablet, R-0, E-Prescribe           CONTINUE these medications which have NOT CHANGED    Details   calcium carbonate (TUMS) 500 MG chewable tablet Take 1 chew tab by mouth 2 times daily as needed for heartburn., Historical      diphenoxylate-atropine (LOMOTIL) 2.5-0.025 MG tablet Take 2 tablets by mouth 4 times daily as needed., Historical      granisetron (KYTRIL) 1 MG tablet Take 1 mg by mouth every 12 hours as needed., Historical      loratadine (CLARITIN) 10 MG tablet Take 10 mg by mouth daily., Historical      Mag Aspart-Potassium Aspart (POTASSIUM & MAGNESIUM ASPARTAT PO) Take 2 capsules by mouth daily, Historical      Methylsulfonylmethane (MSM) 1000 MG TABS Take 2 tablets by mouth daily., Historical      prochlorperazine (COMPAZINE) 10 MG tablet Take 10 mg by mouth every 6 hours as needed., Historical           STOP taking these medications       adagrasib (KRAZATI) 200 MG tablet Comments:   Reason for Stopping:         amLODIPine (NORVASC) 5 MG tablet Comments:   Reason for Stopping:         atorvastatin (LIPITOR) 20 MG tablet Comments:   Reason for Stopping:         folic acid (FOLVITE) 1 MG tablet Comments:   Reason for Stopping:         multivitamin w/minerals (THERA-VIT-M) tablet Comments:   Reason for Stopping:         Vitamin D3 (CHOLECALCIFEROL) 125 MCG (5000 UT) tablet Comments:   Reason for Stopping:             Allergies   No Known Allergies

## 2025-05-19 NOTE — PLAN OF CARE
Inpatient progress note:  A&Ox4, on 2L O2 NC. Has been tachy since around 2200 last night. Fever of 101 at 0330, tylenol given, MD notified, ordered labs, BC & resp PCR. IVF infusing per orders. Temp back down to 99.7 after tylenol. Pt states she is ready to go home & doesn't want to come back to hospitals anymore. C/s palliative today, encouraged pt to let care team know her feelings about her treatment plan.  Plan: IVF, palliative c/s, discharge once medically cleared.     Please review provider order for any additional goals.   Nurse to notify provider when observation goals have been met and patient is ready for discharge.

## 2025-05-19 NOTE — PLAN OF CARE
Problem: Adult Inpatient Plan of Care  Goal: Plan of Care Review  Description: The Plan of Care Review/Shift note should be completed every shift.  The Outcome Evaluation is a brief statement about your assessment that the patient is improving, declining, or no change.  This information will be displayed automatically on your shiftnote.  Outcome: Progressing   Goal Outcome Evaluation:       Denies pain. Denies any further symptoms. TELE NSR. IV fluids infusing. IV mag given. Recheck at 1300. PORT intact. Tolerating regular diet. Awaiting palliative consult.

## 2025-05-19 NOTE — CONSULTS
Palliative Care Consultation Note  [facility]      Patient: Inez Rodriguez  Date of Admission:  5/17/2025    Requesting Clinician / Team: Hospital Medicine  Reason for consult: Goals of care       Recommendations & Counseling     GOALS OF CARE:   Hospice Care - patient wishes to discharge home with hospice enrollment.  Appreciate SW facilitation.  Patient prefers home vs facility at this time.  Educated that end of life may bring increased dependence for cares. Patient understands.     ADVANCE CARE PLANNING:  No health care directive on file. Per system policy, Surrogate Decision-makers for Patients With Diminished Decision-making Capacity offers guidance on possible decision-makers. Kenneth (son) has been identified as a surrogate decision maker.   There is no POLST form on file, defer to patient and/or next of kin for decisions   Code status: No CPR- Do NOT Intubate    MEDICAL MANAGEMENT:   We are not actively managing symptoms at this time.    PSYCHOSOCIAL/SPIRITUAL SUPPORT:  Family : Bart Cooper  Stephanie community: No Restorationist     Palliative Care will continue to follow. Thank you for the consult and allowing us to aid in the care of Inez Rodriguez.    These recommendations have been discussed with hospitalist, SW, nursing staff.    TANESHA Tolentino CNP  [facility], Palliative Care  Securely message with the [employer] Web Console (learn more here) or  Text page via [facility] Paging/Directory         Assessment      Inez Rodriguez is a 74 year old female with a past medical history of metastatic lung cancer with lesions to bone and brain on treatment with adagrasib, malnutrition, subacute CVA, who presented on 5/17/25 with poor oral intake, nausea, fatigue.    Patient was hospitalized from 5/6 - 5/12 for similar symptoms of N/V/D.  At that hospitalization she was open to continued cancer directed treatments, but had begun to verbalize concern about her quality of life and pondering hospice  "enrollment. Discharge plan at that time was to go home with assistance from her son and follow up with Oncology clinic to discuss dose reduction.       Patient was found to have YAMILKA, likely due to dehydration.  Brain MRI completed and was unchanged from prior imaging.  Patient was admitted to Observation for hydration and symptom management.    Today, the patient was seen for:  Goals of care  Patient support    History of Present Illness   Met with Inez at the bedside.   Re-Introduced the role of palliative care as an interdisciplinary team that cares for patients with serious illness to help support symptom management, communication, coping for patients and their families as well as support with medical decision making.    Inez verbalized her clear goals of care.  Discussed her poor quality of life and that she \"doesn't want to spend the end of life going in and out of the hospital.\"  Stated readiness for hospice enrollment.  Reviewed how her son is coping with her news, she stated he is having difficulty accepting all of the information, but that he would understand.      Prognosis, Goals, & Planning:   Functional Status just prior to this current hospitalization:  Outpatient Palliative Performance Score (PPS) 50%  Extensive disease. Normal or reduced intake; normal LOC or confusion; little ambulation (mainly sit/lie), ADLs w/much assistance, unable to do any work.      Prognosis, Goals, and/or Advance Care Planning:  Education provided regarding hospice philosophy, prognostic,and eligibility criteria. Discussed what services are provided and those that are not,  Discussed common misconceptions. We explored the various disposition options where they can receive hospice care (home, residential hospice homes, LTC with hospice) including subsequent financial and familial implications. Discussed typical anticipated timing of discharge.  Patient has decided to discharge home with hospice enrollment.    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    Medications:  Reviewed this patient's medication profile and medications from this hospitalization.   [place] Board of Pharmacy Data Base Reviewed: Yes:   reviewed - controlled substances reflected in medication list..    ROS:  Comprehensive ROS is reviewed and is negative except as here & per HPI:     Physical Exam   Vital Signs with Ranges  Temp:  [97.6  F (36.4  C)-101  F (38.3  C)] 97.6  F (36.4  C)  Pulse:  [] 93  Resp:  [18-23] 20  BP: ()/(49-66) 105/66  SpO2:  [93 %-100 %] 97 %  Wt Readings from Last 10 Encounters:   05/19/25 60 kg (132 lb 4.8 oz)   05/10/25 60.8 kg (134 lb)   03/27/25 59.1 kg (130 lb 3.2 oz)   01/23/25 57.6 kg (126 lb 14.4 oz)   12/12/24 55.2 kg (121 lb 11.2 oz)   10/31/24 54.1 kg (119 lb 3.2 oz)   09/26/24 52.3 kg (115 lb 4.8 oz)   09/23/24 55 kg (121 lb 4.8 oz)   09/09/24 54.4 kg (120 lb)   08/24/24 53.5 kg (118 lb)     132 lbs 4.8 oz    PHYSICAL EXAM:  Constitutional: alert, no distress, and underweight   Cardiovascular: negative  Respiratory: negative  Psychiatric: mentation appears normal and affect normal/bright  Abdomen: Abdomen soft, non-tender. BS normal.   NEURO: No focal deficits. Alert and oriented x4    Data reviewed:  Results for orders placed or performed during the hospital encounter of 05/17/25 (from the past 24 hours)   Potassium   Result Value Ref Range    Potassium 3.7 3.4 - 5.3 mmol/L   Phosphorus   Result Value Ref Range    Phosphorus 3.6 2.5 - 4.5 mg/dL   Magnesium   Result Value Ref Range    Magnesium 1.5 (L) 1.7 - 2.3 mg/dL   Procalcitonin    Result Value Ref Range    Procalcitonin 0.28 <0.50 ng/mL   CBC with Platelets & Differential    Narrative    The following orders were created for panel order CBC with Platelets & Differential.  Procedure                               Abnormality         Status                     ---------                               -----------         ------                     CBC with platelets and ...[[id number]]  Abnormal            Final result                 Please view results for these tests on the individual orders.   Lactic acid whole blood   Result Value Ref Range    Lactic Acid 0.5 (L) 0.7 - 2.0 mmol/L   CBC with platelets and differential   Result Value Ref Range    WBC Count 9.3 4.0 - 11.0 10e3/uL    RBC Count 2.84 (L) 3.80 - 5.20 10e6/uL    Hemoglobin 8.6 (L) 11.7 - 15.7 g/dL    Hematocrit 26.8 (L) 35.0 - 47.0 %    MCV 94 78 - 100 fL    MCH 30.3 26.5 - 33.0 pg    MCHC 32.1 31.5 - 36.5 g/dL    RDW 14.6 10.0 - 15.0 %    Platelet Count 306 150 - 450 10e3/uL    % Neutrophils 65 %    % Lymphocytes 17 %    % Monocytes 12 %    % Eosinophils 4 %    % Basophils 1 %    % Immature Granulocytes 2 %    NRBCs per 100 WBC 0 <1 /100    Absolute Neutrophils 6.1 1.6 - 8.3 10e3/uL    Absolute Lymphocytes 1.6 0.8 - 5.3 10e3/uL    Absolute Monocytes 1.1 0.0 - 1.3 10e3/uL    Absolute Eosinophils 0.4 0.0 - 0.7 10e3/uL    Absolute Basophils 0.1 0.0 - 0.2 10e3/uL    Absolute Immature Granulocytes 0.2 <=0.4 10e3/uL    Absolute NRBCs 0.0 10e3/uL   Respiratory Panel PCR    Specimen: Nasopharyngeal; Swab   Result Value Ref Range    Adenovirus Not Detected Not Detected    Coronavirus Not Detected Not Detected    Human Metapneumovirus Not Detected Not Detected    Human Rhin/Enterovirus Not Detected Not Detected    Influenza A Not Detected Not Detected    Influenza A, H1 Not Detected Not Detected    Influenza A 2009 H1N1 Not Detected Not Detected    Influenza A, H3 Not Detected Not Detected    Influenza B Not Detected Not Detected     Parainfluenza Virus 1 Not Detected Not Detected    Parainfluenza Virus 2 Not Detected Not Detected    Parainfluenza Virus 3 Not Detected Not Detected    Parainfluenza Virus 4 Not Detected Not Detected    Respiratory Syncytial Virus A Not Detected Not Detected    Respiratory Syncytial Virus B Not Detected Not Detected    Chlamydia Pneumoniae Not Detected Not Detected    Mycoplasma Pneumoniae Not Detected Not Detected    Narrative    The [facility] Respiratory Panel is a qualitative nucleic acid, multiplex, in vitro diagnostic test for the simultaneous detection and identification of multiple respiratory viral and bacterial nucleic acids in nasopharyngeal swabs collected in viral transport media from individual exhibiting signs and symptoms of respiratory infection. The assay has received FDA approval for the testing of nasopharyngeal (NP) swabs only. This test is used for clinical purposes and should not be regarded as investigational or for research. This laboratory is certified under the Clinical Laboratory Improvement Amendments of 1988 (CLIA-88) as qualified to perform high complexity clinical laboratory testing.       Medical Decision Making       Please see A&P for additional details of medical decision making.  MANAGEMENT DISCUSSED with the following over the past 24 hours: hospital medicine, nursing, SW   NOTE(S)/MEDICAL RECORDS REVIEWED over the past 24 hours: Prior hospitalization notes, OP clinic notes, current H&P, nursing notes  Tests REVIEWED in the past 24 hours:  - See lab/imaging results included in the data section of the note  Medical complexity over the past 24 hours:  - Decision to DE-ESCALATE CARE based on prognosis

## 2025-05-19 NOTE — PROGRESS NOTES
Physical Therapy Discharge Summary    Reason for therapy discharge:    Discharged to home.    Progress towards therapy goal(s). See goals on Care Plan in [facility] electronic health record for goal details.  Goals met FWW issued.    Therapy recommendation(s):    None; pt intends to start hospice after getting settled at home.     Judy Billings, DPT 5/19/2025

## 2025-05-19 NOTE — PROGRESS NOTES
Care Management Discharge Note    Discharge Date: 05/20/2025       Discharge Disposition:  home with home care vs hospice    Discharge Services:      Discharge DME:      Discharge Transportation: family or friend will provide    Private pay costs discussed: transportation costs    Education Provided on the Discharge Plan:  yes  Persons Notified of Discharge Plans: yes  Patient/Family in Agreement with the Plan:      Handoff Referral Completed: No, handoff not indicated or clinically appropriate    Additional Information:  Pt requested referral be sent to hospice agency; list of choices discussed ([facility] elected). Referral sent and goal is for agency to meet pt in home. [facility] notified. Son to transport at discharge.  10:36 AM    Brenna Kjellberg, BSW

## 2025-05-19 NOTE — PROGRESS NOTES
TRANSITIONS OF CARE (BRIT) LOG    BRIT tasks should be completed by the CC within one (1) business day of notification of each transition. Follow up contact with member is required after return to their usual care setting.  Note:  If CC finds out about the transitions fifteen (15) days or more after the member has returned to their usual care setting, no BRIT log is needed. However, the CC should check in with the member to discuss the transition process, any changes needed to the care plan and document it in a case note.     Member Name:  Inez Rodriguez O Name:  nicole O/Health Plan Member ID#:    Product: [facility] Care Coordinator Contact:  LOPEZ Razo Agency/County/Care System: [facility]   Transition Communication Actions from Care Management Contact   Transition #1   Notification Date: 05/19 Transition Date:   05/17-sat Transition From: Home     Is this the member s usual care setting?               yes Transition To: [facility]   Transition Type:  Unplanned    Documentation from conversation with the member/responsible party, provider, discharging and receiving facility:   Date: 05/19: Received notification of admission to hospital with dx of weaknes  CC contacted Hospital /discharge plannerZEUS(Name) via the [facility] Hand-In Process, with community care plan included.  CC reached out to adult john Cooepr regarding transition and offered support as needed.  Reviewed and update support plan as needed.  Notified community service providers and placed services None on hold as needed.  Transition log initiated.   PCP, Lynn Bowles, notified of hospitalization via EMR.     Transition #2   Notification Date: 05/19 Transition Date:   05/19 Transition From: [facility]     Is this the member s usual care setting?               no Transition To: Home   Transition Type:  Planned    Documentation  from conversation with the member/responsible party, provider, discharging and receiving facility:   Date: 05/19: Received notification of transition to home.  CC contacted adult john young and reviewed discharge summary.  Member has a follow-up appointment with PCP in 7 days: No: Offered Assistance with setting up a follow up appointment   Member has had a change in condition: Yes:   Home visit needed: No  Support Plan reviewed and updated.  The following home based services None were resumed.  New referrals placed: No  Transition log completed.   PCP, Lynn Bowles, notified of transition back to home via EMR.    Inez has decided to enroll in Hospice.      Transition #3   Notification Date:  Transition Date:    Transition From:      Is this the member s usual care setting?                Transition To:    Transition Type:      Documentation from conversation with the member/responsible party, provider, discharging and receiving facility:   Date: :        Transition #4   Notification Date:  Transition Date:    Transition From:      Is this the member s usual care setting?                Transition To:    Transition Type:      Documentation from conversation with the member/responsible party, provider, discharging and receiving facility:   Date: :        Transition #5   Notification Date:  Transition Date:    Transition From:      Is this the member s usual care setting?                Transition To:    Transition Type:      Documentation from conversation with the member/responsible party, provider, discharging and receiving facility:   Date: :        *RETURN TO USUAL CARE SETTING: *Complete tasks below when the member is discharging TO their usual care setting within one (1) business day of notification..      For situations where the Care Coordinator is notified of the discharge prior to the date of discharge, the Care Coordinator must follow up with the member or designated representative to confirm  that discharge actually occurred and discuss required BRIT tasks as outlined in the BRIT Instructions.  (This includes situations where it may be a  new  usual care setting for the member. (i.e., a community member who decides upon permanent nursing home placement following hospitalization and rehab).    Discuss with Member/Responsible Party:    Check  Yes  - if the member, family member and/or SNF/facility staff manages the following:    If  No  provide explanation in the comments section.          Date completed:  Communicated with member or their designated representative about the following:  care transition process; about changes to the member s health status; plan of care updates; education about transitions and how to prevent unplanned transitions/readmissions    Four Pillars for Optimal Transition:    Check  Yes  - if the member, family member and/or SNF/facility staff manages the following:    If  No  provide explanation in the comments section.          []  Yes     []  No Does the member have a follow-up appointment scheduled with primary care or specialist? (Mental health hospitalizations--the appt. should be w/in 7 days)              For mental health hospitalizations:  []  Yes     []  No     Does the member have a follow-up appointment scheduled with a mental health practitioner within 7 days of discharge?  []  Yes     []  No     Has a medication review been completed with member? If no, refer to PCP, home care nurse, MTM, pharmacist  []  Yes     []  No     Can the member manage their medications or is there a system in place to manage medications (e.g. home care set-up)?         []  Yes     []  No     Can the member verbalize warning signs and symptoms to watch for and how to respond?  []  Yes     []  No     Does the member have a copy of and understand their discharge instructions?  If no, assist to obtain copy of discharge instructions, review discharge instructions, and assist to contact PCP to  discuss questions about their recent hospitalization.  []  Yes     []  No     Does the member have adequate food, housing and transportation?  If no, add goal and discuss additional supports available to the member                                                                                                                                                                                 []  Yes     []  No     Is the member safe in their home?  If no, document needs and support provided                                                                                                                                                                          []  Yes     []  No     Are there any concerns of vulnerability, abuse, or neglect?  If yes, document concerns and actions taken by Care Coordinator as a mandated                                                                                                                                                                              []  Yes     []  No     Does the member use a Personal Health Care Record?  Check  Yes  if visit summary, discharge summary, and/or healthcare summary are being used as a PHR.                                                                                                                                                                                  []  Yes     []  No     Have you reviewed the discharge summary with the member? If  No  provide explanation in comments.  []  Yes     []  No     Have you updated the member s care plan/support plan? Add new diagnosis, medications, treatments, goals & interventions, as applicable. If No, provide explanation in comments.    Comments:           Notes from conversation with the member/responsible party, provider, discharging and receiving facility (as applicable):     Myriam Duggan [facility]  [phone]

## 2025-05-20 ENCOUNTER — TELEPHONE (OUTPATIENT)
Dept: INTERNAL MEDICINE | Facility: CLINIC | Age: 75
End: 2025-05-20
Payer: COMMERCIAL

## 2025-05-20 DIAGNOSIS — I10 BENIGN ESSENTIAL HYPERTENSION: ICD-10-CM

## 2025-05-20 RX ORDER — AMLODIPINE BESYLATE 5 MG/1
5 TABLET ORAL DAILY
Qty: 90 TABLET | Refills: 1 | Status: SHIPPED | OUTPATIENT
Start: 2025-05-20

## 2025-05-20 NOTE — TELEPHONE ENCOUNTER
LMTCB       Called Kenneth, pts son (CtC), regarding tomorrows appointment. Pt is now on hospice, and unsure if tomorrows appt is needed.       If son calls back and believes appt for pt is not needed, please cancel appt.       Thank you,  J Luis Mtz Jr., CMA on 5/20/2025 at 9:08 AM

## 2025-05-20 NOTE — TELEPHONE ENCOUNTER
Called and talked to pts son, Kenneth, regarding appt tomorrow. Pt is not on hospice currently, and will not know until 05/30/25 if she will be accepted. Per son, they are weighing their options.       Pt will not be attending appt due to not being able to walk. Would like BP meds filled, however, if possible.       Routing to PCP regarding this request.     J Luis Mtz Jr., CMA on 5/20/2025 at 11:41 AM

## 2025-05-21 LAB
ATRIAL RATE - MUSE: 92 BPM
DIASTOLIC BLOOD PRESSURE - MUSE: 57 MMHG
INTERPRETATION ECG - MUSE: NORMAL
P AXIS - MUSE: 34 DEGREES
PR INTERVAL - MUSE: 182 MS
QRS DURATION - MUSE: 82 MS
QT - MUSE: 378 MS
QTC - MUSE: 467 MS
R AXIS - MUSE: 28 DEGREES
SYSTOLIC BLOOD PRESSURE - MUSE: 103 MMHG
T AXIS - MUSE: 113 DEGREES
VENTRICULAR RATE- MUSE: 92 BPM

## 2025-05-22 ENCOUNTER — TELEPHONE (OUTPATIENT)
Dept: INTERNAL MEDICINE | Facility: CLINIC | Age: 75
End: 2025-05-22
Payer: COMMERCIAL

## 2025-05-22 LAB
BACTERIA SPEC CULT: NORMAL
BACTERIA SPEC CULT: NORMAL

## 2025-05-22 NOTE — PROGRESS NOTES
Physical Therapy        05/18/25 1410   Appointment Info   Signing Clinician's Name / Credentials (PT) Judy Billings, PT, DPT   Living Environment   People in Home child(bernice), adult   Current Living Arrangements house   Home Accessibility stairs within home   Number of Stairs, Within Home, Primary greater than 10 stairs   Stair Railings, Within Home, Primary railings safe and in good condition   Transportation Anticipated family or friend will provide   Living Environment Comments able to stay on one level   Self-Care   Activity/Exercise/Self-Care Comment independent at baseline   General Information   Onset of Illness/Injury or Date of Surgery 05/17/25   Referring Physician Dharmesh Oleary MD   Patient/Family Therapy Goals Statement (PT) go home, possibly start hospice   Pertinent History of Current Problem (include personal factors and/or comorbidities that impact the POC) 74 year old female with history of metastatic lung cancer on treatment with adagrasib admitted on 5/17/2025 with poor oral intake, nausea, vomiting, and suspected YAMILKA.   Existing Precautions/Restrictions fall   Cognition   Affect/Mental Status (Cognition) WFL   Pain Assessment   Patient Currently in Pain Yes, see Vital Sign flowsheet   Integumentary/Edema   Integumentary/Edema no deficits were identifed   Posture    Posture Forward head position;Protracted shoulders   Range of Motion (ROM)   Range of Motion ROM is WFL   Strength (Manual Muscle Testing)   Strength (Manual Muscle Testing) Deficits observed during functional mobility   Bed Mobility   Comment, (Bed Mobility) SBA supine to sit   Transfers   Comment, (Transfers) CGA sit to stand   Gait/Stairs (Locomotion)   Comment, (Gait/Stairs) CGA 10' without AD   Balance   Balance Comments mild deficits; path and speed variation without AD   Sensory Examination   Sensory Perception Comments RLE numbness/tingling   Coordination   Coordination no deficits were identified   Muscle Tone   Muscle  Tone no deficits were identified   Clinical Impression   Criteria for Skilled Therapeutic Intervention Yes, treatment indicated   PT Diagnosis (PT) difficulty walking   Influenced by the following impairments decreased strength and balance   Functional limitations due to impairments limited household mobility   Clinical Presentation (PT Evaluation Complexity) stable   Clinical Presentation Rationale presents as medically diagnosed   Planned Therapy Interventions (PT) balance training;bed mobility training;gait training;home exercise program;neuromuscular re-education;patient/family education;ROM (range of motion);stair training;strengthening;transfer training;progressive activity/exercise   Risk & Benefits of therapy have been explained evaluation/treatment results reviewed;current/potential barriers reviewed;participants voiced agreement with care plan;participants included;patient   PT Total Evaluation Time   PT Eval, Moderate Complexity Minutes (87966) 10   Physical Therapy Goals   PT Predicted Duration/Target Date for Goal Attainment 05/25/25   PT Goals Bed Mobility;Transfers;Gait;Stairs   PT: Bed Mobility Supervision/stand-by assist;Supine to/from sit   PT: Transfers Supervision/stand-by assist;Sit to/from stand   PT: Gait Supervision/stand-by assist;Greater than 200 feet;Rolling walker   PT: Stairs Supervision/stand-by assist;3 stairs;Rail on both sides   Interventions   Interventions Quick Adds Gait Training   Gait Training   Gait Training Minutes (44590) 10   Symptoms Noted During/After Treatment (Gait Training) fatigue   Treatment Detail/Skilled Intervention slow pace, path/speed variation throughout walk. Cues for safety, foot clearance, breathing   Distance in Feet 65   [place] Level (Gait Training) contact guard   Physical Assistance Level (Gait Training) 1 person assist;verbal cues;nonverbal cues (demo/gestures)   Weight Bearing (Gait Training) weight-bearing as tolerated   Assistive Device (Gait  Training)   (none)   Gait Analysis Deviations decreased stride length;decreased toe-to-floor clearance;decreased velocity of limb motion;increased time in double stance;decreased ester   PT Discharge Planning   PT Plan GT and standing balance ex without AD   PT Discharge Recommendation (DC Rec) home   PT Rationale for DC Rec pt fairly steady on her feet, lives with son   PT Brief overview of current status SBA transfers and ambulation. Pt interested in palliative care   PT Total Distance Amb During Session (feet) 75   Physical Therapy Time and Intention   Timed Code Treatment Minutes 10   Total Session Time (sum of timed and untimed services) 20       Judy Billings, DPT 5/21/2025

## 2025-05-23 ENCOUNTER — MEDICAL CORRESPONDENCE (OUTPATIENT)
Dept: HEALTH INFORMATION MANAGEMENT | Facility: CLINIC | Age: 75
End: 2025-05-23
Payer: COMMERCIAL

## 2025-05-24 LAB
BACTERIA SPEC CULT: NO GROWTH
BACTERIA SPEC CULT: NO GROWTH

## 2025-05-27 ENCOUNTER — PATIENT OUTREACH (OUTPATIENT)
Dept: GERIATRIC MEDICINE | Facility: CLINIC | Age: 75
End: 2025-05-27
Payer: COMMERCIAL

## 2025-05-27 ENCOUNTER — TELEPHONE (OUTPATIENT)
Dept: INTERNAL MEDICINE | Facility: CLINIC | Age: 75
End: 2025-05-27
Payer: COMMERCIAL

## 2025-05-27 NOTE — TELEPHONE ENCOUNTER
JANETH [facility] - Order # [id number]    Forms received and placed on PCP inbox to review and sign

## 2025-05-27 NOTE — PROGRESS NOTES
[facility] Care Coordination Contact    Called member and left a voice mail message to remind member to schedule Dental and Wellness  exam(s).  Left contact info.     FRANCES Henry  [facility]  [phone]

## 2025-06-08 ENCOUNTER — HEALTH MAINTENANCE LETTER (OUTPATIENT)
Age: 75
End: 2025-06-08

## 2025-07-30 ENCOUNTER — TELEPHONE (OUTPATIENT)
Dept: INTERNAL MEDICINE | Facility: CLINIC | Age: 75
End: 2025-07-30
Payer: COMMERCIAL

## 2025-07-30 NOTE — TELEPHONE ENCOUNTER
Patient Quality Outreach    Patient is due for the following:   Physical Annual Wellness Visit    Action(s) Taken:   Patient is on Hospice     Type of outreach:    Chart review performed, no outreach needed.    Questions for provider review:    None         Katty Thompson  Chart routed to None.

## (undated) DEVICE — TUBING SUCTION MEDI-VAC 1/4"X20' N620A

## (undated) DEVICE — ENDO NDL BX ASPIRATION EBUS 21GA VIZISHOT NA-201SX-4021

## (undated) DEVICE — FORCEP BIOPSY RADIAL JAW 4 BRONCH M00515180

## (undated) DEVICE — SUCTION MANIFOLD NEPTUNE 2 SYS 1 PORT 702-025-000

## (undated) RX ORDER — FENTANYL CITRATE 50 UG/ML
INJECTION, SOLUTION INTRAMUSCULAR; INTRAVENOUS
Status: DISPENSED
Start: 2024-01-08

## (undated) RX ORDER — LIDOCAINE HYDROCHLORIDE 10 MG/ML
INJECTION, SOLUTION EPIDURAL; INFILTRATION; INTRACAUDAL; PERINEURAL
Status: DISPENSED
Start: 2024-01-08

## (undated) RX ORDER — PROPOFOL 10 MG/ML
INJECTION, EMULSION INTRAVENOUS
Status: DISPENSED
Start: 2024-01-08

## (undated) RX ORDER — HEPARIN SODIUM (PORCINE) LOCK FLUSH IV SOLN 100 UNIT/ML 100 UNIT/ML
SOLUTION INTRAVENOUS
Status: DISPENSED
Start: 2024-07-01

## (undated) RX ORDER — FENTANYL CITRATE 50 UG/ML
INJECTION, SOLUTION INTRAMUSCULAR; INTRAVENOUS
Status: DISPENSED
Start: 2024-02-12

## (undated) RX ORDER — HEPARIN SODIUM (PORCINE) LOCK FLUSH IV SOLN 100 UNIT/ML 100 UNIT/ML
SOLUTION INTRAVENOUS
Status: DISPENSED
Start: 2024-09-24

## (undated) RX ORDER — LIDOCAINE HYDROCHLORIDE AND EPINEPHRINE 10; 10 MG/ML; UG/ML
INJECTION, SOLUTION INFILTRATION; PERINEURAL
Status: DISPENSED
Start: 2024-01-08

## (undated) RX ORDER — HEPARIN SODIUM (PORCINE) LOCK FLUSH IV SOLN 100 UNIT/ML 100 UNIT/ML
SOLUTION INTRAVENOUS
Status: DISPENSED
Start: 2024-12-10

## (undated) RX ORDER — HEPARIN SODIUM (PORCINE) LOCK FLUSH IV SOLN 100 UNIT/ML 100 UNIT/ML
SOLUTION INTRAVENOUS
Status: DISPENSED
Start: 2024-03-21

## (undated) RX ORDER — ONDANSETRON 2 MG/ML
INJECTION INTRAMUSCULAR; INTRAVENOUS
Status: DISPENSED
Start: 2024-02-12

## (undated) RX ORDER — CEFAZOLIN SODIUM/WATER 2 G/20 ML
SYRINGE (ML) INTRAVENOUS
Status: DISPENSED
Start: 2024-02-12

## (undated) RX ORDER — HEPARIN SODIUM (PORCINE) LOCK FLUSH IV SOLN 100 UNIT/ML 100 UNIT/ML
SOLUTION INTRAVENOUS
Status: DISPENSED
Start: 2024-05-02